# Patient Record
Sex: MALE | Race: WHITE | NOT HISPANIC OR LATINO | Employment: OTHER | URBAN - METROPOLITAN AREA
[De-identification: names, ages, dates, MRNs, and addresses within clinical notes are randomized per-mention and may not be internally consistent; named-entity substitution may affect disease eponyms.]

---

## 2017-12-17 ENCOUNTER — HOSPITAL ENCOUNTER (INPATIENT)
Facility: HOSPITAL | Age: 57
LOS: 8 days | Discharge: HOME | End: 2017-12-25
Attending: EMERGENCY MEDICINE | Admitting: HOSPITALIST

## 2017-12-17 ENCOUNTER — APPOINTMENT (OUTPATIENT)
Dept: CT IMAGING | Facility: HOSPITAL | Age: 57
End: 2017-12-17

## 2017-12-17 DIAGNOSIS — F10.930 ALCOHOL WITHDRAWAL SYNDROME WITHOUT COMPLICATION (HCC): Primary | ICD-10-CM

## 2017-12-17 DIAGNOSIS — Z74.09 IMPAIRED FUNCTIONAL MOBILITY, BALANCE, AND ENDURANCE: ICD-10-CM

## 2017-12-17 PROBLEM — Z72.0 TOBACCO ABUSE: Status: ACTIVE | Noted: 2017-12-17

## 2017-12-17 PROBLEM — F10.20 ALCOHOLISM: Status: ACTIVE | Noted: 2017-12-17

## 2017-12-17 PROBLEM — F41.9 ANXIETY: Status: ACTIVE | Noted: 2017-12-17

## 2017-12-17 PROBLEM — C64.9 RENAL CANCER, UNSPECIFIED LATERALITY (HCC): Status: ACTIVE | Noted: 2017-12-17

## 2017-12-17 PROBLEM — K76.0 FATTY LIVER: Status: ACTIVE | Noted: 2017-12-17

## 2017-12-17 LAB
ALBUMIN SERPL-MCNC: 4.2 G/DL (ref 3.5–5.2)
ALBUMIN/GLOB SERPL: 1.1 G/DL
ALP SERPL-CCNC: 122 U/L (ref 39–117)
ALT SERPL W P-5'-P-CCNC: 39 U/L (ref 1–41)
AMPHET+METHAMPHET UR QL: NEGATIVE
ANION GAP SERPL CALCULATED.3IONS-SCNC: 30.8 MMOL/L
AST SERPL-CCNC: 81 U/L (ref 1–40)
BARBITURATES UR QL SCN: NEGATIVE
BASOPHILS # BLD AUTO: 0.01 10*3/MM3 (ref 0–0.2)
BASOPHILS NFR BLD AUTO: 0.1 % (ref 0–1.5)
BENZODIAZ UR QL SCN: NEGATIVE
BILIRUB SERPL-MCNC: 0.8 MG/DL (ref 0.1–1.2)
BILIRUB UR QL STRIP: NEGATIVE
BUN BLD-MCNC: 9 MG/DL (ref 6–20)
BUN/CREAT SERPL: 7.6 (ref 7–25)
CALCIUM SPEC-SCNC: 9.4 MG/DL (ref 8.6–10.5)
CANNABINOIDS SERPL QL: NEGATIVE
CHLORIDE SERPL-SCNC: 81 MMOL/L (ref 98–107)
CLARITY UR: CLEAR
CO2 SERPL-SCNC: 22.2 MMOL/L (ref 22–29)
COCAINE UR QL: NEGATIVE
COLOR UR: ABNORMAL
CREAT BLD-MCNC: 1.19 MG/DL (ref 0.76–1.27)
DEPRECATED RDW RBC AUTO: 53.2 FL (ref 37–54)
EOSINOPHIL # BLD AUTO: 0.02 10*3/MM3 (ref 0–0.7)
EOSINOPHIL NFR BLD AUTO: 0.3 % (ref 0.3–6.2)
ERYTHROCYTE [DISTWIDTH] IN BLOOD BY AUTOMATED COUNT: 14.1 % (ref 11.5–14.5)
ETHANOL BLD-MCNC: 54 MG/DL (ref 0–10)
ETHANOL UR QL: 0.05 %
GFR SERPL CREATININE-BSD FRML MDRD: 63 ML/MIN/1.73
GLOBULIN UR ELPH-MCNC: 4 GM/DL
GLUCOSE BLD-MCNC: 112 MG/DL (ref 65–99)
GLUCOSE UR STRIP-MCNC: NEGATIVE MG/DL
HCT VFR BLD AUTO: 39.2 % (ref 40.4–52.2)
HGB BLD-MCNC: 14 G/DL (ref 13.7–17.6)
HGB UR QL STRIP.AUTO: NEGATIVE
HOLD SPECIMEN: NORMAL
HOLD SPECIMEN: NORMAL
IMM GRANULOCYTES # BLD: 0.02 10*3/MM3 (ref 0–0.03)
IMM GRANULOCYTES NFR BLD: 0.3 % (ref 0–0.5)
KETONES UR QL STRIP: NEGATIVE
LEUKOCYTE ESTERASE UR QL STRIP.AUTO: NEGATIVE
LIPASE SERPL-CCNC: 67 U/L (ref 13–60)
LYMPHOCYTES # BLD AUTO: 1.01 10*3/MM3 (ref 0.9–4.8)
LYMPHOCYTES NFR BLD AUTO: 13 % (ref 19.6–45.3)
MCH RBC QN AUTO: 37.3 PG (ref 27–32.7)
MCHC RBC AUTO-ENTMCNC: 35.7 G/DL (ref 32.6–36.4)
MCV RBC AUTO: 104.5 FL (ref 79.8–96.2)
METHADONE UR QL SCN: NEGATIVE
MONOCYTES # BLD AUTO: 0.54 10*3/MM3 (ref 0.2–1.2)
MONOCYTES NFR BLD AUTO: 7 % (ref 5–12)
NEUTROPHILS # BLD AUTO: 6.16 10*3/MM3 (ref 1.9–8.1)
NEUTROPHILS NFR BLD AUTO: 79.3 % (ref 42.7–76)
NITRITE UR QL STRIP: NEGATIVE
OPIATES UR QL: NEGATIVE
OXYCODONE UR QL SCN: NEGATIVE
PH UR STRIP.AUTO: 6.5 [PH] (ref 5–8)
PLATELET # BLD AUTO: 148 10*3/MM3 (ref 140–500)
PMV BLD AUTO: 9.8 FL (ref 6–12)
POTASSIUM BLD-SCNC: 3.7 MMOL/L (ref 3.5–5.2)
PROT SERPL-MCNC: 8.2 G/DL (ref 6–8.5)
PROT UR QL STRIP: NEGATIVE
RBC # BLD AUTO: 3.75 10*6/MM3 (ref 4.6–6)
SODIUM BLD-SCNC: 134 MMOL/L (ref 136–145)
SP GR UR STRIP: 1.01 (ref 1–1.03)
UROBILINOGEN UR QL STRIP: ABNORMAL
WBC NRBC COR # BLD: 7.76 10*3/MM3 (ref 4.5–10.7)
WHOLE BLOOD HOLD SPECIMEN: NORMAL
WHOLE BLOOD HOLD SPECIMEN: NORMAL

## 2017-12-17 PROCEDURE — 80053 COMPREHEN METABOLIC PANEL: CPT | Performed by: NURSE PRACTITIONER

## 2017-12-17 PROCEDURE — 80307 DRUG TEST PRSMV CHEM ANLYZR: CPT | Performed by: NURSE PRACTITIONER

## 2017-12-17 PROCEDURE — 99285 EMERGENCY DEPT VISIT HI MDM: CPT

## 2017-12-17 PROCEDURE — 83690 ASSAY OF LIPASE: CPT | Performed by: HOSPITALIST

## 2017-12-17 PROCEDURE — 90791 PSYCH DIAGNOSTIC EVALUATION: CPT | Performed by: SOCIAL WORKER

## 2017-12-17 PROCEDURE — 81003 URINALYSIS AUTO W/O SCOPE: CPT | Performed by: NURSE PRACTITIONER

## 2017-12-17 PROCEDURE — 74176 CT ABD & PELVIS W/O CONTRAST: CPT

## 2017-12-17 PROCEDURE — 25010000002 ONDANSETRON PER 1 MG: Performed by: NURSE PRACTITIONER

## 2017-12-17 PROCEDURE — 85025 COMPLETE CBC W/AUTO DIFF WBC: CPT | Performed by: NURSE PRACTITIONER

## 2017-12-17 PROCEDURE — 25010000002 MAGNESIUM SULFATE PER 500 MG OF MAGNESIUM: Performed by: NURSE PRACTITIONER

## 2017-12-17 PROCEDURE — 25010000002 THIAMINE PER 100 MG: Performed by: NURSE PRACTITIONER

## 2017-12-17 PROCEDURE — 25010000002 ONDANSETRON PER 1 MG: Performed by: HOSPITALIST

## 2017-12-17 PROCEDURE — 25010000002 LORAZEPAM PER 2 MG: Performed by: NURSE PRACTITIONER

## 2017-12-17 RX ORDER — PROPRANOLOL HYDROCHLORIDE 40 MG/1
60 TABLET ORAL 2 TIMES DAILY
COMMUNITY

## 2017-12-17 RX ORDER — LORAZEPAM 1 MG/1
1 TABLET ORAL
Status: DISCONTINUED | OUTPATIENT
Start: 2017-12-17 | End: 2017-12-25 | Stop reason: HOSPADM

## 2017-12-17 RX ORDER — LORAZEPAM 2 MG/ML
1 INJECTION INTRAMUSCULAR ONCE
Status: COMPLETED | OUTPATIENT
Start: 2017-12-17 | End: 2017-12-17

## 2017-12-17 RX ORDER — HYDROCHLOROTHIAZIDE 25 MG/1
25 TABLET ORAL DAILY
COMMUNITY
End: 2017-12-25 | Stop reason: HOSPADM

## 2017-12-17 RX ORDER — AMLODIPINE BESYLATE 5 MG/1
5 TABLET ORAL DAILY
Status: DISCONTINUED | OUTPATIENT
Start: 2017-12-17 | End: 2017-12-25 | Stop reason: HOSPADM

## 2017-12-17 RX ORDER — HYDROCHLOROTHIAZIDE 25 MG/1
25 TABLET ORAL DAILY
Status: DISCONTINUED | OUTPATIENT
Start: 2017-12-17 | End: 2017-12-21

## 2017-12-17 RX ORDER — DIPHENOXYLATE HYDROCHLORIDE AND ATROPINE SULFATE 2.5; .025 MG/1; MG/1
1 TABLET ORAL DAILY
Status: DISCONTINUED | OUTPATIENT
Start: 2017-12-17 | End: 2017-12-25 | Stop reason: HOSPADM

## 2017-12-17 RX ORDER — SODIUM CHLORIDE 9 MG/ML
100 INJECTION, SOLUTION INTRAVENOUS CONTINUOUS
Status: DISCONTINUED | OUTPATIENT
Start: 2017-12-17 | End: 2017-12-20

## 2017-12-17 RX ORDER — BUPROPION HYDROCHLORIDE 100 MG/1
300 TABLET ORAL DAILY
COMMUNITY
End: 2019-03-25 | Stop reason: HOSPADM

## 2017-12-17 RX ORDER — ACETAMINOPHEN 325 MG/1
650 TABLET ORAL EVERY 4 HOURS PRN
Status: DISCONTINUED | OUTPATIENT
Start: 2017-12-17 | End: 2017-12-25 | Stop reason: HOSPADM

## 2017-12-17 RX ORDER — ONDANSETRON 2 MG/ML
4 INJECTION INTRAMUSCULAR; INTRAVENOUS EVERY 4 HOURS PRN
Status: DISCONTINUED | OUTPATIENT
Start: 2017-12-17 | End: 2017-12-25 | Stop reason: HOSPADM

## 2017-12-17 RX ORDER — LORAZEPAM 2 MG/ML
1 INJECTION INTRAMUSCULAR EVERY 8 HOURS
Status: COMPLETED | OUTPATIENT
Start: 2017-12-18 | End: 2017-12-19

## 2017-12-17 RX ORDER — LORAZEPAM 2 MG/ML
1 INJECTION INTRAMUSCULAR EVERY 6 HOURS
Status: DISPENSED | OUTPATIENT
Start: 2017-12-17 | End: 2017-12-18

## 2017-12-17 RX ORDER — HYDROCODONE BITARTRATE AND ACETAMINOPHEN 5; 325 MG/1; MG/1
1 TABLET ORAL EVERY 6 HOURS PRN
Status: DISCONTINUED | OUTPATIENT
Start: 2017-12-17 | End: 2017-12-25 | Stop reason: HOSPADM

## 2017-12-17 RX ORDER — SODIUM CHLORIDE 0.9 % (FLUSH) 0.9 %
1-10 SYRINGE (ML) INJECTION AS NEEDED
Status: DISCONTINUED | OUTPATIENT
Start: 2017-12-17 | End: 2017-12-25 | Stop reason: HOSPADM

## 2017-12-17 RX ORDER — ONDANSETRON 2 MG/ML
4 INJECTION INTRAMUSCULAR; INTRAVENOUS ONCE
Status: COMPLETED | OUTPATIENT
Start: 2017-12-17 | End: 2017-12-17

## 2017-12-17 RX ORDER — THIAMINE MONONITRATE (VIT B1) 100 MG
100 TABLET ORAL DAILY
Status: DISCONTINUED | OUTPATIENT
Start: 2017-12-17 | End: 2017-12-25 | Stop reason: HOSPADM

## 2017-12-17 RX ORDER — ONDANSETRON 2 MG/ML
4 INJECTION INTRAMUSCULAR; INTRAVENOUS EVERY 4 HOURS
Status: DISCONTINUED | OUTPATIENT
Start: 2017-12-17 | End: 2017-12-17

## 2017-12-17 RX ORDER — AMLODIPINE BESYLATE 5 MG/1
5 TABLET ORAL DAILY
COMMUNITY
End: 2022-06-03 | Stop reason: HOSPADM

## 2017-12-17 RX ORDER — PROPRANOLOL HYDROCHLORIDE 40 MG/1
40 TABLET ORAL 3 TIMES DAILY
Status: DISCONTINUED | OUTPATIENT
Start: 2017-12-17 | End: 2017-12-25 | Stop reason: HOSPADM

## 2017-12-17 RX ORDER — SODIUM CHLORIDE 0.9 % (FLUSH) 0.9 %
10 SYRINGE (ML) INJECTION AS NEEDED
Status: DISCONTINUED | OUTPATIENT
Start: 2017-12-17 | End: 2017-12-25 | Stop reason: HOSPADM

## 2017-12-17 RX ORDER — HYDROCODONE BITARTRATE AND ACETAMINOPHEN 5; 325 MG/1; MG/1
1 TABLET ORAL EVERY 6 HOURS PRN
Status: ON HOLD | COMMUNITY
End: 2019-03-20

## 2017-12-17 RX ORDER — PANTOPRAZOLE SODIUM 40 MG/1
40 TABLET, DELAYED RELEASE ORAL
Status: DISCONTINUED | OUTPATIENT
Start: 2017-12-17 | End: 2017-12-25 | Stop reason: HOSPADM

## 2017-12-17 RX ADMIN — SODIUM CHLORIDE 1000 ML: 9 INJECTION, SOLUTION INTRAVENOUS at 09:13

## 2017-12-17 RX ADMIN — LORAZEPAM 1 MG: 1 TABLET ORAL at 20:01

## 2017-12-17 RX ADMIN — LORAZEPAM 1 MG: 1 TABLET ORAL at 12:59

## 2017-12-17 RX ADMIN — FOLIC ACID 1000 ML/HR: 5 INJECTION, SOLUTION INTRAMUSCULAR; INTRAVENOUS; SUBCUTANEOUS at 07:38

## 2017-12-17 RX ADMIN — LORAZEPAM 1 MG: 1 TABLET ORAL at 15:27

## 2017-12-17 RX ADMIN — ONDANSETRON 4 MG: 2 INJECTION INTRAMUSCULAR; INTRAVENOUS at 16:43

## 2017-12-17 RX ADMIN — Medication 1 TABLET: at 15:14

## 2017-12-17 RX ADMIN — LORAZEPAM 1 MG: 1 TABLET ORAL at 22:10

## 2017-12-17 RX ADMIN — Medication 100 MG: at 15:14

## 2017-12-17 RX ADMIN — SODIUM CHLORIDE 100 ML/HR: 9 INJECTION, SOLUTION INTRAVENOUS at 15:19

## 2017-12-17 RX ADMIN — ONDANSETRON 4 MG: 2 INJECTION INTRAMUSCULAR; INTRAVENOUS at 09:54

## 2017-12-17 RX ADMIN — PANTOPRAZOLE SODIUM 40 MG: 40 TABLET, DELAYED RELEASE ORAL at 16:43

## 2017-12-17 RX ADMIN — Medication 10 ML: at 15:16

## 2017-12-17 RX ADMIN — BUPROPION HYDROCHLORIDE 350 MG: 100 TABLET, FILM COATED ORAL at 15:15

## 2017-12-17 RX ADMIN — PROPRANOLOL HYDROCHLORIDE 40 MG: 40 TABLET ORAL at 15:15

## 2017-12-17 RX ADMIN — HYDROCHLOROTHIAZIDE 25 MG: 25 TABLET ORAL at 15:19

## 2017-12-17 RX ADMIN — LORAZEPAM 1 MG: 2 INJECTION INTRAMUSCULAR; INTRAVENOUS at 09:55

## 2017-12-17 RX ADMIN — ONDANSETRON 4 MG: 2 INJECTION INTRAMUSCULAR; INTRAVENOUS at 21:04

## 2017-12-17 RX ADMIN — LORAZEPAM 1 MG: 2 INJECTION INTRAMUSCULAR; INTRAVENOUS at 07:01

## 2017-12-17 RX ADMIN — ONDANSETRON 4 MG: 2 INJECTION INTRAMUSCULAR; INTRAVENOUS at 07:01

## 2017-12-17 RX ADMIN — LORAZEPAM 1 MG: 1 TABLET ORAL at 18:09

## 2017-12-17 RX ADMIN — AMLODIPINE BESYLATE 5 MG: 5 TABLET ORAL at 15:15

## 2017-12-18 LAB
ALBUMIN SERPL-MCNC: 3.7 G/DL (ref 3.5–5.2)
ALBUMIN/GLOB SERPL: 1.2 G/DL
ALP SERPL-CCNC: 94 U/L (ref 39–117)
ALT SERPL W P-5'-P-CCNC: 27 U/L (ref 1–41)
ANION GAP SERPL CALCULATED.3IONS-SCNC: 14 MMOL/L
AST SERPL-CCNC: 55 U/L (ref 1–40)
BASOPHILS # BLD AUTO: 0.01 10*3/MM3 (ref 0–0.2)
BASOPHILS NFR BLD AUTO: 0.2 % (ref 0–1.5)
BILIRUB SERPL-MCNC: 1.5 MG/DL (ref 0.1–1.2)
BUN BLD-MCNC: 9 MG/DL (ref 6–20)
BUN/CREAT SERPL: 8.4 (ref 7–25)
CALCIUM SPEC-SCNC: 8.8 MG/DL (ref 8.6–10.5)
CHLORIDE SERPL-SCNC: 94 MMOL/L (ref 98–107)
CO2 SERPL-SCNC: 26 MMOL/L (ref 22–29)
CREAT BLD-MCNC: 1.07 MG/DL (ref 0.76–1.27)
DEPRECATED RDW RBC AUTO: 54.5 FL (ref 37–54)
EOSINOPHIL # BLD AUTO: 0.05 10*3/MM3 (ref 0–0.7)
EOSINOPHIL NFR BLD AUTO: 1.1 % (ref 0.3–6.2)
ERYTHROCYTE [DISTWIDTH] IN BLOOD BY AUTOMATED COUNT: 13.9 % (ref 11.5–14.5)
GFR SERPL CREATININE-BSD FRML MDRD: 71 ML/MIN/1.73
GLOBULIN UR ELPH-MCNC: 3.1 GM/DL
GLUCOSE BLD-MCNC: 77 MG/DL (ref 65–99)
HCT VFR BLD AUTO: 35.2 % (ref 40.4–52.2)
HGB BLD-MCNC: 12.1 G/DL (ref 13.7–17.6)
IMM GRANULOCYTES # BLD: 0.02 10*3/MM3 (ref 0–0.03)
IMM GRANULOCYTES NFR BLD: 0.4 % (ref 0–0.5)
LYMPHOCYTES # BLD AUTO: 0.74 10*3/MM3 (ref 0.9–4.8)
LYMPHOCYTES NFR BLD AUTO: 16.3 % (ref 19.6–45.3)
MACROCYTES BLD QL SMEAR: NORMAL
MCH RBC QN AUTO: 36.9 PG (ref 27–32.7)
MCHC RBC AUTO-ENTMCNC: 34.4 G/DL (ref 32.6–36.4)
MCV RBC AUTO: 107.3 FL (ref 79.8–96.2)
MONOCYTES # BLD AUTO: 0.38 10*3/MM3 (ref 0.2–1.2)
MONOCYTES NFR BLD AUTO: 8.4 % (ref 5–12)
NEUTROPHILS # BLD AUTO: 3.33 10*3/MM3 (ref 1.9–8.1)
NEUTROPHILS NFR BLD AUTO: 73.6 % (ref 42.7–76)
PLAT MORPH BLD: NORMAL
PLATELET # BLD AUTO: 84 10*3/MM3 (ref 140–500)
PMV BLD AUTO: 10.3 FL (ref 6–12)
POTASSIUM BLD-SCNC: 3.6 MMOL/L (ref 3.5–5.2)
PROT SERPL-MCNC: 6.8 G/DL (ref 6–8.5)
RBC # BLD AUTO: 3.28 10*6/MM3 (ref 4.6–6)
SODIUM BLD-SCNC: 134 MMOL/L (ref 136–145)
STOMATOCYTES BLD QL SMEAR: NORMAL
WBC MORPH BLD: NORMAL
WBC NRBC COR # BLD: 4.53 10*3/MM3 (ref 4.5–10.7)

## 2017-12-18 PROCEDURE — 25010000002 LORAZEPAM PER 2 MG: Performed by: HOSPITALIST

## 2017-12-18 PROCEDURE — 85007 BL SMEAR W/DIFF WBC COUNT: CPT | Performed by: HOSPITALIST

## 2017-12-18 PROCEDURE — 25010000002 LORAZEPAM PER 2 MG: Performed by: INTERNAL MEDICINE

## 2017-12-18 PROCEDURE — 85025 COMPLETE CBC W/AUTO DIFF WBC: CPT | Performed by: HOSPITALIST

## 2017-12-18 PROCEDURE — 80053 COMPREHEN METABOLIC PANEL: CPT | Performed by: HOSPITALIST

## 2017-12-18 RX ORDER — LORAZEPAM 1 MG/1
1 TABLET ORAL
Status: DISCONTINUED | OUTPATIENT
Start: 2017-12-18 | End: 2017-12-25 | Stop reason: HOSPADM

## 2017-12-18 RX ORDER — LORAZEPAM 2 MG/ML
2 INJECTION INTRAMUSCULAR
Status: DISCONTINUED | OUTPATIENT
Start: 2017-12-18 | End: 2017-12-25 | Stop reason: HOSPADM

## 2017-12-18 RX ORDER — LORAZEPAM 2 MG/ML
1 INJECTION INTRAMUSCULAR
Status: DISCONTINUED | OUTPATIENT
Start: 2017-12-18 | End: 2017-12-25 | Stop reason: HOSPADM

## 2017-12-18 RX ORDER — LORAZEPAM 1 MG/1
2 TABLET ORAL
Status: DISCONTINUED | OUTPATIENT
Start: 2017-12-18 | End: 2017-12-25 | Stop reason: HOSPADM

## 2017-12-18 RX ADMIN — LORAZEPAM 2 MG: 2 INJECTION INTRAMUSCULAR; INTRAVENOUS at 05:47

## 2017-12-18 RX ADMIN — SODIUM CHLORIDE 100 ML/HR: 9 INJECTION, SOLUTION INTRAVENOUS at 12:23

## 2017-12-18 RX ADMIN — LORAZEPAM 2 MG: 2 INJECTION INTRAMUSCULAR; INTRAVENOUS at 23:35

## 2017-12-18 RX ADMIN — PROPRANOLOL HYDROCHLORIDE 40 MG: 40 TABLET ORAL at 20:55

## 2017-12-18 RX ADMIN — Medication 100 MG: at 12:25

## 2017-12-18 RX ADMIN — LORAZEPAM 1 MG: 2 INJECTION, SOLUTION INTRAMUSCULAR; INTRAVENOUS at 02:05

## 2017-12-18 RX ADMIN — BUPROPION HYDROCHLORIDE 350 MG: 100 TABLET, FILM COATED ORAL at 12:25

## 2017-12-18 RX ADMIN — AMLODIPINE BESYLATE 5 MG: 5 TABLET ORAL at 12:25

## 2017-12-18 RX ADMIN — HYDROCHLOROTHIAZIDE 25 MG: 25 TABLET ORAL at 12:25

## 2017-12-18 RX ADMIN — LORAZEPAM 2 MG: 2 INJECTION INTRAMUSCULAR; INTRAVENOUS at 04:41

## 2017-12-18 RX ADMIN — LORAZEPAM 1 MG: 1 TABLET ORAL at 00:11

## 2017-12-18 RX ADMIN — Medication 1 TABLET: at 12:25

## 2017-12-18 RX ADMIN — LORAZEPAM 1 MG: 1 TABLET ORAL at 03:28

## 2017-12-18 RX ADMIN — LORAZEPAM 1 MG: 2 INJECTION, SOLUTION INTRAMUSCULAR; INTRAVENOUS at 12:28

## 2017-12-18 RX ADMIN — LORAZEPAM 2 MG: 2 INJECTION INTRAMUSCULAR; INTRAVENOUS at 14:26

## 2017-12-18 RX ADMIN — LORAZEPAM 1 MG: 2 INJECTION, SOLUTION INTRAMUSCULAR; INTRAVENOUS at 20:53

## 2017-12-18 RX ADMIN — LORAZEPAM 2 MG: 2 INJECTION, SOLUTION INTRAMUSCULAR; INTRAVENOUS at 08:06

## 2017-12-18 RX ADMIN — PANTOPRAZOLE SODIUM 40 MG: 40 TABLET, DELAYED RELEASE ORAL at 18:09

## 2017-12-18 RX ADMIN — LORAZEPAM 1 MG: 2 INJECTION, SOLUTION INTRAMUSCULAR; INTRAVENOUS at 18:19

## 2017-12-18 RX ADMIN — LORAZEPAM 2 MG: 2 INJECTION INTRAMUSCULAR; INTRAVENOUS at 19:33

## 2017-12-18 RX ADMIN — LORAZEPAM 2 MG: 2 INJECTION INTRAMUSCULAR; INTRAVENOUS at 10:49

## 2017-12-18 RX ADMIN — SODIUM CHLORIDE 100 ML/HR: 9 INJECTION, SOLUTION INTRAVENOUS at 02:05

## 2017-12-18 RX ADMIN — SODIUM CHLORIDE 100 ML/HR: 9 INJECTION, SOLUTION INTRAVENOUS at 23:29

## 2017-12-19 LAB
ALBUMIN SERPL-MCNC: 3.9 G/DL (ref 3.5–5.2)
ALBUMIN/GLOB SERPL: 1.1 G/DL
ALP SERPL-CCNC: 98 U/L (ref 39–117)
ALT SERPL W P-5'-P-CCNC: 29 U/L (ref 1–41)
ANION GAP SERPL CALCULATED.3IONS-SCNC: 11.7 MMOL/L
AST SERPL-CCNC: 65 U/L (ref 1–40)
BASOPHILS # BLD AUTO: 0.01 10*3/MM3 (ref 0–0.2)
BASOPHILS NFR BLD AUTO: 0.1 % (ref 0–1.5)
BILIRUB SERPL-MCNC: 1.3 MG/DL (ref 0.1–1.2)
BUN BLD-MCNC: 6 MG/DL (ref 6–20)
BUN/CREAT SERPL: 6 (ref 7–25)
CALCIUM SPEC-SCNC: 8.9 MG/DL (ref 8.6–10.5)
CHLORIDE SERPL-SCNC: 91 MMOL/L (ref 98–107)
CO2 SERPL-SCNC: 28.3 MMOL/L (ref 22–29)
CREAT BLD-MCNC: 1 MG/DL (ref 0.76–1.27)
DEPRECATED RDW RBC AUTO: 53.7 FL (ref 37–54)
EOSINOPHIL # BLD AUTO: 0.09 10*3/MM3 (ref 0–0.7)
EOSINOPHIL NFR BLD AUTO: 1.3 % (ref 0.3–6.2)
ERYTHROCYTE [DISTWIDTH] IN BLOOD BY AUTOMATED COUNT: 13.8 % (ref 11.5–14.5)
GFR SERPL CREATININE-BSD FRML MDRD: 76 ML/MIN/1.73
GLOBULIN UR ELPH-MCNC: 3.5 GM/DL
GLUCOSE BLD-MCNC: 74 MG/DL (ref 65–99)
HCT VFR BLD AUTO: 37.7 % (ref 40.4–52.2)
HGB BLD-MCNC: 12.9 G/DL (ref 13.7–17.6)
IMM GRANULOCYTES # BLD: 0.02 10*3/MM3 (ref 0–0.03)
IMM GRANULOCYTES NFR BLD: 0.3 % (ref 0–0.5)
LIPASE SERPL-CCNC: 99 U/L (ref 13–60)
LYMPHOCYTES # BLD AUTO: 0.73 10*3/MM3 (ref 0.9–4.8)
LYMPHOCYTES NFR BLD AUTO: 10.5 % (ref 19.6–45.3)
MCH RBC QN AUTO: 36.9 PG (ref 27–32.7)
MCHC RBC AUTO-ENTMCNC: 34.2 G/DL (ref 32.6–36.4)
MCV RBC AUTO: 107.7 FL (ref 79.8–96.2)
MONOCYTES # BLD AUTO: 0.46 10*3/MM3 (ref 0.2–1.2)
MONOCYTES NFR BLD AUTO: 6.6 % (ref 5–12)
NEUTROPHILS # BLD AUTO: 5.63 10*3/MM3 (ref 1.9–8.1)
NEUTROPHILS NFR BLD AUTO: 81.2 % (ref 42.7–76)
PLATELET # BLD AUTO: 62 10*3/MM3 (ref 140–500)
PMV BLD AUTO: 11 FL (ref 6–12)
POTASSIUM BLD-SCNC: 3.6 MMOL/L (ref 3.5–5.2)
PROT SERPL-MCNC: 7.4 G/DL (ref 6–8.5)
RBC # BLD AUTO: 3.5 10*6/MM3 (ref 4.6–6)
SODIUM BLD-SCNC: 131 MMOL/L (ref 136–145)
WBC NRBC COR # BLD: 6.94 10*3/MM3 (ref 4.5–10.7)

## 2017-12-19 PROCEDURE — 25010000002 LORAZEPAM PER 2 MG: Performed by: INTERNAL MEDICINE

## 2017-12-19 PROCEDURE — 25010000002 ONDANSETRON PER 1 MG: Performed by: HOSPITALIST

## 2017-12-19 PROCEDURE — 85025 COMPLETE CBC W/AUTO DIFF WBC: CPT | Performed by: HOSPITALIST

## 2017-12-19 PROCEDURE — 80053 COMPREHEN METABOLIC PANEL: CPT | Performed by: HOSPITALIST

## 2017-12-19 PROCEDURE — 25010000002 LORAZEPAM PER 2 MG: Performed by: HOSPITALIST

## 2017-12-19 PROCEDURE — 83690 ASSAY OF LIPASE: CPT | Performed by: HOSPITALIST

## 2017-12-19 RX ORDER — NICOTINE 21 MG/24HR
1 PATCH, TRANSDERMAL 24 HOURS TRANSDERMAL EVERY 24 HOURS
Status: DISCONTINUED | OUTPATIENT
Start: 2017-12-19 | End: 2017-12-25 | Stop reason: HOSPADM

## 2017-12-19 RX ADMIN — PANTOPRAZOLE SODIUM 40 MG: 40 TABLET, DELAYED RELEASE ORAL at 16:32

## 2017-12-19 RX ADMIN — SODIUM CHLORIDE 100 ML/HR: 9 INJECTION, SOLUTION INTRAVENOUS at 20:59

## 2017-12-19 RX ADMIN — NICOTINE 1 PATCH: 21 PATCH, EXTENDED RELEASE TRANSDERMAL at 16:32

## 2017-12-19 RX ADMIN — ONDANSETRON 4 MG: 2 INJECTION INTRAMUSCULAR; INTRAVENOUS at 22:19

## 2017-12-19 RX ADMIN — PROPRANOLOL HYDROCHLORIDE 40 MG: 40 TABLET ORAL at 16:32

## 2017-12-19 RX ADMIN — LORAZEPAM 1 MG: 2 INJECTION, SOLUTION INTRAMUSCULAR; INTRAVENOUS at 05:46

## 2017-12-19 RX ADMIN — LORAZEPAM 1 MG: 2 INJECTION, SOLUTION INTRAMUSCULAR; INTRAVENOUS at 16:32

## 2017-12-19 RX ADMIN — Medication 100 MG: at 08:51

## 2017-12-19 RX ADMIN — LORAZEPAM 1 MG: 1 TABLET ORAL at 22:19

## 2017-12-19 RX ADMIN — AMLODIPINE BESYLATE 5 MG: 5 TABLET ORAL at 08:51

## 2017-12-19 RX ADMIN — PANTOPRAZOLE SODIUM 40 MG: 40 TABLET, DELAYED RELEASE ORAL at 08:50

## 2017-12-19 RX ADMIN — PROPRANOLOL HYDROCHLORIDE 40 MG: 40 TABLET ORAL at 08:51

## 2017-12-19 RX ADMIN — HYDROCHLOROTHIAZIDE 25 MG: 25 TABLET ORAL at 08:51

## 2017-12-19 RX ADMIN — BUPROPION HYDROCHLORIDE 350 MG: 100 TABLET, FILM COATED ORAL at 08:51

## 2017-12-19 RX ADMIN — Medication 1 TABLET: at 08:51

## 2017-12-19 RX ADMIN — LORAZEPAM 1 MG: 2 INJECTION, SOLUTION INTRAMUSCULAR; INTRAVENOUS at 12:52

## 2017-12-19 RX ADMIN — PROPRANOLOL HYDROCHLORIDE 40 MG: 40 TABLET ORAL at 20:13

## 2017-12-19 RX ADMIN — LORAZEPAM 1 MG: 2 INJECTION, SOLUTION INTRAMUSCULAR; INTRAVENOUS at 08:52

## 2017-12-20 LAB
ALBUMIN SERPL-MCNC: 3.6 G/DL (ref 3.5–5.2)
ALBUMIN/GLOB SERPL: 1 G/DL
ALP SERPL-CCNC: 99 U/L (ref 39–117)
ALT SERPL W P-5'-P-CCNC: 26 U/L (ref 1–41)
ANION GAP SERPL CALCULATED.3IONS-SCNC: 9.2 MMOL/L
AST SERPL-CCNC: 56 U/L (ref 1–40)
BASOPHILS # BLD AUTO: 0.01 10*3/MM3 (ref 0–0.2)
BASOPHILS NFR BLD AUTO: 0.1 % (ref 0–1.5)
BILIRUB SERPL-MCNC: 1.3 MG/DL (ref 0.1–1.2)
BUN BLD-MCNC: 7 MG/DL (ref 6–20)
BUN/CREAT SERPL: 7.1 (ref 7–25)
CALCIUM SPEC-SCNC: 8.9 MG/DL (ref 8.6–10.5)
CHLORIDE SERPL-SCNC: 92 MMOL/L (ref 98–107)
CO2 SERPL-SCNC: 30.8 MMOL/L (ref 22–29)
CREAT BLD-MCNC: 0.98 MG/DL (ref 0.76–1.27)
DEPRECATED RDW RBC AUTO: 51.8 FL (ref 37–54)
EOSINOPHIL # BLD AUTO: 0.13 10*3/MM3 (ref 0–0.7)
EOSINOPHIL NFR BLD AUTO: 1.7 % (ref 0.3–6.2)
ERYTHROCYTE [DISTWIDTH] IN BLOOD BY AUTOMATED COUNT: 13.5 % (ref 11.5–14.5)
GFR SERPL CREATININE-BSD FRML MDRD: 78 ML/MIN/1.73
GLOBULIN UR ELPH-MCNC: 3.6 GM/DL
GLUCOSE BLD-MCNC: 76 MG/DL (ref 65–99)
HCT VFR BLD AUTO: 36.6 % (ref 40.4–52.2)
HGB BLD-MCNC: 12.5 G/DL (ref 13.7–17.6)
IMM GRANULOCYTES # BLD: 0.03 10*3/MM3 (ref 0–0.03)
IMM GRANULOCYTES NFR BLD: 0.4 % (ref 0–0.5)
LYMPHOCYTES # BLD AUTO: 0.81 10*3/MM3 (ref 0.9–4.8)
LYMPHOCYTES NFR BLD AUTO: 10.5 % (ref 19.6–45.3)
MCH RBC QN AUTO: 36.4 PG (ref 27–32.7)
MCHC RBC AUTO-ENTMCNC: 34.2 G/DL (ref 32.6–36.4)
MCV RBC AUTO: 106.7 FL (ref 79.8–96.2)
MONOCYTES # BLD AUTO: 0.6 10*3/MM3 (ref 0.2–1.2)
MONOCYTES NFR BLD AUTO: 7.8 % (ref 5–12)
NEUTROPHILS # BLD AUTO: 6.13 10*3/MM3 (ref 1.9–8.1)
NEUTROPHILS NFR BLD AUTO: 79.5 % (ref 42.7–76)
PLATELET # BLD AUTO: 64 10*3/MM3 (ref 140–500)
PMV BLD AUTO: 11.9 FL (ref 6–12)
POTASSIUM BLD-SCNC: 3.5 MMOL/L (ref 3.5–5.2)
PROT SERPL-MCNC: 7.2 G/DL (ref 6–8.5)
RBC # BLD AUTO: 3.43 10*6/MM3 (ref 4.6–6)
SODIUM BLD-SCNC: 132 MMOL/L (ref 136–145)
WBC NRBC COR # BLD: 7.71 10*3/MM3 (ref 4.5–10.7)

## 2017-12-20 PROCEDURE — 25010000002 ONDANSETRON PER 1 MG: Performed by: HOSPITALIST

## 2017-12-20 PROCEDURE — 85025 COMPLETE CBC W/AUTO DIFF WBC: CPT | Performed by: HOSPITALIST

## 2017-12-20 PROCEDURE — 80053 COMPREHEN METABOLIC PANEL: CPT | Performed by: HOSPITALIST

## 2017-12-20 PROCEDURE — 97162 PT EVAL MOD COMPLEX 30 MIN: CPT

## 2017-12-20 PROCEDURE — 97110 THERAPEUTIC EXERCISES: CPT

## 2017-12-20 PROCEDURE — 25010000002 LORAZEPAM PER 2 MG: Performed by: INTERNAL MEDICINE

## 2017-12-20 RX ADMIN — AMLODIPINE BESYLATE 5 MG: 5 TABLET ORAL at 08:02

## 2017-12-20 RX ADMIN — ONDANSETRON 4 MG: 2 INJECTION INTRAMUSCULAR; INTRAVENOUS at 06:30

## 2017-12-20 RX ADMIN — SODIUM CHLORIDE 100 ML/HR: 9 INJECTION, SOLUTION INTRAVENOUS at 08:17

## 2017-12-20 RX ADMIN — LORAZEPAM 1 MG: 1 TABLET ORAL at 04:13

## 2017-12-20 RX ADMIN — LORAZEPAM 1 MG: 2 INJECTION, SOLUTION INTRAMUSCULAR; INTRAVENOUS at 22:28

## 2017-12-20 RX ADMIN — Medication 100 MG: at 08:02

## 2017-12-20 RX ADMIN — LORAZEPAM 2 MG: 2 INJECTION, SOLUTION INTRAMUSCULAR; INTRAVENOUS at 17:54

## 2017-12-20 RX ADMIN — HYDROCHLOROTHIAZIDE 25 MG: 25 TABLET ORAL at 08:02

## 2017-12-20 RX ADMIN — BUPROPION HYDROCHLORIDE 350 MG: 100 TABLET, FILM COATED ORAL at 08:02

## 2017-12-20 RX ADMIN — PANTOPRAZOLE SODIUM 40 MG: 40 TABLET, DELAYED RELEASE ORAL at 08:02

## 2017-12-20 RX ADMIN — NICOTINE 1 PATCH: 21 PATCH, EXTENDED RELEASE TRANSDERMAL at 17:00

## 2017-12-20 RX ADMIN — PROPRANOLOL HYDROCHLORIDE 40 MG: 40 TABLET ORAL at 18:27

## 2017-12-20 RX ADMIN — PROPRANOLOL HYDROCHLORIDE 40 MG: 40 TABLET ORAL at 08:02

## 2017-12-20 RX ADMIN — LORAZEPAM 1 MG: 1 TABLET ORAL at 11:41

## 2017-12-20 RX ADMIN — Medication 1 TABLET: at 08:02

## 2017-12-21 PROBLEM — E87.6 HYPOKALEMIA: Status: ACTIVE | Noted: 2017-12-21

## 2017-12-21 PROBLEM — E87.1 HYPONATREMIA: Status: ACTIVE | Noted: 2017-12-21

## 2017-12-21 LAB
ANION GAP SERPL CALCULATED.3IONS-SCNC: 15.1 MMOL/L
BASOPHILS # BLD AUTO: 0.02 10*3/MM3 (ref 0–0.2)
BASOPHILS NFR BLD AUTO: 0.3 % (ref 0–1.5)
BUN BLD-MCNC: 8 MG/DL (ref 6–20)
BUN/CREAT SERPL: 7.8 (ref 7–25)
CALCIUM SPEC-SCNC: 9 MG/DL (ref 8.6–10.5)
CHLORIDE SERPL-SCNC: 89 MMOL/L (ref 98–107)
CO2 SERPL-SCNC: 24.9 MMOL/L (ref 22–29)
CREAT BLD-MCNC: 1.03 MG/DL (ref 0.76–1.27)
DEPRECATED RDW RBC AUTO: 53.2 FL (ref 37–54)
EOSINOPHIL # BLD AUTO: 0.1 10*3/MM3 (ref 0–0.7)
EOSINOPHIL NFR BLD AUTO: 1.3 % (ref 0.3–6.2)
ERYTHROCYTE [DISTWIDTH] IN BLOOD BY AUTOMATED COUNT: 13.8 % (ref 11.5–14.5)
GFR SERPL CREATININE-BSD FRML MDRD: 74 ML/MIN/1.73
GLUCOSE BLD-MCNC: 79 MG/DL (ref 65–99)
HCT VFR BLD AUTO: 36.6 % (ref 40.4–52.2)
HGB BLD-MCNC: 12.5 G/DL (ref 13.7–17.6)
IMM GRANULOCYTES # BLD: 0.03 10*3/MM3 (ref 0–0.03)
IMM GRANULOCYTES NFR BLD: 0.4 % (ref 0–0.5)
LYMPHOCYTES # BLD AUTO: 1.05 10*3/MM3 (ref 0.9–4.8)
LYMPHOCYTES NFR BLD AUTO: 13.2 % (ref 19.6–45.3)
MCH RBC QN AUTO: 36.4 PG (ref 27–32.7)
MCHC RBC AUTO-ENTMCNC: 34.2 G/DL (ref 32.6–36.4)
MCV RBC AUTO: 106.7 FL (ref 79.8–96.2)
MONOCYTES # BLD AUTO: 0.92 10*3/MM3 (ref 0.2–1.2)
MONOCYTES NFR BLD AUTO: 11.5 % (ref 5–12)
NEUTROPHILS # BLD AUTO: 5.86 10*3/MM3 (ref 1.9–8.1)
NEUTROPHILS NFR BLD AUTO: 73.3 % (ref 42.7–76)
PLATELET # BLD AUTO: 103 10*3/MM3 (ref 140–500)
PMV BLD AUTO: 11.3 FL (ref 6–12)
POTASSIUM BLD-SCNC: 3.2 MMOL/L (ref 3.5–5.2)
POTASSIUM BLD-SCNC: 4 MMOL/L (ref 3.5–5.2)
RBC # BLD AUTO: 3.43 10*6/MM3 (ref 4.6–6)
SODIUM BLD-SCNC: 129 MMOL/L (ref 136–145)
WBC NRBC COR # BLD: 7.98 10*3/MM3 (ref 4.5–10.7)

## 2017-12-21 PROCEDURE — 84132 ASSAY OF SERUM POTASSIUM: CPT | Performed by: INTERNAL MEDICINE

## 2017-12-21 PROCEDURE — 85025 COMPLETE CBC W/AUTO DIFF WBC: CPT | Performed by: INTERNAL MEDICINE

## 2017-12-21 PROCEDURE — 25010000002 LORAZEPAM PER 2 MG: Performed by: INTERNAL MEDICINE

## 2017-12-21 PROCEDURE — 25010000002 ONDANSETRON PER 1 MG: Performed by: HOSPITALIST

## 2017-12-21 PROCEDURE — 80048 BASIC METABOLIC PNL TOTAL CA: CPT | Performed by: INTERNAL MEDICINE

## 2017-12-21 RX ORDER — POTASSIUM CHLORIDE 750 MG/1
40 CAPSULE, EXTENDED RELEASE ORAL AS NEEDED
Status: DISCONTINUED | OUTPATIENT
Start: 2017-12-21 | End: 2017-12-25 | Stop reason: HOSPADM

## 2017-12-21 RX ORDER — SODIUM CHLORIDE 9 MG/ML
75 INJECTION, SOLUTION INTRAVENOUS CONTINUOUS
Status: DISCONTINUED | OUTPATIENT
Start: 2017-12-21 | End: 2017-12-23

## 2017-12-21 RX ORDER — POTASSIUM CHLORIDE 1.5 G/1.77G
40 POWDER, FOR SOLUTION ORAL AS NEEDED
Status: DISCONTINUED | OUTPATIENT
Start: 2017-12-21 | End: 2017-12-21 | Stop reason: CLARIF

## 2017-12-21 RX ORDER — POTASSIUM CHLORIDE 7.45 MG/ML
10 INJECTION INTRAVENOUS
Status: DISCONTINUED | OUTPATIENT
Start: 2017-12-21 | End: 2017-12-25 | Stop reason: HOSPADM

## 2017-12-21 RX ADMIN — SODIUM CHLORIDE 75 ML/HR: 9 INJECTION, SOLUTION INTRAVENOUS at 23:44

## 2017-12-21 RX ADMIN — PROPRANOLOL HYDROCHLORIDE 40 MG: 40 TABLET ORAL at 21:57

## 2017-12-21 RX ADMIN — HYDROCHLOROTHIAZIDE 25 MG: 25 TABLET ORAL at 08:17

## 2017-12-21 RX ADMIN — PROPRANOLOL HYDROCHLORIDE 40 MG: 40 TABLET ORAL at 08:17

## 2017-12-21 RX ADMIN — POTASSIUM CHLORIDE 40 MEQ: 750 CAPSULE, EXTENDED RELEASE ORAL at 14:59

## 2017-12-21 RX ADMIN — LORAZEPAM 2 MG: 2 INJECTION INTRAMUSCULAR; INTRAVENOUS at 04:00

## 2017-12-21 RX ADMIN — LORAZEPAM 1 MG: 1 TABLET ORAL at 12:52

## 2017-12-21 RX ADMIN — Medication 100 MG: at 08:17

## 2017-12-21 RX ADMIN — HYDROCODONE BITARTRATE AND ACETAMINOPHEN 1 TABLET: 5; 325 TABLET ORAL at 12:51

## 2017-12-21 RX ADMIN — POTASSIUM CHLORIDE 40 MEQ: 750 CAPSULE, EXTENDED RELEASE ORAL at 11:19

## 2017-12-21 RX ADMIN — PROPRANOLOL HYDROCHLORIDE 40 MG: 40 TABLET ORAL at 15:51

## 2017-12-21 RX ADMIN — PANTOPRAZOLE SODIUM 40 MG: 40 TABLET, DELAYED RELEASE ORAL at 17:58

## 2017-12-21 RX ADMIN — ONDANSETRON 4 MG: 2 INJECTION INTRAMUSCULAR; INTRAVENOUS at 22:10

## 2017-12-21 RX ADMIN — BUPROPION HYDROCHLORIDE 350 MG: 100 TABLET, FILM COATED ORAL at 08:17

## 2017-12-21 RX ADMIN — NICOTINE 1 PATCH: 21 PATCH, EXTENDED RELEASE TRANSDERMAL at 17:34

## 2017-12-21 RX ADMIN — LORAZEPAM 1 MG: 2 INJECTION, SOLUTION INTRAMUSCULAR; INTRAVENOUS at 22:13

## 2017-12-21 RX ADMIN — LORAZEPAM 2 MG: 2 INJECTION INTRAMUSCULAR; INTRAVENOUS at 06:39

## 2017-12-21 RX ADMIN — AMLODIPINE BESYLATE 5 MG: 5 TABLET ORAL at 08:17

## 2017-12-21 RX ADMIN — PANTOPRAZOLE SODIUM 40 MG: 40 TABLET, DELAYED RELEASE ORAL at 05:23

## 2017-12-21 RX ADMIN — LORAZEPAM 2 MG: 2 INJECTION INTRAMUSCULAR; INTRAVENOUS at 05:23

## 2017-12-21 RX ADMIN — Medication 1 TABLET: at 08:17

## 2017-12-21 RX ADMIN — SODIUM CHLORIDE 75 ML/HR: 9 INJECTION, SOLUTION INTRAVENOUS at 10:40

## 2017-12-21 RX ADMIN — LORAZEPAM 2 MG: 2 INJECTION INTRAMUSCULAR; INTRAVENOUS at 00:38

## 2017-12-21 RX ADMIN — LORAZEPAM 2 MG: 2 INJECTION INTRAMUSCULAR; INTRAVENOUS at 02:06

## 2017-12-22 LAB
ANION GAP SERPL CALCULATED.3IONS-SCNC: 13.1 MMOL/L
BASOPHILS # BLD AUTO: 0.03 10*3/MM3 (ref 0–0.2)
BASOPHILS NFR BLD AUTO: 0.5 % (ref 0–1.5)
BUN BLD-MCNC: 9 MG/DL (ref 6–20)
BUN/CREAT SERPL: 9.2 (ref 7–25)
CALCIUM SPEC-SCNC: 9 MG/DL (ref 8.6–10.5)
CHLORIDE SERPL-SCNC: 93 MMOL/L (ref 98–107)
CO2 SERPL-SCNC: 23.9 MMOL/L (ref 22–29)
CREAT BLD-MCNC: 0.98 MG/DL (ref 0.76–1.27)
DEPRECATED RDW RBC AUTO: 56.7 FL (ref 37–54)
EOSINOPHIL # BLD AUTO: 0.09 10*3/MM3 (ref 0–0.7)
EOSINOPHIL NFR BLD AUTO: 1.6 % (ref 0.3–6.2)
ERYTHROCYTE [DISTWIDTH] IN BLOOD BY AUTOMATED COUNT: 14.3 % (ref 11.5–14.5)
GFR SERPL CREATININE-BSD FRML MDRD: 78 ML/MIN/1.73
GLUCOSE BLD-MCNC: 75 MG/DL (ref 65–99)
HCT VFR BLD AUTO: 37.4 % (ref 40.4–52.2)
HGB BLD-MCNC: 12.7 G/DL (ref 13.7–17.6)
IMM GRANULOCYTES # BLD: 0.04 10*3/MM3 (ref 0–0.03)
IMM GRANULOCYTES NFR BLD: 0.7 % (ref 0–0.5)
LYMPHOCYTES # BLD AUTO: 0.82 10*3/MM3 (ref 0.9–4.8)
LYMPHOCYTES NFR BLD AUTO: 14.4 % (ref 19.6–45.3)
MAGNESIUM SERPL-MCNC: 1.7 MG/DL (ref 1.6–2.6)
MCH RBC QN AUTO: 37 PG (ref 27–32.7)
MCHC RBC AUTO-ENTMCNC: 34 G/DL (ref 32.6–36.4)
MCV RBC AUTO: 109 FL (ref 79.8–96.2)
MONOCYTES # BLD AUTO: 0.93 10*3/MM3 (ref 0.2–1.2)
MONOCYTES NFR BLD AUTO: 16.4 % (ref 5–12)
NEUTROPHILS # BLD AUTO: 3.77 10*3/MM3 (ref 1.9–8.1)
NEUTROPHILS NFR BLD AUTO: 66.4 % (ref 42.7–76)
PHOSPHATE SERPL-MCNC: 2.9 MG/DL (ref 2.5–4.5)
PLATELET # BLD AUTO: 152 10*3/MM3 (ref 140–500)
PMV BLD AUTO: 11.2 FL (ref 6–12)
POTASSIUM BLD-SCNC: 3.7 MMOL/L (ref 3.5–5.2)
RBC # BLD AUTO: 3.43 10*6/MM3 (ref 4.6–6)
SODIUM BLD-SCNC: 130 MMOL/L (ref 136–145)
WBC NRBC COR # BLD: 5.68 10*3/MM3 (ref 4.5–10.7)

## 2017-12-22 PROCEDURE — 83735 ASSAY OF MAGNESIUM: CPT | Performed by: INTERNAL MEDICINE

## 2017-12-22 PROCEDURE — 84100 ASSAY OF PHOSPHORUS: CPT | Performed by: INTERNAL MEDICINE

## 2017-12-22 PROCEDURE — 80048 BASIC METABOLIC PNL TOTAL CA: CPT | Performed by: INTERNAL MEDICINE

## 2017-12-22 PROCEDURE — 97110 THERAPEUTIC EXERCISES: CPT | Performed by: PHYSICAL THERAPIST

## 2017-12-22 PROCEDURE — 85025 COMPLETE CBC W/AUTO DIFF WBC: CPT | Performed by: INTERNAL MEDICINE

## 2017-12-22 RX ADMIN — LORAZEPAM 1 MG: 1 TABLET ORAL at 12:12

## 2017-12-22 RX ADMIN — PANTOPRAZOLE SODIUM 40 MG: 40 TABLET, DELAYED RELEASE ORAL at 17:14

## 2017-12-22 RX ADMIN — Medication 100 MG: at 08:04

## 2017-12-22 RX ADMIN — PROPRANOLOL HYDROCHLORIDE 40 MG: 40 TABLET ORAL at 20:00

## 2017-12-22 RX ADMIN — LORAZEPAM 1 MG: 1 TABLET ORAL at 21:10

## 2017-12-22 RX ADMIN — BUPROPION HYDROCHLORIDE 350 MG: 100 TABLET, FILM COATED ORAL at 08:04

## 2017-12-22 RX ADMIN — LORAZEPAM 1 MG: 1 TABLET ORAL at 16:12

## 2017-12-22 RX ADMIN — SODIUM CHLORIDE 75 ML/HR: 9 INJECTION, SOLUTION INTRAVENOUS at 20:00

## 2017-12-22 RX ADMIN — LORAZEPAM 1 MG: 1 TABLET ORAL at 00:49

## 2017-12-22 RX ADMIN — SODIUM CHLORIDE 75 ML/HR: 9 INJECTION, SOLUTION INTRAVENOUS at 13:15

## 2017-12-22 RX ADMIN — HYDROCODONE BITARTRATE AND ACETAMINOPHEN 1 TABLET: 5; 325 TABLET ORAL at 18:33

## 2017-12-22 RX ADMIN — NICOTINE 1 PATCH: 21 PATCH, EXTENDED RELEASE TRANSDERMAL at 16:12

## 2017-12-22 RX ADMIN — LORAZEPAM 1 MG: 1 TABLET ORAL at 20:05

## 2017-12-22 RX ADMIN — Medication 1 TABLET: at 08:04

## 2017-12-22 RX ADMIN — HYDROCODONE BITARTRATE AND ACETAMINOPHEN 1 TABLET: 5; 325 TABLET ORAL at 00:39

## 2017-12-22 RX ADMIN — AMLODIPINE BESYLATE 5 MG: 5 TABLET ORAL at 08:04

## 2017-12-22 RX ADMIN — PANTOPRAZOLE SODIUM 40 MG: 40 TABLET, DELAYED RELEASE ORAL at 08:04

## 2017-12-22 RX ADMIN — ACETAMINOPHEN 650 MG: 325 TABLET, FILM COATED ORAL at 03:04

## 2017-12-22 RX ADMIN — HYDROCODONE BITARTRATE AND ACETAMINOPHEN 1 TABLET: 5; 325 TABLET ORAL at 12:12

## 2017-12-22 RX ADMIN — LORAZEPAM 1 MG: 1 TABLET ORAL at 23:16

## 2017-12-22 RX ADMIN — PROPRANOLOL HYDROCHLORIDE 40 MG: 40 TABLET ORAL at 08:04

## 2017-12-23 LAB
ANION GAP SERPL CALCULATED.3IONS-SCNC: 13.4 MMOL/L
BASOPHILS # BLD AUTO: 0.02 10*3/MM3 (ref 0–0.2)
BASOPHILS NFR BLD AUTO: 0.4 % (ref 0–1.5)
BUN BLD-MCNC: 10 MG/DL (ref 6–20)
BUN/CREAT SERPL: 9.9 (ref 7–25)
CALCIUM SPEC-SCNC: 9.2 MG/DL (ref 8.6–10.5)
CHLORIDE SERPL-SCNC: 96 MMOL/L (ref 98–107)
CO2 SERPL-SCNC: 26.6 MMOL/L (ref 22–29)
CREAT BLD-MCNC: 1.01 MG/DL (ref 0.76–1.27)
DEPRECATED RDW RBC AUTO: 54.2 FL (ref 37–54)
EOSINOPHIL # BLD AUTO: 0.07 10*3/MM3 (ref 0–0.7)
EOSINOPHIL NFR BLD AUTO: 1.5 % (ref 0.3–6.2)
ERYTHROCYTE [DISTWIDTH] IN BLOOD BY AUTOMATED COUNT: 13.7 % (ref 11.5–14.5)
GFR SERPL CREATININE-BSD FRML MDRD: 76 ML/MIN/1.73
GLUCOSE BLD-MCNC: 77 MG/DL (ref 65–99)
HCT VFR BLD AUTO: 35.6 % (ref 40.4–52.2)
HGB BLD-MCNC: 11.8 G/DL (ref 13.7–17.6)
IMM GRANULOCYTES # BLD: 0.02 10*3/MM3 (ref 0–0.03)
IMM GRANULOCYTES NFR BLD: 0.4 % (ref 0–0.5)
LYMPHOCYTES # BLD AUTO: 0.8 10*3/MM3 (ref 0.9–4.8)
LYMPHOCYTES NFR BLD AUTO: 17.4 % (ref 19.6–45.3)
MCH RBC QN AUTO: 36 PG (ref 27–32.7)
MCHC RBC AUTO-ENTMCNC: 33.1 G/DL (ref 32.6–36.4)
MCV RBC AUTO: 108.5 FL (ref 79.8–96.2)
MONOCYTES # BLD AUTO: 0.8 10*3/MM3 (ref 0.2–1.2)
MONOCYTES NFR BLD AUTO: 17.4 % (ref 5–12)
NEUTROPHILS # BLD AUTO: 2.89 10*3/MM3 (ref 1.9–8.1)
NEUTROPHILS NFR BLD AUTO: 62.9 % (ref 42.7–76)
PLATELET # BLD AUTO: 184 10*3/MM3 (ref 140–500)
PMV BLD AUTO: 10.7 FL (ref 6–12)
POTASSIUM BLD-SCNC: 3.7 MMOL/L (ref 3.5–5.2)
RBC # BLD AUTO: 3.28 10*6/MM3 (ref 4.6–6)
SODIUM BLD-SCNC: 136 MMOL/L (ref 136–145)
WBC NRBC COR # BLD: 4.6 10*3/MM3 (ref 4.5–10.7)

## 2017-12-23 PROCEDURE — G0009 ADMIN PNEUMOCOCCAL VACCINE: HCPCS | Performed by: HOSPITALIST

## 2017-12-23 PROCEDURE — 90732 PPSV23 VACC 2 YRS+ SUBQ/IM: CPT | Performed by: HOSPITALIST

## 2017-12-23 PROCEDURE — 97110 THERAPEUTIC EXERCISES: CPT | Performed by: PHYSICAL THERAPIST

## 2017-12-23 PROCEDURE — 90661 CCIIV3 VAC ABX FR 0.5 ML IM: CPT | Performed by: HOSPITALIST

## 2017-12-23 PROCEDURE — 80048 BASIC METABOLIC PNL TOTAL CA: CPT | Performed by: HOSPITALIST

## 2017-12-23 PROCEDURE — 25010000002 PNEUMOCOCCAL VAC POLYVALENT PER 0.5 ML: Performed by: HOSPITALIST

## 2017-12-23 PROCEDURE — 25010000002 INFLUENZA VAC SUBUNIT QUAD 0.5 ML SUSPENSION PREFILLED SYRINGE: Performed by: HOSPITALIST

## 2017-12-23 PROCEDURE — 85025 COMPLETE CBC W/AUTO DIFF WBC: CPT | Performed by: HOSPITALIST

## 2017-12-23 PROCEDURE — G0008 ADMIN INFLUENZA VIRUS VAC: HCPCS | Performed by: HOSPITALIST

## 2017-12-23 RX ADMIN — PROPRANOLOL HYDROCHLORIDE 40 MG: 40 TABLET ORAL at 09:14

## 2017-12-23 RX ADMIN — PROPRANOLOL HYDROCHLORIDE 40 MG: 40 TABLET ORAL at 16:39

## 2017-12-23 RX ADMIN — LORAZEPAM 1 MG: 1 TABLET ORAL at 12:59

## 2017-12-23 RX ADMIN — NICOTINE 1 PATCH: 21 PATCH, EXTENDED RELEASE TRANSDERMAL at 16:39

## 2017-12-23 RX ADMIN — HYDROCODONE BITARTRATE AND ACETAMINOPHEN 1 TABLET: 5; 325 TABLET ORAL at 09:20

## 2017-12-23 RX ADMIN — PANTOPRAZOLE SODIUM 40 MG: 40 TABLET, DELAYED RELEASE ORAL at 16:39

## 2017-12-23 RX ADMIN — Medication 1 TABLET: at 09:14

## 2017-12-23 RX ADMIN — A/SINGAPORE/GP1908/2015 IVR-180 (H1N1) (AN A/MICHIGAN/45/2015-LIKE VIRUS), A/SINGAPORE/GP2050/2015 (H3N2) (AN A/HONG KONG/4801/2014 - LIKE VIRUS), B/UTAH/9/2014 (A B/PHUKET/3073/2013-LIKE VIRUS), B/HONG KONG/259/2010 (A B/BRISBANE/60/08-LIKE VIRUS) 0.5 ML: 15; 15; 15; 15 INJECTION, SUSPENSION INTRAMUSCULAR at 16:48

## 2017-12-23 RX ADMIN — PANTOPRAZOLE SODIUM 40 MG: 40 TABLET, DELAYED RELEASE ORAL at 09:14

## 2017-12-23 RX ADMIN — PANTOPRAZOLE SODIUM 40 MG: 40 TABLET, DELAYED RELEASE ORAL at 06:15

## 2017-12-23 RX ADMIN — BUPROPION HYDROCHLORIDE 350 MG: 100 TABLET, FILM COATED ORAL at 09:17

## 2017-12-23 RX ADMIN — LORAZEPAM 1 MG: 1 TABLET ORAL at 06:15

## 2017-12-23 RX ADMIN — Medication 100 MG: at 09:14

## 2017-12-23 RX ADMIN — HYDROCODONE BITARTRATE AND ACETAMINOPHEN 1 TABLET: 5; 325 TABLET ORAL at 16:56

## 2017-12-23 RX ADMIN — PROPRANOLOL HYDROCHLORIDE 40 MG: 40 TABLET ORAL at 20:21

## 2017-12-23 RX ADMIN — LORAZEPAM 1 MG: 1 TABLET ORAL at 16:45

## 2017-12-23 RX ADMIN — HYDROCODONE BITARTRATE AND ACETAMINOPHEN 1 TABLET: 5; 325 TABLET ORAL at 01:36

## 2017-12-23 RX ADMIN — PNEUMOCOCCAL VACCINE POLYVALENT 0.5 ML
25; 25; 25; 25; 25; 25; 25; 25; 25; 25; 25; 25; 25; 25; 25; 25; 25; 25; 25; 25; 25; 25; 25 INJECTION, SOLUTION INTRAMUSCULAR; SUBCUTANEOUS at 16:45

## 2017-12-23 RX ADMIN — AMLODIPINE BESYLATE 5 MG: 5 TABLET ORAL at 09:14

## 2017-12-24 VITALS
BODY MASS INDEX: 33.43 KG/M2 | DIASTOLIC BLOOD PRESSURE: 88 MMHG | OXYGEN SATURATION: 95 % | TEMPERATURE: 98.8 F | HEIGHT: 67 IN | SYSTOLIC BLOOD PRESSURE: 141 MMHG | WEIGHT: 213 LBS | RESPIRATION RATE: 14 BRPM | HEART RATE: 68 BPM

## 2017-12-24 LAB
ANION GAP SERPL CALCULATED.3IONS-SCNC: 10.6 MMOL/L
BASOPHILS # BLD AUTO: 0.01 10*3/MM3 (ref 0–0.2)
BASOPHILS NFR BLD AUTO: 0.2 % (ref 0–1.5)
BUN BLD-MCNC: 9 MG/DL (ref 6–20)
BUN/CREAT SERPL: 9.1 (ref 7–25)
CALCIUM SPEC-SCNC: 9.2 MG/DL (ref 8.6–10.5)
CHLORIDE SERPL-SCNC: 97 MMOL/L (ref 98–107)
CO2 SERPL-SCNC: 27.4 MMOL/L (ref 22–29)
CREAT BLD-MCNC: 0.99 MG/DL (ref 0.76–1.27)
DEPRECATED RDW RBC AUTO: 54.7 FL (ref 37–54)
EOSINOPHIL # BLD AUTO: 0.04 10*3/MM3 (ref 0–0.7)
EOSINOPHIL NFR BLD AUTO: 0.8 % (ref 0.3–6.2)
ERYTHROCYTE [DISTWIDTH] IN BLOOD BY AUTOMATED COUNT: 14 % (ref 11.5–14.5)
GFR SERPL CREATININE-BSD FRML MDRD: 77 ML/MIN/1.73
GLUCOSE BLD-MCNC: 79 MG/DL (ref 65–99)
HCT VFR BLD AUTO: 34.4 % (ref 40.4–52.2)
HGB BLD-MCNC: 11.6 G/DL (ref 13.7–17.6)
IMM GRANULOCYTES # BLD: 0.03 10*3/MM3 (ref 0–0.03)
IMM GRANULOCYTES NFR BLD: 0.6 % (ref 0–0.5)
LYMPHOCYTES # BLD AUTO: 0.62 10*3/MM3 (ref 0.9–4.8)
LYMPHOCYTES NFR BLD AUTO: 11.7 % (ref 19.6–45.3)
MACROCYTES BLD QL SMEAR: NORMAL
MCH RBC QN AUTO: 36.4 PG (ref 27–32.7)
MCHC RBC AUTO-ENTMCNC: 33.7 G/DL (ref 32.6–36.4)
MCV RBC AUTO: 107.8 FL (ref 79.8–96.2)
MONOCYTES # BLD AUTO: 1.08 10*3/MM3 (ref 0.2–1.2)
MONOCYTES NFR BLD AUTO: 20.4 % (ref 5–12)
NEUTROPHILS # BLD AUTO: 3.52 10*3/MM3 (ref 1.9–8.1)
NEUTROPHILS NFR BLD AUTO: 66.3 % (ref 42.7–76)
NRBC BLD MANUAL-RTO: 0 /100 WBC (ref 0–0)
PLAT MORPH BLD: NORMAL
PLATELET # BLD AUTO: 255 10*3/MM3 (ref 140–500)
PMV BLD AUTO: 10.5 FL (ref 6–12)
POTASSIUM BLD-SCNC: 4.1 MMOL/L (ref 3.5–5.2)
RBC # BLD AUTO: 3.19 10*6/MM3 (ref 4.6–6)
SODIUM BLD-SCNC: 135 MMOL/L (ref 136–145)
STOMATOCYTES BLD QL SMEAR: NORMAL
WBC MORPH BLD: NORMAL
WBC NRBC COR # BLD: 5.3 10*3/MM3 (ref 4.5–10.7)

## 2017-12-24 PROCEDURE — 85007 BL SMEAR W/DIFF WBC COUNT: CPT | Performed by: HOSPITALIST

## 2017-12-24 PROCEDURE — 85025 COMPLETE CBC W/AUTO DIFF WBC: CPT | Performed by: HOSPITALIST

## 2017-12-24 PROCEDURE — 80048 BASIC METABOLIC PNL TOTAL CA: CPT | Performed by: HOSPITALIST

## 2017-12-24 RX ADMIN — Medication 100 MG: at 08:11

## 2017-12-24 RX ADMIN — AMLODIPINE BESYLATE 5 MG: 5 TABLET ORAL at 08:11

## 2017-12-24 RX ADMIN — Medication 1 TABLET: at 08:11

## 2017-12-24 RX ADMIN — BUPROPION HYDROCHLORIDE 350 MG: 100 TABLET, FILM COATED ORAL at 08:11

## 2017-12-24 RX ADMIN — PROPRANOLOL HYDROCHLORIDE 40 MG: 40 TABLET ORAL at 08:11

## 2017-12-24 RX ADMIN — PANTOPRAZOLE SODIUM 40 MG: 40 TABLET, DELAYED RELEASE ORAL at 08:11

## 2019-03-19 ENCOUNTER — HOSPITAL ENCOUNTER (INPATIENT)
Facility: HOSPITAL | Age: 59
LOS: 5 days | Discharge: HOME | End: 2019-03-25
Attending: EMERGENCY MEDICINE | Admitting: INTERNAL MEDICINE

## 2019-03-19 DIAGNOSIS — R94.31 PROLONGED Q-T INTERVAL ON ECG: ICD-10-CM

## 2019-03-19 DIAGNOSIS — E83.42 HYPOMAGNESEMIA: ICD-10-CM

## 2019-03-19 DIAGNOSIS — F10.931 ALCOHOL WITHDRAWAL SYNDROME, WITH DELIRIUM (HCC): Primary | ICD-10-CM

## 2019-03-19 PROCEDURE — 99285 EMERGENCY DEPT VISIT HI MDM: CPT

## 2019-03-19 PROCEDURE — 85025 COMPLETE CBC W/AUTO DIFF WBC: CPT | Performed by: EMERGENCY MEDICINE

## 2019-03-19 PROCEDURE — 83735 ASSAY OF MAGNESIUM: CPT | Performed by: EMERGENCY MEDICINE

## 2019-03-19 PROCEDURE — 93010 ELECTROCARDIOGRAM REPORT: CPT | Performed by: INTERNAL MEDICINE

## 2019-03-19 PROCEDURE — 80307 DRUG TEST PRSMV CHEM ANLYZR: CPT | Performed by: EMERGENCY MEDICINE

## 2019-03-19 PROCEDURE — 93005 ELECTROCARDIOGRAM TRACING: CPT | Performed by: EMERGENCY MEDICINE

## 2019-03-19 PROCEDURE — 80053 COMPREHEN METABOLIC PANEL: CPT | Performed by: EMERGENCY MEDICINE

## 2019-03-19 RX ORDER — MAGNESIUM SULFATE HEPTAHYDRATE 40 MG/ML
2 INJECTION, SOLUTION INTRAVENOUS ONCE
Status: COMPLETED | OUTPATIENT
Start: 2019-03-19 | End: 2019-03-20

## 2019-03-19 RX ADMIN — SODIUM CHLORIDE 1000 ML: 9 INJECTION, SOLUTION INTRAVENOUS at 23:54

## 2019-03-20 PROBLEM — F10.931 DELIRIUM TREMENS: Status: ACTIVE | Noted: 2019-03-20

## 2019-03-20 LAB
ALBUMIN SERPL-MCNC: 3.9 G/DL (ref 3.5–5.2)
ALBUMIN SERPL-MCNC: 5 G/DL (ref 3.5–5.2)
ALBUMIN/GLOB SERPL: 1.3 G/DL
ALBUMIN/GLOB SERPL: 1.4 G/DL
ALP SERPL-CCNC: 69 U/L (ref 39–117)
ALP SERPL-CCNC: 92 U/L (ref 39–117)
ALT SERPL W P-5'-P-CCNC: 34 U/L (ref 1–41)
ALT SERPL W P-5'-P-CCNC: 47 U/L (ref 1–41)
ANION GAP SERPL CALCULATED.3IONS-SCNC: 14.4 MMOL/L
ANION GAP SERPL CALCULATED.3IONS-SCNC: 33.3 MMOL/L
AST SERPL-CCNC: 39 U/L (ref 1–40)
AST SERPL-CCNC: 53 U/L (ref 1–40)
BASOPHILS # BLD AUTO: 0.03 10*3/MM3 (ref 0–0.2)
BASOPHILS # BLD AUTO: 0.05 10*3/MM3 (ref 0–0.2)
BASOPHILS NFR BLD AUTO: 0.3 % (ref 0–1.5)
BASOPHILS NFR BLD AUTO: 0.5 % (ref 0–1.5)
BILIRUB SERPL-MCNC: 0.6 MG/DL (ref 0.2–1.2)
BILIRUB SERPL-MCNC: 0.8 MG/DL (ref 0.2–1.2)
BUN BLD-MCNC: 7 MG/DL (ref 8–23)
BUN BLD-MCNC: 9 MG/DL (ref 8–23)
BUN/CREAT SERPL: 11 (ref 7–25)
BUN/CREAT SERPL: 6.1 (ref 7–25)
CA-I BLD-MCNC: 4.6 MG/DL (ref 4.6–5.4)
CA-I SERPL ISE-MCNC: 1.14 MMOL/L (ref 1.15–1.35)
CALCIUM SPEC-SCNC: 10.4 MG/DL (ref 8.6–10.5)
CALCIUM SPEC-SCNC: 9 MG/DL (ref 8.6–10.5)
CHLORIDE SERPL-SCNC: 83 MMOL/L (ref 98–107)
CHLORIDE SERPL-SCNC: 90 MMOL/L (ref 98–107)
CK SERPL-CCNC: 38 U/L (ref 20–200)
CO2 SERPL-SCNC: 23.7 MMOL/L (ref 22–29)
CO2 SERPL-SCNC: 33.6 MMOL/L (ref 22–29)
CREAT BLD-MCNC: 0.82 MG/DL (ref 0.76–1.27)
CREAT BLD-MCNC: 1.15 MG/DL (ref 0.76–1.27)
DEPRECATED RDW RBC AUTO: 44.1 FL (ref 37–54)
DEPRECATED RDW RBC AUTO: 45.1 FL (ref 37–54)
EOSINOPHIL # BLD AUTO: 0 10*3/MM3 (ref 0–0.4)
EOSINOPHIL # BLD AUTO: 0.01 10*3/MM3 (ref 0–0.4)
EOSINOPHIL NFR BLD AUTO: 0 % (ref 0.3–6.2)
EOSINOPHIL NFR BLD AUTO: 0.1 % (ref 0.3–6.2)
ERYTHROCYTE [DISTWIDTH] IN BLOOD BY AUTOMATED COUNT: 12.8 % (ref 12.3–15.4)
ERYTHROCYTE [DISTWIDTH] IN BLOOD BY AUTOMATED COUNT: 13 % (ref 12.3–15.4)
ETHANOL BLD-MCNC: 16 MG/DL (ref 0–10)
ETHANOL UR QL: 0.02 %
GFR SERPL CREATININE-BSD FRML MDRD: 65 ML/MIN/1.73
GFR SERPL CREATININE-BSD FRML MDRD: 96 ML/MIN/1.73
GLOBULIN UR ELPH-MCNC: 3 GM/DL
GLOBULIN UR ELPH-MCNC: 3.7 GM/DL
GLUCOSE BLD-MCNC: 167 MG/DL (ref 65–99)
GLUCOSE BLD-MCNC: 88 MG/DL (ref 65–99)
GLUCOSE BLDC GLUCOMTR-MCNC: 112 MG/DL (ref 70–130)
GLUCOSE BLDC GLUCOMTR-MCNC: 120 MG/DL (ref 70–130)
GLUCOSE BLDC GLUCOMTR-MCNC: 122 MG/DL (ref 70–130)
GLUCOSE BLDC GLUCOMTR-MCNC: 82 MG/DL (ref 70–130)
HCT VFR BLD AUTO: 38.5 % (ref 37.5–51)
HCT VFR BLD AUTO: 47 % (ref 37.5–51)
HGB BLD-MCNC: 13.1 G/DL (ref 13–17.7)
HGB BLD-MCNC: 16.4 G/DL (ref 13–17.7)
IMM GRANULOCYTES # BLD AUTO: 0.03 10*3/MM3 (ref 0–0.05)
IMM GRANULOCYTES # BLD AUTO: 0.04 10*3/MM3 (ref 0–0.05)
IMM GRANULOCYTES NFR BLD AUTO: 0.3 % (ref 0–0.5)
IMM GRANULOCYTES NFR BLD AUTO: 0.4 % (ref 0–0.5)
LYMPHOCYTES # BLD AUTO: 1.01 10*3/MM3 (ref 0.7–3.1)
LYMPHOCYTES # BLD AUTO: 1.54 10*3/MM3 (ref 0.7–3.1)
LYMPHOCYTES NFR BLD AUTO: 11.8 % (ref 19.6–45.3)
LYMPHOCYTES NFR BLD AUTO: 14.2 % (ref 19.6–45.3)
MAGNESIUM SERPL-MCNC: 1.2 MG/DL (ref 1.6–2.4)
MAGNESIUM SERPL-MCNC: 1.7 MG/DL (ref 1.6–2.4)
MCH RBC QN AUTO: 32 PG (ref 26.6–33)
MCH RBC QN AUTO: 32.9 PG (ref 26.6–33)
MCHC RBC AUTO-ENTMCNC: 34 G/DL (ref 31.5–35.7)
MCHC RBC AUTO-ENTMCNC: 34.9 G/DL (ref 31.5–35.7)
MCV RBC AUTO: 94.1 FL (ref 79–97)
MCV RBC AUTO: 94.2 FL (ref 79–97)
MONOCYTES # BLD AUTO: 0.83 10*3/MM3 (ref 0.1–0.9)
MONOCYTES # BLD AUTO: 1.11 10*3/MM3 (ref 0.1–0.9)
MONOCYTES NFR BLD AUTO: 10.2 % (ref 5–12)
MONOCYTES NFR BLD AUTO: 9.7 % (ref 5–12)
NEUTROPHILS # BLD AUTO: 6.68 10*3/MM3 (ref 1.4–7)
NEUTROPHILS # BLD AUTO: 8.13 10*3/MM3 (ref 1.4–7)
NEUTROPHILS NFR BLD AUTO: 74.6 % (ref 42.7–76)
NEUTROPHILS NFR BLD AUTO: 77.9 % (ref 42.7–76)
NRBC BLD AUTO-RTO: 0 /100 WBC (ref 0–0)
NRBC BLD AUTO-RTO: 0 /100 WBC (ref 0–0)
PHOSPHATE SERPL-MCNC: 2.5 MG/DL (ref 2.5–4.5)
PLATELET # BLD AUTO: 196 10*3/MM3 (ref 140–450)
PLATELET # BLD AUTO: 295 10*3/MM3 (ref 140–450)
PMV BLD AUTO: 9.4 FL (ref 6–12)
PMV BLD AUTO: 9.7 FL (ref 6–12)
POTASSIUM BLD-SCNC: 3.1 MMOL/L (ref 3.5–5.2)
POTASSIUM BLD-SCNC: 3.2 MMOL/L (ref 3.5–5.2)
POTASSIUM BLD-SCNC: 3.3 MMOL/L (ref 3.5–5.2)
PROT SERPL-MCNC: 6.9 G/DL (ref 6–8.5)
PROT SERPL-MCNC: 8.7 G/DL (ref 6–8.5)
RBC # BLD AUTO: 4.09 10*6/MM3 (ref 4.14–5.8)
RBC # BLD AUTO: 4.99 10*6/MM3 (ref 4.14–5.8)
SODIUM BLD-SCNC: 138 MMOL/L (ref 136–145)
SODIUM BLD-SCNC: 140 MMOL/L (ref 136–145)
WBC NRBC COR # BLD: 10.88 10*3/MM3 (ref 3.4–10.8)
WBC NRBC COR # BLD: 8.58 10*3/MM3 (ref 3.4–10.8)

## 2019-03-20 PROCEDURE — 82962 GLUCOSE BLOOD TEST: CPT

## 2019-03-20 PROCEDURE — 93010 ELECTROCARDIOGRAM REPORT: CPT | Performed by: INTERNAL MEDICINE

## 2019-03-20 PROCEDURE — 25010000002 ONDANSETRON PER 1 MG: Performed by: EMERGENCY MEDICINE

## 2019-03-20 PROCEDURE — 93005 ELECTROCARDIOGRAM TRACING: CPT | Performed by: INTERNAL MEDICINE

## 2019-03-20 PROCEDURE — 25010000002 ONDANSETRON PER 1 MG: Performed by: INTERNAL MEDICINE

## 2019-03-20 PROCEDURE — 84100 ASSAY OF PHOSPHORUS: CPT | Performed by: INTERNAL MEDICINE

## 2019-03-20 PROCEDURE — 25010000002 POTASSIUM CHLORIDE PER 2 MEQ OF POTASSIUM: Performed by: INTERNAL MEDICINE

## 2019-03-20 PROCEDURE — 82550 ASSAY OF CK (CPK): CPT | Performed by: INTERNAL MEDICINE

## 2019-03-20 PROCEDURE — 83735 ASSAY OF MAGNESIUM: CPT | Performed by: INTERNAL MEDICINE

## 2019-03-20 PROCEDURE — 85025 COMPLETE CBC W/AUTO DIFF WBC: CPT | Performed by: INTERNAL MEDICINE

## 2019-03-20 PROCEDURE — 25010000002 LORAZEPAM PER 2 MG: Performed by: INTERNAL MEDICINE

## 2019-03-20 PROCEDURE — 25010000002 MAGNESIUM SULFATE 2 GM/50ML SOLUTION: Performed by: EMERGENCY MEDICINE

## 2019-03-20 PROCEDURE — 82330 ASSAY OF CALCIUM: CPT | Performed by: INTERNAL MEDICINE

## 2019-03-20 PROCEDURE — 90791 PSYCH DIAGNOSTIC EVALUATION: CPT | Performed by: SOCIAL WORKER

## 2019-03-20 PROCEDURE — 25010000002 THIAMINE PER 100 MG: Performed by: INTERNAL MEDICINE

## 2019-03-20 PROCEDURE — 25010000002 LORAZEPAM PER 2 MG: Performed by: EMERGENCY MEDICINE

## 2019-03-20 PROCEDURE — 80053 COMPREHEN METABOLIC PANEL: CPT | Performed by: INTERNAL MEDICINE

## 2019-03-20 PROCEDURE — 84132 ASSAY OF SERUM POTASSIUM: CPT | Performed by: INTERNAL MEDICINE

## 2019-03-20 RX ORDER — LORAZEPAM 2 MG/ML
2 INJECTION INTRAMUSCULAR
Status: DISCONTINUED | OUTPATIENT
Start: 2019-03-20 | End: 2019-03-23

## 2019-03-20 RX ORDER — SODIUM CHLORIDE 0.9 % (FLUSH) 0.9 %
3-10 SYRINGE (ML) INJECTION AS NEEDED
Status: DISCONTINUED | OUTPATIENT
Start: 2019-03-20 | End: 2019-03-25 | Stop reason: HOSPADM

## 2019-03-20 RX ORDER — LORAZEPAM 2 MG/ML
1 INJECTION INTRAMUSCULAR
Status: DISCONTINUED | OUTPATIENT
Start: 2019-03-20 | End: 2019-03-25 | Stop reason: HOSPADM

## 2019-03-20 RX ORDER — LORAZEPAM 1 MG/1
1 TABLET ORAL
Status: DISCONTINUED | OUTPATIENT
Start: 2019-03-20 | End: 2019-03-25 | Stop reason: HOSPADM

## 2019-03-20 RX ORDER — ONDANSETRON 4 MG/1
4 TABLET, FILM COATED ORAL EVERY 6 HOURS PRN
Status: DISCONTINUED | OUTPATIENT
Start: 2019-03-20 | End: 2019-03-25 | Stop reason: HOSPADM

## 2019-03-20 RX ORDER — THIAMINE MONONITRATE (VIT B1) 100 MG
100 TABLET ORAL DAILY
Status: DISCONTINUED | OUTPATIENT
Start: 2019-03-20 | End: 2019-03-21

## 2019-03-20 RX ORDER — BUPROPION HYDROCHLORIDE 75 MG/1
150 TABLET ORAL DAILY
Status: DISCONTINUED | OUTPATIENT
Start: 2019-03-20 | End: 2019-03-23

## 2019-03-20 RX ORDER — MAGNESIUM SULFATE HEPTAHYDRATE 40 MG/ML
2 INJECTION, SOLUTION INTRAVENOUS AS NEEDED
Status: DISCONTINUED | OUTPATIENT
Start: 2019-03-20 | End: 2019-03-25 | Stop reason: HOSPADM

## 2019-03-20 RX ORDER — MAGNESIUM SULFATE HEPTAHYDRATE 40 MG/ML
4 INJECTION, SOLUTION INTRAVENOUS AS NEEDED
Status: DISCONTINUED | OUTPATIENT
Start: 2019-03-20 | End: 2019-03-25 | Stop reason: HOSPADM

## 2019-03-20 RX ORDER — FOLIC ACID 1 MG/1
1 TABLET ORAL DAILY
Status: DISCONTINUED | OUTPATIENT
Start: 2019-03-20 | End: 2019-03-21

## 2019-03-20 RX ORDER — ACETAMINOPHEN 325 MG/1
650 TABLET ORAL EVERY 6 HOURS PRN
Status: DISCONTINUED | OUTPATIENT
Start: 2019-03-20 | End: 2019-03-25 | Stop reason: HOSPADM

## 2019-03-20 RX ORDER — POTASSIUM CHLORIDE 750 MG/1
20 TABLET, EXTENDED RELEASE ORAL DAILY
Status: ON HOLD | COMMUNITY
End: 2019-03-20

## 2019-03-20 RX ORDER — POTASSIUM CHLORIDE 7.45 MG/ML
10 INJECTION INTRAVENOUS
Status: DISCONTINUED | OUTPATIENT
Start: 2019-03-20 | End: 2019-03-25 | Stop reason: HOSPADM

## 2019-03-20 RX ORDER — BUPROPION HYDROCHLORIDE 300 MG/1
TABLET ORAL
COMMUNITY
End: 2019-03-25 | Stop reason: HOSPADM

## 2019-03-20 RX ORDER — AMLODIPINE BESYLATE 5 MG/1
5 TABLET ORAL DAILY
Status: DISCONTINUED | OUTPATIENT
Start: 2019-03-20 | End: 2019-03-25 | Stop reason: HOSPADM

## 2019-03-20 RX ORDER — PROPRANOLOL HYDROCHLORIDE 60 MG/1
TABLET ORAL
COMMUNITY
End: 2019-03-25 | Stop reason: HOSPADM

## 2019-03-20 RX ORDER — HYDROCHLOROTHIAZIDE 25 MG/1
25 TABLET ORAL DAILY
COMMUNITY
End: 2019-11-03 | Stop reason: HOSPADM

## 2019-03-20 RX ORDER — POTASSIUM CHLORIDE 1.5 G/1.77G
40 POWDER, FOR SOLUTION ORAL AS NEEDED
Status: DISCONTINUED | OUTPATIENT
Start: 2019-03-20 | End: 2019-03-25 | Stop reason: HOSPADM

## 2019-03-20 RX ORDER — POTASSIUM CHLORIDE 750 MG/1
40 CAPSULE, EXTENDED RELEASE ORAL AS NEEDED
Status: DISCONTINUED | OUTPATIENT
Start: 2019-03-20 | End: 2019-03-25 | Stop reason: HOSPADM

## 2019-03-20 RX ORDER — SODIUM CHLORIDE 0.9 % (FLUSH) 0.9 %
3 SYRINGE (ML) INJECTION EVERY 12 HOURS SCHEDULED
Status: DISCONTINUED | OUTPATIENT
Start: 2019-03-20 | End: 2019-03-25 | Stop reason: HOSPADM

## 2019-03-20 RX ORDER — ONDANSETRON 2 MG/ML
4 INJECTION INTRAMUSCULAR; INTRAVENOUS ONCE
Status: COMPLETED | OUTPATIENT
Start: 2019-03-20 | End: 2019-03-20

## 2019-03-20 RX ORDER — CHLORDIAZEPOXIDE HYDROCHLORIDE 25 MG/1
50 CAPSULE, GELATIN COATED ORAL ONCE
Status: COMPLETED | OUTPATIENT
Start: 2019-03-20 | End: 2019-03-20

## 2019-03-20 RX ORDER — ONDANSETRON 4 MG/1
4 TABLET, ORALLY DISINTEGRATING ORAL EVERY 6 HOURS PRN
Status: DISCONTINUED | OUTPATIENT
Start: 2019-03-20 | End: 2019-03-25 | Stop reason: HOSPADM

## 2019-03-20 RX ORDER — LORAZEPAM 1 MG/1
2 TABLET ORAL
Status: DISCONTINUED | OUTPATIENT
Start: 2019-03-20 | End: 2019-03-23

## 2019-03-20 RX ORDER — LORAZEPAM 0.5 MG/1
0.5 TABLET ORAL
Status: DISCONTINUED | OUTPATIENT
Start: 2019-03-20 | End: 2019-03-25 | Stop reason: HOSPADM

## 2019-03-20 RX ORDER — LORAZEPAM 2 MG/ML
0.5 INJECTION INTRAMUSCULAR
Status: DISCONTINUED | OUTPATIENT
Start: 2019-03-20 | End: 2019-03-25 | Stop reason: HOSPADM

## 2019-03-20 RX ORDER — LORAZEPAM 2 MG/ML
2 INJECTION INTRAMUSCULAR ONCE
Status: COMPLETED | OUTPATIENT
Start: 2019-03-20 | End: 2019-03-20

## 2019-03-20 RX ORDER — ONDANSETRON 2 MG/ML
4 INJECTION INTRAMUSCULAR; INTRAVENOUS EVERY 6 HOURS PRN
Status: DISCONTINUED | OUTPATIENT
Start: 2019-03-20 | End: 2019-03-25 | Stop reason: HOSPADM

## 2019-03-20 RX ADMIN — POTASSIUM CHLORIDE 40 MEQ: 750 CAPSULE, EXTENDED RELEASE ORAL at 08:16

## 2019-03-20 RX ADMIN — AMLODIPINE BESYLATE 5 MG: 5 TABLET ORAL at 08:17

## 2019-03-20 RX ADMIN — LORAZEPAM 1 MG: 1 TABLET ORAL at 17:28

## 2019-03-20 RX ADMIN — MAGNESIUM SULFATE HEPTAHYDRATE 2 G: 40 INJECTION, SOLUTION INTRAVENOUS at 00:06

## 2019-03-20 RX ADMIN — POTASSIUM CHLORIDE 50 ML/HR: 2 INJECTION, SOLUTION, CONCENTRATE INTRAVENOUS at 08:18

## 2019-03-20 RX ADMIN — Medication 100 MG: at 11:50

## 2019-03-20 RX ADMIN — CHLORDIAZEPOXIDE HYDROCHLORIDE 50 MG: 25 CAPSULE ORAL at 00:28

## 2019-03-20 RX ADMIN — METOPROLOL TARTRATE 5 MG: 5 INJECTION INTRAVENOUS at 00:02

## 2019-03-20 RX ADMIN — ONDANSETRON HYDROCHLORIDE 4 MG: 2 SOLUTION INTRAMUSCULAR; INTRAVENOUS at 00:31

## 2019-03-20 RX ADMIN — LORAZEPAM 2 MG: 2 INJECTION INTRAMUSCULAR; INTRAVENOUS at 15:30

## 2019-03-20 RX ADMIN — LORAZEPAM 2 MG: 1 TABLET ORAL at 11:55

## 2019-03-20 RX ADMIN — LORAZEPAM 2 MG: 1 TABLET ORAL at 10:28

## 2019-03-20 RX ADMIN — SODIUM CHLORIDE, PRESERVATIVE FREE 3 ML: 5 INJECTION INTRAVENOUS at 20:47

## 2019-03-20 RX ADMIN — BUPROPION HYDROCHLORIDE 150 MG: 75 TABLET, FILM COATED ORAL at 08:17

## 2019-03-20 RX ADMIN — ONDANSETRON HYDROCHLORIDE 4 MG: 2 SOLUTION INTRAMUSCULAR; INTRAVENOUS at 08:17

## 2019-03-20 RX ADMIN — POTASSIUM CHLORIDE 40 MEQ: 750 CAPSULE, EXTENDED RELEASE ORAL at 11:50

## 2019-03-20 RX ADMIN — POTASSIUM CHLORIDE 40 MEQ: 750 CAPSULE, EXTENDED RELEASE ORAL at 17:28

## 2019-03-20 RX ADMIN — LORAZEPAM 2 MG: 2 INJECTION INTRAMUSCULAR; INTRAVENOUS at 00:29

## 2019-03-20 RX ADMIN — SODIUM CHLORIDE, PRESERVATIVE FREE 3 ML: 5 INJECTION INTRAVENOUS at 08:19

## 2019-03-20 RX ADMIN — LORAZEPAM 2 MG: 1 TABLET ORAL at 08:17

## 2019-03-20 RX ADMIN — FOLIC ACID 1 MG: 1 TABLET ORAL at 11:50

## 2019-03-20 RX ADMIN — MAGNESIUM SULFATE HEPTAHYDRATE 4 G: 40 INJECTION, SOLUTION INTRAVENOUS at 09:08

## 2019-03-20 RX ADMIN — PROPRANOLOL HYDROCHLORIDE 60 MG: 20 TABLET ORAL at 08:16

## 2019-03-21 LAB
ALBUMIN SERPL-MCNC: 3.4 G/DL (ref 3.5–5.2)
ALBUMIN/GLOB SERPL: 1.2 G/DL
ALP SERPL-CCNC: 66 U/L (ref 39–117)
ALT SERPL W P-5'-P-CCNC: 29 U/L (ref 1–41)
ANION GAP SERPL CALCULATED.3IONS-SCNC: 12.7 MMOL/L
AST SERPL-CCNC: 37 U/L (ref 1–40)
BASOPHILS # BLD AUTO: 0.02 10*3/MM3 (ref 0–0.2)
BASOPHILS NFR BLD AUTO: 0.3 % (ref 0–1.5)
BILIRUB SERPL-MCNC: 0.7 MG/DL (ref 0.2–1.2)
BUN BLD-MCNC: 10 MG/DL (ref 8–23)
BUN/CREAT SERPL: 11.9 (ref 7–25)
CALCIUM SPEC-SCNC: 9 MG/DL (ref 8.6–10.5)
CHLORIDE SERPL-SCNC: 95 MMOL/L (ref 98–107)
CO2 SERPL-SCNC: 28.3 MMOL/L (ref 22–29)
CREAT BLD-MCNC: 0.84 MG/DL (ref 0.76–1.27)
DEPRECATED RDW RBC AUTO: 47.2 FL (ref 37–54)
EOSINOPHIL # BLD AUTO: 0.03 10*3/MM3 (ref 0–0.4)
EOSINOPHIL NFR BLD AUTO: 0.4 % (ref 0.3–6.2)
ERYTHROCYTE [DISTWIDTH] IN BLOOD BY AUTOMATED COUNT: 13 % (ref 12.3–15.4)
GFR SERPL CREATININE-BSD FRML MDRD: 93 ML/MIN/1.73
GLOBULIN UR ELPH-MCNC: 2.8 GM/DL
GLUCOSE BLD-MCNC: 118 MG/DL (ref 65–99)
HCT VFR BLD AUTO: 38.1 % (ref 37.5–51)
HGB BLD-MCNC: 12.4 G/DL (ref 13–17.7)
IMM GRANULOCYTES # BLD AUTO: 0.03 10*3/MM3 (ref 0–0.05)
IMM GRANULOCYTES NFR BLD AUTO: 0.4 % (ref 0–0.5)
LYMPHOCYTES # BLD AUTO: 0.95 10*3/MM3 (ref 0.7–3.1)
LYMPHOCYTES NFR BLD AUTO: 14.2 % (ref 19.6–45.3)
MAGNESIUM SERPL-MCNC: 2 MG/DL (ref 1.6–2.4)
MCH RBC QN AUTO: 32.3 PG (ref 26.6–33)
MCHC RBC AUTO-ENTMCNC: 32.5 G/DL (ref 31.5–35.7)
MCV RBC AUTO: 99.2 FL (ref 79–97)
MONOCYTES # BLD AUTO: 0.77 10*3/MM3 (ref 0.1–0.9)
MONOCYTES NFR BLD AUTO: 11.5 % (ref 5–12)
NEUTROPHILS # BLD AUTO: 4.89 10*3/MM3 (ref 1.4–7)
NEUTROPHILS NFR BLD AUTO: 73.2 % (ref 42.7–76)
NRBC BLD AUTO-RTO: 0.1 /100 WBC (ref 0–0)
PLATELET # BLD AUTO: 160 10*3/MM3 (ref 140–450)
PMV BLD AUTO: 10.3 FL (ref 6–12)
POTASSIUM BLD-SCNC: 3.8 MMOL/L (ref 3.5–5.2)
PROT SERPL-MCNC: 6.2 G/DL (ref 6–8.5)
RBC # BLD AUTO: 3.84 10*6/MM3 (ref 4.14–5.8)
SODIUM BLD-SCNC: 136 MMOL/L (ref 136–145)
WBC NRBC COR # BLD: 6.69 10*3/MM3 (ref 3.4–10.8)

## 2019-03-21 PROCEDURE — 85025 COMPLETE CBC W/AUTO DIFF WBC: CPT | Performed by: INTERNAL MEDICINE

## 2019-03-21 PROCEDURE — 83735 ASSAY OF MAGNESIUM: CPT | Performed by: INTERNAL MEDICINE

## 2019-03-21 PROCEDURE — 25010000002 ENOXAPARIN PER 10 MG: Performed by: INTERNAL MEDICINE

## 2019-03-21 PROCEDURE — 25010000002 LORAZEPAM PER 2 MG: Performed by: INTERNAL MEDICINE

## 2019-03-21 PROCEDURE — 80053 COMPREHEN METABOLIC PANEL: CPT | Performed by: INTERNAL MEDICINE

## 2019-03-21 PROCEDURE — 25010000002 THIAMINE PER 100 MG: Performed by: INTERNAL MEDICINE

## 2019-03-21 RX ADMIN — LORAZEPAM 1 MG: 2 INJECTION INTRAMUSCULAR; INTRAVENOUS at 07:52

## 2019-03-21 RX ADMIN — LORAZEPAM 1 MG: 2 INJECTION INTRAMUSCULAR; INTRAVENOUS at 06:19

## 2019-03-21 RX ADMIN — DEXMEDETOMIDINE HYDROCHLORIDE 1.5 MCG/KG/HR: 100 INJECTION, SOLUTION, CONCENTRATE INTRAVENOUS at 07:16

## 2019-03-21 RX ADMIN — LORAZEPAM 2 MG: 2 INJECTION INTRAMUSCULAR; INTRAVENOUS at 07:32

## 2019-03-21 RX ADMIN — LORAZEPAM 2 MG: 2 INJECTION INTRAMUSCULAR; INTRAVENOUS at 10:47

## 2019-03-21 RX ADMIN — DEXMEDETOMIDINE HYDROCHLORIDE 0.6 MCG/KG/HR: 100 INJECTION, SOLUTION, CONCENTRATE INTRAVENOUS at 23:17

## 2019-03-21 RX ADMIN — SODIUM CHLORIDE, PRESERVATIVE FREE 3 ML: 5 INJECTION INTRAVENOUS at 08:23

## 2019-03-21 RX ADMIN — DEXMEDETOMIDINE HYDROCHLORIDE 0.7 MCG/KG/HR: 100 INJECTION, SOLUTION, CONCENTRATE INTRAVENOUS at 13:51

## 2019-03-21 RX ADMIN — LORAZEPAM 2 MG: 2 INJECTION INTRAMUSCULAR; INTRAVENOUS at 05:17

## 2019-03-21 RX ADMIN — DEXMEDETOMIDINE HYDROCHLORIDE 1 MCG/KG/HR: 100 INJECTION, SOLUTION, CONCENTRATE INTRAVENOUS at 10:15

## 2019-03-21 RX ADMIN — ENOXAPARIN SODIUM 40 MG: 40 INJECTION SUBCUTANEOUS at 17:04

## 2019-03-21 RX ADMIN — LORAZEPAM 2 MG: 2 INJECTION INTRAMUSCULAR; INTRAVENOUS at 04:14

## 2019-03-21 RX ADMIN — FOLIC ACID 1 MG: 5 INJECTION, SOLUTION INTRAMUSCULAR; INTRAVENOUS; SUBCUTANEOUS at 11:32

## 2019-03-21 RX ADMIN — SODIUM CHLORIDE, PRESERVATIVE FREE 3 ML: 5 INJECTION INTRAVENOUS at 20:05

## 2019-03-21 RX ADMIN — THIAMINE HYDROCHLORIDE 100 MG: 100 INJECTION, SOLUTION INTRAMUSCULAR; INTRAVENOUS at 11:32

## 2019-03-22 PROCEDURE — 25010000002 ENOXAPARIN PER 10 MG: Performed by: INTERNAL MEDICINE

## 2019-03-22 PROCEDURE — 25010000002 THIAMINE PER 100 MG: Performed by: INTERNAL MEDICINE

## 2019-03-22 RX ORDER — FOLIC ACID 1 MG/1
1 TABLET ORAL DAILY
Status: DISCONTINUED | OUTPATIENT
Start: 2019-03-23 | End: 2019-03-25 | Stop reason: HOSPADM

## 2019-03-22 RX ORDER — CHLORDIAZEPOXIDE HYDROCHLORIDE 25 MG/1
25 CAPSULE, GELATIN COATED ORAL EVERY 8 HOURS SCHEDULED
Status: DISCONTINUED | OUTPATIENT
Start: 2019-03-22 | End: 2019-03-23

## 2019-03-22 RX ORDER — THIAMINE MONONITRATE (VIT B1) 100 MG
100 TABLET ORAL DAILY
Status: DISCONTINUED | OUTPATIENT
Start: 2019-03-23 | End: 2019-03-25 | Stop reason: HOSPADM

## 2019-03-22 RX ORDER — ECHINACEA PURPUREA EXTRACT 125 MG
2 TABLET ORAL AS NEEDED
Status: DISCONTINUED | OUTPATIENT
Start: 2019-03-22 | End: 2019-03-25 | Stop reason: HOSPADM

## 2019-03-22 RX ADMIN — PROPRANOLOL HYDROCHLORIDE 60 MG: 20 TABLET ORAL at 08:18

## 2019-03-22 RX ADMIN — DEXMEDETOMIDINE HYDROCHLORIDE 0.5 MCG/KG/HR: 100 INJECTION, SOLUTION, CONCENTRATE INTRAVENOUS at 08:12

## 2019-03-22 RX ADMIN — SODIUM CHLORIDE, PRESERVATIVE FREE 3 ML: 5 INJECTION INTRAVENOUS at 10:07

## 2019-03-22 RX ADMIN — LORAZEPAM 1 MG: 1 TABLET ORAL at 19:48

## 2019-03-22 RX ADMIN — AMLODIPINE BESYLATE 5 MG: 5 TABLET ORAL at 08:18

## 2019-03-22 RX ADMIN — PROPRANOLOL HYDROCHLORIDE 60 MG: 20 TABLET ORAL at 22:05

## 2019-03-22 RX ADMIN — SODIUM CHLORIDE, PRESERVATIVE FREE 10 ML: 5 INJECTION INTRAVENOUS at 22:08

## 2019-03-22 RX ADMIN — ACETAMINOPHEN 650 MG: 325 TABLET, FILM COATED ORAL at 16:32

## 2019-03-22 RX ADMIN — THIAMINE HYDROCHLORIDE 100 MG: 100 INJECTION, SOLUTION INTRAMUSCULAR; INTRAVENOUS at 08:29

## 2019-03-22 RX ADMIN — Medication 2 SPRAY: at 16:32

## 2019-03-22 RX ADMIN — ENOXAPARIN SODIUM 40 MG: 40 INJECTION SUBCUTANEOUS at 16:50

## 2019-03-22 RX ADMIN — CHLORDIAZEPOXIDE HYDROCHLORIDE 25 MG: 25 CAPSULE ORAL at 14:27

## 2019-03-22 RX ADMIN — CHLORDIAZEPOXIDE HYDROCHLORIDE 25 MG: 25 CAPSULE ORAL at 22:04

## 2019-03-22 RX ADMIN — BUPROPION HYDROCHLORIDE 150 MG: 75 TABLET, FILM COATED ORAL at 08:19

## 2019-03-22 RX ADMIN — ACETAMINOPHEN 650 MG: 325 TABLET, FILM COATED ORAL at 23:38

## 2019-03-22 RX ADMIN — LORAZEPAM 1 MG: 1 TABLET ORAL at 23:38

## 2019-03-22 RX ADMIN — FOLIC ACID 1 MG: 5 INJECTION, SOLUTION INTRAMUSCULAR; INTRAVENOUS; SUBCUTANEOUS at 08:29

## 2019-03-23 LAB
ANION GAP SERPL CALCULATED.3IONS-SCNC: 12.1 MMOL/L
BUN BLD-MCNC: 12 MG/DL (ref 8–23)
BUN/CREAT SERPL: 15.2 (ref 7–25)
CALCIUM SPEC-SCNC: 8.8 MG/DL (ref 8.6–10.5)
CHLORIDE SERPL-SCNC: 97 MMOL/L (ref 98–107)
CO2 SERPL-SCNC: 26.9 MMOL/L (ref 22–29)
CREAT BLD-MCNC: 0.79 MG/DL (ref 0.76–1.27)
DEPRECATED RDW RBC AUTO: 47.1 FL (ref 37–54)
DEPRECATED RDW RBC AUTO: 48 FL (ref 37–54)
ERYTHROCYTE [DISTWIDTH] IN BLOOD BY AUTOMATED COUNT: 13.2 % (ref 12.3–15.4)
ERYTHROCYTE [DISTWIDTH] IN BLOOD BY AUTOMATED COUNT: 13.2 % (ref 12.3–15.4)
GFR SERPL CREATININE-BSD FRML MDRD: 100 ML/MIN/1.73
GLUCOSE BLD-MCNC: 94 MG/DL (ref 65–99)
HCT VFR BLD AUTO: 36.4 % (ref 37.5–51)
HCT VFR BLD AUTO: 37.7 % (ref 37.5–51)
HGB BLD-MCNC: 12 G/DL (ref 13–17.7)
HGB BLD-MCNC: 12.4 G/DL (ref 13–17.7)
MCH RBC QN AUTO: 32.6 PG (ref 26.6–33)
MCH RBC QN AUTO: 33.1 PG (ref 26.6–33)
MCHC RBC AUTO-ENTMCNC: 32.9 G/DL (ref 31.5–35.7)
MCHC RBC AUTO-ENTMCNC: 33 G/DL (ref 31.5–35.7)
MCV RBC AUTO: 100.3 FL (ref 79–97)
MCV RBC AUTO: 99.2 FL (ref 79–97)
PLATELET # BLD AUTO: 127 10*3/MM3 (ref 140–450)
PLATELET # BLD AUTO: 142 10*3/MM3 (ref 140–450)
PMV BLD AUTO: 10.7 FL (ref 6–12)
PMV BLD AUTO: 11.3 FL (ref 6–12)
POTASSIUM BLD-SCNC: 3.6 MMOL/L (ref 3.5–5.2)
RBC # BLD AUTO: 3.63 10*6/MM3 (ref 4.14–5.8)
RBC # BLD AUTO: 3.8 10*6/MM3 (ref 4.14–5.8)
SODIUM BLD-SCNC: 136 MMOL/L (ref 136–145)
WBC NRBC COR # BLD: 7.5 10*3/MM3 (ref 3.4–10.8)
WBC NRBC COR # BLD: 7.6 10*3/MM3 (ref 3.4–10.8)

## 2019-03-23 PROCEDURE — 85027 COMPLETE CBC AUTOMATED: CPT | Performed by: INTERNAL MEDICINE

## 2019-03-23 PROCEDURE — 94799 UNLISTED PULMONARY SVC/PX: CPT

## 2019-03-23 PROCEDURE — 80048 BASIC METABOLIC PNL TOTAL CA: CPT | Performed by: INTERNAL MEDICINE

## 2019-03-23 RX ORDER — CALCIUM CARBONATE 200(500)MG
2 TABLET,CHEWABLE ORAL 3 TIMES DAILY PRN
Status: DISCONTINUED | OUTPATIENT
Start: 2019-03-23 | End: 2019-03-25 | Stop reason: HOSPADM

## 2019-03-23 RX ORDER — HALOPERIDOL 5 MG/1
5 TABLET ORAL EVERY 6 HOURS PRN
Status: DISCONTINUED | OUTPATIENT
Start: 2019-03-23 | End: 2019-03-25 | Stop reason: HOSPADM

## 2019-03-23 RX ADMIN — AMLODIPINE BESYLATE 5 MG: 5 TABLET ORAL at 08:31

## 2019-03-23 RX ADMIN — PROPRANOLOL HYDROCHLORIDE 60 MG: 20 TABLET ORAL at 20:15

## 2019-03-23 RX ADMIN — PROPRANOLOL HYDROCHLORIDE 60 MG: 20 TABLET ORAL at 08:31

## 2019-03-23 RX ADMIN — FOLIC ACID 1 MG: 1 TABLET ORAL at 08:31

## 2019-03-23 RX ADMIN — BUPROPION HYDROCHLORIDE 150 MG: 75 TABLET, FILM COATED ORAL at 08:31

## 2019-03-23 RX ADMIN — SODIUM CHLORIDE, PRESERVATIVE FREE 3 ML: 5 INJECTION INTRAVENOUS at 08:23

## 2019-03-23 RX ADMIN — CHLORDIAZEPOXIDE HYDROCHLORIDE 25 MG: 25 CAPSULE ORAL at 06:10

## 2019-03-23 RX ADMIN — POTASSIUM CHLORIDE 40 MEQ: 750 CAPSULE, EXTENDED RELEASE ORAL at 06:10

## 2019-03-23 RX ADMIN — Medication 2 TABLET: at 14:18

## 2019-03-23 RX ADMIN — SODIUM CHLORIDE, PRESERVATIVE FREE 3 ML: 5 INJECTION INTRAVENOUS at 20:15

## 2019-03-23 RX ADMIN — ACETAMINOPHEN 650 MG: 325 TABLET, FILM COATED ORAL at 16:04

## 2019-03-23 RX ADMIN — Medication 100 MG: at 08:31

## 2019-03-23 RX ADMIN — CHLORDIAZEPOXIDE HYDROCHLORIDE 25 MG: 25 CAPSULE ORAL at 13:37

## 2019-03-24 ENCOUNTER — APPOINTMENT (OUTPATIENT)
Dept: GENERAL RADIOLOGY | Facility: HOSPITAL | Age: 59
End: 2019-03-24

## 2019-03-24 LAB
ALBUMIN SERPL-MCNC: 3.5 G/DL (ref 3.5–5.2)
ALBUMIN/GLOB SERPL: 1.3 G/DL
ALP SERPL-CCNC: 63 U/L (ref 39–117)
ALT SERPL W P-5'-P-CCNC: 23 U/L (ref 1–41)
ANION GAP SERPL CALCULATED.3IONS-SCNC: 12.2 MMOL/L
AST SERPL-CCNC: 27 U/L (ref 1–40)
BILIRUB SERPL-MCNC: 0.3 MG/DL (ref 0.2–1.2)
BUN BLD-MCNC: 10 MG/DL (ref 8–23)
BUN/CREAT SERPL: 10.2 (ref 7–25)
CALCIUM SPEC-SCNC: 9.4 MG/DL (ref 8.6–10.5)
CHLORIDE SERPL-SCNC: 99 MMOL/L (ref 98–107)
CO2 SERPL-SCNC: 24.8 MMOL/L (ref 22–29)
CREAT BLD-MCNC: 0.98 MG/DL (ref 0.76–1.27)
GFR SERPL CREATININE-BSD FRML MDRD: 78 ML/MIN/1.73
GLOBULIN UR ELPH-MCNC: 2.8 GM/DL
GLUCOSE BLD-MCNC: 99 MG/DL (ref 65–99)
MAGNESIUM SERPL-MCNC: 1.5 MG/DL (ref 1.6–2.4)
POTASSIUM BLD-SCNC: 4 MMOL/L (ref 3.5–5.2)
PROT SERPL-MCNC: 6.3 G/DL (ref 6–8.5)
SODIUM BLD-SCNC: 136 MMOL/L (ref 136–145)

## 2019-03-24 PROCEDURE — 83735 ASSAY OF MAGNESIUM: CPT | Performed by: INTERNAL MEDICINE

## 2019-03-24 PROCEDURE — 73630 X-RAY EXAM OF FOOT: CPT

## 2019-03-24 PROCEDURE — 80053 COMPREHEN METABOLIC PANEL: CPT | Performed by: INTERNAL MEDICINE

## 2019-03-24 PROCEDURE — 25010000002 MAGNESIUM SULFATE 2 GM/50ML SOLUTION: Performed by: INTERNAL MEDICINE

## 2019-03-24 PROCEDURE — 97162 PT EVAL MOD COMPLEX 30 MIN: CPT | Performed by: PHYSICAL THERAPIST

## 2019-03-24 PROCEDURE — 97110 THERAPEUTIC EXERCISES: CPT | Performed by: PHYSICAL THERAPIST

## 2019-03-24 RX ORDER — AMITRIPTYLINE HYDROCHLORIDE 25 MG/1
25 TABLET, FILM COATED ORAL NIGHTLY
Status: DISCONTINUED | OUTPATIENT
Start: 2019-03-24 | End: 2019-03-25 | Stop reason: HOSPADM

## 2019-03-24 RX ORDER — NICOTINE 21 MG/24HR
1 PATCH, TRANSDERMAL 24 HOURS TRANSDERMAL
Status: DISCONTINUED | OUTPATIENT
Start: 2019-03-24 | End: 2019-03-25 | Stop reason: HOSPADM

## 2019-03-24 RX ADMIN — PROPRANOLOL HYDROCHLORIDE 60 MG: 20 TABLET ORAL at 09:08

## 2019-03-24 RX ADMIN — ACETAMINOPHEN 650 MG: 325 TABLET, FILM COATED ORAL at 09:20

## 2019-03-24 RX ADMIN — MAGNESIUM SULFATE HEPTAHYDRATE 2 G: 40 INJECTION, SOLUTION INTRAVENOUS at 13:15

## 2019-03-24 RX ADMIN — FOLIC ACID 1 MG: 1 TABLET ORAL at 09:08

## 2019-03-24 RX ADMIN — SODIUM CHLORIDE, PRESERVATIVE FREE 3 ML: 5 INJECTION INTRAVENOUS at 20:26

## 2019-03-24 RX ADMIN — NICOTINE 1 PATCH: 21 PATCH, EXTENDED RELEASE TRANSDERMAL at 11:24

## 2019-03-24 RX ADMIN — PROPRANOLOL HYDROCHLORIDE 60 MG: 20 TABLET ORAL at 20:25

## 2019-03-24 RX ADMIN — MAGNESIUM SULFATE HEPTAHYDRATE 2 G: 40 INJECTION, SOLUTION INTRAVENOUS at 09:08

## 2019-03-24 RX ADMIN — MAGNESIUM SULFATE HEPTAHYDRATE 2 G: 40 INJECTION, SOLUTION INTRAVENOUS at 11:14

## 2019-03-24 RX ADMIN — AMLODIPINE BESYLATE 5 MG: 5 TABLET ORAL at 09:07

## 2019-03-24 RX ADMIN — Medication 100 MG: at 09:08

## 2019-03-24 RX ADMIN — AMITRIPTYLINE HYDROCHLORIDE 25 MG: 25 TABLET, FILM COATED ORAL at 15:42

## 2019-03-24 RX ADMIN — AMITRIPTYLINE HYDROCHLORIDE 25 MG: 25 TABLET, FILM COATED ORAL at 20:26

## 2019-03-24 RX ADMIN — ACETAMINOPHEN 650 MG: 325 TABLET, FILM COATED ORAL at 18:12

## 2019-03-24 RX ADMIN — SODIUM CHLORIDE, PRESERVATIVE FREE 3 ML: 5 INJECTION INTRAVENOUS at 09:20

## 2019-03-25 VITALS
WEIGHT: 200.18 LBS | HEART RATE: 77 BPM | OXYGEN SATURATION: 95 % | SYSTOLIC BLOOD PRESSURE: 95 MMHG | HEIGHT: 67 IN | DIASTOLIC BLOOD PRESSURE: 69 MMHG | BODY MASS INDEX: 31.42 KG/M2 | RESPIRATION RATE: 18 BRPM | TEMPERATURE: 97.5 F

## 2019-03-25 LAB — GLUCOSE BLDC GLUCOMTR-MCNC: 86 MG/DL (ref 70–130)

## 2019-03-25 PROCEDURE — 97110 THERAPEUTIC EXERCISES: CPT

## 2019-03-25 PROCEDURE — 82962 GLUCOSE BLOOD TEST: CPT

## 2019-03-25 RX ORDER — FOLIC ACID 1 MG/1
1 TABLET ORAL DAILY
Qty: 30 TABLET | Refills: 0 | Status: SHIPPED | OUTPATIENT
Start: 2019-03-26

## 2019-03-25 RX ORDER — AMITRIPTYLINE HYDROCHLORIDE 25 MG/1
25 TABLET, FILM COATED ORAL NIGHTLY
Qty: 30 TABLET | Refills: 0 | Status: SHIPPED | OUTPATIENT
Start: 2019-03-25 | End: 2023-01-03 | Stop reason: HOSPADM

## 2019-03-25 RX ORDER — LANOLIN ALCOHOL/MO/W.PET/CERES
100 CREAM (GRAM) TOPICAL DAILY
Qty: 30 TABLET | Refills: 0 | Status: SHIPPED | OUTPATIENT
Start: 2019-03-26

## 2019-03-25 RX ADMIN — NICOTINE 1 PATCH: 21 PATCH, EXTENDED RELEASE TRANSDERMAL at 09:11

## 2019-03-25 RX ADMIN — SODIUM CHLORIDE, PRESERVATIVE FREE 3 ML: 5 INJECTION INTRAVENOUS at 09:10

## 2019-03-25 RX ADMIN — PROPRANOLOL HYDROCHLORIDE 60 MG: 20 TABLET ORAL at 09:09

## 2019-03-25 RX ADMIN — ACETAMINOPHEN 650 MG: 325 TABLET, FILM COATED ORAL at 00:06

## 2019-03-25 RX ADMIN — Medication 100 MG: at 09:10

## 2019-03-25 RX ADMIN — LORAZEPAM 0.5 MG: 0.5 TABLET ORAL at 00:11

## 2019-03-25 RX ADMIN — ACETAMINOPHEN 650 MG: 325 TABLET, FILM COATED ORAL at 09:09

## 2019-03-25 RX ADMIN — FOLIC ACID 1 MG: 1 TABLET ORAL at 09:09

## 2019-03-25 RX ADMIN — AMLODIPINE BESYLATE 5 MG: 5 TABLET ORAL at 09:09

## 2019-03-26 ENCOUNTER — READMISSION MANAGEMENT (OUTPATIENT)
Dept: CALL CENTER | Facility: HOSPITAL | Age: 59
End: 2019-03-26

## 2019-03-26 NOTE — OUTREACH NOTE
Prep Survey      Responses   Facility patient discharged from?  [facility]   Is patient eligible?  No   What are the reasons patient is not eligible?  [facility]   Does the patient have one of the following disease processes/diagnoses(primary or secondary)?  Other [Alcohol withdrawal]   Prep survey completed?  Yes          Eva Mcneill RN

## 2019-10-30 ENCOUNTER — HOSPITAL ENCOUNTER (INPATIENT)
Facility: HOSPITAL | Age: 59
LOS: 4 days | Discharge: HOME | End: 2019-11-03
Attending: EMERGENCY MEDICINE | Admitting: INTERNAL MEDICINE

## 2019-10-30 ENCOUNTER — APPOINTMENT (OUTPATIENT)
Dept: CT IMAGING | Facility: HOSPITAL | Age: 59
End: 2019-10-30

## 2019-10-30 DIAGNOSIS — R56.9 ALCOHOL WITHDRAWAL SEIZURE WITH COMPLICATION (HCC): ICD-10-CM

## 2019-10-30 DIAGNOSIS — E87.6 HYPOKALEMIA: ICD-10-CM

## 2019-10-30 DIAGNOSIS — E87.1 HYPONATREMIA: ICD-10-CM

## 2019-10-30 DIAGNOSIS — F10.10 ALCOHOL ABUSE: Primary | ICD-10-CM

## 2019-10-30 DIAGNOSIS — E83.42 HYPOMAGNESEMIA: ICD-10-CM

## 2019-10-30 DIAGNOSIS — F10.939 ALCOHOL WITHDRAWAL SEIZURE WITH COMPLICATION (HCC): ICD-10-CM

## 2019-10-30 PROBLEM — R91.1 LUNG NODULE: Status: ACTIVE | Noted: 2017-08-21

## 2019-10-30 PROBLEM — K76.0 FATTY LIVER: Chronic | Status: ACTIVE | Noted: 2017-12-17

## 2019-10-30 PROBLEM — R45.7 UNDER EMOTIONAL STRESS: Status: ACTIVE | Noted: 2019-10-30

## 2019-10-30 PROBLEM — Z90.5 HISTORY OF LEFT NEPHRECTOMY: Chronic | Status: ACTIVE | Noted: 2019-10-30

## 2019-10-30 PROBLEM — Z72.0 TOBACCO ABUSE: Chronic | Status: ACTIVE | Noted: 2017-12-17

## 2019-10-30 PROBLEM — E55.9 VITAMIN D DEFICIENCY: Status: ACTIVE | Noted: 2017-08-16

## 2019-10-30 PROBLEM — F54 COPING STYLE AFFECTING MEDICAL CONDITION: Status: ACTIVE | Noted: 2019-10-30

## 2019-10-30 PROBLEM — F10.20 ALCOHOLISM: Chronic | Status: ACTIVE | Noted: 2017-12-17

## 2019-10-30 PROBLEM — K76.9 LIVER LESION: Status: ACTIVE | Noted: 2017-12-04

## 2019-10-30 PROBLEM — Z85.528 HISTORY OF RENAL CELL CANCER: Chronic | Status: ACTIVE | Noted: 2019-10-30

## 2019-10-30 PROBLEM — Z63.5 SEPARATED FROM SPOUSE: Status: ACTIVE | Noted: 2019-10-30

## 2019-10-30 LAB
ALBUMIN SERPL-MCNC: 3.8 G/DL (ref 3.5–5.2)
ALBUMIN/GLOB SERPL: 1.2 G/DL
ALP SERPL-CCNC: 103 U/L (ref 39–117)
ALT SERPL W P-5'-P-CCNC: 36 U/L (ref 1–41)
AMPHET+METHAMPHET UR QL: NEGATIVE
ANION GAP SERPL CALCULATED.3IONS-SCNC: 21.9 MMOL/L (ref 5–15)
AST SERPL-CCNC: 78 U/L (ref 1–40)
BARBITURATES UR QL SCN: NEGATIVE
BASOPHILS # BLD AUTO: 0.03 10*3/MM3 (ref 0–0.2)
BASOPHILS NFR BLD AUTO: 0.3 % (ref 0–1.5)
BENZODIAZ UR QL SCN: NEGATIVE
BILIRUB SERPL-MCNC: 1.3 MG/DL (ref 0.2–1.2)
BILIRUB UR QL STRIP: NEGATIVE
BUN BLD-MCNC: 13 MG/DL (ref 8–23)
BUN/CREAT SERPL: 9 (ref 7–25)
CALCIUM SPEC-SCNC: 8.1 MG/DL (ref 8.6–10.5)
CANNABINOIDS SERPL QL: NEGATIVE
CHLORIDE SERPL-SCNC: 74 MMOL/L (ref 98–107)
CLARITY UR: CLEAR
CO2 SERPL-SCNC: 28.1 MMOL/L (ref 22–29)
COCAINE UR QL: NEGATIVE
COLOR UR: YELLOW
CREAT BLD-MCNC: 1.44 MG/DL (ref 0.76–1.27)
DEPRECATED RDW RBC AUTO: 45.7 FL (ref 37–54)
EOSINOPHIL # BLD AUTO: 0.05 10*3/MM3 (ref 0–0.4)
EOSINOPHIL NFR BLD AUTO: 0.5 % (ref 0.3–6.2)
ERYTHROCYTE [DISTWIDTH] IN BLOOD BY AUTOMATED COUNT: 12.8 % (ref 12.3–15.4)
ETHANOL BLD-MCNC: <10 MG/DL (ref 0–10)
ETHANOL UR QL: <0.01 %
GFR SERPL CREATININE-BSD FRML MDRD: 50 ML/MIN/1.73
GLOBULIN UR ELPH-MCNC: 3.2 GM/DL
GLUCOSE BLD-MCNC: 108 MG/DL (ref 65–99)
GLUCOSE UR STRIP-MCNC: NEGATIVE MG/DL
HCT VFR BLD AUTO: 35.9 % (ref 37.5–51)
HGB BLD-MCNC: 12.4 G/DL (ref 13–17.7)
HGB UR QL STRIP.AUTO: NEGATIVE
IMM GRANULOCYTES # BLD AUTO: 0.09 10*3/MM3 (ref 0–0.05)
IMM GRANULOCYTES NFR BLD AUTO: 0.9 % (ref 0–0.5)
INR PPP: 1.14 (ref 0.9–1.1)
KETONES UR QL STRIP: NEGATIVE
LEUKOCYTE ESTERASE UR QL STRIP.AUTO: NEGATIVE
LYMPHOCYTES # BLD AUTO: 0.78 10*3/MM3 (ref 0.7–3.1)
LYMPHOCYTES NFR BLD AUTO: 8.2 % (ref 19.6–45.3)
MAGNESIUM SERPL-MCNC: 1.3 MG/DL (ref 1.6–2.4)
MCH RBC QN AUTO: 33.7 PG (ref 26.6–33)
MCHC RBC AUTO-ENTMCNC: 34.5 G/DL (ref 31.5–35.7)
MCV RBC AUTO: 97.6 FL (ref 79–97)
METHADONE UR QL SCN: NEGATIVE
MONOCYTES # BLD AUTO: 1.16 10*3/MM3 (ref 0.1–0.9)
MONOCYTES NFR BLD AUTO: 12.2 % (ref 5–12)
NEUTROPHILS # BLD AUTO: 7.42 10*3/MM3 (ref 1.7–7)
NEUTROPHILS NFR BLD AUTO: 77.9 % (ref 42.7–76)
NITRITE UR QL STRIP: NEGATIVE
NRBC BLD AUTO-RTO: 0 /100 WBC (ref 0–0.2)
OPIATES UR QL: NEGATIVE
OXYCODONE UR QL SCN: NEGATIVE
PH UR STRIP.AUTO: 6 [PH] (ref 5–8)
PLATELET # BLD AUTO: 209 10*3/MM3 (ref 140–450)
PMV BLD AUTO: 10.2 FL (ref 6–12)
POTASSIUM BLD-SCNC: 3 MMOL/L (ref 3.5–5.2)
PROT SERPL-MCNC: 7 G/DL (ref 6–8.5)
PROT UR QL STRIP: NEGATIVE
PROTHROMBIN TIME: 14.3 SECONDS (ref 11.7–14.2)
RBC # BLD AUTO: 3.68 10*6/MM3 (ref 4.14–5.8)
SODIUM BLD-SCNC: 124 MMOL/L (ref 136–145)
SP GR UR STRIP: 1.01 (ref 1–1.03)
TROPONIN T SERPL-MCNC: <0.01 NG/ML (ref 0–0.03)
TSH SERPL DL<=0.05 MIU/L-ACNC: 2.92 UIU/ML (ref 0.27–4.2)
UROBILINOGEN UR QL STRIP: NORMAL
WBC NRBC COR # BLD: 9.53 10*3/MM3 (ref 3.4–10.8)

## 2019-10-30 PROCEDURE — 84443 ASSAY THYROID STIM HORMONE: CPT | Performed by: EMERGENCY MEDICINE

## 2019-10-30 PROCEDURE — 93010 ELECTROCARDIOGRAM REPORT: CPT | Performed by: INTERNAL MEDICINE

## 2019-10-30 PROCEDURE — 93005 ELECTROCARDIOGRAM TRACING: CPT | Performed by: EMERGENCY MEDICINE

## 2019-10-30 PROCEDURE — 80307 DRUG TEST PRSMV CHEM ANLYZR: CPT | Performed by: EMERGENCY MEDICINE

## 2019-10-30 PROCEDURE — 84484 ASSAY OF TROPONIN QUANT: CPT | Performed by: EMERGENCY MEDICINE

## 2019-10-30 PROCEDURE — 25810000003 SODIUM CHLORIDE 0.9 % WITH KCL 20 MEQ 20-0.9 MEQ/L-% SOLUTION: Performed by: INTERNAL MEDICINE

## 2019-10-30 PROCEDURE — 85025 COMPLETE CBC W/AUTO DIFF WBC: CPT | Performed by: EMERGENCY MEDICINE

## 2019-10-30 PROCEDURE — 80053 COMPREHEN METABOLIC PANEL: CPT | Performed by: EMERGENCY MEDICINE

## 2019-10-30 PROCEDURE — 25010000002 MAGNESIUM SULFATE PER 500 MG OF MAGNESIUM: Performed by: EMERGENCY MEDICINE

## 2019-10-30 PROCEDURE — 25010000002 LORAZEPAM PER 2 MG: Performed by: EMERGENCY MEDICINE

## 2019-10-30 PROCEDURE — 70450 CT HEAD/BRAIN W/O DYE: CPT

## 2019-10-30 PROCEDURE — 81003 URINALYSIS AUTO W/O SCOPE: CPT | Performed by: EMERGENCY MEDICINE

## 2019-10-30 PROCEDURE — 83735 ASSAY OF MAGNESIUM: CPT | Performed by: EMERGENCY MEDICINE

## 2019-10-30 PROCEDURE — 85610 PROTHROMBIN TIME: CPT | Performed by: EMERGENCY MEDICINE

## 2019-10-30 PROCEDURE — 25010000002 MAGNESIUM SULFATE 2 GM/50ML SOLUTION: Performed by: INTERNAL MEDICINE

## 2019-10-30 PROCEDURE — 25010000002 THIAMINE PER 100 MG: Performed by: EMERGENCY MEDICINE

## 2019-10-30 PROCEDURE — 99285 EMERGENCY DEPT VISIT HI MDM: CPT

## 2019-10-30 RX ORDER — NALOXONE HCL 0.4 MG/ML
0.4 VIAL (ML) INJECTION
Status: DISCONTINUED | OUTPATIENT
Start: 2019-10-30 | End: 2019-11-03 | Stop reason: HOSPADM

## 2019-10-30 RX ORDER — AMITRIPTYLINE HYDROCHLORIDE 25 MG/1
25 TABLET, FILM COATED ORAL NIGHTLY
Status: DISCONTINUED | OUTPATIENT
Start: 2019-10-30 | End: 2019-11-01

## 2019-10-30 RX ORDER — CHLORDIAZEPOXIDE HYDROCHLORIDE 10 MG/1
10 CAPSULE, GELATIN COATED ORAL EVERY 6 HOURS SCHEDULED
Status: DISCONTINUED | OUTPATIENT
Start: 2019-10-31 | End: 2019-11-01

## 2019-10-30 RX ORDER — LORAZEPAM 2 MG/ML
1 INJECTION INTRAMUSCULAR
Status: DISCONTINUED | OUTPATIENT
Start: 2019-10-30 | End: 2019-11-03 | Stop reason: HOSPADM

## 2019-10-30 RX ORDER — SODIUM CHLORIDE AND POTASSIUM CHLORIDE 150; 900 MG/100ML; MG/100ML
125 INJECTION, SOLUTION INTRAVENOUS CONTINUOUS
Status: DISCONTINUED | OUTPATIENT
Start: 2019-10-30 | End: 2019-10-31

## 2019-10-30 RX ORDER — SENNA AND DOCUSATE SODIUM 50; 8.6 MG/1; MG/1
2 TABLET, FILM COATED ORAL 2 TIMES DAILY PRN
Status: DISCONTINUED | OUTPATIENT
Start: 2019-10-30 | End: 2019-11-03 | Stop reason: HOSPADM

## 2019-10-30 RX ORDER — LORAZEPAM 2 MG/ML
2 INJECTION INTRAMUSCULAR
Status: DISCONTINUED | OUTPATIENT
Start: 2019-10-30 | End: 2019-11-03 | Stop reason: HOSPADM

## 2019-10-30 RX ORDER — NITROGLYCERIN 0.4 MG/1
0.4 TABLET SUBLINGUAL
Status: DISCONTINUED | OUTPATIENT
Start: 2019-10-30 | End: 2019-11-03 | Stop reason: HOSPADM

## 2019-10-30 RX ORDER — POTASSIUM CHLORIDE 1.5 G/1.77G
40 POWDER, FOR SOLUTION ORAL AS NEEDED
Status: DISCONTINUED | OUTPATIENT
Start: 2019-10-30 | End: 2019-11-03 | Stop reason: HOSPADM

## 2019-10-30 RX ORDER — LORAZEPAM 1 MG/1
2 TABLET ORAL
Status: DISCONTINUED | OUTPATIENT
Start: 2019-10-30 | End: 2019-11-03 | Stop reason: HOSPADM

## 2019-10-30 RX ORDER — BISACODYL 10 MG
10 SUPPOSITORY, RECTAL RECTAL DAILY PRN
Status: DISCONTINUED | OUTPATIENT
Start: 2019-10-30 | End: 2019-11-03 | Stop reason: HOSPADM

## 2019-10-30 RX ORDER — MAGNESIUM SULFATE HEPTAHYDRATE 40 MG/ML
4 INJECTION, SOLUTION INTRAVENOUS AS NEEDED
Status: DISCONTINUED | OUTPATIENT
Start: 2019-10-30 | End: 2019-11-03 | Stop reason: HOSPADM

## 2019-10-30 RX ORDER — THIAMINE MONONITRATE (VIT B1) 100 MG
100 TABLET ORAL DAILY
Status: DISCONTINUED | OUTPATIENT
Start: 2019-10-31 | End: 2019-11-03 | Stop reason: HOSPADM

## 2019-10-30 RX ORDER — MAGNESIUM SULFATE HEPTAHYDRATE 40 MG/ML
2 INJECTION, SOLUTION INTRAVENOUS AS NEEDED
Status: DISCONTINUED | OUTPATIENT
Start: 2019-10-30 | End: 2019-11-03 | Stop reason: HOSPADM

## 2019-10-30 RX ORDER — SODIUM CHLORIDE 0.9 % (FLUSH) 0.9 %
10 SYRINGE (ML) INJECTION AS NEEDED
Status: DISCONTINUED | OUTPATIENT
Start: 2019-10-30 | End: 2019-10-30

## 2019-10-30 RX ORDER — POTASSIUM CHLORIDE 7.45 MG/ML
10 INJECTION INTRAVENOUS
Status: DISCONTINUED | OUTPATIENT
Start: 2019-10-30 | End: 2019-11-03 | Stop reason: HOSPADM

## 2019-10-30 RX ORDER — ACETAMINOPHEN 650 MG/1
650 SUPPOSITORY RECTAL EVERY 4 HOURS PRN
Status: DISCONTINUED | OUTPATIENT
Start: 2019-10-30 | End: 2019-11-03 | Stop reason: HOSPADM

## 2019-10-30 RX ORDER — POLYETHYLENE GLYCOL 3350 17 G/17G
17 POWDER, FOR SOLUTION ORAL DAILY
Status: DISCONTINUED | OUTPATIENT
Start: 2019-10-31 | End: 2019-11-03 | Stop reason: HOSPADM

## 2019-10-30 RX ORDER — CLONIDINE HYDROCHLORIDE 0.1 MG/1
0.1 TABLET ORAL EVERY 4 HOURS PRN
Status: DISCONTINUED | OUTPATIENT
Start: 2019-10-30 | End: 2019-11-03 | Stop reason: HOSPADM

## 2019-10-30 RX ORDER — CHOLECALCIFEROL (VITAMIN D3) 125 MCG
5 CAPSULE ORAL NIGHTLY PRN
Status: DISCONTINUED | OUTPATIENT
Start: 2019-10-30 | End: 2019-11-03 | Stop reason: HOSPADM

## 2019-10-30 RX ORDER — ACETAMINOPHEN 160 MG/5ML
650 SOLUTION ORAL EVERY 4 HOURS PRN
Status: DISCONTINUED | OUTPATIENT
Start: 2019-10-30 | End: 2019-11-03 | Stop reason: HOSPADM

## 2019-10-30 RX ORDER — NICOTINE 21 MG/24HR
1 PATCH, TRANSDERMAL 24 HOURS TRANSDERMAL EVERY 24 HOURS
Status: DISCONTINUED | OUTPATIENT
Start: 2019-10-30 | End: 2019-11-03 | Stop reason: HOSPADM

## 2019-10-30 RX ORDER — SODIUM CHLORIDE 0.9 % (FLUSH) 0.9 %
10 SYRINGE (ML) INJECTION AS NEEDED
Status: DISCONTINUED | OUTPATIENT
Start: 2019-10-30 | End: 2019-11-03 | Stop reason: HOSPADM

## 2019-10-30 RX ORDER — LORAZEPAM 1 MG/1
1 TABLET ORAL
Status: DISCONTINUED | OUTPATIENT
Start: 2019-10-30 | End: 2019-11-03 | Stop reason: HOSPADM

## 2019-10-30 RX ORDER — ONDANSETRON 2 MG/ML
4 INJECTION INTRAMUSCULAR; INTRAVENOUS EVERY 6 HOURS PRN
Status: DISCONTINUED | OUTPATIENT
Start: 2019-10-30 | End: 2019-11-03 | Stop reason: HOSPADM

## 2019-10-30 RX ORDER — CALCIUM CARBONATE 200(500)MG
2 TABLET,CHEWABLE ORAL 2 TIMES DAILY PRN
Status: DISCONTINUED | OUTPATIENT
Start: 2019-10-30 | End: 2019-11-03 | Stop reason: HOSPADM

## 2019-10-30 RX ORDER — ACETAMINOPHEN 325 MG/1
650 TABLET ORAL EVERY 4 HOURS PRN
Status: DISCONTINUED | OUTPATIENT
Start: 2019-10-30 | End: 2019-11-03 | Stop reason: HOSPADM

## 2019-10-30 RX ORDER — ONDANSETRON 4 MG/1
4 TABLET, FILM COATED ORAL EVERY 6 HOURS PRN
Status: DISCONTINUED | OUTPATIENT
Start: 2019-10-30 | End: 2019-11-03 | Stop reason: HOSPADM

## 2019-10-30 RX ORDER — POTASSIUM CHLORIDE 750 MG/1
40 CAPSULE, EXTENDED RELEASE ORAL AS NEEDED
Status: DISCONTINUED | OUTPATIENT
Start: 2019-10-30 | End: 2019-11-03 | Stop reason: HOSPADM

## 2019-10-30 RX ORDER — DOCUSATE SODIUM 100 MG/1
100 CAPSULE, LIQUID FILLED ORAL 2 TIMES DAILY PRN
Status: DISCONTINUED | OUTPATIENT
Start: 2019-10-30 | End: 2019-11-03 | Stop reason: HOSPADM

## 2019-10-30 RX ORDER — FOLIC ACID 1 MG/1
1 TABLET ORAL DAILY
Status: DISCONTINUED | OUTPATIENT
Start: 2019-10-31 | End: 2019-11-03 | Stop reason: HOSPADM

## 2019-10-30 RX ORDER — POTASSIUM CHLORIDE 750 MG/1
40 CAPSULE, EXTENDED RELEASE ORAL ONCE
Status: COMPLETED | OUTPATIENT
Start: 2019-10-30 | End: 2019-10-30

## 2019-10-30 RX ORDER — AMLODIPINE BESYLATE 5 MG/1
5 TABLET ORAL DAILY
Status: DISCONTINUED | OUTPATIENT
Start: 2019-10-31 | End: 2019-10-31

## 2019-10-30 RX ORDER — SODIUM CHLORIDE 0.9 % (FLUSH) 0.9 %
10 SYRINGE (ML) INJECTION EVERY 12 HOURS SCHEDULED
Status: DISCONTINUED | OUTPATIENT
Start: 2019-10-30 | End: 2019-11-03 | Stop reason: HOSPADM

## 2019-10-30 RX ORDER — MORPHINE SULFATE 2 MG/ML
1 INJECTION, SOLUTION INTRAMUSCULAR; INTRAVENOUS EVERY 4 HOURS PRN
Status: DISCONTINUED | OUTPATIENT
Start: 2019-10-30 | End: 2019-11-03 | Stop reason: HOSPADM

## 2019-10-30 RX ORDER — LORAZEPAM 2 MG/ML
2 INJECTION INTRAMUSCULAR ONCE
Status: COMPLETED | OUTPATIENT
Start: 2019-10-30 | End: 2019-10-30

## 2019-10-30 RX ADMIN — THIAMINE HYDROCHLORIDE 1000 ML/HR: 100 INJECTION, SOLUTION INTRAMUSCULAR; INTRAVENOUS at 15:56

## 2019-10-30 RX ADMIN — ACETAMINOPHEN 650 MG: 325 TABLET, FILM COATED ORAL at 23:09

## 2019-10-30 RX ADMIN — NICOTINE 1 PATCH: 21 PATCH, EXTENDED RELEASE TRANSDERMAL at 23:01

## 2019-10-30 RX ADMIN — AMITRIPTYLINE HYDROCHLORIDE 25 MG: 25 TABLET, FILM COATED ORAL at 22:32

## 2019-10-30 RX ADMIN — LORAZEPAM 2 MG: 2 INJECTION INTRAMUSCULAR; INTRAVENOUS at 15:28

## 2019-10-30 RX ADMIN — POTASSIUM CHLORIDE 40 MEQ: 750 CAPSULE, EXTENDED RELEASE ORAL at 22:39

## 2019-10-30 RX ADMIN — SODIUM CHLORIDE, PRESERVATIVE FREE 10 ML: 5 INJECTION INTRAVENOUS at 21:24

## 2019-10-30 RX ADMIN — MAGNESIUM SULFATE HEPTAHYDRATE 2 G: 40 INJECTION, SOLUTION INTRAVENOUS at 22:31

## 2019-10-30 RX ADMIN — PROPRANOLOL HYDROCHLORIDE 60 MG: 40 TABLET ORAL at 22:59

## 2019-10-30 RX ADMIN — POTASSIUM CHLORIDE 40 MEQ: 750 CAPSULE, EXTENDED RELEASE ORAL at 16:00

## 2019-10-30 RX ADMIN — SODIUM CHLORIDE 1000 ML: 9 INJECTION, SOLUTION INTRAVENOUS at 15:28

## 2019-10-30 RX ADMIN — POTASSIUM CHLORIDE AND SODIUM CHLORIDE 125 ML/HR: 900; 150 INJECTION, SOLUTION INTRAVENOUS at 21:22

## 2019-10-31 PROBLEM — E87.6 HYPOKALEMIA: Status: RESOLVED | Noted: 2017-12-21 | Resolved: 2019-10-31

## 2019-10-31 LAB
ANION GAP SERPL CALCULATED.3IONS-SCNC: 10.6 MMOL/L (ref 5–15)
BASOPHILS # BLD AUTO: 0.03 10*3/MM3 (ref 0–0.2)
BASOPHILS NFR BLD AUTO: 0.5 % (ref 0–1.5)
BUN BLD-MCNC: 12 MG/DL (ref 8–23)
BUN/CREAT SERPL: 12.4 (ref 7–25)
CALCIUM SPEC-SCNC: 7.5 MG/DL (ref 8.6–10.5)
CHLORIDE SERPL-SCNC: 92 MMOL/L (ref 98–107)
CO2 SERPL-SCNC: 27.4 MMOL/L (ref 22–29)
CREAT BLD-MCNC: 0.97 MG/DL (ref 0.76–1.27)
DEPRECATED RDW RBC AUTO: 43.5 FL (ref 37–54)
EOSINOPHIL # BLD AUTO: 0.07 10*3/MM3 (ref 0–0.4)
EOSINOPHIL NFR BLD AUTO: 1.1 % (ref 0.3–6.2)
ERYTHROCYTE [DISTWIDTH] IN BLOOD BY AUTOMATED COUNT: 12.6 % (ref 12.3–15.4)
GFR SERPL CREATININE-BSD FRML MDRD: 79 ML/MIN/1.73
GLUCOSE BLD-MCNC: 101 MG/DL (ref 65–99)
HCT VFR BLD AUTO: 29.4 % (ref 37.5–51)
HGB BLD-MCNC: 10.4 G/DL (ref 13–17.7)
IMM GRANULOCYTES # BLD AUTO: 0.06 10*3/MM3 (ref 0–0.05)
IMM GRANULOCYTES NFR BLD AUTO: 0.9 % (ref 0–0.5)
LYMPHOCYTES # BLD AUTO: 0.87 10*3/MM3 (ref 0.7–3.1)
LYMPHOCYTES NFR BLD AUTO: 13.6 % (ref 19.6–45.3)
MAGNESIUM SERPL-MCNC: 3.5 MG/DL (ref 1.6–2.4)
MCH RBC QN AUTO: 33.8 PG (ref 26.6–33)
MCHC RBC AUTO-ENTMCNC: 35.4 G/DL (ref 31.5–35.7)
MCV RBC AUTO: 95.5 FL (ref 79–97)
MONOCYTES # BLD AUTO: 0.86 10*3/MM3 (ref 0.1–0.9)
MONOCYTES NFR BLD AUTO: 13.5 % (ref 5–12)
NEUTROPHILS # BLD AUTO: 4.49 10*3/MM3 (ref 1.7–7)
NEUTROPHILS NFR BLD AUTO: 70.4 % (ref 42.7–76)
NRBC BLD AUTO-RTO: 0 /100 WBC (ref 0–0.2)
PLATELET # BLD AUTO: 160 10*3/MM3 (ref 140–450)
PMV BLD AUTO: 9.9 FL (ref 6–12)
POTASSIUM BLD-SCNC: 4.1 MMOL/L (ref 3.5–5.2)
RBC # BLD AUTO: 3.08 10*6/MM3 (ref 4.14–5.8)
SODIUM BLD-SCNC: 130 MMOL/L (ref 136–145)
WBC NRBC COR # BLD: 6.38 10*3/MM3 (ref 3.4–10.8)

## 2019-10-31 PROCEDURE — 83735 ASSAY OF MAGNESIUM: CPT | Performed by: INTERNAL MEDICINE

## 2019-10-31 PROCEDURE — 85025 COMPLETE CBC W/AUTO DIFF WBC: CPT | Performed by: INTERNAL MEDICINE

## 2019-10-31 PROCEDURE — 80048 BASIC METABOLIC PNL TOTAL CA: CPT | Performed by: INTERNAL MEDICINE

## 2019-10-31 PROCEDURE — 25010000002 LORAZEPAM PER 2 MG: Performed by: INTERNAL MEDICINE

## 2019-10-31 PROCEDURE — 25810000003 SODIUM CHLORIDE 0.9 % WITH KCL 20 MEQ 20-0.9 MEQ/L-% SOLUTION: Performed by: INTERNAL MEDICINE

## 2019-10-31 PROCEDURE — 25010000002 MAGNESIUM SULFATE 2 GM/50ML SOLUTION: Performed by: INTERNAL MEDICINE

## 2019-10-31 RX ORDER — PROPRANOLOL HYDROCHLORIDE 20 MG/1
20 TABLET ORAL 2 TIMES DAILY
Status: DISCONTINUED | OUTPATIENT
Start: 2019-10-31 | End: 2019-11-03 | Stop reason: HOSPADM

## 2019-10-31 RX ADMIN — Medication 5 MG: at 00:58

## 2019-10-31 RX ADMIN — CHLORDIAZEPOXIDE HYDROCHLORIDE 10 MG: 10 CAPSULE ORAL at 18:07

## 2019-10-31 RX ADMIN — POLYETHYLENE GLYCOL 3350 17 G: 17 POWDER, FOR SOLUTION ORAL at 09:49

## 2019-10-31 RX ADMIN — MAGNESIUM SULFATE HEPTAHYDRATE 2 G: 40 INJECTION, SOLUTION INTRAVENOUS at 02:03

## 2019-10-31 RX ADMIN — FOLIC ACID 1 MG: 1 TABLET ORAL at 09:49

## 2019-10-31 RX ADMIN — PROPRANOLOL HYDROCHLORIDE 20 MG: 40 TABLET ORAL at 20:57

## 2019-10-31 RX ADMIN — CHLORDIAZEPOXIDE HYDROCHLORIDE 10 MG: 10 CAPSULE ORAL at 05:24

## 2019-10-31 RX ADMIN — CHLORDIAZEPOXIDE HYDROCHLORIDE 10 MG: 10 CAPSULE ORAL at 00:58

## 2019-10-31 RX ADMIN — MAGNESIUM SULFATE HEPTAHYDRATE 2 G: 40 INJECTION, SOLUTION INTRAVENOUS at 00:08

## 2019-10-31 RX ADMIN — POTASSIUM CHLORIDE AND SODIUM CHLORIDE 125 ML/HR: 900; 150 INJECTION, SOLUTION INTRAVENOUS at 12:57

## 2019-10-31 RX ADMIN — CHLORDIAZEPOXIDE HYDROCHLORIDE 10 MG: 10 CAPSULE ORAL at 12:54

## 2019-10-31 RX ADMIN — SODIUM CHLORIDE, PRESERVATIVE FREE 10 ML: 5 INJECTION INTRAVENOUS at 20:59

## 2019-10-31 RX ADMIN — LORAZEPAM 2 MG: 2 INJECTION INTRAMUSCULAR; INTRAVENOUS at 02:15

## 2019-10-31 RX ADMIN — AMITRIPTYLINE HYDROCHLORIDE 25 MG: 25 TABLET, FILM COATED ORAL at 20:56

## 2019-10-31 RX ADMIN — NICOTINE 1 PATCH: 21 PATCH, EXTENDED RELEASE TRANSDERMAL at 21:04

## 2019-10-31 RX ADMIN — ACETAMINOPHEN 650 MG: 325 TABLET, FILM COATED ORAL at 20:57

## 2019-10-31 RX ADMIN — POTASSIUM CHLORIDE 40 MEQ: 750 CAPSULE, EXTENDED RELEASE ORAL at 02:03

## 2019-10-31 RX ADMIN — LORAZEPAM 1 MG: 2 INJECTION INTRAMUSCULAR; INTRAVENOUS at 00:58

## 2019-10-31 RX ADMIN — CHLORDIAZEPOXIDE HYDROCHLORIDE 10 MG: 10 CAPSULE ORAL at 23:47

## 2019-10-31 RX ADMIN — Medication 100 MG: at 09:49

## 2019-10-31 RX ADMIN — POTASSIUM CHLORIDE AND SODIUM CHLORIDE 125 ML/HR: 900; 150 INJECTION, SOLUTION INTRAVENOUS at 05:26

## 2019-11-01 ENCOUNTER — APPOINTMENT (OUTPATIENT)
Dept: NEUROLOGY | Facility: HOSPITAL | Age: 59
End: 2019-11-01

## 2019-11-01 PROBLEM — F10.931 DELIRIUM TREMENS (HCC): Status: RESOLVED | Noted: 2019-03-20 | Resolved: 2019-11-01

## 2019-11-01 PROBLEM — F10.939 ALCOHOL WITHDRAWAL SYNDROME WITH COMPLICATION: Status: ACTIVE | Noted: 2017-12-17

## 2019-11-01 LAB
ANION GAP SERPL CALCULATED.3IONS-SCNC: 12.1 MMOL/L (ref 5–15)
BUN BLD-MCNC: 10 MG/DL (ref 8–23)
BUN/CREAT SERPL: 9.3 (ref 7–25)
CALCIUM SPEC-SCNC: 7.1 MG/DL (ref 8.6–10.5)
CHLORIDE SERPL-SCNC: 95 MMOL/L (ref 98–107)
CO2 SERPL-SCNC: 25.9 MMOL/L (ref 22–29)
CREAT BLD-MCNC: 1.08 MG/DL (ref 0.76–1.27)
DEPRECATED RDW RBC AUTO: 44 FL (ref 37–54)
ERYTHROCYTE [DISTWIDTH] IN BLOOD BY AUTOMATED COUNT: 12.4 % (ref 12.3–15.4)
GFR SERPL CREATININE-BSD FRML MDRD: 70 ML/MIN/1.73
GLUCOSE BLD-MCNC: 82 MG/DL (ref 65–99)
HCT VFR BLD AUTO: 29.5 % (ref 37.5–51)
HGB BLD-MCNC: 10.1 G/DL (ref 13–17.7)
MAGNESIUM SERPL-MCNC: 1.8 MG/DL (ref 1.6–2.4)
MCH RBC QN AUTO: 33.2 PG (ref 26.6–33)
MCHC RBC AUTO-ENTMCNC: 34.2 G/DL (ref 31.5–35.7)
MCV RBC AUTO: 97 FL (ref 79–97)
PLATELET # BLD AUTO: 178 10*3/MM3 (ref 140–450)
PMV BLD AUTO: 9.9 FL (ref 6–12)
POTASSIUM BLD-SCNC: 4.5 MMOL/L (ref 3.5–5.2)
RBC # BLD AUTO: 3.04 10*6/MM3 (ref 4.14–5.8)
SODIUM BLD-SCNC: 133 MMOL/L (ref 136–145)
WBC NRBC COR # BLD: 5.82 10*3/MM3 (ref 3.4–10.8)

## 2019-11-01 PROCEDURE — 95816 EEG AWAKE AND DROWSY: CPT

## 2019-11-01 PROCEDURE — 83735 ASSAY OF MAGNESIUM: CPT | Performed by: INTERNAL MEDICINE

## 2019-11-01 PROCEDURE — 95816 EEG AWAKE AND DROWSY: CPT | Performed by: PSYCHIATRY & NEUROLOGY

## 2019-11-01 PROCEDURE — 97162 PT EVAL MOD COMPLEX 30 MIN: CPT

## 2019-11-01 PROCEDURE — 25010000002 LORAZEPAM PER 2 MG: Performed by: INTERNAL MEDICINE

## 2019-11-01 PROCEDURE — 85027 COMPLETE CBC AUTOMATED: CPT | Performed by: INTERNAL MEDICINE

## 2019-11-01 PROCEDURE — 80048 BASIC METABOLIC PNL TOTAL CA: CPT | Performed by: INTERNAL MEDICINE

## 2019-11-01 PROCEDURE — 97110 THERAPEUTIC EXERCISES: CPT

## 2019-11-01 RX ORDER — MIRTAZAPINE 15 MG/1
15 TABLET, FILM COATED ORAL NIGHTLY
Status: DISCONTINUED | OUTPATIENT
Start: 2019-11-01 | End: 2019-11-03 | Stop reason: HOSPADM

## 2019-11-01 RX ORDER — OLANZAPINE 10 MG/1
10 INJECTION, POWDER, LYOPHILIZED, FOR SOLUTION INTRAMUSCULAR EVERY 8 HOURS PRN
Status: DISCONTINUED | OUTPATIENT
Start: 2019-11-01 | End: 2019-11-03 | Stop reason: HOSPADM

## 2019-11-01 RX ADMIN — POLYETHYLENE GLYCOL 3350 17 G: 17 POWDER, FOR SOLUTION ORAL at 08:16

## 2019-11-01 RX ADMIN — MIRTAZAPINE 15 MG: 15 TABLET, FILM COATED ORAL at 20:01

## 2019-11-01 RX ADMIN — SODIUM CHLORIDE, PRESERVATIVE FREE 10 ML: 5 INJECTION INTRAVENOUS at 08:16

## 2019-11-01 RX ADMIN — FOLIC ACID 1 MG: 1 TABLET ORAL at 08:16

## 2019-11-01 RX ADMIN — PROPRANOLOL HYDROCHLORIDE 20 MG: 40 TABLET ORAL at 08:16

## 2019-11-01 RX ADMIN — LORAZEPAM 1 MG: 2 INJECTION INTRAMUSCULAR; INTRAVENOUS at 16:46

## 2019-11-01 RX ADMIN — OLANZAPINE 10 MG: 10 INJECTION, POWDER, LYOPHILIZED, FOR SOLUTION INTRAMUSCULAR at 20:40

## 2019-11-01 RX ADMIN — CHLORDIAZEPOXIDE HYDROCHLORIDE 10 MG: 10 CAPSULE ORAL at 05:49

## 2019-11-01 RX ADMIN — LORAZEPAM 1 MG: 2 INJECTION INTRAMUSCULAR; INTRAVENOUS at 12:51

## 2019-11-01 RX ADMIN — PROPRANOLOL HYDROCHLORIDE 20 MG: 40 TABLET ORAL at 20:01

## 2019-11-01 RX ADMIN — NICOTINE 1 PATCH: 21 PATCH, EXTENDED RELEASE TRANSDERMAL at 20:04

## 2019-11-01 RX ADMIN — Medication 100 MG: at 08:16

## 2019-11-01 RX ADMIN — SODIUM CHLORIDE, PRESERVATIVE FREE 10 ML: 5 INJECTION INTRAVENOUS at 20:00

## 2019-11-01 NOTE — CONSULTS
consult per physician request. Pt welcomed me into the room, however, he seemed to have a lot of walls up and didn't want to engage in conversation. I attempted to ask questions to open conversation, but pt did not seem to want to talk about anything that is going on. I educated pt on  services and offered support. He was receptive and expressed thanks for the visit.

## 2019-11-01 NOTE — PROGRESS NOTES
Access did follow up with pt to give him outpt resources for Dual Diagnosis programs upon d/c. Nurse states pt has had confusion, increased anxiety and paranoia and keeps trying to leave. Altagracia called Dr Easley who consulted on pt this morning. Dr Easley will talk with nurse to give orders on meds. Access will continue to follow.

## 2019-11-01 NOTE — PLAN OF CARE
Problem: Patient Care Overview  Goal: Plan of Care Review  Outcome: Ongoing (interventions implemented as appropriate)   11/01/19 5422   Coping/Psychosocial   Plan of Care Reviewed With patient;spouse   Plan of Care Review   Progress declining   OTHER   Outcome Summary Pt more agitated with paranoia. Wanting to leave, getting out of bed and pulling off monitor. Ativan 1 mg IV given x2 this shift. Needs frequent redirecting. Dr Easley contacted and ordered Zyprexa IM PRN. Safety maintained, CIWA continued and currently ranging from 6-9, Will continue to monitor. CCRN 11/1/19 @ 1784

## 2019-11-01 NOTE — PROGRESS NOTES
"     LOS: 2 days   Primary Care Physician: Charles Farooq MD     Subjective   Patient saying that he cannot stay here anymore.  He feels \"intimidated\".  Wife was at bedside.  Patient telling her he needed to leave, not to abandon him etc.    Vital Signs  Body mass index is 36.61 kg/m².  Temp:  [97.6 °F (36.4 °C)-98.7 °F (37.1 °C)] 97.6 °F (36.4 °C)  Heart Rate:  [80-83] 83  Resp:  [16] 16  BP: (103-155)/(57-97) 140/97      Objective:  General Appearance:  General patient appearance: Anxious and almost tearful.  Looks older than age.  Obese.    Vital signs: (most recent): Blood pressure 140/97, pulse 83, temperature 97.6 °F (36.4 °C), temperature source Oral, resp. rate 16, height 167.6 cm (66\"), weight 103 kg (226 lb 12.8 oz), SpO2 92 %.    Lungs:  He is not in respiratory distress.  No stridor.  There are decreased breath sounds.  No rales, wheezes or rhonchi.    Heart: Normal rate.  Regular rhythm.  No murmur.   Abdomen: Abdomen is soft and non-distended.  Bowel sounds are normal.   There is no abdominal tenderness.   There is no splenomegaly. There is no hepatomegaly.   Extremities: There is dependent edema.  (Trace if any.  No tremor of the fingers with outstretched)  Neurological: Patient is alert.    Skin:  Warm.          Results Review:    I reviewed the patient's new clinical results.    Results from last 7 days   Lab Units 11/01/19  0433 10/31/19  0730   WBC 10*3/mm3 5.82 6.38   HEMOGLOBIN g/dL 10.1* 10.4*   PLATELETS 10*3/mm3 178 160     Results from last 7 days   Lab Units 11/01/19 0433 10/31/19  0730   SODIUM mmol/L 133* 130*   POTASSIUM mmol/L 4.5 4.1   CHLORIDE mmol/L 95* 92*   CO2 mmol/L 25.9 27.4   BUN mg/dL 10 12   CREATININE mg/dL 1.08 0.97   CALCIUM mg/dL 7.1* 7.5*   GLUCOSE mg/dL 82 101*     Results from last 7 days   Lab Units 10/30/19  1440   INR  1.14*     Hemoglobin A1C:No results found for: HGBA1C    Glucose Range:No results found for: POCGLU    No results found for: " [id number]    Lab Results   Component Value Date    TSH 2.920 10/30/2019       Assessment & Plan      Medication Review: Yes    Active Hospital Problems    Diagnosis  POA   • **Alcohol withdrawal syndrome with complication (CMS/HCC) [F10.239]  Unknown   • Alcohol abuse [F10.10]  Yes   • History of renal cell cancer [Z85.528]  Not Applicable   • History of left nephrectomy 2/2 RCC [Z90.5]  Not Applicable   • Under emotional stress [R45.7]  Yes   •  from spouse [Z63.5]  Yes   • Coping style affecting medical condition [F54]  Yes   • Hyponatremia [E87.1]  Yes   • Fatty liver [K76.0]  Yes   • Alcoholism (CMS/HCC) [F10.20]  Yes   • Tobacco abuse [Z72.0]  Yes      Resolved Hospital Problems    Diagnosis Date Resolved POA   • Delirium tremens (CMS/HCC) [F10.231] 11/01/2019 Yes   • Hypokalemia [E87.6] 10/31/2019 Yes       Assessment/Plan  1.  Anxiety and depression with alcoholism.  Scheduled Librium discontinued.  Ativan as needed.  Appreciate psych input.  Access to see. ? if could go to [facility] at some point.  Increase activity as tolerated.  PT to see for weakness  2.  Hyponatremia, improved  3.  Anemia.  Probably secondary to alcoholism and history of renal cell cancer.  Stable.  Will check iron studies in a.m.  4.  Possible seizure.  EEG done    Discussed with patient and wife at length.  Total time 35 minutes with 20 minutes regarding management of anxiety, alcoholism and weakness.    Leigh Ann Lora MD  11/01/19  2:51 PM

## 2019-11-01 NOTE — THERAPY EVALUATION
Patient Name: Nilesh Zapata  : 1960    MRN: [id number]                              Today's Date: 2019       Admit Date: 10/30/2019    Visit Dx:     ICD-10-CM ICD-9-CM   1. Alcohol abuse F10.10 305.00   2. Alcohol withdrawal seizure with complication (CMS/HCC) F10.239 291.81    R56.9 780.39   3. Hypomagnesemia E83.42 275.2   4. Hypokalemia E87.6 276.8   5. Hyponatremia E87.1 276.1     Patient Active Problem List   Diagnosis   • Alcohol withdrawal syndrome with complication (CMS/HCC)   • Anxiety   • Fatty liver   • Tobacco abuse   • Kidney cancer, primary, with metastasis from kidney to other site (CMS/HCC)   • Alcoholism (CMS/HCC)   • Hyponatremia   • Alcohol abuse   • History of renal cell cancer   • History of left nephrectomy  RCC   • Liver lesion   • Lung nodule   • Vitamin D deficiency   • Under emotional stress   •  from spouse   • Coping style affecting medical condition     Past Medical History:   Diagnosis Date   • Anxiety    • Delirium tremens (CMS/HCC) 3/20/2019   • Fatty liver    • FH: kidney cancer    • Hypertension      Past Surgical History:   Procedure Laterality Date   • APPENDECTOMY     • NEPHRECTOMY      left    • TONSILLECTOMY       General Information     Row Name 19 1531          PT Evaluation Time/Intention    Document Type  evaluation  -     Mode of Treatment  individual therapy;physical therapy  -     Row Name 19 1531          General Information    Prior Level of Function  independent:;gait;transfer;bed mobility no AD  -     Existing Precautions/Restrictions  fall  -CH     Barriers to Rehab  medically complex  -     Row Name 19 1531          Relationship/Environment    Lives With  alone  -     Row Name 19 1531          Resource/Environmental Concerns    Current Living Arrangements  home/apartment/condo  -     Row Name 19 1531          Cognitive Assessment/Intervention- PT/OT    Orientation Status (Cognition)  oriented x 3  -CH      Row Name 11/01/19 1531          Safety Issues, Functional Mobility    Safety Issues Affecting Function (Mobility)  insight into deficits/self awareness;impulsivity  -     Impairments Affecting Function (Mobility)  balance;cognition;coordination;endurance/activity tolerance;strength;shortness of breath  -       User Key  (r) = Recorded By, (t) = Taken By, (c) = Cosigned By    Initials Name Provider Type     Karla Zavala, PT Physical Therapist        Mobility     Row Name 11/01/19 1532          Bed Mobility Assessment/Treatment    Bed Mobility Assessment/Treatment  supine-sit;sit-supine  -     Supine-Sit [place] (Bed Mobility)  verbal cues;nonverbal cues (demo/gesture);contact guard  -     Sit-Supine [place] (Bed Mobility)  verbal cues;nonverbal cues (demo/gesture);contact guard  -     Row Name 11/01/19 1532          Sit-Stand Transfer    Sit-Stand [place] (Transfers)  verbal cues;nonverbal cues (demo/gesture);contact guard  -     Assistive Device (Sit-Stand Transfers)  -- HHA  -     Row Name 11/01/19 1532          Gait/Stairs Assessment/Training    [place] Level (Gait)  verbal cues;nonverbal cues (demo/gesture);contact guard;minimum assist (75% patient effort)  -     Assistive Device (Gait)  -- HHA  -     Distance in Feet (Gait)  150  -     Deviations/Abnormal Patterns (Gait)  ataxic;[place]mane decreased;gait speed decreased;scissoring;stride length decreased  -     Comment (Gait/Stairs)  pt required 2 standing rest breaks secondary to SOA, pt very unsteady   -       User Key  (r) = Recorded By, (t) = Taken By, (c) = Cosigned By    Initials Name Provider Type     Karla Zavala, PT Physical Therapist        Obj/Interventions     Row Name 11/01/19 1534          General ROM    GENERAL ROM COMMENTS  AROM WFL for age  -     Row Name 11/01/19 1534          MMT (Manual Muscle Testing)    General MMT Comments  generalized weakness noted with functional mobility  and gait  -[facility] Name 11/01/19 1534          Static Standing Balance    Level of [place] (Supported Standing, Static Balance)  contact guard assist  -     Row Name 11/01/19 1534          Dynamic Standing Balance    Level of [place], Reaches Outside Midline (Standing, Dynamic Balance)  contact guard assist;minimal assist, 75% patient effort  -       User Key  (r) = Recorded By, (t) = Taken By, (c) = Cosigned By    Initials Name Provider Type     Karla Zavala, PT Physical Therapist        Goals/Plan     Row Name 11/01/19 1535          Bed Mobility Goal 1 (PT)    Activity/Assistive Device (Bed Mobility Goal 1, PT)  bed mobility activities, all  -CH     [place] Level/Cues Needed (Bed Mobility Goal 1, PT)  supervision required  -CH     Time Frame (Bed Mobility Goal 1, PT)  1 week  -[facility] Name 11/01/19 1535          Transfer Goal 1 (PT)    Activity/Assistive Device (Transfer Goal 1, PT)  transfers, all  -CH     [place] Level/Cues Needed (Transfer Goal 1, PT)  supervision required  -CH     Time Frame (Transfer Goal 1, PT)  1 week  -[facility] Name 11/01/19 1535          Gait Training Goal 1 (PT)    Activity/Assistive Device (Gait Training Goal 1, PT)  gait (walking locomotion)  -CH     [place] Level (Gait Training Goal 1, PT)  supervision required  -CH     Distance (Gait Goal 1, PT)  300  -CH     Time Frame (Gait Training Goal 1, PT)  1 week  -       User Key  (r) = Recorded By, (t) = Taken By, (c) = Cosigned By    Initials Name Provider Type     Karla Zavala, PT Physical Therapist        Clinical Impression     Row Name 11/01/19 1534          Pain Assessment    Additional Documentation  Pain Scale: FACES Pre/Post-Treatment (Group)  -[facility] Name 11/01/19 1534          Pain Scale: FACES Pre/Post-Treatment    Pain: FACES Scale, Pretreatment  0-->no hurt  -     Pain: FACES Scale, Post-Treatment  0-->no hurt  -[facility] Name 11/01/19 1534          Plan of Care  Review    Plan of Care Reviewed With  patient  -     Row Name 11/01/19 1534          Physical Therapy Clinical Impression    Patient/Family Goals Statement (PT Clinical Impression)  to return to [facility]  -     Criteria for Skilled Interventions Met (PT Clinical Impression)  treatment indicated  -     Rehab Potential (PT Clinical Summary)  good, to achieve stated therapy goals  -     Row Name 11/01/19 1534          Positioning and Restraints    Pre-Treatment Position  in bed  -     Post Treatment Position  bed  -     In Bed  supine;call light within reach;encouraged to call for assist;exit alarm on  -       User Key  (r) = Recorded By, (t) = Taken By, (c) = Cosigned By    Initials Name Provider Type    Karla Pardo, PT Physical Therapist        Outcome Measures     Row Name 11/01/19 1538          How much help from another person do you currently need...    Turning from your back to your side while in flat bed without using bedrails?  3  -CH     Moving from lying on back to sitting on the side of a flat bed without bedrails?  3  -CH     Moving to and from a bed to a chair (including a wheelchair)?  3  -CH     Standing up from a chair using your arms (e.g., wheelchair, bedside chair)?  3  -CH     Climbing 3-5 steps with a railing?  2  -CH     To walk in hospital room?  3  -CH     AM-PAC 6 Clicks Score (PT)  17  -     Row Name 11/01/19 1538          Functional Assessment    Outcome Measure Options  AM-PAC 6 Clicks Basic Mobility (PT)  -       User Key  (r) = Recorded By, (t) = Taken By, (c) = Cosigned By    Initials Name Provider Type    aKrla Pardo, PT Physical Therapist        Physical Therapy Education     Title: PT OT SLP Therapies (In Progress)     Topic: Physical Therapy (In Progress)     Point: Mobility training (Done)     Learning Progress Summary           Patient Acceptance, E,TB,D, VU,NR by  at 11/1/2019  3:36 PM                   Point: Body mechanics (Done)     Learning  Progress Summary           Patient Acceptance, E,TB,D, VU,NR by  at 11/1/2019  3:36 PM                   Point: Precautions (Done)     Learning Progress Summary           Patient Acceptance, E,TB,D, VU,NR by  at 11/1/2019  3:36 PM                               User Key     Initials Effective Dates Name Provider Type [facility] 04/03/18 -  Karla Zavala PT Physical Therapist PT              PT Recommendation and Plan  Planned Therapy Interventions (PT Eval): balance training, bed mobility training, gait training, home exercise program, patient/family education, strengthening, transfer training  Outcome Summary/Treatment Plan (PT)  Anticipated Discharge Disposition (PT): home with home health, skilled nursing facility(pending progress and home resources)  Plan of Care Reviewed With: patient  Outcome Summary: Pt is a 60 yo M who was admitted with seizure. Pt present to PT with impaired functional mombility and gait secondary to generalized weakness, impaired balance, and decreased activity toleance. Pt may benefit from skilled PT to address strength, mobility, and gait.     Time Calculation:   PT Charges     Row Name 11/01/19 1538             Time Calculation    Start Time  1502  -      Stop Time  1514  -      Time Calculation (min)  12 min  -      PT Received On  11/01/19  -      PT - Next Appointment  11/02/19  -      PT Goal Re-Cert Due Date  11/08/19  -         Time Calculation- PT    Total Timed Code Minutes- PT  8 minute(s)  -        User Key  (r) = Recorded By, (t) = Taken By, (c) = Cosigned By    Initials Name Provider Type     Karla Zavala PT Physical Therapist        Therapy Charges for Today     Code Description Service Date Service Provider Modifiers Qty    40818551207 HC PT EVAL MOD COMPLEXITY 2 11/1/2019 Karla Zavala, PT GP 1    [id number]  PT THER PROC EA 15 MIN 11/1/2019 Karla Zavala, PT GP 1          PT G-Codes  Outcome Measure Options: AM-PAC 6  Clicks Basic Mobility (PT)  AM-[facility] 6 Clicks Score (PT): 17    Karla Zavala, PT  11/1/2019

## 2019-11-01 NOTE — PLAN OF CARE
Problem: Patient Care Overview  Goal: Plan of Care Review  Outcome: Ongoing (interventions implemented as appropriate)   11/01/19 3677   Coping/Psychosocial   Plan of Care Reviewed With patient   OTHER   Outcome Summary Pt is a 58 yo M who was admitted with seizure. Pt present to PT with impaired functional mombility and gait secondary to generalized weakness, impaired balance, and decreased activity toleance. Pt may benefit from skilled PT to address strength, mobility, and gait.

## 2019-11-01 NOTE — CONSULTS
IDENTIFYING INFORMATION: The patient is a 59-year-old white male admitted after an alcohol withdrawal related seizure    CHIEF COMPLAINT: None given    INFORMANT: Patient and chart    RELIABILITY: Fair    HISTORY OF PRESENT ILLNESS: The patient is a 59-year-old white male with a long history of alcohol dependence.  He has been in residential and outpatient chemical dependence treatment on several occasions in the past.  He is also been prescribed psychotropic medications over the years including Zoloft, Seroquel, Latuda and Luvox.  He is not presently followed by a psychiatrist.  The patient reports that he had attempted to cut his alcohol use and a half as his daughter is being  in 2 weeks.  He had previously been drinking 1/5 of hard liquor on a daily basis, but reports that he had recently tried to cut this drinking in half.  As a result he had suffered a seizure and according to the wife had exhibited symptoms of confusion possibly consistent with a delirium tremens prior to his admission to the hospital.  When seen today the patient is pleasant and cooperative.  He complains of some mild depressive symptoms and complains of poor sleep but denies suicidal homicidal ideation.  He is no longer exhibiting any signs of confusion or delirium tremens as he is now on a detoxication protocol for alcohol.  Patient complains of variable sleep he denies any loss of appetite.  The patient is also being treated for stage IV renal carcinoma.  It is been recommended that he confer with a psychiatrist by his oncologist but the patient had not yet done so.  He has been prescribed amitriptyline for sleep, but his only other psychotropic medication is propranolol.    PAST PSYCHIATRIC HISTORY: As above    PAST MEDICAL HISTORY: Significant for obesity, the aforementioned renal cell carcinoma, history of left nephrectomy, hyponatremia, fatty liver    MEDICATIONS:   Current Facility-Administered Medications   Medication Dose  Route Frequency Provider Last Rate Last Dose   • acetaminophen (TYLENOL) tablet 650 mg  650 mg Oral Q4H PRN Porsha Barraza MD   650 mg at 10/31/19 2057    Or   • acetaminophen (TYLENOL) 160 MG/5ML solution 650 mg  650 mg Oral Q4H PRN Porsha Barraza MD        Or   • acetaminophen (TYLENOL) suppository 650 mg  650 mg Rectal Q4H PRN Porsha Barraza MD       • bisacodyl (DULCOLAX) suppository 10 mg  10 mg Rectal Daily PRN Porsha Barraza MD       • calcium carbonate (TUMS) chewable tablet 500 mg (200 mg elemental)  2 tablet Oral BID PRN Porsha Barraza MD       • cloNIDine (CATAPRES) tablet 0.1 mg  0.1 mg Oral Q4H PRN Porsha Barraza MD       • docusate sodium (COLACE) capsule 100 mg  100 mg Oral BID PRN Porsha Barraza MD       • folic acid (FOLVITE) tablet 1 mg  1 mg Oral Daily Porsha Barraza MD   1 mg at 11/01/19 0816   • influenza vac split quad (FLUZONE,FLUARIX,AFLURIA) injection 0.5 mL  0.5 mL Intramuscular Once Porsha Barraza MD       • LORazepam (ATIVAN) tablet 1 mg  1 mg Oral Q2H PRN Porsha Barraza MD        Or   • LORazepam (ATIVAN) injection 1 mg  1 mg Intravenous Q2H PRN Porsha Barraza MD   1 mg at 10/31/19 0058    Or   • LORazepam (ATIVAN) tablet 2 mg  2 mg Oral Q1H PRN Porsha Barraza MD        Or   • LORazepam (ATIVAN) injection 2 mg  2 mg Intravenous Q1H PRN Porsha Barraza MD   2 mg at 10/31/19 0215    Or   • LORazepam (ATIVAN) injection 2 mg  2 mg Intravenous Q15 Min PRN Porsha Barraza MD        Or   • LORazepam (ATIVAN) injection 2 mg  2 mg Intramuscular Q15 Min PRN Porsha Barraza MD       • Magnesium Sulfate 2 gram Bolus, followed by 8 gram infusion (total Mg dose 10 grams)- Mg less than or equal to 1mg/dL  2 g Intravenous PRN Porsha Barraza MD        Or   • Magnesium Sulfate 2 gram / 50mL Infusion (GIVE X 3 BAGS TO EQUAL 6GM TOTAL DOSE) - Mg 1.1 - 1.5 mg/dl  2 g  Intravenous PRN Porsha Barraza MD 25 mL/hr at 10/31/19 0203 2 g at 10/31/19 0203    Or   • Magnesium Sulfate 4 gram infusion- Mg 1.6-1.9 mg/dL  4 g Intravenous PRN Porsha Barraza MD       • melatonin tablet 5 mg  5 mg Oral Nightly PRN Porsha Barraza MD   5 mg at 10/31/19 0058   • mirtazapine (REMERON) tablet 15 mg  15 mg Oral Nightly Dar Easley III, MD       • morphine injection 1 mg  1 mg Intravenous Q4H PRN Porsha Barraza MD        And   • naloxone (NARCAN) injection 0.4 mg  0.4 mg Intravenous Q5 Min PRN Porsha Barraza MD       • nicotine (NICODERM CQ) 21 MG/24HR patch 1 patch  1 patch Transdermal Q24H Porsha Barraza MD   1 patch at 10/31/19 2104   • nitroglycerin (NITROSTAT) SL tablet 0.4 mg  0.4 mg Sublingual Q5 Min PRN Porsha Barraza MD       • ondansetron (ZOFRAN) tablet 4 mg  4 mg Oral Q6H PRN Porsha Barraza MD        Or   • ondansetron (ZOFRAN) injection 4 mg  4 mg Intravenous Q6H PRN Porsha Barraza MD       • polyethylene glycol (MIRALAX) powder 17 g  17 g Oral Daily Porsha Barraza MD   17 g at 11/01/19 0816   • potassium chloride (MICRO-K) CR capsule 40 mEq  40 mEq Oral PRN Porsha Barraza MD   40 mEq at 10/31/19 0203    Or   • potassium chloride (KLOR-CON) packet 40 mEq  40 mEq Oral PRN Porsha Barraza MD        Or   • potassium chloride 10 mEq in 100 mL IVPB  10 mEq Intravenous Q1H PRN Porsha Barraza MD       • propranolol (INDERAL) tablet 20 mg  20 mg Oral BID Leigh Ann Lora MD   20 mg at 11/01/19 0816   • senna-docusate sodium (SENOKOT-S) 8.6-50 MG tablet 2 tablet  2 tablet Oral BID PRN Porsha Barraza MD       • sodium chloride 0.9 % bolus 1,000 mL  1,000 mL Intravenous Once Porsha Barraza MD       • sodium chloride 0.9 % flush 10 mL  10 mL Intravenous Q12H Porsha Barraza MD   10 mL at 11/01/19 0816   • sodium chloride 0.9 % flush 10 mL  10 mL  Intravenous PRN Porsha Barraza MD       • thiamine (VITAMIN B-1) tablet 100 mg  100 mg Oral Daily Porsha Barraza MD   100 mg at 11/01/19 0816         ALLERGIES: None    FAMILY HISTORY: Noncontributory    SOCIAL HISTORY: The patient is  from his wife.  He is retired from work as a  for a local car lot.  He admits to alcohol use as described previously.  He is also a smoker.    MENTAL STATUS EXAM: The patient is an obese white male appearing stated age.  He is no apparent physical distress at the time of examination.  He is awake alert and oriented ulcers.  His mood is calm his affect constricted.  Speech is generally well coherent.  There are no deficits memory cognition noted.  Intelligence is judged to be in the average range based on fund of knowledge, the patient is cooperative throughout interview.  He denies suicidal homicidal ideation or any psychotic symptoms.  His judgment insight appear to be reasonably intact.    ASSETS/LIABILITIES: Motivation for change/multiple health issues, marital separation, ongoing alcohol use    DIAGNOSTIC IMPRESSION: Alcohol use disorder, dysthymic disorder, delirium tremens resolving, history of alcohol withdrawal seizure, renal cell carcinoma, status post nephrectomy, obesity    PLAN: The patient is agreeable to a trial of mirtazapine 15 mg at bedtime to address his symptoms of depression and poor sleep.  I have spoken with the patient regarding the deleterious effect and ongoing abuse of alcohol have on any potential recovery from his depressive symptoms.  I will request that the access center to see the patient regarding post discharge chemical dependence treatment options.  I have removed the low-dose Librium that have been ordered for the patient since it represents needles polypharmacy with the [place] based Ativan has been ordered for the patient.    Thank you for the opportunity to see this patient.

## 2019-11-01 NOTE — PLAN OF CARE
Problem: Fall Risk (Adult)  Goal: Absence of Fall  Outcome: Ongoing (interventions implemented as appropriate)      Problem: Patient Care Overview  Goal: Plan of Care Review  Outcome: Ongoing (interventions implemented as appropriate)   11/01/19 0537   Coping/Psychosocial   Plan of Care Reviewed With patient   OTHER   Outcome Summary VSS, CIWA monitored, no prn ativan required. Plan for EEG today. Will ct to monitor.      Goal: Discharge Needs Assessment  Outcome: Ongoing (interventions implemented as appropriate)    Goal: Interprofessional Rounds/Family Conf  Outcome: Ongoing (interventions implemented as appropriate)      Problem: Alcohol Withdrawal Acute, Risk/Actual (Adult)  Goal: Signs and Symptoms of Listed Potential Problems Will be Absent, Minimized or Managed (Alcohol Withdrawal Acute, Risk/Actual)  Outcome: Ongoing (interventions implemented as appropriate)   10/31/19 0522   Goal/Outcome Evaluation   Problems Assessed (Alcohol Withdrawal Syndrome) all   Problems Present (Alcohol W/D Syndrome) effects of RUPA (alcohol withdrawal syndrome);situational response

## 2019-11-02 LAB
ANION GAP SERPL CALCULATED.3IONS-SCNC: 5.3 MMOL/L (ref 5–15)
BUN BLD-MCNC: 6 MG/DL (ref 8–23)
BUN/CREAT SERPL: 7.2 (ref 7–25)
CALCIUM SPEC-SCNC: 7.9 MG/DL (ref 8.6–10.5)
CHLORIDE SERPL-SCNC: 101 MMOL/L (ref 98–107)
CO2 SERPL-SCNC: 27.7 MMOL/L (ref 22–29)
CREAT BLD-MCNC: 0.83 MG/DL (ref 0.76–1.27)
GFR SERPL CREATININE-BSD FRML MDRD: 94 ML/MIN/1.73
GLUCOSE BLD-MCNC: 73 MG/DL (ref 65–99)
IRON 24H UR-MRATE: 51 MCG/DL (ref 59–158)
IRON SATN MFR SERPL: 19 % (ref 20–50)
MAGNESIUM SERPL-MCNC: 1.7 MG/DL (ref 1.6–2.4)
POTASSIUM BLD-SCNC: 4.4 MMOL/L (ref 3.5–5.2)
SODIUM BLD-SCNC: 134 MMOL/L (ref 136–145)
TIBC SERPL-MCNC: 264 MCG/DL (ref 298–536)
TRANSFERRIN SERPL-MCNC: 177 MG/DL (ref 200–360)

## 2019-11-02 PROCEDURE — 83540 ASSAY OF IRON: CPT | Performed by: INTERNAL MEDICINE

## 2019-11-02 PROCEDURE — 25010000002 LORAZEPAM PER 2 MG: Performed by: INTERNAL MEDICINE

## 2019-11-02 PROCEDURE — 84466 ASSAY OF TRANSFERRIN: CPT | Performed by: INTERNAL MEDICINE

## 2019-11-02 PROCEDURE — 80048 BASIC METABOLIC PNL TOTAL CA: CPT | Performed by: INTERNAL MEDICINE

## 2019-11-02 PROCEDURE — 83735 ASSAY OF MAGNESIUM: CPT | Performed by: INTERNAL MEDICINE

## 2019-11-02 RX ADMIN — FOLIC ACID 1 MG: 1 TABLET ORAL at 09:50

## 2019-11-02 RX ADMIN — Medication 100 MG: at 09:50

## 2019-11-02 RX ADMIN — LORAZEPAM 1 MG: 2 INJECTION INTRAMUSCULAR; INTRAVENOUS at 03:14

## 2019-11-02 RX ADMIN — SODIUM CHLORIDE, PRESERVATIVE FREE 10 ML: 5 INJECTION INTRAVENOUS at 09:51

## 2019-11-02 RX ADMIN — NICOTINE 1 PATCH: 21 PATCH, EXTENDED RELEASE TRANSDERMAL at 22:00

## 2019-11-02 RX ADMIN — POLYETHYLENE GLYCOL 3350 17 G: 17 POWDER, FOR SOLUTION ORAL at 09:50

## 2019-11-02 RX ADMIN — PROPRANOLOL HYDROCHLORIDE 20 MG: 40 TABLET ORAL at 09:50

## 2019-11-02 RX ADMIN — PROPRANOLOL HYDROCHLORIDE 20 MG: 40 TABLET ORAL at 22:00

## 2019-11-02 RX ADMIN — MIRTAZAPINE 15 MG: 15 TABLET, FILM COATED ORAL at 22:00

## 2019-11-02 NOTE — PROGRESS NOTES
Met w/ patient. He is lying in bed. Admits to having some confusion and wanting to go home. He sates that this happens after he wakes up. He also states talking to his wife helps him re orient.  At same time states that he want to get out of hospital so he can be at daughter's wedding in two weeks.  Discussed concerns w/ him leaving. Explained that if he left he would in all probability would end right back in the hospital and that would probably prevent him from getting to daughter's wedding.  He agreed. Ativan and Zyprexa is ordered. [facility] will follow. No SI or HI. No a/v hallucinations.

## 2019-11-02 NOTE — PLAN OF CARE
Problem: Fall Risk (Adult)  Goal: Absence of Fall  Outcome: Ongoing (interventions implemented as appropriate)      Problem: Patient Care Overview  Goal: Plan of Care Review  Outcome: Ongoing (interventions implemented as appropriate)   11/02/19 1412 11/02/19 1700   Coping/Psychosocial   Plan of Care Reviewed With patient --    Plan of Care Review   Progress --  improving   OTHER   Outcome Summary --  Pt was confused at first this morning, got up without assistance and was not making sense. Pt has improved as day has gone on, and is making more sense and being cooperative. CIWA scale has been minimall, Pt has had no complaints as day has gone on. VSS, will continnue to monitor.       Problem: Alcohol Withdrawal Acute, Risk/Actual (Adult)  Goal: Signs and Symptoms of Listed Potential Problems Will be Absent, Minimized or Managed (Alcohol Withdrawal Acute, Risk/Actual)  Outcome: Ongoing (interventions implemented as appropriate)

## 2019-11-02 NOTE — PLAN OF CARE
Problem: Patient Care Overview  Goal: Plan of Care Review  Outcome: Ongoing (interventions implemented as appropriate)   11/02/19 1975   Coping/Psychosocial   Plan of Care Reviewed With patient   Plan of Care Review   Progress declining   OTHER   Outcome Summary Pt confused and increasingly agitated this shift. Uncooperative at times. zyprexa given but did not help much. Ativan given with highest CIWA a score of 8. Patients wife called and updates were given to her.

## 2019-11-03 VITALS
TEMPERATURE: 98.3 F | RESPIRATION RATE: 18 BRPM | SYSTOLIC BLOOD PRESSURE: 147 MMHG | HEIGHT: 66 IN | HEART RATE: 80 BPM | DIASTOLIC BLOOD PRESSURE: 92 MMHG | BODY MASS INDEX: 35.41 KG/M2 | OXYGEN SATURATION: 94 % | WEIGHT: 220.3 LBS

## 2019-11-03 LAB
ALBUMIN SERPL-MCNC: 3.1 G/DL (ref 3.5–5.2)
ALBUMIN/GLOB SERPL: 1 G/DL
ALP SERPL-CCNC: 91 U/L (ref 39–117)
ALT SERPL W P-5'-P-CCNC: 26 U/L (ref 1–41)
ANION GAP SERPL CALCULATED.3IONS-SCNC: 8.4 MMOL/L (ref 5–15)
AST SERPL-CCNC: 51 U/L (ref 1–40)
BILIRUB SERPL-MCNC: 0.4 MG/DL (ref 0.2–1.2)
BUN BLD-MCNC: 9 MG/DL (ref 8–23)
BUN/CREAT SERPL: 8.5 (ref 7–25)
CALCIUM SPEC-SCNC: 8.6 MG/DL (ref 8.6–10.5)
CHLORIDE SERPL-SCNC: 100 MMOL/L (ref 98–107)
CO2 SERPL-SCNC: 23.6 MMOL/L (ref 22–29)
CREAT BLD-MCNC: 1.06 MG/DL (ref 0.76–1.27)
GFR SERPL CREATININE-BSD FRML MDRD: 71 ML/MIN/1.73
GLOBULIN UR ELPH-MCNC: 3.1 GM/DL
GLUCOSE BLD-MCNC: 79 MG/DL (ref 65–99)
MAGNESIUM SERPL-MCNC: 1.5 MG/DL (ref 1.6–2.4)
POTASSIUM BLD-SCNC: 4.4 MMOL/L (ref 3.5–5.2)
PROT SERPL-MCNC: 6.2 G/DL (ref 6–8.5)
SODIUM BLD-SCNC: 132 MMOL/L (ref 136–145)

## 2019-11-03 PROCEDURE — 97110 THERAPEUTIC EXERCISES: CPT

## 2019-11-03 PROCEDURE — 83735 ASSAY OF MAGNESIUM: CPT | Performed by: INTERNAL MEDICINE

## 2019-11-03 PROCEDURE — 80053 COMPREHEN METABOLIC PANEL: CPT | Performed by: INTERNAL MEDICINE

## 2019-11-03 RX ORDER — MIRTAZAPINE 15 MG/1
15 TABLET, FILM COATED ORAL NIGHTLY
Qty: 30 TABLET | Refills: 0 | Status: SHIPPED | OUTPATIENT
Start: 2019-11-03

## 2019-11-03 RX ADMIN — PROPRANOLOL HYDROCHLORIDE 20 MG: 40 TABLET ORAL at 08:59

## 2019-11-03 RX ADMIN — SODIUM CHLORIDE, PRESERVATIVE FREE 10 ML: 5 INJECTION INTRAVENOUS at 09:00

## 2019-11-03 RX ADMIN — POLYETHYLENE GLYCOL 3350 17 G: 17 POWDER, FOR SOLUTION ORAL at 08:59

## 2019-11-03 RX ADMIN — FOLIC ACID 1 MG: 1 TABLET ORAL at 08:59

## 2019-11-03 RX ADMIN — Medication 100 MG: at 08:59

## 2019-11-03 NOTE — PROGRESS NOTES
Met with patient and reviewed chart. Patient improving.  Oriented to month, day, year and date.  He was correct w/ floor number.  Even with that still seems some foggy.  Was vague with answers and required additional questions to get specific.  Discussed treatment options. He states he might be interested in an IOP.  Will follow.

## 2019-11-03 NOTE — DISCHARGE SUMMARY
NAME: Nilesh Zapata ADMIT: 10/30/2019   : 1960  PCP: Charles Farooq MD    MRN: [id number] LOS: 4 days   AGE/SEX: 59 y.o. male  ROOM: 23/     Date of Admission:  10/30/2019  Date of Discharge:  11/3/2019    PCP: Charles Farooq MD    CHIEF COMPLAINT  Seizures      DISCHARGE DIAGNOSIS  Active Hospital Problems    Diagnosis  POA   • **Alcohol withdrawal syndrome with complication (CMS/HCC) [F10.239]  Unknown   • Alcohol abuse [F10.10]  Yes   • History of renal cell cancer [Z85.528]  Not Applicable   • History of left nephrectomy 2/2 RCC [Z90.5]  Not Applicable   • Under emotional stress [R45.7]  Yes   •  from spouse [Z63.5]  Yes   • Coping style affecting medical condition [F54]  Yes   • Hyponatremia [E87.1]  Yes   • Fatty liver [K76.0]  Yes   • Alcoholism (CMS/HCC) [F10.20]  Yes   • Tobacco abuse [Z72.0]  Yes      Resolved Hospital Problems    Diagnosis Date Resolved POA   • Delirium tremens (CMS/HCC) [F10.231] 2019 Yes   • Hypokalemia [E87.6] 10/31/2019 Yes       SECONDARY DIAGNOSES  Past Medical History:   Diagnosis Date   • Anxiety    • Delirium tremens (CMS/HCC) 3/20/2019   • Fatty liver    • FH: kidney cancer    • Hypertension        CONSULTS   Psychaitry    HOSPITAL COURSE  Patient is a 59 y.o. male with history of alcohol abuse, previous RCC s/p nephrectomy, HTN who presented with a fall and possible seizure. He was admitted and treated for alcohol withdrawal. Hyponatremia which has improved.  He is not requiring any additional medication for alcohol withdrawal as this seems to be over and is ready for discharge.  He was given resources for alcohol cessation.      DIAGNOSTICS    2019 0641  2019 0731  Comprehensive Metabolic Panel [[id number]]    (Abnormal)   Blood     Final result  Glucose 79 mg/dL   BUN 9 mg/dL   Creatinine 1.06 mg/dL   Sodium 132 Abnormally low  mmol/L   Potassium 4.4 mmol/L   Chloride 100 mmol/L   CO2 23.6 mmol/L   Calcium 8.6 mg/dL    Total Protein 6.2 g/dL   Albumin 3.10 Abnormally low  g/dL   ALT (SGPT) 26 U/L   AST (SGOT) 51 Abnormally high  U/L   Alkaline Phosphatase 91 U/L   Total Bilirubin 0.4 mg/dL   eGFR Non African Am 71 mL/min/1.73   Globulin 3.1 gm/dL   A/G Ratio 1.0 g/dL   BUN/Creatinine Ratio 8.5    Anion Gap 8.4 mmol/L            PHYSICAL EXAM  Objective    Alert  nad  No resp distress    CONDITION ON DISCHARGE  Stable.      DISCHARGE DISPOSITION   Home or Self Care      DISCHARGE MEDICATIONS       Your medication list      START taking these medications      Instructions Last Dose Given Next Dose Due   mirtazapine 15 MG tablet  Commonly known as:  REMERON      Take 1 tablet by mouth Every Night.          CONTINUE taking these medications      Instructions Last Dose Given Next Dose Due   amitriptyline 25 MG tablet  Commonly known as:  ELAVIL      Take 1 tablet by mouth Every Night.       amLODIPine 5 MG tablet  Commonly known as:  NORVASC      Take 5 mg by mouth Daily.       folic acid 1 MG tablet  Commonly known as:  FOLVITE      Take 1 tablet by mouth Daily.       propranolol 40 MG tablet  Commonly known as:  INDERAL      Take 60 mg by mouth 2 (Two) Times a Day.       thiamine 100 MG tablet  Commonly known as:  VITAMIN B1      Take 1 tablet by mouth Daily.          STOP taking these medications    hydroCHLOROthiazide 25 MG tablet  Commonly known as:  HYDRODIURIL        POTASSIUM CHLORIDE PO              Where to Get Your Medications      These medications were sent to [facility]/pharmacy #[id number] - [place], [place] - [address] - [phone]  - [phone]   [address] [facility] [address]    Phone:  [phone]   · mirtazapine 15 MG tablet        No future appointments.  Additional Instructions for the Follow-ups that You Need to Schedule     Discharge Follow-up with PCP   As directed       Currently Documented PCP:    Charles Farooq MD    PCP Phone Number:    [phone]     Follow Up Details:  2 weeks          Discharge Follow-up with Specialty: Alcohol Cessation program tomorrow   As directed      Specialty:  Alcohol Cessation program tomorrow           Follow-up Information     Charles Farooq MD .    Specialty:  Family Medicine  Why:  2 weeks  Contact information:  [address] [place], [place] [id number] [phone]                   TEST  RESULTS PENDING AT DISCHARGE         Gino Wise MD  [facility]  11/03/19  2:44 PM      Time: greater than 32 minutes on discharge  It was a pleasure taking care of this patient while in the hospital.

## 2019-11-03 NOTE — PLAN OF CARE
Problem: Patient Care Overview  Goal: Plan of Care Review  Outcome: Ongoing (interventions implemented as appropriate)    Pt oriented x4 this shift. Behavior has been appropriate. Stand by assist OOB,Offered. Pills whole with water and standby assist.

## 2019-11-03 NOTE — THERAPY TREATMENT NOTE
Patient Name: Nilesh Zapata  : 1960    MRN: [id number]                              Today's Date: 11/3/2019       Admit Date: 10/30/2019    Visit Dx:     ICD-10-CM ICD-9-CM   1. Alcohol abuse F10.10 305.00   2. Alcohol withdrawal seizure with complication (CMS/HCC) F10.239 291.81    R56.9 780.39   3. Hypomagnesemia E83.42 275.2   4. Hypokalemia E87.6 276.8   5. Hyponatremia E87.1 276.1     Patient Active Problem List   Diagnosis   • Alcohol withdrawal syndrome with complication (CMS/HCC)   • Anxiety   • Fatty liver   • Tobacco abuse   • Kidney cancer, primary, with metastasis from kidney to other site (CMS/HCC)   • Alcoholism (CMS/HCC)   • Hyponatremia   • Alcohol abuse   • History of renal cell cancer   • History of left nephrectomy / RCC   • Liver lesion   • Lung nodule   • Vitamin D deficiency   • Under emotional stress   •  from spouse   • Coping style affecting medical condition     Past Medical History:   Diagnosis Date   • Anxiety    • Delirium tremens (CMS/HCC) 3/20/2019   • Fatty liver    • FH: kidney cancer    • Hypertension      Past Surgical History:   Procedure Laterality Date   • APPENDECTOMY     • NEPHRECTOMY      left    • TONSILLECTOMY       General Information     Row Name 19 1002          PT Evaluation Time/Intention    Document Type  therapy note (daily note)  -     Mode of Treatment  individual therapy;physical therapy  -     Row Name 19 1002          General Information    Existing Precautions/Restrictions  fall  -     Row Name 19 1002          Cognitive Assessment/Intervention- PT/OT    Orientation Status (Cognition)  oriented x 3  -     Row Name 19 1002          Safety Issues, Functional Mobility    Impairments Affecting Function (Mobility)  balance;cognition;coordination;endurance/activity tolerance;strength;shortness of breath  -       User Key  (r) = Recorded By, (t) = Taken By, (c) = Cosigned By    Initials Name Provider Type      Kunal Vicente, [place] Physical Therapy Assistant        Mobility     Row Name 11/03/19 1002          Bed Mobility Assessment/Treatment    Supine-Sit [place] (Bed Mobility)  supervision  -     Sit-Supine [place] (Bed Mobility)  supervision  -     Assistive Device (Bed Mobility)  bed rails  -     Row Name 11/03/19 1002          Bed-Chair Transfer    Bed-Chair [place] (Transfers)  stand by assist;verbal cues  -     Assistive Device (Bed-Chair Transfers)  walker, front-wheeled  -RH     Row Name 11/03/19 1002          Sit-Stand Transfer    Sit-Stand [place] (Transfers)  supervision;verbal cues  -     Assistive Device (Sit-Stand Transfers)  walker, front-wheeled  -RH     Row Name 11/03/19 1002          Gait/Stairs Assessment/Training    [place] Level (Gait)  verbal cues;stand by assist  -     Assistive Device (Gait)  walker, front-wheeled  -     Pattern (Gait)  step-through  -     Deviations/Abnormal Patterns (Gait)  ousmane decreased;stride length decreased  -     [place] Level (Stairs)  not tested  -       User Key  (r) = Recorded By, (t) = Taken By, (c) = Cosigned By    Initials Name Provider Type    RH Kunal Vicente, [place] Physical Therapy Assistant        Obj/Interventions     Row Name 11/03/19 1004          Static Sitting Balance    Level of [place] (Unsupported Sitting, Static Balance)  conditional independence  -RH     Sitting Position (Unsupported Sitting, Static Balance)  sitting on edge of bed  -     Time Able to Maintain Position (Unsupported Sitting, Static Balance)  more than 5 minutes  -     Row Name 11/03/19 1004          Static Standing Balance    Level of [place] (Supported Standing, Static Balance)  standby assist  -     Time Able to Maintain Position (Supported Standing, Static Balance)  1 to 2 minutes  -     Assistive Device Utilized (Supported Standing, Static Balance)  walker, rolling  -       User Key  (r) = Recorded By, (t)  = Taken By, (c) = Cosigned By    Initials Name Provider Type     Kunal Vicente PTA Physical Therapy Assistant        Goals/Plan    No documentation.       Clinical Impression     Row Name 11/03/19 1004          Pain Assessment    Additional Documentation  Pain Scale: Numbers Pre/Post-Treatment (Group)  -     Row Name 11/03/19 1004          Pain Scale: Numbers Pre/Post-Treatment    Pain Scale: Numbers, Pretreatment  0/10 - no pain  -     Pain Scale: Numbers, Post-Treatment  0/10 - no pain  -     Row Name 11/03/19 1004          Vital Signs    Posttreatment Heart Rate (beats/min)  97  -RH     O2 Delivery Intra Treatment  room air  -     Row Name 11/03/19 1004          Positioning and Restraints    Pre-Treatment Position  in bed  -RH     Post Treatment Position  bed  -RH     In Bed  supine;call light within reach;encouraged to call for assist;exit alarm on;side rails up x2  -RH       User Key  (r) = Recorded By, (t) = Taken By, (c) = Cosigned By    Initials Name Provider Type     Kunal Vicente PTA Physical Therapy Assistant        Outcome Measures     Row Name 11/03/19 1005          How much help from another person do you currently need...    Turning from your back to your side while in flat bed without using bedrails?  3  -RH     Moving from lying on back to sitting on the side of a flat bed without bedrails?  3  -RH     Moving to and from a bed to a chair (including a wheelchair)?  3  -RH     Standing up from a chair using your arms (e.g., wheelchair, bedside chair)?  3  -RH     Climbing 3-5 steps with a railing?  3  -RH     To walk in hospital room?  3  -RH     AM-PAC 6 Clicks Score (PT)  18  -RH       User Key  (r) = Recorded By, (t) = Taken By, (c) = Cosigned By    Initials Name Provider Type    Kunal Mattson PTA Physical Therapy Assistant        Physical Therapy Education     Title: PT OT SLP Therapies (In Progress)     Topic: Physical Therapy (In Progress)     Point: Mobility  training (Done)     Learning Progress Summary           Patient Acceptance, E,TB,D, VU,NR by  at 11/1/2019  3:36 PM                   Point: Body mechanics (Done)     Learning Progress Summary           Patient Acceptance, E,TB,D, VU,NR by  at 11/1/2019  3:36 PM                   Point: Precautions (Done)     Learning Progress Summary           Patient Acceptance, E,TB,D, VU,NR by  at 11/1/2019  3:36 PM                               User Key     Initials Effective Dates Name Provider Type [facility] 04/03/18 -  Karla Zavala, PT Physical Therapist PT              PT Recommendation and Plan     Plan of Care Reviewed With: patient  Progress: improving     Time Calculation:   PT Charges     Row Name 11/03/19 1005             Time Calculation    Start Time  0952  -      Stop Time  1007  -      Time Calculation (min)  15 min  -      PT Received On  11/03/19  -      PT - Next Appointment  11/04/19  -        User Key  (r) = Recorded By, (t) = Taken By, (c) = Cosigned By    Initials Name Provider Type     Kunal Vicente PTA Physical Therapy Assistant        Therapy Charges for Today     Code Description Service Date Service Provider Modifiers Qty    73111165196 HC PT THER PROC EA 15 MIN 11/3/2019 Kunal Vicente PTA GP 1          PT G-Codes  Outcome Measure Options: AM-PAC 6 Clicks Basic Mobility (PT)  AM-PAC 6 Clicks Score (PT): 18    Kunal Vicente PTA  11/3/2019

## 2019-11-03 NOTE — PROGRESS NOTES
"     LOS: 3 days   Primary Care Physician: Charles Farooq MD     Subjective   Saw the patient this afternoon.  He was feeling better.  Wanted to take a shower.  Has been able to eat.  Weak when he got up with PT yesterday    Vital Signs  Body mass index is 36.61 kg/m².  Temp:  [97.8 °F (36.6 °C)-98.2 °F (36.8 °C)] 97.8 °F (36.6 °C)  Heart Rate:  [67-96] 96  Resp:  [18] 18  BP: (104-139)/(69-91) 104/69      Objective:  General Appearance:  In no acute distress (Looks older than age.  Morbidly obese).    Vital signs: (most recent): Blood pressure 104/69, pulse 96, temperature 97.8 °F (36.6 °C), temperature source Oral, resp. rate 18, height 167.6 cm (66\"), weight 103 kg (226 lb 12.8 oz), SpO2 93 %.    HEENT: (No JVD.  Neck supple.  Trachea midline)    Lungs:  He is not in respiratory distress.  No stridor.  There are decreased breath sounds.  No rales, wheezes or rhonchi.    Heart: Normal rate.  Regular rhythm.  No murmur.   Abdomen: Abdomen is soft and non-distended.  Bowel sounds are normal.   There is no abdominal tenderness.   There is no splenomegaly. There is no hepatomegaly.   Extremities: There is no dependent edema.  (No tremor of the fingers with hands outstretched)  Neurological: Patient is alert.    Skin:  Warm and dry.          Results Review:    I reviewed the patient's new clinical results.    Results from last 7 days   Lab Units 11/01/19  0433 10/31/19  0730   WBC 10*3/mm3 5.82 6.38   HEMOGLOBIN g/dL 10.1* 10.4*   PLATELETS 10*3/mm3 178 160     Results from last 7 days   Lab Units 11/02/19  0715 11/01/19  0433   SODIUM mmol/L 134* 133*   POTASSIUM mmol/L 4.4 4.5   CHLORIDE mmol/L 101 95*   CO2 mmol/L 27.7 25.9   BUN mg/dL 6* 10   CREATININE mg/dL 0.83 1.08   CALCIUM mg/dL 7.9* 7.1*   GLUCOSE mg/dL 73 82     Results from last 7 days   Lab Units 10/30/19  1440   INR  1.14*     Hemoglobin A1C:No results found for: HGBA1C    Glucose Range:No results found for: POCGLU    No results found for: " [id number]    Lab Results   Component Value Date    TSH 2.920 10/30/2019       Assessment & Plan      Medication Review: Yes    Active Hospital Problems    Diagnosis  POA   • **Alcohol withdrawal syndrome with complication (CMS/HCC) [F10.239]  Unknown   • Alcohol abuse [F10.10]  Yes   • History of renal cell cancer [Z85.528]  Not Applicable   • History of left nephrectomy 2/2 RCC [Z90.5]  Not Applicable   • Under emotional stress [R45.7]  Yes   •  from spouse [Z63.5]  Yes   • Coping style affecting medical condition [F54]  Yes   • Hyponatremia [E87.1]  Yes   • Fatty liver [K76.0]  Yes   • Alcoholism (CMS/HCC) [F10.20]  Yes   • Tobacco abuse [Z72.0]  Yes      Resolved Hospital Problems    Diagnosis Date Resolved POA   • Delirium tremens (CMS/HCC) [F10.231] 11/01/2019 Yes   • Hypokalemia [E87.6] 10/31/2019 Yes       Assessment/Plan  1.  Alcohol withdrawal, improved.  Has required less Ativan.  Hyponatremia improved.  Potassium and magnesium normal.  2.  Weakness.  Continue activity per PT.  Home versus rehab  3.  Anemia with low iron stores, consistent with chronic disease.  Hemoglobin fairly stable  4.  Possible seizure prior to admission.  EEG report pending  5.  Renal cell cancer, left nephrectomy  6.  Fatty liver noted in 2017.  AST elevated consistent with alcohol usage.  Repeat LFTs- might be candidate for vivitrol as an outpatient.  Access has given him the names of some providers that will prescribe    Leigh Ann Lora MD  11/02/19  9:21 PM

## 2019-11-04 ENCOUNTER — READMISSION MANAGEMENT (OUTPATIENT)
Dept: CALL CENTER | Facility: HOSPITAL | Age: 59
End: 2019-11-04

## 2019-11-04 NOTE — OUTREACH NOTE
Prep Survey      Responses   Facility patient discharged from?  [facility]   Is patient eligible?  No   What are the reasons patient is not eligible?  Other   Does the patient have one of the following disease processes/diagnoses(primary or secondary)?  Other   Prep survey completed?  Yes          Leigh Chiu RN

## 2019-11-04 NOTE — PROGRESS NOTES
Case Management Discharge Note    Final Note: Patient was DC'd home 11/3.         Transportation Services  Private: Car    Final Discharge Disposition Code: 01 - home or self-care

## 2021-03-16 ENCOUNTER — IMMUNIZATION (OUTPATIENT)
Dept: VACCINE CLINIC | Facility: HOSPITAL | Age: 61
End: 2021-03-16

## 2021-03-16 PROCEDURE — 91300 HC SARSCOV02 VAC 30MCG/0.3ML IM: CPT | Performed by: INTERNAL MEDICINE

## 2021-03-16 PROCEDURE — 0001A: CPT | Performed by: INTERNAL MEDICINE

## 2021-04-06 ENCOUNTER — IMMUNIZATION (OUTPATIENT)
Dept: VACCINE CLINIC | Facility: HOSPITAL | Age: 61
End: 2021-04-06

## 2021-04-06 PROCEDURE — 0002A: CPT | Performed by: INTERNAL MEDICINE

## 2021-04-06 PROCEDURE — 91300 HC SARSCOV02 VAC 30MCG/0.3ML IM: CPT | Performed by: INTERNAL MEDICINE

## 2022-05-30 ENCOUNTER — HOSPITAL ENCOUNTER (INPATIENT)
Facility: HOSPITAL | Age: 62
LOS: 4 days | Discharge: HOME | End: 2022-06-03
Attending: EMERGENCY MEDICINE | Admitting: INTERNAL MEDICINE

## 2022-05-30 DIAGNOSIS — F10.939 ALCOHOL WITHDRAWAL SYNDROME WITH COMPLICATION: Primary | ICD-10-CM

## 2022-05-30 LAB
ALBUMIN SERPL-MCNC: 4.4 G/DL (ref 3.5–5.2)
ALBUMIN/GLOB SERPL: 1.4 G/DL
ALP SERPL-CCNC: 100 U/L (ref 39–117)
ALT SERPL W P-5'-P-CCNC: 33 U/L (ref 1–41)
AMPHET+METHAMPHET UR QL: NEGATIVE
ANION GAP SERPL CALCULATED.3IONS-SCNC: 19 MMOL/L (ref 5–15)
AST SERPL-CCNC: 57 U/L (ref 1–40)
BACTERIA UR QL AUTO: ABNORMAL /HPF
BARBITURATES UR QL SCN: NEGATIVE
BASOPHILS # BLD AUTO: 0.04 10*3/MM3 (ref 0–0.2)
BASOPHILS NFR BLD AUTO: 0.6 % (ref 0–1.5)
BENZODIAZ UR QL SCN: NEGATIVE
BILIRUB SERPL-MCNC: 1.3 MG/DL (ref 0–1.2)
BILIRUB UR QL STRIP: NEGATIVE
BUN SERPL-MCNC: 7 MG/DL (ref 8–23)
BUN/CREAT SERPL: 8.2 (ref 7–25)
CALCIUM SPEC-SCNC: 9.3 MG/DL (ref 8.6–10.5)
CANNABINOIDS SERPL QL: NEGATIVE
CHLORIDE SERPL-SCNC: 89 MMOL/L (ref 98–107)
CLARITY UR: CLEAR
CO2 SERPL-SCNC: 26 MMOL/L (ref 22–29)
COCAINE UR QL: NEGATIVE
COLOR UR: ABNORMAL
CREAT SERPL-MCNC: 0.85 MG/DL (ref 0.76–1.27)
DEPRECATED RDW RBC AUTO: 51.2 FL (ref 37–54)
EGFRCR SERPLBLD CKD-EPI 2021: 97.6 ML/MIN/1.73
EOSINOPHIL # BLD AUTO: 0.15 10*3/MM3 (ref 0–0.4)
EOSINOPHIL NFR BLD AUTO: 2.3 % (ref 0.3–6.2)
ERYTHROCYTE [DISTWIDTH] IN BLOOD BY AUTOMATED COUNT: 13.5 % (ref 12.3–15.4)
ETHANOL BLD-MCNC: 34 MG/DL (ref 0–10)
ETHANOL UR QL: 0.03 %
GLOBULIN UR ELPH-MCNC: 3.2 GM/DL
GLUCOSE SERPL-MCNC: 132 MG/DL (ref 65–99)
GLUCOSE UR STRIP-MCNC: NEGATIVE MG/DL
HCT VFR BLD AUTO: 42 % (ref 37.5–51)
HGB BLD-MCNC: 15.1 G/DL (ref 13–17.7)
HGB UR QL STRIP.AUTO: ABNORMAL
HYALINE CASTS UR QL AUTO: ABNORMAL /LPF
IMM GRANULOCYTES # BLD AUTO: 0.02 10*3/MM3 (ref 0–0.05)
IMM GRANULOCYTES NFR BLD AUTO: 0.3 % (ref 0–0.5)
KETONES UR QL STRIP: NEGATIVE
LEUKOCYTE ESTERASE UR QL STRIP.AUTO: ABNORMAL
LIPASE SERPL-CCNC: 37 U/L (ref 13–60)
LYMPHOCYTES # BLD AUTO: 1.16 10*3/MM3 (ref 0.7–3.1)
LYMPHOCYTES NFR BLD AUTO: 17.8 % (ref 19.6–45.3)
MCH RBC QN AUTO: 37.2 PG (ref 26.6–33)
MCHC RBC AUTO-ENTMCNC: 36 G/DL (ref 31.5–35.7)
MCV RBC AUTO: 103.4 FL (ref 79–97)
METHADONE UR QL SCN: NEGATIVE
MONOCYTES # BLD AUTO: 0.27 10*3/MM3 (ref 0.1–0.9)
MONOCYTES NFR BLD AUTO: 4.1 % (ref 5–12)
NEUTROPHILS NFR BLD AUTO: 4.88 10*3/MM3 (ref 1.7–7)
NEUTROPHILS NFR BLD AUTO: 74.9 % (ref 42.7–76)
NITRITE UR QL STRIP: POSITIVE
NRBC BLD AUTO-RTO: 0.2 /100 WBC (ref 0–0.2)
OPIATES UR QL: NEGATIVE
OXYCODONE UR QL SCN: NEGATIVE
PH UR STRIP.AUTO: 6 [PH] (ref 5–8)
PLATELET # BLD AUTO: 71 10*3/MM3 (ref 140–450)
PMV BLD AUTO: 10.6 FL (ref 6–12)
POTASSIUM SERPL-SCNC: 3.8 MMOL/L (ref 3.5–5.2)
PROT SERPL-MCNC: 7.6 G/DL (ref 6–8.5)
PROT UR QL STRIP: ABNORMAL
RBC # BLD AUTO: 4.06 10*6/MM3 (ref 4.14–5.8)
RBC # UR STRIP: ABNORMAL /HPF
REF LAB TEST METHOD: ABNORMAL
SARS-COV-2 RNA RESP QL NAA+PROBE: NOT DETECTED
SODIUM SERPL-SCNC: 134 MMOL/L (ref 136–145)
SP GR UR STRIP: 1.02 (ref 1–1.03)
SQUAMOUS #/AREA URNS HPF: ABNORMAL /HPF
UROBILINOGEN UR QL STRIP: ABNORMAL
WBC # UR STRIP: ABNORMAL /HPF
WBC NRBC COR # BLD: 6.52 10*3/MM3 (ref 3.4–10.8)

## 2022-05-30 PROCEDURE — 80307 DRUG TEST PRSMV CHEM ANLYZR: CPT | Performed by: PHYSICIAN ASSISTANT

## 2022-05-30 PROCEDURE — 25010000002 LORAZEPAM PER 2 MG: Performed by: EMERGENCY MEDICINE

## 2022-05-30 PROCEDURE — 80053 COMPREHEN METABOLIC PANEL: CPT | Performed by: PHYSICIAN ASSISTANT

## 2022-05-30 PROCEDURE — 82077 ASSAY SPEC XCP UR&BREATH IA: CPT | Performed by: PHYSICIAN ASSISTANT

## 2022-05-30 PROCEDURE — U0003 INFECTIOUS AGENT DETECTION BY NUCLEIC ACID (DNA OR RNA); SEVERE ACUTE RESPIRATORY SYNDROME CORONAVIRUS 2 (SARS-COV-2) (CORONAVIRUS DISEASE [COVID-19]), AMPLIFIED PROBE TECHNIQUE, MAKING USE OF HIGH THROUGHPUT TECHNOLOGIES AS DESCRIBED BY CMS-2020-01-R: HCPCS | Performed by: PHYSICIAN ASSISTANT

## 2022-05-30 PROCEDURE — 85025 COMPLETE CBC W/AUTO DIFF WBC: CPT | Performed by: PHYSICIAN ASSISTANT

## 2022-05-30 PROCEDURE — 25010000002 ONDANSETRON PER 1 MG: Performed by: PHYSICIAN ASSISTANT

## 2022-05-30 PROCEDURE — 25010000002 LORAZEPAM PER 2 MG: Performed by: INTERNAL MEDICINE

## 2022-05-30 PROCEDURE — 81001 URINALYSIS AUTO W/SCOPE: CPT | Performed by: PHYSICIAN ASSISTANT

## 2022-05-30 PROCEDURE — 90791 PSYCH DIAGNOSTIC EVALUATION: CPT

## 2022-05-30 PROCEDURE — 99284 EMERGENCY DEPT VISIT MOD MDM: CPT

## 2022-05-30 PROCEDURE — 83690 ASSAY OF LIPASE: CPT | Performed by: PHYSICIAN ASSISTANT

## 2022-05-30 RX ORDER — NITROGLYCERIN 0.4 MG/1
0.4 TABLET SUBLINGUAL
Status: DISCONTINUED | OUTPATIENT
Start: 2022-05-30 | End: 2022-06-03 | Stop reason: HOSPADM

## 2022-05-30 RX ORDER — FLUVOXAMINE MALEATE 50 MG/1
100 TABLET, COATED ORAL DAILY
Status: DISCONTINUED | OUTPATIENT
Start: 2022-05-31 | End: 2022-06-03 | Stop reason: HOSPADM

## 2022-05-30 RX ORDER — LORAZEPAM 2 MG/ML
2 INJECTION INTRAMUSCULAR
Status: DISCONTINUED | OUTPATIENT
Start: 2022-05-30 | End: 2022-06-03 | Stop reason: HOSPADM

## 2022-05-30 RX ORDER — NALTREXONE HYDROCHLORIDE 50 MG/1
50 TABLET, FILM COATED ORAL DAILY
COMMUNITY

## 2022-05-30 RX ORDER — LANOLIN ALCOHOL/MO/W.PET/CERES
3 CREAM (GRAM) TOPICAL DAILY
COMMUNITY
Start: 2022-03-15 | End: 2022-06-13

## 2022-05-30 RX ORDER — ACETAMINOPHEN 325 MG/1
650 TABLET ORAL EVERY 4 HOURS PRN
Status: DISCONTINUED | OUTPATIENT
Start: 2022-05-30 | End: 2022-06-03 | Stop reason: HOSPADM

## 2022-05-30 RX ORDER — ONDANSETRON 2 MG/ML
4 INJECTION INTRAMUSCULAR; INTRAVENOUS EVERY 6 HOURS PRN
Status: DISCONTINUED | OUTPATIENT
Start: 2022-05-30 | End: 2022-06-03 | Stop reason: HOSPADM

## 2022-05-30 RX ORDER — UREA 10 %
3 LOTION (ML) TOPICAL NIGHTLY PRN
Status: DISCONTINUED | OUTPATIENT
Start: 2022-05-30 | End: 2022-06-03 | Stop reason: HOSPADM

## 2022-05-30 RX ORDER — PROPRANOLOL HYDROCHLORIDE 20 MG/1
60 TABLET ORAL 2 TIMES DAILY
Status: DISCONTINUED | OUTPATIENT
Start: 2022-05-30 | End: 2022-06-03 | Stop reason: HOSPADM

## 2022-05-30 RX ORDER — LORAZEPAM 1 MG/1
1 TABLET ORAL
Status: DISCONTINUED | OUTPATIENT
Start: 2022-05-30 | End: 2022-06-03 | Stop reason: HOSPADM

## 2022-05-30 RX ORDER — RISPERIDONE 2 MG/1
2 TABLET ORAL 2 TIMES DAILY
Status: DISCONTINUED | OUTPATIENT
Start: 2022-05-30 | End: 2022-06-03 | Stop reason: HOSPADM

## 2022-05-30 RX ORDER — ONDANSETRON 4 MG/1
4 TABLET, FILM COATED ORAL EVERY 6 HOURS PRN
Status: DISCONTINUED | OUTPATIENT
Start: 2022-05-30 | End: 2022-06-03 | Stop reason: HOSPADM

## 2022-05-30 RX ORDER — CLONIDINE HYDROCHLORIDE 0.1 MG/1
0.1 TABLET ORAL EVERY 4 HOURS PRN
Status: DISCONTINUED | OUTPATIENT
Start: 2022-05-30 | End: 2022-06-03 | Stop reason: HOSPADM

## 2022-05-30 RX ORDER — LORAZEPAM 2 MG/ML
1 INJECTION INTRAMUSCULAR
Status: DISCONTINUED | OUTPATIENT
Start: 2022-05-30 | End: 2022-06-03 | Stop reason: HOSPADM

## 2022-05-30 RX ORDER — FLUVOXAMINE MALEATE 25 MG
100 TABLET ORAL DAILY
COMMUNITY

## 2022-05-30 RX ORDER — LORAZEPAM 2 MG/ML
1 INJECTION INTRAMUSCULAR ONCE
Status: COMPLETED | OUTPATIENT
Start: 2022-05-30 | End: 2022-05-30

## 2022-05-30 RX ORDER — DIPHENOXYLATE HYDROCHLORIDE AND ATROPINE SULFATE 2.5; .025 MG/1; MG/1
1 TABLET ORAL DAILY
Status: COMPLETED | OUTPATIENT
Start: 2022-05-31 | End: 2022-06-02

## 2022-05-30 RX ORDER — AMITRIPTYLINE HYDROCHLORIDE 25 MG/1
25 TABLET, FILM COATED ORAL NIGHTLY
Status: DISCONTINUED | OUTPATIENT
Start: 2022-05-30 | End: 2022-06-03 | Stop reason: HOSPADM

## 2022-05-30 RX ORDER — LORAZEPAM 1 MG/1
2 TABLET ORAL
Status: DISCONTINUED | OUTPATIENT
Start: 2022-05-30 | End: 2022-06-03 | Stop reason: HOSPADM

## 2022-05-30 RX ORDER — HYDROCODONE BITARTRATE AND ACETAMINOPHEN 5; 325 MG/1; MG/1
1 TABLET ORAL EVERY 6 HOURS PRN
COMMUNITY
End: 2022-06-03 | Stop reason: HOSPADM

## 2022-05-30 RX ORDER — AMLODIPINE BESYLATE 5 MG/1
5 TABLET ORAL DAILY
Status: DISCONTINUED | OUTPATIENT
Start: 2022-05-31 | End: 2022-06-02

## 2022-05-30 RX ORDER — THIAMINE HYDROCHLORIDE 100 MG/ML
100 INJECTION, SOLUTION INTRAMUSCULAR; INTRAVENOUS ONCE
Status: DISCONTINUED | OUTPATIENT
Start: 2022-05-30 | End: 2022-05-30 | Stop reason: SDUPTHER

## 2022-05-30 RX ORDER — POTASSIUM CHLORIDE 20 MEQ/1
20 TABLET, EXTENDED RELEASE ORAL DAILY
COMMUNITY
Start: 2022-03-15 | End: 2022-06-13

## 2022-05-30 RX ORDER — ONDANSETRON 2 MG/ML
4 INJECTION INTRAMUSCULAR; INTRAVENOUS ONCE
Status: COMPLETED | OUTPATIENT
Start: 2022-05-30 | End: 2022-05-30

## 2022-05-30 RX ORDER — FOLIC ACID 1 MG/1
1 TABLET ORAL DAILY
Status: DISCONTINUED | OUTPATIENT
Start: 2022-05-31 | End: 2022-06-03 | Stop reason: HOSPADM

## 2022-05-30 RX ORDER — FOLIC ACID 1 MG/1
1 TABLET ORAL DAILY
Status: DISCONTINUED | OUTPATIENT
Start: 2022-05-31 | End: 2022-05-30 | Stop reason: SDUPTHER

## 2022-05-30 RX ORDER — RISPERIDONE 2 MG/1
2 TABLET ORAL 2 TIMES DAILY
COMMUNITY

## 2022-05-30 RX ORDER — MIRTAZAPINE 15 MG/1
15 TABLET, FILM COATED ORAL NIGHTLY
Status: DISCONTINUED | OUTPATIENT
Start: 2022-05-30 | End: 2022-06-03 | Stop reason: HOSPADM

## 2022-05-30 RX ADMIN — LORAZEPAM 1 MG: 2 INJECTION INTRAMUSCULAR; INTRAVENOUS at 13:04

## 2022-05-30 RX ADMIN — MIRTAZAPINE 15 MG: 15 TABLET, FILM COATED ORAL at 21:46

## 2022-05-30 RX ADMIN — LORAZEPAM 1 MG: 2 INJECTION, SOLUTION INTRAMUSCULAR; INTRAVENOUS at 20:45

## 2022-05-30 RX ADMIN — ONDANSETRON 4 MG: 2 INJECTION INTRAMUSCULAR; INTRAVENOUS at 13:04

## 2022-05-30 RX ADMIN — AMITRIPTYLINE HYDROCHLORIDE 25 MG: 25 TABLET, FILM COATED ORAL at 21:46

## 2022-05-30 RX ADMIN — PROPRANOLOL HYDROCHLORIDE 60 MG: 20 TABLET ORAL at 21:46

## 2022-05-30 RX ADMIN — LORAZEPAM 1 MG: 2 INJECTION INTRAMUSCULAR; INTRAVENOUS at 17:01

## 2022-05-30 RX ADMIN — LORAZEPAM 2 MG: 2 INJECTION, SOLUTION INTRAMUSCULAR; INTRAVENOUS at 21:45

## 2022-05-30 RX ADMIN — RISPERIDONE 2 MG: 2 TABLET ORAL at 21:46

## 2022-05-30 RX ADMIN — SODIUM CHLORIDE 1000 ML: 9 INJECTION, SOLUTION INTRAVENOUS at 13:04

## 2022-05-31 ENCOUNTER — APPOINTMENT (OUTPATIENT)
Dept: ULTRASOUND IMAGING | Facility: HOSPITAL | Age: 62
End: 2022-05-31

## 2022-05-31 PROBLEM — I10 ESSENTIAL HYPERTENSION: Status: ACTIVE | Noted: 2022-05-31

## 2022-05-31 PROBLEM — D69.6 THROMBOCYTOPENIA (HCC): Status: ACTIVE | Noted: 2022-05-31

## 2022-05-31 LAB
ANION GAP SERPL CALCULATED.3IONS-SCNC: 7 MMOL/L (ref 5–15)
BUN SERPL-MCNC: 10 MG/DL (ref 8–23)
BUN/CREAT SERPL: 11.2 (ref 7–25)
CALCIUM SPEC-SCNC: 9 MG/DL (ref 8.6–10.5)
CHLORIDE SERPL-SCNC: 92 MMOL/L (ref 98–107)
CO2 SERPL-SCNC: 34 MMOL/L (ref 22–29)
CREAT SERPL-MCNC: 0.89 MG/DL (ref 0.76–1.27)
DEPRECATED RDW RBC AUTO: 50.4 FL (ref 37–54)
EGFRCR SERPLBLD CKD-EPI 2021: 96.3 ML/MIN/1.73
ERYTHROCYTE [DISTWIDTH] IN BLOOD BY AUTOMATED COUNT: 13.2 % (ref 12.3–15.4)
GLUCOSE BLDC GLUCOMTR-MCNC: 150 MG/DL (ref 70–130)
GLUCOSE SERPL-MCNC: 95 MG/DL (ref 65–99)
HCT VFR BLD AUTO: 36.5 % (ref 37.5–51)
HGB BLD-MCNC: 12.8 G/DL (ref 13–17.7)
INR PPP: 1.18 (ref 0.9–1.1)
MCH RBC QN AUTO: 36.8 PG (ref 26.6–33)
MCHC RBC AUTO-ENTMCNC: 35.1 G/DL (ref 31.5–35.7)
MCV RBC AUTO: 104.9 FL (ref 79–97)
PLATELET # BLD AUTO: 39 10*3/MM3 (ref 140–450)
PMV BLD AUTO: 11 FL (ref 6–12)
POTASSIUM SERPL-SCNC: 3.6 MMOL/L (ref 3.5–5.2)
PROTHROMBIN TIME: 14.9 SECONDS (ref 11.7–14.2)
RBC # BLD AUTO: 3.48 10*6/MM3 (ref 4.14–5.8)
SODIUM SERPL-SCNC: 133 MMOL/L (ref 136–145)
WBC NRBC COR # BLD: 4.05 10*3/MM3 (ref 3.4–10.8)

## 2022-05-31 PROCEDURE — 94799 UNLISTED PULMONARY SVC/PX: CPT

## 2022-05-31 PROCEDURE — 85027 COMPLETE CBC AUTOMATED: CPT | Performed by: INTERNAL MEDICINE

## 2022-05-31 PROCEDURE — 85610 PROTHROMBIN TIME: CPT | Performed by: STUDENT IN AN ORGANIZED HEALTH CARE EDUCATION/TRAINING PROGRAM

## 2022-05-31 PROCEDURE — 80048 BASIC METABOLIC PNL TOTAL CA: CPT | Performed by: INTERNAL MEDICINE

## 2022-05-31 PROCEDURE — 25010000002 LORAZEPAM PER 2 MG: Performed by: INTERNAL MEDICINE

## 2022-05-31 PROCEDURE — 82962 GLUCOSE BLOOD TEST: CPT

## 2022-05-31 PROCEDURE — 76700 US EXAM ABDOM COMPLETE: CPT

## 2022-05-31 RX ORDER — NICOTINE 21 MG/24HR
1 PATCH, TRANSDERMAL 24 HOURS TRANSDERMAL
Status: DISCONTINUED | OUTPATIENT
Start: 2022-05-31 | End: 2022-06-03 | Stop reason: HOSPADM

## 2022-05-31 RX ADMIN — PROPRANOLOL HYDROCHLORIDE 60 MG: 20 TABLET ORAL at 09:02

## 2022-05-31 RX ADMIN — LORAZEPAM 1 MG: 1 TABLET ORAL at 22:38

## 2022-05-31 RX ADMIN — LORAZEPAM 2 MG: 2 INJECTION, SOLUTION INTRAMUSCULAR; INTRAVENOUS at 00:07

## 2022-05-31 RX ADMIN — RISPERIDONE 2 MG: 2 TABLET ORAL at 09:02

## 2022-05-31 RX ADMIN — Medication 100 MG: at 09:01

## 2022-05-31 RX ADMIN — PROPRANOLOL HYDROCHLORIDE 60 MG: 20 TABLET ORAL at 20:23

## 2022-05-31 RX ADMIN — AMITRIPTYLINE HYDROCHLORIDE 25 MG: 25 TABLET, FILM COATED ORAL at 20:23

## 2022-05-31 RX ADMIN — AMLODIPINE BESYLATE 5 MG: 5 TABLET ORAL at 09:02

## 2022-05-31 RX ADMIN — NICOTINE 1 PATCH: 21 PATCH, EXTENDED RELEASE TRANSDERMAL at 18:16

## 2022-05-31 RX ADMIN — MIRTAZAPINE 15 MG: 15 TABLET, FILM COATED ORAL at 20:23

## 2022-05-31 RX ADMIN — RISPERIDONE 2 MG: 2 TABLET ORAL at 20:23

## 2022-05-31 RX ADMIN — FLUVOXAMINE MALEATE 100 MG: 50 TABLET, COATED ORAL at 09:02

## 2022-05-31 RX ADMIN — FOLIC ACID 1 MG: 1 TABLET ORAL at 09:01

## 2022-05-31 RX ADMIN — Medication 1 TABLET: at 09:01

## 2022-06-01 LAB
ALBUMIN SERPL-MCNC: 3.6 G/DL (ref 3.5–5.2)
ALBUMIN/GLOB SERPL: 1.4 G/DL
ALP SERPL-CCNC: 77 U/L (ref 39–117)
ALT SERPL W P-5'-P-CCNC: 21 U/L (ref 1–41)
ANION GAP SERPL CALCULATED.3IONS-SCNC: 10 MMOL/L (ref 5–15)
AST SERPL-CCNC: 34 U/L (ref 1–40)
BASOPHILS # BLD AUTO: 0.02 10*3/MM3 (ref 0–0.2)
BASOPHILS NFR BLD AUTO: 0.6 % (ref 0–1.5)
BILIRUB SERPL-MCNC: 1.1 MG/DL (ref 0–1.2)
BUN SERPL-MCNC: 13 MG/DL (ref 8–23)
BUN/CREAT SERPL: 18.1 (ref 7–25)
CALCIUM SPEC-SCNC: 8.9 MG/DL (ref 8.6–10.5)
CHLORIDE SERPL-SCNC: 91 MMOL/L (ref 98–107)
CO2 SERPL-SCNC: 32 MMOL/L (ref 22–29)
CREAT SERPL-MCNC: 0.72 MG/DL (ref 0.76–1.27)
DEPRECATED RDW RBC AUTO: 48.1 FL (ref 37–54)
EGFRCR SERPLBLD CKD-EPI 2021: 102.7 ML/MIN/1.73
EOSINOPHIL # BLD AUTO: 0.07 10*3/MM3 (ref 0–0.4)
EOSINOPHIL NFR BLD AUTO: 2.2 % (ref 0.3–6.2)
ERYTHROCYTE [DISTWIDTH] IN BLOOD BY AUTOMATED COUNT: 13.3 % (ref 12.3–15.4)
FOLATE SERPL-MCNC: 5.64 NG/ML (ref 4.78–24.2)
GLOBULIN UR ELPH-MCNC: 2.6 GM/DL
GLUCOSE SERPL-MCNC: 84 MG/DL (ref 65–99)
HCT VFR BLD AUTO: 34.3 % (ref 37.5–51)
HCV AB SER DONR QL: NORMAL
HGB BLD-MCNC: 12.6 G/DL (ref 13–17.7)
LYMPHOCYTES # BLD AUTO: 1.06 10*3/MM3 (ref 0.7–3.1)
LYMPHOCYTES NFR BLD AUTO: 33.8 % (ref 19.6–45.3)
MAGNESIUM SERPL-MCNC: 1.2 MG/DL (ref 1.6–2.4)
MCH RBC QN AUTO: 37.2 PG (ref 26.6–33)
MCHC RBC AUTO-ENTMCNC: 36.7 G/DL (ref 31.5–35.7)
MCV RBC AUTO: 101.2 FL (ref 79–97)
MONOCYTES # BLD AUTO: 0.28 10*3/MM3 (ref 0.1–0.9)
MONOCYTES NFR BLD AUTO: 8.9 % (ref 5–12)
NEUTROPHILS NFR BLD AUTO: 1.69 10*3/MM3 (ref 1.7–7)
NEUTROPHILS NFR BLD AUTO: 53.9 % (ref 42.7–76)
PHOSPHATE SERPL-MCNC: 2.5 MG/DL (ref 2.5–4.5)
PLATELET # BLD AUTO: 30 10*3/MM3 (ref 140–450)
PMV BLD AUTO: 11.1 FL (ref 6–12)
POTASSIUM SERPL-SCNC: 3.7 MMOL/L (ref 3.5–5.2)
PROT SERPL-MCNC: 6.2 G/DL (ref 6–8.5)
RBC # BLD AUTO: 3.39 10*6/MM3 (ref 4.14–5.8)
SODIUM SERPL-SCNC: 133 MMOL/L (ref 136–145)
VIT B12 BLD-MCNC: 270 PG/ML (ref 211–946)
WBC NRBC COR # BLD: 3.14 10*3/MM3 (ref 3.4–10.8)

## 2022-06-01 PROCEDURE — 86803 HEPATITIS C AB TEST: CPT | Performed by: STUDENT IN AN ORGANIZED HEALTH CARE EDUCATION/TRAINING PROGRAM

## 2022-06-01 PROCEDURE — 83735 ASSAY OF MAGNESIUM: CPT | Performed by: STUDENT IN AN ORGANIZED HEALTH CARE EDUCATION/TRAINING PROGRAM

## 2022-06-01 PROCEDURE — 84100 ASSAY OF PHOSPHORUS: CPT | Performed by: STUDENT IN AN ORGANIZED HEALTH CARE EDUCATION/TRAINING PROGRAM

## 2022-06-01 PROCEDURE — 80053 COMPREHEN METABOLIC PANEL: CPT | Performed by: STUDENT IN AN ORGANIZED HEALTH CARE EDUCATION/TRAINING PROGRAM

## 2022-06-01 PROCEDURE — 25010000002 MAGNESIUM SULFATE 2 GM/50ML SOLUTION: Performed by: STUDENT IN AN ORGANIZED HEALTH CARE EDUCATION/TRAINING PROGRAM

## 2022-06-01 PROCEDURE — 82607 VITAMIN B-12: CPT | Performed by: STUDENT IN AN ORGANIZED HEALTH CARE EDUCATION/TRAINING PROGRAM

## 2022-06-01 PROCEDURE — 82746 ASSAY OF FOLIC ACID SERUM: CPT | Performed by: STUDENT IN AN ORGANIZED HEALTH CARE EDUCATION/TRAINING PROGRAM

## 2022-06-01 PROCEDURE — 85025 COMPLETE CBC W/AUTO DIFF WBC: CPT | Performed by: STUDENT IN AN ORGANIZED HEALTH CARE EDUCATION/TRAINING PROGRAM

## 2022-06-01 PROCEDURE — 99222 1ST HOSP IP/OBS MODERATE 55: CPT | Performed by: INTERNAL MEDICINE

## 2022-06-01 PROCEDURE — 25010000002 CYANOCOBALAMIN PER 1000 MCG: Performed by: STUDENT IN AN ORGANIZED HEALTH CARE EDUCATION/TRAINING PROGRAM

## 2022-06-01 RX ORDER — CYANOCOBALAMIN 1000 UG/ML
1000 INJECTION, SOLUTION INTRAMUSCULAR; SUBCUTANEOUS DAILY
Status: DISCONTINUED | OUTPATIENT
Start: 2022-06-01 | End: 2022-06-03 | Stop reason: HOSPADM

## 2022-06-01 RX ORDER — MAGNESIUM SULFATE HEPTAHYDRATE 40 MG/ML
4 INJECTION, SOLUTION INTRAVENOUS AS NEEDED
Status: DISCONTINUED | OUTPATIENT
Start: 2022-06-01 | End: 2022-06-03 | Stop reason: HOSPADM

## 2022-06-01 RX ORDER — FOLIC ACID 1 MG/1
1 TABLET ORAL DAILY
Status: DISCONTINUED | OUTPATIENT
Start: 2022-06-01 | End: 2022-06-01 | Stop reason: SDUPTHER

## 2022-06-01 RX ORDER — MAGNESIUM SULFATE HEPTAHYDRATE 40 MG/ML
2 INJECTION, SOLUTION INTRAVENOUS AS NEEDED
Status: DISCONTINUED | OUTPATIENT
Start: 2022-06-01 | End: 2022-06-03 | Stop reason: HOSPADM

## 2022-06-01 RX ADMIN — AMLODIPINE BESYLATE 5 MG: 5 TABLET ORAL at 08:35

## 2022-06-01 RX ADMIN — RISPERIDONE 2 MG: 2 TABLET ORAL at 08:35

## 2022-06-01 RX ADMIN — NICOTINE 1 PATCH: 21 PATCH, EXTENDED RELEASE TRANSDERMAL at 08:36

## 2022-06-01 RX ADMIN — MAGNESIUM SULFATE HEPTAHYDRATE 2 G: 2 INJECTION, SOLUTION INTRAVENOUS at 13:32

## 2022-06-01 RX ADMIN — Medication 1 TABLET: at 08:35

## 2022-06-01 RX ADMIN — AMITRIPTYLINE HYDROCHLORIDE 25 MG: 25 TABLET, FILM COATED ORAL at 20:10

## 2022-06-01 RX ADMIN — FOLIC ACID 1 MG: 1 TABLET ORAL at 08:35

## 2022-06-01 RX ADMIN — CYANOCOBALAMIN 1000 MCG: 1000 INJECTION, SOLUTION INTRAMUSCULAR; SUBCUTANEOUS at 11:50

## 2022-06-01 RX ADMIN — RISPERIDONE 2 MG: 2 TABLET ORAL at 20:10

## 2022-06-01 RX ADMIN — PROPRANOLOL HYDROCHLORIDE 60 MG: 20 TABLET ORAL at 08:35

## 2022-06-01 RX ADMIN — Medication 100 MG: at 08:35

## 2022-06-01 RX ADMIN — MAGNESIUM SULFATE HEPTAHYDRATE 2 G: 2 INJECTION, SOLUTION INTRAVENOUS at 11:50

## 2022-06-01 RX ADMIN — MAGNESIUM SULFATE HEPTAHYDRATE 2 G: 2 INJECTION, SOLUTION INTRAVENOUS at 15:10

## 2022-06-01 RX ADMIN — FLUVOXAMINE MALEATE 100 MG: 50 TABLET, COATED ORAL at 08:35

## 2022-06-01 RX ADMIN — MIRTAZAPINE 15 MG: 15 TABLET, FILM COATED ORAL at 20:10

## 2022-06-01 RX ADMIN — PROPRANOLOL HYDROCHLORIDE 60 MG: 20 TABLET ORAL at 20:10

## 2022-06-01 NOTE — NURSING NOTE
[facility] Follow Up    Patient resting in bed, reviewed chart, spoke with nurse. Patient still having anxiety and some hallucinations thinking his room was on fire, had to be redirected multiple times, CIWA-8, 1mg Ativan administered during shift. Access will continue to follow.

## 2022-06-01 NOTE — CASE MANAGEMENT/SOCIAL WORK
Discharge Planning Assessment  [facility]     Patient Name: Nilesh aZpata  MRN: [id number]  Today's Date: 6/1/2022    Admit Date: 5/30/2022     Discharge Needs Assessment     Row Name 06/01/22 1150       Living Environment    People in Home alone    Unique Family Situation seperated from wife but she checks on him    Current Living Arrangements home    Primary Care Provided by self    Provides Primary Care For no one    Family Caregiver if Needed spouse    Quality of Family Relationships helpful;involved    Able to Return to Prior Arrangements yes       Resource/Environmental Concerns    Resource/Environmental Concerns none       Transition Planning    Patient/Family Anticipates Transition to home    Transportation Anticipated family or friend will provide       Discharge Needs Assessment    Readmission Within the Last 30 Days no previous admission in last 30 days    Equipment Currently Used at Home none    Equipment Needed After Discharge none               Discharge Plan     Row Name 06/01/22 1151       Plan    Plan home; access following for ETOH    Patient/Family in Agreement with Plan yes    Plan Comments Spoke with pt's wife via phone. Confirmed facesheet correct. Explained role of CCP. Pt lives alone in a first floor apartment. He is seperated from wife but she helps as needed and checks on him. Pt has a new PCP with  family practive in [facility] but she can't remember the name of the new provider. Pt has never used HH or SNF. During Covid he used [facility] when wife couldn't help him. [facility] is following for ETOH. CCP to follow for d/c needs.              Continued Care and Services - Admitted Since 5/30/2022    Coordination has not been started for this encounter.       Expected Discharge Date and Time     Expected Discharge Date Expected Discharge Time    Jun 2, 2022          Demographic Summary     Row Name 06/01/22 1149       General Information    Admission Type inpatient    Arrived From home     Required Notices Provided Important Message from Medicare               Functional Status     Row Name 06/01/22 1150       Functional Status    Usual Activity Tolerance good    Current Activity Tolerance moderate       Functional Status, IADL    Medications independent    Meal Preparation independent    Housekeeping independent    Laundry independent    Shopping independent               Psychosocial    No documentation.                Abuse/Neglect    No documentation.                Legal    No documentation.                Substance Abuse    No documentation.                Patient Forms    No documentation.                   ROCIO Briggs

## 2022-06-01 NOTE — PROGRESS NOTES
Name: Nilesh Zapata ADMIT: 2022   : 1960  PCP: Charles Farooq MD    MRN: [id number] LOS: 2 days   AGE/SEX: 61 y.o. male  ROOM: [facility]     Subjective   Subjective     Still withdrawaling and had some hallucinations last night. Got ativan this morning Platelets remain low. b12 came back a bit low and so i've started some replacement. Liver u/s reviewed, no splenomegaly or cirrhosis       Objective   Objective   Vital Signs  Temp:  [97.8 °F (36.6 °C)-98.3 °F (36.8 °C)] 97.8 °F (36.6 °C)  Heart Rate:  [66-74] 73  Resp:  [18] 18  BP: (138-147)/(95-97) 145/95  SpO2:  [90 %-94 %] 94 %  on  Flow (L/min):  [2-2.5] 2.5;   Device (Oxygen Therapy): nasal cannula  Body mass index is 33.8 kg/m².  Physical Exam  Constitutional:       General: He is not in acute distress.     Appearance: He is ill-appearing.   Cardiovascular:      Rate and Rhythm: Normal rate and regular rhythm.      Heart sounds: Normal heart sounds.   Pulmonary:      Effort: Pulmonary effort is normal.      Breath sounds: Normal breath sounds.   Abdominal:      General: Bowel sounds are normal.      Palpations: Abdomen is soft.   Musculoskeletal:         General: No tenderness.      Left lower leg: No edema.   Neurological:      Mental Status: He is alert.   Psychiatric:         Mood and Affect: Mood normal.         Behavior: Behavior normal.         Results Review     I reviewed the patient's new clinical results.  Results from last 7 days   Lab Units 22  0639 22  0548 22  1246   WBC 10*3/mm3 3.14* 4.05 6.52   HEMOGLOBIN g/dL 12.6* 12.8* 15.1   PLATELETS 10*3/mm3 30* 39* 71*     Results from last 7 days   Lab Units 22  0640 22  0548 22  1246   SODIUM mmol/L 133* 133* 134*   POTASSIUM mmol/L 3.7 3.6 3.8   CHLORIDE mmol/L 91* 92* 89*   CO2 mmol/L 32.0* 34.0* 26.0   BUN mg/dL 13 10 7*   CREATININE mg/dL 0.72* 0.89 0.85   GLUCOSE mg/dL 84 95 132*   Estimated Creatinine Clearance: 117 mL/min (A) (by C-G  formula based on SCr of 0.72 mg/dL (L)).  Results from last 7 days   Lab Units 06/01/22  0640 05/30/22  1246   ALBUMIN g/dL 3.60 4.40   BILIRUBIN mg/dL 1.1 1.3*   ALK PHOS U/L 77 100   AST (SGOT) U/L 34 57*   ALT (SGPT) U/L 21 33     Results from last 7 days   Lab Units 06/01/22  0640 05/31/22  0548 05/30/22  1246   CALCIUM mg/dL 8.9 9.0 9.3   ALBUMIN g/dL 3.60  --  4.40   MAGNESIUM mg/dL 1.2*  --   --    PHOSPHORUS mg/dL 2.5  --   --        COVID19   Date Value Ref Range Status   05/30/2022 Not Detected Not Detected - Ref. Range Final     Glucose   Date/Time Value Ref Range Status   05/31/2022 1300 150 (H) 70 - 130 mg/dL Final     Comment:     Meter: [id number] : [id number] Heidi Perkins RN         Scheduled Medications  amitriptyline, 25 mg, Oral, Nightly  amLODIPine, 5 mg, Oral, Daily  cyanocobalamin, 1,000 mcg, Intramuscular, Daily  fluvoxaMINE, 100 mg, Oral, Daily  folic acid, 1 mg, Oral, Daily  mirtazapine, 15 mg, Oral, Nightly  thiamine, 100 mg, Oral, Daily   And  multivitamin, 1 tablet, Oral, Daily  nicotine, 1 patch, Transdermal, Q24H  propranolol, 60 mg, Oral, BID  risperiDONE, 2 mg, Oral, BID    Infusions   Diet  Diet Regular; Cardiac       Assessment/Plan     Active Hospital Problems    Diagnosis  POA   • **Alcohol withdrawal syndrome with complication (HCC) [F10.239]  Yes   • Thrombocytopenia (HCC) [D69.6]  Yes   • Essential hypertension [I10]  Yes   • Alcohol abuse [F10.10]  Yes   • 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       61 y.o. male admitted with Alcohol withdrawal syndrome with complication (HCC).    · Alcohol dependence with withdrawal-continue CIWA, thiamine, MVI, access is following  · Thrombocytopenia-this is new this admission. INR is only slightly elevated. Abdominal u/s without cirrhosis or splenomegaly. Hep c negative. Folate normal. b12 on the low side so  replacement has been started. Given the degree of thrombocytopenia, will ask hematology to consult.   · Hypomagnesemia-replace today  · Slurred speech-team d evaluated and recommended continued monitoring  · Essential hypertension-bp adequately controlled acutely  · SCDs for DVT prophylaxis.  · Full code.  · Discussed with patient and nursing staff.  · Anticipate discharge TBD timing yet to be determined.      Naeem Samayoa MD  [facility]  06/01/22  13:46 EDT    I wore protective equipment throughout this patient encounter including a face mask, gloves and protective eyewear.  Hand hygiene was performed before donning protective equipment and after removal when leaving the room.

## 2022-06-01 NOTE — PLAN OF CARE
Goal Outcome Evaluation:  Plan of Care Reviewed With: patient        Progress: no change  Exhibited episodes of anxiety overnight. Continues to attempt to get OOB with unsteady gait. Multiple attempts to redirect. Has some hallucinations and thought his room was on fire. Once calm he has been resting.

## 2022-06-01 NOTE — CONSULTS
Subjective     REASON FOR CONSULTATION: Thrombocytopenia  Provide an opinion on any further workup or treatment                             REQUESTING PHYSICIAN: Dr. Samayoa    RECORDS OBTAINED:  Records of the patients history including those obtained from the referring provider were reviewed and summarized in detail.      History of Present Illness   This is a 61-year-old man with alcoholism admitted on 5/30/2022 requesting help to discontinue alcohol with history of withdrawal.  The patient has been receiving an Ativan protocol.  Hematology consulted for acute thrombocytopenia.  Reviewing his labs, the patient had a normal CBC other than macrocytosis on 3/15/2022 including a white blood cell count 5.62, hemoglobin 15.6/.9 and platelets 148.  On admission the patient had a platelet count of 71,000 which has subsequently declined to 30,000 along with mild neutropenia ANC 1.69 and hemoglobin 12.6.  His B12 is 270 and folic acid 5.64.  Alcohol level on admission was 34.  The patient is followed by Dr. Guillaume Sandhu at [facility] for history of renal cell carcinoma with previous positive biopsy from a subcarinal lymph node although currently not requiring any treatment for the same.  He gets CT scans periodically for surveillance of progressive disease.    The patient reports easy bruising of the skin but denies other overt bleeding.    Past Medical History:   Diagnosis Date   • Alcohol abuse    • Anxiety    • Delirium tremens (HCC) 03/20/2019   • Fatty liver    • FH: kidney cancer    • Hypertension         Past Surgical History:   Procedure Laterality Date   • APPENDECTOMY     • NEPHRECTOMY      left    • TONSILLECTOMY          No current facility-administered medications on file prior to encounter.     Current Outpatient Medications on File Prior to Encounter   Medication Sig Dispense Refill   • amitriptyline (ELAVIL) 25 MG tablet Take 1 tablet by mouth Every Night. 30 tablet 0   • amLODIPine (NORVASC)  5 MG tablet Take 5 mg by mouth Daily.     • fluvoxaMINE (LUVOX) 25 MG tablet Take 100 mg by mouth Daily.     • folic acid (FOLVITE) 1 MG tablet Take 1 tablet by mouth Daily. 30 tablet 0   • HYDROcodone-acetaminophen (NORCO) 5-325 MG per tablet Take 1 tablet by mouth Every 6 (Six) Hours As Needed.     • melatonin 3 MG tablet Take 3 mg by mouth Daily.     • mirtazapine (REMERON) 15 MG tablet Take 1 tablet by mouth Every Night. 30 tablet 0   • naltrexone (DEPADE) 50 MG tablet Take 50 mg by mouth Daily.     • potassium chloride (K-DUR,KLOR-CON) 20 MEQ CR tablet Take 20 mEq by mouth Daily.     • propranolol (INDERAL) 40 MG tablet Take 60 mg by mouth 2 (Two) Times a Day.     • risperiDONE (risperDAL) 2 MG tablet Take 2 mg by mouth 2 (Two) Times a Day.     • thiamine (VITAMIN B1) 100 MG tablet Take 1 tablet by mouth Daily. 30 tablet 0        ALLERGIES:  No Known Allergies     Social History     Socioeconomic History   • Marital status:    Tobacco Use   • Smoking status: Current Some Day Smoker     Packs/day: 0.25     Years: 15.00     Pack years: 3.75     Types: Cigarettes     Start date: 12/17/1981   • Tobacco comment: refused    Substance and Sexual Activity   • Alcohol use: Yes     Comment: last drink was at 1000   drinks 1 fifth a day    • Drug use: No   • Sexual activity: Defer        Family History   Problem Relation Age of Onset   • Diabetes Mother    • Throat cancer Father         Review of Systems   Constitutional: Positive for activity change and fatigue. Negative for fever and unexpected weight change.   HENT: Negative.    Respiratory: Negative.    Cardiovascular: Negative.    Gastrointestinal: Negative.    Musculoskeletal: Negative.    Skin: Negative.    Neurological: Positive for weakness. Negative for dizziness and light-headedness.   Hematological: Bruises/bleeds easily.   Psychiatric/Behavioral: Positive for decreased concentration and dysphoric mood.        Objective     Vitals:    05/31/22 2024  05/31/22 2300 06/01/22 0732 06/01/22 1407   BP: 138/97 147/97 145/95 107/73   BP Location: Left arm Left arm Left arm Left arm   Patient Position: Lying Lying Lying Lying   Pulse: 66 74 73 71   Resp: 18 18 18 18   Temp:  98.3 °F (36.8 °C) 97.8 °F (36.6 °C) 97.6 °F (36.4 °C)   TempSrc:  Oral Oral Oral   SpO2: 90% 91% 94% 92%   Weight:       Height:         No flowsheet data found.    Physical Exam    CONSTITUTIONAL: pleasant well-developed adult man  HEENT: no icterus, no thrush, moist membranes  NECK: no jvd  LYMPH: no cervical or supraclavicular lad  CV: RRR, S1S2, no murmur  RESP: cta bilat, no wheezing, no rales  GI: soft, non-tender, no splenomegaly, +bs  MUSC: no edema  NEURO: alert and oriented x3, mild global weakness, somewhat slurred speech  PSYCH: normal mood and affect  Skin: Scattered ecchymoses, no induration    RECENT LABS:  Hematology WBC   Date Value Ref Range Status   06/01/2022 3.14 (L) 3.40 - 10.80 10*3/mm3 Final     RBC   Date Value Ref Range Status   06/01/2022 3.39 (L) 4.14 - 5.80 10*6/mm3 Final     Hemoglobin   Date Value Ref Range Status   06/01/2022 12.6 (L) 13.0 - 17.7 g/dL Final     Hematocrit   Date Value Ref Range Status   06/01/2022 34.3 (L) 37.5 - 51.0 % Final     Platelets   Date Value Ref Range Status   06/01/2022 30 (C) 140 - 450 10*3/mm3 Final        Lab Results   Component Value Date    GLUCOSE 84 06/01/2022    BUN 13 06/01/2022    CREATININE 0.72 (L) 06/01/2022    EGFRIFNONA 71 11/03/2019    BCR 18.1 06/01/2022    K 3.7 06/01/2022    CO2 32.0 (H) 06/01/2022    CALCIUM 8.9 06/01/2022    ALBUMIN 3.60 06/01/2022    LABIL2 0.8 (L) 09/16/2020    AST 34 06/01/2022    ALT 21 06/01/2022     B12 270/folate 5.6    CT chest abdomen pelvis 3/22/2022 showed stable chronic changes in the chest with no evidence of metastatic disease, normal-appearing liver without cirrhosis, no splenomegaly, barely susceptible enhancing lesion left hepatic lobe    Assessment & Plan     *Acute  thrombocytopenia/leukopenia  · Prior to admission the patient had excessive alcohol intake  · Prior CBC 3/15/2022 was normal except .9-platelets were 148  · CBC on admission platelets 71,000 now 30,000; ANC also slightly low 1.69  · The patient has easy bruising but no overt bleeding    *Low B12 270    *Low folic acid 5.6-appears to have been on folic acid supplement at home    *Metastatic renal cell carcinoma currently not on treatment and under surveillance by Dr. Sandhu at [facility] most recently 3/22/2022 without evidence of progressive disease    Hematology recommendations/plan:  1. Acute thrombocytopenia most likely secondary to acute alcohol toxicity to the bone marrow and I suspect will improve after a few more days of alcohol avoidance.  No clinical evidence of DIC, TTP, HUS etc.  2. Check IPF in the a.m. along with a repeat CBC  3. Agree with B12 replacement  4. Begin folic acid 1 mg p.o. daily  5. Monitor for bleeding  6. Transfusion support as needed, consider platelet transfusion if drops below 20,000  7. Patient will follow-up with Dr. Sandhu after discharge for continued surveillance of his renal cell carcinoma    Thank you for allowing me to participate in the care of this pleasant patient.

## 2022-06-01 NOTE — PLAN OF CARE
Goal Outcome Evaluation:  Plan of Care Reviewed With: patient           Outcome Evaluation: Calm and cooperative with care. VSS. CIWA's 2-3. No attempts to get OOB. No ativan needed or requested thus far this shift. Napped most of the day, but easy to wake and was cooperative. Mag 1.2 this am. Replacement protocol completed and level will be rechecked in the am. Seizure precautions continue, side rails padded. Safety measures maintained.

## 2022-06-02 LAB
ALBUMIN SERPL-MCNC: 3.6 G/DL (ref 3.5–5.2)
ALBUMIN/GLOB SERPL: 1.4 G/DL
ALP SERPL-CCNC: 82 U/L (ref 39–117)
ALT SERPL W P-5'-P-CCNC: 25 U/L (ref 1–41)
ANION GAP SERPL CALCULATED.3IONS-SCNC: 6.2 MMOL/L (ref 5–15)
AST SERPL-CCNC: 45 U/L (ref 1–40)
BASOPHILS # BLD AUTO: 0.01 10*3/MM3 (ref 0–0.2)
BASOPHILS NFR BLD AUTO: 0.3 % (ref 0–1.5)
BILIRUB SERPL-MCNC: 0.8 MG/DL (ref 0–1.2)
BUN SERPL-MCNC: 14 MG/DL (ref 8–23)
BUN/CREAT SERPL: 17.9 (ref 7–25)
CALCIUM SPEC-SCNC: 8.9 MG/DL (ref 8.6–10.5)
CHLORIDE SERPL-SCNC: 91 MMOL/L (ref 98–107)
CO2 SERPL-SCNC: 32.8 MMOL/L (ref 22–29)
CREAT SERPL-MCNC: 0.78 MG/DL (ref 0.76–1.27)
DEPRECATED RDW RBC AUTO: 50.1 FL (ref 37–54)
EGFRCR SERPLBLD CKD-EPI 2021: 100.2 ML/MIN/1.73
EOSINOPHIL # BLD AUTO: 0.06 10*3/MM3 (ref 0–0.4)
EOSINOPHIL NFR BLD AUTO: 1.6 % (ref 0.3–6.2)
ERYTHROCYTE [DISTWIDTH] IN BLOOD BY AUTOMATED COUNT: 13.1 % (ref 12.3–15.4)
GLOBULIN UR ELPH-MCNC: 2.6 GM/DL
GLUCOSE SERPL-MCNC: 90 MG/DL (ref 65–99)
HCT VFR BLD AUTO: 34.7 % (ref 37.5–51)
HGB BLD-MCNC: 12.1 G/DL (ref 13–17.7)
LYMPHOCYTES # BLD AUTO: 0.93 10*3/MM3 (ref 0.7–3.1)
LYMPHOCYTES NFR BLD AUTO: 24.4 % (ref 19.6–45.3)
MAGNESIUM SERPL-MCNC: 1.9 MG/DL (ref 1.6–2.4)
MCH RBC QN AUTO: 36.9 PG (ref 26.6–33)
MCHC RBC AUTO-ENTMCNC: 34.9 G/DL (ref 31.5–35.7)
MCV RBC AUTO: 105.8 FL (ref 79–97)
MONOCYTES # BLD AUTO: 0.53 10*3/MM3 (ref 0.1–0.9)
MONOCYTES NFR BLD AUTO: 13.9 % (ref 5–12)
NEUTROPHILS NFR BLD AUTO: 2.26 10*3/MM3 (ref 1.7–7)
NEUTROPHILS NFR BLD AUTO: 59.3 % (ref 42.7–76)
PHOSPHATE SERPL-MCNC: 3.2 MG/DL (ref 2.5–4.5)
PLATELET # BLD AUTO: 35 10*3/MM3 (ref 140–450)
PLATELET # BLD AUTO: 35 10*3/MM3 (ref 140–450)
PLATELETS.RETICULATED NFR BLD AUTO: 13.6 % (ref 0.9–6.5)
PMV BLD AUTO: 11.1 FL (ref 6–12)
POTASSIUM SERPL-SCNC: 4 MMOL/L (ref 3.5–5.2)
PROT SERPL-MCNC: 6.2 G/DL (ref 6–8.5)
RBC # BLD AUTO: 3.28 10*6/MM3 (ref 4.14–5.8)
SODIUM SERPL-SCNC: 130 MMOL/L (ref 136–145)
WBC NRBC COR # BLD: 3.81 10*3/MM3 (ref 3.4–10.8)

## 2022-06-02 PROCEDURE — 85055 RETICULATED PLATELET ASSAY: CPT | Performed by: INTERNAL MEDICINE

## 2022-06-02 PROCEDURE — 99232 SBSQ HOSP IP/OBS MODERATE 35: CPT | Performed by: INTERNAL MEDICINE

## 2022-06-02 PROCEDURE — 83735 ASSAY OF MAGNESIUM: CPT | Performed by: STUDENT IN AN ORGANIZED HEALTH CARE EDUCATION/TRAINING PROGRAM

## 2022-06-02 PROCEDURE — 84100 ASSAY OF PHOSPHORUS: CPT | Performed by: STUDENT IN AN ORGANIZED HEALTH CARE EDUCATION/TRAINING PROGRAM

## 2022-06-02 PROCEDURE — 85025 COMPLETE CBC W/AUTO DIFF WBC: CPT | Performed by: INTERNAL MEDICINE

## 2022-06-02 PROCEDURE — 80053 COMPREHEN METABOLIC PANEL: CPT | Performed by: STUDENT IN AN ORGANIZED HEALTH CARE EDUCATION/TRAINING PROGRAM

## 2022-06-02 PROCEDURE — 97530 THERAPEUTIC ACTIVITIES: CPT

## 2022-06-02 PROCEDURE — 25010000002 CYANOCOBALAMIN PER 1000 MCG: Performed by: STUDENT IN AN ORGANIZED HEALTH CARE EDUCATION/TRAINING PROGRAM

## 2022-06-02 PROCEDURE — 97161 PT EVAL LOW COMPLEX 20 MIN: CPT

## 2022-06-02 RX ORDER — CHOLECALCIFEROL (VITAMIN D3) 125 MCG
1000 CAPSULE ORAL DAILY
Status: DISCONTINUED | OUTPATIENT
Start: 2022-06-03 | End: 2022-06-03 | Stop reason: HOSPADM

## 2022-06-02 RX ORDER — FOLIC ACID 1 MG/1
1 TABLET ORAL DAILY
Status: DISCONTINUED | OUTPATIENT
Start: 2022-06-03 | End: 2022-06-02

## 2022-06-02 RX ORDER — DIPHENOXYLATE HYDROCHLORIDE AND ATROPINE SULFATE 2.5; .025 MG/1; MG/1
1 TABLET ORAL DAILY
Status: DISCONTINUED | OUTPATIENT
Start: 2022-06-03 | End: 2022-06-03 | Stop reason: HOSPADM

## 2022-06-02 RX ADMIN — PROPRANOLOL HYDROCHLORIDE 60 MG: 20 TABLET ORAL at 21:04

## 2022-06-02 RX ADMIN — MIRTAZAPINE 15 MG: 15 TABLET, FILM COATED ORAL at 21:04

## 2022-06-02 RX ADMIN — RISPERIDONE 2 MG: 2 TABLET ORAL at 08:34

## 2022-06-02 RX ADMIN — CYANOCOBALAMIN 1000 MCG: 1000 INJECTION, SOLUTION INTRAMUSCULAR; SUBCUTANEOUS at 08:34

## 2022-06-02 RX ADMIN — RISPERIDONE 2 MG: 2 TABLET ORAL at 21:04

## 2022-06-02 RX ADMIN — FOLIC ACID 1 MG: 1 TABLET ORAL at 08:34

## 2022-06-02 RX ADMIN — AMLODIPINE BESYLATE 5 MG: 5 TABLET ORAL at 08:34

## 2022-06-02 RX ADMIN — Medication 1 TABLET: at 08:34

## 2022-06-02 RX ADMIN — AMITRIPTYLINE HYDROCHLORIDE 25 MG: 25 TABLET, FILM COATED ORAL at 21:04

## 2022-06-02 RX ADMIN — Medication 3 MG: at 21:04

## 2022-06-02 RX ADMIN — NICOTINE 1 PATCH: 21 PATCH, EXTENDED RELEASE TRANSDERMAL at 08:34

## 2022-06-02 RX ADMIN — PROPRANOLOL HYDROCHLORIDE 60 MG: 20 TABLET ORAL at 08:34

## 2022-06-02 RX ADMIN — FLUVOXAMINE MALEATE 100 MG: 50 TABLET, COATED ORAL at 08:34

## 2022-06-02 RX ADMIN — Medication 100 MG: at 08:34

## 2022-06-02 NOTE — THERAPY EVALUATION
Patient Name: Nilesh Zapata  : 1960    MRN: [id number]                              Today's Date: 2022       Admit Date: 2022    Visit Dx:     ICD-10-CM ICD-9-CM   1. Alcohol withdrawal syndrome with complication (HCC)  F10.239 291.81     Patient Active Problem List   Diagnosis   • Alcohol withdrawal syndrome with complication (HCC)   • Anxiety   • Fatty liver   • Tobacco abuse   • Kidney cancer, primary, with metastasis from kidney to other site (HCC)   • Alcoholism (HCC)   • Hyponatremia   • Alcohol abuse   • History of renal cell cancer   • History of left nephrectomy  RCC   • Liver lesion   • Lung nodule   • Vitamin D deficiency   • Under emotional stress   •  from spouse   • Coping style affecting medical condition   • Thrombocytopenia (HCC)   • Essential hypertension     Past Medical History:   Diagnosis Date   • Alcohol abuse    • Anxiety    • Delirium tremens (HCC) 2019   • Fatty liver    • FH: kidney cancer    • Hypertension      Past Surgical History:   Procedure Laterality Date   • APPENDECTOMY     • NEPHRECTOMY      left    • TONSILLECTOMY        General Information     Row Name 22 1525          Physical Therapy Time and Intention    Document Type evaluation  Pt. admitted with alcohol withdrawal syndrome  -MS     Mode of Treatment physical therapy;individual therapy  -MS     Row Name 22 1525          General Information    Patient Profile Reviewed yes  -MS     Prior Level of Function independent:  -MS     Existing Precautions/Restrictions fall   Exit alarm  -MS     Barriers to Rehab none identified  -MS     Row Name 22 1525          Cognition    Orientation Status (Cognition) oriented x 3  -MS     Row Name 22 1525          Safety Issues, Functional Mobility    Comment, Safety Issues/Impairments (Mobility) Gait belt used for safety.  -MS           User Key  (r) = Recorded By, (t) = Taken By, (c) = Cosigned By    Initials Name Provider Type    MS  Savage Samayoa, PT Physical Therapist               Mobility     Row Name 06/02/22 1525          Bed Mobility    Bed Mobility supine-sit;sit-supine  -MS     Supine-Sit [place] (Bed Mobility) contact guard  -MS     Sit-Supine [place] (Bed Mobility) contact guard  -MS     Row Name 06/02/22 1525          Sit-Stand Transfer    Sit-Stand [place] (Transfers) contact guard  -MS     Row Name 06/02/22 1525          Gait/Stairs (Locomotion)    [place] Level (Gait) contact guard  -MS     Distance in Feet (Gait) 100 feet  -MS     Deviations/Abnormal Patterns (Gait) ousmane decreased  -MS     Comment, (Gait/Stairs) Unsteady at times but no overt losses of balance.  -MS           User Key  (r) = Recorded By, (t) = Taken By, (c) = Cosigned By    Initials Name Provider Type    Savage Peterson, PT Physical Therapist               Obj/Interventions     Row Name 06/02/22 1526          Range of Motion Comprehensive    Comment, General Range of Motion BUE/LE (WFL's)  -MS     Row Name 06/02/22 1526          Strength Comprehensive (MMT)    Comment, General Manual Muscle Testing (MMT) Assessment BUE/LE (4-/5)  -MS           User Key  (r) = Recorded By, (t) = Taken By, (c) = Cosigned By    Initials Name Provider Type    Savage Peterson, PT Physical Therapist               Goals/Plan     Row Name 06/02/22 1527          Bed Mobility Goal 1 (PT)    Activity/Assistive Device (Bed Mobility Goal 1, PT) bed mobility activities, all  -MS     [place] Level/Cues Needed (Bed Mobility Goal 1, PT) independent  -MS     Time Frame (Bed Mobility Goal 1, PT) long term goal (LTG);1 week  -MS     Row Name 06/02/22 1527          Transfer Goal 1 (PT)    Activity/Assistive Device (Transfer Goal 1, PT) transfers, all  -MS     [place] Level/Cues Needed (Transfer Goal 1, PT) independent  -MS     Time Frame (Transfer Goal 1, PT) long term goal (LTG);1 week  -MS     Row Name 06/02/22 1527          Gait Training Goal 1 (PT)     Activity/Assistive Device (Gait Training Goal 1, PT) gait (walking locomotion)  -MS     [place] Level (Gait Training Goal 1, PT) independent  -MS     Distance (Gait Training Goal 1, PT) 300 feet  -MS     Time Frame (Gait Training Goal 1, PT) long term goal (LTG);1 week  -MS     Row Name 06/02/22 1527          Therapy Assessment/Plan (PT)    Planned Therapy Interventions (PT) balance training;bed mobility training;gait training;home exercise program;patient/family education;postural re-education;transfer training;strengthening  -MS           User Key  (r) = Recorded By, (t) = Taken By, (c) = Cosigned By    Initials Name Provider Type    Savage Peterson, PT Physical Therapist               Clinical Impression     Row Name 06/02/22 1526          Pain    Pretreatment Pain Rating 0/10 - no pain  -MS     Posttreatment Pain Rating 0/10 - no pain  -MS     Row Name 06/02/22 1526          Plan of Care Review    Plan of Care Reviewed With patient  -MS     Row Name 06/02/22 1526          Therapy Assessment/Plan (PT)    Rehab Potential (PT) good, to achieve stated therapy goals  -MS     Criteria for Skilled Interventions Met (PT) skilled treatment is necessary  -MS     Therapy Frequency (PT) 5 times/wk  -MS     Row Name 06/02/22 1526          Positioning and Restraints    Pre-Treatment Position in bed  -MS     Post Treatment Position bed  -MS     In Bed notified nsg;supine;call light within reach;encouraged to call for assist;exit alarm on  All lines intact.  -MS           User Key  (r) = Recorded By, (t) = Taken By, (c) = Cosigned By    Initials Name Provider Type    Savage Peterson, PT Physical Therapist               Outcome Measures     Row Name 06/02/22 1527          How much help from another person do you currently need...    Turning from your back to your side while in flat bed without using bedrails? 4  -MS     Moving from lying on back to sitting on the side of a flat bed without bedrails? 3  -MS      Moving to and from a bed to a chair (including a wheelchair)? 3  -MS     Standing up from a chair using your arms (e.g., wheelchair, bedside chair)? 3  -MS     Climbing 3-5 steps with a railing? 3  -MS     To walk in hospital room? 3  -MS     AM-PAC 6 Clicks Score (PT) 19  -MS     Highest level of mobility 6 --> Walked 10 steps or more  -MS     Row Name 06/02/22 1527          Functional Assessment    Outcome Measure Options AM-PAC 6 Clicks Basic Mobility (PT)  -MS           User Key  (r) = Recorded By, (t) = Taken By, (c) = Cosigned By    Initials Name Provider Type    MS Savage Samayoa, PT Physical Therapist                             Physical Therapy Education                 Title: PT OT SLP Therapies (Done)     Topic: Physical Therapy (Done)     Point: Mobility training (Done)     Learning Progress Summary           Patient Acceptance, E,D, VU,NR by MS at 6/2/2022 1528                   Point: Home exercise program (Done)     Learning Progress Summary           Patient Acceptance, E,D, VU,NR by MS at 6/2/2022 1528                   Point: Body mechanics (Done)     Learning Progress Summary           Patient Acceptance, E,D, VU,NR by MS at 6/2/2022 1528                   Point: Precautions (Done)     Learning Progress Summary           Patient Acceptance, E,D, VU,NR by MS at 6/2/2022 1528                               User Key     Initials Effective Dates Name Provider Type Discipline    MS 06/16/21 -  Savage Samayoa PT Physical Therapist PT              PT Recommendation and Plan  Planned Therapy Interventions (PT): balance training, bed mobility training, gait training, home exercise program, patient/family education, postural re-education, transfer training, strengthening  Plan of Care Reviewed With: patient  Outcome Evaluation: Pt. is a 61 year old Male admitted to the hospital with Alcohol withdrawal syndrome. Pt. reports that prior to admission he was independent with functional mobility and did not  normally use an A.D. for ambulation.  Pt. currently presents with decreased strength, decreased balance, and decreased tolerance to functional activity. Pt. will benefit from skilled inpt. P.T. to address his functional deficits and to assist pt. in regaining his maximum level of independence with functional mobility.     Time Calculation:    PT Charges     Row Name 06/02/22 1530             Time Calculation    Start Time 1445  -MS      Stop Time 1500  -MS      Time Calculation (min) 15 min  -MS      PT Received On 06/02/22  -MS      PT - Next Appointment 06/03/22  -MS      PT Goal Re-Cert Due Date 06/09/22  -MS              Time Calculation- PT    Total Timed Code Minutes- PT 14 minute(s)  -MS            User Key  (r) = Recorded By, (t) = Taken By, (c) = Cosigned By    Initials Name Provider Type    Savage Peterson, PT Physical Therapist              Therapy Charges for Today     Code Description Service Date Service Provider Modifiers Qty    98353310222 HC PT EVAL LOW COMPLEXITY 2 6/2/2022 Savage Samayoa, PT GP 1    62578260169 HC PT THERAPEUTIC ACT EA 15 MIN 6/2/2022 Savage Samayoa, PT GP 1          PT G-Codes  Outcome Measure Options: AM-PAC 6 Clicks Basic Mobility (PT)  AM-PAC 6 Clicks Score (PT): 19    Savage Samayoa PT  6/2/2022

## 2022-06-02 NOTE — NURSING NOTE
[facility] follow-up.  Patient awake in bed, pleasant.  Denies alcohol withdrawal; CIWA scores today 0.  He denies alcohol cravings.  He reports wanting to return to  and get a sponsor.  He also reports wanting to return to [facility].  He reports plan to reach out to them once discharged saying he has affairs to take care of before going.    AC following.

## 2022-06-02 NOTE — PROGRESS NOTES
REASON FOR FOLLOW-UP: Acute thrombocytopenia    Interval history:  Resting comfortably.  No bleeding. Bruising present (old).    HISTORY OF PRESENT ILLNESS:   This is a 61-year-old man with alcoholism admitted on 5/30/2022 requesting help to discontinue alcohol with history of withdrawal.  The patient has been receiving an Ativan protocol.  Hematology consulted for acute thrombocytopenia.  Reviewing his labs, the patient had a normal CBC other than macrocytosis on 3/15/2022 including a white blood cell count 5.62, hemoglobin 15.6/.9 and platelets 148.  On admission the patient had a platelet count of 71,000 which has subsequently declined to 30,000 along with mild neutropenia ANC 1.69 and hemoglobin 12.6.  His B12 is 270 and folic acid 5.64.  Alcohol level on admission was 34.  The patient is followed by Dr. Guillaume Sandhu at [facility] for history of renal cell carcinoma with previous positive biopsy from a subcarinal lymph node although currently not requiring any treatment for the same.  He gets CT scans periodically for surveillance of progressive disease.           Past Medical History, Past Surgical History, Social History, Family History have been reviewed and are without significant changes except as mentioned.    Review of Systems   Constitutional: Positive for activity change and fatigue. Negative for fever.   HENT: Negative.    Respiratory: Negative.    Cardiovascular: Negative.    Gastrointestinal: Negative.    Genitourinary: Negative.    Hematological: Bruises/bleeds easily.   Psychiatric/Behavioral: Positive for decreased concentration. The patient is not hyperactive.           Medications:  The current medication list was reviewed in the EMR    ALLERGIES:  No Known Allergies    Objective      Vitals:    06/02/22 0609   BP: 133/86   Pulse: 71   Resp: 16   Temp: 97.9 °F (36.6 °C)   SpO2: 91%          Physical Exam    CONSTITUTIONAL: no agitation or acute distress  CV: RRR, S1S2, no  murmur  RESP: cta bilat, no wheezing, no rales  GI: soft, non-tender, no splenomegaly, +bs  MUSC: no edema  NEURO: no agitaion  Skin: Scattered ecchymoses, no induration    RECENT LABS:  Hematology Results from last 7 days   Lab Units 06/02/22  0532 06/01/22  0639 05/31/22  0548   WBC 10*3/mm3 3.81 3.14* 4.05   HEMOGLOBIN g/dL 12.1* 12.6* 12.8*   HEMATOCRIT % 34.7* 34.3* 36.5*   PLATELETS 10*3/mm3 35*  35* 30* 39*     2  Lab Results   Component Value Date    GLUCOSE 90 06/02/2022    BUN 14 06/02/2022    CREATININE 0.78 06/02/2022    EGFRIFNONA 71 11/03/2019    BCR 17.9 06/02/2022    CO2 32.8 (H) 06/02/2022    CALCIUM 8.9 06/02/2022    ALBUMIN 3.60 06/02/2022    LABIL2 0.8 (L) 09/16/2020    AST 45 (H) 06/02/2022    ALT 25 06/02/2022       Lab Results   Component Value Date    IRON 51 (L) 11/02/2019    TIBC 264 (L) 11/02/2019       Lab Results   Component Value Date    [id number] 270 06/01/2022       Lab Results   Component Value Date    FOLATE 5.64 06/01/2022          Assessment & Plan     *Acute thrombocytopenia/leukopenia  · Prior to admission the patient had excessive alcohol intake  · Prior CBC 3/15/2022 was normal except .9-platelets were 148  · CBC on admission platelets 71,000 now 30,000; ANC also slightly low 1.69  · The patient has easy bruising but no overt bleeding  · The platelet count is stable today at 35,000; IPF elevated 13.6%     *Low B12 270     *Low folic acid 5.6-appears to have been on folic acid supplement at home     *Metastatic renal cell carcinoma currently not on treatment and under surveillance by Dr. Sandhu at [facility] most recently 3/22/2022 without evidence of progressive disease     Hematology recommendations/plan:  1. Acute thrombocytopenia most likely secondary to acute alcohol toxicity to the bone marrow and I suspect will improve after a few more days of alcohol avoidance.  No clinical evidence of DIC, TTP, HUS etc.  2. Continue to monitor CBC daily  3. Continue  B12 replacement  4. Continue folic acid 1 mg p.o. daily  5. Monitor for bleeding  6. Transfusion support as needed, consider platelet transfusion if drops below 20,000  7. Patient will follow-up with Dr. Sandhu after discharge for continued surveillance of his renal cell carcinoma                          6/2/2022      CC:

## 2022-06-02 NOTE — PLAN OF CARE
Goal Outcome Evaluation:  Plan of Care Reviewed With: patient  Progress: improving    Currently back at his baseline. A0x4. Responds appropriately no behaviors noted. Steady gait. CIWA 2. States that he is ready to check into an inpatient rehab. Seizure precautions continue, no seizures observed, and side rails are padded. No anxiety medication needed overnight. WCTM.

## 2022-06-02 NOTE — PLAN OF CARE
Goal Outcome Evaluation:  Plan of Care Reviewed With: patient        Progress: improving  Outcome Evaluation: CIWA 0, a&ox4, possible d/c tomorrow. No complaints at this time, will CTM the rest of my shift.

## 2022-06-02 NOTE — PLAN OF CARE
Goal Outcome Evaluation:  Plan of Care Reviewed With: patient           Outcome Evaluation: Pt. is a 61 year old Male admitted to the hospital with Alcohol withdrawal syndrome. Pt. reports that prior to admission he was independent with functional mobility and did not normally use an A.D. for ambulation.  Pt. currently presents with decreased strength, decreased balance, and decreased tolerance to functional activity. Pt. will benefit from skilled inpt. P.T. to address his functional deficits and to assist pt. in regaining his maximum level of independence with functional mobility.    Patient was wearing a face mask during this therapy encounter. Therapist used appropriate personal protective equipment including eye protection, mask, and gloves.  Mask used was standard procedure mask. Appropriate PPE was worn during the entire therapy session. Hand hygiene was completed before and after therapy session. Patient is not in enhanced droplet precautions.

## 2022-06-02 NOTE — PROGRESS NOTES
Name: Nilesh Zapata ADMIT: 2022   : 1960  PCP: Charles Farooq MD    MRN: [id number] LOS: 3 days   AGE/SEX: 61 y.o. male  ROOM: [facility]/   Subjective   Chief Complaint   Patient presents with   • Alcohol Problem      Alcohol withdrawal has improved with improving CIWA score and less Ativan.  He actually did not require any Ativan overnight.  Reports no chest pain palpitations nausea vomiting diarrhea or abdominal pain.  Denies dysuria.  He does feel weak generalized and is concerned about ambulating.    Objective   Vital Signs  Temp:  [97.9 °F (36.6 °C)-98.3 °F (36.8 °C)] 98.3 °F (36.8 °C)  Heart Rate:  [69-71] 71  Resp:  [16-18] 16  BP: ()/(64-86) 96/64  SpO2:  [91 %-95 %] 91 %  on  Flow (L/min):  [2] 2;   Device (Oxygen Therapy): nasal cannula  Body mass index is 34.1 kg/m².    Physical Exam  Vitals and nursing note reviewed.   Constitutional:       General: He is not in acute distress.     Appearance: He is ill-appearing (chronically). He is not diaphoretic.   HENT:      Head: Normocephalic and atraumatic.   Eyes:      General:         Right eye: No discharge.         Left eye: No discharge.      Conjunctiva/sclera: Conjunctivae normal.   Cardiovascular:      Rate and Rhythm: Normal rate and regular rhythm.      Pulses: Normal pulses.   Pulmonary:      Effort: Pulmonary effort is normal.      Breath sounds: No wheezing.   Abdominal:      Palpations: Abdomen is soft.      Tenderness: There is no abdominal tenderness.   Musculoskeletal:         General: No swelling or tenderness.      Cervical back: Neck supple. No tenderness.   Skin:     General: Skin is warm and dry.   Neurological:      Mental Status: He is alert. Mental status is at baseline.   Psychiatric:         Mood and Affect: Mood normal.         Behavior: Behavior normal.         Results Review:       I reviewed the patient's new clinical results.     I reviewed imaging, agree with interpretation.     I reviewed telemetry/EKG  results, sinus on telemetry      Results from last 7 days   Lab Units 06/02/22  0532 06/01/22  0639 05/31/22  0548 05/30/22  1246   WBC 10*3/mm3 3.81 3.14* 4.05 6.52   HEMOGLOBIN g/dL 12.1* 12.6* 12.8* 15.1   PLATELETS 10*3/mm3 35*  35* 30* 39* 71*     Results from last 7 days   Lab Units 06/02/22  0532 06/01/22  0640 05/31/22  0548 05/30/22  1246   SODIUM mmol/L 130* 133* 133* 134*   POTASSIUM mmol/L 4.0 3.7 3.6 3.8   CHLORIDE mmol/L 91* 91* 92* 89*   CO2 mmol/L 32.8* 32.0* 34.0* 26.0   BUN mg/dL 14 13 10 7*   CREATININE mg/dL 0.78 0.72* 0.89 0.85   GLUCOSE mg/dL 90 84 95 132*   Estimated Creatinine Clearance: 108.5 mL/min (by C-G formula based on SCr of 0.78 mg/dL).  Results from last 7 days   Lab Units 06/02/22  0532 06/01/22  0640 05/31/22  0548 05/30/22  1246   CALCIUM mg/dL 8.9 8.9 9.0 9.3   ALBUMIN g/dL 3.60 3.60  --  4.40   MAGNESIUM mg/dL 1.9 1.2*  --   --    PHOSPHORUS mg/dL 3.2 2.5  --   --      Results from last 7 days   Lab Units 05/31/22  1034   INR  1.18*        amitriptyline, 25 mg, Oral, Nightly  amLODIPine, 5 mg, Oral, Daily  cyanocobalamin, 1,000 mcg, Intramuscular, Daily  fluvoxaMINE, 100 mg, Oral, Daily  folic acid, 1 mg, Oral, Daily  mirtazapine, 15 mg, Oral, Nightly  nicotine, 1 patch, Transdermal, Q24H  propranolol, 60 mg, Oral, BID  risperiDONE, 2 mg, Oral, BID       Diet Regular; Cardiac    Assessment & Plan      Active Hospital Problems    Diagnosis  POA   • **Alcohol withdrawal syndrome with complication (HCC) [F10.239]  Yes   • Thrombocytopenia (HCC) [D69.6]  Yes   • Essential hypertension [I10]  Yes   • Alcohol abuse [F10.10]  Yes   • 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       · Alcohol withdrawal: Improving.  Improved CIWA and did not require Ativan last night.  Continue to monitor.  Continue vitamin supplements.  · Thrombocytopenia secondary to alcohol abuse: His  platelet level did improve slightly.  Will check CBC in the morning to monitor.  Hematology following.  · Weakness: B12 was indeterminate.  Check MMA and homocystine.  Continue supplement.  Blood pressure was also borderline low.  Stop amlodipine.  Place hold parameters on propranolol.  PT consult.  · Hypertension: Borderline low.  See above.  · Prophylaxis: SCD, thrombocytopenia present  · Disposition: Home, Access following for alcohol programs, possible discharge tomorrow.    Ananda Gar MD  [facility]  06/02/22  15:15 EDT    Dictated portions using Dragon dictation software.    During the entire encounter, I was wearing recommended PPE including face mask and eye protection. Hand sanitization was performed prior to entering room and upon exit.

## 2022-06-03 ENCOUNTER — READMISSION MANAGEMENT (OUTPATIENT)
Dept: CALL CENTER | Facility: HOSPITAL | Age: 62
End: 2022-06-03

## 2022-06-03 VITALS
SYSTOLIC BLOOD PRESSURE: 114 MMHG | WEIGHT: 223.55 LBS | TEMPERATURE: 98.3 F | DIASTOLIC BLOOD PRESSURE: 76 MMHG | OXYGEN SATURATION: 93 % | HEIGHT: 67 IN | RESPIRATION RATE: 18 BRPM | BODY MASS INDEX: 35.09 KG/M2 | HEART RATE: 64 BPM

## 2022-06-03 LAB
ALBUMIN SERPL-MCNC: 3.5 G/DL (ref 3.5–5.2)
ALBUMIN/GLOB SERPL: 1.3 G/DL
ALP SERPL-CCNC: 81 U/L (ref 39–117)
ALT SERPL W P-5'-P-CCNC: 27 U/L (ref 1–41)
ANION GAP SERPL CALCULATED.3IONS-SCNC: 9 MMOL/L (ref 5–15)
AST SERPL-CCNC: 48 U/L (ref 1–40)
BASOPHILS # BLD AUTO: 0.02 10*3/MM3 (ref 0–0.2)
BASOPHILS NFR BLD AUTO: 0.6 % (ref 0–1.5)
BILIRUB SERPL-MCNC: 0.6 MG/DL (ref 0–1.2)
BUN SERPL-MCNC: 12 MG/DL (ref 8–23)
BUN/CREAT SERPL: 14.8 (ref 7–25)
CALCIUM SPEC-SCNC: 9.1 MG/DL (ref 8.6–10.5)
CHLORIDE SERPL-SCNC: 93 MMOL/L (ref 98–107)
CO2 SERPL-SCNC: 31 MMOL/L (ref 22–29)
CREAT SERPL-MCNC: 0.81 MG/DL (ref 0.76–1.27)
DEPRECATED RDW RBC AUTO: 49.9 FL (ref 37–54)
EGFRCR SERPLBLD CKD-EPI 2021: 99.1 ML/MIN/1.73
EOSINOPHIL # BLD AUTO: 0.06 10*3/MM3 (ref 0–0.4)
EOSINOPHIL NFR BLD AUTO: 1.8 % (ref 0.3–6.2)
ERYTHROCYTE [DISTWIDTH] IN BLOOD BY AUTOMATED COUNT: 13.3 % (ref 12.3–15.4)
GLOBULIN UR ELPH-MCNC: 2.6 GM/DL
GLUCOSE SERPL-MCNC: 91 MG/DL (ref 65–99)
HCT VFR BLD AUTO: 34.5 % (ref 37.5–51)
HCYS SERPL-MCNC: 19.4 UMOL/L (ref 0–15)
HGB BLD-MCNC: 12.3 G/DL (ref 13–17.7)
LYMPHOCYTES # BLD AUTO: 0.83 10*3/MM3 (ref 0.7–3.1)
LYMPHOCYTES NFR BLD AUTO: 24.5 % (ref 19.6–45.3)
MAGNESIUM SERPL-MCNC: 2 MG/DL (ref 1.6–2.4)
MCH RBC QN AUTO: 37.5 PG (ref 26.6–33)
MCHC RBC AUTO-ENTMCNC: 35.7 G/DL (ref 31.5–35.7)
MCV RBC AUTO: 105.2 FL (ref 79–97)
MONOCYTES # BLD AUTO: 0.54 10*3/MM3 (ref 0.1–0.9)
MONOCYTES NFR BLD AUTO: 15.9 % (ref 5–12)
NEUTROPHILS NFR BLD AUTO: 1.91 10*3/MM3 (ref 1.7–7)
NEUTROPHILS NFR BLD AUTO: 56.3 % (ref 42.7–76)
PLATELET # BLD AUTO: 44 10*3/MM3 (ref 140–450)
PMV BLD AUTO: 11.3 FL (ref 6–12)
POTASSIUM SERPL-SCNC: 4.1 MMOL/L (ref 3.5–5.2)
PROT SERPL-MCNC: 6.1 G/DL (ref 6–8.5)
RBC # BLD AUTO: 3.28 10*6/MM3 (ref 4.14–5.8)
SODIUM SERPL-SCNC: 133 MMOL/L (ref 136–145)
WBC NRBC COR # BLD: 3.39 10*3/MM3 (ref 3.4–10.8)

## 2022-06-03 PROCEDURE — 85025 COMPLETE CBC W/AUTO DIFF WBC: CPT | Performed by: INTERNAL MEDICINE

## 2022-06-03 PROCEDURE — 99231 SBSQ HOSP IP/OBS SF/LOW 25: CPT | Performed by: INTERNAL MEDICINE

## 2022-06-03 PROCEDURE — 83735 ASSAY OF MAGNESIUM: CPT | Performed by: INTERNAL MEDICINE

## 2022-06-03 PROCEDURE — 83090 ASSAY OF HOMOCYSTEINE: CPT | Performed by: INTERNAL MEDICINE

## 2022-06-03 PROCEDURE — 83921 ORGANIC ACID SINGLE QUANT: CPT | Performed by: INTERNAL MEDICINE

## 2022-06-03 PROCEDURE — 80053 COMPREHEN METABOLIC PANEL: CPT | Performed by: INTERNAL MEDICINE

## 2022-06-03 RX ADMIN — NICOTINE 1 PATCH: 21 PATCH, EXTENDED RELEASE TRANSDERMAL at 08:27

## 2022-06-03 RX ADMIN — Medication 1 TABLET: at 08:27

## 2022-06-03 RX ADMIN — Medication 100 MG: at 08:27

## 2022-06-03 RX ADMIN — FOLIC ACID 1 MG: 1 TABLET ORAL at 08:27

## 2022-06-03 RX ADMIN — Medication 1000 MCG: at 08:27

## 2022-06-03 RX ADMIN — RISPERIDONE 2 MG: 2 TABLET ORAL at 08:27

## 2022-06-03 RX ADMIN — FLUVOXAMINE MALEATE 100 MG: 50 TABLET, COATED ORAL at 08:27

## 2022-06-03 RX ADMIN — PROPRANOLOL HYDROCHLORIDE 60 MG: 20 TABLET ORAL at 08:27

## 2022-06-03 NOTE — PROGRESS NOTES
REASON FOR FOLLOW-UP: Acute thrombocytopenia    Interval history:  The patient appears to be doing better.  He denies excessive bleeding or new bruises.  He denies confusion seizures etc.    HISTORY OF PRESENT ILLNESS:   This is a 61-year-old man with alcoholism admitted on 5/30/2022 requesting help to discontinue alcohol with history of withdrawal.  The patient has been receiving an Ativan protocol.  Hematology consulted for acute thrombocytopenia.  Reviewing his labs, the patient had a normal CBC other than macrocytosis on 3/15/2022 including a white blood cell count 5.62, hemoglobin 15.6/.9 and platelets 148.  On admission the patient had a platelet count of 71,000 which has subsequently declined to 30,000 along with mild neutropenia ANC 1.69 and hemoglobin 12.6.  His B12 is 270 and folic acid 5.64.  Alcohol level on admission was 34.  The patient is followed by Dr. Guillaume Sandhu at [facility] for history of renal cell carcinoma with previous positive biopsy from a subcarinal lymph node although currently not requiring any treatment for the same.  He gets CT scans periodically for surveillance of progressive disease.           Past Medical History, Past Surgical History, Social History, Family History have been reviewed and are without significant changes except as mentioned.    Review of Systems   Constitutional: Positive for activity change and fatigue. Negative for fever.   HENT: Negative.    Respiratory: Negative.    Cardiovascular: Negative.    Gastrointestinal: Negative.    Genitourinary: Negative.    Hematological: Bruises/bleeds easily.   Psychiatric/Behavioral: Negative for decreased concentration. The patient is not hyperactive.           Medications:  The current medication list was reviewed in the EMR    ALLERGIES:  No Known Allergies    Objective      Vitals:    06/03/22 0837   BP: 114/76   Pulse: 64   Resp: 18   Temp: 98.3 °F (36.8 °C)   SpO2: 93%          Physical Exam     CONSTITUTIONAL: no agitation or acute distress, alert and oriented x3  CV: RRR, S1S2, no murmur  RESP: cta bilat, no wheezing, no rales  GI: soft, non-tender, no splenomegaly, +bs  MUSC: no edema  NEURO: no agitaion, alert and oriented x3  Skin: Scattered ecchymoses, no induration    RECENT LABS:  Hematology Results from last 7 days   Lab Units 06/03/22  0519 06/02/22  0532 06/01/22  0639   WBC 10*3/mm3 3.39* 3.81 3.14*   HEMOGLOBIN g/dL 12.3* 12.1* 12.6*   HEMATOCRIT % 34.5* 34.7* 34.3*   PLATELETS 10*3/mm3 44* 35*  35* 30*     2  Lab Results   Component Value Date    GLUCOSE 91 06/03/2022    BUN 12 06/03/2022    CREATININE 0.81 06/03/2022    EGFRIFNONA 71 11/03/2019    BCR 14.8 06/03/2022    CO2 31.0 (H) 06/03/2022    CALCIUM 9.1 06/03/2022    ALBUMIN 3.50 06/03/2022    LABIL2 0.8 (L) 09/16/2020    AST 48 (H) 06/03/2022    ALT 27 06/03/2022       Lab Results   Component Value Date    IRON 51 (L) 11/02/2019    TIBC 264 (L) 11/02/2019       Lab Results   Component Value Date    [id number] 270 06/01/2022       Lab Results   Component Value Date    FOLATE 5.64 06/01/2022          Assessment & Plan     *Acute thrombocytopenia/leukopenia  · Prior to admission the patient had excessive alcohol intake  · Prior CBC 3/15/2022 was normal except .9-platelets were 148  · CBC on admission platelets 71,000 now 30,000; ANC also slightly low 1.69  · The patient has easy bruising but no overt bleeding  · The platelet count is stable/improved today at 44,000; IPF elevated 13.6%     *Low B12 270-MMA pending     *Low folic acid 5.6-appears to have been on folic acid supplement at home     *Metastatic renal cell carcinoma currently not on treatment and under surveillance by Dr. Sandhu at [facility] most recently 3/22/2022 without evidence of progressive disease     Hematology recommendations/plan:  1. Acute thrombocytopenia most likely secondary to acute alcohol toxicity to the bone marrow and I suspect will improve  after a few more days of alcohol avoidance.  No clinical evidence of DIC, TTP, HUS etc.  2. Continue B12 replacement  3. Continue folic acid 1 mg p.o. daily  4. I recommended he have his platelet count rechecked with his PCP 1 to 2 weeks after discharge  5. Patient will follow-up with Dr. Sandhu after discharge for continued surveillance of his renal cell carcinoma  6. Hematology will sign off, please call if further needed during the hospital stay                          6/3/2022      CC:

## 2022-06-03 NOTE — NURSING NOTE
CIWA scores have bee 0 throughout the night. No hallucinations or anxiety.     Plans to return to  and get a sponsor; also wanting to return to [facility] transitional living [facility] and will reach out to them once discharged.

## 2022-06-03 NOTE — PLAN OF CARE
Goal Outcome Evaluation:  Plan of Care Reviewed With: patient   Progress: improving    Continues to improve. KRYSTLE is currently a zero. He has not experienced and hallucinations or anxiety over the past 2 nights. Alert and oriented x4. 02 at 1L. He does drop to the mid 80's overnight. No seizure activity noted and bed remains padded for safety. He is hopeful to discharge soon and states that he plans to admit to the [facility] inpatient for AA.

## 2022-06-03 NOTE — CASE MANAGEMENT/SOCIAL WORK
Continued Stay Note  [facility]     Patient Name: Nilesh Zapata  MRN: [id number]  Today's Date: 6/3/2022    Admit Date: 5/30/2022     Discharge Plan     Row Name 06/03/22 1044       Plan    Plan Home with outpt etoh tx at [facility]; follow for home o2 setup    Patient/Family in Agreement with Plan yes    Plan Comments Spoke with pt at bedside, discussed dc planning. Pt states he is going to go home, pay his bills and then call [facility] for their ETOH treatment. Offered to call [facility] for ccp, pt declined and has there number. CCP encouraged pt mobility and deep breathing. CCP to continue to follow for home o2 setup. Roland BROWN/CCP               Discharge Codes    No documentation.               Expected Discharge Date and Time     Expected Discharge Date Expected Discharge Time    Elmo 3, 2022             Emelina Villanueva, KEVIN

## 2022-06-03 NOTE — DISCHARGE SUMMARY
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       Presenting Problem/History of Present Illness:  Alcohol withdrawal syndrome with complication (HCC) [F10.239]     61 year old gentleman who presented to the ED to request help with getting off alcohol; he has a past history of complicated alcohol withdrawal; he says his last drink was last night; he was trying to stop drinking on his own but started feeling like he was going into alcohol withdrawal so he would start drinking again; he denies sick contacts; no fever or chills    Hospital Course:  The patient is a 61 y.o. male who presented with alcohol withdrawal, alcohol abuse and resultant thrombocytopenia.  He was admitted and underwent evaluation by hematology.  He is out of acute withdrawal and has not required Ativan recently.  He is continuing his vitamin supplementation.  His platelet count is slowly improving and he is not having any acute bleed.  He did have peripheral weakness and B12 level was indeterminate.  MMA and homocystine are pending but he is getting B12 supplementation.  He also had borderline low blood pressure so I stopped his amlodipine.  He told me that he recently had elevated blood pressure so had to start it.  I instructed him to hold for now and to check his blood pressure in the outpatient setting and talk with his primary care provider to see if he needs to resume it.  Hematology is recommended he have a repeat CBC in 1 to 2 weeks through his primary care provider  or his outpatient oncologist whom he follows with for RCC.    Access evaluated and have given him information so he can check himself into the [facility] after discharge.  This is his current plan.    Exam Today:  Constitutional:       General: He is not in acute distress.     Appearance: He is ill-appearing (chronically). He is not diaphoretic.   HENT:      Head: Normocephalic and atraumatic.   Eyes:      General:         Right eye: No discharge.         Left eye: No discharge.      Conjunctiva/sclera: Conjunctivae normal.   Cardiovascular:      Rate and Rhythm: Normal rate and regular rhythm.      Pulses: Normal pulses.   Pulmonary:      Effort: Pulmonary effort is normal.      Breath sounds: No wheezing.   Abdominal:      Palpations: Abdomen is soft.      Tenderness: There is no abdominal tenderness.   Musculoskeletal:         General: No swelling or tenderness.      Cervical back: Neck supple. No tenderness.   Skin:     General: Skin is warm and dry.   Neurological:      Mental Status: He is alert. Mental status is at baseline.   Psychiatric:         Mood and Affect: Mood normal.         Behavior: Behavior normal.     Results:  US Abdomen  Hepatomegaly, hepatic steatosis. No discrete liver lesion or biliary  ductal dilatation identified. No evidence for acute cholecystitis.     Normal appearance of the spleen.     Mildly aneurysmal abdominal aorta.     If further imaging evaluation is indicated CT could be considered.    Procedures Performed:         Consults:   Consults     Date and Time Order Name Status Description    6/1/2022 11:03 AM Hematology & Oncology Inpatient Consult      5/30/2022  4:43 PM [facility] (on-call MD unless specified) Details             Discharge Disposition:  Home or Self Care    Discharge Medications:     Discharge Medications      New Medications      Instructions Start Date   cyanocobalamin 1000 MCG tablet  Commonly known as: VITAMIN B-12   1,000 mcg, Oral, Daily   Start Date: June 4, 2022         Continue These Medications      Instructions Start Date   amitriptyline 25 MG tablet  Commonly known as: ELAVIL   25 mg, Oral, Nightly      fluvoxaMINE 25 MG tablet  Commonly known as: LUVOX   100 mg, Oral, Daily      folic acid 1 MG tablet  Commonly known as: FOLVITE   1 mg, Oral, Daily      melatonin 3 MG tablet   3 mg, Oral, Daily      mirtazapine 15 MG tablet  Commonly known as: REMERON   15 mg, Oral, Nightly      naltrexone 50 MG tablet  Commonly known as: DEPADE   50 mg, Oral, Daily      potassium chloride 20 MEQ CR tablet  Commonly known as: K-DUR,KLOR-CON   20 mEq, Oral, Daily      propranolol 40 MG tablet  Commonly known as: INDERAL   60 mg, Oral, 2 Times Daily      risperiDONE 2 MG tablet  Commonly known as: risperDAL   2 mg, Oral, 2 Times Daily      thiamine 100 MG tablet tablet  Commonly known as: VITAMIN B-1   100 mg, Oral, Daily         Stop These Medications    amLODIPine 5 MG tablet  Commonly known as: NORVASC     HYDROcodone-acetaminophen 5-325 MG per tablet  Commonly known as: NORCO            Discharge Diet:   Diet Instructions     Advance Diet As Tolerated            Activity at Discharge:   Activity Instructions     Activity as Tolerated            Follow-up Appointments:   Follow-up Information     Charles Farooq MD .    Specialty: Family Medicine  Contact information:  [address] [place], [place] [id number] [phone]             Guillaume Sandhu MD Follow up.    Specialty: Hematology and Oncology  Contact information:  [address] [id number] [phone]                         Test Results Pending at Discharge:  Pending Labs     Order Current Status    Methylmalonic Acid, Serum In process           Ananda Gar MD  06/03/22  11:45 EDT    Time Spent on Discharge Activities: >30 minutes    Dictated portions using Dragon dictation software.  During the entire encounter, I was wearing recommended PPE including face mask and eye protection. Hand sanitization  was performed prior to entering room and upon exit.

## 2022-06-03 NOTE — OUTREACH NOTE
Prep Survey    Flowsheet Row Responses   [facility] facility patient discharged from? [facility]   Is LACE score < 7 ? No   Emergency Room discharge w/ pulse ox? No   Eligibility [facility]   Date of Admission 05/30/22   Date of Discharge 06/03/22   Discharge Disposition Home or Self Care   Discharge diagnosis ETOH withdrawal with complication, thrombocytopenia   Does the patient have one of the following disease processes/diagnoses(primary or secondary)? Other   Does the patient have Home health ordered? No   Is there a DME ordered? No   Prep survey completed? Yes          LG SMITH - Registered Nurse

## 2022-06-06 ENCOUNTER — TRANSITIONAL CARE MANAGEMENT TELEPHONE ENCOUNTER (OUTPATIENT)
Dept: CALL CENTER | Facility: HOSPITAL | Age: 62
End: 2022-06-06

## 2022-06-06 NOTE — OUTREACH NOTE
Call Center TCM Note    Flowsheet Row Responses   [facility] patient discharged from? [place]   Does the patient have one of the following disease processes/diagnoses(primary or secondary)? Other   TCM attempt successful? Yes   Call start time 1628   Call end time 1630   Discharge diagnosis ETOH withdrawal with complication, thrombocytopenia   Meds reviewed with patient/caregiver? Yes   Is the patient having any side effects they believe may be caused by any medication additions or changes? No   Does the patient have all medications ordered at discharge? Yes   Is the patient taking all medications as directed (includes completed medication regime)? Yes   Does the patient have a primary care provider?  Yes   Does the patient have an appointment with their PCP within 7 days of discharge? No   Comments regarding PCP Pateint declined [facility] d/c follow up   What is preventing the patient from scheduling follow up appointments within 7 days of discharge? Haven't had time   Nursing Interventions Educated patient on importance of making appointment, Advised patient to make appointment   Has the patient kept scheduled appointments due by today? N/A   Has home health visited the patient within 72 hours of discharge? N/A   Psychosocial issues? No   Did the patient receive a copy of their discharge instructions? Yes   Nursing interventions Reviewed instructions with patient   What is the patient's perception of their health status since discharge? Improving   Is the patient/caregiver able to teach back signs and symptoms related to disease process for when to call PCP? Yes   Is the patient/caregiver able to teach back signs and symptoms related to disease process for when to call 911? Yes   Is the patient/caregiver able to teach back the hierarchy of who to call/visit for symptoms/problems? PCP, Specialist, Home health nurse, Urgent Care, ED, 911 Yes   If the patient is a current smoker, are they able to teach  back resources for cessation? Smoking cessation medications   TCM call completed? Yes          Day Babin RN    6/6/2022, 16:30 EDT

## 2022-06-06 NOTE — OUTREACH NOTE
Call Center TCM Note    Flowsheet Row Responses   [facility] patient discharged from? [place]   Does the patient have one of the following disease processes/diagnoses(primary or secondary)? Other   TCM attempt successful? No   Unsuccessful attempts Attempt 1          Eva Benedict MA    6/6/2022, 16:16 EDT

## 2022-06-06 NOTE — CASE MANAGEMENT/SOCIAL WORK
Case Management Discharge Note      Final Note: home with pt arranged ETOH outpt treatment. favian rn/ccp         Selected Continued Care - Discharged on 6/3/2022 Admission date: 5/30/2022 - Discharge disposition: Home or Self Care    Destination    No services have been selected for the patient.              Durable Medical Equipment    No services have been selected for the patient.              Dialysis/Infusion    No services have been selected for the patient.              Home Medical Care    No services have been selected for the patient.              Therapy    No services have been selected for the patient.              Community Resources    No services have been selected for the patient.              Community & DME    No services have been selected for the patient.                  Transportation Services  Private: Car    Final Discharge Disposition Code: 01 - home or self-care

## 2022-06-10 LAB — METHYLMALONATE SERPL-SCNC: 498 NMOL/L (ref 0–378)

## 2022-12-26 ENCOUNTER — HOSPITAL ENCOUNTER (INPATIENT)
Facility: HOSPITAL | Age: 62
LOS: 8 days | Discharge: HOME | DRG: 897 | End: 2023-01-03
Attending: EMERGENCY MEDICINE | Admitting: INTERNAL MEDICINE
Payer: MEDICARE

## 2022-12-26 DIAGNOSIS — F10.20 ALCOHOLISM: ICD-10-CM

## 2022-12-26 DIAGNOSIS — K70.9 ALCOHOLIC LIVER DISEASE: ICD-10-CM

## 2022-12-26 DIAGNOSIS — Z09 FOLLOW-UP EXAM: ICD-10-CM

## 2022-12-26 DIAGNOSIS — E87.6 HYPOKALEMIA: ICD-10-CM

## 2022-12-26 DIAGNOSIS — R09.02 HYPOXEMIA: ICD-10-CM

## 2022-12-26 DIAGNOSIS — F10.930 ALCOHOL WITHDRAWAL SYNDROME WITHOUT COMPLICATION: Primary | ICD-10-CM

## 2022-12-26 DIAGNOSIS — E83.42 HYPOMAGNESEMIA: ICD-10-CM

## 2022-12-26 LAB
ALBUMIN SERPL-MCNC: 4.3 G/DL (ref 3.5–5.2)
ALBUMIN/GLOB SERPL: 1.3 G/DL
ALP SERPL-CCNC: 129 U/L (ref 39–117)
ALT SERPL W P-5'-P-CCNC: 35 U/L (ref 1–41)
ANION GAP SERPL CALCULATED.3IONS-SCNC: 18 MMOL/L (ref 5–15)
AST SERPL-CCNC: 68 U/L (ref 1–40)
BASOPHILS # BLD AUTO: 0.08 10*3/MM3 (ref 0–0.2)
BASOPHILS NFR BLD AUTO: 0.7 % (ref 0–1.5)
BILIRUB SERPL-MCNC: 0.8 MG/DL (ref 0–1.2)
BUN SERPL-MCNC: 6 MG/DL (ref 8–23)
BUN/CREAT SERPL: 7.2 (ref 7–25)
CALCIUM SPEC-SCNC: 9.1 MG/DL (ref 8.6–10.5)
CHLORIDE SERPL-SCNC: 96 MMOL/L (ref 98–107)
CO2 SERPL-SCNC: 26 MMOL/L (ref 22–29)
CREAT SERPL-MCNC: 0.83 MG/DL (ref 0.76–1.27)
DEPRECATED RDW RBC AUTO: 47.4 FL (ref 37–54)
EGFRCR SERPLBLD CKD-EPI 2021: 97.7 ML/MIN/1.73
EOSINOPHIL # BLD AUTO: 0.01 10*3/MM3 (ref 0–0.4)
EOSINOPHIL NFR BLD AUTO: 0.1 % (ref 0.3–6.2)
ERYTHROCYTE [DISTWIDTH] IN BLOOD BY AUTOMATED COUNT: 13.7 % (ref 12.3–15.4)
ETHANOL BLD-MCNC: <10 MG/DL (ref 0–10)
ETHANOL UR QL: <0.01 %
GLOBULIN UR ELPH-MCNC: 3.3 GM/DL
GLUCOSE SERPL-MCNC: 124 MG/DL (ref 65–99)
HCT VFR BLD AUTO: 49.6 % (ref 37.5–51)
HGB BLD-MCNC: 16.9 G/DL (ref 13–17.7)
IMM GRANULOCYTES # BLD AUTO: 0.03 10*3/MM3 (ref 0–0.05)
IMM GRANULOCYTES NFR BLD AUTO: 0.3 % (ref 0–0.5)
INR PPP: 1.08 (ref 0.9–1.1)
LIPASE SERPL-CCNC: 22 U/L (ref 13–60)
LYMPHOCYTES # BLD AUTO: 0.82 10*3/MM3 (ref 0.7–3.1)
LYMPHOCYTES NFR BLD AUTO: 7.3 % (ref 19.6–45.3)
MAGNESIUM SERPL-MCNC: 1.1 MG/DL (ref 1.6–2.4)
MCH RBC QN AUTO: 32.2 PG (ref 26.6–33)
MCHC RBC AUTO-ENTMCNC: 34.1 G/DL (ref 31.5–35.7)
MCV RBC AUTO: 94.5 FL (ref 79–97)
MONOCYTES # BLD AUTO: 0.65 10*3/MM3 (ref 0.1–0.9)
MONOCYTES NFR BLD AUTO: 5.8 % (ref 5–12)
NEUTROPHILS NFR BLD AUTO: 85.8 % (ref 42.7–76)
NEUTROPHILS NFR BLD AUTO: 9.62 10*3/MM3 (ref 1.7–7)
NRBC BLD AUTO-RTO: 0 /100 WBC (ref 0–0.2)
PLATELET # BLD AUTO: 217 10*3/MM3 (ref 140–450)
PMV BLD AUTO: 9.5 FL (ref 6–12)
POTASSIUM SERPL-SCNC: 3.4 MMOL/L (ref 3.5–5.2)
PROT SERPL-MCNC: 7.6 G/DL (ref 6–8.5)
PROTHROMBIN TIME: 14.2 SECONDS (ref 11.7–14.2)
QT INTERVAL: 334 MS
RBC # BLD AUTO: 5.25 10*6/MM3 (ref 4.14–5.8)
SODIUM SERPL-SCNC: 140 MMOL/L (ref 136–145)
WBC NRBC COR # BLD: 11.21 10*3/MM3 (ref 3.4–10.8)

## 2022-12-26 PROCEDURE — 25010000002 LORAZEPAM PER 2 MG: Performed by: INTERNAL MEDICINE

## 2022-12-26 PROCEDURE — 36415 COLL VENOUS BLD VENIPUNCTURE: CPT

## 2022-12-26 PROCEDURE — 93010 ELECTROCARDIOGRAM REPORT: CPT | Performed by: INTERNAL MEDICINE

## 2022-12-26 PROCEDURE — 83735 ASSAY OF MAGNESIUM: CPT | Performed by: EMERGENCY MEDICINE

## 2022-12-26 PROCEDURE — 25010000002 LORAZEPAM PER 2 MG: Performed by: EMERGENCY MEDICINE

## 2022-12-26 PROCEDURE — 83690 ASSAY OF LIPASE: CPT | Performed by: EMERGENCY MEDICINE

## 2022-12-26 PROCEDURE — 99285 EMERGENCY DEPT VISIT HI MDM: CPT

## 2022-12-26 PROCEDURE — 93005 ELECTROCARDIOGRAM TRACING: CPT | Performed by: EMERGENCY MEDICINE

## 2022-12-26 PROCEDURE — 85610 PROTHROMBIN TIME: CPT | Performed by: EMERGENCY MEDICINE

## 2022-12-26 PROCEDURE — 25010000002 MAGNESIUM SULFATE 2 GM/50ML SOLUTION: Performed by: EMERGENCY MEDICINE

## 2022-12-26 PROCEDURE — 82077 ASSAY SPEC XCP UR&BREATH IA: CPT | Performed by: EMERGENCY MEDICINE

## 2022-12-26 PROCEDURE — 80053 COMPREHEN METABOLIC PANEL: CPT | Performed by: EMERGENCY MEDICINE

## 2022-12-26 PROCEDURE — 0 POTASSIUM CHLORIDE 10 MEQ/100ML SOLUTION: Performed by: EMERGENCY MEDICINE

## 2022-12-26 PROCEDURE — 25010000002 ONDANSETRON PER 1 MG: Performed by: EMERGENCY MEDICINE

## 2022-12-26 PROCEDURE — 85025 COMPLETE CBC W/AUTO DIFF WBC: CPT | Performed by: EMERGENCY MEDICINE

## 2022-12-26 RX ORDER — PROPRANOLOL HYDROCHLORIDE 20 MG/1
60 TABLET ORAL 2 TIMES DAILY
Status: DISCONTINUED | OUTPATIENT
Start: 2022-12-26 | End: 2022-12-28

## 2022-12-26 RX ORDER — MAGNESIUM SULFATE HEPTAHYDRATE 40 MG/ML
2 INJECTION, SOLUTION INTRAVENOUS ONCE
Status: COMPLETED | OUTPATIENT
Start: 2022-12-26 | End: 2022-12-26

## 2022-12-26 RX ORDER — SODIUM CHLORIDE 0.9 % (FLUSH) 0.9 %
10 SYRINGE (ML) INJECTION AS NEEDED
Status: DISCONTINUED | OUTPATIENT
Start: 2022-12-26 | End: 2023-01-03 | Stop reason: HOSPADM

## 2022-12-26 RX ORDER — FOLIC ACID 1 MG/1
1 TABLET ORAL DAILY
Status: DISCONTINUED | OUTPATIENT
Start: 2022-12-26 | End: 2022-12-28

## 2022-12-26 RX ORDER — LORAZEPAM 1 MG/1
1 TABLET ORAL EVERY 8 HOURS
Status: DISCONTINUED | OUTPATIENT
Start: 2022-12-27 | End: 2022-12-28

## 2022-12-26 RX ORDER — POTASSIUM CHLORIDE 7.45 MG/ML
10 INJECTION INTRAVENOUS ONCE
Status: COMPLETED | OUTPATIENT
Start: 2022-12-26 | End: 2022-12-26

## 2022-12-26 RX ORDER — SODIUM CHLORIDE 9 MG/ML
125 INJECTION, SOLUTION INTRAVENOUS CONTINUOUS
Status: DISCONTINUED | OUTPATIENT
Start: 2022-12-26 | End: 2022-12-29

## 2022-12-26 RX ORDER — LORAZEPAM 2 MG/ML
2 INJECTION INTRAMUSCULAR
Status: DISCONTINUED | OUTPATIENT
Start: 2022-12-26 | End: 2023-01-02

## 2022-12-26 RX ORDER — SODIUM CHLORIDE 0.9 % (FLUSH) 0.9 %
10 SYRINGE (ML) INJECTION EVERY 12 HOURS SCHEDULED
Status: DISCONTINUED | OUTPATIENT
Start: 2022-12-26 | End: 2023-01-03 | Stop reason: HOSPADM

## 2022-12-26 RX ORDER — CHOLECALCIFEROL (VITAMIN D3) 125 MCG
1000 CAPSULE ORAL DAILY
Status: DISCONTINUED | OUTPATIENT
Start: 2022-12-26 | End: 2022-12-28

## 2022-12-26 RX ORDER — AMITRIPTYLINE HYDROCHLORIDE 25 MG/1
25 TABLET, FILM COATED ORAL NIGHTLY
Status: DISCONTINUED | OUTPATIENT
Start: 2022-12-26 | End: 2022-12-28

## 2022-12-26 RX ORDER — LORAZEPAM 1 MG/1
1 TABLET ORAL
Status: DISCONTINUED | OUTPATIENT
Start: 2022-12-26 | End: 2023-01-02

## 2022-12-26 RX ORDER — NITROGLYCERIN 0.4 MG/1
0.4 TABLET SUBLINGUAL
Status: DISCONTINUED | OUTPATIENT
Start: 2022-12-26 | End: 2023-01-03 | Stop reason: HOSPADM

## 2022-12-26 RX ORDER — LORAZEPAM 2 MG/ML
1 INJECTION INTRAMUSCULAR
Status: DISCONTINUED | OUTPATIENT
Start: 2022-12-26 | End: 2023-01-02

## 2022-12-26 RX ORDER — MIRTAZAPINE 15 MG/1
15 TABLET, FILM COATED ORAL NIGHTLY
Status: DISCONTINUED | OUTPATIENT
Start: 2022-12-26 | End: 2022-12-28

## 2022-12-26 RX ORDER — NALTREXONE HYDROCHLORIDE 50 MG/1
50 TABLET, FILM COATED ORAL DAILY
Status: DISCONTINUED | OUTPATIENT
Start: 2022-12-26 | End: 2022-12-28

## 2022-12-26 RX ORDER — LORAZEPAM 1 MG/1
1 TABLET ORAL EVERY 6 HOURS
Status: DISCONTINUED | OUTPATIENT
Start: 2022-12-26 | End: 2022-12-27

## 2022-12-26 RX ORDER — ONDANSETRON 2 MG/ML
4 INJECTION INTRAMUSCULAR; INTRAVENOUS ONCE
Status: COMPLETED | OUTPATIENT
Start: 2022-12-26 | End: 2022-12-26

## 2022-12-26 RX ORDER — FLUVOXAMINE MALEATE 50 MG/1
100 TABLET, COATED ORAL DAILY
Status: DISCONTINUED | OUTPATIENT
Start: 2022-12-26 | End: 2022-12-28

## 2022-12-26 RX ORDER — SODIUM CHLORIDE 9 MG/ML
40 INJECTION, SOLUTION INTRAVENOUS AS NEEDED
Status: DISCONTINUED | OUTPATIENT
Start: 2022-12-26 | End: 2023-01-03 | Stop reason: HOSPADM

## 2022-12-26 RX ORDER — LORAZEPAM 1 MG/1
2 TABLET ORAL
Status: DISCONTINUED | OUTPATIENT
Start: 2022-12-26 | End: 2023-01-02

## 2022-12-26 RX ORDER — LORAZEPAM 2 MG/ML
1 INJECTION INTRAMUSCULAR ONCE
Status: COMPLETED | OUTPATIENT
Start: 2022-12-26 | End: 2022-12-26

## 2022-12-26 RX ORDER — NICOTINE 21 MG/24HR
1 PATCH, TRANSDERMAL 24 HOURS TRANSDERMAL
Status: DISCONTINUED | OUTPATIENT
Start: 2022-12-26 | End: 2023-01-03 | Stop reason: HOSPADM

## 2022-12-26 RX ORDER — RISPERIDONE 2 MG/1
2 TABLET ORAL 2 TIMES DAILY
Status: DISCONTINUED | OUTPATIENT
Start: 2022-12-26 | End: 2022-12-28

## 2022-12-26 RX ADMIN — PROPRANOLOL HYDROCHLORIDE 60 MG: 20 TABLET ORAL at 20:52

## 2022-12-26 RX ADMIN — SODIUM CHLORIDE 125 ML/HR: 9 INJECTION, SOLUTION INTRAVENOUS at 15:36

## 2022-12-26 RX ADMIN — SODIUM CHLORIDE 1000 ML: 9 INJECTION, SOLUTION INTRAVENOUS at 15:35

## 2022-12-26 RX ADMIN — MIRTAZAPINE 15 MG: 15 TABLET, FILM COATED ORAL at 20:54

## 2022-12-26 RX ADMIN — Medication 1 PATCH: at 23:16

## 2022-12-26 RX ADMIN — Medication 10 ML: at 20:54

## 2022-12-26 RX ADMIN — LORAZEPAM 1 MG: 1 TABLET ORAL at 17:50

## 2022-12-26 RX ADMIN — Medication 1000 MCG: at 20:53

## 2022-12-26 RX ADMIN — NALTREXONE HYDROCHLORIDE 50 MG: 50 TABLET, FILM COATED ORAL at 20:53

## 2022-12-26 RX ADMIN — LORAZEPAM 1 MG: 2 INJECTION INTRAMUSCULAR; INTRAVENOUS at 21:04

## 2022-12-26 RX ADMIN — ONDANSETRON 4 MG: 2 INJECTION INTRAMUSCULAR; INTRAVENOUS at 14:58

## 2022-12-26 RX ADMIN — LORAZEPAM 1 MG: 2 INJECTION INTRAMUSCULAR; INTRAVENOUS at 14:58

## 2022-12-26 RX ADMIN — LORAZEPAM 1 MG: 1 TABLET ORAL at 23:16

## 2022-12-26 RX ADMIN — POTASSIUM CHLORIDE 10 MEQ: 7.46 INJECTION, SOLUTION INTRAVENOUS at 16:08

## 2022-12-26 RX ADMIN — Medication 100 MG: at 20:52

## 2022-12-26 RX ADMIN — FOLIC ACID 1 MG: 1 TABLET ORAL at 20:53

## 2022-12-26 RX ADMIN — SODIUM CHLORIDE 125 ML/HR: 9 INJECTION, SOLUTION INTRAVENOUS at 17:50

## 2022-12-26 RX ADMIN — RISPERIDONE 2 MG: 2 TABLET, FILM COATED ORAL at 20:53

## 2022-12-26 RX ADMIN — AMITRIPTYLINE HYDROCHLORIDE 25 MG: 25 TABLET, FILM COATED ORAL at 20:52

## 2022-12-26 RX ADMIN — MAGNESIUM SULFATE HEPTAHYDRATE 2 G: 2 INJECTION, SOLUTION INTRAVENOUS at 15:59

## 2022-12-26 RX ADMIN — FLUVOXAMINE MALEATE 100 MG: 50 TABLET, COATED ORAL at 20:53

## 2022-12-26 NOTE — ED PROVIDER NOTES
" EMERGENCY DEPARTMENT ENCOUNTER    Room Number:  KIMBERLY/KIMBERLY  Date of encounter:  12/26/2022  PCP: Charles Farooq MD  Historian: Patient      HPI:  Chief Complaint: \"I am going through alcohol withdrawal\"  A complete HPI/ROS/PMH/PSH/SH/FH are unobtainable due to: Not applicable  Context: Nilesh Zapata is a 62 y.o. male who presents to the ED c/o patient has a long history of alcoholism.  He cannot even specify how long he has been an alcoholic for as when I ask him he says for a long time.  Currently he drinks about 1/5 of bourbon a day.  His last drink was last night.  He wants to stop drinking.  He started getting shakes this morning.  Feels agitated and shaking and feels tremulous.  This is similar to when he was here earlier this year for alcohol withdrawal.  He has had nausea but no vomiting.  He has not really had anything to eat or drink since last night.  That includes alcohol.  He denies vomiting.  Denies diarrhea.  Denies pain anywhere.  Denies chest pain, abdominal pain or headache.  Denies focal weakness.  Has generalized weakness.  Denies suicidal ideation or homicidal ideation.        Previous Episodes: Yes see above with alcohol withdrawal in the past  Current Symptoms: See above    MEDICAL HISTORY REVIEWED  I reviewed the chart from May and June 2022.  Patient was admitted with alcohol withdrawal.  He did have symptoms consistent with alcohol withdrawal as well as thrombocytopenia.  He is status post left nephrectomy, has history of hypertension and thrombocytopenia.  Patient was seen by hematology for his low platelets.  They were improving prior to discharge.  They recommended that he follow-up with his primary care physician.  And have repeat lab work done after discharge.      PAST MEDICAL HISTORY  Active Ambulatory Problems     Diagnosis Date Noted   • Alcohol withdrawal syndrome with complication (HCC) 12/17/2017   • Anxiety 12/17/2017   • Fatty liver 12/17/2017   • Tobacco abuse 12/17/2017 "   • Kidney cancer, primary, with metastasis from kidney to other site (HCC) 12/17/2017   • Alcoholism (HCC) 12/17/2017   • Hyponatremia 12/21/2017   • Alcohol abuse 10/30/2019   • History of renal cell cancer 10/30/2019   • History of left nephrectomy 2/2 RCC 10/30/2019   • Liver lesion 12/04/2017   • Lung nodule 08/21/2017   • Vitamin D deficiency 08/16/2017   • Under emotional stress 10/30/2019   •  from spouse 10/30/2019   • Coping style affecting medical condition 10/30/2019   • Thrombocytopenia (HCC) 05/31/2022   • Essential hypertension 05/31/2022     Resolved Ambulatory Problems     Diagnosis Date Noted   • Hypokalemia 12/21/2017   • Delirium tremens (HCC) 03/20/2019     Past Medical History:   Diagnosis Date   • FH: kidney cancer    • Hypertension          PAST SURGICAL HISTORY  Past Surgical History:   Procedure Laterality Date   • APPENDECTOMY     • NEPHRECTOMY      left    • TONSILLECTOMY           FAMILY HISTORY  Family History   Problem Relation Age of Onset   • Diabetes Mother    • Throat cancer Father          SOCIAL HISTORY  Social History     Socioeconomic History   • Marital status:    Tobacco Use   • Smoking status: Some Days     Packs/day: 0.25     Years: 15.00     Pack years: 3.75     Types: Cigarettes     Start date: 12/17/1981   • Tobacco comments:     refused    Substance and Sexual Activity   • Alcohol use: Yes     Comment: last drink was at 1000   drinks 1 fifth a day    • Drug use: No   • Sexual activity: Defer         ALLERGIES  Patient has no known allergies.        REVIEW OF SYSTEMS  Review of Systems     All systems reviewed and negative except for those discussed in HPI.       PHYSICAL EXAM    I have reviewed the triage vital signs and nursing notes.    ED Triage Vitals   Temp Heart Rate Resp BP SpO2   12/26/22 1326 12/26/22 1326 12/26/22 1326 12/26/22 1329 12/26/22 1327   98.3 °F (36.8 °C) (!) 144 18 (!) 194/131 94 %      Temp src Heart Rate Source Patient Position BP  Location FiO2 (%)   12/26/22 1326 12/26/22 1326 -- -- --   Tympanic Monitor          GENERAL: Poorly kept male that appears older than his stated age.  Appears agitated consistent with alcohol withdrawal.  Vital signs on my initial evaluation he is tachycardic on my exam with his heart rate between 120s and 130s.  Blood pressure is mildly elevated.  He is afebrile  HENT: nares patent  Head/neck/ face are symmetric without gross deformity, signs of trauma, or swelling  EYES: no scleral icterus, no conjunctival pallor.  NECK: Supple, no meningismus  CV: regular rhythm, tachycardia with intact distal pulses..  No murmur rub  RESPIRATORY: normal effort and no respiratory distress.  Clear to auscultation bilaterally  ABDOMEN: soft and nontender.  Obese  MUSCULOSKELETAL: no deformity.  Intact distal pulses to upper and lower extremities are equal strong and symmetric  NEURO: alert and appropriate, moves all extremities, follows commands.  No focal motor or sensory changes  SKIN: warm, dry    Vital signs and nursing notes reviewed.        LAB RESULTS  Recent Results (from the past 24 hour(s))   ECG 12 Lead Other; Alcohol withdrawal    Collection Time: 12/26/22  2:34 PM   Result Value Ref Range    QT Interval 334 ms   Comprehensive Metabolic Panel    Collection Time: 12/26/22  2:52 PM    Specimen: Blood   Result Value Ref Range    Glucose 124 (H) 65 - 99 mg/dL    BUN 6 (L) 8 - 23 mg/dL    Creatinine 0.83 0.76 - 1.27 mg/dL    Sodium 140 136 - 145 mmol/L    Potassium 3.4 (L) 3.5 - 5.2 mmol/L    Chloride 96 (L) 98 - 107 mmol/L    CO2 26.0 22.0 - 29.0 mmol/L    Calcium 9.1 8.6 - 10.5 mg/dL    Total Protein 7.6 6.0 - 8.5 g/dL    Albumin 4.30 3.50 - 5.20 g/dL    ALT (SGPT) 35 1 - 41 U/L    AST (SGOT) 68 (H) 1 - 40 U/L    Alkaline Phosphatase 129 (H) 39 - 117 U/L    Total Bilirubin 0.8 0.0 - 1.2 mg/dL    Globulin 3.3 gm/dL    A/G Ratio 1.3 g/dL    BUN/Creatinine Ratio 7.2 7.0 - 25.0    Anion Gap 18.0 (H) 5.0 - 15.0 mmol/L    eGFR  97.7 >60.0 mL/min/1.73   Lipase    Collection Time: 12/26/22  2:52 PM    Specimen: Blood   Result Value Ref Range    Lipase 22 13 - 60 U/L   Magnesium    Collection Time: 12/26/22  2:52 PM    Specimen: Blood   Result Value Ref Range    Magnesium 1.1 (L) 1.6 - 2.4 mg/dL   Ethanol    Collection Time: 12/26/22  2:52 PM    Specimen: Blood   Result Value Ref Range    Ethanol <10 0 - 10 mg/dL    Ethanol % <0.010 %   CBC Auto Differential    Collection Time: 12/26/22  2:52 PM    Specimen: Blood   Result Value Ref Range    WBC 11.21 (H) 3.40 - 10.80 10*3/mm3    RBC 5.25 4.14 - 5.80 10*6/mm3    Hemoglobin 16.9 13.0 - 17.7 g/dL    Hematocrit 49.6 37.5 - 51.0 %    MCV 94.5 79.0 - 97.0 fL    MCH 32.2 26.6 - 33.0 pg    MCHC 34.1 31.5 - 35.7 g/dL    RDW 13.7 12.3 - 15.4 %    RDW-SD 47.4 37.0 - 54.0 fl    MPV 9.5 6.0 - 12.0 fL    Platelets 217 140 - 450 10*3/mm3    Neutrophil % 85.8 (H) 42.7 - 76.0 %    Lymphocyte % 7.3 (L) 19.6 - 45.3 %    Monocyte % 5.8 5.0 - 12.0 %    Eosinophil % 0.1 (L) 0.3 - 6.2 %    Basophil % 0.7 0.0 - 1.5 %    Immature Grans % 0.3 0.0 - 0.5 %    Neutrophils, Absolute 9.62 (H) 1.70 - 7.00 10*3/mm3    Lymphocytes, Absolute 0.82 0.70 - 3.10 10*3/mm3    Monocytes, Absolute 0.65 0.10 - 0.90 10*3/mm3    Eosinophils, Absolute 0.01 0.00 - 0.40 10*3/mm3    Basophils, Absolute 0.08 0.00 - 0.20 10*3/mm3    Immature Grans, Absolute 0.03 0.00 - 0.05 10*3/mm3    nRBC 0.0 0.0 - 0.2 /100 WBC   Protime-INR    Collection Time: 12/26/22  2:53 PM    Specimen: Blood   Result Value Ref Range    Protime 14.2 11.7 - 14.2 Seconds    INR 1.08 0.90 - 1.10       Ordered the above labs and independently reviewed the results.        RADIOLOGY  No Radiology Exams Resulted Within Past 24 Hours    I ordered the above noted radiological studies. Reviewed by me and discussed with radiologist.  See dictation for official radiology interpretation.      PROCEDURES    Procedures      MEDICATIONS GIVEN IN ER    Medications   sodium chloride 0.9  % flush 10 mL (has no administration in time range)   sodium chloride 0.9 % infusion (125 mL/hr Intravenous New Bag 12/26/22 1536)   potassium chloride 10 mEq in 100 mL IVPB (10 mEq Intravenous New Bag 12/26/22 1608)   sodium chloride 0.9 % bolus 1,000 mL (1,000 mL Intravenous New Bag 12/26/22 1535)   LORazepam (ATIVAN) injection 1 mg (1 mg Intravenous Given 12/26/22 1458)   ondansetron (ZOFRAN) injection 4 mg (4 mg Intravenous Given 12/26/22 1458)   magnesium sulfate 2g/50 mL (PREMIX) infusion (2 g Intravenous New Bag 12/26/22 1559)         PROGRESS, DATA ANALYSIS, CONSULTS, AND MEDICAL DECISION MAKING    This is a gentleman who is agitated and tachycardic and has symptoms very likely related to alcohol withdrawal.  Informed him we will start him on some IV fluids and check blood work and electrolytes.  We will give him some Ativan here.  This is similar to his presentation that he had earlier part of this year and was admitted to [facility].  Denies any suicidal or homicidal ideation.  All questions answered at this time.  Denies any pain at this time.  No plan of doing any imaging such as CT scans.  We are currently under a pandemic from the COVID19 infection.  The patient presented to the emergency department by ambulance or personal vehicle. I followed the current protocols required by Infection Control at [facility] in my evaluation and treatment of the patient. The patient was wearing a face mask during my evaluation and throughout my encounter. During my whole encounter with this patient I used appropriate personal protective equipment.  This equipment consisted of eye protection, facemask, gown, and gloves.  I applied this equipment before entering the room.    All labs have been independently reviewed by me.  All radiology studies have been reviewed by me and discussed with radiologist dictating the report.   EKG's independently viewed and interpreted by me.  Discussion below  represents my analysis of pertinent findings related to patient's condition, differential diagnosis, treatment plan and final disposition.      ED Course as of 12/26/22 1654   Mon Dec 26, 2022   1438 EKG reading  EKG was done at 2:34 PM.  I looked at the EKG at 2:37 PM  I believe this patient has an atrial tachycardia.  I think it is a sinus tachycardia.  I do not believe he is in atrial fib.  He does have a PAC.  Narrow complex normal axis and appreciate any acute injury pattern QT looks unremarkable.  I compared to his previous EKG from October 30, 2019 and it looks fairly similar. [MM]   1440 Ethanol %: <0.010 [MM]   1544 Magnesium(!): 1.1 [MM]   1544 Anion Gap(!): 18.0 [MM]   1544 CO2: 26.0 [MM]   1545 Potassium(!): 3.4 [MM]   1545 WBC(!): 11.21 [MM]   1604 I discussed the case with Dr. Fuentes who is on for A.  Informed him of the patient's results of tests and my initial treatment plan.  This gentleman has alcohol withdrawal he has hypomagnesemia and hypokalemia.  Agrees to admit.  All questions answered [MM]   1608 On my reevaluation of the patient.  Patient's heart rate is improved is 90 to low 100s.  Blood pressure still mildly elevated.  He appears less agitated.  Informed him about the results of his lab work and retreating with magnesium and potassium.  He agrees with admission.  He denies any suicidal or homicidal ideation.  He does wish to stop drinking.  All questions answered at this time. [MM]      ED Course User Index  [MM] Jeff Lewis MD       AS OF 16:54 EST VITALS:    BP - (!) 154/109  HR - 91  TEMP - 98.3 °F (36.8 °C) (Tympanic)  02 SATS - 91%        DIAGNOSIS  Final diagnoses:   Alcohol withdrawal syndrome without complication (HCC)   Hypomagnesemia   Hypokalemia   Alcoholism (HCC)         DISPOSITION  I have reviewed the test results with my patient and explained the current treatment plan.  I answered all of the patient's questions.  The patient will be admitted to monitor bed at this  time.  The patient is not hypotensive and is tolerating their current disease condition well enough for a monitored bed at this time.  The patient's current condition does not require intensive care treatment at this time.            DICTATED UTILIZING DRAGON DICTATION    Note Disclaimer: At [facility], we believe that sharing information builds trust and better relationships. You are receiving this note because you recently visited [facility]. It is possible you will see health information before a provider has talked with you about it. This kind of information can be easy to misunderstand. To help you fully understand what it means for your health, we urge you to discuss this note with your provider.     Jeff Lewis MD  12/26/22 6667

## 2022-12-26 NOTE — ED TRIAGE NOTES
Patient to ed from  with complaints of alcohol withdrawal. Patient states that he usually drinks 1/5th of bourbon a night.t drink last night.

## 2022-12-26 NOTE — ED NOTES
Nursing report ED to floor  Nilesh Zapata  62 y.o.  male    HPI :   Chief Complaint   Patient presents with    Alcohol Problem       Admitting doctor:   Naldo Fuentes MD    Admitting diagnosis:   The primary encounter diagnosis was Alcohol withdrawal syndrome without complication (HCC). Diagnoses of Hypomagnesemia, Hypokalemia, and Alcoholism (HCC) were also pertinent to this visit.    Code status:   Current Code Status       Date Active Code Status Order ID Comments User Context       Prior            Allergies:   Patient has no known allergies.    Isolation:   No active isolations    Intake and Output  No intake or output data in the 24 hours ending 12/26/22 1629    Weight:       12/26/22  1326   Weight: 95.3 kg (210 lb)       Most recent vitals:   Vitals:    12/26/22 1501 12/26/22 1535 12/26/22 1543 12/26/22 1610   BP:       Pulse: 116 108 111 91   Resp:       Temp:       TempSrc:       SpO2:  90%  91%   Weight:       Height:           Active LDAs/IV Access:   Lines, Drains & Airways       Active LDAs       Name Placement date Placement time Site Days    Peripheral IV 12/26/22 1346 Anterior;Right Forearm 12/26/22  1346  Forearm  less than 1    Peripheral IV 12/26/22 1606 Right Antecubital 12/26/22  1606  Antecubital  less than 1    Peripheral IV Right Antecubital --  --  Antecubital  --                    Labs (abnormal labs have a star):   Labs Reviewed   COMPREHENSIVE METABOLIC PANEL - Abnormal; Notable for the following components:       Result Value    Glucose 124 (*)     BUN 6 (*)     Potassium 3.4 (*)     Chloride 96 (*)     AST (SGOT) 68 (*)     Alkaline Phosphatase 129 (*)     Anion Gap 18.0 (*)     All other components within normal limits    Narrative:     GFR Normal >60  Chronic Kidney Disease <60  Kidney Failure <15     MAGNESIUM - Abnormal; Notable for the following components:    Magnesium 1.1 (*)     All other components within normal limits   CBC WITH AUTO DIFFERENTIAL - Abnormal; Notable for the  following components:    WBC 11.21 (*)     Neutrophil % 85.8 (*)     Lymphocyte % 7.3 (*)     Eosinophil % 0.1 (*)     Neutrophils, Absolute 9.62 (*)     All other components within normal limits   PROTIME-INR - Normal   LIPASE - Normal   ETHANOL   URINE DRUG SCREEN   URINALYSIS W/ MICROSCOPIC IF INDICATED (NO CULTURE)   CBC AND DIFFERENTIAL    Narrative:     The following orders were created for panel order CBC & Differential.  Procedure                               Abnormality         Status                     ---------                               -----------         ------                     CBC Auto Differential[[id number]]        Abnormal            Final result                 Please view results for these tests on the individual orders.       EKG:   ECG 12 Lead Other; Alcohol withdrawal   Preliminary Result   HEART RATE= 121  bpm   RR Interval= 496  ms   AL Interval=   ms   P Horizontal Axis=   deg   P Front Axis=   deg   QRSD Interval= 74  ms   QT Interval= 334  ms   QRS Axis= 245  deg   T Wave Axis= 15  deg   - ABNORMAL ECG -   Atrial fibrillation   Left anterior fascicular block   Borderline low voltage, extremity leads   Abnormal R-wave progression, late transition   Baseline wander in lead(s) V1,V2   Electronically Signed By:    Date and Time of Study: 2022-12-26 14:34:04          Meds given in ED:   Medications   sodium chloride 0.9 % flush 10 mL (has no administration in time range)   sodium chloride 0.9 % infusion (125 mL/hr Intravenous New Bag 12/26/22 1536)   potassium chloride 10 mEq in 100 mL IVPB (10 mEq Intravenous New Bag 12/26/22 1608)   sodium chloride 0.9 % bolus 1,000 mL (1,000 mL Intravenous New Bag 12/26/22 1535)   LORazepam (ATIVAN) injection 1 mg (1 mg Intravenous Given 12/26/22 1458)   ondansetron (ZOFRAN) injection 4 mg (4 mg Intravenous Given 12/26/22 1458)   magnesium sulfate 2g/50 mL (PREMIX) infusion (2 g Intravenous New Bag 12/26/22 2211)       Imaging results:  No radiology  results for the last day    Ambulatory status:   Partial assist    Social issues:   Social History     Socioeconomic History    Marital status:    Tobacco Use    Smoking status: Some Days     Packs/day: 0.25     Years: 15.00     Pack years: 3.75     Types: Cigarettes     Start date: 12/17/1981    Tobacco comments:     refused    Substance and Sexual Activity    Alcohol use: Yes     Comment: last drink was at 1000   drinks 1 fifth a day     Drug use: No    Sexual activity: Defer       NIH Stroke Scale:         Lisa Zuluaga RN  12/26/22 16:29 EST

## 2022-12-26 NOTE — H&P
Internal medicine history and physical  INTERNAL MEDICINE   [facility]       Patient Identification:  Name: Nilesh Zapata  Age: 62 y.o.  Sex: male  :  1960  MRN: [id number]                   Primary Care Physician: Charles Farooq MD                               Date of admission:2022    Chief Complaint: Has been binge drinking about a pint to 1/5 of bourbon daily for the last several months with a last drink last night and feeling jittery and shaky and wants to quit.    History of Present Illness:   Patient is a 62-year-old male with prior history of alcohol use and previous hospitalization for alcohol withdrawal and related complications, history of anxiety, hypertension has been drinking heavily in the last few months and despite multiple questions did not give a clear answer about how long this episode of binge drinking going on and has been drinking somewhere between a pint and fifth of hard liquor including bourbon on a daily basis.  Patient denies any other symptoms of hematemesis or melena or abdominal pain but does have jitters shakes and feeling out of control.  His last drink was last night.  Last night he decided that he would not drink anymore and when he woke up this morning he felt as if he is having withdrawal and came to the emergency room for further evaluation.  Upon arrival he was noted to be tachycardic and tremulous and was started on alcohol withdrawal protocol.  He was noted to be hypomagnesemic and hypocalcemic which was treated in the emergency room and patient is being admitted.      Past Medical History:  Past Medical History:   Diagnosis Date   • Alcohol abuse    • Anxiety    • Delirium tremens (HCC) 2019   • Fatty liver    • FH: kidney cancer    • Hypertension      Past Surgical History:  Past Surgical History:   Procedure Laterality Date   • APPENDECTOMY     • NEPHRECTOMY      left    • TONSILLECTOMY        Home Meds:  Medications Prior to  Admission   Medication Sig Dispense Refill Last Dose   • amitriptyline (ELAVIL) 25 MG tablet Take 1 tablet by mouth Every Night. (Patient taking differently: Take 25 mg by mouth Every Night. Pt states does not take anymore) 30 tablet 0 Patient Taking Differently   • fluvoxaMINE (LUVOX) 25 MG tablet Take 100 mg by mouth Daily.   12/25/2022   • folic acid (FOLVITE) 1 MG tablet Take 1 tablet by mouth Daily. (Patient taking differently: Take 1 mg by mouth Daily. Does not take anymore) 30 tablet 0 Patient Taking Differently   • mirtazapine (REMERON) 15 MG tablet Take 1 tablet by mouth Every Night. 30 tablet 0 12/25/2022   • naltrexone (DEPADE) 50 MG tablet Take 50 mg by mouth Daily.   12/25/2022   • propranolol (INDERAL) 40 MG tablet Take 60 mg by mouth 2 (Two) Times a Day.   12/25/2022   • risperiDONE (risperDAL) 2 MG tablet Take 2 mg by mouth 2 (Two) Times a Day.   12/25/2022   • thiamine (VITAMIN B1) 100 MG tablet Take 1 tablet by mouth Daily. 30 tablet 0 12/25/2022   • vitamin B-12 (VITAMIN B-12) 1000 MCG tablet Take 1 tablet by mouth Daily. 30 tablet 0 12/25/2022     Current Meds:     Current Facility-Administered Medications:   •  influenza vac split quad (FLUZONE,FLUARIX,AFLURIA,FLULAVAL) injection 0.5 mL, 0.5 mL, Intramuscular, During Hospitalization, Naldo Fuentes MD  •  [COMPLETED] Insert Peripheral IV, , , Once **AND** sodium chloride 0.9 % flush 10 mL, 10 mL, Intravenous, PRN, Jeff Lewis MD  •  sodium chloride 0.9 % infusion, 125 mL/hr, Intravenous, Continuous, Jeff Lewis MD, Last Rate: 125 mL/hr at 12/26/22 1536, 125 mL/hr at 12/26/22 1536  Allergies:  No Known Allergies  Social History:   Social History     Tobacco Use   • Smoking status: Some Days     Packs/day: 0.25     Years: 15.00     Pack years: 3.75     Types: Cigarettes     Start date: 12/17/1981   • Smokeless tobacco: Not on file   • Tobacco comments:     refused    Substance Use Topics   • Alcohol use: Yes     Comment: last drink was at  "1000   drinks 1 fifth a day       Family History:  Family History   Problem Relation Age of Onset   • Diabetes Mother    • Throat cancer Father           Review of Systems  See history of present illness and past medical history.   Denies any fever and chills  Denies any hematemesis or melena or abdominal pain or increasing weight gain or swelling.  Denies any fever chills or falls or injury.      Vitals:   /98 (BP Location: Left arm, Patient Position: Lying)   Pulse 86   Temp 98.3 °F (36.8 °C) (Tympanic)   Resp 18   Ht 167.6 cm (66\")   Wt 95.3 kg (210 lb)   SpO2 93%   BMI 33.89 kg/m²   I/O: No intake or output data in the 24 hours ending 12/26/22 1724  Exam:  Patient is examined using the personal protective equipment as per guidelines from infection control for this particular patient as enacted.  Hand washing was performed before and after patient interaction.  General Appearance:   Alert pleasant cooperative feeling better compared to when he arrived to the emergency room.   Head:    Normocephalic, without obvious abnormality, atraumatic   Eyes:    PERRL, conjunctiva/corneas clear, EOM's intact, both eyes   Ears:    Normal external ear canals, both ears   Nose:   Nares normal, septum midline, mucosa normal, no drainage    or sinus tenderness   Throat:   Lips, tongue, gums normal; oral mucosa pink and moist   Neck:   Supple, symmetrical, trachea midline, no adenopathy;     thyroid:  no enlargement/tenderness/nodules; no carotid    bruit or JVD   Back:     Symmetric, no curvature, ROM normal, no CVA tenderness   Lungs:     Clear to auscultation bilaterally, respirations unlabored   Chest Wall:    No tenderness or deformity    Heart:    Regular rate and rhythm, S1 and S2 normal, no murmur, rub   or gallop   Abdomen:    Soft nontender no guarding rigidity or rebound noted   Extremities:   Extremities normal, atraumatic, no cyanosis or edema   Pulses:   Pulses palpable in all extremities; symmetric all " extremities   Skin:   Skin color normal, Skin is warm and dry,  no rashes or palpable lesions   Neurologic:  Alert and oriented and grossly nonfocal examination       Data Review:      I reviewed the patient's new clinical results.  Results from last 7 days   Lab Units 12/26/22  1452   WBC 10*3/mm3 11.21*   HEMOGLOBIN g/dL 16.9   PLATELETS 10*3/mm3 217     Results from last 7 days   Lab Units 12/26/22  1452   SODIUM mmol/L 140   POTASSIUM mmol/L 3.4*   CHLORIDE mmol/L 96*   CO2 mmol/L 26.0   BUN mg/dL 6*   CREATININE mg/dL 0.83   CALCIUM mg/dL 9.1   GLUCOSE mg/dL 124*     ECG 12 Lead Other; Alcohol withdrawal   Final Result   HEART RATE= 121  bpm   RR Interval= 496  ms   IN Interval=   ms   P Horizontal Axis=   deg   P Front Axis=   deg   QRSD Interval= 74  ms   QT Interval= 334  ms   QRS Axis= 245  deg   T Wave Axis= 15  deg   - ABNORMAL ECG -   Atrial fibrillation   Left anterior fascicular block   Borderline low voltage, extremity leads   Abnormal R-wave progression, late transition   No change from previous tracing   Electronically Signed By: Presley HickeyNAVEEN) ([facility]) 26-Dec-2022 19:47:58   Date and Time of Study: 2022-12-26 14:34:04          Assessment:  Active Hospital Problems    Diagnosis  POA   • **Alcohol withdrawal syndrome without complication (HCC) [F10.930]  Yes   • Hypomagnesemia [E83.42]  Unknown   • Essential hypertension [I10]  Yes   • Hypokalemia [E87.6]  Unknown   • Alcoholism (HCC) [F10.20]  Yes   • Anxiety [F41.9]  Yes   • Tobacco abuse [Z72.0]  Yes   • Kidney cancer, primary, with metastasis from kidney to other site (HCC) [C64.9]  Yes       Medical decision making/care plan: See admission orders  · Continue with as needed benzodiazepines per CIWA protocol  · Replace magnesium and continue electrolyte replacement per protocol  · IV fluids  · Continue his antihypertensives  · Access evaluation  · Further management as his condition evolves.    Naldo Fuentes MD   12/26/2022  17:24 EST    Parts  of this note may be an electronic transcription/translation of spoken language to printed text using the Dragon dictation system.

## 2022-12-27 ENCOUNTER — APPOINTMENT (OUTPATIENT)
Dept: MRI IMAGING | Facility: HOSPITAL | Age: 62
DRG: 897 | End: 2022-12-27
Payer: MEDICARE

## 2022-12-27 ENCOUNTER — APPOINTMENT (OUTPATIENT)
Dept: GENERAL RADIOLOGY | Facility: HOSPITAL | Age: 62
DRG: 897 | End: 2022-12-27
Payer: MEDICARE

## 2022-12-27 ENCOUNTER — APPOINTMENT (OUTPATIENT)
Dept: ULTRASOUND IMAGING | Facility: HOSPITAL | Age: 62
DRG: 897 | End: 2022-12-27
Payer: MEDICARE

## 2022-12-27 PROBLEM — F43.23 ACUTE ADJUSTMENT DISORDER WITH MIXED ANXIETY AND DEPRESSED MOOD: Status: ACTIVE | Noted: 2022-12-27

## 2022-12-27 PROBLEM — E83.39 HYPOPHOSPHATEMIA: Status: ACTIVE | Noted: 2022-12-27

## 2022-12-27 PROBLEM — K70.9 ALCOHOLIC LIVER DISEASE (HCC): Status: ACTIVE | Noted: 2022-12-27

## 2022-12-27 PROBLEM — R09.02 HYPOXEMIA: Status: ACTIVE | Noted: 2022-12-27

## 2022-12-27 LAB
ALBUMIN SERPL-MCNC: 3.3 G/DL (ref 3.5–5.2)
ALBUMIN/GLOB SERPL: 1.3 G/DL
ALP SERPL-CCNC: 92 U/L (ref 39–117)
ALT SERPL W P-5'-P-CCNC: 21 U/L (ref 1–41)
ANION GAP SERPL CALCULATED.3IONS-SCNC: 8.9 MMOL/L (ref 5–15)
AST SERPL-CCNC: 41 U/L (ref 1–40)
BASOPHILS # BLD AUTO: 0.03 10*3/MM3 (ref 0–0.2)
BASOPHILS NFR BLD AUTO: 0.5 % (ref 0–1.5)
BILIRUB SERPL-MCNC: 1.4 MG/DL (ref 0–1.2)
BUN SERPL-MCNC: 6 MG/DL (ref 8–23)
BUN/CREAT SERPL: 7.4 (ref 7–25)
CALCIUM SPEC-SCNC: 7.9 MG/DL (ref 8.6–10.5)
CHLORIDE SERPL-SCNC: 100 MMOL/L (ref 98–107)
CO2 SERPL-SCNC: 29.1 MMOL/L (ref 22–29)
CREAT SERPL-MCNC: 0.81 MG/DL (ref 0.76–1.27)
DEPRECATED RDW RBC AUTO: 48.1 FL (ref 37–54)
EGFRCR SERPLBLD CKD-EPI 2021: 98.5 ML/MIN/1.73
EOSINOPHIL # BLD AUTO: 0.06 10*3/MM3 (ref 0–0.4)
EOSINOPHIL NFR BLD AUTO: 0.9 % (ref 0.3–6.2)
ERYTHROCYTE [DISTWIDTH] IN BLOOD BY AUTOMATED COUNT: 13.4 % (ref 12.3–15.4)
GLOBULIN UR ELPH-MCNC: 2.6 GM/DL
GLUCOSE SERPL-MCNC: 83 MG/DL (ref 65–99)
HCT VFR BLD AUTO: 40.7 % (ref 37.5–51)
HGB BLD-MCNC: 14 G/DL (ref 13–17.7)
LYMPHOCYTES # BLD AUTO: 0.97 10*3/MM3 (ref 0.7–3.1)
LYMPHOCYTES NFR BLD AUTO: 15.3 % (ref 19.6–45.3)
MAGNESIUM SERPL-MCNC: 1.4 MG/DL (ref 1.6–2.4)
MCH RBC QN AUTO: 33.3 PG (ref 26.6–33)
MCHC RBC AUTO-ENTMCNC: 34.4 G/DL (ref 31.5–35.7)
MCV RBC AUTO: 96.7 FL (ref 79–97)
MONOCYTES # BLD AUTO: 0.57 10*3/MM3 (ref 0.1–0.9)
MONOCYTES NFR BLD AUTO: 9 % (ref 5–12)
NEUTROPHILS NFR BLD AUTO: 4.69 10*3/MM3 (ref 1.7–7)
NEUTROPHILS NFR BLD AUTO: 74 % (ref 42.7–76)
PHOSPHATE SERPL-MCNC: 1.8 MG/DL (ref 2.5–4.5)
PLATELET # BLD AUTO: 129 10*3/MM3 (ref 140–450)
PMV BLD AUTO: 9.4 FL (ref 6–12)
POTASSIUM SERPL-SCNC: 3.4 MMOL/L (ref 3.5–5.2)
PROT SERPL-MCNC: 5.9 G/DL (ref 6–8.5)
RBC # BLD AUTO: 4.21 10*6/MM3 (ref 4.14–5.8)
SODIUM SERPL-SCNC: 138 MMOL/L (ref 136–145)
WBC NRBC COR # BLD: 6.34 10*3/MM3 (ref 3.4–10.8)

## 2022-12-27 PROCEDURE — 25010000002 MAGNESIUM SULFATE 2 GM/50ML SOLUTION: Performed by: INTERNAL MEDICINE

## 2022-12-27 PROCEDURE — 71046 X-RAY EXAM CHEST 2 VIEWS: CPT

## 2022-12-27 PROCEDURE — 76705 ECHO EXAM OF ABDOMEN: CPT

## 2022-12-27 PROCEDURE — 97161 PT EVAL LOW COMPLEX 20 MIN: CPT

## 2022-12-27 PROCEDURE — 84100 ASSAY OF PHOSPHORUS: CPT | Performed by: INTERNAL MEDICINE

## 2022-12-27 PROCEDURE — 80053 COMPREHEN METABOLIC PANEL: CPT | Performed by: INTERNAL MEDICINE

## 2022-12-27 PROCEDURE — 25010000002 ENOXAPARIN PER 10 MG: Performed by: INTERNAL MEDICINE

## 2022-12-27 PROCEDURE — 97530 THERAPEUTIC ACTIVITIES: CPT

## 2022-12-27 PROCEDURE — 90791 PSYCH DIAGNOSTIC EVALUATION: CPT | Performed by: SOCIAL WORKER

## 2022-12-27 PROCEDURE — 25010000002 LORAZEPAM PER 2 MG: Performed by: INTERNAL MEDICINE

## 2022-12-27 PROCEDURE — 83735 ASSAY OF MAGNESIUM: CPT | Performed by: INTERNAL MEDICINE

## 2022-12-27 PROCEDURE — 85025 COMPLETE CBC W/AUTO DIFF WBC: CPT | Performed by: INTERNAL MEDICINE

## 2022-12-27 RX ORDER — MAGNESIUM SULFATE HEPTAHYDRATE 40 MG/ML
2 INJECTION, SOLUTION INTRAVENOUS AS NEEDED
Status: DISCONTINUED | OUTPATIENT
Start: 2022-12-27 | End: 2023-01-03 | Stop reason: HOSPADM

## 2022-12-27 RX ORDER — ENOXAPARIN SODIUM 100 MG/ML
40 INJECTION SUBCUTANEOUS EVERY 24 HOURS
Status: DISCONTINUED | OUTPATIENT
Start: 2022-12-27 | End: 2023-01-03 | Stop reason: HOSPADM

## 2022-12-27 RX ORDER — MAGNESIUM SULFATE HEPTAHYDRATE 40 MG/ML
4 INJECTION, SOLUTION INTRAVENOUS AS NEEDED
Status: DISCONTINUED | OUTPATIENT
Start: 2022-12-27 | End: 2023-01-03 | Stop reason: HOSPADM

## 2022-12-27 RX ORDER — FAMOTIDINE 10 MG/ML
20 INJECTION, SOLUTION INTRAVENOUS EVERY 12 HOURS SCHEDULED
Status: DISCONTINUED | OUTPATIENT
Start: 2022-12-27 | End: 2022-12-29

## 2022-12-27 RX ORDER — POTASSIUM CHLORIDE 750 MG/1
40 TABLET, FILM COATED, EXTENDED RELEASE ORAL AS NEEDED
Status: DISCONTINUED | OUTPATIENT
Start: 2022-12-27 | End: 2022-12-28

## 2022-12-27 RX ORDER — POTASSIUM CHLORIDE 1.5 G/1.77G
40 POWDER, FOR SOLUTION ORAL AS NEEDED
Status: DISCONTINUED | OUTPATIENT
Start: 2022-12-27 | End: 2022-12-28

## 2022-12-27 RX ORDER — DIPHENOXYLATE HYDROCHLORIDE AND ATROPINE SULFATE 2.5; .025 MG/1; MG/1
1 TABLET ORAL DAILY
Status: DISCONTINUED | OUTPATIENT
Start: 2022-12-27 | End: 2022-12-28

## 2022-12-27 RX ORDER — ASPIRIN 325 MG
325 TABLET, DELAYED RELEASE (ENTERIC COATED) ORAL DAILY
Status: DISCONTINUED | OUTPATIENT
Start: 2022-12-27 | End: 2022-12-28

## 2022-12-27 RX ADMIN — Medication 1000 MCG: at 09:47

## 2022-12-27 RX ADMIN — LORAZEPAM 1 MG: 1 TABLET ORAL at 18:58

## 2022-12-27 RX ADMIN — MAGNESIUM SULFATE HEPTAHYDRATE 2 G: 2 INJECTION, SOLUTION INTRAVENOUS at 17:33

## 2022-12-27 RX ADMIN — LORAZEPAM 1 MG: 2 INJECTION INTRAMUSCULAR; INTRAVENOUS at 22:40

## 2022-12-27 RX ADMIN — RISPERIDONE 2 MG: 2 TABLET, FILM COATED ORAL at 09:47

## 2022-12-27 RX ADMIN — LORAZEPAM 1 MG: 2 INJECTION INTRAMUSCULAR; INTRAVENOUS at 20:38

## 2022-12-27 RX ADMIN — ENOXAPARIN SODIUM 40 MG: 100 INJECTION SUBCUTANEOUS at 17:33

## 2022-12-27 RX ADMIN — Medication 100 MG: at 09:47

## 2022-12-27 RX ADMIN — NALTREXONE HYDROCHLORIDE 50 MG: 50 TABLET, FILM COATED ORAL at 09:47

## 2022-12-27 RX ADMIN — FLUVOXAMINE MALEATE 100 MG: 50 TABLET, COATED ORAL at 09:47

## 2022-12-27 RX ADMIN — ASPIRIN 325 MG: 325 TABLET, COATED ORAL at 18:58

## 2022-12-27 RX ADMIN — FAMOTIDINE 20 MG: 10 INJECTION INTRAVENOUS at 17:33

## 2022-12-27 RX ADMIN — LORAZEPAM 1 MG: 2 INJECTION INTRAMUSCULAR; INTRAVENOUS at 05:03

## 2022-12-27 RX ADMIN — PROPRANOLOL HYDROCHLORIDE 60 MG: 20 TABLET ORAL at 09:47

## 2022-12-27 RX ADMIN — Medication 10 ML: at 20:37

## 2022-12-27 RX ADMIN — LORAZEPAM 2 MG: 2 INJECTION INTRAMUSCULAR; INTRAVENOUS at 23:56

## 2022-12-27 RX ADMIN — POTASSIUM CHLORIDE 40 MEQ: 750 TABLET, EXTENDED RELEASE ORAL at 17:32

## 2022-12-27 RX ADMIN — SODIUM CHLORIDE 125 ML/HR: 9 INJECTION, SOLUTION INTRAVENOUS at 09:49

## 2022-12-27 RX ADMIN — FOLIC ACID 1 MG: 1 TABLET ORAL at 09:47

## 2022-12-27 RX ADMIN — Medication 10 ML: at 09:48

## 2022-12-27 RX ADMIN — Medication 1 TABLET: at 17:33

## 2022-12-27 NOTE — PROGRESS NOTES
Name: Nilesh Zapata ADMIT: 2022   : 1960  PCP: Charles Farooq MD    MRN: [id number] LOS: 1 days   AGE/SEX: 62 y.o. male  ROOM: [facility]     Subjective   Subjective   Patient reports feeling better.  Less anxious.  No hallucinations or delusions.  No headache.  No seizures.  No focal neurological symptoms.  No abdominal pain.  No nausea or vomiting.  Tolerating clear liquids well.  Cannot remember about his bowel movement.    Review of Systems  .  No dysuria or hematuria.  Cardiovascular/respiratory.  Positive occasional dry cough.  No chest pain/no shortness of breath/no wheeze/no hemoptysis/no palpitation.  Constitutional.  No fever or chills       Objective   Objective   Vital Signs  Temp:  [98.1 °F (36.7 °C)-98.6 °F (37 °C)] 98.1 °F (36.7 °C)  Heart Rate:  [] 63  Resp:  [18] 18  BP: (148-194)/() 151/96  SpO2:  [90 %-94 %] 93 %  on  Flow (L/min):  [2-4] 3;   Device (Oxygen Therapy): nasal cannula  No intake or output data in the 24 hours ending 22 1043  Body mass index is 33.89 kg/m².      22  1326   Weight: 95.3 kg (210 lb)     Physical Exam  In general.  Middle-aged gentleman.  He is alert.  Oriented x3.  In no apparent pain/distress/diaphoresis.  Normal mood.  Flat affect.  Obese.  Eyes.  Pupils equal round and reactive.  Intact extraocular musculature.  No pallor or jaundice.  Oral cavity.  Dry mucous membrane.  Neck.  Supple.  No JVD.  No lymphadenopathy or thyromegaly.  Cardiovascular.  Regular rate and rhythm with no gallops or murmurs.  Chest.  Poor to auscultation bilaterally with no added sounds.  Abdomen.  Soft lax.  No tenderness.  No organomegaly.  No guarding or rebound.  Extremities.  No clubbing cyanosis or edema.  CNS.  No acute focal neurological deficits.    Results Review:      Results from last 7 days   Lab Units 22  0458 22  1452   SODIUM mmol/L 138 140   POTASSIUM mmol/L 3.4* 3.4*   CHLORIDE mmol/L 100 96*   CO2 mmol/L 29.1* 26.0    BUN mg/dL 6* 6*   CREATININE mg/dL 0.81 0.83   GLUCOSE mg/dL 83 124*   CALCIUM mg/dL 7.9* 9.1   AST (SGOT) U/L 41* 68*   ALT (SGPT) U/L 21 35     Estimated Creatinine Clearance: 99.6 mL/min (by C-G formula based on SCr of 0.81 mg/dL).                      Results from last 7 days   Lab Units 12/27/22  0458 12/26/22  1452   MAGNESIUM mg/dL 1.4* 1.1*           Invalid input(s): LDLCALC  Results from last 7 days   Lab Units 12/27/22  0458 12/26/22  1452   WBC 10*3/mm3 6.34 11.21*   HEMOGLOBIN g/dL 14.0 16.9   HEMATOCRIT % 40.7 49.6   PLATELETS 10*3/mm3 129* 217   MCV fL 96.7 94.5   MCH pg 33.3* 32.2   MCHC g/dL 34.4 34.1   RDW % 13.4 13.7   RDW-SD fl 48.1 47.4   MPV fL 9.4 9.5   NEUTROPHIL % % 74.0 85.8*   LYMPHOCYTE % % 15.3* 7.3*   MONOCYTES % % 9.0 5.8   EOSINOPHIL % % 0.9 0.1*   BASOPHIL % % 0.5 0.7   IMM GRAN % %  --  0.3   NEUTROS ABS 10*3/mm3 4.69 9.62*   LYMPHS ABS 10*3/mm3 0.97 0.82   MONOS ABS 10*3/mm3 0.57 0.65   EOS ABS 10*3/mm3 0.06 0.01   BASOS ABS 10*3/mm3 0.03 0.08   IMMATURE GRANS (ABS) 10*3/mm3  --  0.03   NRBC /100 WBC  --  0.0     Results from last 7 days   Lab Units 12/26/22  1453   INR  1.08                 Results from last 7 days   Lab Units 12/26/22  1452   LIPASE U/L 22                       Imaging:  Imaging Results (Last 24 Hours)       ** No results found for the last 24 hours. **               I reviewed the patient's new clinical results / labs / tests / procedures      Assessment/Plan     Active Hospital Problems    Diagnosis  POA    **Alcohol withdrawal syndrome without complication (HCC) [F10.930]  Yes    Hypomagnesemia [E83.42]  Unknown    Essential hypertension [I10]  Yes    Hypokalemia [E87.6]  Unknown    Alcoholism (HCC) [F10.20]  Yes    Anxiety [F41.9]  Yes    Tobacco abuse [Z72.0]  Yes    Kidney cancer, primary, with metastasis from kidney to other site (HCC) [C64.9]  Yes      Resolved Hospital Problems   No resolved problems to display.           Alcohol abuse/alcohol  withdrawal/alcoholic liver disease.  No active withdrawal at this time on CIWA protocol and detoxification with folate and thiamine.  Would add multivitamin.  Would ask access center to evaluate (patient's request help to DC alcohol and he would rather outpatient follow-up).  We will monitor elevated liver function test.  Will check right upper quadrant ultrasound.  History of anxiety/depression/mood disorder.  We will continue Lexapro/Risperdal/Remeron.  Hypertension.  Improved since admission with resumption of the antibiotic.  Will monitor.  No evidence of angina or congestive heart failure.  Dehydration/hypomagnesemia/hypokalemia/hypophosphatemia.  Plan IV fluids/electrolyte replacement and monitor.  Metastatic kidney cancer.  History of.  Tobacco abuse.  Counseled.  Hypoxemia.  Will check chest x-ray.  VTE prophylaxis.  Lovenox.    Discussed my findings and plan of treatment with the patient/nurses at multidisciplinary round.  Disposition.  Hopefully home in 1 to 2 days with outpatient follow-up      Nadeen Cai MD  [facility]  12/27/22  10:43 EST

## 2022-12-27 NOTE — NURSING NOTE
Noticed pt had a left facial droop upon entering room as well as slightly slurred speech. Pt A&O x 4, with no other presenting symptoms.   Notified MD.  MRI ordered.

## 2022-12-27 NOTE — CONSULTS
"Access center evaluated 62-year-old male for alcohol abuse.  Patient states he sees psychiatrist Dr Linda Blue.  Patient takes Risperdal, Remeron and Luvox.  Patient rates his current anxiety as an 8/10 and his current depressed mood as a 9/10.  Patient is feeling very tired due to having just had Ativan.  Patient's current CIWA is an 8.  Patient states he was drinking a pint of bourbon a night.  Patient denies any SI and denies any history of attempts.  Patient denies any past psychiatric care.  Patient was last evaluated by Access on 5/30 of this year.  Patient states his only inpatient substance abuse rehab was at the [facility] 10 years ago.  Patient states he left [facility] in June of this year after being in the hospital for a few days in detox.  Patient states he went to a couple of AA meetings but never went to the [facility] as he had planned.  Patient states his sleep is up and down but his appetite was \"okay\".  Patient states he has had seizures and hallucinations with past withdrawal.  Patient states he lives alone in an apartment and has support from his estranged wife and 2 adult daughters.  Patient denies any current legal issues though he states he had DUI in the past.  Patient states he is retired.  Access will follow.  Access will have Samara Dia LCSW come to talk to patient about substance abuse rehab resources.  "

## 2022-12-27 NOTE — PLAN OF CARE
Goal Outcome Evaluation:  Plan of Care Reviewed With: patient           Outcome Evaluation: Pt is a 61 yo M admitted from home for alcohol withdrawal. Pt lives alone and reports independence at BL with no AD. Pt has 1 MAGGIE with no steps inside. Pt reports 1 recent fall 2/2 LOB. Pt presents to PT with impaired strength, endurance, and balance limiting overall mobility. Pt transferred to EOB with CGA, STS with min A x1, and was able to take a few steps to the chair with min A x1 and rwx. Pt c/o mild dizziness with standing, very shuffled unsteady steps to the chair, benefitting from rwx use for safety. Pt required some assist guiding hips to chair 2/2 BLE weakness and rapid fatigue. Pt assisted with repositioning and left with all needs met. PT will continue to follow to progress mobility as tolerated. Anticipate DC home with HHPT pending progress.

## 2022-12-27 NOTE — CASE MANAGEMENT/SOCIAL WORK
Discharge Planning Assessment  [facility]     Patient Name: Nilesh Zapata  MRN: [id number]  Today's Date: 12/27/2022    Admit Date: 12/26/2022    Plan: Home via personal vehicle   Discharge Needs Assessment    No documentation.                Discharge Plan     Row Name 12/27/22 1202       Plan    Plan Home via personal vehicle    Plan Comments Spoke with patient’s nurse/ Elizabeth; stated that patient is not appropriate for screen. Called Wife Jenny Zapata/[phone] via outbound call. Introduced self and explained CCP role. Verified local pharmacy [facility], PCP Charles Farooq MD at Replaced by [facility], and current address. Patient and wife are , patient lives in apartment alone with 1 step to enter, spouse reports that patient is IADL and still drives self. Patient has used inpatient services from [facility] in the past, has outpatient treatment through [facility] for ETOH treatment, and has used AA in the past. Has walker from prior admission with covid. Patient received DME through [facility] previously, agreeable to use again. Family will transport at discharge. CCP to follow for hospital course. SPRING Vivar RN, CCP              Continued Care and Services - Admitted Since 12/26/2022    Coordination has not been started for this encounter.          Demographic Summary     Row Name 12/27/22 1210       General Information    Arrived From home    Referral Source admission list    Reason for Consult discharge planning    Preferred Language English       Contact Information    Permission Granted to Share Info With                Functional Status     Row Name 12/27/22 1211       Functional Status    Usual Activity Tolerance good    Current Activity Tolerance moderate       Functional Status, IADL    Medications independent    Meal Preparation independent    Housekeeping independent    Laundry independent    Shopping independent               Psychosocial     Row Name  12/27/22 1211       Developmental Stage (Eriksson's)    Developmental Stage Stage 7 (35-65 years/Middle Adulthood) Generativity vs. Stagnation               Abuse/Neglect    No documentation.                Legal    No documentation.                Substance Abuse    No documentation.                Patient Forms    No documentation.                   Maddie Vivar RN

## 2022-12-27 NOTE — THERAPY EVALUATION
Patient Name: Nilesh Zapata  : 1960    MRN: [id number]                              Today's Date: 2022       Admit Date: 2022    Visit Dx:     ICD-10-CM ICD-9-CM   1. Alcohol withdrawal syndrome without complication (HCC)  F10.930 291.81   2. Hypomagnesemia  E83.42 275.2   3. Hypokalemia  E87.6 276.8   4. Alcoholism (HCC)  F10.20 303.90     Patient Active Problem List   Diagnosis   • Alcohol withdrawal syndrome with complication (HCC)   • Anxiety   • Fatty liver   • Tobacco abuse   • Kidney cancer, primary, with metastasis from kidney to other site (HCC)   • Alcoholism (HCC)   • Hyponatremia   • Hypokalemia   • Alcohol abuse   • History of renal cell cancer   • History of left nephrectomy  RCC   • Liver lesion   • Lung nodule   • Vitamin D deficiency   • Under emotional stress   •  from spouse   • Coping style affecting medical condition   • Thrombocytopenia (HCC)   • Essential hypertension   • Alcohol withdrawal syndrome without complication (HCC)   • Hypomagnesemia   • Hypophosphatemia   • Acute adjustment disorder with mixed anxiety and depressed mood   • Alcoholic liver disease (HCC)   • Hypoxemia     Past Medical History:   Diagnosis Date   • Alcohol abuse    • Anxiety    • Delirium tremens (HCC) 2019   • Fatty liver    • FH: kidney cancer    • Hypertension      Past Surgical History:   Procedure Laterality Date   • APPENDECTOMY     • NEPHRECTOMY      left    • TONSILLECTOMY        General Information     Row Name 22 1538          Physical Therapy Time and Intention    Document Type evaluation  -     Mode of Treatment individual therapy;physical therapy  -     Row Name 22 1538          General Information    Patient Profile Reviewed yes  -     Prior Level of Function independent:;gait;transfer;bed mobility  -     Existing Precautions/Restrictions fall  Alcohol withdrawal  -     Barriers to Rehab medically complex  -     Row Name 22 1538           Living Environment    People in Home alone  -     Row Name 12/27/22 1538          Home Main Entrance    Number of Stairs, Main Entrance one  -     Row Name 12/27/22 1538          Stairs Within Home, Primary    Number of Stairs, Within Home, Primary none  -     Row Name 12/27/22 1538          Cognition    Orientation Status (Cognition) oriented x 4  -     Row Name 12/27/22 1538          Safety Issues, Functional Mobility    Impairments Affecting Function (Mobility) balance;endurance/activity tolerance;strength  -           User Key  (r) = Recorded By, (t) = Taken By, (c) = Cosigned By    Initials Name Provider Type     Traci Lee PT Physical Therapist               Mobility     Row Name 12/27/22 1539          Bed Mobility    Bed Mobility supine-sit  -     Supine-Sit [place] (Bed Mobility) verbal cues;contact guard  -     Assistive Device (Bed Mobility) bed rails;head of bed elevated  -     Row Name 12/27/22 1539          Sit-Stand Transfer    Sit-Stand Vernon (Transfers) minimum assist (75% patient effort);1 person assist;verbal cues  -     Assistive Device (Sit-Stand Transfers) walker, front-wheeled  -     Row Name 12/27/22 1539          Gait/Stairs (Locomotion)    Vernon Level (Gait) minimum assist (75% patient effort);1 person assist;verbal cues  -     Assistive Device (Gait) walker, front-wheeled  -     Distance in Feet (Gait) 3ft to chair  -     Deviations/Abnormal Patterns (Gait) ousmane decreased;gait speed decreased;festinating/shuffling;stride length decreased;weight shifting decreased  -     Bilateral Gait Deviations forward flexed posture;heel strike decreased  -     Comment, (Gait/Stairs) Shuffled steps to chair, unsteady, benefitted from rwx use  -           User Key  (r) = Recorded By, (t) = Taken By, (c) = Cosigned By    Initials Name Provider Type     Traci Lee PT Physical Therapist               Obj/Interventions     Row Name  12/27/22 1541          Range of Motion Comprehensive    General Range of Motion bilateral lower extremity ROM WFL  -     Row Name 12/27/22 1541          Strength Comprehensive (MMT)    General Manual Muscle Testing (MMT) Assessment lower extremity strength deficits identified  -     Comment, General Manual Muscle Testing (MMT) Assessment Generalized weakness  -     Row Name 12/27/22 1541          Balance    Balance Assessment sitting static balance;sitting dynamic balance;standing static balance;standing dynamic balance  -     Static Sitting Balance contact guard;verbal cues  -     Dynamic Sitting Balance contact guard;verbal cues  -     Position, Sitting Balance unsupported;sitting edge of bed  -     Static Standing Balance minimal assist;verbal cues  -     Dynamic Standing Balance minimal assist;verbal cues  -     Position/Device Used, Standing Balance walker, front-wheeled;supported  -     Balance Interventions sitting;standing;sit to stand;supported;static;dynamic  -[facility] Name 12/27/22 1541          Sensory Assessment (Somatosensory)    Sensory Assessment (Somatosensory) LE sensation intact  -           User Key  (r) = Recorded By, (t) = Taken By, (c) = Cosigned By    Initials Name Provider Type     Traci Lee, PT Physical Therapist               Goals/Plan     [facility] Name 12/27/22 1548          Bed Mobility Goal 1 (PT)    Activity/Assistive Device (Bed Mobility Goal 1, PT) bed mobility activities, all  -     [place] Level/Cues Needed (Bed Mobility Goal 1, PT) standby assist  -     Time Frame (Bed Mobility Goal 1, PT) 1 week  -[facility] Name 12/27/22 1548          Transfer Goal 1 (PT)    Activity/Assistive Device (Transfer Goal 1, PT) transfers, all  -     [place] Level/Cues Needed (Transfer Goal 1, PT) standby assist  -     Time Frame (Transfer Goal 1, PT) 1 week  -[facility] Name 12/27/22 1548          Gait Training Goal 1 (PT)    Activity/Assistive Device  (Gait Training Goal 1, PT) gait (walking locomotion)  -     [place] Level (Gait Training Goal 1, PT) standby assist  -     Distance (Gait Training Goal 1, PT) 100ft  -     Time Frame (Gait Training Goal 1, PT) 1 week  -     Row Name 12/27/22 1548          Therapy Assessment/Plan (PT)    Planned Therapy Interventions (PT) balance training;bed mobility training;gait training;home exercise program;patient/family education;strengthening;transfer training  -           User Key  (r) = Recorded By, (t) = Taken By, (c) = Cosigned By    Initials Name Provider Type     Traci Lee, PT Physical Therapist               Clinical Impression     Row Name 12/27/22 1542          Pain    Pretreatment Pain Rating 0/10 - no pain  -     Posttreatment Pain Rating 0/10 - no pain  -     Row Name 12/27/22 1542          Plan of Care Review    Plan of Care Reviewed With patient  -     Outcome Evaluation Pt is a 63 yo M admitted from home for alcohol withdrawal. Pt lives alone and reports independence at BL with no AD. Pt has 1 MAGGIE with no steps inside. Pt reports 1 recent fall 2/2 LOB. Pt presents to PT with impaired strength, endurance, and balance limiting overall mobility. Pt transferred to EOB with CGA, STS with min A x1, and was able to take a few steps to the chair with min A x1 and rwx. Pt c/o mild dizziness with standing, very shuffled unsteady steps to the chair, benefitting from rwx use for safety. Pt required some assist guiding hips to chair 2/2 BLE weakness and rapid fatigue. Pt assisted with repositioning and left with all needs met. PT will continue to follow to progress mobility as tolerated. Anticipate DC home with HHPT pending progress.  -     Row Name 12/27/22 1542          Therapy Assessment/Plan (PT)    Patient/Family Therapy Goals Statement (PT) Return to PLOF  -     Rehab Potential (PT) good, to achieve stated therapy goals  -     Criteria for Skilled Interventions Met (PT) yes  -      Therapy Frequency (PT) 5 times/wk  -     Row Name 12/27/22 1542          Vital Signs    O2 Delivery Pre Treatment supplemental O2  -     O2 Delivery Intra Treatment room air  -     O2 Delivery Post Treatment supplemental O2  -     Row Name 12/27/22 1542          Positioning and Restraints    Pre-Treatment Position in bed  -     Post Treatment Position chair  -     In Chair reclined;notified nsg;call light within reach;encouraged to call for assist;exit alarm on  -           User Key  (r) = Recorded By, (t) = Taken By, (c) = Cosigned By    Initials Name Provider Type     Traci Lee PT Physical Therapist               Outcome Measures     Row Name 12/27/22 1548          How much help from another person do you currently need...    Turning from your back to your side while in flat bed without using bedrails? 3  -BH     Moving from lying on back to sitting on the side of a flat bed without bedrails? 3  -BH     Moving to and from a bed to a chair (including a wheelchair)? 2  -     Standing up from a chair using your arms (e.g., wheelchair, bedside chair)? 3  -     Climbing 3-5 steps with a railing? 1  -     To walk in hospital room? 2  -     AM-PAC 6 Clicks Score (PT) 14  -     Highest level of mobility 4 --> Transferred to chair/commode  -     Row Name 12/27/22 1548          Functional Assessment    Outcome Measure Options AM-PAC 6 Clicks Basic Mobility (PT)  -           User Key  (r) = Recorded By, (t) = Taken By, (c) = Cosigned By    Initials Name Provider Type     Traci Lee PT Physical Therapist                             Physical Therapy Education     Title: PT OT SLP Therapies (Done)     Topic: Physical Therapy (Done)     Point: Mobility training (Done)     Learning Progress Summary           Patient Acceptance, E,TB,D, VU,NR by  at 12/27/2022 6718                   Point: Home exercise program (Done)     Learning Progress Summary           Patient Acceptance,  E,TB,D, VU,NR by  at 12/27/2022 1548                   Point: Body mechanics (Done)     Learning Progress Summary           Patient Acceptance, E,TB,D, VU,NR by  at 12/27/2022 1548                   Point: Precautions (Done)     Learning Progress Summary           Patient Acceptance, E,TB,D, VU,NR by  at 12/27/2022 1548                               User Key     Initials Effective Dates Name Provider Type Discipline     04/08/22 -  Traci Lee, PT Physical Therapist PT              PT Recommendation and Plan  Planned Therapy Interventions (PT): balance training, bed mobility training, gait training, home exercise program, patient/family education, strengthening, transfer training  Plan of Care Reviewed With: patient  Outcome Evaluation: Pt is a 61 yo M admitted from home for alcohol withdrawal. Pt lives alone and reports independence at BL with no AD. Pt has 1 MAGGIE with no steps inside. Pt reports 1 recent fall 2/2 LOB. Pt presents to PT with impaired strength, endurance, and balance limiting overall mobility. Pt transferred to EOB with CGA, STS with min A x1, and was able to take a few steps to the chair with min A x1 and rwx. Pt c/o mild dizziness with standing, very shuffled unsteady steps to the chair, benefitting from rwx use for safety. Pt required some assist guiding hips to chair 2/2 BLE weakness and rapid fatigue. Pt assisted with repositioning and left with all needs met. PT will continue to follow to progress mobility as tolerated. Anticipate DC home with HHPT pending progress.     Time Calculation:    PT Charges     Row Name 12/27/22 1549             Time Calculation    Start Time 1459  -      Stop Time 1515  -      Time Calculation (min) 16 min  -      PT Received On 12/27/22  -      PT - Next Appointment 12/28/22  [facility]      PT Goal Re-Cert Due Date 01/03/23  -         Time Calculation- PT    Total Timed Code Minutes- PT 12 minute(s)  -         Timed Charges    50228 - PT  Therapeutic Activity Minutes 12  -BH         Untimed Charges    PT Eval/Re-eval Minutes 5  -BH         Total Minutes    Timed Charges Total Minutes 12  -BH      Untimed Charges Total Minutes 5  -BH       Total Minutes 17  -BH            User Key  (r) = Recorded By, (t) = Taken By, (c) = Cosigned By    Initials Name Provider Type     Traci Lee, PT Physical Therapist              Therapy Charges for Today     Code Description Service Date Service Provider Modifiers Qty    47825114244 HC PT THERAPEUTIC ACT EA 15 MIN 12/27/2022 Traci Lee, PT GP 1    93096842522 HC PT EVAL LOW COMPLEXITY 3 12/27/2022 Traci Lee, PT GP 1          PT G-Codes  Outcome Measure Options: AM-PAC 6 Clicks Basic Mobility (PT)  AM-PAC 6 Clicks Score (PT): 14  PT Discharge Summary  Anticipated Discharge Disposition (PT): home with assist, home with home health    Traci Lee, PT  12/27/2022

## 2022-12-28 ENCOUNTER — APPOINTMENT (OUTPATIENT)
Dept: CT IMAGING | Facility: HOSPITAL | Age: 62
DRG: 897 | End: 2022-12-28
Payer: MEDICARE

## 2022-12-28 ENCOUNTER — APPOINTMENT (OUTPATIENT)
Dept: OTHER | Facility: HOSPITAL | Age: 62
DRG: 897 | End: 2022-12-28
Payer: MEDICARE

## 2022-12-28 LAB
ALBUMIN SERPL-MCNC: 3.5 G/DL (ref 3.5–5.2)
ALBUMIN/GLOB SERPL: 1.3 G/DL
ALP SERPL-CCNC: 94 U/L (ref 39–117)
ALT SERPL W P-5'-P-CCNC: 18 U/L (ref 1–41)
ANION GAP SERPL CALCULATED.3IONS-SCNC: 13.1 MMOL/L (ref 5–15)
AST SERPL-CCNC: 43 U/L (ref 1–40)
BASOPHILS # BLD AUTO: 0.04 10*3/MM3 (ref 0–0.2)
BASOPHILS NFR BLD AUTO: 0.7 % (ref 0–1.5)
BILIRUB SERPL-MCNC: 1.3 MG/DL (ref 0–1.2)
BUN SERPL-MCNC: 6 MG/DL (ref 8–23)
BUN/CREAT SERPL: 8.3 (ref 7–25)
CALCIUM SPEC-SCNC: 8.3 MG/DL (ref 8.6–10.5)
CHLORIDE SERPL-SCNC: 98 MMOL/L (ref 98–107)
CO2 SERPL-SCNC: 23.9 MMOL/L (ref 22–29)
CREAT SERPL-MCNC: 0.72 MG/DL (ref 0.76–1.27)
DEPRECATED RDW RBC AUTO: 46.1 FL (ref 37–54)
EGFRCR SERPLBLD CKD-EPI 2021: 102 ML/MIN/1.73
EOSINOPHIL # BLD AUTO: 0.08 10*3/MM3 (ref 0–0.4)
EOSINOPHIL NFR BLD AUTO: 1.4 % (ref 0.3–6.2)
ERYTHROCYTE [DISTWIDTH] IN BLOOD BY AUTOMATED COUNT: 13.2 % (ref 12.3–15.4)
GLOBULIN UR ELPH-MCNC: 2.7 GM/DL
GLUCOSE SERPL-MCNC: 75 MG/DL (ref 65–99)
HCT VFR BLD AUTO: 42.5 % (ref 37.5–51)
HGB BLD-MCNC: 14.6 G/DL (ref 13–17.7)
LYMPHOCYTES # BLD AUTO: 0.95 10*3/MM3 (ref 0.7–3.1)
LYMPHOCYTES NFR BLD AUTO: 16.6 % (ref 19.6–45.3)
MAGNESIUM SERPL-MCNC: 1.6 MG/DL (ref 1.6–2.4)
MCH RBC QN AUTO: 32.9 PG (ref 26.6–33)
MCHC RBC AUTO-ENTMCNC: 34.4 G/DL (ref 31.5–35.7)
MCV RBC AUTO: 95.7 FL (ref 79–97)
MONOCYTES # BLD AUTO: 0.54 10*3/MM3 (ref 0.1–0.9)
MONOCYTES NFR BLD AUTO: 9.4 % (ref 5–12)
NEUTROPHILS NFR BLD AUTO: 4.11 10*3/MM3 (ref 1.7–7)
NEUTROPHILS NFR BLD AUTO: 71.6 % (ref 42.7–76)
PHOSPHATE SERPL-MCNC: 1.2 MG/DL (ref 2.5–4.5)
PHOSPHATE SERPL-MCNC: 2.7 MG/DL (ref 2.5–4.5)
PLATELET # BLD AUTO: 112 10*3/MM3 (ref 140–450)
PMV BLD AUTO: 9.6 FL (ref 6–12)
POTASSIUM SERPL-SCNC: 4 MMOL/L (ref 3.5–5.2)
PROT SERPL-MCNC: 6.2 G/DL (ref 6–8.5)
RBC # BLD AUTO: 4.44 10*6/MM3 (ref 4.14–5.8)
SODIUM SERPL-SCNC: 135 MMOL/L (ref 136–145)
WBC NRBC COR # BLD: 5.74 10*3/MM3 (ref 3.4–10.8)

## 2022-12-28 PROCEDURE — 25010000002 ENOXAPARIN PER 10 MG: Performed by: INTERNAL MEDICINE

## 2022-12-28 PROCEDURE — 25010000002 HYDRALAZINE PER 20 MG: Performed by: INTERNAL MEDICINE

## 2022-12-28 PROCEDURE — 25010000002 LORAZEPAM PER 2 MG: Performed by: INTERNAL MEDICINE

## 2022-12-28 PROCEDURE — 84100 ASSAY OF PHOSPHORUS: CPT | Performed by: INTERNAL MEDICINE

## 2022-12-28 PROCEDURE — 85025 COMPLETE CBC W/AUTO DIFF WBC: CPT | Performed by: INTERNAL MEDICINE

## 2022-12-28 PROCEDURE — 80053 COMPREHEN METABOLIC PANEL: CPT | Performed by: INTERNAL MEDICINE

## 2022-12-28 PROCEDURE — 83735 ASSAY OF MAGNESIUM: CPT | Performed by: INTERNAL MEDICINE

## 2022-12-28 PROCEDURE — 25010000002 THIAMINE PER 100 MG: Performed by: INTERNAL MEDICINE

## 2022-12-28 PROCEDURE — 70450 CT HEAD/BRAIN W/O DYE: CPT

## 2022-12-28 RX ORDER — ASPIRIN 300 MG/1
300 SUPPOSITORY RECTAL DAILY
Status: DISCONTINUED | OUTPATIENT
Start: 2022-12-28 | End: 2022-12-29

## 2022-12-28 RX ORDER — HYDRALAZINE HYDROCHLORIDE 20 MG/ML
10 INJECTION INTRAMUSCULAR; INTRAVENOUS EVERY 6 HOURS PRN
Status: DISCONTINUED | OUTPATIENT
Start: 2022-12-28 | End: 2023-01-02

## 2022-12-28 RX ORDER — OLANZAPINE 10 MG/1
5 INJECTION, POWDER, LYOPHILIZED, FOR SOLUTION INTRAMUSCULAR EVERY 8 HOURS PRN
Status: DISCONTINUED | OUTPATIENT
Start: 2022-12-28 | End: 2023-01-02

## 2022-12-28 RX ADMIN — LORAZEPAM 1 MG: 2 INJECTION INTRAMUSCULAR; INTRAVENOUS at 21:49

## 2022-12-28 RX ADMIN — THIAMINE HYDROCHLORIDE 100 MG: 100 INJECTION, SOLUTION INTRAMUSCULAR; INTRAVENOUS at 13:46

## 2022-12-28 RX ADMIN — RISPERIDONE 2 MG: 2 TABLET, FILM COATED ORAL at 08:10

## 2022-12-28 RX ADMIN — Medication 1 PATCH: at 08:09

## 2022-12-28 RX ADMIN — HYDRALAZINE HYDROCHLORIDE 10 MG: 20 INJECTION INTRAMUSCULAR; INTRAVENOUS at 18:26

## 2022-12-28 RX ADMIN — FAMOTIDINE 20 MG: 10 INJECTION INTRAVENOUS at 20:01

## 2022-12-28 RX ADMIN — ENOXAPARIN SODIUM 40 MG: 100 INJECTION SUBCUTANEOUS at 12:32

## 2022-12-28 RX ADMIN — LORAZEPAM 2 MG: 2 INJECTION INTRAMUSCULAR; INTRAVENOUS at 12:32

## 2022-12-28 RX ADMIN — FOLIC ACID 1 MG: 5 INJECTION, SOLUTION INTRAMUSCULAR; INTRAVENOUS; SUBCUTANEOUS at 12:32

## 2022-12-28 RX ADMIN — LORAZEPAM 2 MG: 2 INJECTION INTRAMUSCULAR; INTRAVENOUS at 08:24

## 2022-12-28 RX ADMIN — FAMOTIDINE 20 MG: 10 INJECTION INTRAVENOUS at 08:10

## 2022-12-28 RX ADMIN — METOPROLOL TARTRATE 2.5 MG: 1 INJECTION, SOLUTION INTRAVENOUS at 12:32

## 2022-12-28 RX ADMIN — ASPIRIN 300 MG: 300 SUPPOSITORY RECTAL at 11:24

## 2022-12-28 RX ADMIN — Medication 10 ML: at 20:02

## 2022-12-28 RX ADMIN — SODIUM CHLORIDE 125 ML/HR: 9 INJECTION, SOLUTION INTRAVENOUS at 17:51

## 2022-12-28 RX ADMIN — Medication 10 ML: at 19:47

## 2022-12-28 RX ADMIN — Medication 1 TABLET: at 08:09

## 2022-12-28 RX ADMIN — POTASSIUM PHOSPHATE, MONOBASIC AND POTASSIUM PHOSPHATE, DIBASIC 45 MMOL: 224; 236 INJECTION, SOLUTION, CONCENTRATE INTRAVENOUS at 07:21

## 2022-12-28 NOTE — PLAN OF CARE
Goal Outcome Evaluation:           Progress: improving   VSS.  Alert to self.  Bilateral soft wrist restraints in place.  Pt confused and trying to get out of bed.  Pt also having slurred speech.  MD aware.  Orders in place.  Spoke with pt's wife later in the day who informed this nurse that the pt has had slurred speech in the past when withdrawing.  Pt refusing PO.  Will CTM.

## 2022-12-28 NOTE — PLAN OF CARE
Problem: Restraint, Nonbehavioral (Nonviolent)  Goal: Absence of Harm or Injury  12/28/2022 0538 by Hatchett, Chenyl, RN  Outcome: Ongoing, Progressing  12/28/2022 0148 by Hatchett, Chenyl, RN  Outcome: Ongoing, Progressing  Intervention: Implement Least Restrictive Safety Strategies  Recent Flowsheet Documentation  Taken 12/28/2022 0400 by Hatchett, Chenyl, RN  Medical Device Protection: IV pole/bag removed from visual field  Less Restrictive Alternative:   1:1 observation maintained   bed alarm in use   calming techniques promoted   emotional support provided   medication offered  De-Escalation Techniques:   1:1 observation initiated   medication administered   increased round frequency  Diversional Activities: television  Taken 12/28/2022 0200 by Hatchett, Chenyl, RN  Medical Device Protection: IV pole/bag removed from visual field  Less Restrictive Alternative:   1:1 observation maintained   bed alarm in use   calming techniques promoted   emotional support provided   medication offered  De-Escalation Techniques:   1:1 observation initiated   increased round frequency   medication administered  Diversional Activities: television  Taken 12/28/2022 0008 by Hatchett, Chenyl, RN  Medical Device Protection: IV pole/bag removed from visual field  Less Restrictive Alternative:   1:1 observation maintained   bed alarm in use   calming techniques promoted   emotional support provided   medication offered  De-Escalation Techniques:   1:1 observation initiated   increased round frequency   medication administered   reoriented   stimulation decreased  Diversional Activities: television  Taken 12/27/2022 2000 by Hatchett, Chenyl, RN  Diversional Activities:   television   smartphone  Intervention: Protect Dignity, Rights, and Personal Wellbeing  Recent Flowsheet Documentation  Taken 12/28/2022 0008 by Hatchett, Chenyl, RN  Trust Relationship/Rapport: care explained  Taken 12/27/2022 2000 by Hatchett, Chenyl, RN Trust  Relationship/Rapport: care explained  Intervention: Protect Skin and Joint Integrity  Recent Flowsheet Documentation  Taken 12/28/2022 0200 by Hatchett, Chenyl, RN  Body Position: position changed independently  Taken 12/28/2022 0008 by Hatchett, Chenyl, RN  Body Position: position changed independently  Taken 12/27/2022 2240 by Hatchett, Chenyl, RN  Body Position: position changed independently  Taken 12/27/2022 2000 by Hatchett, Chenyl, RN  Body Position: position changed independently   Goal Outcome Evaluation:  Plan of Care Reviewed With: patient        Progress: no change  Outcome Evaluation: [id number], VSS. CIWA 8-15, Ativan given per protocol. Ankush wrist restraints ordered. Unable to complete MRI, pt wouldnt tolerate being still. Attempt in AM. Will CTM

## 2022-12-28 NOTE — PLAN OF CARE
Goal Outcome Evaluation:  Plan of Care Reviewed With: patient        Progress: no change  Outcome Evaluation: [id number], VSS. CIWA 8-15, Ativan given per protocol. Ankush wrist restraints ordered. Unable to complete MRI, pt wouldnt tolerate being still. Attempt in AM. Will CTM

## 2022-12-28 NOTE — PROGRESS NOTES
Name: Nilesh Zapata ADMIT: 2022   : 1960  PCP: Charles Farooq MD    MRN: [id number] LOS: 2 days   AGE/SEX: 62 y.o. male  ROOM: [facility]     Subjective   Subjective   Most of the history is from the nursing staff as the patient appears to be disoriented.  Notation of increasing confusion and combativeness last night associated with slurred speech and facial drooping.  No loss of consciousness.  No seizures.  Moving all extremities.  Continues to require oxygen.  No chest pain.  No fever or chills.  No abdominal pain.  No nausea or vomiting.  Positive urine output.  Refusing p.o.  Had to be put in restraints       Objective   Objective   Vital Signs  Temp:  [97.7 °F (36.5 °C)-98.9 °F (37.2 °C)] 98 °F (36.7 °C)  Heart Rate:  [55-84] 65  Resp:  [18] 18  BP: (131-168)/() 149/90  SpO2:  [90 %-96 %] 94 %  on  Flow (L/min):  [3-5] 5;   Device (Oxygen Therapy): nasal cannula    Intake/Output Summary (Last 24 hours) at 2022 0937  Last data filed at 2022 0654  Gross per 24 hour   Intake 380 ml   Output 1250 ml   Net -870 ml     Body mass index is 33.89 kg/m².      22  1326   Weight: 95.3 kg (210 lb)     Physical Exam    In general.  Middle-aged gentleman.  He is alert.  Oriented x2.  In no apparent pain/distress/diaphoresis.  Normal mood.  Flat affect.  Obese.  In restraints.    Eyes left pupil slightly larger than the right (new).  Intact extraocular musculature.  No pallor or jaundice.  Oral cavity.  Dry mucous membrane.  Neck.  Supple.  No JVD.  No lymphadenopathy or thyromegaly.  Cardiovascular.  Regular rate and rhythm with no gallops or murmurs.  Chest.  Poor to auscultation bilaterally with scattered bilateral rhonchi.  Abdomen.  Soft lax.  No tenderness.  No organomegaly.  No guarding or rebound.  Extremities.  No clubbing cyanosis or edema.  CNS.  Left pupil slightly larger than the right.  Questionable right facial droop.  Slurred speech.  Otherwise no focal neurological  deficits.  Above findings are new.    Results Review:      Results from last 7 days   Lab Units 12/28/22  0540 12/27/22  0458 12/26/22  1452   SODIUM mmol/L 135* 138 140   POTASSIUM mmol/L 4.0 3.4* 3.4*   CHLORIDE mmol/L 98 100 96*   CO2 mmol/L 23.9 29.1* 26.0   BUN mg/dL 6* 6* 6*   CREATININE mg/dL 0.72* 0.81 0.83   GLUCOSE mg/dL 75 83 124*   CALCIUM mg/dL 8.3* 7.9* 9.1   AST (SGOT) U/L 43* 41* 68*   ALT (SGPT) U/L 18 21 35     Estimated Creatinine Clearance: 112 mL/min (A) (by C-G formula based on SCr of 0.72 mg/dL (L)).                      Results from last 7 days   Lab Units 12/28/22  0540 12/27/22  0458 12/26/22  1452   MAGNESIUM mg/dL 1.6 1.4* 1.1*           Invalid input(s): LDLCALC  Results from last 7 days   Lab Units 12/28/22  0540 12/27/22  0458 12/26/22  1452   WBC 10*3/mm3 5.74 6.34 11.21*   HEMOGLOBIN g/dL 14.6 14.0 16.9   HEMATOCRIT % 42.5 40.7 49.6   PLATELETS 10*3/mm3 112* 129* 217   MCV fL 95.7 96.7 94.5   MCH pg 32.9 33.3* 32.2   MCHC g/dL 34.4 34.4 34.1   RDW % 13.2 13.4 13.7   RDW-SD fl 46.1 48.1 47.4   MPV fL 9.6 9.4 9.5   NEUTROPHIL % % 71.6 74.0 85.8*   LYMPHOCYTE % % 16.6* 15.3* 7.3*   MONOCYTES % % 9.4 9.0 5.8   EOSINOPHIL % % 1.4 0.9 0.1*   BASOPHIL % % 0.7 0.5 0.7   IMM GRAN % %  --   --  0.3   NEUTROS ABS 10*3/mm3 4.11 4.69 9.62*   LYMPHS ABS 10*3/mm3 0.95 0.97 0.82   MONOS ABS 10*3/mm3 0.54 0.57 0.65   EOS ABS 10*3/mm3 0.08 0.06 0.01   BASOS ABS 10*3/mm3 0.04 0.03 0.08   IMMATURE GRANS (ABS) 10*3/mm3  --   --  0.03   NRBC /100 WBC  --   --  0.0     Results from last 7 days   Lab Units 12/26/22  1453   INR  1.08                 Results from last 7 days   Lab Units 12/26/22  1452   LIPASE U/L 22                       Imaging:  Imaging Results (Last 24 Hours)     Procedure Component Value Units Date/Time    MRI Brain With & Without Contrast [[id number]] Resulted: 12/27/22 2116     Updated: 12/27/22 2143    US Abdomen Limited [[id number]] Collected: 12/27/22 1647     Updated: 12/27/22 1653     Narrative:      RIGHT UPPER QUADRANT ULTRASOUND     HISTORY: Elevated LFTs     COMPARISON: 05/31/2022     TECHNIQUE: Real time ultrasound imaging of the right upper quadrant was  performed. Static images reviewed.     FINDINGS: Visualized portions of the pancreas are unremarkable. There is  increased echogenicity of the liver. Mobile gallstones are noted in the  gallbladder. Some comet tail artifact is also noted suggesting focal  adenomyomatosis. Focal trace pericholecystic fluid is seen. Common duct  measures about 5 mm. The right kidney measures 15.1 cm in length and  contains a small probable cyst measuring about 1.3 cm.       Impression:      1. Increased echogenicity of the liver consistent with fatty  infiltration  2. Cholelithiasis. Focal trace pericholecystic fluid. Findings also  suggesting focal adenomyomatosis     This report was finalized on 12/27/2022 4:50 PM by Dr. Singh Miller M.D.       XR Chest PA & Lateral [[id number]] Collected: 12/27/22 1233     Updated: 12/27/22 1236    Narrative:      PA AND LATERAL CHEST     HISTORY: Hypoxemia     COMPARISON: None available     FINDINGS: Shallow inspiratory effort with bibasilar atelectasis. Heart  size appears normal. There appears to be a hiatal hernia.       Impression:      Bibasilar atelectasis. Apparent hiatal hernia     This report was finalized on 12/27/2022 12:33 PM by Dr. Singh Miller M.D.                I reviewed the patient's new clinical results / labs / tests / procedures      Assessment/Plan     Active Hospital Problems    Diagnosis  POA   • **Alcohol withdrawal syndrome without complication (HCC) [F10.930]  Yes   • Hypophosphatemia [E83.39]  Yes   • Acute adjustment disorder with mixed anxiety and depressed mood [F43.23]  Yes   • Alcoholic liver disease (HCC) [K70.9]  Yes   • Hypoxemia [R09.02]  Yes   • Hypomagnesemia [E83.42]  Yes   • Essential hypertension [I10]  Yes   • Hypokalemia [E87.6]  Yes   • Alcoholism (HCC) [F10.20]  Yes    • Anxiety [F41.9]  Yes   • Tobacco abuse [Z72.0]  Yes   • Kidney cancer, primary, with metastasis from kidney to other site (HCC) [C64.9]  Yes      Resolved Hospital Problems   No resolved problems to display.           · Alcohol abuse/alcohol withdrawal/alcoholic liver disease.  In active withdrawal.  CIWA is 11.  Currently on CIWA protocol and detoxification with folate and thiamine.  Will change to IV as the patient is refusing p.o.  Access center on the case.  Right upper quadrant ultrasound revealed fatty liver and gallstones.  Stable elevation of the liver function test.   · New onset right facial droop/slurred speech/mentation changes.  Given aspirin (will change to rectal suppository as the patient refuses p.o.).  Patient did not tolerate doing an MRI.  We will check CT scan of the brain and consult neurology.  · History of anxiety/depression/mood disorder.  Will hold Risperdal/Remeron as patient refuses p.o..  · Hypertension.  No evidence of angina or congestive heart failure clinically.  Will switch to IV Lopressor since the patient refuses p.o..  Will monitor.   · Dehydration/hypomagnesemia/hypokalemia/hypophosphatemia.  Continues with dehydration.  We will continue IV fluid.  Resolved hypokalemia and hypomagnesemia.  Continues with low phosphate.  We will continue phosphate substitution.   · Metastatic kidney cancer.  History of.  · Tobacco abuse.  Order nicotine patch  · Hypoxemia secondary to lung atelectasis.  Chest x-ray noted.  We will try incentive spirometry.   · VTE prophylaxis.  Lovenox.    Discussed my findings and plan of treatment with the patient/nurses at multidisciplinary round.  Disposition.  To be determined based on clinical course.    Nadeen Cai MD  [facility]  12/28/22  09:37 EST

## 2022-12-28 NOTE — NURSING NOTE
Access Center follow-up d/t ETOH. Chart reviewed. Report received from RN. Latest SAMEERAWA 11 @ 7125.     Per report from RN the patient had a rough night and required restraints to limit mobility and patient pulling lines/tubes. The RN reported patient has been confused and most of his speech is nonsensical. The RN reported the patient is showing some paranoia and stated the patient thinks the RN is trying to poison him.    Upon entering room patient in restraints with legs thrown over the side rail. This writer assisted with putting patient's legs back in bed. The patient was restless in bed. The patient was not able to appropriately answer any questions. The patient was able to state his name but all other speech was nonsensical and difficult to understand. Access will follow.

## 2022-12-29 ENCOUNTER — APPOINTMENT (OUTPATIENT)
Dept: MRI IMAGING | Facility: HOSPITAL | Age: 62
DRG: 897 | End: 2022-12-29
Payer: MEDICARE

## 2022-12-29 LAB
ALBUMIN SERPL-MCNC: 3.6 G/DL (ref 3.5–5.2)
ALBUMIN/GLOB SERPL: 1.1 G/DL
ALP SERPL-CCNC: 97 U/L (ref 39–117)
ALT SERPL W P-5'-P-CCNC: 21 U/L (ref 1–41)
AMMONIA BLD-SCNC: 54 UMOL/L (ref 16–60)
ANION GAP SERPL CALCULATED.3IONS-SCNC: 12.6 MMOL/L (ref 5–15)
AST SERPL-CCNC: 62 U/L (ref 1–40)
BASOPHILS # BLD AUTO: 0.05 10*3/MM3 (ref 0–0.2)
BASOPHILS NFR BLD AUTO: 0.7 % (ref 0–1.5)
BILIRUB SERPL-MCNC: 1.3 MG/DL (ref 0–1.2)
BUN SERPL-MCNC: 6 MG/DL (ref 8–23)
BUN/CREAT SERPL: 9 (ref 7–25)
CALCIUM SPEC-SCNC: 8.5 MG/DL (ref 8.6–10.5)
CHLORIDE SERPL-SCNC: 95 MMOL/L (ref 98–107)
CO2 SERPL-SCNC: 28.4 MMOL/L (ref 22–29)
CREAT SERPL-MCNC: 0.67 MG/DL (ref 0.76–1.27)
DEPRECATED RDW RBC AUTO: 43.6 FL (ref 37–54)
EGFRCR SERPLBLD CKD-EPI 2021: 104.3 ML/MIN/1.73
EOSINOPHIL # BLD AUTO: 0.12 10*3/MM3 (ref 0–0.4)
EOSINOPHIL NFR BLD AUTO: 1.6 % (ref 0.3–6.2)
ERYTHROCYTE [DISTWIDTH] IN BLOOD BY AUTOMATED COUNT: 12.8 % (ref 12.3–15.4)
GLOBULIN UR ELPH-MCNC: 3.2 GM/DL
GLUCOSE BLDC GLUCOMTR-MCNC: 81 MG/DL (ref 70–130)
GLUCOSE SERPL-MCNC: 53 MG/DL (ref 65–99)
HCT VFR BLD AUTO: 44.3 % (ref 37.5–51)
HGB BLD-MCNC: 15.9 G/DL (ref 13–17.7)
LYMPHOCYTES # BLD AUTO: 1.15 10*3/MM3 (ref 0.7–3.1)
LYMPHOCYTES NFR BLD AUTO: 15.4 % (ref 19.6–45.3)
MAGNESIUM SERPL-MCNC: 1.3 MG/DL (ref 1.6–2.4)
MCH RBC QN AUTO: 33.3 PG (ref 26.6–33)
MCHC RBC AUTO-ENTMCNC: 35.9 G/DL (ref 31.5–35.7)
MCV RBC AUTO: 92.7 FL (ref 79–97)
MONOCYTES # BLD AUTO: 0.63 10*3/MM3 (ref 0.1–0.9)
MONOCYTES NFR BLD AUTO: 8.4 % (ref 5–12)
NEUTROPHILS NFR BLD AUTO: 5.51 10*3/MM3 (ref 1.7–7)
NEUTROPHILS NFR BLD AUTO: 73.6 % (ref 42.7–76)
PHOSPHATE SERPL-MCNC: 2.8 MG/DL (ref 2.5–4.5)
PLATELET # BLD AUTO: 105 10*3/MM3 (ref 140–450)
PMV BLD AUTO: 9.9 FL (ref 6–12)
POTASSIUM SERPL-SCNC: 3.6 MMOL/L (ref 3.5–5.2)
PROT SERPL-MCNC: 6.8 G/DL (ref 6–8.5)
RBC # BLD AUTO: 4.78 10*6/MM3 (ref 4.14–5.8)
SODIUM SERPL-SCNC: 136 MMOL/L (ref 136–145)
VIT B12 BLD-MCNC: 717 PG/ML (ref 211–946)
WBC NRBC COR # BLD: 7.48 10*3/MM3 (ref 3.4–10.8)

## 2022-12-29 PROCEDURE — 84100 ASSAY OF PHOSPHORUS: CPT | Performed by: INTERNAL MEDICINE

## 2022-12-29 PROCEDURE — 83735 ASSAY OF MAGNESIUM: CPT | Performed by: INTERNAL MEDICINE

## 2022-12-29 PROCEDURE — 25010000002 HYDRALAZINE PER 20 MG: Performed by: INTERNAL MEDICINE

## 2022-12-29 PROCEDURE — 85025 COMPLETE CBC W/AUTO DIFF WBC: CPT | Performed by: INTERNAL MEDICINE

## 2022-12-29 PROCEDURE — 80053 COMPREHEN METABOLIC PANEL: CPT | Performed by: INTERNAL MEDICINE

## 2022-12-29 PROCEDURE — 25010000002 MAGNESIUM SULFATE 2 GM/50ML SOLUTION: Performed by: INTERNAL MEDICINE

## 2022-12-29 PROCEDURE — 99223 1ST HOSP IP/OBS HIGH 75: CPT | Performed by: NURSE PRACTITIONER

## 2022-12-29 PROCEDURE — 0 GADOBENATE DIMEGLUMINE 529 MG/ML SOLUTION: Performed by: INTERNAL MEDICINE

## 2022-12-29 PROCEDURE — 25010000002 THIAMINE PER 100 MG: Performed by: INTERNAL MEDICINE

## 2022-12-29 PROCEDURE — 25010000002 ENOXAPARIN PER 10 MG: Performed by: INTERNAL MEDICINE

## 2022-12-29 PROCEDURE — 70553 MRI BRAIN STEM W/O & W/DYE: CPT

## 2022-12-29 PROCEDURE — 82140 ASSAY OF AMMONIA: CPT | Performed by: NURSE PRACTITIONER

## 2022-12-29 PROCEDURE — A9577 INJ MULTIHANCE: HCPCS | Performed by: INTERNAL MEDICINE

## 2022-12-29 PROCEDURE — 82962 GLUCOSE BLOOD TEST: CPT

## 2022-12-29 PROCEDURE — 25010000002 LORAZEPAM PER 2 MG: Performed by: INTERNAL MEDICINE

## 2022-12-29 PROCEDURE — 82607 VITAMIN B-12: CPT | Performed by: NURSE PRACTITIONER

## 2022-12-29 RX ORDER — DIPHENOXYLATE HYDROCHLORIDE AND ATROPINE SULFATE 2.5; .025 MG/1; MG/1
1 TABLET ORAL DAILY
Status: DISCONTINUED | OUTPATIENT
Start: 2022-12-29 | End: 2023-01-03 | Stop reason: HOSPADM

## 2022-12-29 RX ORDER — ATORVASTATIN CALCIUM 20 MG/1
40 TABLET, FILM COATED ORAL NIGHTLY
Status: DISCONTINUED | OUTPATIENT
Start: 2022-12-29 | End: 2023-01-03 | Stop reason: HOSPADM

## 2022-12-29 RX ORDER — FAMOTIDINE 20 MG/1
20 TABLET, FILM COATED ORAL
Status: DISCONTINUED | OUTPATIENT
Start: 2022-12-29 | End: 2023-01-03 | Stop reason: HOSPADM

## 2022-12-29 RX ORDER — MIRTAZAPINE 15 MG/1
15 TABLET, FILM COATED ORAL NIGHTLY
Status: DISCONTINUED | OUTPATIENT
Start: 2022-12-29 | End: 2023-01-03 | Stop reason: HOSPADM

## 2022-12-29 RX ORDER — ASPIRIN 81 MG/1
81 TABLET ORAL DAILY
Status: DISCONTINUED | OUTPATIENT
Start: 2022-12-29 | End: 2023-01-03 | Stop reason: HOSPADM

## 2022-12-29 RX ORDER — RISPERIDONE 2 MG/1
2 TABLET ORAL EVERY 12 HOURS SCHEDULED
Status: DISCONTINUED | OUTPATIENT
Start: 2022-12-29 | End: 2023-01-03 | Stop reason: HOSPADM

## 2022-12-29 RX ORDER — FOLIC ACID 1 MG/1
1 TABLET ORAL DAILY
Status: DISCONTINUED | OUTPATIENT
Start: 2022-12-30 | End: 2023-01-03 | Stop reason: HOSPADM

## 2022-12-29 RX ORDER — DEXTROSE AND SODIUM CHLORIDE 5; .45 G/100ML; G/100ML
125 INJECTION, SOLUTION INTRAVENOUS CONTINUOUS
Status: DISCONTINUED | OUTPATIENT
Start: 2022-12-29 | End: 2023-01-01

## 2022-12-29 RX ORDER — FLUVOXAMINE MALEATE 50 MG/1
100 TABLET, COATED ORAL NIGHTLY
Status: DISCONTINUED | OUTPATIENT
Start: 2022-12-29 | End: 2023-01-03 | Stop reason: HOSPADM

## 2022-12-29 RX ORDER — DEXTROSE MONOHYDRATE 25 G/50ML
50 INJECTION, SOLUTION INTRAVENOUS ONCE
Status: COMPLETED | OUTPATIENT
Start: 2022-12-29 | End: 2022-12-29

## 2022-12-29 RX ORDER — PROPRANOLOL HYDROCHLORIDE 20 MG/1
60 TABLET ORAL EVERY 12 HOURS SCHEDULED
Status: DISCONTINUED | OUTPATIENT
Start: 2022-12-29 | End: 2023-01-03 | Stop reason: HOSPADM

## 2022-12-29 RX ADMIN — RISPERIDONE 2 MG: 2 TABLET, FILM COATED ORAL at 21:03

## 2022-12-29 RX ADMIN — RISPERIDONE 2 MG: 2 TABLET, FILM COATED ORAL at 16:18

## 2022-12-29 RX ADMIN — THIAMINE HYDROCHLORIDE 100 MG: 100 INJECTION, SOLUTION INTRAMUSCULAR; INTRAVENOUS at 08:13

## 2022-12-29 RX ADMIN — ENOXAPARIN SODIUM 40 MG: 100 INJECTION SUBCUTANEOUS at 12:51

## 2022-12-29 RX ADMIN — SODIUM CHLORIDE 125 ML/HR: 9 INJECTION, SOLUTION INTRAVENOUS at 01:55

## 2022-12-29 RX ADMIN — LORAZEPAM 2 MG: 2 INJECTION INTRAMUSCULAR; INTRAVENOUS at 01:24

## 2022-12-29 RX ADMIN — Medication 1 TABLET: at 16:06

## 2022-12-29 RX ADMIN — GADOBENATE DIMEGLUMINE 20 ML: 529 INJECTION, SOLUTION INTRAVENOUS at 15:29

## 2022-12-29 RX ADMIN — FAMOTIDINE 20 MG: 20 TABLET, FILM COATED ORAL at 18:42

## 2022-12-29 RX ADMIN — MAGNESIUM SULFATE HEPTAHYDRATE 2 G: 2 INJECTION, SOLUTION INTRAVENOUS at 12:51

## 2022-12-29 RX ADMIN — MIRTAZAPINE 15 MG: 15 TABLET, FILM COATED ORAL at 21:02

## 2022-12-29 RX ADMIN — FLUVOXAMINE MALEATE 100 MG: 50 TABLET, COATED ORAL at 21:02

## 2022-12-29 RX ADMIN — ATORVASTATIN CALCIUM 40 MG: 20 TABLET, FILM COATED ORAL at 21:02

## 2022-12-29 RX ADMIN — METOPROLOL TARTRATE 2.5 MG: 1 INJECTION, SOLUTION INTRAVENOUS at 02:35

## 2022-12-29 RX ADMIN — PROPRANOLOL HYDROCHLORIDE 60 MG: 20 TABLET ORAL at 16:06

## 2022-12-29 RX ADMIN — HYDRALAZINE HYDROCHLORIDE 10 MG: 20 INJECTION INTRAMUSCULAR; INTRAVENOUS at 07:39

## 2022-12-29 RX ADMIN — DEXTROSE AND SODIUM CHLORIDE 125 ML/HR: 5; 450 INJECTION, SOLUTION INTRAVENOUS at 07:39

## 2022-12-29 RX ADMIN — PROPRANOLOL HYDROCHLORIDE 60 MG: 20 TABLET ORAL at 21:02

## 2022-12-29 RX ADMIN — FOLIC ACID 1 MG: 5 INJECTION, SOLUTION INTRAMUSCULAR; INTRAVENOUS; SUBCUTANEOUS at 09:35

## 2022-12-29 RX ADMIN — ASPIRIN 81 MG: 81 TABLET, COATED ORAL at 12:51

## 2022-12-29 RX ADMIN — MAGNESIUM SULFATE HEPTAHYDRATE 2 G: 2 INJECTION, SOLUTION INTRAVENOUS at 10:18

## 2022-12-29 RX ADMIN — FAMOTIDINE 20 MG: 10 INJECTION INTRAVENOUS at 08:13

## 2022-12-29 RX ADMIN — Medication 10 ML: at 21:03

## 2022-12-29 RX ADMIN — MAGNESIUM SULFATE HEPTAHYDRATE 2 G: 2 INJECTION, SOLUTION INTRAVENOUS at 16:06

## 2022-12-29 RX ADMIN — DEXTROSE MONOHYDRATE 50 ML: 25 INJECTION, SOLUTION INTRAVENOUS at 07:39

## 2022-12-29 RX ADMIN — ASPIRIN 300 MG: 300 SUPPOSITORY RECTAL at 08:13

## 2022-12-29 NOTE — PLAN OF CARE
Problem: Adult Inpatient Plan of Care  Goal: Plan of Care Review  Outcome: Ongoing, Progressing  Flowsheets  Taken 12/29/2022 0541 by Radha Jimenez RN  Outcome Evaluation: Continued to be confused and not able to follow commands. Ativan was given as prescribed and bilateral wrist restraints were continued CIWA did not improve during the shift. Spoke with the wife and explained why he was still in restraints and what we were doing for comfort measures,attempts to reorientate him.We continued throughout the shift without success, however we will CTM.  Taken 12/28/2022 0538 by Hatchett, Chenyl, RN  Plan of Care Reviewed With: patient     Problem: Restraint, Nonbehavioral (Nonviolent)  Goal: Absence of Harm or Injury  Intervention: Protect Skin and Joint Integrity  Recent Flowsheet Documentation  Taken 12/29/2022 0400 by Radha Jimenez RN  Body Position:   right   tilted  Taken 12/29/2022 0200 by Radha Jimenez RN  Body Position:   left   side-lying  Taken 12/29/2022 0000 by Radha Jimenez RN  Body Position: weight shifting  Taken 12/28/2022 1950 by Radha Jimenez RN  Body Position: supine     Problem: Restraint, Nonbehavioral (Nonviolent)  Goal: Absence of Harm or Injury  Intervention: Implement Least Restrictive Safety Strategies  Recent Flowsheet Documentation  Taken 12/29/2022 0400 by Radha Jimenez RN  Medical Device Protection:   long sleeve gown   tubing secured  Less Restrictive Alternative:   bed alarm in use   calming techniques promoted   emotional support provided   medication offered  De-Escalation Techniques: quiet time facilitated  Diversional Activities: (music) --  Taken 12/29/2022 0200 by Radha Jimenez RN  Medical Device Protection:   IV pole/bag removed from visual field   tubing secured  Less Restrictive Alternative:   bed alarm in use   calming techniques promoted   emotional support provided   medication offered  De-Escalation Techniques: quiet time facilitated  Diversional Activities: (music)  other (see comments)  Taken 12/29/2022 0000 by Radha Jimenez RN  Medical Device Protection:   IV pole/bag removed from visual field   tubing secured  Less Restrictive Alternative:   bed alarm in use   calming techniques promoted   emotional support provided   medication offered  De-Escalation Techniques:   reoriented   quiet time facilitated   stimulation decreased   verbally redirected  Diversional Activities: television  Taken 12/28/2022 2200 by Radha Jimenez RN  Medical Device Protection: IV pole/bag removed from visual field  Less Restrictive Alternative:   bed alarm in use   calming techniques promoted   emotional support provided   medication offered  De-Escalation Techniques: quiet time facilitated  Diversional Activities: television  Taken 12/28/2022 2000 by Radha Jimenez RN  Medical Device Protection: IV pole/bag removed from visual field  Less Restrictive Alternative:   bed alarm in use   calming techniques promoted   emotional support provided   medication offered  De-Escalation Techniques: quiet time facilitated  Diversional Activities: television  Taken 12/28/2022 1950 by Radha Jimenez RN  Medical Device Protection: IV pole/bag removed from visual field  Less Restrictive Alternative:   bed alarm in use   calming techniques promoted   emotional support provided   medication offered  De-Escalation Techniques:   quiet time facilitated   stimulation decreased  Intervention: Protect Dignity, Rights, and Personal Wellbeing  Recent Flowsheet Documentation  Taken 12/29/2022 0000 by Radha Jimenez RN  Trust Relationship/Rapport: care explained  Taken 12/28/2022 1950 by Radha Jimenez RN  Trust Relationship/Rapport:   care explained   reassurance provided  Intervention: Protect Skin and Joint Integrity  Recent Flowsheet Documentation  Taken 12/29/2022 0400 by Radha Jimenez RN  Body Position:   right   tilted  Taken 12/29/2022 0200 by Radha Jimenez RN  Body Position:   left   side-lying  Taken 12/29/2022  0000 by Radha Jimenez, RN  Body Position: weight shifting  Taken 12/28/2022 1950 by Radha Jimenez, RN  Body Position: supine   Goal Outcome Evaluation:              Outcome Evaluation: Continued to be confused and not able to follow commands. Ativan was given as prescribed and bilateral wrist restraints were continued CIWA did not improve during the shift. Spoke with the wife and explained why he was still in restraints and what we were doing for comfort measures,attempts to reorientate him.We continued throughout the shift without success, however we will CTM.

## 2022-12-29 NOTE — CONSULTS
Reviewed chart:   Pt not appropriate for interview.   Pt confused, restless, and paranoid at times. Pt speech nonsensical. Pt in restraints. Pt on CIWA protocol with IV thiamine

## 2022-12-29 NOTE — SIGNIFICANT NOTE
12/29/22 1247   OTHER   Discipline physical therapist   Rehab Time/Intention   Session Not Performed unable to treat, medical status change;other (see comments)  (PT spoke with RN, pt in restraints and not appropriate for PT today. Will f/u tomorrow)   Therapy Assessment/Plan (PT)   Criteria for Skilled Interventions Met (PT) yes   Recommendation   PT - Next Appointment 12/30/22

## 2022-12-29 NOTE — CASE MANAGEMENT/SOCIAL WORK
Continued Stay Note  [facility]     Patient Name: Nilesh Zapata  MRN: [id number]  Today's Date: 12/29/2022    Admit Date: 12/26/2022    Plan: Home with family   Discharge Plan     Row Name 12/29/22 1534       Plan    Plan Home with family    Patient/Family in Agreement with Plan yes    Plan Comments Access following for ETOH withdrawal treatment. CCP to follow for hospital course/ needs. SPRING Vivar RN, CCP               Discharge Codes    No documentation.               Expected Discharge Date and Time     Expected Discharge Date Expected Discharge Time    Jan 2, 2023             Maddie Vivar, RN

## 2022-12-29 NOTE — NURSING NOTE
Per overnight nurse pt. confused, unable to follow commands. No improvement in CIWA overnight, prn ativan given and restraints continued. At this time pt. resting in bed, primary RN at bedside. Primary reports some improvement noted and wrist restraints to be d/c'ed. Patient alert and oriented x3 but drowsy. Pt. With some slurred speech but reports possibly considering ETOH treatment. Pt. to be seen by chemical dependency  regarding recovery options. Last CIWA 1. Access following.

## 2022-12-29 NOTE — PROGRESS NOTES
Name: Nilesh Zapata ADMIT: 2022   : 1960  PCP: Charles Farooq MD    MRN: [id number] LOS: 3 days   AGE/SEX: 62 y.o. male  ROOM: [facility]     Subjective   Subjective   History from both the patient and the nurse.  Low blood sugar this morning status post IV dextrose.  Confusion and agitation are better today.  Patient is somnolent but able to be aroused and cooperative.  Decreased slurred speech.  No headache.  No other focal neurological symptoms.  No seizures or loss of consciousness.    No chest pain.  No fever or chills.  No abdominal pain.  No nausea or vomiting.  Positive urine output.  CIWA score 0 this morning     Objective   Objective   Vital Signs  Temp:  [97.4 °F (36.3 °C)-98.5 °F (36.9 °C)] 98.5 °F (36.9 °C)  Heart Rate:  [57-70] 65  Resp:  [18] 18  BP: (148-189)/() 169/88  SpO2:  [92 %-96 %] 92 %  on  Flow (L/min):  [4] 4;   Device (Oxygen Therapy): nasal cannula    Intake/Output Summary (Last 24 hours) at 2022 1129  Last data filed at 2022 0104  Gross per 24 hour   Intake --   Output 1150 ml   Net -1150 ml     Body mass index is 33.89 kg/m².      22  1326   Weight: 95.3 kg (210 lb)     Physical Exam    General.  Middle-aged gentleman.  Somnolent but arousable.  Oriented x2.  In no apparent pain/distress/diaphoresis.  Normal mood.  Flat affect.  Obese.  In restraints.    Eyes left pupil slightly larger than the right (new).  Intact extraocular musculature.  No pallor or jaundice.  Oral cavity.  Dry mucous membrane.  Neck.  Supple.  No JVD.  No lymphadenopathy or thyromegaly.  Cardiovascular.  Regular rate and rhythm with no gallops or murmurs.  Chest.  Poor to auscultation bilaterally with few scattered bilateral rhonchi.  Abdomen.  Soft lax.  No tenderness.  No organomegaly.  No guarding or rebound.  Extremities.  No clubbing cyanosis or edema.  CNS.  Left pupil slightly larger than the right.  Questionable right facial droop.  Slurred speech (improved)..   Otherwise no focal neurological deficits.      Results Review:      Results from last 7 days   Lab Units 12/29/22  0435 12/28/22  0540 12/27/22  0458 12/26/22  1452   SODIUM mmol/L 136 135* 138 140   POTASSIUM mmol/L 3.6 4.0 3.4* 3.4*   CHLORIDE mmol/L 95* 98 100 96*   CO2 mmol/L 28.4 23.9 29.1* 26.0   BUN mg/dL 6* 6* 6* 6*   CREATININE mg/dL 0.67* 0.72* 0.81 0.83   GLUCOSE mg/dL 53* 75 83 124*   CALCIUM mg/dL 8.5* 8.3* 7.9* 9.1   AST (SGOT) U/L 62* 43* 41* 68*   ALT (SGPT) U/L 21 18 21 35     Estimated Creatinine Clearance: 120.4 mL/min (A) (by C-G formula based on SCr of 0.67 mg/dL (L)).                      Results from last 7 days   Lab Units 12/29/22 0435 12/28/22  0540 12/27/22 0458 12/26/22  1452   MAGNESIUM mg/dL 1.3* 1.6 1.4* 1.1*           Invalid input(s): LDLCALC  Results from last 7 days   Lab Units 12/29/22 0435 12/28/22  0540 12/27/22  0458 12/26/22  1452   WBC 10*3/mm3 7.48 5.74 6.34 11.21*   HEMOGLOBIN g/dL 15.9 14.6 14.0 16.9   HEMATOCRIT % 44.3 42.5 40.7 49.6   PLATELETS 10*3/mm3 105* 112* 129* 217   MCV fL 92.7 95.7 96.7 94.5   MCH pg 33.3* 32.9 33.3* 32.2   MCHC g/dL 35.9* 34.4 34.4 34.1   RDW % 12.8 13.2 13.4 13.7   RDW-SD fl 43.6 46.1 48.1 47.4   MPV fL 9.9 9.6 9.4 9.5   NEUTROPHIL % % 73.6 71.6 74.0 85.8*   LYMPHOCYTE % % 15.4* 16.6* 15.3* 7.3*   MONOCYTES % % 8.4 9.4 9.0 5.8   EOSINOPHIL % % 1.6 1.4 0.9 0.1*   BASOPHIL % % 0.7 0.7 0.5 0.7   IMM GRAN % %  --   --   --  0.3   NEUTROS ABS 10*3/mm3 5.51 4.11 4.69 9.62*   LYMPHS ABS 10*3/mm3 1.15 0.95 0.97 0.82   MONOS ABS 10*3/mm3 0.63 0.54 0.57 0.65   EOS ABS 10*3/mm3 0.12 0.08 0.06 0.01   BASOS ABS 10*3/mm3 0.05 0.04 0.03 0.08   IMMATURE GRANS (ABS) 10*3/mm3  --   --   --  0.03   NRBC /100 WBC  --   --   --  0.0     Results from last 7 days   Lab Units 12/26/22  1453   INR  1.08                 Results from last 7 days   Lab Units 12/26/22  1452   LIPASE U/L 22                       Imaging:  Imaging Results (Last 24 Hours)     Procedure  Component Value Units Date/Time    CT Head Without Contrast [[id number]] Collected: 12/28/22 1048     Updated: 12/28/22 1627    Narrative:      CT HEAD WITHOUT CONTRAST     HISTORY: Alcohol withdrawal, aphasia, slurred speech.     COMPARISON: CT head 10/30/2019.     FINDINGS: The brain ventricles are symmetrical. There is no evidence of  hemorrhage, hydrocephalus or of abnormal extra-axial fluid. No focal  area of decreased attenuation to suggest acute infarction is identified.  Mild-to-moderate vascular calcifications involving the carotid siphons  are noted. There is a small volume of fluid present within the left  maxillary sinus.       Impression:      There is no evidence of acute infarction, intracranial  hemorrhage, hydrocephalus or of abnormal extra-axial fluid. The etiology  for the patient's aphasia and slurred speech is not established. Further  evaluation could be performed with an MRI examination of the brain.        Radiation dose reduction techniques were utilized, including automated  exposure control and exposure modulation based on body size.     This report was finalized on 12/28/2022 4:24 PM by Dr. Mariusz Godinez M.D.                I reviewed the patient's new clinical results / labs / tests / procedures      Assessment/Plan     Active Hospital Problems    Diagnosis  POA   • **Alcohol withdrawal syndrome without complication (HCC) [F10.930]  Yes   • Hypophosphatemia [E83.39]  Yes   • Acute adjustment disorder with mixed anxiety and depressed mood [F43.23]  Yes   • Alcoholic liver disease (HCC) [K70.9]  Yes   • Hypoxemia [R09.02]  Yes   • Hypomagnesemia [E83.42]  Yes   • Essential hypertension [I10]  Yes   • Hypokalemia [E87.6]  Yes   • Alcoholism (HCC) [F10.20]  Yes   • Anxiety [F41.9]  Yes   • Tobacco abuse [Z72.0]  Yes   • Kidney cancer, primary, with metastasis from kidney to other site (HCC) [C64.9]  Yes      Resolved Hospital Problems   No resolved problems to display.           · Alcohol  abuse/alcohol withdrawal/alcoholic liver disease.  Improved alcohol withdrawal today..  CIWA is 0 this morning.  Currently on CIWA protocol and detoxification with folate and thiamine.  We will try instituting good p.o.  As the patient is more cooperative.    Access center on the case.  Right upper quadrant ultrasound revealed fatty liver and gallstones.  Liver function test slightly worse today.    · New onset right facial droop/slurred speech/mentation changes.  On aspirin.  We will resume Lipitor.  Patient did not tolerate doing an MRI.  CT scan of the brain without acute disease.  We will try to do the MRI today.  Neurology consult pending.   · History of anxiety/depression/mood disorder.  We will resume Luvox/Risperdal/Remeron as we advance the p.o.  · Hypertension.  No evidence of angina or congestive heart failure clinically.  We will switch back to p.o. Inderal and continue as needed hydralazine.  · Dehydration/hypomagnesemia/hypokalemia/hypophosphatemia.  Continues with dehydration.  Potassium and phosphorus are normal now.  We will continue IV fluid.  .  Continue substitution of the magnesium.   · Metastatic kidney cancer.  History of.  · Tobacco abuse.  Order nicotine patch  · Hypoxemia secondary to lung atelectasis.  Chest x-ray noted.  Continue incentive spirometry.   · VTE prophylaxis.  Lovenox.    Discussed my findings and plan of treatment with the patient/nurses at multidisciplinary round.  Disposition.  To be determined based on clinical course.    Nadeen Cai MD  [facility]  12/29/22  11:29 EST        Review of Systems

## 2022-12-29 NOTE — PLAN OF CARE
Goal Outcome Evaluation:           Progress: improving   VSS.  A-Ox3.  Pt doing better today.  Speech much improved.  CIWA 0.  Restraints DC'd.  Magnesium replaced.  Will CTM.

## 2022-12-29 NOTE — CONSULTS
Neurology Consult Note    Consult Date: 12/29/2022    Referring MD: Trell    Reason for Consult I have been asked to see the patient in neurological consultation to render advice and opinion regarding facial droop, slurred speech    Nilesh Zapata is a 62 y.o. male, current smoker, with history of alcohol abuse with associated thrombocytopenia, anxiety, hypertension, renal cell carcinoma status post left nephrectomy who presented 12/26 with alcohol withdrawal.  He reportedly drinks 1/5 of bourbon a day.  His last drink was 12/25.  It was noted on 12/27 after he arrived to the his nursing unit that he had slurred speech and left facial droop.  He is on CIWA protocol and has been noted to be confused, restless, and paranoid at times with nonsensical speech but does seem to be improving.  BP was as high as 194/131 on presentation.  CT head completed yesterday showed no acute findings.  Patient denies previous history of stroke.  MRI brain without contrast is currently pending.  He has been treated with IV thiamine and started on aspirin.     Past Medical/Surgical Hx:  Past Medical History:   Diagnosis Date   • Alcohol abuse    • Anxiety    • Delirium tremens (HCC) 03/20/2019   • Fatty liver    • FH: kidney cancer    • Hypertension      Past Surgical History:   Procedure Laterality Date   • APPENDECTOMY     • NEPHRECTOMY      left    • TONSILLECTOMY         Medications On Admission  Medications Prior to Admission   Medication Sig Dispense Refill Last Dose   • amitriptyline (ELAVIL) 25 MG tablet Take 1 tablet by mouth Every Night. (Patient taking differently: Take 25 mg by mouth Every Night. Pt states does not take anymore) 30 tablet 0 Patient Taking Differently   • fluvoxaMINE (LUVOX) 25 MG tablet Take 100 mg by mouth Daily.   12/25/2022   • folic acid (FOLVITE) 1 MG tablet Take 1 tablet by mouth Daily. (Patient taking differently: Take 1 mg by mouth Daily. Does not take anymore) 30 tablet 0 Patient Taking Differently  "  • mirtazapine (REMERON) 15 MG tablet Take 1 tablet by mouth Every Night. 30 tablet 0 12/25/2022   • naltrexone (DEPADE) 50 MG tablet Take 50 mg by mouth Daily.   12/25/2022   • propranolol (INDERAL) 40 MG tablet Take 60 mg by mouth 2 (Two) Times a Day.   12/25/2022   • risperiDONE (risperDAL) 2 MG tablet Take 2 mg by mouth 2 (Two) Times a Day.   12/25/2022   • thiamine (VITAMIN B1) 100 MG tablet Take 1 tablet by mouth Daily. 30 tablet 0 12/25/2022   • vitamin B-12 (VITAMIN B-12) 1000 MCG tablet Take 1 tablet by mouth Daily. 30 tablet 0 12/25/2022       Allergies:  No Known Allergies    Social Hx:  Social History     Socioeconomic History   • Marital status:    Tobacco Use   • Smoking status: Some Days     Packs/day: 0.25     Years: 15.00     Pack years: 3.75     Types: Cigarettes     Start date: 12/17/1981   • Tobacco comments:     refused    Substance and Sexual Activity   • Alcohol use: Yes     Comment: last drink was at 1000   drinks 1 fifth a day    • Drug use: No   • Sexual activity: Defer       Family Hx:  Family History   Problem Relation Age of Onset   • Diabetes Mother    • Throat cancer Father        REVIEW OF SYSTEMS:   Constitutional: [No fevers, chills, or weight loss/gain]   Eye: [No change in vision]   HEENT: [No headaches or dizziness.]   Respiratory: [No shortness of breath, coughing, wheezing]   Cardiovascular: [No Chest pain or syncope]   Gastrointestinal:+ Nausea, no vomiting/diarrhea  Genitourinary: [Normal bladder function]   Musculoskeletal: [No trauma, joint or neck pain, myalgias, cramping or weakness]   Skin: [No itching, burning, rashes, or birthmarks]   Endocrinology: [No heat or cold intolerance]   Psychiatric:+ anxiety  Neurologic: [See HPI, above]         Exam    /88 (BP Location: Right arm, Patient Position: Lying)   Pulse 65   Temp 98.5 °F (36.9 °C) (Oral)   Resp 18   Ht 167.6 cm (66\")   Wt 95.3 kg (210 lb)   SpO2 92%   BMI 33.89 kg/m²   General appearance: Well " developed, well nourished, drowsy and cooperative.   HEENT: Normocephalic.   Neck: Supple   Cardiac: Regular rate and rhythm. No murmurs.   Peripheral Vasculature: Radial pulses are equal and symmetric.  Chest Exam: Clear to auscultation bilaterally, no wheezes, no rhonchi.  Extremities: Normal, no edema.   Skin: No rashes or birthmarks.     Higher integrative function: Drowsy.  Requires frequent stimulation to attend.  Oriented to self, location, month/year, and current president.  Able to name a few common objects.  No neglect.  Cranial nerves: Visual fields intact to threat, extraocular movements intact, PERRL.  Normal facial sensation, face appears symmetric.  Hearing intact.  Tongue midline.  Moderate dysarthria.  Motor: Moving all extremities symmetrically, 5 out of 5.  Normal tone.  Sensation: Intact/symmetric to light touch in arms and legs.  Station and gait: Deferred.  Muscle stretch reflexes reflexes 2+ and upper extremities, 1+ patella bilaterally, absent ankles.  Plantar reflexes are flexor bilaterally.   Coordination: Finger to nose test showed no dysmetria.     DATA:    Lab Results   Component Value Date    GLUCOSE 53 (L) 12/29/2022    CALCIUM 8.5 (L) 12/29/2022     12/29/2022    K 3.6 12/29/2022    CO2 28.4 12/29/2022    CL 95 (L) 12/29/2022    BUN 6 (L) 12/29/2022    CREATININE 0.67 (L) 12/29/2022    EGFRIFNONA 71 11/03/2019    BCR 9.0 12/29/2022    ANIONGAP 12.6 12/29/2022     Lab Results   Component Value Date    WBC 7.48 12/29/2022    HGB 15.9 12/29/2022    HCT 44.3 12/29/2022    MCV 92.7 12/29/2022     (L) 12/29/2022     No results found for: CHOL  No results found for: HDL  No results found for: LDL  No results found for: TRIG  No results found for: HGBA1C  Lab Results   Component Value Date    INR 1.08 12/26/2022    INR 1.18 (H) 05/31/2022    INR 1.14 (H) 10/30/2019    PROTIME 14.2 12/26/2022    PROTIME 14.9 (H) 05/31/2022    PROTIME 14.3 (H) 10/30/2019       Imaging review: CT  head images viewed by me   CT Head Without Contrast    Result Date: 12/28/2022  CT HEAD WITHOUT CONTRAST  HISTORY: Alcohol withdrawal, aphasia, slurred speech.  COMPARISON: CT head 10/30/2019.  FINDINGS: The brain ventricles are symmetrical. There is no evidence of hemorrhage, hydrocephalus or of abnormal extra-axial fluid. No focal area of decreased attenuation to suggest acute infarction is identified. Mild-to-moderate vascular calcifications involving the carotid siphons are noted. There is a small volume of fluid present within the left maxillary sinus.      There is no evidence of acute infarction, intracranial hemorrhage, hydrocephalus or of abnormal extra-axial fluid. The etiology for the patient's aphasia and slurred speech is not established. Further evaluation could be performed with an MRI examination of the brain.   Radiation dose reduction techniques were utilized, including automated exposure control and exposure modulation based on body size.  This report was finalized on 12/28/2022 4:24 PM by Dr. Mariusz Godinez M.D.      MRI Outside Films    Result Date: 12/28/2022  This procedure was auto-finalized with no dictation required.    US Abdomen Limited    Result Date: 12/27/2022  RIGHT UPPER QUADRANT ULTRASOUND  HISTORY: Elevated LFTs  COMPARISON: 05/31/2022  TECHNIQUE: Real time ultrasound imaging of the right upper quadrant was performed. Static images reviewed.  FINDINGS: Visualized portions of the pancreas are unremarkable. There is increased echogenicity of the liver. Mobile gallstones are noted in the gallbladder. Some comet tail artifact is also noted suggesting focal adenomyomatosis. Focal trace pericholecystic fluid is seen. Common duct measures about 5 mm. The right kidney measures 15.1 cm in length and contains a small probable cyst measuring about 1.3 cm.      1. Increased echogenicity of the liver consistent with fatty infiltration 2. Cholelithiasis. Focal trace pericholecystic fluid.  Findings also suggesting focal adenomyomatosis  This report was finalized on 12/27/2022 4:50 PM by Dr. Singh Miller M.D.      XR Chest PA & Lateral    Result Date: 12/27/2022  PA AND LATERAL CHEST  HISTORY: Hypoxemia  COMPARISON: None available  FINDINGS: Shallow inspiratory effort with bibasilar atelectasis. Heart size appears normal. There appears to be a hiatal hernia.      Bibasilar atelectasis. Apparent hiatal hernia  This report was finalized on 12/27/2022 12:33 PM by Dr. Singh Miller M.D.      Impression:  1) Transient facial droop with persistent dysarthria  2) Acute alcohol withdrawal  3) Tobacco abuse  4) HTN  5) thrombocytopenia      PLAN:   The patient was seen with Dr Welch   Check B12, ammonia level  F/U MRI brain w/wo  On [facility] protocol  Exam is currently non focal. Will f/u MRI brain, if negative no further neurological work is planned.

## 2022-12-30 LAB
ALBUMIN SERPL-MCNC: 3.4 G/DL (ref 3.5–5.2)
ALBUMIN/GLOB SERPL: 1.1 G/DL
ALP SERPL-CCNC: 91 U/L (ref 39–117)
ALT SERPL W P-5'-P-CCNC: 21 U/L (ref 1–41)
ANION GAP SERPL CALCULATED.3IONS-SCNC: 6.1 MMOL/L (ref 5–15)
AST SERPL-CCNC: 60 U/L (ref 1–40)
BASOPHILS # BLD AUTO: 0.01 10*3/MM3 (ref 0–0.2)
BASOPHILS NFR BLD AUTO: 0.1 % (ref 0–1.5)
BILIRUB SERPL-MCNC: 1.5 MG/DL (ref 0–1.2)
BUN SERPL-MCNC: 4 MG/DL (ref 8–23)
BUN/CREAT SERPL: 5.9 (ref 7–25)
CALCIUM SPEC-SCNC: 8.7 MG/DL (ref 8.6–10.5)
CHLORIDE SERPL-SCNC: 93 MMOL/L (ref 98–107)
CO2 SERPL-SCNC: 32.9 MMOL/L (ref 22–29)
CREAT SERPL-MCNC: 0.68 MG/DL (ref 0.76–1.27)
DEPRECATED RDW RBC AUTO: 43.6 FL (ref 37–54)
EGFRCR SERPLBLD CKD-EPI 2021: 103.8 ML/MIN/1.73
EOSINOPHIL # BLD AUTO: 0.11 10*3/MM3 (ref 0–0.4)
EOSINOPHIL NFR BLD AUTO: 1.6 % (ref 0.3–6.2)
ERYTHROCYTE [DISTWIDTH] IN BLOOD BY AUTOMATED COUNT: 12.9 % (ref 12.3–15.4)
GLOBULIN UR ELPH-MCNC: 3.2 GM/DL
GLUCOSE BLDC GLUCOMTR-MCNC: 107 MG/DL (ref 70–130)
GLUCOSE BLDC GLUCOMTR-MCNC: 125 MG/DL (ref 70–130)
GLUCOSE BLDC GLUCOMTR-MCNC: 186 MG/DL (ref 70–130)
GLUCOSE SERPL-MCNC: 109 MG/DL (ref 65–99)
HCT VFR BLD AUTO: 42.8 % (ref 37.5–51)
HGB BLD-MCNC: 15.4 G/DL (ref 13–17.7)
LYMPHOCYTES # BLD AUTO: 0.83 10*3/MM3 (ref 0.7–3.1)
LYMPHOCYTES NFR BLD AUTO: 12 % (ref 19.6–45.3)
MAGNESIUM SERPL-MCNC: 2 MG/DL (ref 1.6–2.4)
MCH RBC QN AUTO: 33 PG (ref 26.6–33)
MCHC RBC AUTO-ENTMCNC: 36 G/DL (ref 31.5–35.7)
MCV RBC AUTO: 91.8 FL (ref 79–97)
MONOCYTES # BLD AUTO: 0.72 10*3/MM3 (ref 0.1–0.9)
MONOCYTES NFR BLD AUTO: 10.4 % (ref 5–12)
NEUTROPHILS NFR BLD AUTO: 5.2 10*3/MM3 (ref 1.7–7)
NEUTROPHILS NFR BLD AUTO: 75.6 % (ref 42.7–76)
PHOSPHATE SERPL-MCNC: 2.3 MG/DL (ref 2.5–4.5)
PLATELET # BLD AUTO: 96 10*3/MM3 (ref 140–450)
PMV BLD AUTO: 10.1 FL (ref 6–12)
POTASSIUM SERPL-SCNC: 3.5 MMOL/L (ref 3.5–5.2)
PROT SERPL-MCNC: 6.6 G/DL (ref 6–8.5)
RBC # BLD AUTO: 4.66 10*6/MM3 (ref 4.14–5.8)
SODIUM SERPL-SCNC: 132 MMOL/L (ref 136–145)
WBC NRBC COR # BLD: 6.89 10*3/MM3 (ref 3.4–10.8)

## 2022-12-30 PROCEDURE — 80053 COMPREHEN METABOLIC PANEL: CPT | Performed by: INTERNAL MEDICINE

## 2022-12-30 PROCEDURE — 85025 COMPLETE CBC W/AUTO DIFF WBC: CPT | Performed by: INTERNAL MEDICINE

## 2022-12-30 PROCEDURE — 82962 GLUCOSE BLOOD TEST: CPT

## 2022-12-30 PROCEDURE — 84100 ASSAY OF PHOSPHORUS: CPT | Performed by: INTERNAL MEDICINE

## 2022-12-30 PROCEDURE — 25010000002 LORAZEPAM PER 2 MG: Performed by: INTERNAL MEDICINE

## 2022-12-30 PROCEDURE — 83735 ASSAY OF MAGNESIUM: CPT | Performed by: INTERNAL MEDICINE

## 2022-12-30 PROCEDURE — 25010000002 ENOXAPARIN PER 10 MG: Performed by: INTERNAL MEDICINE

## 2022-12-30 RX ORDER — AMLODIPINE BESYLATE 5 MG/1
5 TABLET ORAL
Status: DISCONTINUED | OUTPATIENT
Start: 2022-12-30 | End: 2023-01-03 | Stop reason: HOSPADM

## 2022-12-30 RX ADMIN — FAMOTIDINE 20 MG: 20 TABLET, FILM COATED ORAL at 06:42

## 2022-12-30 RX ADMIN — FLUVOXAMINE MALEATE 100 MG: 50 TABLET, COATED ORAL at 22:12

## 2022-12-30 RX ADMIN — RISPERIDONE 2 MG: 2 TABLET, FILM COATED ORAL at 22:12

## 2022-12-30 RX ADMIN — PROPRANOLOL HYDROCHLORIDE 60 MG: 20 TABLET ORAL at 22:12

## 2022-12-30 RX ADMIN — ENOXAPARIN SODIUM 40 MG: 100 INJECTION SUBCUTANEOUS at 12:34

## 2022-12-30 RX ADMIN — PROPRANOLOL HYDROCHLORIDE 60 MG: 20 TABLET ORAL at 10:01

## 2022-12-30 RX ADMIN — DEXTROSE AND SODIUM CHLORIDE 125 ML/HR: 5; 450 INJECTION, SOLUTION INTRAVENOUS at 02:34

## 2022-12-30 RX ADMIN — RISPERIDONE 2 MG: 2 TABLET, FILM COATED ORAL at 10:01

## 2022-12-30 RX ADMIN — ATORVASTATIN CALCIUM 40 MG: 20 TABLET, FILM COATED ORAL at 22:11

## 2022-12-30 RX ADMIN — Medication 1 PATCH: at 10:01

## 2022-12-30 RX ADMIN — MIRTAZAPINE 15 MG: 15 TABLET, FILM COATED ORAL at 22:12

## 2022-12-30 RX ADMIN — Medication 1 TABLET: at 10:01

## 2022-12-30 RX ADMIN — LORAZEPAM 2 MG: 2 INJECTION INTRAMUSCULAR; INTRAVENOUS at 23:29

## 2022-12-30 RX ADMIN — FOLIC ACID 1 MG: 1 TABLET ORAL at 10:01

## 2022-12-30 RX ADMIN — POTASSIUM PHOSPHATE, MONOBASIC AND POTASSIUM PHOSPHATE, DIBASIC 15 MMOL: 224; 236 INJECTION, SOLUTION, CONCENTRATE INTRAVENOUS at 10:01

## 2022-12-30 RX ADMIN — ASPIRIN 81 MG: 81 TABLET, COATED ORAL at 10:04

## 2022-12-30 RX ADMIN — AMLODIPINE BESYLATE 5 MG: 5 TABLET ORAL at 12:34

## 2022-12-30 RX ADMIN — FAMOTIDINE 20 MG: 20 TABLET, FILM COATED ORAL at 18:34

## 2022-12-30 RX ADMIN — Medication 100 MG: at 10:01

## 2022-12-30 RX ADMIN — Medication 10 ML: at 22:13

## 2022-12-30 NOTE — NURSING NOTE
Access center follow up note, regarding ETOH use.  Upon entering the room patient is soundly sleeping and this clinician did not awaken patient.  Latest CIWA was 16.  Jerica H has been requested to also see patient.    Access will continue to follow.

## 2022-12-30 NOTE — PROGRESS NOTES
Name: Nilesh Zapata ADMIT: 2022   : 1960  PCP: Charles Farooq MD    MRN: [id number] LOS: 4 days   AGE/SEX: 62 y.o. male  ROOM: [facility]     Subjective   Subjective   Patient is alert and more cooperative today.  Patient denies any shaking but reports some anxiety.  No headache.  No dizziness.  No seizures.  No focal neurological symptoms.    Review of system.  Cardiovascular/respiratory.  No chest pain/no shortness of breath/no cough/no wheeze/no palpitation  .  No dysuria or hematuria  GI.  No abdominal pain or nausea or vomiting.  Able to tolerate diet.  Objective   Objective   Vital Signs  Temp:  [97.4 °F (36.3 °C)-98.2 °F (36.8 °C)] 98 °F (36.7 °C)  Heart Rate:  [59-79] 59  Resp:  [18] 18  BP: (146-181)/() 162/92  SpO2:  [91 %-96 %] 94 %  on  Flow (L/min):  [0.5] 0.5;   Device (Oxygen Therapy): nasal cannula    Intake/Output Summary (Last 24 hours) at 2022 0902  Last data filed at 2022 1500  Gross per 24 hour   Intake 280 ml   Output --   Net 280 ml     Body mass index is 33.89 kg/m².      22  1326   Weight: 95.3 kg (210 lb)     Physical Exam    General.  Middle-aged gentleman.  Alert.  Oriented x2.  In no apparent pain/distress/diaphoresis.  Normal mood.  Flat affect.  Obese.  In restraints.    Eyes left pupil slightly larger than the right.  Intact extraocular musculature.  No pallor or jaundice.  Oral cavity.  Moist mucous membrane.  Neck.  Supple.  No JVD.  No lymphadenopathy or thyromegaly.  Cardiovascular.  Regular rate and rhythm with no gallops or murmurs.  Chest.  Poor to auscultation bilaterally with few scattered bilateral rhonchi.  Abdomen.  Soft lax.  No tenderness.  No organomegaly.  No guarding or rebound.  Extremities.  No clubbing cyanosis or edema.  CNS.  Left pupil slightly larger than the right.  Resolved right facial droop.  Resolved slurred speech .  Otherwise no focal neurological deficits.      Results Review:      Results from last 7 days    Lab Units 12/30/22  0506 12/29/22  0435 12/28/22  0540 12/27/22  0458 12/26/22  1452   SODIUM mmol/L 132* 136 135* 138 140   POTASSIUM mmol/L 3.5 3.6 4.0 3.4* 3.4*   CHLORIDE mmol/L 93* 95* 98 100 96*   CO2 mmol/L 32.9* 28.4 23.9 29.1* 26.0   BUN mg/dL 4* 6* 6* 6* 6*   CREATININE mg/dL 0.68* 0.67* 0.72* 0.81 0.83   GLUCOSE mg/dL 109* 53* 75 83 124*   CALCIUM mg/dL 8.7 8.5* 8.3* 7.9* 9.1   AST (SGOT) U/L 60* 62* 43* 41* 68*   ALT (SGPT) U/L 21 21 18 21 35     Estimated Creatinine Clearance: 118.6 mL/min (A) (by C-G formula based on SCr of 0.68 mg/dL (L)).      Results from last 7 days   Lab Units 12/30/22  0632 12/29/22  1259   GLUCOSE mg/dL 107 81                 Results from last 7 days   Lab Units 12/30/22  0506 12/29/22  0435 12/28/22  0540 12/27/22  0458 12/26/22  1452   MAGNESIUM mg/dL 2.0 1.3* 1.6 1.4* 1.1*           Invalid input(s): LDLCALC  Results from last 7 days   Lab Units 12/30/22  0506 12/29/22  0435 12/28/22  0540 12/27/22  0458 12/26/22  1452   WBC 10*3/mm3 6.89 7.48 5.74 6.34 11.21*   HEMOGLOBIN g/dL 15.4 15.9 14.6 14.0 16.9   HEMATOCRIT % 42.8 44.3 42.5 40.7 49.6   PLATELETS 10*3/mm3 96* 105* 112* 129* 217   MCV fL 91.8 92.7 95.7 96.7 94.5   MCH pg 33.0 33.3* 32.9 33.3* 32.2   MCHC g/dL 36.0* 35.9* 34.4 34.4 34.1   RDW % 12.9 12.8 13.2 13.4 13.7   RDW-SD fl 43.6 43.6 46.1 48.1 47.4   MPV fL 10.1 9.9 9.6 9.4 9.5   NEUTROPHIL % % 75.6 73.6 71.6 74.0 85.8*   LYMPHOCYTE % % 12.0* 15.4* 16.6* 15.3* 7.3*   MONOCYTES % % 10.4 8.4 9.4 9.0 5.8   EOSINOPHIL % % 1.6 1.6 1.4 0.9 0.1*   BASOPHIL % % 0.1 0.7 0.7 0.5 0.7   IMM GRAN % %  --   --   --   --  0.3   NEUTROS ABS 10*3/mm3 5.20 5.51 4.11 4.69 9.62*   LYMPHS ABS 10*3/mm3 0.83 1.15 0.95 0.97 0.82   MONOS ABS 10*3/mm3 0.72 0.63 0.54 0.57 0.65   EOS ABS 10*3/mm3 0.11 0.12 0.08 0.06 0.01   BASOS ABS 10*3/mm3 0.01 0.05 0.04 0.03 0.08   IMMATURE GRANS (ABS) 10*3/mm3  --   --   --   --  0.03   NRBC /100 WBC  --   --   --   --  0.0     Results from last 7 days    Lab Units 12/26/22  1453   INR  1.08                 Results from last 7 days   Lab Units 12/29/22  1646 12/26/22  1452   LIPASE U/L  --  22   AMMONIA umol/L 54  --                        Imaging:  Imaging Results (Last 24 Hours)     Procedure Component Value Units Date/Time    MRI Brain With & Without Contrast [[id number]] Collected: 12/29/22 1738     Updated: 12/29/22 1934    Narrative:      MRI OF THE BRAIN WITH AND WITHOUT CONTRAST     CLINICAL HISTORY: Facial droop.     TECHNIQUE: MRI of the brain was obtained with sagittal pre and  postgadolinium T1, axial pre and postgadolinium T1, coronal  postgadolinium T1, axial FLAIR, axial T2, axial diffusion, and axial  gradient echo images.     FINDINGS:     The study is mild to moderately compromised by motion artifact.  Otherwise, the ventricles, sulci, and cisterns are age-appropriate.  There are no abnormal foci of restricted diffusion. There are mild  changes of chronic small vessel ischemic phenomena. The major  intracranial flow related signal voids are unremarkable. There are no  abnormal foci of susceptibility artifact. No abnormal foci of contrast  enhancement are noted. The midline intracranial anatomy is within normal  limits.     Incidental note is made of a small amount of fluid signal intensity  within the mastoid air cells that are statistically likely  representative of incidental mastoid effusions. There is  mild-to-moderate mucosal thickening appreciated within the ethmoid air  cells, the left maxillary sinus, and the right aspect of the sphenoid  sinus. A mucus retention cyst is incidentally noted within the right  maxillary sinus.       Impression:         The study is compromised by motion artifact but otherwise demonstrates  no evidence for acute intracranial pathology.     Incidental note is made of mild-to-moderate changes of inflammatory  paranasal sinus disease.     This report was finalized on 12/29/2022 7:30 PM by Dr. Dhiraj Lerma M.D.                 I reviewed the patient's new clinical results / labs / tests / procedures      Assessment/Plan     Active Hospital Problems    Diagnosis  POA   • **Alcohol withdrawal syndrome without complication (HCC) [F10.930]  Yes   • Hypophosphatemia [E83.39]  Yes   • Acute adjustment disorder with mixed anxiety and depressed mood [F43.23]  Yes   • Alcoholic liver disease (HCC) [K70.9]  Yes   • Hypoxemia [R09.02]  Yes   • Hypomagnesemia [E83.42]  Yes   • Essential hypertension [I10]  Yes   • Hypokalemia [E87.6]  Yes   • Alcoholism (HCC) [F10.20]  Yes   • Anxiety [F41.9]  Yes   • Tobacco abuse [Z72.0]  Yes   • Kidney cancer, primary, with metastasis from kidney to other site (HCC) [C64.9]  Yes      Resolved Hospital Problems   No resolved problems to display.           · Alcohol abuse/alcohol withdrawal/alcoholic liver disease.  Resolved alcohol withdrawal.  Continue on CIWA protocol and detoxification with folate and thiamine.  Tolerating diet well.   Access center on the case.  Right upper quadrant ultrasound revealed fatty liver and gallstones.  Liver function test slightly worse today.    · New onset right facial droop/slurred speech/mentation changes.  On aspirin.  Resolved.  Negative MRI of the brain for acute CVA.  Continue aspirin and Lipitor.  Appreciate neuro help.  · History of anxiety/depression/mood disorder.  Stable on Luvox/Risperdal/Remeron as we advance the p.o.  · Hypertension.  No evidence of angina or congestive heart failure clinically.  Blood pressure continues to be elevated after resumption of Inderal.  Will add Norvasc and monitor.  · Dehydration/hypomagnesemia/hypokalemia/hypophosphatemia.  Resolved with substitution and IV fluid..  Continue electrolyte replacement protocol.  · Metastatic kidney cancer.  History of.  · Tobacco abuse.  Order nicotine patch  · Hypoxemia secondary to lung atelectasis.  Chest x-ray noted.  Continue incentive spirometry.  Continue to wean off oxygen.  · VTE  prophylaxis.  Lovenox.    Discussed my findings and plan of treatment with the patient/nurses at multidisciplinary round.  Disposition.  I will await physical therapy evaluation as the patient looks very weak to decide on disposition.  Mostly will need either home health physical therapy or SNF    Nadeen Cai MD  [facility]  12/30/22  09:02 EST        Review of Systems

## 2022-12-30 NOTE — PLAN OF CARE
Goal Outcome Evaluation:  Plan of Care Reviewed With: patient        Progress: improving  Outcome Evaluation: Pt. A&O x3.  Appeared to sleep most of the shift.  CIWA = 1.  No ativan given.  VSS.  WCTM for the rest of the shift.

## 2022-12-30 NOTE — CASE MANAGEMENT/SOCIAL WORK
Continued Stay Note  [facility]     Patient Name: Nilesh Zapata  MRN: [id number]  Today's Date: 12/30/2022    Admit Date: 12/26/2022    Plan: home vs snf   Discharge Plan     Row Name 12/30/22 1559       Plan    Plan home vs snf    Patient/Family in Agreement with Plan yes    Plan Comments Spoke with family via phone regarding discharge planning. Wife/ Jenny states that she is certain that patient will decline SNF at discharge, but verbalizes understanding that we need to have a plan for safe discharge. Wife to return call to weekend CCP, number given, with choices for rehab/SNF. Patient will require precert prior to placement. SPRING Vivra RN, CCP               Discharge Codes    No documentation.               Expected Discharge Date and Time     Expected Discharge Date Expected Discharge Time    Jan 2, 2023             Maddie Vivar RN

## 2022-12-30 NOTE — PROGRESS NOTES
Neurology update: MRI reviewed. No evidence of stroke. No further neurologic workup planned. Continue thiamine, CIWA protocol.

## 2022-12-31 LAB
ALBUMIN SERPL-MCNC: 3.5 G/DL (ref 3.5–5.2)
ALBUMIN/GLOB SERPL: 1.2 G/DL
ALP SERPL-CCNC: 88 U/L (ref 39–117)
ALT SERPL W P-5'-P-CCNC: 23 U/L (ref 1–41)
ANION GAP SERPL CALCULATED.3IONS-SCNC: 9.2 MMOL/L (ref 5–15)
AST SERPL-CCNC: 71 U/L (ref 1–40)
BASOPHILS # BLD AUTO: 0.02 10*3/MM3 (ref 0–0.2)
BASOPHILS NFR BLD AUTO: 0.3 % (ref 0–1.5)
BILIRUB SERPL-MCNC: 1.2 MG/DL (ref 0–1.2)
BUN SERPL-MCNC: 10 MG/DL (ref 8–23)
BUN/CREAT SERPL: 13 (ref 7–25)
CALCIUM SPEC-SCNC: 9.1 MG/DL (ref 8.6–10.5)
CHLORIDE SERPL-SCNC: 94 MMOL/L (ref 98–107)
CO2 SERPL-SCNC: 29.8 MMOL/L (ref 22–29)
CREAT SERPL-MCNC: 0.77 MG/DL (ref 0.76–1.27)
DEPRECATED RDW RBC AUTO: 47.2 FL (ref 37–54)
EGFRCR SERPLBLD CKD-EPI 2021: 100 ML/MIN/1.73
EOSINOPHIL # BLD AUTO: 0.12 10*3/MM3 (ref 0–0.4)
EOSINOPHIL NFR BLD AUTO: 1.6 % (ref 0.3–6.2)
ERYTHROCYTE [DISTWIDTH] IN BLOOD BY AUTOMATED COUNT: 13.2 % (ref 12.3–15.4)
GLOBULIN UR ELPH-MCNC: 3 GM/DL
GLUCOSE BLDC GLUCOMTR-MCNC: 139 MG/DL (ref 70–130)
GLUCOSE BLDC GLUCOMTR-MCNC: 144 MG/DL (ref 70–130)
GLUCOSE BLDC GLUCOMTR-MCNC: 211 MG/DL (ref 70–130)
GLUCOSE BLDC GLUCOMTR-MCNC: 93 MG/DL (ref 70–130)
GLUCOSE SERPL-MCNC: 90 MG/DL (ref 65–99)
HCT VFR BLD AUTO: 41.4 % (ref 37.5–51)
HGB BLD-MCNC: 14.2 G/DL (ref 13–17.7)
LYMPHOCYTES # BLD AUTO: 1.1 10*3/MM3 (ref 0.7–3.1)
LYMPHOCYTES NFR BLD AUTO: 14.4 % (ref 19.6–45.3)
MAGNESIUM SERPL-MCNC: 1.6 MG/DL (ref 1.6–2.4)
MCH RBC QN AUTO: 33.3 PG (ref 26.6–33)
MCHC RBC AUTO-ENTMCNC: 34.3 G/DL (ref 31.5–35.7)
MCV RBC AUTO: 97.2 FL (ref 79–97)
MONOCYTES # BLD AUTO: 0.99 10*3/MM3 (ref 0.1–0.9)
MONOCYTES NFR BLD AUTO: 12.9 % (ref 5–12)
NEUTROPHILS NFR BLD AUTO: 5.38 10*3/MM3 (ref 1.7–7)
NEUTROPHILS NFR BLD AUTO: 70.3 % (ref 42.7–76)
PHOSPHATE SERPL-MCNC: 2.2 MG/DL (ref 2.5–4.5)
PLATELET # BLD AUTO: 105 10*3/MM3 (ref 140–450)
PMV BLD AUTO: 10.9 FL (ref 6–12)
POTASSIUM SERPL-SCNC: 3.7 MMOL/L (ref 3.5–5.2)
PROT SERPL-MCNC: 6.5 G/DL (ref 6–8.5)
RBC # BLD AUTO: 4.26 10*6/MM3 (ref 4.14–5.8)
SODIUM SERPL-SCNC: 133 MMOL/L (ref 136–145)
WBC NRBC COR # BLD: 7.65 10*3/MM3 (ref 3.4–10.8)

## 2022-12-31 PROCEDURE — 82962 GLUCOSE BLOOD TEST: CPT

## 2022-12-31 PROCEDURE — 80053 COMPREHEN METABOLIC PANEL: CPT | Performed by: INTERNAL MEDICINE

## 2022-12-31 PROCEDURE — 83735 ASSAY OF MAGNESIUM: CPT | Performed by: INTERNAL MEDICINE

## 2022-12-31 PROCEDURE — 25010000002 ENOXAPARIN PER 10 MG: Performed by: INTERNAL MEDICINE

## 2022-12-31 PROCEDURE — 84100 ASSAY OF PHOSPHORUS: CPT | Performed by: INTERNAL MEDICINE

## 2022-12-31 PROCEDURE — 97110 THERAPEUTIC EXERCISES: CPT

## 2022-12-31 PROCEDURE — 97530 THERAPEUTIC ACTIVITIES: CPT

## 2022-12-31 PROCEDURE — 85025 COMPLETE CBC W/AUTO DIFF WBC: CPT | Performed by: INTERNAL MEDICINE

## 2022-12-31 RX ADMIN — Medication 1 TABLET: at 08:18

## 2022-12-31 RX ADMIN — PROPRANOLOL HYDROCHLORIDE 60 MG: 20 TABLET ORAL at 08:18

## 2022-12-31 RX ADMIN — PROPRANOLOL HYDROCHLORIDE 60 MG: 20 TABLET ORAL at 20:26

## 2022-12-31 RX ADMIN — ATORVASTATIN CALCIUM 40 MG: 20 TABLET, FILM COATED ORAL at 20:26

## 2022-12-31 RX ADMIN — Medication 1 PATCH: at 08:19

## 2022-12-31 RX ADMIN — RISPERIDONE 2 MG: 2 TABLET, FILM COATED ORAL at 08:18

## 2022-12-31 RX ADMIN — Medication 100 MG: at 08:18

## 2022-12-31 RX ADMIN — Medication 10 ML: at 20:26

## 2022-12-31 RX ADMIN — RISPERIDONE 2 MG: 2 TABLET, FILM COATED ORAL at 20:26

## 2022-12-31 RX ADMIN — FLUVOXAMINE MALEATE 100 MG: 50 TABLET, COATED ORAL at 20:26

## 2022-12-31 RX ADMIN — FAMOTIDINE 20 MG: 20 TABLET, FILM COATED ORAL at 06:39

## 2022-12-31 RX ADMIN — FAMOTIDINE 20 MG: 20 TABLET, FILM COATED ORAL at 18:35

## 2022-12-31 RX ADMIN — FOLIC ACID 1 MG: 1 TABLET ORAL at 08:18

## 2022-12-31 RX ADMIN — ASPIRIN 81 MG: 81 TABLET, COATED ORAL at 08:18

## 2022-12-31 RX ADMIN — ENOXAPARIN SODIUM 40 MG: 100 INJECTION SUBCUTANEOUS at 12:23

## 2022-12-31 RX ADMIN — AMLODIPINE BESYLATE 5 MG: 5 TABLET ORAL at 08:18

## 2022-12-31 RX ADMIN — MIRTAZAPINE 15 MG: 15 TABLET, FILM COATED ORAL at 20:26

## 2022-12-31 NOTE — NURSING NOTE
Patient was very sedate this AM. He could converse with me but was very confused and unable to stand. By the early afternoon, he was back to mostly normal, CIWA 0 and AOx4. Twice in the last 2 days he's received IV ativan for a greater than 11 CIWA score but has remained without withdrawal symptoms in between those doses which was more than a whole shift. Hope to avoid any ativan tonight and possible DC tomorrow. VSS

## 2022-12-31 NOTE — THERAPY PROGRESS REPORT/RE-CERT
Patient Name: Nilesh Zapata  : 1960    MRN: [id number]                              Today's Date: 2022       Admit Date: 2022    Visit Dx:     ICD-10-CM ICD-9-CM   1. Alcohol withdrawal syndrome without complication (HCC)  F10.930 291.81   2. Hypomagnesemia  E83.42 275.2   3. Hypokalemia  E87.6 276.8   4. Alcoholism (HCC)  F10.20 303.90   5. Follow-up exam  Z09 V67.9     Patient Active Problem List   Diagnosis   • Alcohol withdrawal syndrome with complication (HCC)   • Anxiety   • Fatty liver   • Tobacco abuse   • Kidney cancer, primary, with metastasis from kidney to other site (HCC)   • Alcoholism (HCC)   • Hyponatremia   • Hypokalemia   • Alcohol abuse   • History of renal cell cancer   • History of left nephrectomy 2/ RCC   • Liver lesion   • Lung nodule   • Vitamin D deficiency   • Under emotional stress   •  from spouse   • Coping style affecting medical condition   • Thrombocytopenia (HCC)   • Essential hypertension   • Alcohol withdrawal syndrome without complication (HCC)   • Hypomagnesemia   • Hypophosphatemia   • Acute adjustment disorder with mixed anxiety and depressed mood   • Alcoholic liver disease (HCC)   • Hypoxemia     Past Medical History:   Diagnosis Date   • Alcohol abuse    • Anxiety    • Delirium tremens (HCC) 2019   • Fatty liver    • FH: kidney cancer    • Hypertension      Past Surgical History:   Procedure Laterality Date   • APPENDECTOMY     • NEPHRECTOMY      left    • TONSILLECTOMY        General Information     Row Name 22 130          Physical Therapy Time and Intention    Document Type therapy note (daily note)  -CW     Mode of Treatment physical therapy  -CW     Row Name 22 1308          General Information    Patient Profile Reviewed yes  -CW     Existing Precautions/Restrictions fall  -CW     Row Name 22 1302          Cognition    Orientation Status (Cognition) oriented x 4  -CW     Row Name 22 1301          Safety  Issues, Functional Mobility    Impairments Affecting Function (Mobility) balance;endurance/activity tolerance;strength  -CW           User Key  (r) = Recorded By, (t) = Taken By, (c) = Cosigned By    Initials Name Provider Type    CW Frankie Gee PTA Physical Therapist Assistant               Mobility     Row Name 12/31/22 1309          Bed Mobility    Bed Mobility supine-sit  -CW     Supine-Sit [place] (Bed Mobility) verbal cues;contact guard  -CW     Assistive Device (Bed Mobility) bed rails;head of bed elevated  -CW     Row Name 12/31/22 1309          Sit-Stand Transfer    Sit-Stand [place] (Transfers) minimum assist (75% patient effort);1 person assist;verbal cues  -CW     Assistive Device (Sit-Stand Transfers) walker, front-wheeled  -CW     Row Name 12/31/22 1309          Gait/Stairs (Locomotion)    [place] Level (Gait) minimum assist (75% patient effort);1 person assist;verbal cues  -CW     Assistive Device (Gait) walker, front-wheeled  -CW     Deviations/Abnormal Patterns (Gait) ousmane decreased;gait speed decreased;festinating/shuffling;stride length decreased;weight shifting decreased  -CW     Bilateral Gait Deviations forward flexed posture;heel strike decreased  -CW           User Key  (r) = Recorded By, (t) = Taken By, (c) = Cosigned By    Initials Name Provider Type    Frankie Hurt PTA Physical Therapist Assistant               Obj/Interventions    No documentation.                Goals/Plan    No documentation.                Clinical Impression     Row Name 12/31/22 1310          Plan of Care Review    Plan of Care Reviewed With patient  -CW     Progress improving  -CW     Outcome Evaluation Pt able to increase with amb distance and act krissy.  Pt does require extra time when asked to do a task.  -CW     Row Name 12/31/22 1310          Therapy Assessment/Plan (PT)    Rehab Potential (PT) good, to achieve stated therapy goals  -CW     Criteria for Skilled Interventions  Met (PT) yes  -CW     Therapy Frequency (PT) 5 times/wk  -CW     Row Name 12/31/22 1310          Vital Signs    O2 Delivery Pre Treatment supplemental O2  -CW     O2 Delivery Intra Treatment room air  -CW     O2 Delivery Post Treatment supplemental O2  -CW     Row Name 12/31/22 1310          Positioning and Restraints    Pre-Treatment Position in bed  -CW     Post Treatment Position bed  -CW     In Bed notified nsg;supine;call light within reach;encouraged to call for assist;exit alarm on;side rails up x3  -CW           User Key  (r) = Recorded By, (t) = Taken By, (c) = Cosigned By    Initials Name Provider Type    CW Frankie Gee, PTA Physical Therapist Assistant               Outcome Measures     Row Name 12/31/22 1316          How much help from another person do you currently need...    Turning from your back to your side while in flat bed without using bedrails? 3  -CW     Moving from lying on back to sitting on the side of a flat bed without bedrails? 3  -CW     Moving to and from a bed to a chair (including a wheelchair)? 2  -CW     Standing up from a chair using your arms (e.g., wheelchair, bedside chair)? 3  -CW     Climbing 3-5 steps with a railing? 1  -CW     To walk in hospital room? 2  -CW     AM-PAC 6 Clicks Score (PT) 14  -CW     Highest level of mobility 4 --> Transferred to chair/commode  -CW     Row Name 12/31/22 1316          Functional Assessment    Outcome Measure Options AM-PAC 6 Clicks Basic Mobility (PT)  -CW           User Key  (r) = Recorded By, (t) = Taken By, (c) = Cosigned By    Initials Name Provider Type    Frankie Hurt PTA Physical Therapist Assistant                             Physical Therapy Education     Title: PT OT SLP Therapies (Done)     Topic: Physical Therapy (Done)     Point: Mobility training (Done)     Learning Progress Summary           Patient Acceptance, E,TB,D, VU,NR by  at 12/27/2022 3024                   Point: Home exercise program (Done)      Learning Progress Summary           Patient Acceptance, E,TB,D, VU,NR by  at 12/27/2022 1548                   Point: Body mechanics (Done)     Learning Progress Summary           Patient Acceptance, E,TB,D, VU,NR by  at 12/27/2022 1548                   Point: Precautions (Done)     Learning Progress Summary           Patient Acceptance, E,TB,D, VU,NR by  at 12/27/2022 1548                               User Key     Initials Effective Dates Name Provider Type Discipline     04/08/22 -  Traci Lee, PT Physical Therapist PT              PT Recommendation and Plan     Plan of Care Reviewed With: patient  Progress: improving  Outcome Evaluation: Pt able to increase with amb distance and act krissy.  Pt does require extra time when asked to do a task.     Time Calculation:    PT Charges     Row Name 12/31/22 1316             Time Calculation    Start Time 1248  -CW      Stop Time 1316  -CW      Time Calculation (min) 28 min  -CW      PT Received On 12/31/22  -CW      PT - Next Appointment 01/01/23  -CW         Timed Charges    07245 - PT Therapeutic Exercise Minutes 10  -CW      68001 - PT Therapeutic Activity Minutes 18  -CW         Total Minutes    Timed Charges Total Minutes 28  -CW       Total Minutes 28  -CW            User Key  (r) = Recorded By, (t) = Taken By, (c) = Cosigned By    Initials Name Provider Type    CW Frankie Gee PTA Physical Therapist Assistant              Therapy Charges for Today     Code Description Service Date Service Provider Modifiers Qty    11687242249 HC PT THER PROC EA 15 MIN 12/31/2022 Frankie Gee, PTA GP 1    08360564745 HC PT THERAPEUTIC ACT EA 15 MIN 12/31/2022 Frankie Gee PTA GP 1          PT G-Codes  Outcome Measure Options: AM-PAC 6 Clicks Basic Mobility (PT)  AM-PAC 6 Clicks Score (PT): 14  PT Discharge Summary  Anticipated Discharge Disposition (PT): home with assist, home with home health    Frankie Gee PTA  12/31/2022

## 2022-12-31 NOTE — PLAN OF CARE
Goal Outcome Evaluation:  Plan of Care Reviewed With: patient        Progress: no change  Outcome Evaluation: Pt.  slept the first half of the shift.  Report of headache, tremors and feeling anxious once during the shift with CIWA score of 14 and ativan given per MD order.  VSS.  WCTM for the rest of the shift.

## 2022-12-31 NOTE — NURSING NOTE
" Access follow up due to alcohol use. Chemical dependency counselor previously attempted assessment, however patient not appropriate at that time. Chart reveals CIWA score of 14 last night, Ativan provided. Upon entering room, patient is sitting in bed, eating breakfast. He continues to be tremulous and express anxiety. When asked how he's doing, he states \"not very good.\" He believes the date to be \"the 24th.\" He is \"real weak.\" Affect flat. He denies SI, cravings, AH/VH. He rates depression and anxiety both 10/10 \"because I'm here.\" He states his plan is to return to AA. He denies having a sponsor currently but would be open to that in the future. He denies need for residential when asked. He refuses alternate resources at this time and states he has what he needs for AA. Access will continue to follow.   "

## 2022-12-31 NOTE — PROGRESS NOTES
Name: Nilesh Zapata ADMIT: 2022   : 1960  PCP: Charles Farooq MD    MRN: [id number] LOS: 5 days   AGE/SEX: 62 y.o. male  ROOM: [facility]     Subjective   Subjective   Patient seen at bedside.       Objective   Objective   Vital Signs  Temp:  [97.2 °F (36.2 °C)-98.5 °F (36.9 °C)] 97.2 °F (36.2 °C)  Heart Rate:  [73-81] 81  Resp:  [18] 18  BP: (115-130)/(74-91) 115/91  SpO2:  [89 %] 89 %  on  Flow (L/min):  [0.5-4] 4;   Device (Oxygen Therapy): nasal cannula  Body mass index is 33.89 kg/m².  Physical Exam      General.  Middle-aged gentleman.  Alert.  Oriented x2.  In no apparent pain/distress/diaphoresis.  Normal mood.  Flat affect.  Obese.  In restraints.    Eyes left pupil slightly larger than the right.  Intact extraocular musculature.  No pallor or jaundice.  Oral cavity.  Moist mucous membrane.  Neck.  Supple.  No JVD.  No lymphadenopathy or thyromegaly.  Cardiovascular.  Regular rate and rhythm with no gallops or murmurs.  Chest.  Poor to auscultation bilaterally with few scattered bilateral rhonchi.  Abdomen.  Soft lax.  No tenderness.  No organomegaly.  No guarding or rebound.  Extremities.  No clubbing cyanosis or edema.  CNS.  Left pupil slightly larger than the right.  Resolved right facial droop.  Resolved slurred speech .  Otherwise no focal neurological deficits.        Results Review     I reviewed the patient's new clinical results.  Results from last 7 days   Lab Units 22  0537 22  0506 22  0435 22  0540   WBC 10*3/mm3 7.65 6.89 7.48 5.74   HEMOGLOBIN g/dL 14.2 15.4 15.9 14.6   PLATELETS 10*3/mm3 105* 96* 105* 112*     Results from last 7 days   Lab Units 22  0537 22  0506 22  0435 22  0540   SODIUM mmol/L 133* 132* 136 135*   POTASSIUM mmol/L 3.7 3.5 3.6 4.0   CHLORIDE mmol/L 94* 93* 95* 98   CO2 mmol/L 29.8* 32.9* 28.4 23.9   BUN mg/dL 10 4* 6* 6*   CREATININE mg/dL 0.77 0.68* 0.67* 0.72*   GLUCOSE mg/dL 90 109* 53* 75   EGFR  mL/min/1.73 100.0 103.8 104.3 102.0     Results from last 7 days   Lab Units 12/31/22  0537 12/30/22  0506 12/29/22  0435 12/28/22  0540   ALBUMIN g/dL 3.5 3.4* 3.6 3.5   BILIRUBIN mg/dL 1.2 1.5* 1.3* 1.3*   ALK PHOS U/L 88 91 97 94   AST (SGOT) U/L 71* 60* 62* 43*   ALT (SGPT) U/L 23 21 21 18     Results from last 7 days   Lab Units 12/31/22  0537 12/30/22  0506 12/29/22  0435 12/28/22  2204 12/28/22  0540   CALCIUM mg/dL 9.1 8.7 8.5*  --  8.3*   ALBUMIN g/dL 3.5 3.4* 3.6  --  3.5   MAGNESIUM mg/dL 1.6 2.0 1.3*  --  1.6   PHOSPHORUS mg/dL 2.2* 2.3* 2.8 2.7 1.2*       Glucose   Date/Time Value Ref Range Status   12/31/2022 1629 144 (H) 70 - 130 mg/dL Final     Comment:     Meter: [id number] : [id number] Mony Adriana NA   12/31/2022 1102 211 (H) 70 - 130 mg/dL Final     Comment:     Meter: [id number] : [id number] Mony Wright NA   12/31/2022 0555 93 70 - 130 mg/dL Final     Comment:     Meter: [id number] : mark Haley RN   12/30/2022 2056 186 (H) 70 - 130 mg/dL Final     Comment:     Meter: [id number] : [id number] Jazmin Warner NA   12/30/2022 1114 125 70 - 130 mg/dL Final     Comment:     Meter: [id number] : [id number] Nico Andrews PCA   12/30/2022 0632 107 70 - 130 mg/dL Final     Comment:     Meter: [id number] : [id number] Forrest Greene NA   12/29/2022 1259 81 70 - 130 mg/dL Final     Comment:     Meter: [id number] : [id number] Nico Andrews PCA       No radiology results for the last day  Scheduled Medications  amLODIPine, 5 mg, Oral, Q24H  aspirin, 81 mg, Oral, Daily  atorvastatin, 40 mg, Oral, Nightly  enoxaparin, 40 mg, Subcutaneous, Q24H  famotidine, 20 mg, Oral, BID AC  fluvoxaMINE, 100 mg, Oral, Nightly  folic acid, 1 mg, Oral, Daily  mirtazapine, 15 mg, Oral, Nightly  multivitamin, 1 tablet, Oral, Daily  nicotine, 1 patch, Transdermal, Q24H  propranolol, 60 mg, Oral, Q12H  risperiDONE, 2 mg, Oral, Q12H  sodium chloride, 10 mL, Intravenous,  Q12H  thiamine, 100 mg, Oral, Daily    Infusions  dextrose 5 % and sodium chloride 0.45 %, 125 mL/hr, Last Rate: 125 mL/hr (12/30/22 0234)    Diet  Diet: Regular/House Diet; Texture: Regular Texture (IDDSI 7); Fluid Consistency: Thin (IDDSI 0)       Assessment/Plan     Active Hospital Problems    Diagnosis  POA   • **Alcohol withdrawal syndrome without complication (HCC) [F10.930]  Yes   • Hypophosphatemia [E83.39]  Yes   • Acute adjustment disorder with mixed anxiety and depressed mood [F43.23]  Yes   • Alcoholic liver disease (HCC) [K70.9]  Yes   • Hypoxemia [R09.02]  Yes   • Hypomagnesemia [E83.42]  Yes   • Essential hypertension [I10]  Yes   • Hypokalemia [E87.6]  Yes   • Alcoholism (HCC) [F10.20]  Yes   • Anxiety [F41.9]  Yes   • Tobacco abuse [Z72.0]  Yes   • Kidney cancer, primary, with metastasis from kidney to other site (HCC) [C64.9]  Yes      Resolved Hospital Problems   No resolved problems to display.       62 y.o. male admitted with Alcohol withdrawal syndrome without complication (HCC).    Assessment and plan  • Alcohol abuse/alcohol withdrawal/alcoholic liver disease.  Resolved alcohol withdrawal.  Continue on CIWA protocol and detoxification with folate and thiamine.  Tolerating diet well.   Access center on the case.  Right upper quadrant ultrasound revealed fatty liver and gallstones.  Liver function test slightly worse today.    • New onset right facial droop/slurred speech/mentation changes.  On aspirin.  Resolved.  Negative MRI of the brain for acute CVA.  Continue aspirin and Lipitor.  Appreciate neuro help.  • History of anxiety/depression/mood disorder.  Stable on Luvox/Risperdal/Remeron as we advance the p.o.  • Hypertension.  No evidence of angina or congestive heart failure clinically.  Blood pressure continues to be elevated after resumption of Inderal.  Will add Norvasc and monitor.  • Dehydration/hypomagnesemia/hypokalemia/hypophosphatemia.  Resolved with substitution and IV fluid..   Continue electrolyte replacement protocol.  • Metastatic kidney cancer.  History of.  • Tobacco abuse.  Order nicotine patch  • Hypoxemia secondary to lung atelectasis.  Chest x-ray noted.  Continue incentive spirometry.  Continue to wean off oxygen.  • VTE prophylaxis.  Lovenox.      Yaakov Lopez MD  [facility]  12/31/22  16:57 EST

## 2022-12-31 NOTE — PLAN OF CARE
Goal Outcome Evaluation:  Plan of Care Reviewed With: patient        Progress: improving  Outcome Evaluation: Pt able to increase with amb distance and act krissy.  Pt does require extra time when asked to do a task.        Patient was wearing a face mask during this therapy encounter. Therapist used appropriate personal protective equipment including eye protection, mask, and gloves.  Mask used was standard procedure mask. Appropriate PPE was worn during the entire therapy session. Hand hygiene was completed before and after therapy session. Patient is not in enhanced droplet precautions.

## 2023-01-01 LAB
ALBUMIN SERPL-MCNC: 3.4 G/DL (ref 3.5–5.2)
ALBUMIN/GLOB SERPL: 1.1 G/DL
ALP SERPL-CCNC: 87 U/L (ref 39–117)
ALT SERPL W P-5'-P-CCNC: 25 U/L (ref 1–41)
ANION GAP SERPL CALCULATED.3IONS-SCNC: 7.3 MMOL/L (ref 5–15)
AST SERPL-CCNC: 87 U/L (ref 1–40)
BASOPHILS # BLD AUTO: 0.04 10*3/MM3 (ref 0–0.2)
BASOPHILS NFR BLD AUTO: 0.6 % (ref 0–1.5)
BILIRUB SERPL-MCNC: 1.2 MG/DL (ref 0–1.2)
BUN SERPL-MCNC: 11 MG/DL (ref 8–23)
BUN/CREAT SERPL: 14.9 (ref 7–25)
CALCIUM SPEC-SCNC: 9.2 MG/DL (ref 8.6–10.5)
CHLORIDE SERPL-SCNC: 96 MMOL/L (ref 98–107)
CO2 SERPL-SCNC: 31.7 MMOL/L (ref 22–29)
CREAT SERPL-MCNC: 0.74 MG/DL (ref 0.76–1.27)
DEPRECATED RDW RBC AUTO: 47.1 FL (ref 37–54)
EGFRCR SERPLBLD CKD-EPI 2021: 101.2 ML/MIN/1.73
EOSINOPHIL # BLD AUTO: 0.11 10*3/MM3 (ref 0–0.4)
EOSINOPHIL NFR BLD AUTO: 1.6 % (ref 0.3–6.2)
ERYTHROCYTE [DISTWIDTH] IN BLOOD BY AUTOMATED COUNT: 13.1 % (ref 12.3–15.4)
GLOBULIN UR ELPH-MCNC: 3.2 GM/DL
GLUCOSE BLDC GLUCOMTR-MCNC: 102 MG/DL (ref 70–130)
GLUCOSE BLDC GLUCOMTR-MCNC: 140 MG/DL (ref 70–130)
GLUCOSE BLDC GLUCOMTR-MCNC: 87 MG/DL (ref 70–130)
GLUCOSE BLDC GLUCOMTR-MCNC: 88 MG/DL (ref 70–130)
GLUCOSE SERPL-MCNC: 85 MG/DL (ref 65–99)
HCT VFR BLD AUTO: 43.2 % (ref 37.5–51)
HGB BLD-MCNC: 14.6 G/DL (ref 13–17.7)
LYMPHOCYTES # BLD AUTO: 1.23 10*3/MM3 (ref 0.7–3.1)
LYMPHOCYTES NFR BLD AUTO: 18 % (ref 19.6–45.3)
MAGNESIUM SERPL-MCNC: 1.6 MG/DL (ref 1.6–2.4)
MCH RBC QN AUTO: 33 PG (ref 26.6–33)
MCHC RBC AUTO-ENTMCNC: 33.8 G/DL (ref 31.5–35.7)
MCV RBC AUTO: 97.7 FL (ref 79–97)
MONOCYTES # BLD AUTO: 1.03 10*3/MM3 (ref 0.1–0.9)
MONOCYTES NFR BLD AUTO: 15.1 % (ref 5–12)
NEUTROPHILS NFR BLD AUTO: 4.41 10*3/MM3 (ref 1.7–7)
NEUTROPHILS NFR BLD AUTO: 64.4 % (ref 42.7–76)
PHOSPHATE SERPL-MCNC: 3.4 MG/DL (ref 2.5–4.5)
PLATELET # BLD AUTO: 112 10*3/MM3 (ref 140–450)
PMV BLD AUTO: 10.5 FL (ref 6–12)
POTASSIUM SERPL-SCNC: 3.6 MMOL/L (ref 3.5–5.2)
PROT SERPL-MCNC: 6.6 G/DL (ref 6–8.5)
RBC # BLD AUTO: 4.42 10*6/MM3 (ref 4.14–5.8)
SODIUM SERPL-SCNC: 135 MMOL/L (ref 136–145)
WBC NRBC COR # BLD: 6.84 10*3/MM3 (ref 3.4–10.8)

## 2023-01-01 PROCEDURE — 80053 COMPREHEN METABOLIC PANEL: CPT | Performed by: INTERNAL MEDICINE

## 2023-01-01 PROCEDURE — 85025 COMPLETE CBC W/AUTO DIFF WBC: CPT | Performed by: INTERNAL MEDICINE

## 2023-01-01 PROCEDURE — 82962 GLUCOSE BLOOD TEST: CPT

## 2023-01-01 PROCEDURE — 25010000002 ENOXAPARIN PER 10 MG: Performed by: INTERNAL MEDICINE

## 2023-01-01 PROCEDURE — 84100 ASSAY OF PHOSPHORUS: CPT | Performed by: INTERNAL MEDICINE

## 2023-01-01 PROCEDURE — 83735 ASSAY OF MAGNESIUM: CPT | Performed by: INTERNAL MEDICINE

## 2023-01-01 PROCEDURE — 97530 THERAPEUTIC ACTIVITIES: CPT

## 2023-01-01 RX ADMIN — FAMOTIDINE 20 MG: 20 TABLET, FILM COATED ORAL at 17:00

## 2023-01-01 RX ADMIN — RISPERIDONE 2 MG: 2 TABLET, FILM COATED ORAL at 08:07

## 2023-01-01 RX ADMIN — ENOXAPARIN SODIUM 40 MG: 100 INJECTION SUBCUTANEOUS at 11:25

## 2023-01-01 RX ADMIN — FLUVOXAMINE MALEATE 100 MG: 50 TABLET, COATED ORAL at 20:35

## 2023-01-01 RX ADMIN — FOLIC ACID 1 MG: 1 TABLET ORAL at 08:06

## 2023-01-01 RX ADMIN — MIRTAZAPINE 15 MG: 15 TABLET, FILM COATED ORAL at 20:35

## 2023-01-01 RX ADMIN — PROPRANOLOL HYDROCHLORIDE 60 MG: 20 TABLET ORAL at 08:07

## 2023-01-01 RX ADMIN — AMLODIPINE BESYLATE 5 MG: 5 TABLET ORAL at 08:06

## 2023-01-01 RX ADMIN — PROPRANOLOL HYDROCHLORIDE 60 MG: 20 TABLET ORAL at 20:35

## 2023-01-01 RX ADMIN — Medication 100 MG: at 08:07

## 2023-01-01 RX ADMIN — Medication 1 PATCH: at 08:07

## 2023-01-01 RX ADMIN — Medication 10 ML: at 08:07

## 2023-01-01 RX ADMIN — ASPIRIN 81 MG: 81 TABLET, COATED ORAL at 08:06

## 2023-01-01 RX ADMIN — Medication 10 ML: at 20:35

## 2023-01-01 RX ADMIN — ATORVASTATIN CALCIUM 40 MG: 20 TABLET, FILM COATED ORAL at 20:34

## 2023-01-01 RX ADMIN — FAMOTIDINE 20 MG: 20 TABLET, FILM COATED ORAL at 08:07

## 2023-01-01 RX ADMIN — RISPERIDONE 2 MG: 2 TABLET, FILM COATED ORAL at 20:35

## 2023-01-01 RX ADMIN — Medication 1 TABLET: at 08:06

## 2023-01-01 NOTE — PROGRESS NOTES
Name: Nilesh Zapata ADMIT: 2022   : 1960  PCP: Charles Farooq MD    MRN: [id number] LOS: 6 days   AGE/SEX: 62 y.o. male  ROOM: [facility]     Subjective   Subjective   Patient seen at bedside.       Objective   Objective   Vital Signs  Temp:  [97.6 °F (36.4 °C)-99.6 °F (37.6 °C)] 98 °F (36.7 °C)  Heart Rate:  [65-91] 76  Resp:  [16-18] 16  BP: (134-157)/() 157/109  SpO2:  [96 %-97 %] 97 %  on  Flow (L/min):  [3-4] 3;   Device (Oxygen Therapy): nasal cannula  Body mass index is 33.89 kg/m².  Physical Exam   General.  Middle-aged gentleman.  Alert.  Oriented x2.  In no apparent pain/distress/diaphoresis.  Normal mood.  Flat affect.  Obese.  In restraints.    Eyes left pupil slightly larger than the right.  Intact extraocular musculature.  No pallor or jaundice.  Oral cavity.  Moist mucous membrane.  Neck.  Supple.  No JVD.  No lymphadenopathy or thyromegaly.  Cardiovascular.  Regular rate and rhythm with no gallops or murmurs.  Chest.  Poor to auscultation bilaterally with few scattered bilateral rhonchi.  Abdomen.  Soft lax.  No tenderness.  No organomegaly.  No guarding or rebound.  Extremities.  No clubbing cyanosis or edema.  CNS.  Left pupil slightly larger than the right.  Resolved right facial droop.  Resolved slurred speech .  Otherwise no focal neurological deficits.        Results Review     I reviewed the patient's new clinical results.  Results from last 7 days   Lab Units 23  0557 22  0537 22  0506 22  0435   WBC 10*3/mm3 6.84 7.65 6.89 7.48   HEMOGLOBIN g/dL 14.6 14.2 15.4 15.9   PLATELETS 10*3/mm3 112* 105* 96* 105*     Results from last 7 days   Lab Units 23  0557 22  0537 22  0506 22  0435   SODIUM mmol/L 135* 133* 132* 136   POTASSIUM mmol/L 3.6 3.7 3.5 3.6   CHLORIDE mmol/L 96* 94* 93* 95*   CO2 mmol/L 31.7* 29.8* 32.9* 28.4   BUN mg/dL 11 10 4* 6*   CREATININE mg/dL 0.74* 0.77 0.68* 0.67*   GLUCOSE mg/dL 85 90 109* 53*    EGFR mL/min/1.73 101.2 100.0 103.8 104.3     Results from last 7 days   Lab Units 01/01/23  0557 12/31/22  0537 12/30/22  0506 12/29/22  0435   ALBUMIN g/dL 3.4* 3.5 3.4* 3.6   BILIRUBIN mg/dL 1.2 1.2 1.5* 1.3*   ALK PHOS U/L 87 88 91 97   AST (SGOT) U/L 87* 71* 60* 62*   ALT (SGPT) U/L 25 23 21 21     Results from last 7 days   Lab Units 01/01/23  0557 12/31/22  0537 12/30/22  0506 12/29/22  0435   CALCIUM mg/dL 9.2 9.1 8.7 8.5*   ALBUMIN g/dL 3.4* 3.5 3.4* 3.6   MAGNESIUM mg/dL 1.6 1.6 2.0 1.3*   PHOSPHORUS mg/dL 3.4 2.2* 2.3* 2.8       Glucose   Date/Time Value Ref Range Status   01/01/2023 1609 140 (H) 70 - 130 mg/dL Final     Comment:     Meter: [id number] : [id number] Tammire Callumina NA   01/01/2023 1109 87 70 - 130 mg/dL Final     Comment:     Meter: [id number] : [id number] Jasperradhaedgar Michelle NA   01/01/2023 0606 88 70 - 130 mg/dL Final     Comment:     Meter: [id number] : [id number] Jesus Hill RN   12/31/2022 2022 139 (H) 70 - 130 mg/dL Final     Comment:     Meter: [id number] : [id number] Samson SMITH NA   12/31/2022 1629 144 (H) 70 - 130 mg/dL Final     Comment:     Meter: [id number] : [id number] Ramuikare Khina NA   12/31/2022 1102 211 (H) 70 - 130 mg/dL Final     Comment:     Meter: [id number] : [id number] Adhikare Khina NA   12/31/2022 0555 93 70 - 130 mg/dL Final     Comment:     Meter: [id number] : nhjay Haley RN       No radiology results for the last day  Scheduled Medications  amLODIPine, 5 mg, Oral, Q24H  aspirin, 81 mg, Oral, Daily  atorvastatin, 40 mg, Oral, Nightly  enoxaparin, 40 mg, Subcutaneous, Q24H  famotidine, 20 mg, Oral, BID AC  fluvoxaMINE, 100 mg, Oral, Nightly  folic acid, 1 mg, Oral, Daily  mirtazapine, 15 mg, Oral, Nightly  multivitamin, 1 tablet, Oral, Daily  nicotine, 1 patch, Transdermal, Q24H  propranolol, 60 mg, Oral, Q12H  risperiDONE, 2 mg, Oral, Q12H  sodium chloride, 10 mL, Intravenous, Q12H  thiamine, 100 mg,  Oral, Daily    Infusions  dextrose 5 % and sodium chloride 0.45 %, 125 mL/hr, Last Rate: 125 mL/hr (12/30/22 0234)    Diet  Diet: Regular/House Diet; Texture: Regular Texture (IDDSI 7); Fluid Consistency: Thin (IDDSI 0)       Assessment/Plan     Active Hospital Problems    Diagnosis  POA   • **Alcohol withdrawal syndrome without complication (HCC) [F10.930]  Yes   • Hypophosphatemia [E83.39]  Yes   • Acute adjustment disorder with mixed anxiety and depressed mood [F43.23]  Yes   • Alcoholic liver disease (HCC) [K70.9]  Yes   • Hypoxemia [R09.02]  Yes   • Hypomagnesemia [E83.42]  Yes   • Essential hypertension [I10]  Yes   • Hypokalemia [E87.6]  Yes   • Alcoholism (HCC) [F10.20]  Yes   • Anxiety [F41.9]  Yes   • Tobacco abuse [Z72.0]  Yes   • Kidney cancer, primary, with metastasis from kidney to other site (HCC) [C64.9]  Yes      Resolved Hospital Problems   No resolved problems to display.       62 y.o. male admitted with Alcohol withdrawal syndrome without complication (HCC).      Assessment and plan  • Alcohol abuse/alcohol withdrawal/alcoholic liver disease.  Resolved alcohol withdrawal.  Continue on CIWA protocol and detoxification with folate and thiamine.  Tolerating diet well.   Access center on the case.  Right upper quadrant ultrasound revealed fatty liver and gallstones.        • New onset right facial droop/slurred speech/mentation changes.  On aspirin.  Resolved.  Negative MRI of the brain for acute CVA.  Continue aspirin and Lipitor.  Appreciate neuro help.      • History of anxiety/depression/mood disorder.  Stable on Luvox/Risperdal/Remeron as we advance the p.o.      • Hypertension.  No evidence of angina or congestive heart failure clinically.  Blood pressure continues to be elevated after resumption of Inderal.  Will add Norvasc and monitor.       • Dehydration/hypomagnesemia/hypokalemia/hypophosphatemia.  Resolved with substitution and IV fluid..  Continue electrolyte replacement  protocol.      • Metastatic kidney cancer.  Chronic condition.      • Tobacco abuse.  Order nicotine patch      • Hypoxemia secondary to lung atelectasis.  Chest x-ray noted.  Continue incentive spirometry.  Continue to wean off oxygen.      • VTE prophylaxis.  Lovenox.     Patient will likely need rehab placement.         Yaakov Lopez MD  [facility]  01/01/23  16:51 EST

## 2023-01-01 NOTE — PLAN OF CARE
Goal Outcome Evaluation:  Plan of Care Reviewed With: patient           Outcome Evaluation: Pt sleeping but arousable, agreeable to ambulation. Patient has no c/o pain, slow to answer, flat affect but cooperative and following all commands. Patient performed bed mobility with CGA, sit to stand with CGA, ambulated with very slow pace with rwx and CGA. Pt progressing with mobility.

## 2023-01-01 NOTE — NURSING NOTE
Access center follow up regarding ETOH: Last CIWA score of 4. No Ativan given overnight. Will follow for support.

## 2023-01-01 NOTE — PLAN OF CARE
Problem: Adult Inpatient Plan of Care  Goal: Plan of Care Review  Outcome: Ongoing, Progressing  Goal: Patient-Specific Goal (Individualized)  Outcome: Ongoing, Progressing  Goal: Absence of Hospital-Acquired Illness or Injury  Outcome: Ongoing, Progressing  Intervention: Identify and Manage Fall Risk  Recent Flowsheet Documentation  Taken 1/1/2023 1800 by Sandy Diop RN  Safety Promotion/Fall Prevention:   activity supervised   safety round/check completed   nonskid shoes/slippers when out of bed   lighting adjusted   clutter free environment maintained  Taken 1/1/2023 1600 by Sandy Diop RN  Safety Promotion/Fall Prevention:   activity supervised   safety round/check completed   nonskid shoes/slippers when out of bed   lighting adjusted   clutter free environment maintained  Taken 1/1/2023 1400 by Sandy Diop RN  Safety Promotion/Fall Prevention:   activity supervised   safety round/check completed   nonskid shoes/slippers when out of bed   lighting adjusted  Taken 1/1/2023 1200 by Sandy Diop RN  Safety Promotion/Fall Prevention:   activity supervised   safety round/check completed   nonskid shoes/slippers when out of bed   lighting adjusted   clutter free environment maintained  Taken 1/1/2023 1000 by Sandy Diop RN  Safety Promotion/Fall Prevention:   activity supervised   safety round/check completed   nonskid shoes/slippers when out of bed   lighting adjusted   fall prevention program maintained  Taken 1/1/2023 0800 by Sandy Diop RN  Safety Promotion/Fall Prevention:   activity supervised   safety round/check completed   nonskid shoes/slippers when out of bed   lighting adjusted   clutter free environment maintained  Intervention: Prevent Skin Injury  Recent Flowsheet Documentation  Taken 1/1/2023 1800 by Sandy Diop RN  Body Position: supine  Taken 1/1/2023 1600 by Sandy Diop RN  Body Position: position changed independently  Taken 1/1/2023 1400 by Sandy Diop RN  Body Position: sitting up in  bed  Skin Protection: adhesive use limited  Taken 1/1/2023 1200 by Sandy Diop RN  Body Position: position changed independently  Taken 1/1/2023 1000 by Sandy Diop RN  Body Position: position changed independently  Taken 1/1/2023 0800 by Sandy Diop RN  Body Position: position changed independently  Skin Protection: adhesive use limited  Intervention: Prevent and Manage VTE (Venous Thromboembolism) Risk  Recent Flowsheet Documentation  Taken 1/1/2023 1800 by Sandy Diop RN  Activity Management:   activity encouraged   activity adjusted per tolerance  Taken 1/1/2023 1600 by Sandy Diop RN  Activity Management:   activity adjusted per tolerance   activity encouraged  Taken 1/1/2023 1400 by Sandy Diop RN  Activity Management:   activity adjusted per tolerance   activity encouraged  Taken 1/1/2023 1200 by Sandy Diop RN  Activity Management:   activity adjusted per tolerance   activity encouraged  Taken 1/1/2023 1000 by Sandy Diop RN  Activity Management:   activity adjusted per tolerance   activity encouraged  Taken 1/1/2023 0800 by Sandy Diop RN  Activity Management:   activity adjusted per tolerance   activity encouraged  VTE Prevention/Management:   bilateral   sequential compression devices off   patient refused intervention  Goal: Optimal Comfort and Wellbeing  Outcome: Ongoing, Progressing  Intervention: Provide Person-Centered Care  Recent Flowsheet Documentation  Taken 1/1/2023 1400 by Sandy Diop RN  Trust Relationship/Rapport:   care explained   choices provided   emotional support provided   empathic listening provided   questions answered   questions encouraged   reassurance provided   thoughts/feelings acknowledged  Taken 1/1/2023 0800 by Sandy Diop RN  Trust Relationship/Rapport:   care explained   choices provided   emotional support provided   empathic listening provided   questions answered   questions encouraged   reassurance provided   thoughts/feelings acknowledged  Goal: Readiness  for Transition of Care  Outcome: Ongoing, Progressing     Problem: Fall Injury Risk  Goal: Absence of Fall and Fall-Related Injury  Outcome: Ongoing, Progressing  Intervention: Identify and Manage Contributors  Recent Flowsheet Documentation  Taken 1/1/2023 1800 by Sandy Diop RN  Medication Review/Management: medications reviewed  Self-Care Promotion:   independence encouraged   BADL personal routines maintained   BADL personal objects within reach   meal set-up provided  Taken 1/1/2023 1600 by Sandy Diop RN  Medication Review/Management: medications reviewed  Self-Care Promotion:   independence encouraged   BADL personal objects within reach   BADL personal routines maintained   meal set-up provided  Taken 1/1/2023 1400 by Sandy Diop RN  Medication Review/Management: medications reviewed  Self-Care Promotion:   independence encouraged   BADL personal objects within reach   BADL personal routines maintained   meal set-up provided  Taken 1/1/2023 1200 by Sandy Diop RN  Medication Review/Management: medications reviewed  Self-Care Promotion:   independence encouraged   BADL personal objects within reach   BADL personal routines maintained   meal set-up provided  Taken 1/1/2023 1000 by Sandy Diop RN  Medication Review/Management: medications reviewed  Self-Care Promotion:   independence encouraged   BADL personal objects within reach   BADL personal routines maintained   meal set-up provided  Taken 1/1/2023 0800 by Sandy Diop RN  Medication Review/Management: medications reviewed  Self-Care Promotion:   independence encouraged   BADL personal objects within reach   BADL personal routines maintained   meal set-up provided  Intervention: Promote Injury-Free Environment  Recent Flowsheet Documentation  Taken 1/1/2023 1800 by Sandy Dipo RN  Safety Promotion/Fall Prevention:   activity supervised   safety round/check completed   nonskid shoes/slippers when out of bed   lighting adjusted   clutter free  environment maintained  Taken 1/1/2023 1600 by Sandy Diop RN  Safety Promotion/Fall Prevention:   activity supervised   safety round/check completed   nonskid shoes/slippers when out of bed   lighting adjusted   clutter free environment maintained  Taken 1/1/2023 1400 by Sandy Diop RN  Safety Promotion/Fall Prevention:   activity supervised   safety round/check completed   nonskid shoes/slippers when out of bed   lighting adjusted  Taken 1/1/2023 1200 by Sandy Diop RN  Safety Promotion/Fall Prevention:   activity supervised   safety round/check completed   nonskid shoes/slippers when out of bed   lighting adjusted   clutter free environment maintained  Taken 1/1/2023 1000 by Sandy Diop RN  Safety Promotion/Fall Prevention:   activity supervised   safety round/check completed   nonskid shoes/slippers when out of bed   lighting adjusted   fall prevention program maintained  Taken 1/1/2023 0800 by Sandy Diop RN  Safety Promotion/Fall Prevention:   activity supervised   safety round/check completed   nonskid shoes/slippers when out of bed   lighting adjusted   clutter free environment maintained     Problem: Restraint, Nonbehavioral (Nonviolent)  Goal: Absence of Harm or Injury  Outcome: Ongoing, Progressing  Intervention: Implement Least Restrictive Safety Strategies  Recent Flowsheet Documentation  Taken 1/1/2023 1400 by Sandy Diop RN  Diversional Activities: television  Taken 1/1/2023 0800 by Sandy Diop RN  Diversional Activities: television  Intervention: Protect Dignity, Rights, and Personal Wellbeing  Recent Flowsheet Documentation  Taken 1/1/2023 1400 by Sandy Diop RN  Trust Relationship/Rapport:   care explained   choices provided   emotional support provided   empathic listening provided   questions answered   questions encouraged   reassurance provided   thoughts/feelings acknowledged  Taken 1/1/2023 0800 by Sandy Diop RN  Trust Relationship/Rapport:   care explained   choices provided    emotional support provided   empathic listening provided   questions answered   questions encouraged   reassurance provided   thoughts/feelings acknowledged  Intervention: Protect Skin and Joint Integrity  Recent Flowsheet Documentation  Taken 1/1/2023 1800 by Sandy Diop RN  Body Position: supine  Taken 1/1/2023 1600 by Sandy Diop RN  Body Position: position changed independently  Taken 1/1/2023 1400 by Sandy Diop RN  Body Position: sitting up in bed  Taken 1/1/2023 1200 by Sandy Diop RN  Body Position: position changed independently  Taken 1/1/2023 1000 by Sandy Diop RN  Body Position: position changed independently  Taken 1/1/2023 0800 by Sandy Diop RN  Body Position: position changed independently     Problem: Skin Injury Risk Increased  Goal: Skin Health and Integrity  Outcome: Ongoing, Progressing  Intervention: Optimize Skin Protection  Recent Flowsheet Documentation  Taken 1/1/2023 1800 by Sandy Diop RN  Head of Bed (HOB) Positioning: HOB elevated  Taken 1/1/2023 1600 by Sandy Diop RN  Head of Bed (HOB) Positioning: HOB elevated  Taken 1/1/2023 1400 by aSndy Diop RN  Pressure Reduction Techniques: frequent weight shift encouraged  Head of Bed (HOB) Positioning: HOB elevated  Pressure Reduction Devices: alternating pressure pump (ADD)  Skin Protection: adhesive use limited  Taken 1/1/2023 1200 by Sandy Diop RN  Head of Bed (HOB) Positioning: HOB elevated  Taken 1/1/2023 1000 by Sandy Diop RN  Head of Bed (HOB) Positioning: HOB elevated  Taken 1/1/2023 0800 by Sandy Diop RN  Pressure Reduction Techniques: frequent weight shift encouraged  Head of Bed (HOB) Positioning: HOB elevated  Pressure Reduction Devices: alternating pressure pump (ADD)  Skin Protection: adhesive use limited   Goal Outcome Evaluation:      Patient educated on plan of care. Patient stated understanding of education but needs reinforcement. Patient is oriented to self, confused but redirectable. Vital signs stable  throughout shift. Patient CIWA score 4. Patient denied pain and no ativan needed throughout shift.

## 2023-01-01 NOTE — THERAPY TREATMENT NOTE
Patient Name: Nilesh Zapata  : 1960    MRN: [id number]                              Today's Date: 2023       Admit Date: 2022    Visit Dx:     ICD-10-CM ICD-9-CM   1. Alcohol withdrawal syndrome without complication (HCC)  F10.930 291.81   2. Hypomagnesemia  E83.42 275.2   3. Hypokalemia  E87.6 276.8   4. Alcoholism (HCC)  F10.20 303.90   5. Follow-up exam  Z09 V67.9     Patient Active Problem List   Diagnosis   • Alcohol withdrawal syndrome with complication (HCC)   • Anxiety   • Fatty liver   • Tobacco abuse   • Kidney cancer, primary, with metastasis from kidney to other site (HCC)   • Alcoholism (HCC)   • Hyponatremia   • Hypokalemia   • Alcohol abuse   • History of renal cell cancer   • History of left nephrectomy 2/ RCC   • Liver lesion   • Lung nodule   • Vitamin D deficiency   • Under emotional stress   •  from spouse   • Coping style affecting medical condition   • Thrombocytopenia (HCC)   • Essential hypertension   • Alcohol withdrawal syndrome without complication (HCC)   • Hypomagnesemia   • Hypophosphatemia   • Acute adjustment disorder with mixed anxiety and depressed mood   • Alcoholic liver disease (HCC)   • Hypoxemia     Past Medical History:   Diagnosis Date   • Alcohol abuse    • Anxiety    • Delirium tremens (HCC) 2019   • Fatty liver    • FH: kidney cancer    • Hypertension      Past Surgical History:   Procedure Laterality Date   • APPENDECTOMY     • NEPHRECTOMY      left    • TONSILLECTOMY        General Information     Row Name 23 1433          Physical Therapy Time and Intention    Document Type therapy note (daily note)  -EM     Mode of Treatment individual therapy;physical therapy  -EM     Row Name 23 1433          General Information    Existing Precautions/Restrictions fall  -EM           User Key  (r) = Recorded By, (t) = Taken By, (c) = Cosigned By    Initials Name Provider Type    EM Sada Yun PT Physical Therapist                Mobility     Row Name 01/01/23 1433          Bed Mobility    Bed Mobility sit-supine  -EM     Supine-Sit [place] (Bed Mobility) contact guard  -EM     Sit-Supine [place] (Bed Mobility) standby assist  -EM     Assistive Device (Bed Mobility) head of bed elevated  -EM     Row Name 01/01/23 1433          Sit-Stand Transfer    Sit-Stand [place] (Transfers) contact guard  -EM     Assistive Device (Sit-Stand Transfers) walker, front-wheeled  -EM     Row Name 01/01/23 1433          Gait/Stairs (Locomotion)    [place] Level (Gait) contact guard  -EM     Assistive Device (Gait) walker, front-wheeled  -EM     Distance in Feet (Gait) 80  -EM     Deviations/Abnormal Patterns (Gait) gait speed decreased;stride length decreased  -EM     Bilateral Gait Deviations forward flexed posture;heel strike decreased  -EM           User Key  (r) = Recorded By, (t) = Taken By, (c) = Cosigned By    Initials Name Provider Type    Sada Levy PT Physical Therapist               Obj/Interventions    No documentation.                Goals/Plan    No documentation.                Clinical Impression     Row Name 01/01/23 1434          Pain    Pretreatment Pain Rating 0/10 - no pain  -EM     Row Name 01/01/23 1434          Plan of Care Review    Plan of Care Reviewed With patient  -EM     Outcome Evaluation Pt sleeping but arousable, agreeable to ambulation. Patient has no c/o pain, slow to answer, flat affect but cooperative and following all commands. Patient performed bed mobility with CGA, sit to stand with CGA, ambulated with very slow pace with rwx and CGA. Pt progressing with mobility.  -EM     Row Name 01/01/23 1434          Positioning and Restraints    Pre-Treatment Position in bed  -EM     Post Treatment Position bed  -EM     In Bed supine;call light within reach;exit alarm on  -EM           User Key  (r) = Recorded By, (t) = Taken By, (c) = Cosigned By    Initials Name Provider Type    MAGGY Yun  Sada DUFF, PT Physical Therapist               Outcome Measures     Row Name 01/01/23 1436 01/01/23 1000       How much help from another person do you currently need...    Turning from your back to your side while in flat bed without using bedrails? 3  -EM 3  -EB    Moving from lying on back to sitting on the side of a flat bed without bedrails? 3  -EM 3  -EB    Moving to and from a bed to a chair (including a wheelchair)? 3  -EM 2  -EB    Standing up from a chair using your arms (e.g., wheelchair, bedside chair)? 3  -EM 3  -EB    Climbing 3-5 steps with a railing? 3  -EM 1  -EB    To walk in hospital room? 3  -EM 2  -EB    AM-PAC 6 Clicks Score (PT) 18  -EM 14  -EB    Highest level of mobility 6 --> Walked 10 steps or more  -EM 4 --> Transferred to chair/commode  -EB    Row Name 01/01/23 0800          How much help from another person do you currently need...    Turning from your back to your side while in flat bed without using bedrails? 3  -EB     Moving from lying on back to sitting on the side of a flat bed without bedrails? 3  -EB     Moving to and from a bed to a chair (including a wheelchair)? 2  -EB     Standing up from a chair using your arms (e.g., wheelchair, bedside chair)? 3  -EB     Climbing 3-5 steps with a railing? 1  -EB     To walk in hospital room? 2  -EB     AM-PAC 6 Clicks Score (PT) 14  -EB     Highest level of mobility 4 --> Transferred to chair/commode  -EB           User Key  (r) = Recorded By, (t) = Taken By, (c) = Cosigned By    Initials Name Provider Type    Sada Levy, PT Physical Therapist    Sandy Vargas, RN Registered Nurse                             Physical Therapy Education     Title: PT OT SLP Therapies (Done)     Topic: Physical Therapy (Done)     Point: Mobility training (Done)     Learning Progress Summary           Patient Acceptance, E, VU by EM at 1/1/2023 1436    Acceptance, E,TB,D, VU,NR by  at 12/27/2022 1548                   Point: Home exercise  program (Done)     Learning Progress Summary           Patient Acceptance, E,TB,D, VU,NR by  at 12/27/2022 1548                   Point: Body mechanics (Done)     Learning Progress Summary           Patient Acceptance, E,TB,D, VU,NR by  at 12/27/2022 1548                   Point: Precautions (Done)     Learning Progress Summary           Patient Acceptance, E,TB,D, VU,NR by  at 12/27/2022 1548                               User Key     Initials Effective Dates Name Provider Type Discipline     06/16/21 -  Sada Yun PT Physical Therapist PT     04/08/22 -  Traci Lee PT Physical Therapist PT              PT Recommendation and Plan     Plan of Care Reviewed With: patient  Outcome Evaluation: Pt sleeping but arousable, agreeable to ambulation. Patient has no c/o pain, slow to answer, flat affect but cooperative and following all commands. Patient performed bed mobility with CGA, sit to stand with CGA, ambulated with very slow pace with rwx and CGA. Pt progressing with mobility.     Time Calculation:    PT Charges     Row Name 01/01/23 1437             Time Calculation    Start Time 1417  -EM      Stop Time 1427  -EM      Time Calculation (min) 10 min  -EM      PT Received On 01/01/23  -EM      PT - Next Appointment 01/03/23  -EM         Time Calculation- PT    Total Timed Code Minutes- PT 10 minute(s)  -EM         Timed Charges    03304 - PT Therapeutic Activity Minutes 10  -EM         Total Minutes    Timed Charges Total Minutes 10  -EM       Total Minutes 10  -EM            User Key  (r) = Recorded By, (t) = Taken By, (c) = Cosigned By    Initials Name Provider Type     Sada Yun PT Physical Therapist              Therapy Charges for Today     Code Description Service Date Service Provider Modifiers Qty    [id number]  PT THERAPEUTIC ACT EA 15 MIN 1/1/2023 Sada Yun PT GP 1          PT G-Codes  Outcome Measure Options: AM-PAC 6 Clicks Basic Mobility  (PT)  AM-PAC 6 Clicks Score (PT): 18  PT Discharge Summary  Anticipated Discharge Disposition (PT): home with assist, home with home health    Sada Yun, PT  1/1/2023

## 2023-01-02 ENCOUNTER — APPOINTMENT (OUTPATIENT)
Dept: GENERAL RADIOLOGY | Facility: HOSPITAL | Age: 63
DRG: 897 | End: 2023-01-02
Payer: MEDICARE

## 2023-01-02 LAB
ALBUMIN SERPL-MCNC: 3.3 G/DL (ref 3.5–5.2)
ALBUMIN/GLOB SERPL: 1 G/DL
ALP SERPL-CCNC: 86 U/L (ref 39–117)
ALT SERPL W P-5'-P-CCNC: 32 U/L (ref 1–41)
ANION GAP SERPL CALCULATED.3IONS-SCNC: 7.3 MMOL/L (ref 5–15)
AST SERPL-CCNC: 94 U/L (ref 1–40)
BASOPHILS # BLD AUTO: 0.05 10*3/MM3 (ref 0–0.2)
BASOPHILS NFR BLD AUTO: 0.8 % (ref 0–1.5)
BILIRUB SERPL-MCNC: 1.1 MG/DL (ref 0–1.2)
BUN SERPL-MCNC: 16 MG/DL (ref 8–23)
BUN/CREAT SERPL: 20 (ref 7–25)
CALCIUM SPEC-SCNC: 9 MG/DL (ref 8.6–10.5)
CHLORIDE SERPL-SCNC: 98 MMOL/L (ref 98–107)
CO2 SERPL-SCNC: 31.7 MMOL/L (ref 22–29)
CREAT SERPL-MCNC: 0.8 MG/DL (ref 0.76–1.27)
DEPRECATED RDW RBC AUTO: 46.1 FL (ref 37–54)
EGFRCR SERPLBLD CKD-EPI 2021: 98.8 ML/MIN/1.73
EOSINOPHIL # BLD AUTO: 0.17 10*3/MM3 (ref 0–0.4)
EOSINOPHIL NFR BLD AUTO: 2.6 % (ref 0.3–6.2)
ERYTHROCYTE [DISTWIDTH] IN BLOOD BY AUTOMATED COUNT: 13 % (ref 12.3–15.4)
GLOBULIN UR ELPH-MCNC: 3.3 GM/DL
GLUCOSE BLDC GLUCOMTR-MCNC: 102 MG/DL (ref 70–130)
GLUCOSE BLDC GLUCOMTR-MCNC: 76 MG/DL (ref 70–130)
GLUCOSE SERPL-MCNC: 85 MG/DL (ref 65–99)
HCT VFR BLD AUTO: 41.1 % (ref 37.5–51)
HGB BLD-MCNC: 14.1 G/DL (ref 13–17.7)
LYMPHOCYTES # BLD AUTO: 1.17 10*3/MM3 (ref 0.7–3.1)
LYMPHOCYTES NFR BLD AUTO: 18 % (ref 19.6–45.3)
MAGNESIUM SERPL-MCNC: 1.6 MG/DL (ref 1.6–2.4)
MCH RBC QN AUTO: 33.4 PG (ref 26.6–33)
MCHC RBC AUTO-ENTMCNC: 34.3 G/DL (ref 31.5–35.7)
MCV RBC AUTO: 97.4 FL (ref 79–97)
MONOCYTES # BLD AUTO: 1 10*3/MM3 (ref 0.1–0.9)
MONOCYTES NFR BLD AUTO: 15.4 % (ref 5–12)
NEUTROPHILS NFR BLD AUTO: 4.09 10*3/MM3 (ref 1.7–7)
NEUTROPHILS NFR BLD AUTO: 62.7 % (ref 42.7–76)
PHOSPHATE SERPL-MCNC: 3.5 MG/DL (ref 2.5–4.5)
PLATELET # BLD AUTO: 139 10*3/MM3 (ref 140–450)
PMV BLD AUTO: 10.5 FL (ref 6–12)
POTASSIUM SERPL-SCNC: 3.9 MMOL/L (ref 3.5–5.2)
PROT SERPL-MCNC: 6.6 G/DL (ref 6–8.5)
RBC # BLD AUTO: 4.22 10*6/MM3 (ref 4.14–5.8)
SODIUM SERPL-SCNC: 137 MMOL/L (ref 136–145)
WBC NRBC COR # BLD: 6.51 10*3/MM3 (ref 3.4–10.8)

## 2023-01-02 PROCEDURE — 85025 COMPLETE CBC W/AUTO DIFF WBC: CPT | Performed by: INTERNAL MEDICINE

## 2023-01-02 PROCEDURE — 82962 GLUCOSE BLOOD TEST: CPT

## 2023-01-02 PROCEDURE — 94640 AIRWAY INHALATION TREATMENT: CPT

## 2023-01-02 PROCEDURE — 94799 UNLISTED PULMONARY SVC/PX: CPT

## 2023-01-02 PROCEDURE — 83735 ASSAY OF MAGNESIUM: CPT | Performed by: INTERNAL MEDICINE

## 2023-01-02 PROCEDURE — 84100 ASSAY OF PHOSPHORUS: CPT | Performed by: INTERNAL MEDICINE

## 2023-01-02 PROCEDURE — 94761 N-INVAS EAR/PLS OXIMETRY MLT: CPT

## 2023-01-02 PROCEDURE — 25010000002 ENOXAPARIN PER 10 MG: Performed by: INTERNAL MEDICINE

## 2023-01-02 PROCEDURE — 80053 COMPREHEN METABOLIC PANEL: CPT | Performed by: INTERNAL MEDICINE

## 2023-01-02 PROCEDURE — 94760 N-INVAS EAR/PLS OXIMETRY 1: CPT

## 2023-01-02 PROCEDURE — 71046 X-RAY EXAM CHEST 2 VIEWS: CPT

## 2023-01-02 PROCEDURE — 0 MAGNESIUM SULFATE 4 GM/100ML SOLUTION: Performed by: INTERNAL MEDICINE

## 2023-01-02 RX ORDER — LABETALOL HYDROCHLORIDE 5 MG/ML
10 INJECTION, SOLUTION INTRAVENOUS
Status: DISCONTINUED | OUTPATIENT
Start: 2023-01-02 | End: 2023-01-03 | Stop reason: HOSPADM

## 2023-01-02 RX ORDER — IPRATROPIUM BROMIDE AND ALBUTEROL SULFATE 2.5; .5 MG/3ML; MG/3ML
3 SOLUTION RESPIRATORY (INHALATION)
Status: DISCONTINUED | OUTPATIENT
Start: 2023-01-02 | End: 2023-01-03 | Stop reason: HOSPADM

## 2023-01-02 RX ADMIN — AMLODIPINE BESYLATE 5 MG: 5 TABLET ORAL at 08:20

## 2023-01-02 RX ADMIN — ASPIRIN 81 MG: 81 TABLET, COATED ORAL at 08:20

## 2023-01-02 RX ADMIN — MAGNESIUM SULFATE HEPTAHYDRATE 4 G: 40 INJECTION, SOLUTION INTRAVENOUS at 15:19

## 2023-01-02 RX ADMIN — RISPERIDONE 2 MG: 2 TABLET, FILM COATED ORAL at 20:11

## 2023-01-02 RX ADMIN — RISPERIDONE 2 MG: 2 TABLET, FILM COATED ORAL at 08:21

## 2023-01-02 RX ADMIN — ATORVASTATIN CALCIUM 40 MG: 20 TABLET, FILM COATED ORAL at 20:10

## 2023-01-02 RX ADMIN — FAMOTIDINE 20 MG: 20 TABLET, FILM COATED ORAL at 16:54

## 2023-01-02 RX ADMIN — IPRATROPIUM BROMIDE AND ALBUTEROL SULFATE 3 ML: .5; 3 SOLUTION RESPIRATORY (INHALATION) at 10:27

## 2023-01-02 RX ADMIN — FOLIC ACID 1 MG: 1 TABLET ORAL at 08:20

## 2023-01-02 RX ADMIN — PROPRANOLOL HYDROCHLORIDE 60 MG: 20 TABLET ORAL at 08:20

## 2023-01-02 RX ADMIN — Medication 10 ML: at 08:22

## 2023-01-02 RX ADMIN — Medication 100 MG: at 08:20

## 2023-01-02 RX ADMIN — FLUVOXAMINE MALEATE 100 MG: 50 TABLET, COATED ORAL at 20:11

## 2023-01-02 RX ADMIN — Medication 1 TABLET: at 08:20

## 2023-01-02 RX ADMIN — PROPRANOLOL HYDROCHLORIDE 60 MG: 20 TABLET ORAL at 20:10

## 2023-01-02 RX ADMIN — MIRTAZAPINE 15 MG: 15 TABLET, FILM COATED ORAL at 20:11

## 2023-01-02 RX ADMIN — IPRATROPIUM BROMIDE AND ALBUTEROL SULFATE 3 ML: .5; 3 SOLUTION RESPIRATORY (INHALATION) at 15:02

## 2023-01-02 RX ADMIN — IPRATROPIUM BROMIDE AND ALBUTEROL SULFATE 3 ML: .5; 3 SOLUTION RESPIRATORY (INHALATION) at 19:22

## 2023-01-02 RX ADMIN — Medication 1 PATCH: at 08:23

## 2023-01-02 RX ADMIN — Medication 10 ML: at 20:11

## 2023-01-02 RX ADMIN — ENOXAPARIN SODIUM 40 MG: 100 INJECTION SUBCUTANEOUS at 10:49

## 2023-01-02 RX ADMIN — FAMOTIDINE 20 MG: 20 TABLET, FILM COATED ORAL at 08:20

## 2023-01-02 NOTE — PROGRESS NOTES
[facility] Medicine Services  PROGRESS NOTE    Patient Name: Nilesh Zapata  : 1960  MRN: [id number]    Date of Admission: 2022  Primary Care Physician: Charles Farooq MD    Subjective   Subjective     CC:  Follow-up facial droop and alcohol withdrawal    Subjective: Patient still feels quite weak.  He is trying to decide if he feels comfortable going home or if he needs to go to a facility.  He still does not feel quite ready to go home today.  He denies any further tremors.  He is a little short of breath.  He denies ever formally being diagnosed with COPD.  He does not take an inhaler at home and is not on supplemental oxygen.  He is currently 87 to 88% on room air and 92% on 2 L    Review of Systems  No current fevers or chills  No current nausea, vomiting, or diarrhea  No current chest pain or palpitations    Objective   Objective     Vital Signs:   Temp:  [97.3 °F (36.3 °C)-98 °F (36.7 °C)] 97.8 °F (36.6 °C)  Heart Rate:  [69-81] 81  Resp:  [16] 16  BP: (117-157)/() 123/74        Physical Exam:  Constitutional:Awake, alert, no acute distress  HENT: NCAT, mucous membranes moist, neck supple  Respiratory: No cough, no wheezes, rare coarse sounds, respiratory effort normal, nonlabored breathing   Cardiovascular: RRR, normal radial pulses  Gastrointestinal: Positive bowel sounds, soft, nontender, nondistended  Musculoskeletal: Debilitated and obese in appearance, BMI is 34, minimal lower extremity edema  Psychiatric: Somewhat anxious affect, cooperative, conversational  Neurologic: No slurred speech or facial droop, follows commands  Skin: No rashes or jaundice, warm      Results Reviewed:  Results from last 7 days   Lab Units 23  0604 23  0557 22  0537 22  0458 22  1453   WBC 10*3/mm3 6.51 6.84 7.65   < >  --    HEMOGLOBIN g/dL 14.1 14.6 14.2   < >  --    HEMATOCRIT % 41.1 43.2 41.4   < >  --    PLATELETS 10*3/mm3 139* 112* 105*   < >  --    INR   --    --   --   --  1.08    < > = values in this interval not displayed.     Results from last 7 days   Lab Units 01/02/23  0604 01/01/23  0557 12/31/22  0537   SODIUM mmol/L 137 135* 133*   POTASSIUM mmol/L 3.9 3.6 3.7   CHLORIDE mmol/L 98 96* 94*   CO2 mmol/L 31.7* 31.7* 29.8*   BUN mg/dL 16 11 10   CREATININE mg/dL 0.80 0.74* 0.77   GLUCOSE mg/dL 85 85 90   CALCIUM mg/dL 9.0 9.2 9.1   ALT (SGPT) U/L 32 25 23   AST (SGOT) U/L 94* 87* 71*     Estimated Creatinine Clearance: 100.8 mL/min (by C-G formula based on SCr of 0.8 mg/dL).    Microbiology Results Abnormal     None          Imaging Results (Last 24 Hours)     ** No results found for the last 24 hours. **              I have reviewed the medications:  Scheduled Meds:amLODIPine, 5 mg, Oral, Q24H  aspirin, 81 mg, Oral, Daily  atorvastatin, 40 mg, Oral, Nightly  enoxaparin, 40 mg, Subcutaneous, Q24H  famotidine, 20 mg, Oral, BID AC  fluvoxaMINE, 100 mg, Oral, Nightly  folic acid, 1 mg, Oral, Daily  ipratropium-albuterol, 3 mL, Nebulization, 4x Daily - RT  mirtazapine, 15 mg, Oral, Nightly  multivitamin, 1 tablet, Oral, Daily  nicotine, 1 patch, Transdermal, Q24H  propranolol, 60 mg, Oral, Q12H  risperiDONE, 2 mg, Oral, Q12H  sodium chloride, 10 mL, Intravenous, Q12H  thiamine, 100 mg, Oral, Daily      Continuous Infusions:   PRN Meds:.•  hydrALAZINE  •  influenza vaccine  •  magnesium sulfate **OR** magnesium sulfate **OR** magnesium sulfate  •  nitroglycerin  •  OLANZapine  •  potassium phosphate infusion greater than 15 mMoles **OR** potassium phosphate infusion greater than 15 mMoles **OR** potassium phosphate **OR** sodium phosphate IVPB **OR** sodium phosphate IVPB  •  [COMPLETED] Insert Peripheral IV **AND** sodium chloride  •  sodium chloride  •  sodium chloride    Assessment & Plan   Assessment & Plan     Active Hospital Problems    Diagnosis  POA   • **Alcohol withdrawal syndrome without complication (HCC) [F10.930]  Yes   • Hypophosphatemia [E83.39]  Yes   •  Acute adjustment disorder with mixed anxiety and depressed mood [F43.23]  Yes   • Alcoholic liver disease (HCC) [K70.9]  Yes   • Hypoxemia [R09.02]  Yes   • Hypomagnesemia [E83.42]  Yes   • Essential hypertension [I10]  Yes   • Hypokalemia [E87.6]  Yes   • Alcoholism (HCC) [F10.20]  Yes   • Anxiety [F41.9]  Yes   • Tobacco abuse [Z72.0]  Yes   • Kidney cancer, primary, with metastasis from kidney to other site (HCC) [C64.9]  Yes      Resolved Hospital Problems   No resolved problems to display.        Brief Hospital Course to date:  Nilesh Zapata is a 62 y.o. male presents the hospital with slurred speech and alcohol withdrawal syndrome.    Discussion/plan today:  Alcohol withdrawal seems to be resolving.  Slurred speech has been ruled out for stroke and is likely also related to alcohol issues.  Patient today on my examination is hypoxic at 88% on room air and 92% on 2 L nasal cannula.  Patient does not have home oxygen.  Uncertain if he may need this.  Chest x-ray images reviewed and seems to show atelectasis at admission.  Plan to repeat chest x-ray today.  Start flutter valve and incentive spirometer.  Add duo nebs for probably undiagnosed COPD as patient has extensive smoking history.  Obesity also is not helping.  Smoking cessation counseled.  I have encouraged patient to enroll in AA alcohol rehab program at discharge.  Continue physical therapy.  Patient may require a walker at discharge.  Mood disorder reasonably stable on current mood stabilizing medications.  Treatment plan discussed with the patient is in agreement    Hopefully if patient has improvement in his issues he can discharge home tomorrow.    CODE STATUS:   Code Status and Medical Interventions:   Ordered at: 12/26/22 1727     Code Status (Patient has no pulse and is not breathing):    CPR (Attempt to Resuscitate)     Medical Interventions (Patient has pulse or is breathing):    Full Support       Mariusz Powers MD  01/02/23

## 2023-01-02 NOTE — NURSING NOTE
Access center follow up note, regarding ETOH use.  Latest CIWA was 4.  Upon entering the room, patient is sitting up in bed eating breakfast.  Patient states his appetite is a lot better.  Patient states he is doing better and does not have any withdrawal symptoms currently.  Patient plans to go to AA meetings and group therapy for her ETOH use treatment after discharge.    Access will continue to follow.

## 2023-01-02 NOTE — CASE MANAGEMENT/SOCIAL WORK
Continued Stay Note  [facility]     Patient Name: Nilesh Zapata  MRN: [id number]  Today's Date: 1/2/2023    Admit Date: 12/26/2022    Plan: home via family transport   Discharge Plan     Row Name 01/02/23 1416       Plan    Plan home via family transport    Patient/Family in Agreement with Plan yes    Plan Comments Spoke with patient at bedside. Introduced self and explained CCP role. Patient states that he has a walker at home; Patient currently on o2 at 2L, offered to place referrals for home oxygen needs patient declined. Patient offered/declined home health/ DME referrals. CCP will follow hospital course for needs. SPRING Vivar RN, CCP               Discharge Codes    No documentation.               Expected Discharge Date and Time     Expected Discharge Date Expected Discharge Time    Jan 2, 2023             Maddie Vivar RN

## 2023-01-02 NOTE — PLAN OF CARE
Goal Outcome Evaluation:  Plan of Care Reviewed With: patient        Progress: no change  Outcome Evaluation: Pt admitted on 12/26 for Alcohol withdraws. CIWA score being monitored (2,6), no ativan given this shift. New IV placed. Increased oxygen from 3L to 4L. Chest Xray obtained for hypoxia, see results reviewed. IS encourgaed.  Mg replaced, re-draw in the AM. Ambulated to the bathrooom. No complaints of pain.

## 2023-01-03 ENCOUNTER — TELEPHONE (OUTPATIENT)
Dept: FAMILY MEDICINE CLINIC | Facility: CLINIC | Age: 63
End: 2023-01-03

## 2023-01-03 ENCOUNTER — READMISSION MANAGEMENT (OUTPATIENT)
Dept: CALL CENTER | Facility: HOSPITAL | Age: 63
End: 2023-01-03
Payer: MEDICARE

## 2023-01-03 VITALS
RESPIRATION RATE: 18 BRPM | HEIGHT: 66 IN | DIASTOLIC BLOOD PRESSURE: 67 MMHG | HEART RATE: 70 BPM | TEMPERATURE: 98.1 F | WEIGHT: 210 LBS | SYSTOLIC BLOOD PRESSURE: 122 MMHG | OXYGEN SATURATION: 91 % | BODY MASS INDEX: 33.75 KG/M2

## 2023-01-03 PROBLEM — E83.39 HYPOPHOSPHATEMIA: Status: RESOLVED | Noted: 2022-12-27 | Resolved: 2023-01-03

## 2023-01-03 PROBLEM — E83.42 HYPOMAGNESEMIA: Status: RESOLVED | Noted: 2022-12-26 | Resolved: 2023-01-03

## 2023-01-03 PROBLEM — F10.930 ALCOHOL WITHDRAWAL SYNDROME WITHOUT COMPLICATION: Status: RESOLVED | Noted: 2022-12-26 | Resolved: 2023-01-03

## 2023-01-03 PROBLEM — E87.6 HYPOKALEMIA: Status: RESOLVED | Noted: 2017-12-21 | Resolved: 2023-01-03

## 2023-01-03 LAB
ALBUMIN SERPL-MCNC: 3.4 G/DL (ref 3.5–5.2)
ALBUMIN/GLOB SERPL: 1.1 G/DL
ALP SERPL-CCNC: 100 U/L (ref 39–117)
ALT SERPL W P-5'-P-CCNC: 30 U/L (ref 1–41)
ANION GAP SERPL CALCULATED.3IONS-SCNC: 6 MMOL/L (ref 5–15)
AST SERPL-CCNC: 68 U/L (ref 1–40)
BASOPHILS # BLD AUTO: 0.04 10*3/MM3 (ref 0–0.2)
BASOPHILS NFR BLD AUTO: 0.6 % (ref 0–1.5)
BILIRUB SERPL-MCNC: 0.9 MG/DL (ref 0–1.2)
BUN SERPL-MCNC: 18 MG/DL (ref 8–23)
BUN/CREAT SERPL: 22.2 (ref 7–25)
CALCIUM SPEC-SCNC: 9 MG/DL (ref 8.6–10.5)
CHLORIDE SERPL-SCNC: 97 MMOL/L (ref 98–107)
CO2 SERPL-SCNC: 31 MMOL/L (ref 22–29)
CREAT SERPL-MCNC: 0.81 MG/DL (ref 0.76–1.27)
DEPRECATED RDW RBC AUTO: 46.2 FL (ref 37–54)
EGFRCR SERPLBLD CKD-EPI 2021: 98.5 ML/MIN/1.73
EOSINOPHIL # BLD AUTO: 0.17 10*3/MM3 (ref 0–0.4)
EOSINOPHIL NFR BLD AUTO: 2.5 % (ref 0.3–6.2)
ERYTHROCYTE [DISTWIDTH] IN BLOOD BY AUTOMATED COUNT: 12.8 % (ref 12.3–15.4)
GLOBULIN UR ELPH-MCNC: 3.2 GM/DL
GLUCOSE BLDC GLUCOMTR-MCNC: 85 MG/DL (ref 70–130)
GLUCOSE SERPL-MCNC: 85 MG/DL (ref 65–99)
HCT VFR BLD AUTO: 40.4 % (ref 37.5–51)
HGB BLD-MCNC: 13.5 G/DL (ref 13–17.7)
IMM GRANULOCYTES # BLD AUTO: 0.02 10*3/MM3 (ref 0–0.05)
IMM GRANULOCYTES NFR BLD AUTO: 0.3 % (ref 0–0.5)
LYMPHOCYTES # BLD AUTO: 1.31 10*3/MM3 (ref 0.7–3.1)
LYMPHOCYTES NFR BLD AUTO: 19.2 % (ref 19.6–45.3)
MAGNESIUM SERPL-MCNC: 2 MG/DL (ref 1.6–2.4)
MCH RBC QN AUTO: 32.8 PG (ref 26.6–33)
MCHC RBC AUTO-ENTMCNC: 33.4 G/DL (ref 31.5–35.7)
MCV RBC AUTO: 98.1 FL (ref 79–97)
MONOCYTES # BLD AUTO: 1.05 10*3/MM3 (ref 0.1–0.9)
MONOCYTES NFR BLD AUTO: 15.4 % (ref 5–12)
NEUTROPHILS NFR BLD AUTO: 4.23 10*3/MM3 (ref 1.7–7)
NEUTROPHILS NFR BLD AUTO: 62 % (ref 42.7–76)
NRBC BLD AUTO-RTO: 0 /100 WBC (ref 0–0.2)
PHOSPHATE SERPL-MCNC: 3.3 MG/DL (ref 2.5–4.5)
PLATELET # BLD AUTO: 150 10*3/MM3 (ref 140–450)
PMV BLD AUTO: 10.8 FL (ref 6–12)
POTASSIUM SERPL-SCNC: 3.7 MMOL/L (ref 3.5–5.2)
PROT SERPL-MCNC: 6.6 G/DL (ref 6–8.5)
RBC # BLD AUTO: 4.12 10*6/MM3 (ref 4.14–5.8)
SODIUM SERPL-SCNC: 134 MMOL/L (ref 136–145)
WBC NRBC COR # BLD: 6.82 10*3/MM3 (ref 3.4–10.8)

## 2023-01-03 PROCEDURE — 90686 IIV4 VACC NO PRSV 0.5 ML IM: CPT | Performed by: INTERNAL MEDICINE

## 2023-01-03 PROCEDURE — 84100 ASSAY OF PHOSPHORUS: CPT | Performed by: INTERNAL MEDICINE

## 2023-01-03 PROCEDURE — G0008 ADMIN INFLUENZA VIRUS VAC: HCPCS | Performed by: INTERNAL MEDICINE

## 2023-01-03 PROCEDURE — 94664 DEMO&/EVAL PT USE INHALER: CPT

## 2023-01-03 PROCEDURE — 25010000002 INFLUENZA VAC SPLIT QUAD 0.5 ML SUSPENSION PREFILLED SYRINGE: Performed by: INTERNAL MEDICINE

## 2023-01-03 PROCEDURE — 94799 UNLISTED PULMONARY SVC/PX: CPT

## 2023-01-03 PROCEDURE — 80053 COMPREHEN METABOLIC PANEL: CPT | Performed by: INTERNAL MEDICINE

## 2023-01-03 PROCEDURE — 82962 GLUCOSE BLOOD TEST: CPT

## 2023-01-03 PROCEDURE — 94761 N-INVAS EAR/PLS OXIMETRY MLT: CPT

## 2023-01-03 PROCEDURE — 85025 COMPLETE CBC W/AUTO DIFF WBC: CPT | Performed by: INTERNAL MEDICINE

## 2023-01-03 PROCEDURE — 83735 ASSAY OF MAGNESIUM: CPT | Performed by: INTERNAL MEDICINE

## 2023-01-03 PROCEDURE — 25010000002 ENOXAPARIN PER 10 MG: Performed by: INTERNAL MEDICINE

## 2023-01-03 RX ORDER — AMLODIPINE BESYLATE 2.5 MG/1
2.5 TABLET ORAL
Qty: 30 TABLET | Refills: 0 | Status: SHIPPED | OUTPATIENT
Start: 2023-01-04

## 2023-01-03 RX ORDER — DIPHENOXYLATE HYDROCHLORIDE AND ATROPINE SULFATE 2.5; .025 MG/1; MG/1
1 TABLET ORAL DAILY
Qty: 30 TABLET
Start: 2023-01-04

## 2023-01-03 RX ADMIN — FOLIC ACID 1 MG: 1 TABLET ORAL at 08:06

## 2023-01-03 RX ADMIN — ENOXAPARIN SODIUM 40 MG: 100 INJECTION SUBCUTANEOUS at 11:25

## 2023-01-03 RX ADMIN — Medication 1 TABLET: at 08:06

## 2023-01-03 RX ADMIN — IPRATROPIUM BROMIDE AND ALBUTEROL SULFATE 3 ML: .5; 3 SOLUTION RESPIRATORY (INHALATION) at 07:53

## 2023-01-03 RX ADMIN — Medication 1 PATCH: at 08:07

## 2023-01-03 RX ADMIN — Medication 300 MG: at 08:06

## 2023-01-03 RX ADMIN — FAMOTIDINE 20 MG: 20 TABLET, FILM COATED ORAL at 08:07

## 2023-01-03 RX ADMIN — IPRATROPIUM BROMIDE AND ALBUTEROL SULFATE 3 ML: .5; 3 SOLUTION RESPIRATORY (INHALATION) at 12:00

## 2023-01-03 RX ADMIN — INFLUENZA VIRUS VACCINE 0.5 ML: 15; 15; 15; 15 SUSPENSION INTRAMUSCULAR at 13:29

## 2023-01-03 RX ADMIN — AMLODIPINE BESYLATE 5 MG: 5 TABLET ORAL at 08:07

## 2023-01-03 RX ADMIN — Medication 10 ML: at 08:07

## 2023-01-03 RX ADMIN — PROPRANOLOL HYDROCHLORIDE 60 MG: 20 TABLET ORAL at 08:06

## 2023-01-03 RX ADMIN — ASPIRIN 81 MG: 81 TABLET, COATED ORAL at 08:06

## 2023-01-03 RX ADMIN — RISPERIDONE 2 MG: 2 TABLET, FILM COATED ORAL at 08:07

## 2023-01-03 NOTE — PLAN OF CARE
Problem: Adult Inpatient Plan of Care  Goal: Plan of Care Review  Outcome: Adequate for Care Transition  Goal: Patient-Specific Goal (Individualized)  Outcome: Adequate for Care Transition  Goal: Absence of Hospital-Acquired Illness or Injury  Outcome: Adequate for Care Transition  Intervention: Identify and Manage Fall Risk  Recent Flowsheet Documentation  Taken 1/3/2023 0957 by Erika Elmore RN  Safety Promotion/Fall Prevention:   clutter free environment maintained   assistive device/personal items within reach   activity supervised   fall prevention program maintained   nonskid shoes/slippers when out of bed   safety round/check completed   room organization consistent  Taken 1/3/2023 0803 by Erika Elmore RN  Safety Promotion/Fall Prevention:   assistive device/personal items within reach   activity supervised   clutter free environment maintained   fall prevention program maintained   nonskid shoes/slippers when out of bed   room organization consistent   safety round/check completed  Intervention: Prevent Skin Injury  Recent Flowsheet Documentation  Taken 1/3/2023 0957 by Erika Elmore RN  Body Position: supine  Taken 1/3/2023 0803 by Erika Elmore RN  Body Position: supine  Intervention: Prevent and Manage VTE (Venous Thromboembolism) Risk  Recent Flowsheet Documentation  Taken 1/3/2023 0957 by Erika Elmore RN  Activity Management:   activity adjusted per tolerance   ambulated outside room  Taken 1/3/2023 0803 by Erika Elmore RN  Activity Management: activity adjusted per tolerance  Goal: Optimal Comfort and Wellbeing  Outcome: Adequate for Care Transition  Goal: Readiness for Transition of Care  Outcome: Adequate for Care Transition     Problem: Fall Injury Risk  Goal: Absence of Fall and Fall-Related Injury  Outcome: Adequate for Care Transition  Intervention: Promote Injury-Free Environment  Recent Flowsheet Documentation  Taken 1/3/2023 0957 by Erika Elmore  RN  Safety Promotion/Fall Prevention:   clutter free environment maintained   assistive device/personal items within reach   activity supervised   fall prevention program maintained   nonskid shoes/slippers when out of bed   safety round/check completed   room organization consistent  Taken 1/3/2023 0803 by Erika Elmore RN  Safety Promotion/Fall Prevention:   assistive device/personal items within reach   activity supervised   clutter free environment maintained   fall prevention program maintained   nonskid shoes/slippers when out of bed   room organization consistent   safety round/check completed     Problem: Restraint, Nonbehavioral (Nonviolent)  Goal: Absence of Harm or Injury  Outcome: Adequate for Care Transition  Intervention: Protect Skin and Joint Integrity  Recent Flowsheet Documentation  Taken 1/3/2023 0957 by Erika Elmore RN  Body Position: supine  Taken 1/3/2023 0803 by Erika Elmore RN  Body Position: supine     Problem: Skin Injury Risk Increased  Goal: Skin Health and Integrity  Outcome: Adequate for Care Transition  Intervention: Optimize Skin Protection  Recent Flowsheet Documentation  Taken 1/3/2023 0957 by Erika Elmore RN  Head of Bed (HOB) Positioning: HOB at 30-45 degrees  Taken 1/3/2023 0803 by Erika Elmore RN  Head of Bed (HOB) Positioning: HOB at 30-45 degrees   Goal Outcome Evaluation:

## 2023-01-03 NOTE — CASE MANAGEMENT/SOCIAL WORK
Continued Stay Note  [facility]     Patient Name: Nilesh Zapata  MRN: [id number]  Today's Date: 1/3/2023    Admit Date: 12/26/2022    Plan: Home with o2 thru [facility], declines HH   Discharge Plan     Row Name 01/03/23 1141       Plan    Plan Home with o2 thru [facility], declines HH    Patient/Family in Agreement with Plan yes    Plan Comments O2 order in [facility], called [facility] and left vm. Spoke with pt at bedside, he declines HH, even though going home on new o2. He states he will follow up with his PCP if needed. Wife to transport home, once o2 delivered to room. Roland BROWN/CCP    Row Name 01/03/23 0936       Plan    Plan Home with referral to [facility] (for possible o2 at home)    Patient/Family in Agreement with Plan yes    Provided Post Acute Provider List? Yes    Post Acute Provider List DME Supplier;Home Health    Provided Post Acute Provider Quality & Resource List? Refused    Delivered To Patient    Method of Delivery In person    Plan Comments DC orders noted and Walking oximetry pending. CCP followed up with pt at bedside, introduced self and explained CCP role. Discussed dc planning and pt states plan is home and his wife will drive him. CCP explained awaiting walking oximetry and pt states he doesnt anticipate home o2 at dc. CCP explained currently o2 at 89% and explained importance and need for o2 to prevent harm or even death. CCP explained will not setup home o2 if not needed, he verbalized understaning. Denies dme preference. CCP discussed Hh for ongoing PT to improve strength, CCP offered to make HH referrals to see who is in network with insurance and available staff in zip[place]. He verablized agreement and states once stronger he will go to AA meetings. Referral to [facility] and [facility] . Roland BROWN/CCP               Discharge Codes    No documentation.               Expected Discharge Date and Time     Expected Discharge Date Expected Discharge Time    Bharat 3, 2023              Emelina Villanueva RN

## 2023-01-03 NOTE — TELEPHONE ENCOUNTER
Caller: LAZARO    Relationship: Other    Best call back number: [phone]    What orders are you requesting (i.e. lab or imaging): TO EVALUATE FOR PHYSICAL THERAPY      Additional notes:     PATIENT MAY DISCHARGE TODAY FROM HOSPITAL.  SO A RESPONSE AS SOON AS POSSIBLE WOULD BE GREAT.    PLEASE CALL LAZARO

## 2023-01-03 NOTE — OUTREACH NOTE
Prep Survey    Flowsheet Row Responses   [facility] facility patient discharged from? [place]   Is LACE score < 7 ? No   Eligibility Not Eligible   What are the reasons patient is not eligible? Behavioral Health   Does the patient have one of the following disease processes/diagnoses(primary or secondary)? Other   Prep survey completed? Yes          JOVANNY WESLEY - Registered Nurse

## 2023-01-03 NOTE — CASE MANAGEMENT/SOCIAL WORK
Continued Stay Note  [facility]     Patient Name: Nilesh Zapata  MRN: [id number]  Today's Date: 1/3/2023    Admit Date: 12/26/2022    Plan: Home with referral to [facility] (for possible o2 at home)   Discharge Plan     Row Name 01/03/23 0936       Plan    Plan Home with referral to [facility] and [place] (for possible o2 at home)    Patient/Family in Agreement with Plan yes    Provided Post Acute Provider List? Yes    Post Acute Provider List DME Supplier;Home Health    Provided Post Acute Provider Quality & Resource List? Refused    Delivered To Patient    Method of Delivery In person    Plan Comments DC orders noted and Walking oximetry pending. CCP followed up with pt at bedside, introduced self and explained CCP role. Discussed dc planning and pt states plan is home and his wife will drive him. CCP explained awaiting walking oximetry and pt states he doesnt anticipate home o2 at dc. CCP explained currently o2 at 89% and explained importance and need for o2 to prevent harm or even death. CCP explained will not setup home o2 if not needed, he verbalized understaning. Denies dme preference. CCP discussed Hh for ongoing PT to improve strength, CCP offered to make HH referrals to see who is in network with insurance and available staff in zipcode. He verablized agreement and states once stronger he will go to AA meetings. Referral to [facility] . Roland BROWN/CCP               Discharge Codes    No documentation.               Expected Discharge Date and Time     Expected Discharge Date Expected Discharge Time    Bharat 3, 2023             Emelina Villanueva RN

## 2023-01-03 NOTE — DISCHARGE PLACEMENT REQUEST
"Casie Goel (62 y.o. Male)     Date of Birth   1960    Social Security Number       Address   [address] [place] [address] [place] [address]    Home Phone   [phone]    MRN   [id number]       Mu-ism   None    Marital Status                               Admission Date   12/26/22    Admission Type   Emergency    Admitting Provider   Naldo Fuentes MD    Attending Provider   Mariusz Powers MD    Department, Room/Bed   [address], [address]/1       Discharge Date       Discharge Disposition   Home or Self Care    Discharge Destination                               Attending Provider: Mariusz Powers MD    Allergies: No Known Allergies    Isolation: None   Infection: None   Code Status: CPR    Ht: 167.6 cm (66\")   Wt: 95.3 kg (210 lb)    Admission Cmt: None   Principal Problem: Alcohol withdrawal syndrome without complication (HCC) [F10.930]                 Active Insurance as of 12/26/2022     Primary Coverage     Payor Plan Insurance Group Employer/Plan Group    ANTH MEDICARE REPLACEMENT ANTH MEDICARE ADVANTAGE [id number]     Payor Plan Address Payor Plan Phone Number Payor Plan Fax Number Effective Dates     [id number] [phone]  8/1/2016 - None Entered    [facility] [id number]       Subscriber Name Subscriber Birth Date Member ID       CASIE GOEL 1960 [id number]                 Emergency Contacts      (Rel.) Home Phone Work Phone Mobile Phone    Jenny Goel (Spouse) [phone] -- --              "

## 2023-01-03 NOTE — NURSING NOTE
02 sats 89-92% while sitting in bed on room air. Patient walked 150 ft in rodriguez on room air and 02 saturations dropped to 87%. Once 2L NC applied 02 sats were 93%.

## 2023-01-03 NOTE — DISCHARGE SUMMARY
[facility] Medicine Services  DISCHARGE SUMMARY    Patient Name: Nilesh Zapata  : 1960  MRN: [id number]    Date of Admission: 2022  1:35 PM  Date of Discharge: 1/3/2023  Primary Care Physician: Charles Farooq MD    Consults     Date and Time Order Name Status Description    2022  9:32 AM Inpatient Neurology Consult Stroke Completed     2022  3:44 PM [facility] (on-call MD unless specified) Details            Hospital Course       Active Hospital Problems    Diagnosis  POA   • Acute adjustment disorder with mixed anxiety and depressed mood [F43.23]  Yes   • Alcoholic liver disease (HCC) [K70.9]  Yes   • Hypoxemia [R09.02]  Yes   • Essential hypertension [I10]  Yes   • Alcoholism (HCC) [F10.20]  Yes   • Anxiety [F41.9]  Yes   • Tobacco abuse [Z72.0]  Yes   • Kidney cancer, primary, with metastasis from kidney to other site (HCC) [C64.9]  Yes      Resolved Hospital Problems    Diagnosis Date Resolved POA   • **Alcohol withdrawal syndrome without complication (HCC) [F10.930] 2023 Yes   • Hypophosphatemia [E83.39] 2023 Yes   • Hypomagnesemia [E83.42] 2023 Yes   • Hypokalemia [E87.6] 2023 Yes          Hospital Course:  Nilesh Zapata is a 62 y.o. male presents the hospital with slurred speech and alcohol withdrawal syndrome.  Patient was seen by neurology and slurred speech was evaluated and MRI was negative for acute stroke.     Discussion/plan today:  Alcohol withdrawal seems to be resolving.  Slurred speech has been ruled out for stroke and is likely also related to alcohol issues.  Patient today on my examination is hypoxic at 88% on room air and 92% on 2 L nasal cannula.  Patient does not have home oxygen.  Uncertain if he may need this.  Chest x-ray images reviewed and seems to show atelectasis at admission.  Plan to repeat chest x-ray today.  Start flutter valve and incentive spirometer.  Add duo nebs for probably undiagnosed COPD as patient has extensive  smoking history.  Obesity also is not helping.  Smoking cessation counseled.  I have encouraged patient to enroll in AA alcohol rehab program at discharge.  Continue physical therapy.  Patient may require a walker at discharge.  Mood disorder reasonably stable on current mood stabilizing medications.  Treatment plan discussed with the patient is in agreement.  Patient was monitored and managed per alcohol withdrawal protocol.  Ultimately alcohol withdrawal resolved.  Recommend patient continue multivitamin daily.  Patient had significant hypertension and amlodipine was added to blood pressure medications with some improvement.  Patient was treated for his dehydration, hypomagnesemia, hypokalemia, and hypophosphatemia with IV fluids and electrolyte replacement.  He was counseled on smoking cessation.  He received counseling about alcohol abuse cessation and was provided resources for alcohol treatment programs.  Patient was found to have hypoxemia and x-ray showed significant lung atelectasis.  He also likely has underlying COPD and recommend outpatient pulmonary function test to evaluate further.  I will discharge him on albuterol inhaler with spacer chamber and have him follow-up with primary care provider.  Patient is eager and adamant to discharge home today and appears stable to do so.  He will receive walking oximetry prior to discharge to further evaluate his discharge oxygen    At the time of discharge patient was told to take all medications as prescribed, keep all follow-up appointments, and call their doctor or return to the hospital with any worsening or concerning symptoms.    Please note that this note was made using Dragon voice recognition software            Day of Discharge     Subjective: Patient feels well.  Says his wife is going to pick him up from the hospital.  Agrees to enter an alcohol treatment program.  Agrees to see his primary care within 1 week.  Patient says he has had all the alcohol  removed from his house to prevent him from relapsing.  He denies any new complaints.    ROS:  No current fevers or chills  No current shortness of breath or cough  No current nausea, vomiting, or diarrhea  No current chest pain or palpitations    Vital Signs:   Temp:  [98 °F (36.7 °C)-98.6 °F (37 °C)] 98.6 °F (37 °C)  Heart Rate:  [65-70] 69  Resp:  [16-20] 18  BP: (108-123)/(62-72) 111/69     Physical Exam:  Constitutional:Awake, alert, no acute distress  HENT: NCAT, mucous membranes moist, neck supple  Respiratory: No cough, no wheezes, rare coarse sounds, respiratory effort normal, nonlabored breathing   Cardiovascular: RRR, normal radial pulses  Gastrointestinal: Positive bowel sounds, soft, nontender, nondistended  Musculoskeletal: Debilitated and obese in appearance, BMI is 34, minimal lower extremity edema  Psychiatric: Less anxious affect, cooperative, conversational  Neurologic: No slurred speech or facial droop, follows commands  Skin: No rashes or jaundice, warm      Pertinent  and/or Most Recent Results     Results from last 7 days   Lab Units 01/03/23  0502 01/02/23  0604 01/01/23  0557 12/31/22  0537 12/30/22  0506 12/29/22  0435 12/28/22  0540   WBC 10*3/mm3 6.82 6.51 6.84 7.65 6.89 7.48 5.74   HEMOGLOBIN g/dL 13.5 14.1 14.6 14.2 15.4 15.9 14.6   HEMATOCRIT % 40.4 41.1 43.2 41.4 42.8 44.3 42.5   PLATELETS 10*3/mm3 150 139* 112* 105* 96* 105* 112*   SODIUM mmol/L 134* 137 135* 133* 132* 136 135*   POTASSIUM mmol/L 3.7 3.9 3.6 3.7 3.5 3.6 4.0   CHLORIDE mmol/L 97* 98 96* 94* 93* 95* 98   CO2 mmol/L 31.0* 31.7* 31.7* 29.8* 32.9* 28.4 23.9   BUN mg/dL 18 16 11 10 4* 6* 6*   CREATININE mg/dL 0.81 0.80 0.74* 0.77 0.68* 0.67* 0.72*   GLUCOSE mg/dL 85 85 85 90 109* 53* 75   CALCIUM mg/dL 9.0 9.0 9.2 9.1 8.7 8.5* 8.3*     Results from last 7 days   Lab Units 01/03/23  0502 01/02/23  0604 01/01/23  0557 12/31/22  0537 12/30/22  0506 12/29/22  0435 12/28/22  0540   BILIRUBIN mg/dL 0.9 1.1 1.2 1.2 1.5* 1.3* 1.3*    ALK PHOS U/L 100 86 87 88 91 97 94   ALT (SGPT) U/L 30 32 25 23 21 21 18   AST (SGOT) U/L 68* 94* 87* 71* 60* 62* 43*         Imaging Results (All)     Procedure Component Value Units Date/Time    XR Chest PA & Lateral [[id number]] Collected: 01/02/23 1138     Updated: 01/02/23 1142    Narrative:      TWO-VIEW CHEST     HISTORY: Hypoxia.     FINDINGS: The lungs are well-expanded and clear except for some minimal  linear atelectasis at the right base showing improvement from  12/27/2022. The heart size remains normal.     This report was finalized on 1/2/2023 11:39 AM by Dr. Carlos Gray M.D.       MRI Brain With & Without Contrast [[id number]] Collected: 12/29/22 1738     Updated: 12/29/22 1934    Narrative:      MRI OF THE BRAIN WITH AND WITHOUT CONTRAST     CLINICAL HISTORY: Facial droop.     TECHNIQUE: MRI of the brain was obtained with sagittal pre and  postgadolinium T1, axial pre and postgadolinium T1, coronal  postgadolinium T1, axial FLAIR, axial T2, axial diffusion, and axial  gradient echo images.     FINDINGS:     The study is mild to moderately compromised by motion artifact.  Otherwise, the ventricles, sulci, and cisterns are age-appropriate.  There are no abnormal foci of restricted diffusion. There are mild  changes of chronic small vessel ischemic phenomena. The major  intracranial flow related signal voids are unremarkable. There are no  abnormal foci of susceptibility artifact. No abnormal foci of contrast  enhancement are noted. The midline intracranial anatomy is within normal  limits.     Incidental note is made of a small amount of fluid signal intensity  within the mastoid air cells that are statistically likely  representative of incidental mastoid effusions. There is  mild-to-moderate mucosal thickening appreciated within the ethmoid air  cells, the left maxillary sinus, and the right aspect of the sphenoid  sinus. A mucus retention cyst is incidentally noted within the right  maxillary  sinus.       Impression:         The study is compromised by motion artifact but otherwise demonstrates  no evidence for acute intracranial pathology.     Incidental note is made of mild-to-moderate changes of inflammatory  paranasal sinus disease.     This report was finalized on 12/29/2022 7:30 PM by Dr. Dhiraj Lerma M.D.       CT Head Without Contrast [[id number]] Collected: 12/28/22 1048     Updated: 12/28/22 1627    Narrative:      CT HEAD WITHOUT CONTRAST     HISTORY: Alcohol withdrawal, aphasia, slurred speech.     COMPARISON: CT head 10/30/2019.     FINDINGS: The brain ventricles are symmetrical. There is no evidence of  hemorrhage, hydrocephalus or of abnormal extra-axial fluid. No focal  area of decreased attenuation to suggest acute infarction is identified.  Mild-to-moderate vascular calcifications involving the carotid siphons  are noted. There is a small volume of fluid present within the left  maxillary sinus.       Impression:      There is no evidence of acute infarction, intracranial  hemorrhage, hydrocephalus or of abnormal extra-axial fluid. The etiology  for the patient's aphasia and slurred speech is not established. Further  evaluation could be performed with an MRI examination of the brain.        Radiation dose reduction techniques were utilized, including automated  exposure control and exposure modulation based on body size.     This report was finalized on 12/28/2022 4:24 PM by Dr. Mariusz Godinez M.D.       MRI Outside Films [[id number]] Resulted: 12/28/22 1056     Updated: 12/28/22 1056    Narrative:      This procedure was auto-finalized with no dictation required.    US Abdomen Limited [[id number]] Collected: 12/27/22 1647     Updated: 12/27/22 1653    Narrative:      RIGHT UPPER QUADRANT ULTRASOUND     HISTORY: Elevated LFTs     COMPARISON: 05/31/2022     TECHNIQUE: Real time ultrasound imaging of the right upper quadrant was  performed. Static images reviewed.     FINDINGS:  Visualized portions of the pancreas are unremarkable. There is  increased echogenicity of the liver. Mobile gallstones are noted in the  gallbladder. Some comet tail artifact is also noted suggesting focal  adenomyomatosis. Focal trace pericholecystic fluid is seen. Common duct  measures about 5 mm. The right kidney measures 15.1 cm in length and  contains a small probable cyst measuring about 1.3 cm.       Impression:      1. Increased echogenicity of the liver consistent with fatty  infiltration  2. Cholelithiasis. Focal trace pericholecystic fluid. Findings also  suggesting focal adenomyomatosis     This report was finalized on 12/27/2022 4:50 PM by Dr. Singh Miller M.D.       XR Chest PA & Lateral [[id number]] Collected: 12/27/22 1233     Updated: 12/27/22 1236    Narrative:      PA AND LATERAL CHEST     HISTORY: Hypoxemia     COMPARISON: None available     FINDINGS: Shallow inspiratory effort with bibasilar atelectasis. Heart  size appears normal. There appears to be a hiatal hernia.       Impression:      Bibasilar atelectasis. Apparent hiatal hernia     This report was finalized on 12/27/2022 12:33 PM by Dr. Singh Miller M.D.               Discharge Details        Discharge Medications      New Medications      Instructions Start Date   amLODIPine 2.5 MG tablet  Commonly known as: NORVASC   2.5 mg, Oral, Every 24 Hours Scheduled   Start Date: January 4, 2023     multivitamin tablet tablet   1 tablet, Oral, Daily   Start Date: January 4, 2023        Changes to Medications      Instructions Start Date   folic acid 1 MG tablet  Commonly known as: FOLVITE  What changed: additional instructions   1 mg, Oral, Daily         Continue These Medications      Instructions Start Date   cyanocobalamin 1000 MCG tablet  Commonly known as: VITAMIN B-12   1,000 mcg, Oral, Daily      fluvoxaMINE 25 MG tablet  Commonly known as: LUVOX   100 mg, Oral, Daily      mirtazapine 15 MG tablet  Commonly known as: REMERON   15  mg, Oral, Nightly      naltrexone 50 MG tablet  Commonly known as: DEPADE   50 mg, Oral, Daily      propranolol 40 MG tablet  Commonly known as: INDERAL   60 mg, Oral, 2 Times Daily      risperiDONE 2 MG tablet  Commonly known as: risperDAL   2 mg, Oral, 2 Times Daily      thiamine 100 MG tablet  Commonly known as: VITAMIN B1   100 mg, Oral, Daily         Stop These Medications    amitriptyline 25 MG tablet  Commonly known as: ELAVIL            No Known Allergies      Discharge Disposition:  Home or Self Care    Diet:  Hospital:  Diet Order   Procedures   • Diet: Regular/House Diet; Texture: Regular Texture (IDDSI 7); Fluid Consistency: Thin (IDDSI 0)       Activity:  Activity Instructions     Activity as Tolerated                 CODE STATUS:    Code Status and Medical Interventions:   Ordered at: 12/26/22 2597     Code Status (Patient has no pulse and is not breathing):    CPR (Attempt to Resuscitate)     Medical Interventions (Patient has pulse or is breathing):    Full Support           Additional Instructions for the Follow-ups that You Need to Schedule     Discharge Follow-up with PCP   As directed       Currently Documented PCP:    Charles Farooq MD    PCP Phone Number:    [phone]     Follow Up Details: Recommend primary care follow-up within 1 week for general hospital follow-up         Discharge Follow-up with Specified Provider: Recommend follow-up with an alcohol treatment program as discussed.; 2 Days   As directed      To: Recommend follow-up with an alcohol treatment program as discussed.    Follow Up: 2 Days            Follow-up Information     Charles Farooq MD .    Specialty: Family Medicine  Why: Recommend primary care follow-up within 1 week for general hospital follow-up  Contact information:  [address] [place] [address]  [phone]                             Mariusz Powers MD  01/03/23      Time Spent on Discharge:  I spent greater than 35 minutes on  this discharge activity which included: face-to-face encounter with the patient, reviewing the data in the system, coordination of the care with the nursing staff as well as consultants, documentation, and entering orders.

## 2023-01-03 NOTE — PROGRESS NOTES
"Nutrition Services    Patient Name:  Nilesh Zapata  YOB: 1960  MRN: [id number]  Admit Date:  12/26/2022  Assessment Date:  01/03/23    CLINICAL NUTRITION ASSESSMENT   Nutrition screen completed for Length of Stay. Pt tolerating po intake. Visited while he was eating breakfast. He state appetite is good and has no request at this time.  Will remain available as needed.  Encounter Information         Reason for Encounter Length of Stay    Admitting Diagnosis ETOH withdraw, slurred speech, dehydration    Pertinent Medical History ETOH/smoking abuse, depression, kidney cancer, HTN,     Current Issues  respiratory distress     Current Nutrition Orders & Evaluation of Intake       Oral Nutrition     Current PO Diet Diet: Regular/House Diet; Texture: Regular Texture (IDDSI 7); Fluid Consistency: Thin (IDDSI 0)   Supplement    PO Evaluation     Trending % PO Intake Improved, %   --  Anthropometrics          Height    Weight Height: 167.6 cm (66\")  Weight: 95.3 kg (210 lb) (12/26/22 1326)    BMI kg/m2 Body mass index is 33.89 kg/m².    Weight Trend/Change Stable    Weight History  Wt Readings from Last 15 Encounters:   12/26/22 1326 95.3 kg (210 lb)   12/27/22 1852 95.3 kg (210 lb)   06/03/22 0521 101 kg (223 lb 8.7 oz)   06/02/22 0609 98.7 kg (217 lb 11.2 oz)   05/30/22 1808 97.9 kg (215 lb 13.3 oz)   11/03/19 0524 99.9 kg (220 lb 4.8 oz)   11/02/19 0543 103 kg (226 lb 12.8 oz)   11/01/19 0557 103 kg (226 lb 12.8 oz)   10/31/19 0441 103 kg (227 lb 11.2 oz)   10/30/19 2042 100 kg (220 lb 11.2 oz)   10/30/19 1442 101 kg (223 lb)   03/25/19 0500 90.8 kg (200 lb 2.8 oz)   03/24/19 0520 94.6 kg (208 lb 8.9 oz)   03/23/19 0600 93.7 kg (206 lb 9.1 oz)   03/19/19 2347 90.1 kg (198 lb 9.6 oz)   12/18/17 0457 96.6 kg (213 lb)   12/17/17 1003 95.7 kg (211 lb)   12/17/17 0554 113 kg (250 lb)      --  Labs        Pertinent Labs Reviewed, listed below     Results from last 7 days   Lab Units 01/03/23  0502 " 01/02/23  0604 01/01/23  0557   SODIUM mmol/L 134* 137 135*   POTASSIUM mmol/L 3.7 3.9 3.6   CHLORIDE mmol/L 97* 98 96*   CO2 mmol/L 31.0* 31.7* 31.7*   BUN mg/dL 18 16 11   CREATININE mg/dL 0.81 0.80 0.74*   CALCIUM mg/dL 9.0 9.0 9.2   BILIRUBIN mg/dL 0.9 1.1 1.2   ALK PHOS U/L 100 86 87   ALT (SGPT) U/L 30 32 25   AST (SGOT) U/L 68* 94* 87*   GLUCOSE mg/dL 85 85 85     Results from last 7 days   Lab Units 01/03/23  0502 01/02/23  0604 01/01/23  0557   MAGNESIUM mg/dL 2.0 1.6 1.6   PHOSPHORUS mg/dL 3.3 3.5 3.4   HEMOGLOBIN g/dL 13.5 14.1 14.6   HEMATOCRIT % 40.4 41.1 43.2   WBC 10*3/mm3 6.82 6.51 6.84   ALBUMIN g/dL 3.4* 3.3* 3.4*     Results from last 7 days   Lab Units 01/03/23  0502 01/02/23  0604 01/01/23  0557 12/31/22  0537 12/30/22  0506   PLATELETS 10*3/mm3 150 139* 112* 105* 96*     COVID19   Date Value Ref Range Status   05/30/2022 Not Detected Not Detected - Ref. Range Final     No results found for: HGBA1C       Medications            Scheduled Medications amLODIPine, 5 mg, Oral, Q24H  aspirin, 81 mg, Oral, Daily  atorvastatin, 40 mg, Oral, Nightly  enoxaparin, 40 mg, Subcutaneous, Q24H  famotidine, 20 mg, Oral, BID AC  fluvoxaMINE, 100 mg, Oral, Nightly  folic acid, 1 mg, Oral, Daily  ipratropium-albuterol, 3 mL, Nebulization, 4x Daily - RT  mirtazapine, 15 mg, Oral, Nightly  multivitamin, 1 tablet, Oral, Daily  nicotine, 1 patch, Transdermal, Q24H  propranolol, 60 mg, Oral, Q12H  risperiDONE, 2 mg, Oral, Q12H  sodium chloride, 10 mL, Intravenous, Q12H  thiamine, 300 mg, Oral, Daily        Infusions      PRN Medications •  influenza vaccine  •  labetalol  •  magnesium sulfate **OR** magnesium sulfate **OR** magnesium sulfate  •  nitroglycerin  •  potassium phosphate infusion greater than 15 mMoles **OR** potassium phosphate infusion greater than 15 mMoles **OR** potassium phosphate **OR** sodium phosphate IVPB **OR** sodium phosphate IVPB  •  [COMPLETED] Insert Peripheral IV **AND** sodium chloride  •   sodium chloride  •  sodium chloride     Physical Findings         Skin, GI, Oral, LDA Skin bruised, BM 12/31, teeth missing    NFPE Not applicable at this time   --  PES STATEMENT / NUTRITION DIAGNOSIS      Nutrition Dx Problem  Problem: Nutrition Appropriate for Condition at this Time  Etiology: Medical Diagnosis ETOH withdraw  Signs/Symptoms:     Comment:      NUTRITION INTERVENTION / PLAN OF CARE  Intervention Goal        Intervention Goal(s) Maintain nutrition status, Maintain intake and No significant weight loss     Nutrition Intervention        RD Action Advise alternative selection, Advise available snack, Interview for preferences, Menu provided and Follow Tx Progress       Monitor/Evaluation        Monitor Per protocol   Discharge Needs Pending clinical course   Education Will instruct as appropriate     RD to follow up per protocol.    Electronically signed by:  Leslie Castro RD  01/03/23 09:13 EST

## 2023-01-05 NOTE — CASE MANAGEMENT/SOCIAL WORK
Case Management Discharge Note      Final Note: Home with [facility] for continous o2. Pt declined HH. Notified [facility], pt declines wanting HH. [facility] rn/ccp    Provided Post Acute Provider List?: Yes  Post Acute Provider List: DME Supplier, Home Health  Provided Post Acute Provider Quality & Resource List?: Refused  Delivered To: Patient  Method of Delivery: In person    Selected Continued Care - Discharged on 1/3/2023 Admission date: 12/26/2022 - Discharge disposition: Home or Self Care    Destination    No services have been selected for the patient.              Durable Medical Equipment     Service Provider Selected Services Address Phone Fax Patient Preferred    [facility] [address] [facility] [address] [address] [phone] --          Dialysis/Infusion    No services have been selected for the patient.              Home Medical Care    No services have been selected for the patient.              Therapy    No services have been selected for the patient.              Community Resources    No services have been selected for the patient.              Community & DME    No services have been selected for the patient.                  Transportation Services  Private: Car    Final Discharge Disposition Code: 01 - home or self-care

## 2023-06-03 ENCOUNTER — APPOINTMENT (OUTPATIENT)
Dept: CT IMAGING | Facility: HOSPITAL | Age: 63
End: 2023-06-03
Payer: MEDICARE

## 2023-06-03 ENCOUNTER — HOSPITAL ENCOUNTER (INPATIENT)
Facility: HOSPITAL | Age: 63
LOS: 13 days | Discharge: NURSING, REHAB OR LONG-TERM CARE | End: 2023-06-16
Attending: EMERGENCY MEDICINE | Admitting: INTERNAL MEDICINE
Payer: MEDICARE

## 2023-06-03 ENCOUNTER — APPOINTMENT (OUTPATIENT)
Dept: GENERAL RADIOLOGY | Facility: HOSPITAL | Age: 63
End: 2023-06-03
Payer: MEDICARE

## 2023-06-03 DIAGNOSIS — F10.20 ALCOHOLISM: ICD-10-CM

## 2023-06-03 DIAGNOSIS — R09.02 HYPOXIA: ICD-10-CM

## 2023-06-03 DIAGNOSIS — F10.930 ALCOHOL WITHDRAWAL SYNDROME WITHOUT COMPLICATION: Primary | ICD-10-CM

## 2023-06-03 DIAGNOSIS — S32.592A CLOSED FRACTURE OF LEFT INFERIOR PUBIC RAMUS, INITIAL ENCOUNTER: ICD-10-CM

## 2023-06-03 DIAGNOSIS — S70.02XA CONTUSION OF LEFT HIP, INITIAL ENCOUNTER: ICD-10-CM

## 2023-06-03 LAB
ALBUMIN SERPL-MCNC: 4.5 G/DL (ref 3.5–5.2)
ALBUMIN/GLOB SERPL: 1.3 G/DL
ALP SERPL-CCNC: 97 U/L (ref 39–117)
ALT SERPL W P-5'-P-CCNC: 32 U/L (ref 1–41)
AMMONIA BLD-SCNC: 25 UMOL/L (ref 16–60)
ANION GAP SERPL CALCULATED.3IONS-SCNC: 23.6 MMOL/L (ref 5–15)
ARTERIAL PATENCY WRIST A: POSITIVE
AST SERPL-CCNC: 39 U/L (ref 1–40)
ATMOSPHERIC PRESS: 746.5 MMHG
B-OH-BUTYR SERPL-SCNC: 0.2 MMOL/L (ref 0.02–0.27)
BASE EXCESS BLDA CALC-SCNC: 0.7 MMOL/L (ref 0–2)
BASOPHILS # BLD AUTO: 0.04 10*3/MM3 (ref 0–0.2)
BASOPHILS NFR BLD AUTO: 0.3 % (ref 0–1.5)
BDY SITE: ABNORMAL
BILIRUB SERPL-MCNC: 0.7 MG/DL (ref 0–1.2)
BUN SERPL-MCNC: 23 MG/DL (ref 8–23)
BUN/CREAT SERPL: 21.7 (ref 7–25)
CALCIUM SPEC-SCNC: 9.3 MG/DL (ref 8.6–10.5)
CHLORIDE SERPL-SCNC: 93 MMOL/L (ref 98–107)
CK SERPL-CCNC: 131 U/L (ref 20–200)
CO2 SERPL-SCNC: 18.4 MMOL/L (ref 22–29)
CREAT SERPL-MCNC: 1.06 MG/DL (ref 0.76–1.27)
DEPRECATED RDW RBC AUTO: 42.5 FL (ref 37–54)
EGFRCR SERPLBLD CKD-EPI 2021: 78.4 ML/MIN/1.73
EOSINOPHIL # BLD AUTO: 0 10*3/MM3 (ref 0–0.4)
EOSINOPHIL NFR BLD AUTO: 0 % (ref 0.3–6.2)
ERYTHROCYTE [DISTWIDTH] IN BLOOD BY AUTOMATED COUNT: 12.8 % (ref 12.3–15.4)
ETHANOL BLD-MCNC: 44 MG/DL (ref 0–10)
ETHANOL UR QL: 0.04 %
GAS FLOW AIRWAY: 15 LPM
GLOBULIN UR ELPH-MCNC: 3.5 GM/DL
GLUCOSE SERPL-MCNC: 130 MG/DL (ref 65–99)
HCO3 BLDA-SCNC: 25.7 MMOL/L (ref 22–28)
HCT VFR BLD AUTO: 47.2 % (ref 37.5–51)
HGB BLD-MCNC: 16.4 G/DL (ref 13–17.7)
IMM GRANULOCYTES # BLD AUTO: 0.05 10*3/MM3 (ref 0–0.05)
IMM GRANULOCYTES NFR BLD AUTO: 0.3 % (ref 0–0.5)
INR PPP: 1.05 (ref 0.9–1.1)
LYMPHOCYTES # BLD AUTO: 1.25 10*3/MM3 (ref 0.7–3.1)
LYMPHOCYTES NFR BLD AUTO: 8.7 % (ref 19.6–45.3)
MAGNESIUM SERPL-MCNC: 1.5 MG/DL (ref 1.6–2.4)
MCH RBC QN AUTO: 31.8 PG (ref 26.6–33)
MCHC RBC AUTO-ENTMCNC: 34.7 G/DL (ref 31.5–35.7)
MCV RBC AUTO: 91.5 FL (ref 79–97)
MODALITY: ABNORMAL
MONOCYTES # BLD AUTO: 0.79 10*3/MM3 (ref 0.1–0.9)
MONOCYTES NFR BLD AUTO: 5.5 % (ref 5–12)
NEUTROPHILS NFR BLD AUTO: 12.25 10*3/MM3 (ref 1.7–7)
NEUTROPHILS NFR BLD AUTO: 85.2 % (ref 42.7–76)
NRBC BLD AUTO-RTO: 0 /100 WBC (ref 0–0.2)
PCO2 BLDA: 41.3 MM HG (ref 35–45)
PH BLDA: 7.4 PH UNITS (ref 7.35–7.45)
PHOSPHATE SERPL-MCNC: 2.9 MG/DL (ref 2.5–4.5)
PLATELET # BLD AUTO: 234 10*3/MM3 (ref 140–450)
PMV BLD AUTO: 9.3 FL (ref 6–12)
PO2 BLDA: 59.1 MM HG (ref 80–100)
POTASSIUM SERPL-SCNC: 4.5 MMOL/L (ref 3.5–5.2)
PROT SERPL-MCNC: 8 G/DL (ref 6–8.5)
PROTHROMBIN TIME: 13.9 SECONDS (ref 11.7–14.2)
RBC # BLD AUTO: 5.16 10*6/MM3 (ref 4.14–5.8)
SAO2 % BLDCOA: 90.2 % (ref 92–99)
SODIUM SERPL-SCNC: 135 MMOL/L (ref 136–145)
TOTAL RATE: 16 BREATHS/MINUTE
TROPONIN T SERPL HS-MCNC: 15 NG/L
WBC NRBC COR # BLD: 14.38 10*3/MM3 (ref 3.4–10.8)

## 2023-06-03 PROCEDURE — 85025 COMPLETE CBC W/AUTO DIFF WBC: CPT | Performed by: EMERGENCY MEDICINE

## 2023-06-03 PROCEDURE — 71275 CT ANGIOGRAPHY CHEST: CPT

## 2023-06-03 PROCEDURE — 25510000001 IOPAMIDOL PER 1 ML: Performed by: EMERGENCY MEDICINE

## 2023-06-03 PROCEDURE — 84484 ASSAY OF TROPONIN QUANT: CPT | Performed by: EMERGENCY MEDICINE

## 2023-06-03 PROCEDURE — 85610 PROTHROMBIN TIME: CPT | Performed by: EMERGENCY MEDICINE

## 2023-06-03 PROCEDURE — 82140 ASSAY OF AMMONIA: CPT | Performed by: EMERGENCY MEDICINE

## 2023-06-03 PROCEDURE — 25010000002 ENOXAPARIN PER 10 MG: Performed by: INTERNAL MEDICINE

## 2023-06-03 PROCEDURE — 25010000002 LORAZEPAM PER 2 MG: Performed by: EMERGENCY MEDICINE

## 2023-06-03 PROCEDURE — 82010 KETONE BODYS QUAN: CPT | Performed by: EMERGENCY MEDICINE

## 2023-06-03 PROCEDURE — 83735 ASSAY OF MAGNESIUM: CPT | Performed by: EMERGENCY MEDICINE

## 2023-06-03 PROCEDURE — 82077 ASSAY SPEC XCP UR&BREATH IA: CPT | Performed by: EMERGENCY MEDICINE

## 2023-06-03 PROCEDURE — 25010000002 THIAMINE HCL 200 MG/2ML SOLUTION: Performed by: INTERNAL MEDICINE

## 2023-06-03 PROCEDURE — 25010000002 PHENOBARBITAL PER 120 MG: Performed by: EMERGENCY MEDICINE

## 2023-06-03 PROCEDURE — 80053 COMPREHEN METABOLIC PANEL: CPT | Performed by: EMERGENCY MEDICINE

## 2023-06-03 PROCEDURE — 25010000002 MAGNESIUM SULFATE PER 500 MG OF MAGNESIUM: Performed by: EMERGENCY MEDICINE

## 2023-06-03 PROCEDURE — 94799 UNLISTED PULMONARY SVC/PX: CPT

## 2023-06-03 PROCEDURE — 25010000002 HYDROMORPHONE 1 MG/ML SOLUTION: Performed by: INTERNAL MEDICINE

## 2023-06-03 PROCEDURE — 93010 ELECTROCARDIOGRAM REPORT: CPT | Performed by: INTERNAL MEDICINE

## 2023-06-03 PROCEDURE — 25010000002 ONDANSETRON PER 1 MG: Performed by: EMERGENCY MEDICINE

## 2023-06-03 PROCEDURE — 73502 X-RAY EXAM HIP UNI 2-3 VIEWS: CPT

## 2023-06-03 PROCEDURE — 36600 WITHDRAWAL OF ARTERIAL BLOOD: CPT

## 2023-06-03 PROCEDURE — 82550 ASSAY OF CK (CPK): CPT | Performed by: EMERGENCY MEDICINE

## 2023-06-03 PROCEDURE — 84100 ASSAY OF PHOSPHORUS: CPT | Performed by: EMERGENCY MEDICINE

## 2023-06-03 PROCEDURE — 25010000002 THIAMINE HCL 200 MG/2ML SOLUTION 2 ML VIAL: Performed by: EMERGENCY MEDICINE

## 2023-06-03 PROCEDURE — 70450 CT HEAD/BRAIN W/O DYE: CPT

## 2023-06-03 PROCEDURE — 82803 BLOOD GASES ANY COMBINATION: CPT

## 2023-06-03 PROCEDURE — 71045 X-RAY EXAM CHEST 1 VIEW: CPT

## 2023-06-03 PROCEDURE — 93005 ELECTROCARDIOGRAM TRACING: CPT | Performed by: EMERGENCY MEDICINE

## 2023-06-03 RX ORDER — AMOXICILLIN 250 MG
2 CAPSULE ORAL 2 TIMES DAILY
Status: DISCONTINUED | OUTPATIENT
Start: 2023-06-03 | End: 2023-06-11

## 2023-06-03 RX ORDER — MULTIPLE VITAMINS W/ MINERALS TAB 9MG-400MCG
1 TAB ORAL DAILY
Status: DISCONTINUED | OUTPATIENT
Start: 2023-06-03 | End: 2023-06-13 | Stop reason: SDUPTHER

## 2023-06-03 RX ORDER — DIPHENOXYLATE HYDROCHLORIDE AND ATROPINE SULFATE 2.5; .025 MG/1; MG/1
1 TABLET ORAL DAILY
Status: DISCONTINUED | OUTPATIENT
Start: 2023-06-03 | End: 2023-06-16 | Stop reason: HOSPADM

## 2023-06-03 RX ORDER — UREA 10 %
3 LOTION (ML) TOPICAL NIGHTLY PRN
Status: DISCONTINUED | OUTPATIENT
Start: 2023-06-03 | End: 2023-06-16 | Stop reason: HOSPADM

## 2023-06-03 RX ORDER — LORAZEPAM 1 MG/1
2 TABLET ORAL
Status: ACTIVE | OUTPATIENT
Start: 2023-06-03 | End: 2023-06-13

## 2023-06-03 RX ORDER — LORAZEPAM 1 MG/1
2 TABLET ORAL
Status: DISCONTINUED | OUTPATIENT
Start: 2023-06-03 | End: 2023-06-10

## 2023-06-03 RX ORDER — THIAMINE HYDROCHLORIDE 100 MG/ML
200 INJECTION, SOLUTION INTRAMUSCULAR; INTRAVENOUS EVERY 8 HOURS SCHEDULED
Status: COMPLETED | OUTPATIENT
Start: 2023-06-03 | End: 2023-06-08

## 2023-06-03 RX ORDER — BISACODYL 5 MG/1
5 TABLET, DELAYED RELEASE ORAL DAILY PRN
Status: DISCONTINUED | OUTPATIENT
Start: 2023-06-03 | End: 2023-06-11

## 2023-06-03 RX ORDER — ONDANSETRON 4 MG/1
4 TABLET, FILM COATED ORAL EVERY 6 HOURS PRN
Status: DISCONTINUED | OUTPATIENT
Start: 2023-06-03 | End: 2023-06-16 | Stop reason: HOSPADM

## 2023-06-03 RX ORDER — NALOXONE HCL 0.4 MG/ML
0.4 VIAL (ML) INJECTION
Status: DISCONTINUED | OUTPATIENT
Start: 2023-06-03 | End: 2023-06-10

## 2023-06-03 RX ORDER — PHENOBARBITAL 32.4 MG/1
32.4 TABLET ORAL ONCE
Status: COMPLETED | OUTPATIENT
Start: 2023-06-05 | End: 2023-06-05

## 2023-06-03 RX ORDER — ONDANSETRON 2 MG/ML
4 INJECTION INTRAMUSCULAR; INTRAVENOUS EVERY 6 HOURS PRN
Status: DISCONTINUED | OUTPATIENT
Start: 2023-06-03 | End: 2023-06-16 | Stop reason: HOSPADM

## 2023-06-03 RX ORDER — LORAZEPAM 2 MG/ML
1 INJECTION INTRAMUSCULAR EVERY 8 HOURS
Status: DISCONTINUED | OUTPATIENT
Start: 2023-06-04 | End: 2023-06-05

## 2023-06-03 RX ORDER — HYDROCODONE BITARTRATE AND ACETAMINOPHEN 5; 325 MG/1; MG/1
1 TABLET ORAL EVERY 4 HOURS PRN
Status: DISCONTINUED | OUTPATIENT
Start: 2023-06-03 | End: 2023-06-10

## 2023-06-03 RX ORDER — THIAMINE HYDROCHLORIDE 100 MG/ML
200 INJECTION, SOLUTION INTRAMUSCULAR; INTRAVENOUS EVERY 8 HOURS SCHEDULED
Status: DISCONTINUED | OUTPATIENT
Start: 2023-06-06 | End: 2023-06-03 | Stop reason: SDUPTHER

## 2023-06-03 RX ORDER — RISPERIDONE 2 MG/1
2 TABLET ORAL 2 TIMES DAILY
Status: DISCONTINUED | OUTPATIENT
Start: 2023-06-03 | End: 2023-06-16 | Stop reason: HOSPADM

## 2023-06-03 RX ORDER — LORAZEPAM 2 MG/ML
2 INJECTION INTRAMUSCULAR
Status: ACTIVE | OUTPATIENT
Start: 2023-06-03 | End: 2023-06-13

## 2023-06-03 RX ORDER — PHENOBARBITAL SODIUM 65 MG/ML
65 INJECTION INTRAMUSCULAR ONCE
Status: COMPLETED | OUTPATIENT
Start: 2023-06-04 | End: 2023-06-04

## 2023-06-03 RX ORDER — FOLIC ACID 1 MG/1
1 TABLET ORAL DAILY
Status: DISCONTINUED | OUTPATIENT
Start: 2023-06-03 | End: 2023-06-16 | Stop reason: HOSPADM

## 2023-06-03 RX ORDER — LORAZEPAM 2 MG/ML
1 INJECTION INTRAMUSCULAR
Status: DISCONTINUED | OUTPATIENT
Start: 2023-06-03 | End: 2023-06-10

## 2023-06-03 RX ORDER — LORAZEPAM 2 MG/ML
2 INJECTION INTRAMUSCULAR
Status: DISCONTINUED | OUTPATIENT
Start: 2023-06-03 | End: 2023-06-10

## 2023-06-03 RX ORDER — CLONIDINE HYDROCHLORIDE 0.1 MG/1
0.1 TABLET ORAL EVERY 4 HOURS PRN
Status: DISCONTINUED | OUTPATIENT
Start: 2023-06-03 | End: 2023-06-16 | Stop reason: HOSPADM

## 2023-06-03 RX ORDER — PHENOBARBITAL 32.4 MG/1
32.4 TABLET ORAL ONCE
Status: COMPLETED | OUTPATIENT
Start: 2023-06-06 | End: 2023-06-06

## 2023-06-03 RX ORDER — PROPRANOLOL HYDROCHLORIDE 20 MG/1
60 TABLET ORAL 2 TIMES DAILY
Status: DISCONTINUED | OUTPATIENT
Start: 2023-06-03 | End: 2023-06-12

## 2023-06-03 RX ORDER — LORAZEPAM 2 MG/ML
1 INJECTION INTRAMUSCULAR EVERY 6 HOURS
Status: DISCONTINUED | OUTPATIENT
Start: 2023-06-03 | End: 2023-06-04

## 2023-06-03 RX ORDER — FLUVOXAMINE MALEATE 50 MG/1
100 TABLET, COATED ORAL DAILY
Status: DISCONTINUED | OUTPATIENT
Start: 2023-06-03 | End: 2023-06-16 | Stop reason: HOSPADM

## 2023-06-03 RX ORDER — LORAZEPAM 1 MG/1
1 TABLET ORAL
Status: ACTIVE | OUTPATIENT
Start: 2023-06-03 | End: 2023-06-13

## 2023-06-03 RX ORDER — MULTIPLE VITAMINS W/ MINERALS TAB 9MG-400MCG
1 TAB ORAL DAILY
Status: DISCONTINUED | OUTPATIENT
Start: 2023-06-03 | End: 2023-06-03 | Stop reason: SDUPTHER

## 2023-06-03 RX ORDER — ONDANSETRON 2 MG/ML
4 INJECTION INTRAMUSCULAR; INTRAVENOUS ONCE
Status: COMPLETED | OUTPATIENT
Start: 2023-06-03 | End: 2023-06-03

## 2023-06-03 RX ORDER — SODIUM CHLORIDE 0.9 % (FLUSH) 0.9 %
10 SYRINGE (ML) INJECTION AS NEEDED
Status: DISCONTINUED | OUTPATIENT
Start: 2023-06-03 | End: 2023-06-16 | Stop reason: HOSPADM

## 2023-06-03 RX ORDER — MIRTAZAPINE 15 MG/1
15 TABLET, FILM COATED ORAL NIGHTLY
Status: DISCONTINUED | OUTPATIENT
Start: 2023-06-03 | End: 2023-06-16 | Stop reason: HOSPADM

## 2023-06-03 RX ORDER — POLYETHYLENE GLYCOL 3350 17 G/17G
17 POWDER, FOR SOLUTION ORAL DAILY PRN
Status: DISCONTINUED | OUTPATIENT
Start: 2023-06-03 | End: 2023-06-11

## 2023-06-03 RX ORDER — LORAZEPAM 2 MG/ML
1 INJECTION INTRAMUSCULAR
Status: DISPENSED | OUTPATIENT
Start: 2023-06-03 | End: 2023-06-13

## 2023-06-03 RX ORDER — ACETAMINOPHEN 325 MG/1
650 TABLET ORAL EVERY 4 HOURS PRN
Status: DISCONTINUED | OUTPATIENT
Start: 2023-06-03 | End: 2023-06-16 | Stop reason: HOSPADM

## 2023-06-03 RX ORDER — ENOXAPARIN SODIUM 100 MG/ML
40 INJECTION SUBCUTANEOUS EVERY 24 HOURS
Status: DISCONTINUED | OUTPATIENT
Start: 2023-06-03 | End: 2023-06-16 | Stop reason: HOSPADM

## 2023-06-03 RX ORDER — BISACODYL 10 MG
10 SUPPOSITORY, RECTAL RECTAL DAILY PRN
Status: DISCONTINUED | OUTPATIENT
Start: 2023-06-03 | End: 2023-06-11

## 2023-06-03 RX ORDER — LORAZEPAM 1 MG/1
1 TABLET ORAL
Status: DISCONTINUED | OUTPATIENT
Start: 2023-06-03 | End: 2023-06-10

## 2023-06-03 RX ORDER — AMLODIPINE BESYLATE 2.5 MG/1
2.5 TABLET ORAL
Status: DISCONTINUED | OUTPATIENT
Start: 2023-06-03 | End: 2023-06-12

## 2023-06-03 RX ADMIN — LORAZEPAM 2 MG: 2 INJECTION INTRAMUSCULAR; INTRAVENOUS at 08:15

## 2023-06-03 RX ADMIN — ENOXAPARIN SODIUM 40 MG: 100 INJECTION SUBCUTANEOUS at 17:00

## 2023-06-03 RX ADMIN — PHENOBARBITAL SODIUM 191.1 MG: 65 INJECTION INTRAMUSCULAR at 13:43

## 2023-06-03 RX ADMIN — Medication 1 MG: at 17:00

## 2023-06-03 RX ADMIN — MULTIPLE VITAMINS W/ MINERALS TAB 1 TABLET: TAB at 13:06

## 2023-06-03 RX ADMIN — MAGNESIUM SULFATE HEPTAHYDRATE 2 G: 500 INJECTION, SOLUTION INTRAMUSCULAR; INTRAVENOUS at 13:00

## 2023-06-03 RX ADMIN — FOLIC ACID 1000 ML/HR: 5 INJECTION, SOLUTION INTRAMUSCULAR; INTRAVENOUS; SUBCUTANEOUS at 09:22

## 2023-06-03 RX ADMIN — MAGNESIUM SULFATE HEPTAHYDRATE 2 G: 500 INJECTION, SOLUTION INTRAMUSCULAR; INTRAVENOUS at 14:45

## 2023-06-03 RX ADMIN — AMLODIPINE BESYLATE 2.5 MG: 2.5 TABLET ORAL at 17:00

## 2023-06-03 RX ADMIN — THIAMINE HYDROCHLORIDE 500 MG: 100 INJECTION, SOLUTION INTRAMUSCULAR; INTRAVENOUS at 11:42

## 2023-06-03 RX ADMIN — THIAMINE HYDROCHLORIDE 200 MG: 100 INJECTION, SOLUTION INTRAMUSCULAR; INTRAVENOUS at 17:00

## 2023-06-03 RX ADMIN — LORAZEPAM 1 MG: 2 INJECTION INTRAMUSCULAR; INTRAVENOUS at 17:00

## 2023-06-03 RX ADMIN — FLUVOXAMINE MALEATE 100 MG: 50 TABLET, FILM COATED ORAL at 17:00

## 2023-06-03 RX ADMIN — ONDANSETRON 4 MG: 2 INJECTION INTRAMUSCULAR; INTRAVENOUS at 09:04

## 2023-06-03 RX ADMIN — PROPRANOLOL HYDROCHLORIDE 60 MG: 20 TABLET ORAL at 20:17

## 2023-06-03 RX ADMIN — ACETAMINOPHEN 650 MG: 325 TABLET, FILM COATED ORAL at 17:00

## 2023-06-03 RX ADMIN — MIRTAZAPINE 15 MG: 15 TABLET, FILM COATED ORAL at 20:18

## 2023-06-03 RX ADMIN — THIAMINE HYDROCHLORIDE 200 MG: 100 INJECTION, SOLUTION INTRAMUSCULAR; INTRAVENOUS at 22:06

## 2023-06-03 RX ADMIN — LORAZEPAM 1 MG: 2 INJECTION INTRAMUSCULAR; INTRAVENOUS at 22:07

## 2023-06-03 RX ADMIN — THIAMINE HYDROCHLORIDE 500 MG: 100 INJECTION, SOLUTION INTRAMUSCULAR; INTRAVENOUS at 13:15

## 2023-06-03 RX ADMIN — IOPAMIDOL 95 ML: 755 INJECTION, SOLUTION INTRAVENOUS at 11:00

## 2023-06-03 RX ADMIN — PHENOBARBITAL SODIUM 191.1 MG: 65 INJECTION INTRAMUSCULAR at 11:40

## 2023-06-03 RX ADMIN — PHENOBARBITAL SODIUM 255.5 MG: 65 INJECTION INTRAMUSCULAR at 09:05

## 2023-06-03 RX ADMIN — HYDROMORPHONE HYDROCHLORIDE 1 MG: 1 INJECTION, SOLUTION INTRAMUSCULAR; INTRAVENOUS; SUBCUTANEOUS at 22:06

## 2023-06-03 RX ADMIN — DOCUSATE SODIUM 50MG AND SENNOSIDES 8.6MG 2 TABLET: 8.6; 5 TABLET, FILM COATED ORAL at 20:17

## 2023-06-03 RX ADMIN — RISPERIDONE 2 MG: 2 TABLET, FILM COATED ORAL at 20:17

## 2023-06-03 NOTE — ED PROVIDER NOTES
EMERGENCY DEPARTMENT ENCOUNTER    Room Number:  E663/1  Date of encounter:  6/3/2023  PCP: Shakira Colbert APRN  Patient Care Team:  Shakira Colbert APRN as PCP - General (Nurse Practitioner)   Independent Historians: Patient, EMS    HPI:  Chief Complaint: Acute fall, left hip pain, alcohol withdrawal    A complete HPI/ROS/PMH/PSH/SH/FH are unobtainable due to: Nothing    Chronic or social conditions impacting patient care (Social Determinants of Health): None  (Financial Resource Strain / Food Insecurity / Transportation Needs / Physical Activity / Stress / Social Connections / Intimate Partner Violence / Housing Stability)    Context: Nilesh Zapata is a 62 y.o. male who presents to the ED c/o acute fall with left hip pain and alcohol withdrawal.  The patient reports that he fell last night.  He reports he injured his left hip.  He states that he has not been able to ambulate since then.  He states he was able to climb into the recliner with significant pain and sleep in the recliner.  He reports that he usually drinks daily.  He states that he has gotten himself down to a pint a day.  He reports that he has not had any alcohol since last night at 11 PM.  He states he feels like he is going into withdrawals.  He denies any suicidal or homicidal ideation.  He states that he thinks he might of fallen because of alcohol but does not remember the event.  He denies any chest pain or shortness of breath.  He states he has been told he has cirrhosis by 1 doctor and hold he does not have cirrhosis by another doctor.    Review of prior external notes (non-ED) -and- Review of prior external test results outside of this encounter: Discharge summary dated 1/3/2023 with alcohol withdrawal syndrome and some slurred speech.  He was seen by neurology.  MRI was negative for acute stroke.    Prescription drug monitoring program review:         PAST MEDICAL HISTORY  Active Ambulatory Problems     Diagnosis Date Noted     Alcohol withdrawal syndrome with complication (HCC) 12/17/2017    Anxiety 12/17/2017    Fatty liver 12/17/2017    Tobacco abuse 12/17/2017    Kidney cancer, primary, with metastasis from kidney to other site (HCC) 12/17/2017    Alcoholism (HCC) 12/17/2017    Hyponatremia 12/21/2017    Alcohol abuse 10/30/2019    History of renal cell cancer 10/30/2019    History of left nephrectomy 2/2 RCC 10/30/2019    Liver lesion 12/04/2017    Lung nodule 08/21/2017    Vitamin D deficiency 08/16/2017    Under emotional stress 10/30/2019     from spouse 10/30/2019    Coping style affecting medical condition 10/30/2019    Thrombocytopenia 05/31/2022    Essential hypertension 05/31/2022    Acute adjustment disorder with mixed anxiety and depressed mood 12/27/2022    Alcoholic liver disease 12/27/2022    Hypoxemia 12/27/2022     Resolved Ambulatory Problems     Diagnosis Date Noted    Hypokalemia 12/21/2017    Delirium tremens 03/20/2019    Alcohol withdrawal syndrome without complication 12/26/2022    Hypomagnesemia 12/26/2022    Hypophosphatemia 12/27/2022     Past Medical History:   Diagnosis Date    FH: kidney cancer     Hypertension          PAST SURGICAL HISTORY  Past Surgical History:   Procedure Laterality Date    APPENDECTOMY      NEPHRECTOMY      left     TONSILLECTOMY           FAMILY HISTORY  Family History   Problem Relation Age of Onset    Diabetes Mother     Throat cancer Father          SOCIAL HISTORY  Social History     Socioeconomic History    Marital status:    Tobacco Use    Smoking status: Some Days     Packs/day: 0.25     Years: 15.00     Pack years: 3.75     Types: Cigarettes     Start date: 12/17/1981    Tobacco comments:     refused    Substance and Sexual Activity    Alcohol use: Yes     Comment: last drink was at 1000   drinks 1 fifth a day     Drug use: No    Sexual activity: Defer         ALLERGIES  Patient has no known allergies.        REVIEW OF SYSTEMS  Review of Systems  Included in  HPI  All systems reviewed and negative except for those discussed in HPI.      PHYSICAL EXAM    I have reviewed the triage vital signs and nursing notes.    ED Triage Vitals [06/03/23 0735]   Temp Heart Rate Resp BP SpO2   98.3 °F (36.8 °C) 120 18 138/95 96 %      Temp src Heart Rate Source Patient Position BP Location FiO2 (%)   Tympanic Monitor -- -- --       Physical Exam  GENERAL: Awake, alert, no acute distress alert and oriented x4  SKIN: Warm, dry  HENT: Normocephalic, atraumatic  EYES: no scleral icterus  CV: regular rhythm, tachycardic rate  RESPIRATORY: normal effort, lungs clear  ABDOMEN: soft, nontender, nondistended  MUSCULOSKELETAL: no deformity.  No tenderness in the cervical, thoracic, or lumbar spines.  There is tenderness in the lateral left hip.  Lower extremity sensation and motor intact.  Intact pulses to the lower extremities.  No shortening or rotation of the lower extremities.  NEURO: alert, moves all extremities, follows commands.  Tremors to the upper extremities                                                               LAB RESULTS  Recent Results (from the past 24 hour(s))   Comprehensive Metabolic Panel    Collection Time: 06/03/23  7:58 AM    Specimen: Blood   Result Value Ref Range    Glucose 130 (H) 65 - 99 mg/dL    BUN 23 8 - 23 mg/dL    Creatinine 1.06 0.76 - 1.27 mg/dL    Sodium 135 (L) 136 - 145 mmol/L    Potassium 4.5 3.5 - 5.2 mmol/L    Chloride 93 (L) 98 - 107 mmol/L    CO2 18.4 (L) 22.0 - 29.0 mmol/L    Calcium 9.3 8.6 - 10.5 mg/dL    Total Protein 8.0 6.0 - 8.5 g/dL    Albumin 4.5 3.5 - 5.2 g/dL    ALT (SGPT) 32 1 - 41 U/L    AST (SGOT) 39 1 - 40 U/L    Alkaline Phosphatase 97 39 - 117 U/L    Total Bilirubin 0.7 0.0 - 1.2 mg/dL    Globulin 3.5 gm/dL    A/G Ratio 1.3 g/dL    BUN/Creatinine Ratio 21.7 7.0 - 25.0    Anion Gap 23.6 (H) 5.0 - 15.0 mmol/L    eGFR 78.4 >60.0 mL/min/1.73   Protime-INR    Collection Time: 06/03/23  7:58 AM    Specimen: Blood   Result Value Ref  Range    Protime 13.9 11.7 - 14.2 Seconds    INR 1.05 0.90 - 1.10   Ammonia    Collection Time: 06/03/23  7:58 AM    Specimen: Blood   Result Value Ref Range    Ammonia 25 16 - 60 umol/L   Ethanol    Collection Time: 06/03/23  7:58 AM    Specimen: Blood   Result Value Ref Range    Ethanol 44 (H) 0 - 10 mg/dL    Ethanol % 0.044 %   CK    Collection Time: 06/03/23  7:58 AM    Specimen: Blood   Result Value Ref Range    Creatine Kinase 131 20 - 200 U/L   Magnesium    Collection Time: 06/03/23  7:58 AM    Specimen: Blood   Result Value Ref Range    Magnesium 1.5 (L) 1.6 - 2.4 mg/dL   High Sensitivity Troponin T    Collection Time: 06/03/23  7:58 AM    Specimen: Blood   Result Value Ref Range    HS Troponin T 15 (H) <15 ng/L   CBC Auto Differential    Collection Time: 06/03/23  7:58 AM    Specimen: Blood   Result Value Ref Range    WBC 14.38 (H) 3.40 - 10.80 10*3/mm3    RBC 5.16 4.14 - 5.80 10*6/mm3    Hemoglobin 16.4 13.0 - 17.7 g/dL    Hematocrit 47.2 37.5 - 51.0 %    MCV 91.5 79.0 - 97.0 fL    MCH 31.8 26.6 - 33.0 pg    MCHC 34.7 31.5 - 35.7 g/dL    RDW 12.8 12.3 - 15.4 %    RDW-SD 42.5 37.0 - 54.0 fl    MPV 9.3 6.0 - 12.0 fL    Platelets 234 140 - 450 10*3/mm3    Neutrophil % 85.2 (H) 42.7 - 76.0 %    Lymphocyte % 8.7 (L) 19.6 - 45.3 %    Monocyte % 5.5 5.0 - 12.0 %    Eosinophil % 0.0 (L) 0.3 - 6.2 %    Basophil % 0.3 0.0 - 1.5 %    Immature Grans % 0.3 0.0 - 0.5 %    Neutrophils, Absolute 12.25 (H) 1.70 - 7.00 10*3/mm3    Lymphocytes, Absolute 1.25 0.70 - 3.10 10*3/mm3    Monocytes, Absolute 0.79 0.10 - 0.90 10*3/mm3    Eosinophils, Absolute 0.00 0.00 - 0.40 10*3/mm3    Basophils, Absolute 0.04 0.00 - 0.20 10*3/mm3    Immature Grans, Absolute 0.05 0.00 - 0.05 10*3/mm3    nRBC 0.0 0.0 - 0.2 /100 WBC   Phosphorus    Collection Time: 06/03/23  7:58 AM    Specimen: Blood   Result Value Ref Range    Phosphorus 2.9 2.5 - 4.5 mg/dL   Beta Hydroxybutyrate Quantitative    Collection Time: 06/03/23  7:58 AM    Specimen:  Blood   Result Value Ref Range    Beta-Hydroxybutyrate Quant 0.198 0.020 - 0.270 mmol/L   ECG 12 Lead Syncope    Collection Time: 06/03/23  8:11 AM   Result Value Ref Range    QT Interval 359 ms   Blood Gas, Arterial -    Collection Time: 06/03/23  9:21 AM    Specimen: Arterial Blood   Result Value Ref Range    Site Arterial: right radial     Broderick's Test Positive     pH, Arterial 7.402 7.350 - 7.450 pH units    pCO2, Arterial 41.3 35.0 - 45.0 mm Hg    pO2, Arterial 59.1 (L) 80.0 - 100.0 mm Hg    HCO3, Arterial 25.7 22.0 - 28.0 mmol/L    Base Excess, Arterial 0.7 0.0 - 2.0 mmol/L    O2 Saturation Calculated 90.2 (L) 92.0 - 99.0 %    Barometric Pressure for Blood Gas 746.5 mmHg    Modality HFNC     Flow Rate 15 lpm    Rate 16 Breaths/minute       Ordered the above labs and independently reviewed the results.        RADIOLOGY  CT Head Without Contrast    Result Date: 6/3/2023  CT HEAD WO CONTRAST-  INDICATIONS: Head injury  TECHNIQUE: Radiation dose reduction techniques were utilized, including automated exposure control and exposure modulation based on body size. Noncontrast head CT  COMPARISON: None available  FINDINGS:    No acute intracranial hemorrhage, midline shift or mass effect. No acute territorial infarct is identified.  Mild periventricular hypodensities suggest chronic small vessel ischemic change in a patient this age.  Arterial calcifications are seen at the base of the brain.  Ventricles, cisterns, cerebral sulci are unremarkable for patient age.  Likely mucous retention cyst or polyp in right maxillary sinus. The visualized paranasal sinuses, orbits, mastoid air cells are otherwise unremarkable.           No acute intracranial hemorrhage or hydrocephalus. If there is further clinical concern, MRI could be considered for further evaluation.  This report was finalized on 6/3/2023 9:12 AM by Dr. Chase Ruiz M.D.      XR Chest 1 View    Result Date: 6/3/2023  XR CHEST 1 VW-  HISTORY: Male who is 62  years-old, trauma  TECHNIQUE: Frontal view of the chest  COMPARISON: 01/02/2023  FINDINGS: Heart, mediastinum and pulmonary vasculature are unremarkable. No focal pulmonary consolidation, pleural effusion, or pneumothorax. No acute osseous process.      No evidence for acute pulmonary process. Follow-up as clinical indications persist.  This report was finalized on 6/3/2023 8:58 AM by Dr. Chase Ruiz M.D.      CT Angiogram Chest    Result Date: 6/3/2023  CT ANGIOGRAM CHEST-  INDICATIONS: Hypoxia  TECHNIQUE: Radiation dose reduction techniques were utilized, including automated exposure control and exposure modulation based on body size. CT angiography of the chest. Three-dimensional reconstructions.  COMPARISON: None available  FINDINGS:  No pulmonary embolism. No aortic dissection. Ascending aorta is dilated, 4.1 cm.  The heart size is normal without pericardial effusion. Moderate coronary arterial calcification is apparent. A few small subcentimeter short axis mediastinal lymph nodes are seen that are not significant by size criteria.  The airways appear clear.  No pleural effusion or pneumothorax.  The lungs show mild dependent atelectasis bilaterally. A 2 mm subpleural nodule left upper lobe as seen on axial image 42.  Upper abdominal structures show no acute findings. Cholelithiasis is evident.  Degenerative changes are seen in the spine. L1 compression deformity, with 40% loss of height, is age-indeterminate, new from 01/02/2023, correlate clinically. L2 compression deformity is also apparent, but only partly included; if indicated, MRI could be obtained for further evaluation. Chronic sclerotic focus in a lower left rib. Sternal foramen is noted.       No pulmonary embolism.  Age-indeterminate L1, L2 compression deformities, correlate clinically.  This report was finalized on 6/3/2023 11:29 AM by Dr. Chase Ruiz M.D.      XR Hip With or Without Pelvis 2 - 3 View Left    Result Date: 6/3/2023  XR  HIP W OR WO PELVIS 2-3 VIEW LEFT-  INDICATIONS: Trauma  TECHNIQUE: Frontal view of the pelvis, 2 views of the left hip  COMPARISON: None available  FINDINGS:  No dislocation. Hip joint spaces appear preserved. No definite fracture is identified. If there is further clinical concern, cross-sectional imaging could be considered for further evaluation.       As described.  This report was finalized on 6/3/2023 8:58 AM by Dr. Chase Ruiz M.D.       I ordered the above noted radiological studies. Reviewed by me and discussed with radiologist.  See dictation for official radiology interpretation.      PROCEDURES    Procedures      MEDICATIONS GIVEN IN ER    Medications   sodium chloride 0.9 % flush 10 mL (has no administration in time range)   multivitamin with minerals 1 tablet (has no administration in time range)   thiamine (B-1) 500 mg in sodium chloride 0.9 % 100 mL IVPB (500 mg Intravenous New Bag 6/3/23 1142)     Followed by   thiamine (B-1) injection 200 mg (has no administration in time range)     Followed by   thiamine (VITAMIN B-1) tablet 100 mg (has no administration in time range)   folic acid 1 mg in sodium chloride 0.9 % 50 mL IVPB (1 mg Intravenous Not Given 6/3/23 0924)   LORazepam (ATIVAN) injection 1 mg (1 mg Intravenous Not Given 6/3/23 0923)     Followed by   LORazepam (ATIVAN) injection 1 mg (has no administration in time range)   LORazepam (ATIVAN) tablet 1 mg ( Oral Not Given:  See Alt 6/3/23 0815)     Or   LORazepam (ATIVAN) injection 1 mg ( Intravenous Not Given:  See Alt 6/3/23 0815)     Or   LORazepam (ATIVAN) tablet 2 mg ( Oral Not Given:  See Alt 6/3/23 0815)     Or   LORazepam (ATIVAN) injection 2 mg (2 mg Intravenous Given 6/3/23 0815)     Or   LORazepam (ATIVAN) injection 2 mg ( Intravenous Not Given:  See Alt 6/3/23 0815)     Or   LORazepam (ATIVAN) injection 2 mg ( Intramuscular Not Given:  See Alt 6/3/23 0815)   PHENobarbital 255.5 mg in sodium chloride 0.9 % 100 mL IVPB (0 mg  Intravenous Stopped 6/3/23 0925)     Followed by   PHENobarbital 191.1 mg in sodium chloride 0.9 % 100 mL IVPB (191.1 mg Intravenous New Bag 6/3/23 1140)     Followed by   PHENobarbital 191.1 mg in sodium chloride 0.9 % 100 mL IVPB (has no administration in time range)   PHENobarbital injection 65 mg (has no administration in time range)     Followed by   PHENobarbital injection 65 mg (has no administration in time range)     Followed by   PHENobarbital (LUMINAL) tablet 32.4 mg (has no administration in time range)     Followed by   PHENobarbital (LUMINAL) tablet 32.4 mg (has no administration in time range)     Followed by   PHENobarbital (LUMINAL) tablet 32.4 mg (has no administration in time range)   Magnesium Standard Dose Replacement - Follow Nurse / BPA Driven Protocol (has no administration in time range)   magnesium sulfate 2 g in dextrose (D5W) 5 % 100 mL IVPB ( Intravenous Canceled Entry 6/3/23 1152)   thiamine (B-1) 100 mg, folic acid 1 mg in sodium chloride 0.9 % 1,000 mL infusion (1,000 mL/hr Intravenous New Bag 6/3/23 0922)   ondansetron (ZOFRAN) injection 4 mg (4 mg Intravenous Given 6/3/23 0904)   iopamidol (ISOVUE-370) 76 % injection 100 mL (95 mL Intravenous Given 6/3/23 1100)         ORDERS PLACED DURING THIS VISIT:  Orders Placed This Encounter   Procedures    XR Hip With or Without Pelvis 2 - 3 View Left    XR Chest 1 View    CT Head Without Contrast    CT Angiogram Chest    Comprehensive Metabolic Panel    Protime-INR    Ammonia    Ethanol    Urine Drug Screen - Urine, Clean Catch    CK    Magnesium    High Sensitivity Troponin T    CBC Auto Differential    High Sensitivity Troponin T 2Hr    Blood Gas, Arterial -    Phosphorus    Beta Hydroxybutyrate Quantitative    Blood Gas, Arterial -    Monitor Blood Pressure    Vital Signs    Continuous Pulse Oximetry    Obtain Baseline Clinical [place] Withdrawal Assessment - Ar (CIWA-Ar), Sedation Scale & Vital Signs    Clinical [place] Withdrawal  Assessment (CIWA-Ar)    If CIWA-Ar Score Less Than 8 For 3 Consecutive Assessments, Monitor Every 4 Hours & Discontinue Assessment When CIWA-Ar Less Than 8 for 24 Hours    Obtain Pre & Post Sedation Scores With Every Lorazepam Dose - Hold For POSS Greater Than 2 or RASS of -3 or Less    Notify Provider - Withdrawal    Notify Provider of Abnormal Lab Results    Notify Provider - Vitals    [facility] (on-call MD unless specified) Details    ECG 12 Lead Syncope    Insert Peripheral IV    Inpatient Admission    Seizure Precautions    Safety Precautions    CBC & Differential         PROGRESS, DATA ANALYSIS, CONSULTS, AND MEDICAL DECISION MAKING    All labs have been independently interpreted by me.  All radiology studies have been reviewed by me and discussed with radiologist dictating the report.   EKG's independently viewed and interpreted by me.  Discussion below represents my analysis of pertinent findings related to patient's condition, differential diagnosis, treatment plan and final disposition.    Differential diagnosis includes but is not limited to hip fracture, syncope, alcohol withdrawal, seizure, intracranial hemorrhage, arrhythmia.    ED Course as of 06/03/23 1304   Sat Jun 03, 2023   0901 XR Hip With or Without Pelvis 2 - 3 View Left  My independent interpretation of the left hip x-ray is no fracture [TR]   0901 EKG          EKG time: 811  Rhythm/Rate: Sinus tachycardia, rate 115  P waves and WA: Normal P, normal WA  QRS, axis: Narrow QRS, normal axis  ST and T waves: No acute    Independently Interpreted by me  Result atrial fibrillation changed compared to prior 12/26/2022   [TR]   0907 CO2(!): 18.4 [TR]   0907 Discussing with Dr. Mccabe with A.  Agrees to admit.  It is unclear why he has hypoxia.  This was present prior to Ativan administration.  Adding a CTA of the chest to rule out PE. [TR]      ED Course User Index  [TR] Bandar Stinson MD       I interpreted the cardiac monitor rhythm and my independent  interpretation is: Sinus tachycardia    Critical care provider statement:     Critical care time (minutes): 30-74.    Critical care time was exclusive of:  Separately billable procedures and treating other patients    Critical care was necessary to treat or prevent imminent or life-threatening deterioration of the following conditions: CNS failure, alcohol withdrawal    Critical care was time spent personally by me on the following activities:  Development of treatment plan with patient or surrogate, discussions with consultants, evaluation of patient's response to treatment, examination of patient, obtaining history from patient or surrogate, ordering and performing treatments and interventions, ordering and review of laboratory studies, ordering and review of radiographic studies, pulse oximetry, re-evaluation of patient's condition and review of old charts      AS OF 13:04 EDT VITALS:    BP - 126/94  HR - 114  TEMP - 98.4 °F (36.9 °C) (Oral)  O2 SATS - 90%        DIAGNOSIS  Final diagnoses:   Alcohol withdrawal syndrome without complication   Contusion of left hip, initial encounter   Alcoholism   Hypoxia         DISPOSITION  ED Disposition       ED Disposition   Decision to Admit    Condition   --    Comment   Level of Care: Telemetry [5]   Diagnosis: Alcohol withdrawal syndrome without complication [[id number]]   Admitting Physician: BELÉN BROOKS [[id number]]   Attending Physician: BELÉN BROOKS [[id number]]   Certification: I Certify That Inpatient Hospital Services Are Medically Necessary For Greater Than 2 Midnights                    Note Disclaimer: At [facility], we believe that sharing information builds trust and better relationships. You are receiving this note because you recently visited [facility]. It is possible you will see health information before a provider has talked with you about it. This kind of information can be easy to misunderstand. To help you fully understand what it means  for your health, we urge you to discuss this note with your provider.         Bandar Stinson MD  06/03/23 [id number]

## 2023-06-03 NOTE — PROGRESS NOTES
"[facility] Clinical Pharmacy Services: Enoxaparin Consult    Nilesh Zapata has a pharmacy consult to dose prophylactic enoxaparin per Dr Mccabe's request.     Indication: VTE Prophylaxis  Home Anticoagulation: none     Relevant clinical data and objective history reviewed:  62 y.o. male 167.6 cm (66\") 95.3 kg (210 lb)   Body mass index is 33.89 kg/m².   Results from last 7 days   Lab Units 06/03/23  0758   PLATELETS 10*3/mm3 234     Estimated Creatinine Clearance: 76.1 mL/min (by C-G formula based on SCr of 1.06 mg/dL).    Assessment/Plan    Will start patient on 40mg subcutaneous every 24 hours, adjusted for renal function. Consult order will be discontinued but pharmacy will continue to follow.     Martin Altamirano [facility]  Clinical Pharmacist    "

## 2023-06-03 NOTE — CONSULTS
[facility] [[email or web address]] attempted consult for this patient. Pt is alert and oriented x4. Pt politely declined assessment and stated he is not at a point in his life where he wishes to discuss alcohol use. Pt is known to the [facility] [[email or web address]] and was last seen for ETOH in Jan. 2023. Pt further stated that he is in too much pain from his recent fall to focus on anything else. Encouraged patient that if he is to change his mind or wishes to have a discussion to let a staff member know. Pt does not wish to receive follow up visits at this time. Will sign off with strong encouragement to pt to please let a staff member know if he wishes to have a discussion.

## 2023-06-03 NOTE — H&P
Patient Name:  Nilesh Zapata  YOB: 1960  MRN:  [id number]  Admit Date:  6/3/2023  Patient Care Team:  Shakira Colbert APRN as PCP - General (Nurse Practitioner)      Subjective   History Present Illness     Chief Complaint   Patient presents with   • Fall      Patient is a 62-year-old male with known history of alcohol use, hypertension, fatty liver had a mechanical fall at home and does not recall how he fell.  Manage to get up and sit in a recliner and then started noticing pain in his left hip and could not get up and ambulate on his left leg and therefore decided to come to the emergency room.  In the ER x-rays did not reveal any evidence of femur fracture on the left side.  CT brain with no acute findings.  No reports of any urinary or bowel incontinence and he does not have any prior history of seizure disorder .  No known prior cardiac issues.  At the time of my evaluation is awake alert and oriented x3 and is complaining of pain in his left hip.  He was also found to be hypoxic and was requiring 2-3 L of oxygen and chest x-ray with no acute findings.  Given his hypoxia CT of the chest has been ordered and patient denies any cough, sputum production, PND, orthopnea, chest pain.      History of Present Illness  Review of Systems       A 12 system review has been performed and they are negative other than mentioned in the H&P    Personal History     Past Medical History:   Diagnosis Date   • Alcohol abuse    • Anxiety    • Delirium tremens 03/20/2019   • Fatty liver    • FH: kidney cancer    • Hypertension      Past Surgical History:   Procedure Laterality Date   • APPENDECTOMY     • NEPHRECTOMY      left    • TONSILLECTOMY       Family History   Problem Relation Age of Onset   • Diabetes Mother    • Throat cancer Father      Social History     Tobacco Use   • Smoking status: Some Days     Packs/day: 0.25     Years: 15.00     Pack years: 3.75     Types: Cigarettes     Start date:  12/17/1981   • Tobacco comments:     refused    Substance Use Topics   • Alcohol use: Yes     Comment: last drink was at 1000   drinks 1 fifth a day    • Drug use: No     No current facility-administered medications on file prior to encounter.     Current Outpatient Medications on File Prior to Encounter   Medication Sig Dispense Refill   • amLODIPine (NORVASC) 2.5 MG tablet Take 1 tablet by mouth Daily. 30 tablet 0   • fluvoxaMINE (LUVOX) 25 MG tablet Take 100 mg by mouth Daily.     • folic acid (FOLVITE) 1 MG tablet Take 1 tablet by mouth Daily. 30 tablet 0   • mirtazapine (REMERON) 15 MG tablet Take 1 tablet by mouth Every Night. 30 tablet 0   • multivitamin (THERAGRAN) tablet tablet Take 1 tablet by mouth Daily. 30 tablet    • naltrexone (DEPADE) 50 MG tablet Take 50 mg by mouth Daily.     • propranolol (INDERAL) 40 MG tablet Take 60 mg by mouth 2 (Two) Times a Day.     • risperiDONE (risperDAL) 2 MG tablet Take 2 mg by mouth 2 (Two) Times a Day.     • thiamine (VITAMIN B1) 100 MG tablet Take 1 tablet by mouth Daily. 30 tablet 0   • vitamin B-12 (VITAMIN B-12) 1000 MCG tablet Take 1 tablet by mouth Daily. 30 tablet 0     No Known Allergies    Objective    Objective     Vital Signs  Temp:  [98.3 °F (36.8 °C)-98.4 °F (36.9 °C)] 98.4 °F (36.9 °C)  Heart Rate:  [] 114  Resp:  [18] 18  BP: (126-139)/(94-98) 126/94  SpO2:  [90 %-96 %] 90 %  on  Flow (L/min):  [15] 15;   Device (Oxygen Therapy): nasal cannula  Body mass index is 33.89 kg/m².    Physical Exam   HEENT:  PERRLA, extraocular movements intact, sclerus no icterus   Neck: Supple, no JVD   Cardiovascular:  Regular rate and rhythm with normal S1 and S2   Respiratory: Fairly clear to auscultation bilaterally with no wheezes  GI:  Soft, nontender, bowel sounds are present   Extremities:  No edema, palpable pedal pulses , limited range of motion of the left hip secondary to pain  Neurologic:  Grossly nonfocal, no facial asymmetry   Skin:  Warm and dry with  no evidence of any rash      Results Review:  I reviewed the patient's new clinical results.  I reviewed the patient's new imaging results and agree with the interpretation.  I reviewed the patient's other test results and agree with the interpretation  I personally viewed and interpreted the patient's EKG/Telemetry data  Discussed with ED provider.    Lab Results (last 24 hours)       Procedure Component Value Units Date/Time    CBC & Differential [[id number]]  (Abnormal) Collected: 06/03/23 0758    Specimen: Blood Updated: 06/03/23 0814    Narrative:      The following orders were created for panel order CBC & Differential.  Procedure                               Abnormality         Status                     ---------                               -----------         ------                     CBC Auto Differential[[id number]]        Abnormal            Final result                 Please view results for these tests on the individual orders.    Comprehensive Metabolic Panel [[id number]]  (Abnormal) Collected: 06/03/23 0758    Specimen: Blood Updated: 06/03/23 0833     Glucose 130 mg/dL      BUN 23 mg/dL      Creatinine 1.06 mg/dL      Sodium 135 mmol/L      Potassium 4.5 mmol/L      Comment: Slight hemolysis detected by analyzer. Results may be affected.        Chloride 93 mmol/L      CO2 18.4 mmol/L      Calcium 9.3 mg/dL      Total Protein 8.0 g/dL      Albumin 4.5 g/dL      ALT (SGPT) 32 U/L      AST (SGOT) 39 U/L      Alkaline Phosphatase 97 U/L      Total Bilirubin 0.7 mg/dL      Globulin 3.5 gm/dL      A/G Ratio 1.3 g/dL      BUN/Creatinine Ratio 21.7     Anion Gap 23.6 mmol/L      eGFR 78.4 mL/min/1.73     Narrative:      GFR Normal >60  Chronic Kidney Disease <60  Kidney Failure <15      Protime-INR [[id number]]  (Normal) Collected: 06/03/23 0758    Specimen: Blood Updated: 06/03/23 0824     Protime 13.9 Seconds      INR 1.05    Ammonia [[id number]]  (Normal) Collected: 06/03/23 0758    Specimen: Blood  Updated: 06/03/23 0835     Ammonia 25 umol/L     Ethanol [[id number]]  (Abnormal) Collected: 06/03/23 0758    Specimen: Blood Updated: 06/03/23 0833     Ethanol 44 mg/dL      Ethanol % 0.044 %     CK [[id number]]  (Normal) Collected: 06/03/23 0758    Specimen: Blood Updated: 06/03/23 0833     Creatine Kinase 131 U/L     Magnesium [[id number]]  (Abnormal) Collected: 06/03/23 0758    Specimen: Blood Updated: 06/03/23 0833     Magnesium 1.5 mg/dL     High Sensitivity Troponin T [[id number]]  (Abnormal) Collected: 06/03/23 0758    Specimen: Blood Updated: 06/03/23 0833     HS Troponin T 15 ng/L     Narrative:      High Sensitive Troponin T Reference Range:  <10.0 ng/L- Negative Female for AMI  <15.0 ng/L- Negative Male for AMI  >=10 - Abnormal Female indicating possible myocardial injury.  >=15 - Abnormal Male indicating possible myocardial injury.   Clinicians would have to utilize clinical acumen, EKG, Troponin, and serial changes to determine if it is an Acute Myocardial Infarction or myocardial injury due to an underlying chronic condition.         CBC Auto Differential [[id number]]  (Abnormal) Collected: 06/03/23 0758    Specimen: Blood Updated: 06/03/23 0814     WBC 14.38 10*3/mm3      RBC 5.16 10*6/mm3      Hemoglobin 16.4 g/dL      Hematocrit 47.2 %      MCV 91.5 fL      MCH 31.8 pg      MCHC 34.7 g/dL      RDW 12.8 %      RDW-SD 42.5 fl      MPV 9.3 fL      Platelets 234 10*3/mm3      Neutrophil % 85.2 %      Lymphocyte % 8.7 %      Monocyte % 5.5 %      Eosinophil % 0.0 %      Basophil % 0.3 %      Immature Grans % 0.3 %      Neutrophils, Absolute 12.25 10*3/mm3      Lymphocytes, Absolute 1.25 10*3/mm3      Monocytes, Absolute 0.79 10*3/mm3      Eosinophils, Absolute 0.00 10*3/mm3      Basophils, Absolute 0.04 10*3/mm3      Immature Grans, Absolute 0.05 10*3/mm3      nRBC 0.0 /100 WBC     Phosphorus [[id number]]  (Normal) Collected: 06/03/23 0758    Specimen: Blood Updated: 06/03/23 0937     Phosphorus 2.9 mg/dL      Beta Hydroxybutyrate Quantitative [[id number]]  (Normal) Collected: 06/03/23 0758    Specimen: Blood Updated: 06/03/23 0937     Beta-Hydroxybutyrate Quant 0.198 mmol/L     Narrative:      In the assessment of possible diabetic ketoacidosis, the test should be interpreted along with other clinical and laboratory findings.  A level greater than 1 mmol/L should require further evaluation and levels of more than 3 mmol/L require immediate medical review.    Blood Gas, Arterial - [[id number]]  (Abnormal) Collected: 06/03/23 0921    Specimen: Arterial Blood Updated: 06/03/23 0924     Site Arterial: right radial     Broderick's Test Positive     pH, Arterial 7.402 pH units      pCO2, Arterial 41.3 mm Hg      pO2, Arterial 59.1 mm Hg      HCO3, Arterial 25.7 mmol/L      Base Excess, Arterial 0.7 mmol/L      O2 Saturation Calculated 90.2 %      Comment: SPO2 92 Meter: [id number] : [id number] Frandy Coyne        Barometric Pressure for Blood Gas 746.5 mmHg      Modality HFNC     Flow Rate 15 lpm      Rate 16 Breaths/minute             Imaging Results (Last 24 Hours)       Procedure Component Value Units Date/Time    CT Angiogram Chest [[id number]] Collected: 06/03/23 1117     Updated: 06/03/23 1132    Narrative:      CT ANGIOGRAM CHEST-     INDICATIONS: Hypoxia     TECHNIQUE: Radiation dose reduction techniques were utilized, including  automated exposure control and exposure modulation based on body size.  CT angiography of the chest. Three-dimensional reconstructions.     COMPARISON: None available      FINDINGS:     No pulmonary embolism. No aortic dissection. Ascending aorta is dilated,  4.1 cm.     The heart size is normal without pericardial effusion. Moderate coronary  arterial calcification is apparent. A few small subcentimeter short axis  mediastinal lymph nodes are seen that are not significant by size  criteria.     The airways appear clear.     No pleural effusion or pneumothorax.     The lungs show mild  dependent atelectasis bilaterally. A 2 mm subpleural  nodule left upper lobe as seen on axial image 42.     Upper abdominal structures show no acute findings. Cholelithiasis is  evident.     Degenerative changes are seen in the spine. L1 compression deformity,  with 40% loss of height, is age-indeterminate, new from 01/02/2023,  correlate clinically. L2 compression deformity is also apparent, but  only partly included; if indicated, MRI could be obtained for further  evaluation. Chronic sclerotic focus in a lower left rib. Sternal foramen  is noted.        Impression:         No pulmonary embolism.     Age-indeterminate L1, L2 compression deformities, correlate clinically.     This report was finalized on 6/3/2023 11:29 AM by Dr. Chase Ruiz M.D.       CT Head Without Contrast [[id number]] Collected: 06/03/23 0901     Updated: 06/03/23 0915    Narrative:      CT HEAD WO CONTRAST-     INDICATIONS: Head injury     TECHNIQUE: Radiation dose reduction techniques were utilized, including  automated exposure control and exposure modulation based on body size.  Noncontrast head CT     COMPARISON: None available     FINDINGS:           No acute intracranial hemorrhage, midline shift or mass effect. No acute  territorial infarct is identified.     Mild periventricular hypodensities suggest chronic small vessel ischemic  change in a patient this age.     Arterial calcifications are seen at the base of the brain.     Ventricles, cisterns, cerebral sulci are unremarkable for patient age.     Likely mucous retention cyst or polyp in right maxillary sinus. The  visualized paranasal sinuses, orbits, mastoid air cells are otherwise  unremarkable.                   Impression:         No acute intracranial hemorrhage or hydrocephalus. If there is further  clinical concern, MRI could be considered for further evaluation.     This report was finalized on 6/3/2023 9:12 AM by Dr. Chase Ruiz M.D.       XR Chest 1 View  [[id number]] Collected: 06/03/23 0858     Updated: 06/03/23 0901    Narrative:      XR CHEST 1 VW-     HISTORY: Male who is 62 years-old, trauma     TECHNIQUE: Frontal view of the chest     COMPARISON: 01/02/2023     FINDINGS: Heart, mediastinum and pulmonary vasculature are unremarkable.  No focal pulmonary consolidation, pleural effusion, or pneumothorax. No  acute osseous process.       Impression:      No evidence for acute pulmonary process. Follow-up as  clinical indications persist.     This report was finalized on 6/3/2023 8:58 AM by Dr. Chase Ruiz M.D.       XR Hip With or Without Pelvis 2 - 3 View Left [[id number]] Collected: 06/03/23 0855     Updated: 06/03/23 0901    Narrative:      XR HIP W OR WO PELVIS 2-3 VIEW LEFT-     INDICATIONS: Trauma     TECHNIQUE: Frontal view of the pelvis, 2 views of the left hip     COMPARISON: None available     FINDINGS:     No dislocation. Hip joint spaces appear preserved. No definite fracture  is identified. If there is further clinical concern, cross-sectional  imaging could be considered for further evaluation.       Impression:         As described.     This report was finalized on 6/3/2023 8:58 AM by Dr. Chase Ruiz M.D.                   ECG 12 Lead Syncope   Preliminary Result   HEART RATE= 115  bpm   RR Interval= 522  ms   LA Interval= 163  ms   P Horizontal Axis= -44  deg   P Front Axis= 24  deg   QRSD Interval= 78  ms   QT Interval= 359  ms   QRS Axis= 155  deg   T Wave Axis= 2  deg   - BORDERLINE ECG -   Sinus tachycardia   Right axis deviation   Consider anterior infarct   Baseline wander in lead(s) V2   Electronically Signed By:    Date and Time of Study: 2023-06-03 08:11:07           Assessment/Plan     Active Hospital Problems    Diagnosis  POA   • Alcohol withdrawal syndrome without complication [F10.930]  Yes      Resolved Hospital Problems   No resolved problems to display.      1. Mechanical fall with left hip pain, unable to ambulate  secondary to pain therefore analgesics will be initiated and x-ray with no evidence any fracture.  Occult fracture cannot be ruled out therefore Orthopedic consultation will be obtained.  2. Ethanol abuse, trying to quit and does drink a pt a day.  Will be placed on withdrawal protocol and he will be initiated on folate and thiamine supplements.  Seizure precautions will also be undertaken.  3. Hypertension, on amlodipine and will be continued.    4. On Lovenox for DVT prophylaxis.    5. Code status is full code.         Aba Mccabe MD  [facility]  06/03/23  13:18 EDT

## 2023-06-03 NOTE — ED NOTES
"Nursing report ED to floor  Nilesh Zapata  62 y.o.  male    HPI :   Chief Complaint   Patient presents with    Fall       Admitting doctor:   Aba Mccabe MD    Admitting diagnosis:   The primary encounter diagnosis was Alcohol withdrawal syndrome without complication. Diagnoses of Contusion of left hip, initial encounter, Alcoholism, and Hypoxia were also pertinent to this visit.    Code status:   Current Code Status       Date Active Code Status Order ID Comments User Context       Prior            Allergies:   Patient has no known allergies.    Isolation:   No active isolations    Intake and Output  No intake or output data in the 24 hours ending 06/03/23 0954    Weight:       06/03/23  0736   Weight: 95.3 kg (210 lb)       Most recent vitals:   Vitals:    06/03/23 0735 06/03/23 0736 06/03/23 0919   BP: 138/95     Pulse: 120  117   Resp: 18  18   Temp: 98.3 °F (36.8 °C)     TempSrc: Tympanic     SpO2: 96%  92%   Weight:  95.3 kg (210 lb)    Height:  167.6 cm (66\")        Active LDAs/IV Access:   Lines, Drains & Airways       Active LDAs       Name Placement date Placement time Site Days    Peripheral IV 01/02/23 0954 Posterior;Right Hand 01/02/23  0954  Hand  151    Peripheral IV 06/03/23 0757 Right Antecubital 06/03/23  0757  Antecubital  less than 1                    Labs (abnormal labs have a star):   Labs Reviewed   COMPREHENSIVE METABOLIC PANEL - Abnormal; Notable for the following components:       Result Value    Glucose 130 (*)     Sodium 135 (*)     Chloride 93 (*)     CO2 18.4 (*)     Anion Gap 23.6 (*)     All other components within normal limits    Narrative:     GFR Normal >60  Chronic Kidney Disease <60  Kidney Failure <15     ETHANOL - Abnormal; Notable for the following components:    Ethanol 44 (*)     All other components within normal limits   MAGNESIUM - Abnormal; Notable for the following components:    Magnesium 1.5 (*)     All other components within normal limits   TROPONIN - " Abnormal; Notable for the following components:    HS Troponin T 15 (*)     All other components within normal limits    Narrative:     High Sensitive Troponin T Reference Range:  <10.0 ng/L- Negative Female for AMI  <15.0 ng/L- Negative Male for AMI  >=10 - Abnormal Female indicating possible myocardial injury.  >=15 - Abnormal Male indicating possible myocardial injury.   Clinicians would have to utilize clinical acumen, EKG, Troponin, and serial changes to determine if it is an Acute Myocardial Infarction or myocardial injury due to an underlying chronic condition.        CBC WITH AUTO DIFFERENTIAL - Abnormal; Notable for the following components:    WBC 14.38 (*)     Neutrophil % 85.2 (*)     Lymphocyte % 8.7 (*)     Eosinophil % 0.0 (*)     Neutrophils, Absolute 12.25 (*)     All other components within normal limits   BLOOD GAS, ARTERIAL - Abnormal; Notable for the following components:    pO2, Arterial 59.1 (*)     O2 Saturation Calculated 90.2 (*)     All other components within normal limits   PROTIME-INR - Normal   AMMONIA - Normal   CK - Normal   PHOSPHORUS - Normal   BETA HYDROXYBUTYRATE QUANTITATIVE - Normal    Narrative:     In the assessment of possible diabetic ketoacidosis, the test should be interpreted along with other clinical and laboratory findings.  A level greater than 1 mmol/L should require further evaluation and levels of more than 3 mmol/L require immediate medical review.   BLOOD GAS, ARTERIAL   URINE DRUG SCREEN   HIGH SENSITIVITIY TROPONIN T 2HR   CBC AND DIFFERENTIAL    Narrative:     The following orders were created for panel order CBC & Differential.  Procedure                               Abnormality         Status                     ---------                               -----------         ------                     CBC Auto Differential[[id number]]        Abnormal            Final result                 Please view results for these tests on the individual orders.       EKG:    ECG 12 Lead Syncope   Preliminary Result   HEART RATE= 115  bpm   RR Interval= 522  ms   KS Interval= 163  ms   P Horizontal Axis= -44  deg   P Front Axis= 24  deg   QRSD Interval= 78  ms   QT Interval= 359  ms   QRS Axis= 155  deg   T Wave Axis= 2  deg   - BORDERLINE ECG -   Sinus tachycardia   Right axis deviation   Consider anterior infarct   Baseline wander in lead(s) V2   Electronically Signed By:    Date and Time of Study: 2023-06-03 08:11:07          Meds given in ED:   Medications   sodium chloride 0.9 % flush 10 mL (has no administration in time range)   multivitamin with minerals 1 tablet (has no administration in time range)   thiamine (B-1) 500 mg in sodium chloride 0.9 % 100 mL IVPB (has no administration in time range)     Followed by   thiamine (B-1) injection 200 mg (has no administration in time range)     Followed by   thiamine (VITAMIN B-1) tablet 100 mg (has no administration in time range)   folic acid 1 mg in sodium chloride 0.9 % 50 mL IVPB (1 mg Intravenous Not Given 6/3/23 0924)   LORazepam (ATIVAN) injection 1 mg (1 mg Intravenous Not Given 6/3/23 0923)     Followed by   LORazepam (ATIVAN) injection 1 mg (has no administration in time range)   LORazepam (ATIVAN) tablet 1 mg ( Oral Not Given:  See Alt 6/3/23 0815)     Or   LORazepam (ATIVAN) injection 1 mg ( Intravenous Not Given:  See Alt 6/3/23 0815)     Or   LORazepam (ATIVAN) tablet 2 mg ( Oral Not Given:  See Alt 6/3/23 0815)     Or   LORazepam (ATIVAN) injection 2 mg (2 mg Intravenous Given 6/3/23 0815)     Or   LORazepam (ATIVAN) injection 2 mg ( Intravenous Not Given:  See Alt 6/3/23 0815)     Or   LORazepam (ATIVAN) injection 2 mg ( Intramuscular Not Given:  See Alt 6/3/23 0815)   PHENobarbital 255.5 mg in sodium chloride 0.9 % 100 mL IVPB (0 mg Intravenous Stopped 6/3/23 0925)     Followed by   PHENobarbital 191.1 mg in sodium chloride 0.9 % 100 mL IVPB (has no administration in time range)     Followed by   PHENobarbital 191.1  mg in sodium chloride 0.9 % 100 mL IVPB (has no administration in time range)   PHENobarbital injection 65 mg (has no administration in time range)     Followed by   PHENobarbital injection 65 mg (has no administration in time range)     Followed by   PHENobarbital (LUMINAL) tablet 32.4 mg (has no administration in time range)     Followed by   PHENobarbital (LUMINAL) tablet 32.4 mg (has no administration in time range)     Followed by   PHENobarbital (LUMINAL) tablet 32.4 mg (has no administration in time range)   Magnesium Standard Dose Replacement - Follow Nurse / BPA Driven Protocol (has no administration in time range)   thiamine (B-1) 100 mg, folic acid 1 mg in sodium chloride 0.9 % 1,000 mL infusion (1,000 mL/hr Intravenous New Bag 6/3/23 0922)   ondansetron (ZOFRAN) injection 4 mg (4 mg Intravenous Given 6/3/23 0904)       Imaging results:  CT Head Without Contrast    Result Date: 6/3/2023   No acute intracranial hemorrhage or hydrocephalus. If there is further clinical concern, MRI could be considered for further evaluation.  This report was finalized on 6/3/2023 9:12 AM by Dr. Chase Ruiz M.D.      XR Chest 1 View    Result Date: 6/3/2023  No evidence for acute pulmonary process. Follow-up as clinical indications persist.  This report was finalized on 6/3/2023 8:58 AM by Dr. Chase Ruiz M.D.      XR Hip With or Without Pelvis 2 - 3 View Left    Result Date: 6/3/2023   As described.  This report was finalized on 6/3/2023 8:58 AM by Dr. Chase Ruiz M.D.       Ambulatory status:   - Up with assist x2    Social issues:   Social History     Socioeconomic History    Marital status:    Tobacco Use    Smoking status: Some Days     Packs/day: 0.25     Years: 15.00     Pack years: 3.75     Types: Cigarettes     Start date: 12/17/1981    Tobacco comments:     refused    Substance and Sexual Activity    Alcohol use: Yes     Comment: last drink was at 1000   drinks 1 fifth a day     Drug  use: No    Sexual activity: Defer       NIH Stroke Scale:         Guicho White RN  06/03/23 09:54 EDT

## 2023-06-03 NOTE — ED NOTES
Pt fell 0200.  He is unsure of where he fell or what happened.  He is an alcoholic but it is unclear if he had been drinking.  He called someone and told them that he fell.  He c/o left hip pain

## 2023-06-04 LAB
ALBUMIN SERPL-MCNC: 3.8 G/DL (ref 3.5–5.2)
ALBUMIN/GLOB SERPL: 1.4 G/DL
ALP SERPL-CCNC: 82 U/L (ref 39–117)
ALT SERPL W P-5'-P-CCNC: 20 U/L (ref 1–41)
AMPHET+METHAMPHET UR QL: NEGATIVE
ANION GAP SERPL CALCULATED.3IONS-SCNC: 8 MMOL/L (ref 5–15)
AST SERPL-CCNC: 32 U/L (ref 1–40)
BARBITURATES UR QL SCN: POSITIVE
BASOPHILS # BLD AUTO: 0.06 10*3/MM3 (ref 0–0.2)
BASOPHILS NFR BLD AUTO: 0.9 % (ref 0–1.5)
BENZODIAZ UR QL SCN: NEGATIVE
BILIRUB SERPL-MCNC: 1.1 MG/DL (ref 0–1.2)
BUN SERPL-MCNC: 24 MG/DL (ref 8–23)
BUN/CREAT SERPL: 21.8 (ref 7–25)
CALCIUM SPEC-SCNC: 8.5 MG/DL (ref 8.6–10.5)
CANNABINOIDS SERPL QL: NEGATIVE
CHLORIDE SERPL-SCNC: 98 MMOL/L (ref 98–107)
CO2 SERPL-SCNC: 29 MMOL/L (ref 22–29)
COCAINE UR QL: NEGATIVE
CREAT SERPL-MCNC: 1.1 MG/DL (ref 0.76–1.27)
DEPRECATED RDW RBC AUTO: 41.1 FL (ref 37–54)
EGFRCR SERPLBLD CKD-EPI 2021: 75 ML/MIN/1.73
EOSINOPHIL # BLD AUTO: 0.06 10*3/MM3 (ref 0–0.4)
EOSINOPHIL NFR BLD AUTO: 0.9 % (ref 0.3–6.2)
ERYTHROCYTE [DISTWIDTH] IN BLOOD BY AUTOMATED COUNT: 12.5 % (ref 12.3–15.4)
FENTANYL UR-MCNC: NEGATIVE NG/ML
GLOBULIN UR ELPH-MCNC: 2.8 GM/DL
GLUCOSE SERPL-MCNC: 110 MG/DL (ref 65–99)
HCT VFR BLD AUTO: 37.9 % (ref 37.5–51)
HGB BLD-MCNC: 13.4 G/DL (ref 13–17.7)
LYMPHOCYTES # BLD AUTO: 1.69 10*3/MM3 (ref 0.7–3.1)
LYMPHOCYTES NFR BLD AUTO: 24 % (ref 19.6–45.3)
MCH RBC QN AUTO: 32.3 PG (ref 26.6–33)
MCHC RBC AUTO-ENTMCNC: 35.4 G/DL (ref 31.5–35.7)
MCV RBC AUTO: 91.3 FL (ref 79–97)
METHADONE UR QL SCN: NEGATIVE
MONOCYTES # BLD AUTO: 0.62 10*3/MM3 (ref 0.1–0.9)
MONOCYTES NFR BLD AUTO: 8.8 % (ref 5–12)
NEUTROPHILS NFR BLD AUTO: 4.6 10*3/MM3 (ref 1.7–7)
NEUTROPHILS NFR BLD AUTO: 65.1 % (ref 42.7–76)
OPIATES UR QL: NEGATIVE
OXYCODONE UR QL SCN: NEGATIVE
PLATELET # BLD AUTO: 125 10*3/MM3 (ref 140–450)
PMV BLD AUTO: 10.1 FL (ref 6–12)
POTASSIUM SERPL-SCNC: 4 MMOL/L (ref 3.5–5.2)
PROT SERPL-MCNC: 6.6 G/DL (ref 6–8.5)
RBC # BLD AUTO: 4.15 10*6/MM3 (ref 4.14–5.8)
SODIUM SERPL-SCNC: 135 MMOL/L (ref 136–145)
WBC NRBC COR # BLD: 7.05 10*3/MM3 (ref 3.4–10.8)

## 2023-06-04 PROCEDURE — 80307 DRUG TEST PRSMV CHEM ANLYZR: CPT | Performed by: EMERGENCY MEDICINE

## 2023-06-04 PROCEDURE — 25010000002 LORAZEPAM PER 2 MG: Performed by: EMERGENCY MEDICINE

## 2023-06-04 PROCEDURE — 25010000002 ENOXAPARIN PER 10 MG: Performed by: INTERNAL MEDICINE

## 2023-06-04 PROCEDURE — 25010000002 THIAMINE HCL 200 MG/2ML SOLUTION: Performed by: INTERNAL MEDICINE

## 2023-06-04 PROCEDURE — 25010000002 PHENOBARBITAL PER 120 MG: Performed by: EMERGENCY MEDICINE

## 2023-06-04 PROCEDURE — 80053 COMPREHEN METABOLIC PANEL: CPT | Performed by: INTERNAL MEDICINE

## 2023-06-04 PROCEDURE — 97161 PT EVAL LOW COMPLEX 20 MIN: CPT

## 2023-06-04 PROCEDURE — 97530 THERAPEUTIC ACTIVITIES: CPT

## 2023-06-04 PROCEDURE — 85025 COMPLETE CBC W/AUTO DIFF WBC: CPT | Performed by: INTERNAL MEDICINE

## 2023-06-04 RX ADMIN — MULTIPLE VITAMINS W/ MINERALS TAB 1 TABLET: TAB at 08:25

## 2023-06-04 RX ADMIN — DOCUSATE SODIUM 50MG AND SENNOSIDES 8.6MG 2 TABLET: 8.6; 5 TABLET, FILM COATED ORAL at 08:26

## 2023-06-04 RX ADMIN — PHENOBARBITAL SODIUM 65 MG: 65 INJECTION INTRAMUSCULAR at 14:12

## 2023-06-04 RX ADMIN — ENOXAPARIN SODIUM 40 MG: 100 INJECTION SUBCUTANEOUS at 14:13

## 2023-06-04 RX ADMIN — ACETAMINOPHEN 650 MG: 325 TABLET, FILM COATED ORAL at 14:15

## 2023-06-04 RX ADMIN — THIAMINE HYDROCHLORIDE 200 MG: 100 INJECTION, SOLUTION INTRAMUSCULAR; INTRAVENOUS at 06:54

## 2023-06-04 RX ADMIN — PROPRANOLOL HYDROCHLORIDE 60 MG: 20 TABLET ORAL at 20:21

## 2023-06-04 RX ADMIN — Medication 1 TABLET: at 08:26

## 2023-06-04 RX ADMIN — RISPERIDONE 2 MG: 2 TABLET, FILM COATED ORAL at 20:21

## 2023-06-04 RX ADMIN — FLUVOXAMINE MALEATE 100 MG: 50 TABLET, FILM COATED ORAL at 08:25

## 2023-06-04 RX ADMIN — THIAMINE HYDROCHLORIDE 200 MG: 100 INJECTION, SOLUTION INTRAMUSCULAR; INTRAVENOUS at 23:48

## 2023-06-04 RX ADMIN — PHENOBARBITAL SODIUM 65 MG: 65 INJECTION INTRAMUSCULAR; INTRAVENOUS at 03:58

## 2023-06-04 RX ADMIN — MIRTAZAPINE 15 MG: 15 TABLET, FILM COATED ORAL at 20:21

## 2023-06-04 RX ADMIN — AMLODIPINE BESYLATE 2.5 MG: 2.5 TABLET ORAL at 08:25

## 2023-06-04 RX ADMIN — Medication 1 MG: at 08:25

## 2023-06-04 RX ADMIN — LORAZEPAM 1 MG: 2 INJECTION INTRAMUSCULAR; INTRAVENOUS at 20:53

## 2023-06-04 RX ADMIN — RISPERIDONE 2 MG: 2 TABLET, FILM COATED ORAL at 08:25

## 2023-06-04 RX ADMIN — THIAMINE HYDROCHLORIDE 200 MG: 100 INJECTION, SOLUTION INTRAMUSCULAR; INTRAVENOUS at 14:12

## 2023-06-04 RX ADMIN — PROPRANOLOL HYDROCHLORIDE 60 MG: 20 TABLET ORAL at 08:25

## 2023-06-04 NOTE — PLAN OF CARE
Goal Outcome Evaluation:  Plan of Care Reviewed With: patient           Outcome Evaluation: Patient is a 62 y.o male who presents to [facility] following a fall with resulting L hip pain. Xray showed no fracture. Patient with hx of alcohol abuse. Patient reports he had been drinking and he is not sure how he fell. Patient AOx3 upon arrival though appeared drowsy, with increased processing time to follow commands. Patient reports seeing frogs flying across the ceiling during session. Patient reports he lives alone and is independent at baseline. Patient sat up to EOB with modA this date. Patient required assistance with bringing L LE to EOB and bringing trunk to midline. Once at EOB patient maintainted sitting balance with Baltazar. VCs required for hand placement to improve balance. Patient with R lean due to pain when shifting weight to midline. When attempting LAQ at EOB on L patient with LOB leaning to R requiring modA-maxA to correct. Once returning to supine patient able to use R LE along with B UEs on handrails to assist therapist with scooting patient up in bed. Further transfers deferred this date due to poor sitting balance and awaiting ortho eval to clear patient for WB on L LE. Anticipate patient will need SNF at d/c. PT will continue to monitor.

## 2023-06-04 NOTE — THERAPY EVALUATION
Patient Name: Nilesh Zapata  : 1960    MRN: [id number]                              Today's Date: 2023       Admit Date: 6/3/2023    Visit Dx:     ICD-10-CM ICD-9-CM   1. Alcohol withdrawal syndrome without complication  F10.930 291.81   2. Contusion of left hip, initial encounter  S70.02XA 924.01   3. Alcoholism  F10.20 303.90   4. Hypoxia  R09.02 799.02     Patient Active Problem List   Diagnosis    Alcohol withdrawal syndrome with complication (HCC)    Anxiety    Fatty liver    Tobacco abuse    Kidney cancer, primary, with metastasis from kidney to other site (HCC)    Alcoholism (HCC)    Hyponatremia    Alcohol abuse    History of renal cell cancer    History of left nephrectomy 2/2 RCC    Liver lesion    Lung nodule    Vitamin D deficiency    Under emotional stress     from spouse    Coping style affecting medical condition    Thrombocytopenia    Essential hypertension    Acute adjustment disorder with mixed anxiety and depressed mood    Alcoholic liver disease    Hypoxemia    Alcohol withdrawal syndrome without complication     Past Medical History:   Diagnosis Date    Alcohol abuse     Anxiety     Delirium tremens 2019    Fatty liver     FH: kidney cancer     Hypertension      Past Surgical History:   Procedure Laterality Date    APPENDECTOMY      NEPHRECTOMY      left     TONSILLECTOMY        General Information       Row Name 23 1118          Physical Therapy Time and Intention    Document Type evaluation  -SM     Mode of Treatment individual therapy;physical therapy  -       Row Name 23 1118          General Information    Patient Profile Reviewed yes  -SM     Prior Level of Function independent:  -     Existing Precautions/Restrictions fall  -       Row Name 23 1118          Living Environment    People in Home alone  -       Row Name 23 1118          Home Main Entrance    Number of Stairs, Main Entrance none  -       Row Name 23 1118           Cognition    Orientation Status (Cognition) oriented x 3  Moments of confusion, increased processing time, hallucinations  -       Row Name 06/04/23 1118          Safety Issues, Functional Mobility    Safety Issues Affecting Function (Mobility) problem-solving;sequencing abilities;impulsivity;judgment  -     Impairments Affecting Function (Mobility) balance;postural/trunk control;strength;endurance/activity tolerance;pain  -               User Key  (r) = Recorded By, (t) = Taken By, (c) = Cosigned By      Initials Name Provider Type     Preethi Henao PT Physical Therapist                   Mobility       Row Name 06/04/23 1119          Bed Mobility    Bed Mobility supine-sit;sit-supine  -     Supine-Sit [place] (Bed Mobility) moderate assist (50% patient effort);verbal cues  -     Sit-Supine [place] (Bed Mobility) moderate assist (50% patient effort);verbal cues  -     Assistive Device (Bed Mobility) head of bed elevated;bed rails;draw sheet  -       Row Name 06/04/23 1119          Transfers    Comment, (Transfers) Deferred due to poor sitting balance  -               User Key  (r) = Recorded By, (t) = Taken By, (c) = Cosigned By      Initials Name Provider Type     Preethi Henao PT Physical Therapist                   Obj/Interventions       Row Name 06/04/23 1120          Range of Motion Comprehensive    Comment, General Range of Motion Unable to formally assess due to pain  -[facility] Name 06/04/23 1120          Strength Comprehensive (MMT)    General Manual Muscle Testing (MMT) Assessment lower extremity strength deficits identified  -     Comment, General Manual Muscle Testing (MMT) Assessment Unable to formally assess due to pain. Patient able to perform LAQ on L through partial ROM  -       Row Name 06/04/23 1120          Balance    Balance Assessment sitting static balance;sitting dynamic balance  -     Static Sitting Balance minimal assist;verbal cues  -      Dynamic Sitting Balance moderate assist;maximum assist;verbal cues  -SM     Position, Sitting Balance sitting edge of bed  -SM     Balance Interventions sitting;static;dynamic  -SM     Comment, Balance Patient demonstrates a strong lean to the R when attempting to perform LAQs on L.  -SM               User Key  (r) = Recorded By, (t) = Taken By, (c) = Cosigned By      Initials Name Provider Type     Preethi Henao PT Physical Therapist                   Goals/Plan       Row Name 06/04/23 1144          Bed Mobility Goal 1 (PT)    Activity/Assistive Device (Bed Mobility Goal 1, PT) bed mobility activities, all  -SM     [place] Level/Cues Needed (Bed Mobility Goal 1, PT) contact guard required  -SM     Time Frame (Bed Mobility Goal 1, PT) 2 weeks  -SM       Row Name 06/04/23 1144          Transfer Goal 1 (PT)    Activity/Assistive Device (Transfer Goal 1, PT) sit-to-stand/stand-to-sit;bed-to-chair/chair-to-bed  -SM     [place] Level/Cues Needed (Transfer Goal 1, PT) minimum assist (75% or more patient effort)  -SM     Time Frame (Transfer Goal 1, PT) 2 weeks  -SM       Row Name 06/04/23 1144          Gait Training Goal 1 (PT)    Activity/Assistive Device (Gait Training Goal 1, PT) gait (walking locomotion);walker, rolling  -SM     [place] Level (Gait Training Goal 1, PT) contact guard required  -SM     Distance (Gait Training Goal 1, PT) 50ft  -SM     Time Frame (Gait Training Goal 1, PT) 2 weeks  -SM               User Key  (r) = Recorded By, (t) = Taken By, (c) = Cosigned By      Initials Name Provider Type     Preethi Henao PT Physical Therapist                   Clinical Impression       Row Name 06/04/23 1122          Pain    Pain Location - Side/Orientation Left  -SM     Pain Location - hip  -SM     Pre/Posttreatment Pain Comment Patient did not rate pain  -SM     Pain Intervention(s) Rest;Repositioned  -SM       Row Name 06/04/23 1122          Plan of Care Review    Plan of Care  Reviewed With patient  -     Outcome Evaluation Patient is a 62 y.o male who presents to [facility] following a fall with resulting L hip pain. Xray showed no fracture. Patient with hx of alcohol abuse. Patient reports he had been drinking and he is not sure how he fell. Patient AOx3 upon arrival though appeared drowsy, with increased processing time to follow commands. Patient reports seeing frogs flying across the ceiling during session. Patient reports he lives alone and is independent at baseline. Patient sat up to EOB with modA this date. Patient required assistance with bringing L LE to EOB and bringing trunk to midline. Once at EOB patient maintainted sitting balance with [place]. VCs required for hand placement to improve balance. Patient with R lean due to pain when shifting weight to midline. When attempting LAQ at EOB on L patient with LOB leaning to R requiring modA-maxA to correct. Once returning to supine patient able to use R LE along with B UEs on handrails to assist therapist with scooting patient up in bed. Further transfers deferred this date due to poor sitting balance and awaiting ortho eval to clear patient for WB on L LE. Anticipate patient will need SNF at d/c. PT will continue to monitor.  -       Row Name 06/04/23 1122          Therapy Assessment/Plan (PT)    Rehab Potential (PT) good, to achieve stated therapy goals  -     Criteria for Skilled Interventions Met (PT) yes  -     Therapy Frequency (PT) 5 times/wk  -       Row Name 06/04/23 1122          Vital Signs    O2 Delivery Pre Treatment room air  -SM     O2 Delivery Intra Treatment room air  -SM     O2 Delivery Post Treatment room air  -SM     Pre Patient Position Supine  -SM     Intra Patient Position Sitting  -SM     Post Patient Position Supine  -SM       Row Name 06/04/23 1122          Positioning and Restraints    Pre-Treatment Position in bed  -SM     Post Treatment Position bed  -SM     In Bed notified nsg;encouraged to call for  assist;exit alarm on;call light within reach  -               User Key  (r) = Recorded By, (t) = Taken By, (c) = Cosigned By      Initials Name Provider Type    Preethi Ramos PT Physical Therapist                   Outcome Measures       Row Name 06/04/23 1145 06/04/23 0845       How much help from another person do you currently need...    Turning from your back to your side while in flat bed without using bedrails? 3  -SM 3  -JK    Moving from lying on back to sitting on the side of a flat bed without bedrails? 2  -SM 3  -JK    Moving to and from a bed to a chair (including a wheelchair)? 1  -SM 2  -JK    Standing up from a chair using your arms (e.g., wheelchair, bedside chair)? 1  -SM 2  -JK    Climbing 3-5 steps with a railing? 1  -SM 2  -JK    To walk in hospital room? 1  -SM 2  -JK    AM-PAC 6 Clicks Score (PT) 9  - 14  -JK    Highest level of mobility 3 --> Sat at edge of bed  - 4 --> Transferred to chair/commode  -JK              User Key  (r) = Recorded By, (t) = Taken By, (c) = Cosigned By      Initials Name Provider Type    Ute Levy, RN Registered Nurse    Preethi Ramos, DORETHA Physical Therapist                                 Physical Therapy Education       Title: PT OT SLP Therapies (Done)       Topic: Physical Therapy (Done)       Point: Mobility training (Done)       Learning Progress Summary             Patient Acceptance, E, NR,VU by  at 6/4/2023 1146                         Point: Home exercise program (Done)       Learning Progress Summary             Patient Acceptance, E, NR,VU by  at 6/4/2023 1146                         Point: Body mechanics (Done)       Learning Progress Summary             Patient Acceptance, E, NR,VU by  at 6/4/2023 1146                         Point: Precautions (Done)       Learning Progress Summary             Patient Acceptance, E, NR,VU by  at 6/4/2023 1146                                         User Key       Initials  Effective Dates Name Provider Type Discipline     05/02/22 -  Pretehi Henao, PT Physical Therapist PT                  PT Recommendation and Plan     Plan of Care Reviewed With: patient  Outcome Evaluation: Patient is a 62 y.o male who presents to [facility] following a fall with resulting L hip pain. Xray showed no fracture. Patient with hx of alcohol abuse. Patient reports he had been drinking and he is not sure how he fell. Patient AOx3 upon arrival though appeared drowsy, with increased processing time to follow commands. Patient reports seeing frogs flying across the ceiling during session. Patient reports he lives alone and is independent at baseline. Patient sat up to EOB with modA this date. Patient required assistance with bringing L LE to EOB and bringing trunk to midline. Once at EOB patient maintainted sitting balance with [place]. VCs required for hand placement to improve balance. Patient with R lean due to pain when shifting weight to midline. When attempting LAQ at EOB on L patient with LOB leaning to R requiring modA-maxA to correct. Once returning to supine patient able to use R LE along with B UEs on handrails to assist therapist with scooting patient up in bed. Further transfers deferred this date due to poor sitting balance and awaiting ortho eval to clear patient for WB on L LE. Anticipate patient will need SNF at d/c. PT will continue to monitor.     Time Calculation:    PT Charges       Row Name 06/04/23 1147             Time Calculation    Start Time 1011  -SM      Stop Time 1032  -SM      Time Calculation (min) 21 min  -SM      PT Received On 06/04/23  -      PT - Next Appointment 06/05/23  -      PT Goal Re-Cert Due Date 06/18/23  -         Time Calculation- PT    Total Timed Code Minutes- PT 10 minute(s)  -SM         Timed Charges    96227 - PT Therapeutic Activity Minutes 10  -SM         Total Minutes    Timed Charges Total Minutes 10  -SM       Total Minutes 10  -SM                 User Key  (r) = Recorded By, (t) = Taken By, (c) = Cosigned By      Initials Name Provider Type     Preethi Henao, PT Physical Therapist                  Therapy Charges for Today       Code Description Service Date Service Provider Modifiers Qty    65068004047 HC PT THERAPEUTIC ACT EA 15 MIN 6/4/2023 Preethi Henao, PT GP 1    35993898752 HC PT EVAL LOW COMPLEXITY 3 6/4/2023 Preethi Henao, PT GP 1            PT G-Codes  AM-PAC 6 Clicks Score (PT): 9  PT Discharge Summary  Anticipated Discharge Disposition (PT): skilled nursing facility    Preethi Henao PT  6/4/2023

## 2023-06-04 NOTE — PLAN OF CARE
Goal Outcome Evaluation:   Pt AO x 4, 3.5 L o2 nc, st on monitor, administered meds per mar, dilaudid x 1 for pain, no ss of acute distress noted, will continue to monitor.        Problem: Adult Inpatient Plan of Care  Goal: Plan of Care Review  Outcome: Ongoing, Progressing  Goal: Patient-Specific Goal (Individualized)  Outcome: Ongoing, Progressing  Goal: Absence of Hospital-Acquired Illness or Injury  Outcome: Ongoing, Progressing  Intervention: Identify and Manage Fall Risk  Recent Flowsheet Documentation  Taken 6/4/2023 0405 by Lucio Hall RN  Safety Promotion/Fall Prevention:   activity supervised   safety round/check completed   room organization consistent  Taken 6/4/2023 0214 by Lucio Hall RN  Safety Promotion/Fall Prevention:   activity supervised   safety round/check completed  Taken 6/4/2023 0001 by Lucio Hall RN  Safety Promotion/Fall Prevention:   activity supervised   safety round/check completed   room organization consistent  Taken 6/3/2023 2204 by Lucio Hall RN  Safety Promotion/Fall Prevention:   activity supervised   safety round/check completed  Taken 6/3/2023 2010 by Lucio Hall RN  Safety Promotion/Fall Prevention:   activity supervised   safety round/check completed   room organization consistent  Intervention: Prevent Skin Injury  Recent Flowsheet Documentation  Taken 6/4/2023 0405 by Lucio Hall RN  Body Position: position changed independently  Taken 6/4/2023 0214 by Lucio Hall RN  Body Position: position changed independently  Taken 6/4/2023 0001 by Lucio Hall RN  Body Position: position changed independently  Skin Protection: adhesive use limited  Taken 6/3/2023 2204 by Lucio Hall RN  Body Position: position changed independently  Taken 6/3/2023 2010 by Lucio Hall RN  Body Position: position changed independently  Skin Protection: adhesive use limited  Intervention: Prevent and Manage VTE (Venous  Thromboembolism) Risk  Recent Flowsheet Documentation  Taken 6/4/2023 0001 by Lucio Hall RN  Activity Management: bedrest  Range of Motion: active ROM (range of motion) encouraged  Taken 6/3/2023 2010 by Lucio Hall RN  Activity Management: bedrest  VTE Prevention/Management: other (see comments)  Range of Motion: active ROM (range of motion) encouraged  Intervention: Prevent Infection  Recent Flowsheet Documentation  Taken 6/4/2023 0405 by Lucio Hall RN  Infection Prevention:   hand hygiene promoted   rest/sleep promoted  Taken 6/4/2023 0214 by Lucio Hall RN  Infection Prevention:   hand hygiene promoted   rest/sleep promoted  Taken 6/4/2023 0001 by Lucio Hall RN  Infection Prevention:   hand hygiene promoted   rest/sleep promoted  Taken 6/3/2023 2204 by Lucio Hall RN  Infection Prevention:   hand hygiene promoted   rest/sleep promoted  Taken 6/3/2023 2010 by Lucio Hall RN  Infection Prevention:   hand hygiene promoted   rest/sleep promoted  Goal: Optimal Comfort and Wellbeing  Outcome: Ongoing, Progressing  Intervention: Monitor Pain and Promote Comfort  Recent Flowsheet Documentation  Taken 6/3/2023 2010 by Lucio Hall RN  Pain Management Interventions:   quiet environment facilitated   pillow support provided  Intervention: Provide Person-Centered Care  Recent Flowsheet Documentation  Taken 6/3/2023 2010 by Lucio Hall RN  Trust Relationship/Rapport: care explained  Goal: Readiness for Transition of Care  Outcome: Ongoing, Progressing     Problem: Skin Injury Risk Increased  Goal: Skin Health and Integrity  Outcome: Ongoing, Progressing  Intervention: Optimize Skin Protection  Recent Flowsheet Documentation  Taken 6/4/2023 0405 by Lucio Hall RN  Head of Bed (HOB) Positioning: HOB elevated  Taken 6/4/2023 0214 by Lucio Hall RN  Head of Bed (HOB) Positioning: HOB elevated  Taken 6/4/2023 0001 by Lucio Hall RN  Pressure  Reduction Techniques:   frequent weight shift encouraged   weight shift assistance provided  Head of Bed (HOB) Positioning: HOB elevated  Pressure Reduction Devices:   alternating pressure pump (ADD)   pressure-redistributing mattress utilized  Skin Protection: adhesive use limited  Taken 6/3/2023 2204 by Lucio Hall RN  Head of Bed (HOB) Positioning: HOB elevated  Taken 6/3/2023 2010 by Lucio Hall RN  Pressure Reduction Techniques:   frequent weight shift encouraged   weight shift assistance provided  Head of Bed (HOB) Positioning: HOB elevated  Pressure Reduction Devices:   alternating pressure pump (ADD)   pressure-redistributing mattress utilized  Skin Protection: adhesive use limited     Problem: Behavioral Health Comorbidity  Goal: Maintenance of Behavioral Health Symptom Control  Outcome: Ongoing, Progressing  Intervention: Maintain Behavioral Health Symptom Control  Recent Flowsheet Documentation  Taken 6/4/2023 0405 by Lucio Hall RN  Medication Review/Management: medications reviewed  Taken 6/4/2023 0214 by Lucio Hall RN  Medication Review/Management: medications reviewed  Taken 6/4/2023 0001 by Lucio Hall RN  Medication Review/Management: medications reviewed  Taken 6/3/2023 2204 by Lucio Hall RN  Medication Review/Management: medications reviewed  Taken 6/3/2023 2010 by Lucio Hall RN  Medication Review/Management: medications reviewed     Problem: COPD (Chronic Obstructive Pulmonary Disease) Comorbidity  Goal: Maintenance of COPD Symptom Control  Outcome: Ongoing, Progressing  Intervention: Maintain COPD-Symptom Control  Recent Flowsheet Documentation  Taken 6/4/2023 0405 by Lucio Hall RN  Medication Review/Management: medications reviewed  Taken 6/4/2023 0214 by Lucio Hall RN  Medication Review/Management: medications reviewed  Taken 6/4/2023 0001 by Lucio Hall RN  Medication Review/Management: medications reviewed  Taken  6/3/2023 2204 by Lucio Hall RN  Medication Review/Management: medications reviewed  Taken 6/3/2023 2010 by Lucio Hall RN  Medication Review/Management: medications reviewed     Problem: Hypertension Comorbidity  Goal: Blood Pressure in Desired Range  Outcome: Ongoing, Progressing  Intervention: Maintain Blood Pressure Management  Recent Flowsheet Documentation  Taken 6/4/2023 0405 by Lucio Hall RN  Medication Review/Management: medications reviewed  Taken 6/4/2023 0214 by Lucio Hall RN  Medication Review/Management: medications reviewed  Taken 6/4/2023 0001 by Lucio Hall RN  Medication Review/Management: medications reviewed  Taken 6/3/2023 2204 by Lucio Hall RN  Medication Review/Management: medications reviewed  Taken 6/3/2023 2010 by Lucio Hall RN  Medication Review/Management: medications reviewed     Problem: Pain Chronic (Persistent) (Comorbidity Management)  Goal: Acceptable Pain Control and Functional Ability  Outcome: Ongoing, Progressing  Intervention: Manage Persistent Pain  Recent Flowsheet Documentation  Taken 6/4/2023 0405 by Lucio Hall RN  Medication Review/Management: medications reviewed  Taken 6/4/2023 0214 by Lucio Hall RN  Medication Review/Management: medications reviewed  Taken 6/4/2023 0001 by Lucio Hall RN  Medication Review/Management: medications reviewed  Taken 6/3/2023 2204 by Lucio Hall RN  Medication Review/Management: medications reviewed  Taken 6/3/2023 2010 by Lucio Hall RN  Medication Review/Management: medications reviewed  Intervention: Develop Pain Management Plan  Recent Flowsheet Documentation  Taken 6/3/2023 2010 by Lucio Hall RN  Pain Management Interventions:   quiet environment facilitated   pillow support provided  Intervention: Optimize Psychosocial Wellbeing  Recent Flowsheet Documentation  Taken 6/4/2023 0001 by Lucio Hall RN  Diversional Activities:  television  Taken 6/3/2023 2010 by Lucio Hall RN  Diversional Activities: television     Problem: Seizure Disorder Comorbidity  Goal: Maintenance of Seizure Control  Outcome: Ongoing, Progressing     Problem: Fall Injury Risk  Goal: Absence of Fall and Fall-Related Injury  Outcome: Ongoing, Progressing  Intervention: Identify and Manage Contributors  Recent Flowsheet Documentation  Taken 6/4/2023 0405 by Lucio Hall RN  Medication Review/Management: medications reviewed  Taken 6/4/2023 0214 by Lucio Hall RN  Medication Review/Management: medications reviewed  Taken 6/4/2023 0001 by Lucio Hall RN  Medication Review/Management: medications reviewed  Taken 6/3/2023 2204 by Lucio Hall RN  Medication Review/Management: medications reviewed  Taken 6/3/2023 2010 by Lucio Hall RN  Medication Review/Management: medications reviewed  Intervention: Promote Injury-Free Environment  Recent Flowsheet Documentation  Taken 6/4/2023 0405 by Lucio Hall RN  Safety Promotion/Fall Prevention:   activity supervised   safety round/check completed   room organization consistent  Taken 6/4/2023 0214 by Lucio Hall RN  Safety Promotion/Fall Prevention:   activity supervised   safety round/check completed  Taken 6/4/2023 0001 by Lucio Hall RN  Safety Promotion/Fall Prevention:   activity supervised   safety round/check completed   room organization consistent  Taken 6/3/2023 2204 by Lucio Hall RN  Safety Promotion/Fall Prevention:   activity supervised   safety round/check completed  Taken 6/3/2023 2010 by Lucio Hall RN  Safety Promotion/Fall Prevention:   activity supervised   safety round/check completed   room organization consistent

## 2023-06-04 NOTE — PAYOR COMM NOTE
"Casie Zapata (62 y.o. Male)     ATTN: NURSE REVIEWER  RE: INITIAL INPT AUTH CLINICALS  AUTH: [id number]     PLEASE REPLY TO SHALOM PATEL [phone] OR FAX# [phone]      Date of Birth   1960    Social Security Number       Address   [address] [place] [address] [place] [address]    Home Phone   [phone]    MRN   [id number]       Advent   None    Marital Status                               Admission Date   6/3/23    Admission Type   Emergency    Admitting Provider   Aba Mccabe MD    Attending Provider   Aba Mccabe MD    Department, Room/Bed   [address], E663/1       Discharge Date       Discharge Disposition       Discharge Destination                                 Attending Provider: Aba Mccabe MD    Allergies: No Known Allergies    Isolation: None   Infection: None   Code Status: CPR    Ht: 167.6 cm (66\")   Wt: 95.6 kg (210 lb 12.2 oz)    Admission Cmt: None   Principal Problem: None                  Active Insurance as of 6/3/2023       Primary Coverage       Payor Plan Insurance Group Employer/Plan Group    ANTHEM MEDICARE REPLACEMENT ANTHEM MEDICARE ADVANTAGE [id number]       Payor Plan Address Payor Plan Phone Number Payor Plan Fax Number Effective Dates    [address] [phone]  8/1/2016 - None Entered    [facility] [id number]         Subscriber Name Subscriber Birth Date Member ID       CASIE ZAPATA 1960 [id number]                     Emergency Contacts        (Rel.) Home Phone Work Phone Mobile Phone    Jenny Zapata (Spouse) [phone] -- --                 History & Physical        Aba Mccabe MD at 06/03/23 1318              Patient Name:  Casie Zapata  YOB: 1960  MRN:  [id number]  Admit Date:  6/3/2023  Patient Care Team:  Shakira Colbert, APRN as PCP - General (Nurse Practitioner)      Subjective  History Present Illness     Chief Complaint   Patient presents " with    Fall      Patient is a 62-year-old male with known history of alcohol use, hypertension, fatty liver had a mechanical fall at home and does not recall how he fell.  Manage to get up and sit in a recliner and then started noticing pain in his left hip and could not get up and ambulate on his left leg and therefore decided to come to the emergency room.  In the ER x-rays did not reveal any evidence of femur fracture on the left side.  CT brain with no acute findings.  No reports of any urinary or bowel incontinence and he does not have any prior history of seizure disorder .  No known prior cardiac issues.  At the time of my evaluation is awake alert and oriented x3 and is complaining of pain in his left hip.  He was also found to be hypoxic and was requiring 2-3 L of oxygen and chest x-ray with no acute findings.  Given his hypoxia CT of the chest has been ordered and patient denies any cough, sputum production, PND, orthopnea, chest pain.      History of Present Illness  Review of Systems       A 12 system review has been performed and they are negative other than mentioned in the H&P    Personal History     Past Medical History:   Diagnosis Date    Alcohol abuse     Anxiety     Delirium tremens 03/20/2019    Fatty liver     FH: kidney cancer     Hypertension      Past Surgical History:   Procedure Laterality Date    APPENDECTOMY      NEPHRECTOMY      left     TONSILLECTOMY       Family History   Problem Relation Age of Onset    Diabetes Mother     Throat cancer Father      Social History     Tobacco Use    Smoking status: Some Days     Packs/day: 0.25     Years: 15.00     Pack years: 3.75     Types: Cigarettes     Start date: 12/17/1981    Tobacco comments:     refused    Substance Use Topics    Alcohol use: Yes     Comment: last drink was at 1000   drinks 1 fifth a day     Drug use: No     No current facility-administered medications on file prior to encounter.     Current Outpatient Medications on File  Prior to Encounter   Medication Sig Dispense Refill    amLODIPine (NORVASC) 2.5 MG tablet Take 1 tablet by mouth Daily. 30 tablet 0    fluvoxaMINE (LUVOX) 25 MG tablet Take 100 mg by mouth Daily.      folic acid (FOLVITE) 1 MG tablet Take 1 tablet by mouth Daily. 30 tablet 0    mirtazapine (REMERON) 15 MG tablet Take 1 tablet by mouth Every Night. 30 tablet 0    multivitamin (THERAGRAN) tablet tablet Take 1 tablet by mouth Daily. 30 tablet     naltrexone (DEPADE) 50 MG tablet Take 50 mg by mouth Daily.      propranolol (INDERAL) 40 MG tablet Take 60 mg by mouth 2 (Two) Times a Day.      risperiDONE (risperDAL) 2 MG tablet Take 2 mg by mouth 2 (Two) Times a Day.      thiamine (VITAMIN B1) 100 MG tablet Take 1 tablet by mouth Daily. 30 tablet 0    vitamin B-12 (VITAMIN B-12) 1000 MCG tablet Take 1 tablet by mouth Daily. 30 tablet 0     No Known Allergies    Objective   Objective     Vital Signs  Temp:  [98.3 °F (36.8 °C)-98.4 °F (36.9 °C)] 98.4 °F (36.9 °C)  Heart Rate:  [] 114  Resp:  [18] 18  BP: (126-139)/(94-98) 126/94  SpO2:  [90 %-96 %] 90 %  on  Flow (L/min):  [15] 15;   Device (Oxygen Therapy): nasal cannula  Body mass index is 33.89 kg/m².    Physical Exam   HEENT:  PERRLA, extraocular movements intact, sclerus no icterus   Neck: Supple, no JVD   Cardiovascular:  Regular rate and rhythm with normal S1 and S2   Respiratory: Fairly clear to auscultation bilaterally with no wheezes  GI:  Soft, nontender, bowel sounds are present   Extremities:  No edema, palpable pedal pulses , limited range of motion of the left hip secondary to pain  Neurologic:  Grossly nonfocal, no facial asymmetry   Skin:  Warm and dry with no evidence of any rash      Results Review:  I reviewed the patient's new clinical results.  I reviewed the patient's new imaging results and agree with the interpretation.  I reviewed the patient's other test results and agree with the interpretation  I personally viewed and interpreted the  patient's EKG/Telemetry data  Discussed with ED provider.    Lab Results (last 24 hours)       Procedure Component Value Units Date/Time    CBC & Differential [[id number]]  (Abnormal) Collected: 06/03/23 0758    Specimen: Blood Updated: 06/03/23 0814    Narrative:      The following orders were created for panel order CBC & Differential.  Procedure                               Abnormality         Status                     ---------                               -----------         ------                     CBC Auto Differential[[id number]]        Abnormal            Final result                 Please view results for these tests on the individual orders.    Comprehensive Metabolic Panel [[id number]]  (Abnormal) Collected: 06/03/23 0758    Specimen: Blood Updated: 06/03/23 0833     Glucose 130 mg/dL      BUN 23 mg/dL      Creatinine 1.06 mg/dL      Sodium 135 mmol/L      Potassium 4.5 mmol/L      Comment: Slight hemolysis detected by analyzer. Results may be affected.        Chloride 93 mmol/L      CO2 18.4 mmol/L      Calcium 9.3 mg/dL      Total Protein 8.0 g/dL      Albumin 4.5 g/dL      ALT (SGPT) 32 U/L      AST (SGOT) 39 U/L      Alkaline Phosphatase 97 U/L      Total Bilirubin 0.7 mg/dL      Globulin 3.5 gm/dL      A/G Ratio 1.3 g/dL      BUN/Creatinine Ratio 21.7     Anion Gap 23.6 mmol/L      eGFR 78.4 mL/min/1.73     Narrative:      GFR Normal >60  Chronic Kidney Disease <60  Kidney Failure <15      Protime-INR [[id number]]  (Normal) Collected: 06/03/23 0758    Specimen: Blood Updated: 06/03/23 0824     Protime 13.9 Seconds      INR 1.05    Ammonia [[id number]]  (Normal) Collected: 06/03/23 0758    Specimen: Blood Updated: 06/03/23 0835     Ammonia 25 umol/L     Ethanol [[id number]]  (Abnormal) Collected: 06/03/23 0758    Specimen: Blood Updated: 06/03/23 0833     Ethanol 44 mg/dL      Ethanol % 0.044 %     CK [[id number]]  (Normal) Collected: 06/03/23 0758    Specimen: Blood Updated: 06/03/23 0833      Creatine Kinase 131 U/L     Magnesium [[id number]]  (Abnormal) Collected: 06/03/23 0758    Specimen: Blood Updated: 06/03/23 0833     Magnesium 1.5 mg/dL     High Sensitivity Troponin T [[id number]]  (Abnormal) Collected: 06/03/23 0758    Specimen: Blood Updated: 06/03/23 0833     HS Troponin T 15 ng/L     Narrative:      High Sensitive Troponin T Reference Range:  <10.0 ng/L- Negative Female for AMI  <15.0 ng/L- Negative Male for AMI  >=10 - Abnormal Female indicating possible myocardial injury.  >=15 - Abnormal Male indicating possible myocardial injury.   Clinicians would have to utilize clinical acumen, EKG, Troponin, and serial changes to determine if it is an Acute Myocardial Infarction or myocardial injury due to an underlying chronic condition.         CBC Auto Differential [[id number]]  (Abnormal) Collected: 06/03/23 0758    Specimen: Blood Updated: 06/03/23 0814     WBC 14.38 10*3/mm3      RBC 5.16 10*6/mm3      Hemoglobin 16.4 g/dL      Hematocrit 47.2 %      MCV 91.5 fL      MCH 31.8 pg      MCHC 34.7 g/dL      RDW 12.8 %      RDW-SD 42.5 fl      MPV 9.3 fL      Platelets 234 10*3/mm3      Neutrophil % 85.2 %      Lymphocyte % 8.7 %      Monocyte % 5.5 %      Eosinophil % 0.0 %      Basophil % 0.3 %      Immature Grans % 0.3 %      Neutrophils, Absolute 12.25 10*3/mm3      Lymphocytes, Absolute 1.25 10*3/mm3      Monocytes, Absolute 0.79 10*3/mm3      Eosinophils, Absolute 0.00 10*3/mm3      Basophils, Absolute 0.04 10*3/mm3      Immature Grans, Absolute 0.05 10*3/mm3      nRBC 0.0 /100 WBC     Phosphorus [[id number]]  (Normal) Collected: 06/03/23 0758    Specimen: Blood Updated: 06/03/23 0937     Phosphorus 2.9 mg/dL     Beta Hydroxybutyrate Quantitative [[id number]]  (Normal) Collected: 06/03/23 0758    Specimen: Blood Updated: 06/03/23 0937     Beta-Hydroxybutyrate Quant 0.198 mmol/L     Narrative:      In the assessment of possible diabetic ketoacidosis, the test should be interpreted along with other  clinical and laboratory findings.  A level greater than 1 mmol/L should require further evaluation and levels of more than 3 mmol/L require immediate medical review.    Blood Gas, Arterial - [[id number]]  (Abnormal) Collected: 06/03/23 0921    Specimen: Arterial Blood Updated: 06/03/23 0924     Site Arterial: right radial     Broderick's Test Positive     pH, Arterial 7.402 pH units      pCO2, Arterial 41.3 mm Hg      pO2, Arterial 59.1 mm Hg      HCO3, Arterial 25.7 mmol/L      Base Excess, Arterial 0.7 mmol/L      O2 Saturation Calculated 90.2 %      Comment: SPO2 92 Meter: [id number] : [id number] Frandy Coyne        Barometric Pressure for Blood Gas 746.5 mmHg      Modality HFNC     Flow Rate 15 lpm      Rate 16 Breaths/minute             Imaging Results (Last 24 Hours)       Procedure Component Value Units Date/Time    CT Angiogram Chest [[id number]] Collected: 06/03/23 1117     Updated: 06/03/23 1132    Narrative:      CT ANGIOGRAM CHEST-     INDICATIONS: Hypoxia     TECHNIQUE: Radiation dose reduction techniques were utilized, including  automated exposure control and exposure modulation based on body size.  CT angiography of the chest. Three-dimensional reconstructions.     COMPARISON: None available      FINDINGS:     No pulmonary embolism. No aortic dissection. Ascending aorta is dilated,  4.1 cm.     The heart size is normal without pericardial effusion. Moderate coronary  arterial calcification is apparent. A few small subcentimeter short axis  mediastinal lymph nodes are seen that are not significant by size  criteria.     The airways appear clear.     No pleural effusion or pneumothorax.     The lungs show mild dependent atelectasis bilaterally. A 2 mm subpleural  nodule left upper lobe as seen on axial image 42.     Upper abdominal structures show no acute findings. Cholelithiasis is  evident.     Degenerative changes are seen in the spine. L1 compression deformity,  with 40% loss of height, is  age-indeterminate, new from 01/02/2023,  correlate clinically. L2 compression deformity is also apparent, but  only partly included; if indicated, MRI could be obtained for further  evaluation. Chronic sclerotic focus in a lower left rib. Sternal foramen  is noted.        Impression:         No pulmonary embolism.     Age-indeterminate L1, L2 compression deformities, correlate clinically.     This report was finalized on 6/3/2023 11:29 AM by Dr. Chase Ruiz M.D.       CT Head Without Contrast [[id number]] Collected: 06/03/23 0901     Updated: 06/03/23 0915    Narrative:      CT HEAD WO CONTRAST-     INDICATIONS: Head injury     TECHNIQUE: Radiation dose reduction techniques were utilized, including  automated exposure control and exposure modulation based on body size.  Noncontrast head CT     COMPARISON: None available     FINDINGS:           No acute intracranial hemorrhage, midline shift or mass effect. No acute  territorial infarct is identified.     Mild periventricular hypodensities suggest chronic small vessel ischemic  change in a patient this age.     Arterial calcifications are seen at the base of the brain.     Ventricles, cisterns, cerebral sulci are unremarkable for patient age.     Likely mucous retention cyst or polyp in right maxillary sinus. The  visualized paranasal sinuses, orbits, mastoid air cells are otherwise  unremarkable.                   Impression:         No acute intracranial hemorrhage or hydrocephalus. If there is further  clinical concern, MRI could be considered for further evaluation.     This report was finalized on 6/3/2023 9:12 AM by Dr. Chase Ruiz M.D.       XR Chest 1 View [[id number]] Collected: 06/03/23 0858     Updated: 06/03/23 0901    Narrative:      XR CHEST 1 VW-     HISTORY: Male who is 62 years-old, trauma     TECHNIQUE: Frontal view of the chest     COMPARISON: 01/02/2023     FINDINGS: Heart, mediastinum and pulmonary vasculature are unremarkable.  No  focal pulmonary consolidation, pleural effusion, or pneumothorax. No  acute osseous process.       Impression:      No evidence for acute pulmonary process. Follow-up as  clinical indications persist.     This report was finalized on 6/3/2023 8:58 AM by Dr. Chase Ruiz M.D.       XR Hip With or Without Pelvis 2 - 3 View Left [[id number]] Collected: 06/03/23 0855     Updated: 06/03/23 0901    Narrative:      XR HIP W OR WO PELVIS 2-3 VIEW LEFT-     INDICATIONS: Trauma     TECHNIQUE: Frontal view of the pelvis, 2 views of the left hip     COMPARISON: None available     FINDINGS:     No dislocation. Hip joint spaces appear preserved. No definite fracture  is identified. If there is further clinical concern, cross-sectional  imaging could be considered for further evaluation.       Impression:         As described.     This report was finalized on 6/3/2023 8:58 AM by Dr. Chase Ruiz M.D.                   ECG 12 Lead Syncope   Preliminary Result   HEART RATE= 115  bpm   RR Interval= 522  ms   SD Interval= 163  ms   P Horizontal Axis= -44  deg   P Front Axis= 24  deg   QRSD Interval= 78  ms   QT Interval= 359  ms   QRS Axis= 155  deg   T Wave Axis= 2  deg   - BORDERLINE ECG -   Sinus tachycardia   Right axis deviation   Consider anterior infarct   Baseline wander in lead(s) V2   Electronically Signed By:    Date and Time of Study: 2023-06-03 08:11:07           Assessment/Plan     Active Hospital Problems    Diagnosis  POA    Alcohol withdrawal syndrome without complication [F10.930]  Yes      Resolved Hospital Problems   No resolved problems to display.      1. Mechanical fall with left hip pain, unable to ambulate secondary to pain therefore analgesics will be initiated and x-ray with no evidence any fracture.  Occult fracture cannot be ruled out therefore Orthopedic consultation will be obtained.  2. Ethanol abuse, trying to quit and does drink a pt a day.  Will be placed on withdrawal protocol and he  will be initiated on folate and thiamine supplements.  Seizure precautions will also be undertaken.  3. Hypertension, on amlodipine and will be continued.    4. On Lovenox for DVT prophylaxis.    5. Code status is full code.         Aba Mccabe MD  [facility]  06/03/23  13:18 EDT      Electronically signed by Aba Mccabe MD at 06/03/23 1325       Facility-Administered Medications as of 6/4/2023   Medication Dose Route Frequency Provider Last Rate Last Admin    acetaminophen (TYLENOL) tablet 650 mg  650 mg Oral Q4H PRN Aba Mccabe MD   650 mg at 06/03/23 1700    amLODIPine (NORVASC) tablet 2.5 mg  2.5 mg Oral Q24H Aba cMcabe MD   2.5 mg at 06/04/23 0825    sennosides-docusate (PERICOLACE) 8.6-50 MG per tablet 2 tablet  2 tablet Oral BID Aba Mccabe MD   2 tablet at 06/04/23 0826    And    polyethylene glycol (MIRALAX) packet 17 g  17 g Oral Daily PRN Aba Mccabe MD        And    bisacodyl (DULCOLAX) EC tablet 5 mg  5 mg Oral Daily PRN Aba Mccabe MD        And    bisacodyl (DULCOLAX) suppository 10 mg  10 mg Rectal Daily PRN Aba Mccabe MD        Calcium Replacement - Follow Nurse / BPA Driven Protocol   Does not apply PRN Aba Mccabe MD        cloNIDine (CATAPRES) tablet 0.1 mg  0.1 mg Oral Q4H PRN Aba Mccabe MD        Enoxaparin Sodium (LOVENOX) syringe 40 mg  40 mg Subcutaneous Q24H Aba Mccabe MD   40 mg at 06/03/23 1700    fluvoxaMINE (LUVOX) tablet 100 mg  100 mg Oral Daily Aba Mccabe MD   100 mg at 06/04/23 0825    folic acid (FOLVITE) tablet 1 mg  1 mg Oral Daily Aba Mccabe MD   1 mg at 06/04/23 0825    HYDROcodone-acetaminophen (NORCO) 5-325 MG per tablet 1 tablet  1 tablet Oral Q4H PRN Aba Mccabe MD        HYDROmorphone (DILAUDID) injection 1 mg  1 mg Intravenous Q2H PRN Aba Mccabe MD   1 mg at 06/03/23 2206    And    naloxone  (NARCAN) injection 0.4 mg  0.4 mg Intravenous Q5 Min PRN Aba Mccabe MD        [COMPLETED] iopamidol (ISOVUE-370) 76 % injection 100 mL  100 mL Intravenous Once in imaging Bandar Stinson MD   95 mL at 23 1100    [] LORazepam (ATIVAN) injection 1 mg  1 mg Intravenous Q6H Bandar Stinson MD   1 mg at 23 2207    Followed by    LORazepam (ATIVAN) injection 1 mg  1 mg Intravenous Q8H Bandar Stinson MD        LORazepam (ATIVAN) tablet 1 mg  1 mg Oral Q2H PRN Bandar Stinson MD        Or    LORazepam (ATIVAN) injection 1 mg  1 mg Intravenous Q2H PRN Bandar Stinson MD        Or    LORazepam (ATIVAN) tablet 2 mg  2 mg Oral Q1H PRN Bandar Stinson MD        Or    LORazepam (ATIVAN) injection 2 mg  2 mg Intravenous Q1H PRN Bandar Stinson MD   2 mg at 23 0815    Or    LORazepam (ATIVAN) injection 2 mg  2 mg Intravenous Q15 Min PRN Bandar Stinson MD        Or    LORazepam (ATIVAN) injection 2 mg  2 mg Intramuscular Q15 Min PRN Bandar Stinson MD        LORazepam (ATIVAN) tablet 1 mg  1 mg Oral Q2H PRN Aba Mccabe MD        Or    LORazepam (ATIVAN) injection 1 mg  1 mg Intravenous Q2H PRN Aba Mccabe MD        Or    LORazepam (ATIVAN) tablet 2 mg  2 mg Oral Q1H PRN Aba Mccabe MD        Or    LORazepam (ATIVAN) injection 2 mg  2 mg Intravenous Q1H PRAba Hawkins MD        Or    LORazepam (ATIVAN) injection 2 mg  2 mg Intravenous Q15 Min PRN Aba Mccabe MD        Or    LORazepam (ATIVAN) injection 2 mg  2 mg Intramuscular Q15 Min PRAba Hawkins MD        Magnesium Standard Dose Replacement - Follow Nurse / BPA Driven Protocol   Does not apply PRN Bandar Stinson MD        Magnesium Standard Dose Replacement - Follow Nurse / BPA Driven Protocol   Does not apply PRN Aba Mccabe MD        [] magnesium sulfate 2 g in dextrose (D5W) 5 % 100 mL IVPB  2 g Intravenous Q2H Bandar Stinson  mL/hr at  06/03/23 1445 2 g at 06/03/23 1445    melatonin tablet 3 mg  3 mg Oral Nightly PRN Aba Mccabe MD        mirtazapine (REMERON) tablet 15 mg  15 mg Oral Nightly Aba Mccabe MD   15 mg at 06/03/23 2018    multivitamin (THERAGRAN) tablet 1 tablet  1 tablet Oral Daily Aba Mccabe MD   1 tablet at 06/04/23 0826    multivitamin with minerals 1 tablet  1 tablet Oral Daily Bandar Stinson MD   1 tablet at 06/04/23 0825    [COMPLETED] ondansetron (ZOFRAN) injection 4 mg  4 mg Intravenous Once Bandar Stinson MD   4 mg at 06/03/23 0904    ondansetron (ZOFRAN) tablet 4 mg  4 mg Oral Q6H PRN Aba Mccabe MD        Or    ondansetron (ZOFRAN) injection 4 mg  4 mg Intravenous Q6H PRN Aba Mccabe MD        [COMPLETED] PHENobarbital injection 65 mg  65 mg Intravenous Once Bandar Stinson MD   65 mg at 06/04/23 0358    Followed by    PHENobarbital injection 65 mg  65 mg Intravenous Once Bandar Stinson MD        Followed by    [START ON 6/5/2023] PHENobarbital (LUMINAL) tablet 32.4 mg  32.4 mg Oral Once Bandar Stinson MD        Followed by    [START ON 6/5/2023] PHENobarbital (LUMINAL) tablet 32.4 mg  32.4 mg Oral Once Bandar Stinson MD        Followed by    [START ON 6/6/2023] PHENobarbital (LUMINAL) tablet 32.4 mg  32.4 mg Oral Once Bandar Stinson MD        [COMPLETED] PHENobarbital 255.5 mg in sodium chloride 0.9 % 100 mL IVPB  4 mg/kg (Ideal) Intravenous Once Bandar Stinson MD   Stopped at 06/03/23 0925    Followed by    [COMPLETED] PHENobarbital 191.1 mg in sodium chloride 0.9 % 100 mL IVPB  3 mg/kg (Ideal) Intravenous Once Bandar Stinson  mL/hr at 06/03/23 1140 191.1 mg at 06/03/23 1140    Followed by    [COMPLETED] PHENobarbital 191.1 mg in sodium chloride 0.9 % 100 mL IVPB  3 mg/kg (Ideal) Intravenous Once Bandar tSinson  mL/hr at 06/03/23 1343 191.1 mg at 06/03/23 1343    Phosphorus Replacement - Follow Nurse / BPA Driven Protocol   Does not apply  PRN Aba Mccabe MD        Potassium Replacement - Follow Nurse / BPA Driven Protocol   Does not apply PRN Aba Mccabe MD        propranolol (INDERAL) tablet 60 mg  60 mg Oral BID Aba Mccabe MD   60 mg at 06/04/23 0825    risperiDONE (risperDAL) tablet 2 mg  2 mg Oral BID Aba Mccabe MD   2 mg at 06/04/23 0825    sodium chloride 0.9 % flush 10 mL  10 mL Intravenous PRN Bandar Stinson MD        [COMPLETED] thiamine (B-1) 100 mg, folic acid 1 mg in sodium chloride 0.9 % 1,000 mL infusion  1,000 mL/hr Intravenous Once Bandar Stinson MD 1,000 mL/hr at 06/03/23 0922 1,000 mL/hr at 06/03/23 0922    thiamine (B-1) injection 200 mg  200 mg Intravenous Q8H Aba Mccabe MD   200 mg at 06/04/23 0654    Followed by    [START ON 6/8/2023] thiamine (VITAMIN B-1) tablet 100 mg  100 mg Oral Daily Aba Mccabe MD         Lab Results (last 24 hours)       Procedure Component Value Units Date/Time    Comprehensive Metabolic Panel [[id number]]  (Abnormal) Collected: 06/04/23 0638    Specimen: Blood Updated: 06/04/23 0823     Glucose 110 mg/dL      BUN 24 mg/dL      Creatinine 1.10 mg/dL      Sodium 135 mmol/L      Potassium 4.0 mmol/L      Chloride 98 mmol/L      CO2 29.0 mmol/L      Calcium 8.5 mg/dL      Total Protein 6.6 g/dL      Albumin 3.8 g/dL      ALT (SGPT) 20 U/L      AST (SGOT) 32 U/L      Alkaline Phosphatase 82 U/L      Total Bilirubin 1.1 mg/dL      Globulin 2.8 gm/dL      A/G Ratio 1.4 g/dL      BUN/Creatinine Ratio 21.8     Anion Gap 8.0 mmol/L      eGFR 75.0 mL/min/1.73     Narrative:      GFR Normal >60  Chronic Kidney Disease <60  Kidney Failure <15      CBC & Differential [[id number]]  (Abnormal) Collected: 06/04/23 0638    Specimen: Blood Updated: 06/04/23 0747    Narrative:      The following orders were created for panel order CBC & Differential.  Procedure                               Abnormality         Status                     ---------                                -----------         ------                     CBC Auto Differential[[id number]]        Abnormal            Final result                 Please view results for these tests on the individual orders.    CBC Auto Differential [[id number]]  (Abnormal) Collected: 06/04/23 0638    Specimen: Blood Updated: 06/04/23 0747     WBC 7.05 10*3/mm3      RBC 4.15 10*6/mm3      Hemoglobin 13.4 g/dL      Hematocrit 37.9 %      MCV 91.3 fL      MCH 32.3 pg      MCHC 35.4 g/dL      RDW 12.5 %      RDW-SD 41.1 fl      MPV 10.1 fL      Platelets 125 10*3/mm3      Neutrophil % 65.1 %      Lymphocyte % 24.0 %      Monocyte % 8.8 %      Eosinophil % 0.9 %      Basophil % 0.9 %      Neutrophils, Absolute 4.60 10*3/mm3      Lymphocytes, Absolute 1.69 10*3/mm3      Monocytes, Absolute 0.62 10*3/mm3      Eosinophils, Absolute 0.06 10*3/mm3      Basophils, Absolute 0.06 10*3/mm3           Imaging Results (Last 24 Hours)       ** No results found for the last 24 hours. **          ECG/EMG Results (last 24 hours)       Procedure Component Value Units Date/Time    SCANNED - TELEMETRY   [[id number]] Resulted: 06/03/23     Updated: 06/03/23 1921    SCANNED - TELEMETRY   [[id number]] Resulted: 06/03/23     Updated: 06/03/23 1921          Orders (last 24 hrs)        Start     Ordered    06/10/23 0900  thiamine (VITAMIN B-1) tablet 100 mg  Daily,   Status:  Discontinued        See Hyperspace for full Linked Orders Report.    06/03/23 0750    06/08/23 1400  thiamine (VITAMIN B-1) tablet 100 mg  Daily        See Hyperspace for full Linked Orders Report.    06/03/23 1315    06/06/23 0600  thiamine (B-1) injection 200 mg  Every 8 Hours Scheduled,   Status:  Discontinued        See Hyperspace for full Linked Orders Report.    06/03/23 0750    06/06/23 0350  PHENobarbital (LUMINAL) tablet 32.4 mg  Once        See Hyperspace for full Linked Orders Report.    06/03/23 0750    06/05/23 1350  PHENobarbital (LUMINAL) tablet 32.4 mg  Once        See  Hyperspace for full Linked Orders Report.    06/03/23 0750    06/05/23 0150  PHENobarbital (LUMINAL) tablet 32.4 mg  Once        See Hyperspace for full Linked Orders Report.    06/03/23 0750    06/04/23 1350  PHENobarbital injection 65 mg  Once        See Hyperspace for full Linked Orders Report.    06/03/23 0750    06/04/23 1029  CT Lower Extremity Left Without Contrast  1 Time Imaging         06/04/23 1029    06/04/23 1026  CT Pelvis Without Contrast  1 Time Imaging         06/04/23 1029    06/04/23 0806  LORazepam (ATIVAN) injection 1 mg  Every 8 Hours        See Hyperspace for full Linked Orders Report.    06/03/23 0750    06/04/23 0735  Manual Differential  Once,   Status:  Canceled         06/04/23 0734    06/04/23 0600  CBC & Differential  Daily       06/03/23 1313    06/04/23 0600  Comprehensive Metabolic Panel  Daily       06/03/23 1313    06/04/23 0600  CBC Auto Differential  PROCEDURE ONCE         06/03/23 2203    06/04/23 0151  PHENobarbital injection 65 mg  Once        See Hyperspace for full Linked Orders Report.    06/03/23 0750    06/04/23 0000  PT Consult: Eval & Treat as tolerated  Once         06/03/23 1313    06/03/23 2100  sennosides-docusate (PERICOLACE) 8.6-50 MG per tablet 2 tablet  2 Times Daily        See Hyperspace for full Linked Orders Report.    06/03/23 1313    06/03/23 2100  mirtazapine (REMERON) tablet 15 mg  Nightly         06/03/23 1317    06/03/23 2100  propranolol (INDERAL) tablet 60 mg  2 Times Daily         06/03/23 1317    06/03/23 2100  risperiDONE (risperDAL) tablet 2 mg  2 Times Daily         06/03/23 1317    06/03/23 1800  Oral Care  2 Times Daily       06/03/23 1313    06/03/23 1600  Vital Signs  Every 4 Hours      Comments: Per per hospital policy    06/03/23 1313    06/03/23 1600  thiamine (B-1) injection 200 mg  Every 8 Hours Scheduled        See Hyperspace for full Linked Orders Report.    06/03/23 1315    06/03/23 1600  amLODIPine (NORVASC) tablet 2.5 mg  Every 24  Hours Scheduled         06/03/23 1317    06/03/23 1600  fluvoxaMINE (LUVOX) tablet 100 mg  Daily         06/03/23 1317    06/03/23 1600  multivitamin (THERAGRAN) tablet 1 tablet  Daily         06/03/23 1317    06/03/23 1415  folic acid (FOLVITE) tablet 1 mg  Daily         06/03/23 1315    06/03/23 1415  Enoxaparin Sodium (LOVENOX) syringe 40 mg  Every 24 Hours         06/03/23 1317    06/03/23 1405  PHENobarbital 191.1 mg in sodium chloride 0.9 % 100 mL IVPB  Once        See Hyperspace for full Linked Orders Report.    06/03/23 0750    06/03/23 1400  Strict Intake & Output  Every Hour       06/03/23 1313    06/03/23 1314  Daily Weights  Daily       06/03/23 1313    06/03/23 1313  LORazepam (ATIVAN) tablet 1 mg  Every 2 Hours PRN        See Hyperspace for full Linked Orders Report.    06/03/23 1315    06/03/23 1313  LORazepam (ATIVAN) injection 1 mg  Every 2 Hours PRN        See Hyperspace for full Linked Orders Report.    06/03/23 1315    06/03/23 1313  LORazepam (ATIVAN) tablet 2 mg  Every 1 Hour PRN        See Hyperspace for full Linked Orders Report.    06/03/23 1315    06/03/23 1313  LORazepam (ATIVAN) injection 2 mg  Every 1 Hour PRN        See Hyperspace for full Linked Orders Report.    06/03/23 1315    06/03/23 1313  LORazepam (ATIVAN) injection 2 mg  Every 15 Minutes PRN        See Hyperspace for full Linked Orders Report.    06/03/23 1315    06/03/23 1313  LORazepam (ATIVAN) injection 2 mg  Every 15 Minutes PRN        See Hyperspace for full Linked Orders Report.    06/03/23 1315    06/03/23 1313  cloNIDine (CATAPRES) tablet 0.1 mg  Every 4 Hours PRN         06/03/23 1315    06/03/23 1313  HYDROmorphone (DILAUDID) injection 1 mg  Every 2 Hours PRN        See Hyperspace for full Linked Orders Report.    06/03/23 1313    06/03/23 1313  naloxone (NARCAN) injection 0.4 mg  Every 5 Minutes PRN        See Hyperspace for full Linked Orders Report.    06/03/23 1313    06/03/23 1313  HYDROcodone-acetaminophen  (NORCO) 5-325 MG per tablet 1 tablet  Every 4 Hours PRN         06/03/23 1313    06/03/23 1312  Potassium Replacement - Follow Nurse / BPA Driven Protocol  As Needed         06/03/23 1313    06/03/23 1312  Magnesium Standard Dose Replacement - Follow Nurse / BPA Driven Protocol  As Needed         06/03/23 1313    06/03/23 1312  Phosphorus Replacement - Follow Nurse / BPA Driven Protocol  As Needed         06/03/23 1313    06/03/23 1312  Calcium Replacement - Follow Nurse / BPA Driven Protocol  As Needed         06/03/23 1313    06/03/23 1311  melatonin tablet 3 mg  Nightly PRN         06/03/23 1313    06/03/23 1311  acetaminophen (TYLENOL) tablet 650 mg  Every 4 Hours PRN         06/03/23 1313    06/03/23 1311  polyethylene glycol (MIRALAX) packet 17 g  Daily PRN        See Hyperspace for full Linked Orders Report.    06/03/23 1313    06/03/23 1311  bisacodyl (DULCOLAX) EC tablet 5 mg  Daily PRN        See Hyperspace for full Linked Orders Report.    06/03/23 1313    06/03/23 1311  bisacodyl (DULCOLAX) suppository 10 mg  Daily PRN        See Hyperspace for full Linked Orders Report.    06/03/23 1313    06/03/23 1311  ondansetron (ZOFRAN) tablet 4 mg  Every 6 Hours PRN        See Hyperspace for full Linked Orders Report.    06/03/23 1313    06/03/23 1311  ondansetron (ZOFRAN) injection 4 mg  Every 6 Hours PRN        See Hyperspace for full Linked Orders Report.    06/03/23 1313    06/03/23 1011  magnesium sulfate 2 g in dextrose (D5W) 5 % 100 mL IVPB  Every 2 Hours         06/03/23 0955    06/03/23 0948  Magnesium Standard Dose Replacement - Follow Nurse / BPA Driven Protocol  As Needed         06/03/23 0949    06/03/23 0900  multivitamin with minerals 1 tablet  Daily         06/03/23 0750    06/03/23 0806  thiamine (B-1) 500 mg in sodium chloride 0.9 % 100 mL IVPB  Every 8 Hours Scheduled,   Status:  Discontinued        See Hyperspace for full Linked Orders Report.    06/03/23 0750    06/03/23 0806  LORazepam  (ATIVAN) injection 1 mg  Every 6 Hours        See Hyperspace for full Linked Orders Report.    06/03/23 0750    06/03/23 0750  LORazepam (ATIVAN) tablet 1 mg  Every 2 Hours PRN        See Hyperspace for full Linked Orders Report.    06/03/23 0750    06/03/23 0750  LORazepam (ATIVAN) injection 1 mg  Every 2 Hours PRN        See Hyperspace for full Linked Orders Report.    06/03/23 0750    06/03/23 0750  LORazepam (ATIVAN) tablet 2 mg  Every 1 Hour PRN        See Hyperspace for full Linked Orders Report.    06/03/23 0750    06/03/23 0750  LORazepam (ATIVAN) injection 2 mg  Every 1 Hour PRN        See Hyperspace for full Linked Orders Report.    06/03/23 0750    06/03/23 0750  LORazepam (ATIVAN) injection 2 mg  Every 15 Minutes PRN        See Hyperspace for full Linked Orders Report.    06/03/23 0750    06/03/23 0750  LORazepam (ATIVAN) injection 2 mg  Every 15 Minutes PRN        See Hyperspace for full Linked Orders Report.    06/03/23 0750    06/03/23 0748  sodium chloride 0.9 % flush 10 mL  As Needed        See Hyperspace for full Linked Orders Report.    06/03/23 0750    Unscheduled  Obtain Pre & Post Sedation Scores With Every Lorazepam Dose - Hold For POSS Greater Than 2 or RASS of -3 or Less  As Needed       06/03/23 0750    Unscheduled  Up with assistance  As Needed       06/03/23 1313    --  SCANNED - TELEMETRY           06/03/23 0000    --  SCANNED - TELEMETRY           06/03/23 0000                  Operative/Procedure Notes (last 24 hours)  Notes from 06/03/23 1340 through 06/04/23 1340   No notes of this type exist for this encounter.       Physician Progress Notes (last 24 hours)  Notes from 06/03/23 1340 through 06/04/23 1340   No notes of this type exist for this encounter.       Consult Notes (last 24 hours)  Notes from 06/03/23 1340 through 06/04/23 1340   No notes of this type exist for this encounter.

## 2023-06-04 NOTE — PROGRESS NOTES
Name: Nilesh Zapata ADMIT: 6/3/2023   : 1960  PCP: Shakira Colbert GRACIE    MRN: [id number] LOS: 1 days   AGE/SEX: 62 y.o. male  ROOM: [facility]/     Subjective   Subjective     Patient is lying on the bed and continues complain of pain in his left hip.  Appears weak and lethargic today and no major withdrawal symptoms.  Denies nausea, vomiting, chest pain, shortness of breath.  Currently requiring 3 L of oxygen but not in any respiratory distress.       Objective   Objective   Vital Signs  Temp:  [97.7 °F (36.5 °C)-98.8 °F (37.1 °C)] 98.8 °F (37.1 °C)  Heart Rate:  [] 77  Resp:  [18] 18  BP: ()/(65-78) 106/73  SpO2:  [91 %] 91 %  on  Flow (L/min):  [3-3.5] 3;   Device (Oxygen Therapy): nasal cannula  Body mass index is 34.02 kg/m².  Physical Exam   HEENT:  PERRLA, extraocular movements intact, sclerus no icterus   Neck: Supple, no JVD   Cardiovascular:  Regular rate and rhythm with normal S1 and S2   Respiratory: Fairly clear to auscultation bilaterally with no wheezes  GI:  Soft, nontender, bowel sounds are present   Extremities:  No edema, palpable pedal pulses , limited range of motion of the left hip secondary to pain  Neurologic:  Grossly nonfocal, no facial asymmetry   Skin:  Warm and dry with no evidence of any rash      Results Review     I reviewed the patient's new clinical results.  Results from last 7 days   Lab Units 23  0638 23  0758   WBC 10*3/mm3 7.05 14.38*   HEMOGLOBIN g/dL 13.4 16.4   PLATELETS 10*3/mm3 125* 234     Results from last 7 days   Lab Units 23  0638 23  0758   SODIUM mmol/L 135* 135*   POTASSIUM mmol/L 4.0 4.5   CHLORIDE mmol/L 98 93*   CO2 mmol/L 29.0 18.4*   BUN mg/dL 24* 23   CREATININE mg/dL 1.10 1.06   GLUCOSE mg/dL 110* 130*   EGFR mL/min/1.73 75.0 78.4     Results from last 7 days   Lab Units 23  0638 23  0758   ALBUMIN g/dL 3.8 4.5   BILIRUBIN mg/dL 1.1 0.7   ALK PHOS U/L 82 97   AST (SGOT) U/L 32 39   ALT (SGPT) U/L 20  32     Results from last 7 days   Lab Units 06/04/23  0638 06/03/23  0758   CALCIUM mg/dL 8.5* 9.3   ALBUMIN g/dL 3.8 4.5   MAGNESIUM mg/dL  --  1.5*   PHOSPHORUS mg/dL  --  2.9       No results found for: HGBA1C, POCGLU    CT Head Without Contrast    Result Date: 6/3/2023   No acute intracranial hemorrhage or hydrocephalus. If there is further clinical concern, MRI could be considered for further evaluation.  This report was finalized on 6/3/2023 9:12 AM by Dr. Chase Ruiz M.D.      XR Chest 1 View    Result Date: 6/3/2023  No evidence for acute pulmonary process. Follow-up as clinical indications persist.  This report was finalized on 6/3/2023 8:58 AM by Dr. Chase Ruiz M.D.      CT Angiogram Chest    Result Date: 6/3/2023   No pulmonary embolism.  Age-indeterminate L1, L2 compression deformities, correlate clinically.  This report was finalized on 6/3/2023 11:29 AM by Dr. Chase Ruiz M.D.      XR Hip With or Without Pelvis 2 - 3 View Left    Result Date: 6/3/2023   As described.  This report was finalized on 6/3/2023 8:58 AM by Dr. Chase Ruiz M.D.     I have personally reviewed all medications:  Scheduled Medications  amLODIPine, 2.5 mg, Oral, Q24H  enoxaparin, 40 mg, Subcutaneous, Q24H  fluvoxaMINE, 100 mg, Oral, Daily  folic acid, 1 mg, Oral, Daily  LORazepam, 1 mg, Intravenous, Q8H  mirtazapine, 15 mg, Oral, Nightly  multivitamin, 1 tablet, Oral, Daily  multivitamin with minerals, 1 tablet, Oral, Daily  [START ON 6/5/2023] PHENobarbital, 32.4 mg, Oral, Once   Followed by  [START ON 6/5/2023] PHENobarbital, 32.4 mg, Oral, Once   Followed by  [START ON 6/6/2023] PHENobarbital, 32.4 mg, Oral, Once  propranolol, 60 mg, Oral, BID  risperiDONE, 2 mg, Oral, BID  senna-docusate sodium, 2 tablet, Oral, BID  thiamine (B-1) IV, 200 mg, Intravenous, Q8H   Followed by  [START ON 6/8/2023] thiamine, 100 mg, Oral, Daily    Infusions   Diet  Diet: Regular/House Diet; Texture: Regular Texture  (IDDSI 7); Fluid Consistency: Thin (IDDSI 0)    I have personally reviewed:  [x]  Laboratory   [x]  Microbiology   [x]  Radiology   [x]  EKG/Telemetry  [x]  Cardiology/Vascular   []  Pathology    []  Records       Assessment/Plan     Active Hospital Problems    Diagnosis  POA    Alcohol withdrawal syndrome without complication [F10.930]  Yes      Resolved Hospital Problems   No resolved problems to display.      1. Mechanical fall with left hip pain,   Unable to ambulate secondary to pain therefore CT of the pelvis and femur will be obtained.  X-rays with no evidence of any fracture.  Continue with analgesics and physical therapy is following along.  Orthopedic consultation has been obtained as well.    2. Ethanol abuse, trying to quit and does drink a pt a day.    On a CIWA protocol and not requiring much of Ativan at least as of last night.  On folate and thiamine supplements which will be continued.    3. Hypertension, on amlodipine and will be continued.      4. On Lovenox for DVT prophylaxis.      5. Code status is full code.      Aba Mccabe MD  [facility]  06/04/23  14:37 EDT

## 2023-06-05 ENCOUNTER — APPOINTMENT (OUTPATIENT)
Dept: CT IMAGING | Facility: HOSPITAL | Age: 63
End: 2023-06-05
Payer: MEDICARE

## 2023-06-05 ENCOUNTER — APPOINTMENT (OUTPATIENT)
Dept: ULTRASOUND IMAGING | Facility: HOSPITAL | Age: 63
End: 2023-06-05
Payer: MEDICARE

## 2023-06-05 LAB
ALBUMIN SERPL-MCNC: 3.8 G/DL (ref 3.5–5.2)
ALBUMIN/GLOB SERPL: 1.3 G/DL
ALP SERPL-CCNC: 83 U/L (ref 39–117)
ALT SERPL W P-5'-P-CCNC: 21 U/L (ref 1–41)
AMMONIA BLD-SCNC: 33 UMOL/L (ref 16–60)
ANION GAP SERPL CALCULATED.3IONS-SCNC: 11.8 MMOL/L (ref 5–15)
AST SERPL-CCNC: 31 U/L (ref 1–40)
BASOPHILS # BLD AUTO: 0.04 10*3/MM3 (ref 0–0.2)
BASOPHILS NFR BLD AUTO: 0.6 % (ref 0–1.5)
BILIRUB SERPL-MCNC: 1 MG/DL (ref 0–1.2)
BUN SERPL-MCNC: 20 MG/DL (ref 8–23)
BUN/CREAT SERPL: 24.4 (ref 7–25)
CALCIUM SPEC-SCNC: 9.1 MG/DL (ref 8.6–10.5)
CHLORIDE SERPL-SCNC: 97 MMOL/L (ref 98–107)
CO2 SERPL-SCNC: 24.2 MMOL/L (ref 22–29)
CREAT SERPL-MCNC: 0.82 MG/DL (ref 0.76–1.27)
DEPRECATED RDW RBC AUTO: 40.4 FL (ref 37–54)
EGFRCR SERPLBLD CKD-EPI 2021: 98.1 ML/MIN/1.73
EOSINOPHIL # BLD AUTO: 0.08 10*3/MM3 (ref 0–0.4)
EOSINOPHIL NFR BLD AUTO: 1.1 % (ref 0.3–6.2)
ERYTHROCYTE [DISTWIDTH] IN BLOOD BY AUTOMATED COUNT: 12.2 % (ref 12.3–15.4)
GLOBULIN UR ELPH-MCNC: 3 GM/DL
GLUCOSE SERPL-MCNC: 90 MG/DL (ref 65–99)
HCT VFR BLD AUTO: 38.8 % (ref 37.5–51)
HGB BLD-MCNC: 13.9 G/DL (ref 13–17.7)
LYMPHOCYTES # BLD AUTO: 1.25 10*3/MM3 (ref 0.7–3.1)
LYMPHOCYTES NFR BLD AUTO: 17.6 % (ref 19.6–45.3)
MCH RBC QN AUTO: 32.6 PG (ref 26.6–33)
MCHC RBC AUTO-ENTMCNC: 35.8 G/DL (ref 31.5–35.7)
MCV RBC AUTO: 90.9 FL (ref 79–97)
MONOCYTES # BLD AUTO: 0.65 10*3/MM3 (ref 0.1–0.9)
MONOCYTES NFR BLD AUTO: 9.1 % (ref 5–12)
NEUTROPHILS NFR BLD AUTO: 5.08 10*3/MM3 (ref 1.7–7)
NEUTROPHILS NFR BLD AUTO: 71.3 % (ref 42.7–76)
PLATELET # BLD AUTO: 99 10*3/MM3 (ref 140–450)
PMV BLD AUTO: 10.3 FL (ref 6–12)
POTASSIUM SERPL-SCNC: 4 MMOL/L (ref 3.5–5.2)
PROT SERPL-MCNC: 6.8 G/DL (ref 6–8.5)
QT INTERVAL: 359 MS
RBC # BLD AUTO: 4.27 10*6/MM3 (ref 4.14–5.8)
SODIUM SERPL-SCNC: 133 MMOL/L (ref 136–145)
WBC NRBC COR # BLD: 7.12 10*3/MM3 (ref 3.4–10.8)

## 2023-06-05 PROCEDURE — 72192 CT PELVIS W/O DYE: CPT

## 2023-06-05 PROCEDURE — 82140 ASSAY OF AMMONIA: CPT | Performed by: INTERNAL MEDICINE

## 2023-06-05 PROCEDURE — 25010000002 THIAMINE HCL 200 MG/2ML SOLUTION: Performed by: INTERNAL MEDICINE

## 2023-06-05 PROCEDURE — 76705 ECHO EXAM OF ABDOMEN: CPT

## 2023-06-05 PROCEDURE — 25010000002 ENOXAPARIN PER 10 MG: Performed by: INTERNAL MEDICINE

## 2023-06-05 PROCEDURE — 25010000002 LORAZEPAM PER 2 MG: Performed by: EMERGENCY MEDICINE

## 2023-06-05 PROCEDURE — 73700 CT LOWER EXTREMITY W/O DYE: CPT

## 2023-06-05 PROCEDURE — 85025 COMPLETE CBC W/AUTO DIFF WBC: CPT | Performed by: INTERNAL MEDICINE

## 2023-06-05 PROCEDURE — 80053 COMPREHEN METABOLIC PANEL: CPT | Performed by: INTERNAL MEDICINE

## 2023-06-05 PROCEDURE — 70450 CT HEAD/BRAIN W/O DYE: CPT

## 2023-06-05 RX ADMIN — Medication 1 TABLET: at 08:31

## 2023-06-05 RX ADMIN — MULTIPLE VITAMINS W/ MINERALS TAB 1 TABLET: TAB at 08:31

## 2023-06-05 RX ADMIN — RISPERIDONE 2 MG: 2 TABLET, FILM COATED ORAL at 08:31

## 2023-06-05 RX ADMIN — LORAZEPAM 1 MG: 2 INJECTION INTRAMUSCULAR; INTRAVENOUS at 10:22

## 2023-06-05 RX ADMIN — FLUVOXAMINE MALEATE 100 MG: 50 TABLET, FILM COATED ORAL at 08:31

## 2023-06-05 RX ADMIN — THIAMINE HYDROCHLORIDE 200 MG: 100 INJECTION, SOLUTION INTRAMUSCULAR; INTRAVENOUS at 22:30

## 2023-06-05 RX ADMIN — MIRTAZAPINE 15 MG: 15 TABLET, FILM COATED ORAL at 22:32

## 2023-06-05 RX ADMIN — THIAMINE HYDROCHLORIDE 200 MG: 100 INJECTION, SOLUTION INTRAMUSCULAR; INTRAVENOUS at 15:00

## 2023-06-05 RX ADMIN — THIAMINE HYDROCHLORIDE 200 MG: 100 INJECTION, SOLUTION INTRAMUSCULAR; INTRAVENOUS at 05:21

## 2023-06-05 RX ADMIN — PROPRANOLOL HYDROCHLORIDE 60 MG: 20 TABLET ORAL at 08:31

## 2023-06-05 RX ADMIN — PHENOBARBITAL 32.4 MG: 32.4 TABLET ORAL at 01:03

## 2023-06-05 RX ADMIN — DOCUSATE SODIUM 50MG AND SENNOSIDES 8.6MG 2 TABLET: 8.6; 5 TABLET, FILM COATED ORAL at 08:31

## 2023-06-05 RX ADMIN — ENOXAPARIN SODIUM 40 MG: 100 INJECTION SUBCUTANEOUS at 15:00

## 2023-06-05 RX ADMIN — Medication 1 MG: at 08:31

## 2023-06-05 RX ADMIN — DOCUSATE SODIUM 50MG AND SENNOSIDES 8.6MG 2 TABLET: 8.6; 5 TABLET, FILM COATED ORAL at 22:30

## 2023-06-05 RX ADMIN — PHENOBARBITAL 32.4 MG: 32.4 TABLET ORAL at 15:00

## 2023-06-05 RX ADMIN — PROPRANOLOL HYDROCHLORIDE 60 MG: 20 TABLET ORAL at 22:30

## 2023-06-05 RX ADMIN — RISPERIDONE 2 MG: 2 TABLET, FILM COATED ORAL at 22:30

## 2023-06-05 RX ADMIN — AMLODIPINE BESYLATE 2.5 MG: 2.5 TABLET ORAL at 08:31

## 2023-06-05 NOTE — SIGNIFICANT NOTE
06/05/23 1401   OTHER   Discipline physical therapist   Rehab Time/Intention   Session Not Performed other (see comments)  (Spoke with nsg, patient very disoriented today. Per nsg CT positive for fracture. PT will hold and await further ortho recommendations.)   Recommendation   PT - Next Appointment 06/06/23

## 2023-06-05 NOTE — CASE MANAGEMENT/SOCIAL WORK
Discharge Planning Assessment  [facility]     Patient Name: Nilesh Zapata  MRN: [id number]  Today's Date: 6/5/2023    Admit Date: 6/3/2023    Plan: disposition pending progress with PT- CCP will follow   Discharge Needs Assessment       Row Name 06/05/23 1728       Living Environment    People in Home alone    Potentially Unsafe Housing Conditions none    Primary Care Provided by self    Provides Primary Care For no one    Able to Return to Prior Arrangements yes       Resource/Environmental Concerns    Resource/Environmental Concerns none       Transition Planning    Patient/Family Anticipates Transition to home    Patient/Family Anticipated Services at Transition none    Transportation Anticipated family or friend will provide       Discharge Needs Assessment    Equipment Currently Used at Home walker, standard    Concerns to be Addressed denies needs/concerns at this time;no discharge needs identified                   Discharge Plan       Row Name 06/05/23 1729       Plan    Plan disposition pending progress with PT- CCP will follow    Patient/Family in Agreement with Plan yes    Plan Comments Attempted to speak with patient, but per documentation, he is confused. Called and spoke with wife, Jenny Zapata ( [phone]. She and the patient are legally , but live separately. Patient lives alone in an apartment. At baseline, he is IADLS.  He does have a walker and nocturnal o3 from [place]. He has used [facility] in the past and does not have a SNF history.  [facility] following. Advised wife that CCP will continue to follow progress to help determine disposition once patient nears dc.                  Continued Care and Services - Admitted Since 6/3/2023    Coordination has not been started for this encounter.       Expected Discharge Date and Time       Expected Discharge Date Expected Discharge Time    Jun 9, 2023            Demographic Summary       Row Name 06/05/23 1728       General Information     Admission Type inpatient    Arrived From emergency department    Required Notices Provided Important Message from Medicare    Referral Source admission list                   Functional Status       Row Name 06/05/23 1728       Functional Status    Usual Activity Tolerance good    Current Activity Tolerance fair       Functional Status, IADL    Medications independent    Meal Preparation independent    Housekeeping independent    Laundry independent    Shopping independent       Mental Status    General Appearance WDL WDL                   Psychosocial    No documentation.                  Abuse/Neglect    No documentation.                  Legal    No documentation.                  Substance Abuse    No documentation.                  Patient Forms    No documentation.                     Elizabeth Olson RN

## 2023-06-05 NOTE — PLAN OF CARE
Goal Outcome Evaluation:   Pt AO x 4, 3L o2 nc, sr on monitor, hallucinating on my first assessment, refused all scheduled meds, restless, got out of bed, took off leads, picked IVS, notified [facility], little calm and took meds after a dose of Ativan, didn't sleep all night, c/o pain but refused PRN pain meds, vss, will continue to monitor.      Problem: Adult Inpatient Plan of Care  Goal: Plan of Care Review  Outcome: Ongoing, Progressing  Goal: Patient-Specific Goal (Individualized)  Outcome: Ongoing, Progressing  Goal: Absence of Hospital-Acquired Illness or Injury  Outcome: Ongoing, Progressing  Intervention: Identify and Manage Fall Risk  Recent Flowsheet Documentation  Taken 6/5/2023 0405 by Lucio Hall RN  Safety Promotion/Fall Prevention:   activity supervised   safety round/check completed   room organization consistent  Taken 6/5/2023 0209 by Lucio Hall RN  Safety Promotion/Fall Prevention:   activity supervised   safety round/check completed   room organization consistent  Taken 6/5/2023 0001 by Lucio Hall RN  Safety Promotion/Fall Prevention:   activity supervised   safety round/check completed   room organization consistent  Taken 6/4/2023 2205 by Lucio Hall RN  Safety Promotion/Fall Prevention:   activity supervised   safety round/check completed  Taken 6/4/2023 2020 by Lucio Hall RN  Safety Promotion/Fall Prevention:   activity supervised   safety round/check completed   room organization consistent  Intervention: Prevent Skin Injury  Recent Flowsheet Documentation  Taken 6/5/2023 0405 by Lucio Hall RN  Body Position: position changed independently  Taken 6/5/2023 0209 by Lucio Hall RN  Body Position: position changed independently  Taken 6/5/2023 0001 by Lucio Hall RN  Body Position: position changed independently  Skin Protection: adhesive use limited  Taken 6/4/2023 2205 by Lucio Hall RN  Body Position: position changed  independently  Taken 6/4/2023 2020 by Lucio Hall RN  Body Position: position changed independently  Skin Protection: adhesive use limited  Intervention: Prevent and Manage VTE (Venous Thromboembolism) Risk  Recent Flowsheet Documentation  Taken 6/5/2023 0001 by Lucio Hall RN  Activity Management: bedrest  VTE Prevention/Management: (lovenox) other (see comments)  Range of Motion: active ROM (range of motion) encouraged  Taken 6/4/2023 2020 by Lucio Hall RN  Activity Management: bedrest  VTE Prevention/Management: (lovenox) other (see comments)  Range of Motion: active ROM (range of motion) encouraged  Intervention: Prevent Infection  Recent Flowsheet Documentation  Taken 6/5/2023 0405 by Lucio Hall RN  Infection Prevention:   hand hygiene promoted   rest/sleep promoted  Taken 6/5/2023 0209 by Lucio Hall RN  Infection Prevention: hand hygiene promoted  Taken 6/5/2023 0001 by Lucio Hall RN  Infection Prevention:   hand hygiene promoted   rest/sleep promoted  Taken 6/4/2023 2205 by Lucio Hall RN  Infection Prevention:   hand hygiene promoted   rest/sleep promoted  Taken 6/4/2023 2020 by Lucio Hall RN  Infection Prevention:   cohorting utilized   rest/sleep promoted  Goal: Optimal Comfort and Wellbeing  Outcome: Ongoing, Progressing  Intervention: Monitor Pain and Promote Comfort  Recent Flowsheet Documentation  Taken 6/5/2023 0001 by Lucio Hall RN  Pain Management Interventions: quiet environment facilitated  Taken 6/4/2023 2020 by Lucio Hall RN  Pain Management Interventions:   quiet environment facilitated   position adjusted  Intervention: Provide Person-Centered Care  Recent Flowsheet Documentation  Taken 6/5/2023 0001 by Lucio Hall RN  Trust Relationship/Rapport:   choices provided   care explained  Taken 6/4/2023 2020 by Lucio Hall RN  Trust Relationship/Rapport: care explained  Goal: Readiness for Transition of  Care  Outcome: Ongoing, Progressing     Problem: Skin Injury Risk Increased  Goal: Skin Health and Integrity  Outcome: Ongoing, Progressing  Intervention: Optimize Skin Protection  Recent Flowsheet Documentation  Taken 6/5/2023 0405 by Lucio Hall RN  Head of Bed (HOB) Positioning: HOB elevated  Taken 6/5/2023 0209 by Lucio Hall RN  Head of Bed (HOB) Positioning: HOB elevated  Taken 6/5/2023 0001 by Lucio Hall RN  Pressure Reduction Techniques:   frequent weight shift encouraged   weight shift assistance provided  Head of Bed (HOB) Positioning: HOB elevated  Pressure Reduction Devices:   alternating pressure pump (ADD)   pressure-redistributing mattress utilized  Skin Protection: adhesive use limited  Taken 6/4/2023 2205 by Lucio Hall RN  Head of Bed (HOB) Positioning: HOB elevated  Taken 6/4/2023 2020 by Lucio Hall RN  Pressure Reduction Techniques:   frequent weight shift encouraged   weight shift assistance provided  Head of Bed (HOB) Positioning: HOB elevated  Pressure Reduction Devices:   alternating pressure pump (ADD)   pressure-redistributing mattress utilized  Skin Protection: adhesive use limited     Problem: Behavioral Health Comorbidity  Goal: Maintenance of Behavioral Health Symptom Control  Outcome: Ongoing, Progressing  Intervention: Maintain Behavioral Health Symptom Control  Recent Flowsheet Documentation  Taken 6/5/2023 0405 by Lucio Hall RN  Medication Review/Management: medications reviewed  Taken 6/5/2023 0209 by Lucio Hall RN  Medication Review/Management: medications reviewed  Taken 6/5/2023 0001 by Lucio Hall RN  Medication Review/Management: medications reviewed  Taken 6/4/2023 2205 by Lucio Hall RN  Medication Review/Management: medications reviewed  Taken 6/4/2023 2020 by Lucio Hall RN  Medication Review/Management: medications reviewed     Problem: COPD (Chronic Obstructive Pulmonary Disease) Comorbidity  Goal:  Maintenance of COPD Symptom Control  Outcome: Ongoing, Progressing  Intervention: Maintain COPD-Symptom Control  Recent Flowsheet Documentation  Taken 6/5/2023 0405 by Lucio Hall RN  Medication Review/Management: medications reviewed  Taken 6/5/2023 0209 by Lucio Hall RN  Medication Review/Management: medications reviewed  Taken 6/5/2023 0001 by Lucio Hall RN  Medication Review/Management: medications reviewed  Taken 6/4/2023 2205 by Lucio Hall RN  Medication Review/Management: medications reviewed  Taken 6/4/2023 2020 by Lucio Hall RN  Medication Review/Management: medications reviewed     Problem: Hypertension Comorbidity  Goal: Blood Pressure in Desired Range  Outcome: Ongoing, Progressing  Intervention: Maintain Blood Pressure Management  Recent Flowsheet Documentation  Taken 6/5/2023 0405 by Lucio Hall RN  Medication Review/Management: medications reviewed  Taken 6/5/2023 0209 by Lucio Hall RN  Medication Review/Management: medications reviewed  Taken 6/5/2023 0001 by Lucio Hall RN  Medication Review/Management: medications reviewed  Taken 6/4/2023 2205 by Lucio Hall RN  Medication Review/Management: medications reviewed  Taken 6/4/2023 2020 by Lucio Hall RN  Medication Review/Management: medications reviewed     Problem: Pain Chronic (Persistent) (Comorbidity Management)  Goal: Acceptable Pain Control and Functional Ability  Outcome: Ongoing, Progressing  Intervention: Manage Persistent Pain  Recent Flowsheet Documentation  Taken 6/5/2023 0405 by Lucio Hall RN  Medication Review/Management: medications reviewed  Taken 6/5/2023 0209 by Lucio Hall RN  Medication Review/Management: medications reviewed  Taken 6/5/2023 0001 by Lucio Hall RN  Medication Review/Management: medications reviewed  Taken 6/4/2023 2205 by Lucio Hall RN  Medication Review/Management: medications reviewed  Taken 6/4/2023 2020 by  Lucio Hall RN  Medication Review/Management: medications reviewed  Intervention: Develop Pain Management Plan  Recent Flowsheet Documentation  Taken 6/5/2023 0001 by Lucio Hall RN  Pain Management Interventions: quiet environment facilitated  Taken 6/4/2023 2020 by Lucio Hall RN  Pain Management Interventions:   quiet environment facilitated   position adjusted  Intervention: Optimize Psychosocial Wellbeing  Recent Flowsheet Documentation  Taken 6/5/2023 0001 by Lucio Hall RN  Diversional Activities: television  Taken 6/4/2023 2020 by Lucio Hall RN  Diversional Activities: television     Problem: Seizure Disorder Comorbidity  Goal: Maintenance of Seizure Control  Outcome: Ongoing, Progressing     Problem: Fall Injury Risk  Goal: Absence of Fall and Fall-Related Injury  Outcome: Ongoing, Progressing  Intervention: Identify and Manage Contributors  Recent Flowsheet Documentation  Taken 6/5/2023 0405 by Lucio Hall RN  Medication Review/Management: medications reviewed  Taken 6/5/2023 0209 by Lucio Hall RN  Medication Review/Management: medications reviewed  Taken 6/5/2023 0001 by Lucio Hall RN  Medication Review/Management: medications reviewed  Taken 6/4/2023 2205 by Lucio Hall RN  Medication Review/Management: medications reviewed  Taken 6/4/2023 2020 by Lucio Hall RN  Medication Review/Management: medications reviewed  Intervention: Promote Injury-Free Environment  Recent Flowsheet Documentation  Taken 6/5/2023 0405 by Lucio Hall RN  Safety Promotion/Fall Prevention:   activity supervised   safety round/check completed   room organization consistent  Taken 6/5/2023 0209 by Lucio Hall RN  Safety Promotion/Fall Prevention:   activity supervised   safety round/check completed   room organization consistent  Taken 6/5/2023 0001 by Lucio Hall RN  Safety Promotion/Fall Prevention:   activity supervised   safety  round/check completed   room organization consistent  Taken 6/4/2023 2205 by Lucio Hall, RN  Safety Promotion/Fall Prevention:   activity supervised   safety round/check completed  Taken 6/4/2023 2020 by Lucio Hall, RN  Safety Promotion/Fall Prevention:   activity supervised   safety round/check completed   room organization consistent

## 2023-06-05 NOTE — PROGRESS NOTES
Name: Nilesh Zapata ADMIT: 6/3/2023   : 1960  PCP: Shakira Colbert GRACIE    MRN: [id number] LOS: 2 days   AGE/SEX: 62 y.o. male  ROOM: [facility], [place]     Subjective   Subjective      patient is lying on the bed and appears weak and lethargic and is confused and his speech is slurred.  No reports of nausea, vomiting, abdominal pain, chest pain.         Objective   Objective   Vital Signs  Temp:  [98.4 °F (36.9 °C)-99 °F (37.2 °C)] 98.4 °F (36.9 °C)  Heart Rate:  [75-80] 79  Resp:  [18] 18  BP: (112-138)/(71-97) 138/97  SpO2:  [91 %-92 %] 92 %  on  Flow (L/min):  [3] 3;   Device (Oxygen Therapy): nasal cannula  Body mass index is 33.41 kg/m².  Physical Exam   HEENT:  PERRLA, extraocular movements intact, sclerus no icterus   Neck: Supple, no JVD   Cardiovascular:  Regular rate and rhythm with normal S1 and S2   Respiratory: Fairly clear to auscultation bilaterally with no wheezes  GI:  Soft, nontender, bowel sounds are present   Extremities:  No edema, palpable pedal pulses , limited range of motion of the left hip secondary to pain  Neurologic:  Grossly nonfocal, no facial asymmetry   Skin:  Warm and dry with no evidence of any rash      Results Review     I reviewed the patient's new clinical results.  Results from last 7 days   Lab Units 23  0645 23  0638 23  0758   WBC 10*3/mm3 7.12 7.05 14.38*   HEMOGLOBIN g/dL 13.9 13.4 16.4   PLATELETS 10*3/mm3 99* 125* 234       Results from last 7 days   Lab Units 23  0645 23  0638 23  0758   SODIUM mmol/L 133* 135* 135*   POTASSIUM mmol/L 4.0 4.0 4.5   CHLORIDE mmol/L 97* 98 93*   CO2 mmol/L 24.2 29.0 18.4*   BUN mg/dL 20 24* 23   CREATININE mg/dL 0.82 1.10 1.06   GLUCOSE mg/dL 90 110* 130*   EGFR mL/min/1.73 98.1 75.0 78.4       Results from last 7 days   Lab Units 23  0645 23  0638 23  0758   ALBUMIN g/dL 3.8 3.8 4.5   BILIRUBIN mg/dL 1.0 1.1 0.7   ALK PHOS U/L 83 82 97   AST (SGOT) U/L 31 32 39   ALT (SGPT) U/L  21 20 32       Results from last 7 days   Lab Units 06/05/23  0645 06/04/23  0638 06/03/23  0758   CALCIUM mg/dL 9.1 8.5* 9.3   ALBUMIN g/dL 3.8 3.8 4.5   MAGNESIUM mg/dL  --   --  1.5*   PHOSPHORUS mg/dL  --   --  2.9         No results found for: HGBA1C, POCGLU    CT Pelvis Without Contrast    Result Date: 6/5/2023  1. Comminuted nondisplaced left acetabular fracture. 2. Fractures of the left superior and inferior pubic rami. 3. Potential subtle, nondisplaced fracture of the left sacral ala. 4. 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  Radiation dose reduction techniques were utilized, including automated exposure control and exposure modulation based on body size.  This report was finalized on 6/5/2023 3:01 PM by Dr. Brandin Conti M.D.      CT Lower Extremity Left Without Contrast    Result Date: 6/5/2023  1. Comminuted nondisplaced left acetabular fracture. 2. Fractures of the left superior and inferior pubic rami. 3. Potential subtle, nondisplaced fracture of the left sacral ala. 4. 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  Radiation dose reduction techniques were utilized, including automated exposure control and exposure modulation based on body size.  This report was finalized on 6/5/2023 3:01 PM by Dr. Brandin Conti M.D.     I have personally reviewed all medications:  Scheduled Medications  amLODIPine, 2.5 mg, Oral, Q24H  enoxaparin, 40 mg, Subcutaneous, Q24H  fluvoxaMINE, 100 mg, Oral, Daily  folic acid, 1 mg, Oral, Daily  mirtazapine, 15 mg, Oral, Nightly  multivitamin, 1 tablet, Oral, Daily  multivitamin with minerals, 1 tablet, Oral, Daily  [START ON 6/6/2023] PHENobarbital, 32.4 mg, Oral, Once  propranolol, 60 mg, Oral, BID  risperiDONE, 2 mg, Oral, BID  senna-docusate sodium, 2 tablet, Oral, BID  thiamine (B-1) IV, 200 mg, Intravenous, Q8H   Followed  by  [START ON 6/8/2023] thiamine, 100 mg, Oral, Daily    Infusions   Diet  Diet: Regular/House Diet; Texture: Regular Texture (IDDSI 7); Fluid Consistency: Thin (IDDSI 0)    I have personally reviewed:  [x]  Laboratory   [x]  Microbiology   [x]  Radiology   [x]  EKG/Telemetry  [x]  Cardiology/Vascular   []  Pathology    []  Records       Assessment/Plan     Active Hospital Problems    Diagnosis  POA    Alcohol withdrawal syndrome without complication [F10.930]  Yes      Resolved Hospital Problems   No resolved problems to display.      1. Mechanical fall with left hip pain,  X-ray no acute findings however CT was obtained which revealed left acetabular fracture and fractures of left superior and inferior pubic rami.  Sacral ala fracture was also noted.  Continue with analgesics and physical therapy and orthopedic consultation has been obtained.    2. Ethanol abuse, trying to quit and does drink a pt a day.    On CIWA protocol and is on folate and thiamine supplements which will be continued.  Today is more confused and lethargic therefore CT brain is being obtained and ammonia level will be checked.  Liver ultrasound will also be ordered.    3. Hypertension, on amlodipine and will be continued.      4. On Lovenox for DVT prophylaxis.      5. Code status is full code.    Copied text on this note has been reviewed by me on 6/5/2023    Aba Mccabe MD  [facility]  06/05/23  15:16 EDT

## 2023-06-06 LAB
ALBUMIN SERPL-MCNC: 3.7 G/DL (ref 3.5–5.2)
ALBUMIN/GLOB SERPL: 1.1 G/DL
ALP SERPL-CCNC: 85 U/L (ref 39–117)
ALT SERPL W P-5'-P-CCNC: 18 U/L (ref 1–41)
ANION GAP SERPL CALCULATED.3IONS-SCNC: 10 MMOL/L (ref 5–15)
AST SERPL-CCNC: 33 U/L (ref 1–40)
BASOPHILS # BLD AUTO: 0.04 10*3/MM3 (ref 0–0.2)
BASOPHILS NFR BLD AUTO: 0.6 % (ref 0–1.5)
BILIRUB SERPL-MCNC: 1 MG/DL (ref 0–1.2)
BUN SERPL-MCNC: 19 MG/DL (ref 8–23)
BUN/CREAT SERPL: 25.3 (ref 7–25)
CALCIUM SPEC-SCNC: 8.7 MG/DL (ref 8.6–10.5)
CHLORIDE SERPL-SCNC: 97 MMOL/L (ref 98–107)
CO2 SERPL-SCNC: 27 MMOL/L (ref 22–29)
CREAT SERPL-MCNC: 0.75 MG/DL (ref 0.76–1.27)
DEPRECATED RDW RBC AUTO: 43.2 FL (ref 37–54)
EGFRCR SERPLBLD CKD-EPI 2021: 100.8 ML/MIN/1.73
EOSINOPHIL # BLD AUTO: 0.07 10*3/MM3 (ref 0–0.4)
EOSINOPHIL NFR BLD AUTO: 1 % (ref 0.3–6.2)
ERYTHROCYTE [DISTWIDTH] IN BLOOD BY AUTOMATED COUNT: 12.7 % (ref 12.3–15.4)
GLOBULIN UR ELPH-MCNC: 3.3 GM/DL
GLUCOSE SERPL-MCNC: 73 MG/DL (ref 65–99)
HCT VFR BLD AUTO: 41.9 % (ref 37.5–51)
HGB BLD-MCNC: 14.6 G/DL (ref 13–17.7)
LYMPHOCYTES # BLD AUTO: 0.81 10*3/MM3 (ref 0.7–3.1)
LYMPHOCYTES NFR BLD AUTO: 12.1 % (ref 19.6–45.3)
MCH RBC QN AUTO: 32.3 PG (ref 26.6–33)
MCHC RBC AUTO-ENTMCNC: 34.8 G/DL (ref 31.5–35.7)
MCV RBC AUTO: 92.7 FL (ref 79–97)
MONOCYTES # BLD AUTO: 0.55 10*3/MM3 (ref 0.1–0.9)
MONOCYTES NFR BLD AUTO: 8.2 % (ref 5–12)
NEUTROPHILS NFR BLD AUTO: 5.2 10*3/MM3 (ref 1.7–7)
NEUTROPHILS NFR BLD AUTO: 77.7 % (ref 42.7–76)
PLATELET # BLD AUTO: 101 10*3/MM3 (ref 140–450)
PMV BLD AUTO: 10.4 FL (ref 6–12)
POTASSIUM SERPL-SCNC: 3.9 MMOL/L (ref 3.5–5.2)
PROT SERPL-MCNC: 7 G/DL (ref 6–8.5)
RBC # BLD AUTO: 4.52 10*6/MM3 (ref 4.14–5.8)
SODIUM SERPL-SCNC: 134 MMOL/L (ref 136–145)
WBC NRBC COR # BLD: 6.7 10*3/MM3 (ref 3.4–10.8)

## 2023-06-06 PROCEDURE — 25010000002 ENOXAPARIN PER 10 MG: Performed by: INTERNAL MEDICINE

## 2023-06-06 PROCEDURE — 25010000002 LORAZEPAM PER 2 MG: Performed by: EMERGENCY MEDICINE

## 2023-06-06 PROCEDURE — 25010000002 HYDROMORPHONE 1 MG/ML SOLUTION: Performed by: INTERNAL MEDICINE

## 2023-06-06 PROCEDURE — 97530 THERAPEUTIC ACTIVITIES: CPT

## 2023-06-06 PROCEDURE — 85025 COMPLETE CBC W/AUTO DIFF WBC: CPT | Performed by: INTERNAL MEDICINE

## 2023-06-06 PROCEDURE — 25010000002 THIAMINE HCL 200 MG/2ML SOLUTION: Performed by: INTERNAL MEDICINE

## 2023-06-06 PROCEDURE — 80053 COMPREHEN METABOLIC PANEL: CPT | Performed by: INTERNAL MEDICINE

## 2023-06-06 RX ADMIN — THIAMINE HYDROCHLORIDE 200 MG: 100 INJECTION, SOLUTION INTRAMUSCULAR; INTRAVENOUS at 22:00

## 2023-06-06 RX ADMIN — PROPRANOLOL HYDROCHLORIDE 60 MG: 20 TABLET ORAL at 08:29

## 2023-06-06 RX ADMIN — HYDROMORPHONE HYDROCHLORIDE 1 MG: 1 INJECTION, SOLUTION INTRAMUSCULAR; INTRAVENOUS; SUBCUTANEOUS at 23:48

## 2023-06-06 RX ADMIN — DOCUSATE SODIUM 50MG AND SENNOSIDES 8.6MG 2 TABLET: 8.6; 5 TABLET, FILM COATED ORAL at 20:40

## 2023-06-06 RX ADMIN — PHENOBARBITAL 32.4 MG: 32.4 TABLET ORAL at 03:52

## 2023-06-06 RX ADMIN — RISPERIDONE 2 MG: 2 TABLET, FILM COATED ORAL at 20:40

## 2023-06-06 RX ADMIN — MIRTAZAPINE 15 MG: 15 TABLET, FILM COATED ORAL at 20:40

## 2023-06-06 RX ADMIN — THIAMINE HYDROCHLORIDE 200 MG: 100 INJECTION, SOLUTION INTRAMUSCULAR; INTRAVENOUS at 12:23

## 2023-06-06 RX ADMIN — FLUVOXAMINE MALEATE 100 MG: 50 TABLET, FILM COATED ORAL at 08:29

## 2023-06-06 RX ADMIN — PROPRANOLOL HYDROCHLORIDE 60 MG: 20 TABLET ORAL at 20:40

## 2023-06-06 RX ADMIN — HYDROCODONE BITARTRATE AND ACETAMINOPHEN 1 TABLET: 5; 325 TABLET ORAL at 20:40

## 2023-06-06 RX ADMIN — Medication 1 TABLET: at 08:29

## 2023-06-06 RX ADMIN — HYDROCODONE BITARTRATE AND ACETAMINOPHEN 1 TABLET: 5; 325 TABLET ORAL at 16:46

## 2023-06-06 RX ADMIN — AMLODIPINE BESYLATE 2.5 MG: 2.5 TABLET ORAL at 08:29

## 2023-06-06 RX ADMIN — THIAMINE HYDROCHLORIDE 200 MG: 100 INJECTION, SOLUTION INTRAMUSCULAR; INTRAVENOUS at 05:57

## 2023-06-06 RX ADMIN — LORAZEPAM 1 MG: 2 INJECTION INTRAMUSCULAR; INTRAVENOUS at 00:12

## 2023-06-06 RX ADMIN — DOCUSATE SODIUM 50MG AND SENNOSIDES 8.6MG 2 TABLET: 8.6; 5 TABLET, FILM COATED ORAL at 08:29

## 2023-06-06 RX ADMIN — RISPERIDONE 2 MG: 2 TABLET, FILM COATED ORAL at 08:29

## 2023-06-06 RX ADMIN — ENOXAPARIN SODIUM 40 MG: 100 INJECTION SUBCUTANEOUS at 12:22

## 2023-06-06 RX ADMIN — MULTIPLE VITAMINS W/ MINERALS TAB 1 TABLET: TAB at 08:29

## 2023-06-06 RX ADMIN — Medication 1 MG: at 08:29

## 2023-06-06 NOTE — PLAN OF CARE
Goal Outcome Evaluation:  Plan of Care Reviewed With: patient      Patient continues to be lethargic.VSS.On 2.5L.of oxygen.Ativan given once with a CIWA score of 10.Continues to hallucinate.Restless and pulling things off at times.No signs of pain noted.One time dose of Phenobarbital given this am.US of liver obtained.SR.Will continue to monitor and assist as needed.

## 2023-06-06 NOTE — CONSULTS
Orthopaedic & Sports Medicine Surgery  Dr. Negro Torrez MD  [phone]    Orthopaedic Surgery  Consult Note      HPI:  Patient is a 62 y.o. male who presents with left hip pain after same level fall.  Admitted to the hospital for ethanol abuse and alcohol withdrawal syndrome.  A couple days ago he says he fell and injured the left hip.  Believe he was able to get up to the recliner.  He ended up coming to the hospital on 6/3/2023.  CT scan was obtained and found to have left acetabular and superior and inferior pubic rami fractures.  Orthopedic surgery was consulted.  Does not really hurt him at rest but is more with walking.  Says he drinks 1 pint of bourbon a day.    MEDICAL HISTORY  Past Medical History:   Diagnosis Date    Alcohol abuse     Anxiety     Delirium tremens 03/20/2019    Fatty liver     FH: kidney cancer     Hypertension      Past Surgical History:   Procedure Laterality Date    APPENDECTOMY      NEPHRECTOMY      left     TONSILLECTOMY       Prior to Admission medications    Medication Sig Start Date End Date Taking? Authorizing Provider   amLODIPine (NORVASC) 2.5 MG tablet Take 1 tablet by mouth Daily. 1/4/23   Mariusz Powers MD   fluvoxaMINE (LUVOX) 25 MG tablet Take 4 tablets by mouth Daily.    ProviderIbeth MD   folic acid (FOLVITE) 1 MG tablet Take 1 tablet by mouth Daily. 3/26/19   Nneka Underwood MD   mirtazapine (REMERON) 15 MG tablet Take 1 tablet by mouth Every Night. 11/3/19   Gino Wise MD   multivitamin (THERAGRAN) tablet tablet Take 1 tablet by mouth Daily. 1/4/23   Mariusz Powers MD   naltrexone (DEPADE) 50 MG tablet Take 1 tablet by mouth Daily.    Ibeth Villareal MD   propranolol (INDERAL) 40 MG tablet Take 1.5 tablets by mouth 2 (Two) Times a Day.    Ibeth Villareal MD   risperiDONE (risperDAL) 2 MG tablet Take 1 tablet by mouth 2 (Two) Times a Day.    Ibeth Villareal MD   thiamine (VITAMIN B1) 100 MG tablet Take 1  "tablet by mouth Daily. 3/26/19   Nneka Underwood MD   vitamin B-12 (VITAMIN B-12) 1000 MCG tablet Take 1 tablet by mouth Daily. 6/4/22   Aannda Gar MD     No Known Allergies  Most Recent Immunizations   Administered Date(s) Administered    COVID-19 (PFIZER) Purple Cap Monovalent 04/06/2021    FluLaval/Fluzone >6mos 01/03/2023    Influenza Quad Vaccine (Inpatient) 12/23/2017    Pneumococcal Polysaccharide (PPSV23) 12/23/2017     Social History     Tobacco Use    Smoking status: Some Days     Packs/day: 0.25     Years: 15.00     Pack years: 3.75     Types: Cigarettes     Start date: 12/17/1981    Smokeless tobacco: Not on file    Tobacco comments:     refused    Substance Use Topics    Alcohol use: Yes     Comment: last drink was at 1000   drinks 1 fifth a day       Social History     Substance and Sexual Activity   Drug Use No       VITALS: /80 (BP Location: Left arm, Patient Position: Lying)   Pulse 76   Temp 97.5 °F (36.4 °C) (Oral)   Resp 18   Ht 167.6 cm (66\")   Wt 93.9 kg (207 lb 0.2 oz)   SpO2 91%   BMI 33.41 kg/m²  Body mass index is 33.41 kg/m².    PHYSICAL EXAM:   CONSTITUTIONAL: No acute distress  LUNGS: Equal chest rise, no shortness of air  CARDIOVASCULAR: palpable peripheral pulses  EXTREMITY:   Left lower extremity  Tenderness to Palpation: Tenderness to palpation at the over pubic rami, and mildly over lateral posterior hip.  No pain in mid thigh and distal.  Gross Deformity: No Gross Deformity  Pulses:  Brisk Capillary Refill  Sensation: Intact to Saphenous, Sural, Deep Peroneal, Superficial Peroneal, and Tibial Nerves and grossly throughout extremity  Motor: 5/5 EHL/FHL/TA/GS motor complexes   No pain with logroll of hip.  Does have pain with hip flexion.    Right lower extremity  Tenderness to Palpation: No tenderness to palpation  Gross Deformity: No Gross Deformity  Pulses:  Brisk Capillary Refill  Sensation: Intact to Saphenous, Sural, Deep Peroneal, Superficial Peroneal, " and Tibial Nerves and grossly throughout extremity  Motor: 5/5 EHL/FHL/TA/GS motor complexes   No pain with logroll of hip.  Grossly nontender to palpation.    RADIOLOGY REVIEW:   CT Head Without Contrast    Result Date: 6/5/2023   There is no CT evidence to suggest acute intracranial pathology. Mild changes of chronic small vessel ischemic phenomena are incidentally noted. The etiology of the patient's delirium is not further elucidated on this study; and if further assessment is warranted, one could obtain an MRI of the brain for follow-up.  Radiation dose reduction techniques were utilized, including automated exposure control and exposure modulation based on body size.  This report was finalized on 6/5/2023 3:44 PM by Dr. Dhiraj Lerma M.D.      CT Head Without Contrast    Result Date: 6/3/2023   No acute intracranial hemorrhage or hydrocephalus. If there is further clinical concern, MRI could be considered for further evaluation.  This report was finalized on 6/3/2023 9:12 AM by Dr. Chase Ruiz M.D.      CT Pelvis Without Contrast    Result Date: 6/5/2023  1. Comminuted nondisplaced left acetabular fracture. 2. Fractures of the left superior and inferior pubic rami. 3. Potential subtle, nondisplaced fracture of the left sacral ala. 4. 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  Radiation dose reduction techniques were utilized, including automated exposure control and exposure modulation based on body size.  This report was finalized on 6/5/2023 3:01 PM by Dr. Brandin Conti M.D.      XR Chest 1 View    Result Date: 6/3/2023  No evidence for acute pulmonary process. Follow-up as clinical indications persist.  This report was finalized on 6/3/2023 8:58 AM by Dr. Chase Ruiz M.D.      CT Angiogram Chest    Result Date: 6/3/2023   No pulmonary embolism.  Age-indeterminate L1, L2 compression deformities, correlate clinically.  This  report was finalized on 6/3/2023 11:29 AM by Dr. Chase Ruiz M.D.      CT Lower Extremity Left Without Contrast    Result Date: 6/5/2023  1. Comminuted nondisplaced left acetabular fracture. 2. Fractures of the left superior and inferior pubic rami. 3. Potential subtle, nondisplaced fracture of the left sacral ala. 4. 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  Radiation dose reduction techniques were utilized, including automated exposure control and exposure modulation based on body size.  This report was finalized on 6/5/2023 3:01 PM by Dr. Brandin Conti M.D.      XR Hip With or Without Pelvis 2 - 3 View Left    Result Date: 6/3/2023   As described.  This report was finalized on 6/3/2023 8:58 AM by Dr. Chase Ruiz M.D.       LABS:   Results for the past 24 hours:   Recent Results (from the past 24 hour(s))   Comprehensive Metabolic Panel    Collection Time: 06/05/23  6:45 AM    Specimen: Blood   Result Value Ref Range    Glucose 90 65 - 99 mg/dL    BUN 20 8 - 23 mg/dL    Creatinine 0.82 0.76 - 1.27 mg/dL    Sodium 133 (L) 136 - 145 mmol/L    Potassium 4.0 3.5 - 5.2 mmol/L    Chloride 97 (L) 98 - 107 mmol/L    CO2 24.2 22.0 - 29.0 mmol/L    Calcium 9.1 8.6 - 10.5 mg/dL    Total Protein 6.8 6.0 - 8.5 g/dL    Albumin 3.8 3.5 - 5.2 g/dL    ALT (SGPT) 21 1 - 41 U/L    AST (SGOT) 31 1 - 40 U/L    Alkaline Phosphatase 83 39 - 117 U/L    Total Bilirubin 1.0 0.0 - 1.2 mg/dL    Globulin 3.0 gm/dL    A/G Ratio 1.3 g/dL    BUN/Creatinine Ratio 24.4 7.0 - 25.0    Anion Gap 11.8 5.0 - 15.0 mmol/L    eGFR 98.1 >60.0 mL/min/1.73   CBC Auto Differential    Collection Time: 06/05/23  6:45 AM    Specimen: Blood   Result Value Ref Range    WBC 7.12 3.40 - 10.80 10*3/mm3    RBC 4.27 4.14 - 5.80 10*6/mm3    Hemoglobin 13.9 13.0 - 17.7 g/dL    Hematocrit 38.8 37.5 - 51.0 %    MCV 90.9 79.0 - 97.0 fL    MCH 32.6 26.6 - 33.0 pg    MCHC 35.8 (H) 31.5 -  35.7 g/dL    RDW 12.2 (L) 12.3 - 15.4 %    RDW-SD 40.4 37.0 - 54.0 fl    MPV 10.3 6.0 - 12.0 fL    Platelets 99 (L) 140 - 450 10*3/mm3    Neutrophil % 71.3 42.7 - 76.0 %    Lymphocyte % 17.6 (L) 19.6 - 45.3 %    Monocyte % 9.1 5.0 - 12.0 %    Eosinophil % 1.1 0.3 - 6.2 %    Basophil % 0.6 0.0 - 1.5 %    Neutrophils, Absolute 5.08 1.70 - 7.00 10*3/mm3    Lymphocytes, Absolute 1.25 0.70 - 3.10 10*3/mm3    Monocytes, Absolute 0.65 0.10 - 0.90 10*3/mm3    Eosinophils, Absolute 0.08 0.00 - 0.40 10*3/mm3    Basophils, Absolute 0.04 0.00 - 0.20 10*3/mm3   Ammonia    Collection Time: 06/05/23  4:16 PM    Specimen: Blood   Result Value Ref Range    Ammonia 33 16 - 60 umol/L       IMPRESSION:  Patient is a 62 y.o. male with left comminuted nondisplaced superior and inferior pubic rami fractures, nondisplaced acetabular fracture, potential nondisplaced sacral ala fracture.    PLAN:   Admited to: Aba Mccabe MD  Diet: Okay for diet from orthopedic surgery standpoint  Weight Bearing: Touchdown weightbearing left lower extremity for 6 weeks (10% or less of body weight), will then start progressively weightbearing.  PT/OT.  I do think these are likely nonoperative injuries.  He does have an acetabular fracture as well as superior and inferior pubic rami fractures at least.  These do appear to be nondisplaced.  I think these will likely heal well with nonoperative treatment.  As long as they heal they are think he will likely do well.  So we will start with protected weightbearing, and then let him start progressing after 6 weeks if x-rays appear to show some healing.  No operative intervention needed in house.  Lovenox for DVT prophylaxis per primary.  Follow-up in the office in 3 weeks.  Please call [phone] to make an appointment.  Please message with any questions or concerns.    Negro Torrez MD  [facility]  Orthopaedic Surgery, Sports Medicine  [phone]

## 2023-06-06 NOTE — PLAN OF CARE
Goal Outcome Evaluation:  Plan of Care Reviewed With: patient        Progress: no change  Outcome Evaluation: Pt seen for PT treatment today. Pt is Touchdown wbing on left LE. Pt able to answer orientation questions correctly but had a lot of difficulty following commands and simple sequencing cues. Educated pt on wbing precautions, however pt unable to maintain or follow. Pt is ModA/Max assist of 2 with bed mobility. Pt improved with sitting balance needing CGA. Pt attempted 1 STS with rwx, and Max assist of 2. Pt unable to maintain Touchdown wbing precautions despite max cueing. Pt putting full weight on left. Had pt sit back down immediately. Pt had difficulty following simple demonstration for exercises today. Will continue to follow pt for d/c needs. Recommend SNF at this time.

## 2023-06-06 NOTE — THERAPY TREATMENT NOTE
Patient Name: Nilesh Zapata  : 1960    MRN: [id number]                              Today's Date: 2023       Admit Date: 6/3/2023    Visit Dx:     ICD-10-CM ICD-9-CM   1. Alcohol withdrawal syndrome without complication  F10.930 291.81   2. Contusion of left hip, initial encounter  S70.02XA 924.01   3. Alcoholism  F10.20 303.90   4. Hypoxia  R09.02 799.02     Patient Active Problem List   Diagnosis    Alcohol withdrawal syndrome with complication (HCC)    Anxiety    Fatty liver    Tobacco abuse    Kidney cancer, primary, with metastasis from kidney to other site (HCC)    Alcoholism (HCC)    Hyponatremia    Alcohol abuse    History of renal cell cancer    History of left nephrectomy 2/2 RCC    Liver lesion    Lung nodule    Vitamin D deficiency    Under emotional stress     from spouse    Coping style affecting medical condition    Thrombocytopenia    Essential hypertension    Acute adjustment disorder with mixed anxiety and depressed mood    Alcoholic liver disease    Hypoxemia    Alcohol withdrawal syndrome without complication     Past Medical History:   Diagnosis Date    Alcohol abuse     Anxiety     Delirium tremens 2019    Fatty liver     FH: kidney cancer     Hypertension      Past Surgical History:   Procedure Laterality Date    APPENDECTOMY      NEPHRECTOMY      left     TONSILLECTOMY        General Information       Row Name 23 1127          Physical Therapy Time and Intention    Document Type therapy note (daily note)  -EB     Mode of Treatment individual therapy;physical therapy  -EB       Row Name 23 1127          General Information    Patient Profile Reviewed yes  -EB     Existing Precautions/Restrictions fall;left  Left LE Touchdown WBing precautions  -EB       Row Name 23 1127          Cognition    Orientation Status (Cognition) oriented to;person;place;situation;time  -EB       Row Name 23 1127          Safety Issues, Functional Mobility    Safety  Issues Affecting Function (Mobility) ability to follow commands;insight into deficits/self-awareness;judgment;problem-solving;safety precaution awareness;unable to maintain weight-bearing restrictions;sequencing abilities  -EB     Impairments Affecting Function (Mobility) balance;strength;endurance/activity tolerance;pain;range of motion (ROM);cognition  -EB     Comment, Safety Issues/Impairments (Mobility) pt able to answer orientation questions correctly but had difficulty following commands and simple squencing cues.  -EB               User Key  (r) = Recorded By, (t) = Taken By, (c) = Cosigned By      Initials Name Provider Type    EB Trina Cardozo PTA Physical Therapist Assistant                   Mobility       Row Name 06/06/23 1129          Bed Mobility    Supine-Sit Delta (Bed Mobility) moderate assist (50% patient effort);verbal cues  -EB     Sit-Supine Delta (Bed Mobility) maximum assist (25% patient effort);2 person assist;verbal cues  -EB     Assistive Device (Bed Mobility) bed rails;head of bed elevated;draw sheet  -EB     Comment, (Bed Mobility) assist with left LE  -EB       Row Name 06/06/23 1129          Sit-Stand Transfer    Sit-Stand Delta (Transfers) maximum assist (25% patient effort);2 person assist  -EB     Assistive Device (Sit-Stand Transfers) walker, front-wheeled  -EB     Comment, (Sit-Stand Transfer) Educated and demo'd touchdown WBing precautions. Pt attempted to stand but unable to maintain WBing precations. pt putting more weight on left side and not following commands. Had pt sit back on EOB.  -EB               User Key  (r) = Recorded By, (t) = Taken By, (c) = Cosigned By      Initials Name Provider Type    Trina Mack PTA Physical Therapist Assistant                   Obj/Interventions       Row Name 06/06/23 1132          Motor Skills    Therapeutic Exercise --  Left LE: AAROM (LAQs X10) pt  not following commands well.  -EB       Row Name 06/06/23 1132           Balance    Balance Assessment sitting static balance;sitting dynamic balance  -EB     Static Sitting Balance contact guard  -EB     Dynamic Sitting Balance contact guard  -EB     Position, Sitting Balance sitting edge of bed  -EB               User Key  (r) = Recorded By, (t) = Taken By, (c) = Cosigned By      Initials Name Provider Type    Trina Mack PTA Physical Therapist Assistant                   Goals/Plan    No documentation.                  Clinical Impression       Row Name 06/06/23 1132          Pain    Pain Location - Side/Orientation Left  -EB     Pain Location - hip  -EB       Row Name 06/06/23 1132          Plan of Care Review    Plan of Care Reviewed With patient  -EB     Progress no change  -EB     Outcome Evaluation Pt seen for PT treatment today. Pt is Touchdown wbing on left LE. Pt able to answer orientation questions correctly but had a lot of difficulty following commands and simple sequencing cues. Educated pt on wbing precautions, however pt unable to maintain or follow. Pt is ModA/Max assist of 2 with bed mobility. Pt improved with sitting balance needing CGA. Pt attempted 1 STS with rwx, and Max assist of 2. Pt unable to maintain Touchdown wbing precautions despite max cueing. Pt putting full weight on left. Had pt sit back down immediately. Pt had difficulty following simple demonstration for exercises today. Will continue to follow pt for d/c needs. Recommend SNF at this time.  -EB       Row Name 06/06/23 1132          Therapy Assessment/Plan (PT)    Therapy Frequency (PT) 5 times/wk  -EB       Row Name 06/06/23 1132          Positioning and Restraints    Pre-Treatment Position in bed  -EB     Post Treatment Position bed  -EB     In Bed supine;call light within reach;encouraged to call for assist;exit alarm on  -EB               User Key  (r) = Recorded By, (t) = Taken By, (c) = Cosigned By      Initials Name Provider Type    Trina Mack PTA Physical Therapist  Assistant                   Outcome Measures       Row Name 06/06/23 1136 06/06/23 0832       How much help from another person do you currently need...    Turning from your back to your side while in flat bed without using bedrails? 3  -EB 3  -MS    Moving from lying on back to sitting on the side of a flat bed without bedrails? 2  -EB 2  -MS    Moving to and from a bed to a chair (including a wheelchair)? 1  -EB 1  -MS    Standing up from a chair using your arms (e.g., wheelchair, bedside chair)? 2  -EB 1  -MS    Climbing 3-5 steps with a railing? 1  -EB 1  -MS    To walk in hospital room? 1  -EB 1  -MS    AM-PAC 6 Clicks Score (PT) 10  -EB 9  -MS    Highest level of mobility 4 --> Transferred to chair/commode  -EB 3 --> Sat at edge of bed  -MS              User Key  (r) = Recorded By, (t) = Taken By, (c) = Cosigned By      Initials Name Provider Type    Charles Dorsey, RN Registered Nurse    Trina Mack PTA Physical Therapist Assistant                                 Physical Therapy Education       Title: PT OT SLP Therapies (In Progress)       Topic: Physical Therapy (In Progress)       Point: Mobility training (In Progress)       Learning Progress Summary             Patient Acceptance, E,D, NR by ALMA at 6/6/2023 1137    Acceptance, E, NR,VU by MEHRAN at 6/4/2023 1146                         Point: Home exercise program (Done)       Learning Progress Summary             Patient Acceptance, E, NR,VU by  at 6/4/2023 1146                         Point: Body mechanics (In Progress)       Learning Progress Summary             Patient Acceptance, E,D, NR by ALMA at 6/6/2023 1137    Acceptance, E, NR,VU by MEHRAN at 6/4/2023 1146                         Point: Precautions (In Progress)       Learning Progress Summary             Patient Acceptance, E,D, NR by ALMA at 6/6/2023 1137    Acceptance, E, NR,VU by MEHRAN at 6/4/2023 1146                                         User Key       Initials Effective Dates Name  Provider Type Discipline     02/14/23 -  Trina Cardozo PTA Physical Therapist Assistant PT     05/02/22 -  Preethi Henao PT Physical Therapist PT                  PT Recommendation and Plan     Plan of Care Reviewed With: patient  Progress: no change  Outcome Evaluation: Pt seen for PT treatment today. Pt is Touchdown wbing on left LE. Pt able to answer orientation questions correctly but had a lot of difficulty following commands and simple sequencing cues. Educated pt on wbing precautions, however pt unable to maintain or follow. Pt is ModA/Max assist of 2 with bed mobility. Pt improved with sitting balance needing CGA. Pt attempted 1 STS with rwx, and Max assist of 2. Pt unable to maintain Touchdown wbing precautions despite max cueing. Pt putting full weight on left. Had pt sit back down immediately. Pt had difficulty following simple demonstration for exercises today. Will continue to follow pt for d/c needs. Recommend SNF at this time.     Time Calculation:    PT Charges       Row Name 06/06/23 1124             Time Calculation    Start Time 0933  -EB      Stop Time 0945  -EB      Time Calculation (min) 12 min  -EB      PT Received On 06/06/23  -EB      PT - Next Appointment 06/07/23  -EB         Time Calculation- PT    Total Timed Code Minutes- PT 12 minute(s)  -EB                User Key  (r) = Recorded By, (t) = Taken By, (c) = Cosigned By      Initials Name Provider Type    EB Trina Cardozo PTA Physical Therapist Assistant                  Therapy Charges for Today       Code Description Service Date Service Provider Modifiers Qty    44229463908  PT THER SUPP EA 15 MIN 6/6/2023 Trina Cardozo PTA GP 1    58123183818 HC PT THERAPEUTIC ACT EA 15 MIN 6/6/2023 Trina Cardozo PTA GP 1            PT G-Codes  AM-PAC 6 Clicks Score (PT): 10       Trina Cardozo PTA  6/6/2023

## 2023-06-06 NOTE — PROGRESS NOTES
Name: Nilesh Zapata ADMIT: 6/3/2023   : 1960  PCP: Shakira Colbert GRACIE    MRN: [id number] LOS: 3 days   AGE/SEX: 62 y.o. male  ROOM: [facility] ([place])     Subjective   Subjective      patient is lying on the bed and appears weak and lethargic and is confused and his speech is slurred.  No reports of nausea, vomiting, abdominal pain, chest pain.         Objective   Objective   Vital Signs  Temp:  [97.5 °F (36.4 °C)-98.3 °F (36.8 °C)] 98.1 °F (36.7 °C)  Heart Rate:  [66-76] 66  Resp:  [18] 18  BP: (103-145)/(67-97) 103/67  SpO2:  [91 %-95 %] 91 %  on  Flow (L/min):  [2-3] 2;   Device (Oxygen Therapy): nasal cannula  Body mass index is 32.52 kg/m².  Physical Exam   HEENT:  PERRLA, extraocular movements intact, sclerus no icterus   Neck: Supple, no JVD   Cardiovascular:  Regular rate and rhythm with normal S1 and S2   Respiratory: Fairly clear to auscultation bilaterally with no wheezes  GI:  Soft, nontender, bowel sounds are present   Extremities:  No edema, palpable pedal pulses , limited range of motion of the left hip secondary to pain  Neurologic:  Grossly nonfocal, no facial asymmetry   Skin:  Warm and dry with no evidence of any rash      Results Review     I reviewed the patient's new clinical results.  Results from last 7 days   Lab Units 23  0758   WBC 10*3/mm3 6.70 7.12 7.05 14.38*   HEMOGLOBIN g/dL 14.6 13.9 13.4 16.4   PLATELETS 10*3/mm3 101* 99* 125* 234       Results from last 7 days   Lab Units 2345 2338 23  0758   SODIUM mmol/L 134* 133* 135* 135*   POTASSIUM mmol/L 3.9 4.0 4.0 4.5   CHLORIDE mmol/L 97* 97* 98 93*   CO2 mmol/L 27.0 24.2 29.0 18.4*   BUN mg/dL 19 20 24* 23   CREATININE mg/dL 0.75* 0.82 1.10 1.06   GLUCOSE mg/dL 73 90 110* 130*   EGFR mL/min/1.73 100.8 98.1 75.0 78.4       Results from last 7 days   Lab Units 23  0458 23  0645 23  0638 23  0758   ALBUMIN g/dL 3.7 3.8  3.8 4.5   BILIRUBIN mg/dL 1.0 1.0 1.1 0.7   ALK PHOS U/L 85 83 82 97   AST (SGOT) U/L 33 31 32 39   ALT (SGPT) U/L 18 21 20 32       Results from last 7 days   Lab Units 06/06/23  0458 06/05/23  0645 06/04/23  0638 06/03/23  0758   CALCIUM mg/dL 8.7 9.1 8.5* 9.3   ALBUMIN g/dL 3.7 3.8 3.8 4.5   MAGNESIUM mg/dL  --   --   --  1.5*   PHOSPHORUS mg/dL  --   --   --  2.9         No results found for: HGBA1C, POCGLU    CT Head Without Contrast    Result Date: 6/5/2023   There is no CT evidence to suggest acute intracranial pathology. Mild changes of chronic small vessel ischemic phenomena are incidentally noted. The etiology of the patient's delirium is not further elucidated on this study; and if further assessment is warranted, one could obtain an MRI of the brain for follow-up.  Radiation dose reduction techniques were utilized, including automated exposure control and exposure modulation based on body size.  This report was finalized on 6/5/2023 3:44 PM by Dr. Dhiraj Lerma M.D.      CT Pelvis Without Contrast    Result Date: 6/5/2023  1. Comminuted nondisplaced left acetabular fracture. 2. Fractures of the left superior and inferior pubic rami. 3. Potential subtle, nondisplaced fracture of the left sacral ala. 4. 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  Radiation dose reduction techniques were utilized, including automated exposure control and exposure modulation based on body size.  This report was finalized on 6/5/2023 3:01 PM by Dr. Brandin Conti M.D.      CT Lower Extremity Left Without Contrast    Result Date: 6/5/2023  1. Comminuted nondisplaced left acetabular fracture. 2. Fractures of the left superior and inferior pubic rami. 3. Potential subtle, nondisplaced fracture of the left sacral ala. 4. 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  Radiation dose reduction  techniques were utilized, including automated exposure control and exposure modulation based on body size.  This report was finalized on 6/5/2023 3:01 PM by Dr. Brandin Conti M.D.     I have personally reviewed all medications:  Scheduled Medications  amLODIPine, 2.5 mg, Oral, Q24H  enoxaparin, 40 mg, Subcutaneous, Q24H  fluvoxaMINE, 100 mg, Oral, Daily  folic acid, 1 mg, Oral, Daily  mirtazapine, 15 mg, Oral, Nightly  multivitamin, 1 tablet, Oral, Daily  multivitamin with minerals, 1 tablet, Oral, Daily  propranolol, 60 mg, Oral, BID  risperiDONE, 2 mg, Oral, BID  senna-docusate sodium, 2 tablet, Oral, BID  thiamine (B-1) IV, 200 mg, Intravenous, Q8H   Followed by  [START ON 6/8/2023] thiamine, 100 mg, Oral, Daily    Infusions   Diet  Diet: Regular/House Diet; Texture: Regular Texture (IDDSI 7); Fluid Consistency: Thin (IDDSI 0)    I have personally reviewed:  [x]  Laboratory   [x]  Microbiology   [x]  Radiology   [x]  EKG/Telemetry  [x]  Cardiology/Vascular   []  Pathology    []  Records       Assessment/Plan     Active Hospital Problems    Diagnosis  POA    Alcohol withdrawal syndrome without complication [F10.930]  Yes      Resolved Hospital Problems   No resolved problems to display.      1. Mechanical fall with left hip pain,  X-ray no acute findings however CT was obtained which revealed left acetabular fracture and fractures of left superior and inferior pubic rami.  Sacral ala fracture was also noted.  Continue with analgesics and physical therapy and orthopedic consultation has been obtained.  Orthopedics recommends toe-touch weightbearing on the left lower extremity for 6 weeks.  Most likely will need rehab placement once medically stable.    2. Ethanol abuse, trying to quit and does drink a pt a day.    On CIWA protocol and is on folate and thiamine supplements which will be continued.  Given that he appeared confused and slow to respond CT brain was done on 6/5/23 with no acute findings and ammonia  level was normal.    3.  Liver cirrhosis, secondary to alcohol use and currently appears to be compensated.  No evidence of any volume overload.    4. Hypertension, on amlodipine and will be continued.      5. On Lovenox for DVT prophylaxis.      6. Code status is full code.    Copied text on this note has been reviewed by me on 6/6/2023    Aba Mccabe MD  [facility]  06/06/23  14:17 EDT

## 2023-06-06 NOTE — PLAN OF CARE
Problem: Adult Inpatient Plan of Care  Goal: Plan of Care Review  Outcome: Ongoing, Progressing  Flowsheets (Taken 6/6/2023 1419)  Progress: improving  Plan of Care Reviewed With: patient  Outcome Evaluation: Patient has been pleasantly confused during shift. CIWA 4. AOx3, assist x1, room air, SR. No complaints of pain, nausea or SOA. Patient hallucinating at tomes. Will continue to monitor and assist patient as needed.  Goal: Patient-Specific Goal (Individualized)  Outcome: Ongoing, Progressing  Goal: Absence of Hospital-Acquired Illness or Injury  Outcome: Ongoing, Progressing  Intervention: Identify and Manage Fall Risk  Recent Flowsheet Documentation  Taken 6/6/2023 1330 by Charles Mendez RN  Safety Promotion/Fall Prevention:   assistive device/personal items within reach   fall prevention program maintained   nonskid shoes/slippers when out of bed   safety round/check completed  Taken 6/6/2023 1200 by Charles Mendez RN  Safety Promotion/Fall Prevention:   assistive device/personal items within reach   fall prevention program maintained   nonskid shoes/slippers when out of bed   safety round/check completed  Taken 6/6/2023 1000 by Charles Mendez RN  Safety Promotion/Fall Prevention:   assistive device/personal items within reach   fall prevention program maintained   nonskid shoes/slippers when out of bed   safety round/check completed  Taken 6/6/2023 0832 by Charles Mendez RN  Safety Promotion/Fall Prevention:   assistive device/personal items within reach   fall prevention program maintained   nonskid shoes/slippers when out of bed   safety round/check completed  Intervention: Prevent Skin Injury  Recent Flowsheet Documentation  Taken 6/6/2023 1330 by Charles Mendez RN  Body Position: supine  Skin Protection:   tubing/devices free from skin contact   incontinence pads utilized  Taken 6/6/2023 1200 by Charles Mendez RN  Body Position: supine  Taken 6/6/2023 1000 by Charles Mendez RN  Body  Position: supine  Taken 6/6/2023 0832 by Charles Mendez RN  Body Position: supine  Skin Protection:   tubing/devices free from skin contact   incontinence pads utilized  Goal: Optimal Comfort and Wellbeing  Outcome: Ongoing, Progressing  Goal: Readiness for Transition of Care  Outcome: Ongoing, Progressing     Problem: Skin Injury Risk Increased  Goal: Skin Health and Integrity  Outcome: Ongoing, Progressing  Intervention: Optimize Skin Protection  Recent Flowsheet Documentation  Taken 6/6/2023 1330 by Charles Mendez RN  Pressure Reduction Techniques:   frequent weight shift encouraged   weight shift assistance provided  Head of Bed (HOB) Positioning: HOB at 45 degrees  Pressure Reduction Devices: pressure-redistributing mattress utilized  Skin Protection:   tubing/devices free from skin contact   incontinence pads utilized  Taken 6/6/2023 1200 by Charles Mendez RN  Head of Bed (HOB) Positioning: HOB at 30-45 degrees  Taken 6/6/2023 1000 by Charles Mendez RN  Head of Bed (HOB) Positioning: HOB at 30-45 degrees  Taken 6/6/2023 0832 by Charles Mendez RN  Pressure Reduction Techniques: frequent weight shift encouraged  Head of Bed (HOB) Positioning: HOB at 45 degrees  Pressure Reduction Devices: pressure-redistributing mattress utilized  Skin Protection:   tubing/devices free from skin contact   incontinence pads utilized     Problem: Behavioral Health Comorbidity  Goal: Maintenance of Behavioral Health Symptom Control  Outcome: Ongoing, Progressing     Problem: COPD (Chronic Obstructive Pulmonary Disease) Comorbidity  Goal: Maintenance of COPD Symptom Control  Outcome: Ongoing, Progressing     Problem: Hypertension Comorbidity  Goal: Blood Pressure in Desired Range  Outcome: Ongoing, Progressing     Problem: Pain Chronic (Persistent) (Comorbidity Management)  Goal: Acceptable Pain Control and Functional Ability  Outcome: Ongoing, Progressing     Problem: Seizure Disorder Comorbidity  Goal: Maintenance  of Seizure Control  Outcome: Ongoing, Progressing     Problem: Fall Injury Risk  Goal: Absence of Fall and Fall-Related Injury  Outcome: Ongoing, Progressing  Intervention: Promote Injury-Free Environment  Recent Flowsheet Documentation  Taken 6/6/2023 1330 by Charles Mendez RN  Safety Promotion/Fall Prevention:   assistive device/personal items within reach   fall prevention program maintained   nonskid shoes/slippers when out of bed   safety round/check completed  Taken 6/6/2023 1200 by Charles Mendez RN  Safety Promotion/Fall Prevention:   assistive device/personal items within reach   fall prevention program maintained   nonskid shoes/slippers when out of bed   safety round/check completed  Taken 6/6/2023 1000 by Charles Mendez RN  Safety Promotion/Fall Prevention:   assistive device/personal items within reach   fall prevention program maintained   nonskid shoes/slippers when out of bed   safety round/check completed  Taken 6/6/2023 0832 by Charles Mendez RN  Safety Promotion/Fall Prevention:   assistive device/personal items within reach   fall prevention program maintained   nonskid shoes/slippers when out of bed   safety round/check completed   Goal Outcome Evaluation:  Plan of Care Reviewed With: patient        Progress: improving  Outcome Evaluation: Patient has been pleasantly confused during shift. CIWA 4. AOx3, assist x1, room air, SR. No complaints of pain, nausea or SOA. Patient hallucinating at tomes. Will continue to monitor and assist patient as needed.

## 2023-06-07 ENCOUNTER — APPOINTMENT (OUTPATIENT)
Dept: GENERAL RADIOLOGY | Facility: HOSPITAL | Age: 63
End: 2023-06-07
Payer: MEDICARE

## 2023-06-07 LAB
ALBUMIN SERPL-MCNC: 3.4 G/DL (ref 3.5–5.2)
ALBUMIN/GLOB SERPL: 1 G/DL
ALP SERPL-CCNC: 86 U/L (ref 39–117)
ALT SERPL W P-5'-P-CCNC: 18 U/L (ref 1–41)
ANION GAP SERPL CALCULATED.3IONS-SCNC: 11.7 MMOL/L (ref 5–15)
AST SERPL-CCNC: 35 U/L (ref 1–40)
BASOPHILS # BLD AUTO: 0.03 10*3/MM3 (ref 0–0.2)
BASOPHILS NFR BLD AUTO: 0.4 % (ref 0–1.5)
BILIRUB SERPL-MCNC: 0.6 MG/DL (ref 0–1.2)
BUN SERPL-MCNC: 26 MG/DL (ref 8–23)
BUN/CREAT SERPL: 27.1 (ref 7–25)
CALCIUM SPEC-SCNC: 8.8 MG/DL (ref 8.6–10.5)
CHLORIDE SERPL-SCNC: 95 MMOL/L (ref 98–107)
CO2 SERPL-SCNC: 23.3 MMOL/L (ref 22–29)
CREAT SERPL-MCNC: 0.96 MG/DL (ref 0.76–1.27)
DEPRECATED RDW RBC AUTO: 41.3 FL (ref 37–54)
EGFRCR SERPLBLD CKD-EPI 2021: 88.3 ML/MIN/1.73
EOSINOPHIL # BLD AUTO: 0.17 10*3/MM3 (ref 0–0.4)
EOSINOPHIL NFR BLD AUTO: 2.3 % (ref 0.3–6.2)
ERYTHROCYTE [DISTWIDTH] IN BLOOD BY AUTOMATED COUNT: 12.1 % (ref 12.3–15.4)
GLOBULIN UR ELPH-MCNC: 3.4 GM/DL
GLUCOSE SERPL-MCNC: 85 MG/DL (ref 65–99)
HCT VFR BLD AUTO: 34.2 % (ref 37.5–51)
HGB BLD-MCNC: 12.1 G/DL (ref 13–17.7)
LYMPHOCYTES # BLD AUTO: 1.24 10*3/MM3 (ref 0.7–3.1)
LYMPHOCYTES NFR BLD AUTO: 16.6 % (ref 19.6–45.3)
MCH RBC QN AUTO: 32.7 PG (ref 26.6–33)
MCHC RBC AUTO-ENTMCNC: 35.4 G/DL (ref 31.5–35.7)
MCV RBC AUTO: 92.4 FL (ref 79–97)
MONOCYTES # BLD AUTO: 1.17 10*3/MM3 (ref 0.1–0.9)
MONOCYTES NFR BLD AUTO: 15.7 % (ref 5–12)
NEUTROPHILS NFR BLD AUTO: 4.81 10*3/MM3 (ref 1.7–7)
NEUTROPHILS NFR BLD AUTO: 64.5 % (ref 42.7–76)
PLATELET # BLD AUTO: 103 10*3/MM3 (ref 140–450)
PMV BLD AUTO: 10.6 FL (ref 6–12)
POTASSIUM SERPL-SCNC: 3.9 MMOL/L (ref 3.5–5.2)
PROT SERPL-MCNC: 6.8 G/DL (ref 6–8.5)
RBC # BLD AUTO: 3.7 10*6/MM3 (ref 4.14–5.8)
SODIUM SERPL-SCNC: 130 MMOL/L (ref 136–145)
WBC NRBC COR # BLD: 7.46 10*3/MM3 (ref 3.4–10.8)

## 2023-06-07 PROCEDURE — 25010000002 ENOXAPARIN PER 10 MG: Performed by: INTERNAL MEDICINE

## 2023-06-07 PROCEDURE — 25010000002 THIAMINE PER 100 MG: Performed by: INTERNAL MEDICINE

## 2023-06-07 PROCEDURE — 71045 X-RAY EXAM CHEST 1 VIEW: CPT

## 2023-06-07 PROCEDURE — 97110 THERAPEUTIC EXERCISES: CPT

## 2023-06-07 PROCEDURE — 25010000002 HYDROMORPHONE 1 MG/ML SOLUTION: Performed by: INTERNAL MEDICINE

## 2023-06-07 PROCEDURE — 85025 COMPLETE CBC W/AUTO DIFF WBC: CPT | Performed by: INTERNAL MEDICINE

## 2023-06-07 PROCEDURE — 25010000002 THIAMINE HCL 200 MG/2ML SOLUTION: Performed by: INTERNAL MEDICINE

## 2023-06-07 PROCEDURE — 80053 COMPREHEN METABOLIC PANEL: CPT | Performed by: INTERNAL MEDICINE

## 2023-06-07 RX ADMIN — HYDROMORPHONE HYDROCHLORIDE 1 MG: 1 INJECTION, SOLUTION INTRAMUSCULAR; INTRAVENOUS; SUBCUTANEOUS at 17:47

## 2023-06-07 RX ADMIN — FLUVOXAMINE MALEATE 100 MG: 50 TABLET, FILM COATED ORAL at 07:53

## 2023-06-07 RX ADMIN — THIAMINE HYDROCHLORIDE 200 MG: 100 INJECTION, SOLUTION INTRAMUSCULAR; INTRAVENOUS at 06:17

## 2023-06-07 RX ADMIN — Medication 1 MG: at 07:54

## 2023-06-07 RX ADMIN — AMLODIPINE BESYLATE 2.5 MG: 2.5 TABLET ORAL at 07:54

## 2023-06-07 RX ADMIN — HYDROMORPHONE HYDROCHLORIDE 1 MG: 1 INJECTION, SOLUTION INTRAMUSCULAR; INTRAVENOUS; SUBCUTANEOUS at 07:54

## 2023-06-07 RX ADMIN — THIAMINE HYDROCHLORIDE 200 MG: 100 INJECTION, SOLUTION INTRAMUSCULAR; INTRAVENOUS at 14:14

## 2023-06-07 RX ADMIN — HYDROCODONE BITARTRATE AND ACETAMINOPHEN 1 TABLET: 5; 325 TABLET ORAL at 05:19

## 2023-06-07 RX ADMIN — DOCUSATE SODIUM 50MG AND SENNOSIDES 8.6MG 2 TABLET: 8.6; 5 TABLET, FILM COATED ORAL at 20:50

## 2023-06-07 RX ADMIN — RISPERIDONE 2 MG: 2 TABLET, FILM COATED ORAL at 07:53

## 2023-06-07 RX ADMIN — MIRTAZAPINE 15 MG: 15 TABLET, FILM COATED ORAL at 20:48

## 2023-06-07 RX ADMIN — THIAMINE HYDROCHLORIDE 200 MG: 100 INJECTION, SOLUTION INTRAMUSCULAR; INTRAVENOUS at 22:40

## 2023-06-07 RX ADMIN — PROPRANOLOL HYDROCHLORIDE 60 MG: 20 TABLET ORAL at 20:48

## 2023-06-07 RX ADMIN — ENOXAPARIN SODIUM 40 MG: 100 INJECTION SUBCUTANEOUS at 14:14

## 2023-06-07 RX ADMIN — RISPERIDONE 2 MG: 2 TABLET, FILM COATED ORAL at 20:48

## 2023-06-07 RX ADMIN — DOCUSATE SODIUM 50MG AND SENNOSIDES 8.6MG 2 TABLET: 8.6; 5 TABLET, FILM COATED ORAL at 07:54

## 2023-06-07 RX ADMIN — HYDROCODONE BITARTRATE AND ACETAMINOPHEN 1 TABLET: 5; 325 TABLET ORAL at 14:14

## 2023-06-07 RX ADMIN — Medication 1 TABLET: at 07:54

## 2023-06-07 RX ADMIN — HYDROCODONE BITARTRATE AND ACETAMINOPHEN 1 TABLET: 5; 325 TABLET ORAL at 20:48

## 2023-06-07 RX ADMIN — MULTIPLE VITAMINS W/ MINERALS TAB 1 TABLET: TAB at 07:54

## 2023-06-07 RX ADMIN — PROPRANOLOL HYDROCHLORIDE 60 MG: 20 TABLET ORAL at 07:54

## 2023-06-07 NOTE — THERAPY TREATMENT NOTE
Patient Name: Nilesh Zapata  : 1960    MRN: [id number]                              Today's Date: 2023       Admit Date: 6/3/2023    Visit Dx:     ICD-10-CM ICD-9-CM   1. Alcohol withdrawal syndrome without complication  F10.930 291.81   2. Contusion of left hip, initial encounter  S70.02XA 924.01   3. Alcoholism  F10.20 303.90   4. Hypoxia  R09.02 799.02     Patient Active Problem List   Diagnosis    Alcohol withdrawal syndrome with complication (HCC)    Anxiety    Fatty liver    Tobacco abuse    Kidney cancer, primary, with metastasis from kidney to other site (HCC)    Alcoholism (HCC)    Hyponatremia    Alcohol abuse    History of renal cell cancer    History of left nephrectomy 2/2 RCC    Liver lesion    Lung nodule    Vitamin D deficiency    Under emotional stress     from spouse    Coping style affecting medical condition    Thrombocytopenia    Essential hypertension    Acute adjustment disorder with mixed anxiety and depressed mood    Alcoholic liver disease    Hypoxemia    Alcohol withdrawal syndrome without complication     Past Medical History:   Diagnosis Date    Alcohol abuse     Anxiety     Delirium tremens 2019    Fatty liver     FH: kidney cancer     Hypertension      Past Surgical History:   Procedure Laterality Date    APPENDECTOMY      NEPHRECTOMY      left     TONSILLECTOMY        General Information       Row Name 23 0955          Physical Therapy Time and Intention    Document Type therapy note (daily note)  -     Mode of Treatment individual therapy;physical therapy  -       Row Name 23 0955          General Information    Patient Profile Reviewed yes  -     Existing Precautions/Restrictions fall;left   L TDWB, NO SX  -       Row Name 23 0955          Living Environment    People in Home alone  ex-wife? to check in on  -       Row Name 23 0955          Cognition    Orientation Status (Cognition) oriented x 3  very lethargic  -        Row Name 06/07/23 0955          Safety Issues, Functional Mobility    Safety Issues Affecting Function (Mobility) ability to follow commands;insight into deficits/self-awareness;judgment;positioning of assistive device;problem-solving;safety precaution awareness;safety precautions follow-through/compliance;sequencing abilities;unable to maintain weight-bearing restrictions  initially able to TDWB L , placed shoe on R foot, but forgetting after 1 step forw  -JM     Impairments Affecting Function (Mobility) balance;coordination;endurance/activity tolerance;pain;strength  -     Comment, Safety Issues/Impairments (Mobility) falling asleep during activity, unsafe to trial further amb  -               User Key  (r) = Recorded By, (t) = Taken By, (c) = Cosigned By      Initials Name Provider Type    Rupa Madrid PTA Physical Therapist Assistant                   Mobility       Row Name 06/07/23 1002          Bed Mobility    Supine-Sit Clarke (Bed Mobility) moderate assist (50% patient effort);verbal cues;nonverbal cues (demo/gesture)  heavy use of BR  -     Sit-Supine Clarke (Bed Mobility) 2 person assist;maximum assist (25% patient effort);verbal cues;nonverbal cues (demo/gesture)  -     Assistive Device (Bed Mobility) bed rails;head of bed elevated;draw sheet  -     Comment, (Bed Mobility) difficulty staying on task  -       Row Name 06/07/23 1002          Sit-Stand Transfer    Sit-Stand Clarke (Transfers) 2 person assist;maximum assist (25% patient effort);moderate assist (50% patient effort);verbal cues;nonverbal cues (demo/gesture)  -     Assistive Device (Sit-Stand Transfers) walker, front-wheeled  -     Comment, (Sit-Stand Transfer) Pt required constant cues to perf TDWB, only able to maintain WB for 1 step forw and back  -       Row Name 06/07/23 1002          Gait/Stairs (Locomotion)    Clarke Level (Gait) 2 person assist;moderate assist (50% patient effort);verbal  cues;nonverbal cues (demo/gesture)  -     Assistive Device (Gait) walker, front-wheeled  -     Distance in Feet (Gait) 1 step forw and back, then 2 steps to L towards HOB, but side stepping difficult for pt to maintain TDWB  -     Deviations/Abnormal Patterns (Gait) antalgic;festinating/shuffling;weight shifting decreased  -     Comment, (Gait/Stairs) falling asleep or closing eyes throughout  -               User Key  (r) = Recorded By, (t) = Taken By, (c) = Cosigned By      Initials Name Provider Type    Rupa Madrid PTA Physical Therapist Assistant                   Obj/Interventions       Row Name 06/07/23 1219          Motor Skills    Therapeutic Exercise --  APs, QS x10 reps, R hip abd/add, HS  -               User Key  (r) = Recorded By, (t) = Taken By, (c) = Cosigned By      Initials Name Provider Type    Rupa Madrid PTA Physical Therapist Assistant                   Goals/Plan    No documentation.                  Clinical Impression       Row Name 06/07/23 1220          Pain    Pretreatment Pain Rating 9/10  -     Posttreatment Pain Rating 9/10  -     Pain Location - Side/Orientation Left  -     Pain Location - hip  -     Pain Intervention(s) Medication (See MAR);Repositioned;Rest  -       Row Name 06/07/23 1220          Plan of Care Review    Plan of Care Reviewed With patient  -     Outcome Evaluation Pt agreed to PT after some encouragement, pt limited by pain and wkness, unable to maintain TDWB more than 1 step forw and back , pt reports living alone, but said wife can check on him, currently req heavy assist of 2 for mobility, pt more appropriate for SNU at present  -       Row Name 06/07/23 1220          Therapy Assessment/Plan (PT)    Rehab Potential (PT) fair, will monitor progress closely  -     Criteria for Skilled Interventions Met (PT) yes  -       Row Name 06/07/23 1220          Vital Signs    Pre SpO2 (%) 90  -     O2 Delivery Pre Treatment  room air  pt had accidentally taken O2 off upon entry  -     Intra SpO2 (%) 94  -JM     O2 Delivery Intra Treatment --  while washing face , RA  -     Post SpO2 (%) 92  -JM     O2 Delivery Post Treatment supplemental O2  5L  -JM       Row Name 06/07/23 1220          Positioning and Restraints    Pre-Treatment Position in bed  -JM     Post Treatment Position bed  -JM     In Bed fowlers;call light within reach;encouraged to call for assist;exit alarm on;notified nsg  -               User Key  (r) = Recorded By, (t) = Taken By, (c) = Cosigned By      Initials Name Provider Type    Rupa Madrid PTA Physical Therapist Assistant                   Outcome Measures       Row Name 06/07/23 1233          How much help from another person do you currently need...    Turning from your back to your side while in flat bed without using bedrails? 2  -JM     Moving from lying on back to sitting on the side of a flat bed without bedrails? 2  -JM     Moving to and from a bed to a chair (including a wheelchair)? 1  -JM     Standing up from a chair using your arms (e.g., wheelchair, bedside chair)? 2  -JM     Climbing 3-5 steps with a railing? 1  -JM     To walk in hospital room? 1  -JM     AM-PAC 6 Clicks Score (PT) 9  -JM     Highest level of mobility 3 --> Sat at edge of bed  -               User Key  (r) = Recorded By, (t) = Taken By, (c) = Cosigned By      Initials Name Provider Type    Rupa Madrid PTA Physical Therapist Assistant                                 Physical Therapy Education       Title: PT OT SLP Therapies (In Progress)       Topic: Physical Therapy (In Progress)       Point: Mobility training (In Progress)       Learning Progress Summary             Patient Acceptance, E,D, NR by KASSIDY at 6/7/2023 1234    Acceptance, E,D, NR by ALMA at 6/6/2023 1137    Acceptance, E, NR,VU by MEHRAN at 6/4/2023 1146                         Point: Home exercise program (In Progress)       Learning Progress Summary              Patient Acceptance, E,D, NR by  at 6/7/2023 1234    Acceptance, E, NR,VU by  at 6/4/2023 1146                         Point: Body mechanics (In Progress)       Learning Progress Summary             Patient Acceptance, E,D, NR by  at 6/7/2023 1234    Acceptance, E,D, NR by EB at 6/6/2023 1137    Acceptance, E, NR,VU by  at 6/4/2023 1146                         Point: Precautions (In Progress)       Learning Progress Summary             Patient Acceptance, E,D, NR by  at 6/7/2023 1234    Acceptance, E,D, NR by EB at 6/6/2023 1137    Acceptance, E, NR,VU by  at 6/4/2023 1146                                         User Key       Initials Effective Dates Name Provider Type [facility] 03/07/18 -  Rupa Barraza PTA Physical Therapist Assistant PT    EB 02/14/23 -  Trina Cardozo PTA Physical Therapist Assistant PT     05/02/22 -  Preethi Henao, DORETHA Physical Therapist PT                  PT Recommendation and Plan     Plan of Care Reviewed With: patient  Outcome Evaluation: Pt agreed to PT after some encouragement, pt limited by pain and wkness, unable to maintain TDWB more than 1 step forw and back , pt reports living alone, but said wife can check on him, currently req heavy assist of 2 for mobility, pt more appropriate for SNU at present     Time Calculation:    PT Charges       Row Name 06/07/23 1051             Time Calculation    Start Time 0932  -      Stop Time 1004  -      Time Calculation (min) 32 min  -      PT Received On 06/07/23  -      PT - Next Appointment 06/08/23  -                User Key  (r) = Recorded By, (t) = Taken By, (c) = Cosigned By      Initials Name Provider Type     Rupa Barraza PTA Physical Therapist Assistant                  Therapy Charges for Today       Code Description Service Date Service Provider Modifiers Qty    51243691638 HC PT THER PROC EA 15 MIN 6/7/2023 Rupa Barraza PTA GP 2    33820553462 HC PT THER SUPP EA 15 MIN  6/7/2023 Rupa Barraza, [place] GP 2            PT G-Codes  AM-PAC 6 Clicks Score (PT): 9  PT Discharge Summary  Anticipated Discharge Disposition (PT): skilled nursing facility    Rupa Barraza PTA  6/7/2023

## 2023-06-07 NOTE — PLAN OF CARE
Goal Outcome Evaluation:     Patient A&Ox4. Complaints of severe pain in his hip, given PRN pain meds. O2 at 3L nasal cannula. CIWA minimal this shift, no PRN medications needed. No hallucinations. Medicated per orders.

## 2023-06-07 NOTE — PROGRESS NOTES
Name: Nilesh Zapata ADMIT: 6/3/2023   : 1960  PCP: Shakira Colbert GRACIE    MRN: [id number] LOS: 4 days   AGE/SEX: 62 y.o. male  ROOM: [facility]     Subjective   Subjective       patient is lying on the bed and is more awake and alert and answering questions reasonably well.No reports of nausea, vomiting, abdominal pain, chest pain.         Objective   Objective   Vital Signs  Temp:  [97.9 °F (36.6 °C)-98.6 °F (37 °C)] 97.9 °F (36.6 °C)  Heart Rate:  [63-74] 67  Resp:  [18] 18  BP: ()/(64-89) 107/67  SpO2:  [93 %-96 %] 96 %  on  Flow (L/min):  [2] 2;   Device (Oxygen Therapy): nasal cannula  Body mass index is 32.88 kg/m².  Physical Exam   HEENT:  PERRLA, extraocular movements intact, sclerus no icterus   Neck: Supple, no JVD   Cardiovascular:  Regular rate and rhythm with normal S1 and S2   Respiratory: Fairly clear to auscultation bilaterally with no wheezes  GI:  Soft, nontender, bowel sounds are present   Extremities:  No edema, palpable pedal pulses , limited range of motion of the left hip secondary to pain  Neurologic:  Grossly nonfocal, no facial asymmetry   Skin:  Warm and dry with no evidence of any rash      Results Review     I reviewed the patient's new clinical results.  Results from last 7 days   Lab Units 23  052345 23  0638   WBC 10*3/mm3 7.46 6.70 7.12 7.05   HEMOGLOBIN g/dL 12.1* 14.6 13.9 13.4   PLATELETS 10*3/mm3 103* 101* 99* 125*       Results from last 7 days   Lab Units 23  05238 23  0645 23  0638   SODIUM mmol/L 130* 134* 133* 135*   POTASSIUM mmol/L 3.9 3.9 4.0 4.0   CHLORIDE mmol/L 95* 97* 97* 98   CO2 mmol/L 23.3 27.0 24.2 29.0   BUN mg/dL 26* 19 20 24*   CREATININE mg/dL 0.96 0.75* 0.82 1.10   GLUCOSE mg/dL 85 73 90 110*   EGFR mL/min/1.73 88.3 100.8 98.1 75.0       Results from last 7 days   Lab Units 23  0511 23  0458 23  0645 23  0638   ALBUMIN g/dL 3.4* 3.7 3.8 3.8    BILIRUBIN mg/dL 0.6 1.0 1.0 1.1   ALK PHOS U/L 86 85 83 82   AST (SGOT) U/L 35 33 31 32   ALT (SGPT) U/L 18 18 21 20       Results from last 7 days   Lab Units 06/07/23  0511 06/06/23  0458 06/05/23  0645 06/04/23  0638 06/03/23  0758   CALCIUM mg/dL 8.8 8.7 9.1 8.5* 9.3   ALBUMIN g/dL 3.4* 3.7 3.8 3.8 4.5   MAGNESIUM mg/dL  --   --   --   --  1.5*   PHOSPHORUS mg/dL  --   --   --   --  2.9         No results found for: HGBA1C, POCGLU    No radiology results for the last day  I have personally reviewed all medications:  Scheduled Medications  amLODIPine, 2.5 mg, Oral, Q24H  enoxaparin, 40 mg, Subcutaneous, Q24H  fluvoxaMINE, 100 mg, Oral, Daily  folic acid, 1 mg, Oral, Daily  mirtazapine, 15 mg, Oral, Nightly  multivitamin, 1 tablet, Oral, Daily  multivitamin with minerals, 1 tablet, Oral, Daily  propranolol, 60 mg, Oral, BID  risperiDONE, 2 mg, Oral, BID  senna-docusate sodium, 2 tablet, Oral, BID  thiamine (B-1) IV, 200 mg, Intravenous, Q8H   Followed by  [START ON 6/8/2023] thiamine, 100 mg, Oral, Daily    Infusions   Diet  Diet: Regular/House Diet; Texture: Regular Texture (IDDSI 7); Fluid Consistency: Thin (IDDSI 0)    I have personally reviewed:  [x]  Laboratory   [x]  Microbiology   [x]  Radiology   [x]  EKG/Telemetry  [x]  Cardiology/Vascular   []  Pathology    []  Records       Assessment/Plan     Active Hospital Problems    Diagnosis  POA    Alcohol withdrawal syndrome without complication [F10.930]  Yes      Resolved Hospital Problems   No resolved problems to display.      1. Mechanical fall with left hip pain,  X-ray no acute findings however CT was obtained which revealed left acetabular fracture and fractures of left superior and inferior pubic rami.  Sacral ala fracture was also noted.  Continue with analgesics and physical therapy and orthopedic consultation has been obtained.  Orthopedics recommends toe-touch weightbearing on the left lower extremity for 6 weeks.  Most likely will need rehab  placement once medically stable.    2. Ethanol abuse, trying to quit and does drink a pt a day.    On CIWA protocol and is on folate and thiamine supplements which will be continued.  Given that he appeared confused and slow to respond CT brain was done on 6/5/23 with no acute findings and ammonia level was normal.    3.  Liver cirrhosis, secondary to alcohol use and currently appears to be compensated.  No evidence of any volume overload.    4. Hypertension, on amlodipine and will be continued.    5.  Hyponatremia, sodium level is at 130 and will place him on 1500 mill volume restriction.    6. On Lovenox for DVT prophylaxis.      7. Code status is full code.    Copied text on this note has been reviewed by me on 6/7/2023    Aba Mccabe MD  [facility]  06/07/23  15:23 EDT

## 2023-06-07 NOTE — PLAN OF CARE
Goal Outcome Evaluation:      Pt. Alert, oriented x4, voiced c/o at last evening time, pain medicine given x2 , v/s stable, 02 sats kept over 90% with 02 inhal. @2l/m per n/c, Pt. Woke up early this morning time, c/o pain to left hip, another pain medicine given at this early morning time, no s/s acute distress noted

## 2023-06-07 NOTE — PLAN OF CARE
Goal Outcome Evaluation:  Plan of Care Reviewed With: patient           Outcome Evaluation: Pt agreed to PT after some encouragement, pt limited by pain and wkness, unable to maintain TDWB more than 1 step forw and back , pt reports living alone, but said wife can check on him, currently req heavy assist of 2 for mobility, pt more appropriate for SNU at present

## 2023-06-08 LAB
ALBUMIN SERPL-MCNC: 3.3 G/DL (ref 3.5–5.2)
ALBUMIN/GLOB SERPL: 1.1 G/DL
ALP SERPL-CCNC: 88 U/L (ref 39–117)
ALT SERPL W P-5'-P-CCNC: 27 U/L (ref 1–41)
ANION GAP SERPL CALCULATED.3IONS-SCNC: 7 MMOL/L (ref 5–15)
AST SERPL-CCNC: 38 U/L (ref 1–40)
BASOPHILS # BLD AUTO: 0.04 10*3/MM3 (ref 0–0.2)
BASOPHILS NFR BLD AUTO: 0.6 % (ref 0–1.5)
BILIRUB SERPL-MCNC: 0.4 MG/DL (ref 0–1.2)
BUN SERPL-MCNC: 19 MG/DL (ref 8–23)
BUN/CREAT SERPL: 21.1 (ref 7–25)
CALCIUM SPEC-SCNC: 8.9 MG/DL (ref 8.6–10.5)
CHLORIDE SERPL-SCNC: 98 MMOL/L (ref 98–107)
CO2 SERPL-SCNC: 28 MMOL/L (ref 22–29)
CREAT SERPL-MCNC: 0.9 MG/DL (ref 0.76–1.27)
DEPRECATED RDW RBC AUTO: 42.7 FL (ref 37–54)
EGFRCR SERPLBLD CKD-EPI 2021: 95.4 ML/MIN/1.73
EOSINOPHIL # BLD AUTO: 0.18 10*3/MM3 (ref 0–0.4)
EOSINOPHIL NFR BLD AUTO: 2.5 % (ref 0.3–6.2)
ERYTHROCYTE [DISTWIDTH] IN BLOOD BY AUTOMATED COUNT: 12.6 % (ref 12.3–15.4)
GLOBULIN UR ELPH-MCNC: 3.1 GM/DL
GLUCOSE SERPL-MCNC: 90 MG/DL (ref 65–99)
HCT VFR BLD AUTO: 35.6 % (ref 37.5–51)
HGB BLD-MCNC: 12.3 G/DL (ref 13–17.7)
LYMPHOCYTES # BLD AUTO: 1.03 10*3/MM3 (ref 0.7–3.1)
LYMPHOCYTES NFR BLD AUTO: 14.5 % (ref 19.6–45.3)
MCH RBC QN AUTO: 32.3 PG (ref 26.6–33)
MCHC RBC AUTO-ENTMCNC: 34.6 G/DL (ref 31.5–35.7)
MCV RBC AUTO: 93.4 FL (ref 79–97)
MONOCYTES # BLD AUTO: 0.96 10*3/MM3 (ref 0.1–0.9)
MONOCYTES NFR BLD AUTO: 13.5 % (ref 5–12)
NEUTROPHILS NFR BLD AUTO: 4.85 10*3/MM3 (ref 1.7–7)
NEUTROPHILS NFR BLD AUTO: 68.5 % (ref 42.7–76)
PLATELET # BLD AUTO: 107 10*3/MM3 (ref 140–450)
PMV BLD AUTO: 10.7 FL (ref 6–12)
POTASSIUM SERPL-SCNC: 4.2 MMOL/L (ref 3.5–5.2)
PROT SERPL-MCNC: 6.4 G/DL (ref 6–8.5)
RBC # BLD AUTO: 3.81 10*6/MM3 (ref 4.14–5.8)
SODIUM SERPL-SCNC: 133 MMOL/L (ref 136–145)
WBC NRBC COR # BLD: 7.09 10*3/MM3 (ref 3.4–10.8)

## 2023-06-08 PROCEDURE — 25010000002 THIAMINE HCL 200 MG/2ML SOLUTION: Performed by: INTERNAL MEDICINE

## 2023-06-08 PROCEDURE — 97530 THERAPEUTIC ACTIVITIES: CPT

## 2023-06-08 PROCEDURE — 97110 THERAPEUTIC EXERCISES: CPT

## 2023-06-08 PROCEDURE — 25010000002 ENOXAPARIN PER 10 MG: Performed by: INTERNAL MEDICINE

## 2023-06-08 PROCEDURE — 80053 COMPREHEN METABOLIC PANEL: CPT | Performed by: INTERNAL MEDICINE

## 2023-06-08 PROCEDURE — 85025 COMPLETE CBC W/AUTO DIFF WBC: CPT | Performed by: INTERNAL MEDICINE

## 2023-06-08 RX ADMIN — Medication 1 TABLET: at 08:33

## 2023-06-08 RX ADMIN — ENOXAPARIN SODIUM 40 MG: 100 INJECTION SUBCUTANEOUS at 15:06

## 2023-06-08 RX ADMIN — DOCUSATE SODIUM 50MG AND SENNOSIDES 8.6MG 2 TABLET: 8.6; 5 TABLET, FILM COATED ORAL at 20:21

## 2023-06-08 RX ADMIN — RISPERIDONE 2 MG: 2 TABLET, FILM COATED ORAL at 08:33

## 2023-06-08 RX ADMIN — RISPERIDONE 2 MG: 2 TABLET, FILM COATED ORAL at 20:22

## 2023-06-08 RX ADMIN — FLUVOXAMINE MALEATE 100 MG: 50 TABLET, FILM COATED ORAL at 08:33

## 2023-06-08 RX ADMIN — HYDROCODONE BITARTRATE AND ACETAMINOPHEN 1 TABLET: 5; 325 TABLET ORAL at 17:31

## 2023-06-08 RX ADMIN — HYDROCODONE BITARTRATE AND ACETAMINOPHEN 1 TABLET: 5; 325 TABLET ORAL at 22:40

## 2023-06-08 RX ADMIN — MIRTAZAPINE 15 MG: 15 TABLET, FILM COATED ORAL at 20:22

## 2023-06-08 RX ADMIN — DOCUSATE SODIUM 50MG AND SENNOSIDES 8.6MG 2 TABLET: 8.6; 5 TABLET, FILM COATED ORAL at 08:33

## 2023-06-08 RX ADMIN — PROPRANOLOL HYDROCHLORIDE 60 MG: 20 TABLET ORAL at 08:33

## 2023-06-08 RX ADMIN — MULTIPLE VITAMINS W/ MINERALS TAB 1 TABLET: TAB at 08:33

## 2023-06-08 RX ADMIN — Medication 100 MG: at 15:06

## 2023-06-08 RX ADMIN — HYDROCODONE BITARTRATE AND ACETAMINOPHEN 1 TABLET: 5; 325 TABLET ORAL at 08:33

## 2023-06-08 RX ADMIN — HYDROCODONE BITARTRATE AND ACETAMINOPHEN 1 TABLET: 5; 325 TABLET ORAL at 04:10

## 2023-06-08 RX ADMIN — AMLODIPINE BESYLATE 2.5 MG: 2.5 TABLET ORAL at 08:33

## 2023-06-08 RX ADMIN — Medication 1 MG: at 08:33

## 2023-06-08 RX ADMIN — PROPRANOLOL HYDROCHLORIDE 60 MG: 20 TABLET ORAL at 20:21

## 2023-06-08 RX ADMIN — THIAMINE HYDROCHLORIDE 200 MG: 100 INJECTION, SOLUTION INTRAMUSCULAR; INTRAVENOUS at 06:45

## 2023-06-08 RX ADMIN — HYDROCODONE BITARTRATE AND ACETAMINOPHEN 1 TABLET: 5; 325 TABLET ORAL at 12:28

## 2023-06-08 NOTE — THERAPY TREATMENT NOTE
Patient Name: Nilesh Zapata  : 1960    MRN: [id number]                              Today's Date: 2023       Admit Date: 6/3/2023    Visit Dx:     ICD-10-CM ICD-9-CM   1. Alcohol withdrawal syndrome without complication  F10.930 291.81   2. Contusion of left hip, initial encounter  S70.02XA 924.01   3. Alcoholism  F10.20 303.90   4. Hypoxia  R09.02 799.02     Patient Active Problem List   Diagnosis    Alcohol withdrawal syndrome with complication    Anxiety    Fatty liver    Tobacco abuse    Kidney cancer, primary, with metastasis from kidney to other site    Alcoholism    Hyponatremia    Alcohol abuse    History of renal cell cancer    History of left nephrectomy 2/2 RCC    Liver lesion    Lung nodule    Vitamin D deficiency    Under emotional stress     from spouse    Coping style affecting medical condition    Thrombocytopenia    Essential hypertension    Acute adjustment disorder with mixed anxiety and depressed mood    Alcoholic liver disease    Hypoxemia    Alcohol withdrawal syndrome without complication     Past Medical History:   Diagnosis Date    Alcohol abuse     Anxiety     Delirium tremens 2019    Fatty liver     FH: kidney cancer     Hypertension      Past Surgical History:   Procedure Laterality Date    APPENDECTOMY      NEPHRECTOMY      left     TONSILLECTOMY        General Information       Row Name 23 1600          Physical Therapy Time and Intention    Document Type therapy note (daily note)  -EB     Mode of Treatment individual therapy;physical therapy  -EB       Row Name 23 1600          General Information    Existing Precautions/Restrictions fall;left  left TDWB  -EB       Row Name 23 1600          Cognition    Orientation Status (Cognition) oriented x 3  -EB       Row Name 23 1600          Safety Issues, Functional Mobility    Safety Issues Affecting Function (Mobility) ability to follow commands;awareness of need for assistance;insight into  deficits/self-awareness;judgment;problem-solving;safety precautions follow-through/compliance;unable to maintain weight-bearing restrictions  -EB     Impairments Affecting Function (Mobility) balance;cognition;endurance/activity tolerance;strength;pain  -EB               User Key  (r) = Recorded By, (t) = Taken By, (c) = Cosigned By      Initials Name Provider Type    Trina Mack PTA Physical Therapist Assistant                   Mobility       Row Name 06/08/23 1601          Bed Mobility    Supine-Sit Ashe (Bed Mobility) minimum assist (75% patient effort);verbal cues;nonverbal cues (demo/gesture)  -EB     Sit-Supine [place]he (Bed Mobility) not tested  -EB     Assistive Device (Bed Mobility) bed rails;head of bed elevated  -EB     Comment, (Bed Mobility) increased time and assist with left LE to EOB  -EB       Row Name 06/08/23 1601          Bed-Chair Transfer    Bed-Chair [place]he (Transfers) moderate assist (50% patient effort);2 person assist  -EB     Assistive Device (Bed-Chair Transfers) walker, front-wheeled  -EB     Comment, (Bed-Chair Transfer) stand/pivot pt unable to maintain TDWB on left.  -EB       Row Name 06/08/23 1601          Sit-Stand Transfer    Sit-Stand [place]he (Transfers) moderate assist (50% patient effort);2 person assist;verbal cues;nonverbal cues (demo/gesture)  -EB     Assistive Device (Sit-Stand Transfers) walker, front-wheeled  -EB     Comment, (Sit-Stand Transfer) bed height elevated, max cues for TDWB  -EB               User Key  (r) = Recorded By, (t) = Taken By, (c) = Cosigned By      Initials Name Provider Type    Trina Mack PTA Physical Therapist Assistant                   Obj/Interventions    No documentation.                  Goals/Plan    No documentation.                  Clinical Impression       Row Name 06/08/23 1603          Plan of Care Review    Plan of Care Reviewed With patient  -EB     Progress no change  -EB     Outcome Evaluation Pt  seen this AM for PT treatment. Pt is oriented and more alert but still has difficulty following commands and difficulty with maintaining TDWB of left LE. Pt is Eleanor with bed mobility and ModAX2 with STS transfers. Pt required demo and max cues to maintain TDWB. Pt performed a stand pivot transfer from bed to chair but was unable to maintain TDWB. Had an extensive discussion with pt about going to SNF before home due to pt needing assist of 2 and unable to maintain TDWB. Recommend SNF. Will continue to progress pt as able.  -EB       Row Name 06/08/23 1603          Therapy Assessment/Plan (PT)    Therapy Frequency (PT) 5 times/wk  -EB       Row Name 06/08/23 1603          Positioning and Restraints    Pre-Treatment Position in bed  -EB     Post Treatment Position chair  -EB     In Chair reclined;call light within reach;encouraged to call for assist;exit alarm on  -EB               User Key  (r) = Recorded By, (t) = Taken By, (c) = Cosigned By      Initials Name Provider Type    Trina Mack PTA Physical Therapist Assistant                   Outcome Measures       Row Name 06/08/23 1606          How much help from another person do you currently need...    Turning from your back to your side while in flat bed without using bedrails? 3  -EB     Moving from lying on back to sitting on the side of a flat bed without bedrails? 3  -EB     Moving to and from a bed to a chair (including a wheelchair)? 2  -EB     Standing up from a chair using your arms (e.g., wheelchair, bedside chair)? 2  -EB     Climbing 3-5 steps with a railing? 1  -EB     To walk in hospital room? 1  -EB     AM-PAC 6 Clicks Score (PT) 12  -EB     Highest level of mobility 4 --> Transferred to chair/commode  -EB               User Key  (r) = Recorded By, (t) = Taken By, (c) = Cosigned By      Initials Name Provider Type    Trina Mack PTA Physical Therapist Assistant                                 Physical Therapy Education       Title: PT OT  SLP Therapies (In Progress)       Topic: Physical Therapy (In Progress)       Point: Mobility training (In Progress)       Learning Progress Summary             Patient Acceptance, E,D, NR by  at 6/8/2023 1607    Acceptance, E,D, NR by  at 6/7/2023 1234    Acceptance, E,D, NR by EB at 6/6/2023 1137    Acceptance, E, NR,VU by  at 6/4/2023 1146                         Point: Home exercise program (In Progress)       Learning Progress Summary             Patient Acceptance, E,D, NR by  at 6/7/2023 1234    Acceptance, E, NR,VU by  at 6/4/2023 1146                         Point: Body mechanics (In Progress)       Learning Progress Summary             Patient Acceptance, E,D, NR by ALMA at 6/8/2023 1607    Acceptance, E,D, NR by  at 6/7/2023 1234    Acceptance, E,D, NR by  at 6/6/2023 1137    Acceptance, E, NR,VU by  at 6/4/2023 1146                         Point: Precautions (In Progress)       Learning Progress Summary             Patient Acceptance, E,D, NR by EB at 6/8/2023 1607    Acceptance, E,D, NR by  at 6/7/2023 1234    Acceptance, E,D, NR by  at 6/6/2023 1137    Acceptance, E, NR,VU by  at 6/4/2023 1146                                         User Key       Initials Effective Dates Name Provider Type Discipline     03/07/18 -  Rupa Barraza PTA Physical Therapist Assistant PT     02/14/23 -  Trina Cardozo PTA Physical Therapist Assistant PT     05/02/22 -  Preethi Henao PT Physical Therapist PT                  PT Recommendation and Plan     Plan of Care Reviewed With: patient  Progress: no change  Outcome Evaluation: Pt seen this AM for PT treatment. Pt is oriented and more alert but still has difficulty following commands and difficulty with maintaining TDWB of left LE. Pt is Eleanor with bed mobility and ModAX2 with STS transfers. Pt required demo and max cues to maintain TDWB. Pt performed a stand pivot transfer from bed to chair but was unable to maintain TDWB. Had an  extensive discussion with pt about going to SNF before home due to pt needing assist of 2 and unable to maintain TDWB. Recommend SNF. Will continue to progress pt as able.     Time Calculation:    PT Charges       Row Name 06/08/23 1559             Time Calculation    Start Time 1049  -EB      Stop Time 1112  -EB      Time Calculation (min) 23 min  -EB      PT Received On 06/08/23  -EB      PT - Next Appointment 06/09/23  -EB         Time Calculation- PT    Total Timed Code Minutes- PT 23 minute(s)  -EB                User Key  (r) = Recorded By, (t) = Taken By, (c) = Cosigned By      Initials Name Provider Type    EB Trina Cardozo PTA Physical Therapist Assistant                  Therapy Charges for Today       Code Description Service Date Service Provider Modifiers Qty    76652368045 HC PT THER PROC EA 15 MIN 6/8/2023 Trina Cardozo PTA GP 1    28500899626 HC PT THER SUPP EA 15 MIN 6/8/2023 Trina Cardozo PTA GP 1    00428950191 HC PT THERAPEUTIC ACT EA 15 MIN 6/8/2023 Trina Cardozo PTA GP 1            PT G-Codes  AM-PAC 6 Clicks Score (PT): 12       Trina Cardozo PTA  6/8/2023

## 2023-06-08 NOTE — PROGRESS NOTES
Name: Nilesh Zapata ADMIT: 6/3/2023   : 1960  PCP: Shakira Colbert GRACIE    MRN: [id number] LOS: 5 days   AGE/SEX: 62 y.o. male  ROOM: [facility]     Subjective   Subjective       patient is lying on the bed and is more awake and alert and answering questions reasonably well.No reports of nausea, vomiting, abdominal pain, chest pain.         Objective   Objective   Vital Signs  Temp:  [97.4 °F (36.3 °C)-97.7 °F (36.5 °C)] 97.6 °F (36.4 °C)  Heart Rate:  [60-73] 60  Resp:  [16-20] 16  BP: ()/(64-77) 108/66  SpO2:  [90 %-96 %] 96 %  on  Flow (L/min):  [2] 2;   Device (Oxygen Therapy): nasal cannula  Body mass index is 33.13 kg/m².  Physical Exam   HEENT:  PERRLA, extraocular movements intact, sclerus no icterus   Neck: Supple, no JVD   Cardiovascular:  Regular rate and rhythm with normal S1 and S2   Respiratory: Fairly clear to auscultation bilaterally with no wheezes  GI:  Soft, nontender, bowel sounds are present   Extremities:  No edema, palpable pedal pulses , limited range of motion of the left hip secondary to pain  Neurologic:  Grossly nonfocal, no facial asymmetry   Skin:  Warm and dry with no evidence of any rash      Results Review     I reviewed the patient's new clinical results.  Results from last 7 days   Lab Units 23  05023  0523  0645   WBC 10*3/mm3 7.09 7.46 6.70 7.12   HEMOGLOBIN g/dL 12.3* 12.1* 14.6 13.9   PLATELETS 10*3/mm3 107* 103* 101* 99*       Results from last 7 days   Lab Units 23  05023  0523  0645   SODIUM mmol/L 133* 130* 134* 133*   POTASSIUM mmol/L 4.2 3.9 3.9 4.0   CHLORIDE mmol/L 98 95* 97* 97*   CO2 mmol/L 28.0 23.3 27.0 24.2   BUN mg/dL 19 26* 19 20   CREATININE mg/dL 0.90 0.96 0.75* 0.82   GLUCOSE mg/dL 90 85 73 90   EGFR mL/min/1.73 95.4 88.3 100.8 98.1       Results from last 7 days   Lab Units 23  0509 23  0511 23  0458 23  0645   ALBUMIN g/dL 3.3* 3.4* 3.7 3.8    BILIRUBIN mg/dL 0.4 0.6 1.0 1.0   ALK PHOS U/L 88 86 85 83   AST (SGOT) U/L 38 35 33 31   ALT (SGPT) U/L 27 18 18 21       Results from last 7 days   Lab Units 06/08/23  0509 06/07/23  0511 06/06/23  0458 06/05/23  0645 06/04/23  0638 06/03/23  0758   CALCIUM mg/dL 8.9 8.8 8.7 9.1   < > 9.3   ALBUMIN g/dL 3.3* 3.4* 3.7 3.8   < > 4.5   MAGNESIUM mg/dL  --   --   --   --   --  1.5*   PHOSPHORUS mg/dL  --   --   --   --   --  2.9    < > = values in this interval not displayed.         No results found for: HGBA1C, POCGLU    No radiology results for the last day  I have personally reviewed all medications:  Scheduled Medications  amLODIPine, 2.5 mg, Oral, Q24H  enoxaparin, 40 mg, Subcutaneous, Q24H  fluvoxaMINE, 100 mg, Oral, Daily  folic acid, 1 mg, Oral, Daily  mirtazapine, 15 mg, Oral, Nightly  multivitamin, 1 tablet, Oral, Daily  multivitamin with minerals, 1 tablet, Oral, Daily  propranolol, 60 mg, Oral, BID  risperiDONE, 2 mg, Oral, BID  senna-docusate sodium, 2 tablet, Oral, BID  thiamine, 100 mg, Oral, Daily    Infusions   Diet  Diet: Fluid Restriction (240 mL/tray) Diets; 1500 mL/day; Texture: Regular Texture (IDDSI 7); Fluid Consistency: Thin (IDDSI 0)    I have personally reviewed:  [x]  Laboratory   [x]  Microbiology   [x]  Radiology   [x]  EKG/Telemetry  [x]  Cardiology/Vascular   []  Pathology    []  Records       Assessment/Plan     Active Hospital Problems    Diagnosis  POA    Alcohol withdrawal syndrome without complication [F10.930]  Yes      Resolved Hospital Problems   No resolved problems to display.      1. Mechanical fall with left hip pain,  X-ray no acute findings however CT was obtained which revealed left acetabular fracture and fractures of left superior and inferior pubic rami.  Sacral ala fracture was also noted.  Continue with analgesics and physical therapy and orthopedic consultation has been obtained.  Orthopedics recommends toe-touch weightbearing on the left lower extremity for 6  weeks.  Most likely will need rehab placement once medically stable.    2. Ethanol abuse, trying to quit and does drink a pt a day.    On CIWA protocol and is on folate and thiamine supplements which will be continued.  Given that he appeared confused and slow to respond CT brain was done on 6/5/23 with no acute findings and ammonia level was normal.  Now mental status is back to baseline and answering questions quite well.    3.  Liver cirrhosis, secondary to alcohol use and currently appears to be compensated.  No evidence of any volume overload.    4. Hypertension, on amlodipine and will be continued.    5.  Hyponatremia, sodium level is improved from 130-133 and is on a 1500 mill volume restriction.    6. On Lovenox for DVT prophylaxis.      7. Code status is full code.    8.  Estimated discharge date, to rehab once a bed is available.    Copied text on this note has been reviewed by me on 6/8/2023    Aba Mccabe MD  [facility]  06/08/23  16:23 EDT

## 2023-06-08 NOTE — PLAN OF CARE
Goal Outcome Evaluation:         Pt. Alert, oriented x4, being monitored for o2 sats , 02 sats not going down  below 90% at this shift, kept 92 to 93% with 02 inhal. @2l/m per n/c, voiced c/o pain, pain medicine PO given x2 until this time, Pt. Sleeping in bed quietly at this shift, v/s stable, no s/s acute distress noted

## 2023-06-08 NOTE — DISCHARGE PLACEMENT REQUEST
"Casie Goel (62 y.o. Male)       Date of Birth   1960    Social Security Number       Address   [address] [place] [address] [place] [address]    Home Phone   [phone]    MRN   [id number]       Mu-ism   None    Marital Status                               Admission Date   6/3/23    Admission Type   Emergency    Admitting Provider   Aba Mccabe MD    Attending Provider   Aba Mccabe MD    Department, Room/Bed   [address], E663/1       Discharge Date       Discharge Disposition       Discharge Destination                                 Attending Provider: Aba Mccabe MD    Allergies: No Known Allergies    Isolation: None   Infection: None   Code Status: CPR    Ht: 167.6 cm (66\")   Wt: 93.1 kg (205 lb 4 oz)    Admission Cmt: None   Principal Problem: None                  Active Insurance as of 6/3/2023       Primary Coverage       Payor Plan Insurance Group Employer/Plan Group    ANTHEM MEDICARE REPLACEMENT ANTHEM MEDICARE ADVANTAGE [id number]       Payor Plan Address Payor Plan Phone Number Payor Plan Fax Number Effective Dates    [id number] [id number] [phone]  8/1/2016 - None Entered    [facility] [id number]         Subscriber Name Subscriber Birth Date Member ID       CASIE GOEL 1960 [id number]                     Emergency Contacts        (Rel.) Home Phone Work Phone Mobile Phone    Jenny Goel (Spouse) [phone] -- --                "

## 2023-06-08 NOTE — PLAN OF CARE
Goal Outcome Evaluation:     Patient A&Ox4. Tolerated sitting in the chair for several hours. CIWA scores minimal not requiring medications. PRN pain meds as requested.

## 2023-06-08 NOTE — PLAN OF CARE
Goal Outcome Evaluation:  Plan of Care Reviewed With: patient        Progress: no change  Outcome Evaluation: Pt seen this AM for PT treatment. Pt is oriented and more alert but still has difficulty following commands and difficulty with maintaining TDWB of left LE. Pt is Eleanor with bed mobility and ModAX2 with STS transfers. Pt required demo and max cues to maintain TDWB. Pt performed a stand pivot transfer from bed to chair but was unable to maintain TDWB. Had an extensive discussion with pt about going to SNF before home due to pt needing assist of 2 and unable to maintain TDWB. Recommend SNF. Will continue to progress pt as able.

## 2023-06-08 NOTE — CASE MANAGEMENT/SOCIAL WORK
Continued Stay Note  [facility]     Patient Name: Nilesh Zapata  MRN: [id number]  Today's Date: 6/8/2023    Admit Date: 6/3/2023    Plan: Plan skilled care at accepting facility- pre cert needed.   GINA Marie RN   Discharge Plan       Row Name 06/08/23 1450       Plan    Plan Plan skilled care at accepting facility- pre cert needed.   GINA Marie RN    Patient/Family in Agreement with Plan yes    Plan Comments Spoke with pt at bedside.  Pt states his choice for skilled care is [facility].  Pt states his father was in [facility] until his death last November.  Ginny  ( [phone]) called with referral.  Plan skilled care at accepting facility- pre cert needed.   GINA Marie RN                   Discharge Codes    No documentation.                 Expected Discharge Date and Time       Expected Discharge Date Expected Discharge Time    Jun 5, 2023               Sona Marie RN

## 2023-06-09 PROBLEM — S32.592A CLOSED FRACTURE OF LEFT INFERIOR PUBIC RAMUS: Status: ACTIVE | Noted: 2023-06-09

## 2023-06-09 PROBLEM — S32.512A CLOSED FRACTURE OF LEFT SUPERIOR PUBIC RAMUS: Status: ACTIVE | Noted: 2023-06-09

## 2023-06-09 PROBLEM — S32.10XA CLOSED FRACTURE OF SACRUM: Status: ACTIVE | Noted: 2023-06-09

## 2023-06-09 LAB
ALBUMIN SERPL-MCNC: 3.4 G/DL (ref 3.5–5.2)
ALBUMIN/GLOB SERPL: 1.1 G/DL
ALP SERPL-CCNC: 90 U/L (ref 39–117)
ALT SERPL W P-5'-P-CCNC: 24 U/L (ref 1–41)
ANION GAP SERPL CALCULATED.3IONS-SCNC: 7.7 MMOL/L (ref 5–15)
AST SERPL-CCNC: 31 U/L (ref 1–40)
BASOPHILS # BLD AUTO: 0.02 10*3/MM3 (ref 0–0.2)
BASOPHILS NFR BLD AUTO: 0.3 % (ref 0–1.5)
BILIRUB SERPL-MCNC: 0.5 MG/DL (ref 0–1.2)
BUN SERPL-MCNC: 16 MG/DL (ref 8–23)
BUN/CREAT SERPL: 19.8 (ref 7–25)
CALCIUM SPEC-SCNC: 9.3 MG/DL (ref 8.6–10.5)
CHLORIDE SERPL-SCNC: 97 MMOL/L (ref 98–107)
CO2 SERPL-SCNC: 30.3 MMOL/L (ref 22–29)
CREAT SERPL-MCNC: 0.81 MG/DL (ref 0.76–1.27)
DEPRECATED RDW RBC AUTO: 42.2 FL (ref 37–54)
EGFRCR SERPLBLD CKD-EPI 2021: 98.5 ML/MIN/1.73
EOSINOPHIL # BLD AUTO: 0.18 10*3/MM3 (ref 0–0.4)
EOSINOPHIL NFR BLD AUTO: 3 % (ref 0.3–6.2)
ERYTHROCYTE [DISTWIDTH] IN BLOOD BY AUTOMATED COUNT: 12.5 % (ref 12.3–15.4)
GLOBULIN UR ELPH-MCNC: 3.1 GM/DL
GLUCOSE SERPL-MCNC: 85 MG/DL (ref 65–99)
HCT VFR BLD AUTO: 35.4 % (ref 37.5–51)
HGB BLD-MCNC: 12.3 G/DL (ref 13–17.7)
LYMPHOCYTES # BLD AUTO: 1.37 10*3/MM3 (ref 0.7–3.1)
LYMPHOCYTES NFR BLD AUTO: 22.5 % (ref 19.6–45.3)
MCH RBC QN AUTO: 32.5 PG (ref 26.6–33)
MCHC RBC AUTO-ENTMCNC: 34.7 G/DL (ref 31.5–35.7)
MCV RBC AUTO: 93.4 FL (ref 79–97)
MONOCYTES # BLD AUTO: 0.91 10*3/MM3 (ref 0.1–0.9)
MONOCYTES NFR BLD AUTO: 14.9 % (ref 5–12)
NEUTROPHILS NFR BLD AUTO: 3.58 10*3/MM3 (ref 1.7–7)
NEUTROPHILS NFR BLD AUTO: 58.8 % (ref 42.7–76)
PLATELET # BLD AUTO: 119 10*3/MM3 (ref 140–450)
PMV BLD AUTO: 10.6 FL (ref 6–12)
POTASSIUM SERPL-SCNC: 4.2 MMOL/L (ref 3.5–5.2)
PROT SERPL-MCNC: 6.5 G/DL (ref 6–8.5)
RBC # BLD AUTO: 3.79 10*6/MM3 (ref 4.14–5.8)
SODIUM SERPL-SCNC: 135 MMOL/L (ref 136–145)
WBC NRBC COR # BLD: 6.09 10*3/MM3 (ref 3.4–10.8)

## 2023-06-09 PROCEDURE — 80053 COMPREHEN METABOLIC PANEL: CPT | Performed by: INTERNAL MEDICINE

## 2023-06-09 PROCEDURE — 25010000002 ENOXAPARIN PER 10 MG: Performed by: INTERNAL MEDICINE

## 2023-06-09 PROCEDURE — 25010000002 HYDROMORPHONE 1 MG/ML SOLUTION: Performed by: INTERNAL MEDICINE

## 2023-06-09 PROCEDURE — 85025 COMPLETE CBC W/AUTO DIFF WBC: CPT | Performed by: INTERNAL MEDICINE

## 2023-06-09 PROCEDURE — 97530 THERAPEUTIC ACTIVITIES: CPT

## 2023-06-09 PROCEDURE — 97110 THERAPEUTIC EXERCISES: CPT

## 2023-06-09 RX ADMIN — HYDROCODONE BITARTRATE AND ACETAMINOPHEN 1 TABLET: 5; 325 TABLET ORAL at 09:01

## 2023-06-09 RX ADMIN — PROPRANOLOL HYDROCHLORIDE 60 MG: 20 TABLET ORAL at 21:08

## 2023-06-09 RX ADMIN — HYDROCODONE BITARTRATE AND ACETAMINOPHEN 1 TABLET: 5; 325 TABLET ORAL at 15:23

## 2023-06-09 RX ADMIN — HYDROCODONE BITARTRATE AND ACETAMINOPHEN 1 TABLET: 5; 325 TABLET ORAL at 21:07

## 2023-06-09 RX ADMIN — Medication 1 MG: at 09:01

## 2023-06-09 RX ADMIN — DOCUSATE SODIUM 50MG AND SENNOSIDES 8.6MG 2 TABLET: 8.6; 5 TABLET, FILM COATED ORAL at 21:08

## 2023-06-09 RX ADMIN — PROPRANOLOL HYDROCHLORIDE 60 MG: 20 TABLET ORAL at 09:01

## 2023-06-09 RX ADMIN — Medication 100 MG: at 09:01

## 2023-06-09 RX ADMIN — MULTIPLE VITAMINS W/ MINERALS TAB 1 TABLET: TAB at 09:02

## 2023-06-09 RX ADMIN — RISPERIDONE 2 MG: 2 TABLET, FILM COATED ORAL at 21:08

## 2023-06-09 RX ADMIN — HYDROMORPHONE HYDROCHLORIDE 1 MG: 1 INJECTION, SOLUTION INTRAMUSCULAR; INTRAVENOUS; SUBCUTANEOUS at 10:04

## 2023-06-09 RX ADMIN — HYDROMORPHONE HYDROCHLORIDE 1 MG: 1 INJECTION, SOLUTION INTRAMUSCULAR; INTRAVENOUS; SUBCUTANEOUS at 17:58

## 2023-06-09 RX ADMIN — RISPERIDONE 2 MG: 2 TABLET, FILM COATED ORAL at 09:01

## 2023-06-09 RX ADMIN — MIRTAZAPINE 15 MG: 15 TABLET, FILM COATED ORAL at 21:08

## 2023-06-09 RX ADMIN — Medication 1 TABLET: at 09:01

## 2023-06-09 RX ADMIN — ENOXAPARIN SODIUM 40 MG: 100 INJECTION SUBCUTANEOUS at 15:15

## 2023-06-09 RX ADMIN — AMLODIPINE BESYLATE 2.5 MG: 2.5 TABLET ORAL at 09:01

## 2023-06-09 RX ADMIN — DOCUSATE SODIUM 50MG AND SENNOSIDES 8.6MG 2 TABLET: 8.6; 5 TABLET, FILM COATED ORAL at 09:02

## 2023-06-09 RX ADMIN — HYDROCODONE BITARTRATE AND ACETAMINOPHEN 1 TABLET: 5; 325 TABLET ORAL at 02:18

## 2023-06-09 RX ADMIN — FLUVOXAMINE MALEATE 100 MG: 50 TABLET, FILM COATED ORAL at 09:01

## 2023-06-09 NOTE — PLAN OF CARE
Goal Outcome Evaluation:   Pt resting on the, AO x 3, 2 L o2 nc, SR/SB on monitor, administered narco x 2 for pain, no ss of acute distress noted, will continue to monitor.        Problem: Adult Inpatient Plan of Care  Goal: Plan of Care Review  Outcome: Ongoing, Progressing  Goal: Patient-Specific Goal (Individualized)  Outcome: Ongoing, Progressing  Goal: Absence of Hospital-Acquired Illness or Injury  Outcome: Ongoing, Progressing  Intervention: Identify and Manage Fall Risk  Recent Flowsheet Documentation  Taken 6/9/2023 0608 by Lucio Hall RN  Safety Promotion/Fall Prevention:   activity supervised   safety round/check completed   room organization consistent  Taken 6/9/2023 0357 by Lucio Hall RN  Safety Promotion/Fall Prevention:   activity supervised   safety round/check completed  Taken 6/9/2023 0205 by Lucio Hall RN  Safety Promotion/Fall Prevention:   activity supervised   safety round/check completed  Taken 6/9/2023 0011 by Lucio Hall RN  Safety Promotion/Fall Prevention:   activity supervised   safety round/check completed  Taken 6/8/2023 2201 by Lucio Hall RN  Safety Promotion/Fall Prevention:   activity supervised   safety round/check completed  Taken 6/8/2023 2015 by Lucio Hall RN  Safety Promotion/Fall Prevention:   activity supervised   room organization consistent   safety round/check completed  Intervention: Prevent Skin Injury  Recent Flowsheet Documentation  Taken 6/9/2023 0608 by Lucio Hall RN  Body Position: position changed independently  Taken 6/9/2023 0357 by Lucio Hall RN  Body Position: position changed independently  Taken 6/9/2023 0205 by Lucio Hall RN  Body Position: position changed independently  Taken 6/9/2023 0011 by Lucio Hall RN  Body Position: position changed independently  Skin Protection: adhesive use limited  Taken 6/8/2023 2201 by Lucio Hall RN  Body Position: position changed  independently  Taken 6/8/2023 2015 by Lucio Hall RN  Body Position: position changed independently  Skin Protection: adhesive use limited  Intervention: Prevent and Manage VTE (Venous Thromboembolism) Risk  Recent Flowsheet Documentation  Taken 6/9/2023 0011 by Lucio Hall RN  Range of Motion: active ROM (range of motion) encouraged  Taken 6/8/2023 2015 by Lucio Hall RN  Activity Management: bedrest  Range of Motion: active ROM (range of motion) encouraged  Intervention: Prevent Infection  Recent Flowsheet Documentation  Taken 6/9/2023 0608 by Lucio Hall RN  Infection Prevention:   hand hygiene promoted   rest/sleep promoted  Taken 6/9/2023 0357 by Luico Hall RN  Infection Prevention:   hand hygiene promoted   rest/sleep promoted  Taken 6/9/2023 0205 by Lucio Hall RN  Infection Prevention:   hand hygiene promoted   rest/sleep promoted  Taken 6/9/2023 0011 by Lucio Hall RN  Infection Prevention:   hand hygiene promoted   rest/sleep promoted  Taken 6/8/2023 2201 by Lucio Hall RN  Infection Prevention:   hand hygiene promoted   rest/sleep promoted  Taken 6/8/2023 2015 by Lucio Hall RN  Infection Prevention:   environmental surveillance performed   hand hygiene promoted   rest/sleep promoted  Goal: Optimal Comfort and Wellbeing  Outcome: Ongoing, Progressing  Intervention: Monitor Pain and Promote Comfort  Recent Flowsheet Documentation  Taken 6/9/2023 0011 by Lucio Hall RN  Pain Management Interventions:   quiet environment facilitated   position adjusted  Taken 6/8/2023 2015 by Lucio Hall RN  Pain Management Interventions:   see MAR   quiet environment facilitated  Intervention: Provide Person-Centered Care  Recent Flowsheet Documentation  Taken 6/9/2023 0011 by Lucio Hall RN  Trust Relationship/Rapport: care explained  Taken 6/8/2023 2015 by Lucio Hall RN  Trust Relationship/Rapport: care explained  Goal: Readiness  for Transition of Care  Outcome: Ongoing, Progressing     Problem: Skin Injury Risk Increased  Goal: Skin Health and Integrity  Outcome: Ongoing, Progressing  Intervention: Optimize Skin Protection  Recent Flowsheet Documentation  Taken 6/9/2023 0608 by Lucio Hall RN  Head of Bed (HOB) Positioning: HOB elevated  Taken 6/9/2023 0357 by Lucio Hall RN  Head of Bed (HOB) Positioning: HOB elevated  Taken 6/9/2023 0205 by Lucio Hall RN  Head of Bed (HOB) Positioning: HOB elevated  Taken 6/9/2023 0011 by Lucio Hall RN  Pressure Reduction Techniques:   frequent weight shift encouraged   weight shift assistance provided  Head of Bed (HOB) Positioning: HOB elevated  Pressure Reduction Devices:   alternating pressure pump (ADD)   pressure-redistributing mattress utilized  Skin Protection: adhesive use limited  Taken 6/8/2023 2201 by Lucio Hall RN  Head of Bed (HOB) Positioning: HOB elevated  Taken 6/8/2023 2015 by Lucio Hall RN  Pressure Reduction Techniques:   frequent weight shift encouraged   weight shift assistance provided  Head of Bed (HOB) Positioning: HOB elevated  Pressure Reduction Devices:   alternating pressure pump (ADD)   pressure-redistributing mattress utilized  Skin Protection: adhesive use limited     Problem: Behavioral Health Comorbidity  Goal: Maintenance of Behavioral Health Symptom Control  Outcome: Ongoing, Progressing  Intervention: Maintain Behavioral Health Symptom Control  Recent Flowsheet Documentation  Taken 6/9/2023 0608 by Lucio Hall RN  Medication Review/Management: medications reviewed  Taken 6/9/2023 0357 by Lucio Hall RN  Medication Review/Management: medications reviewed  Taken 6/9/2023 0205 by Lucio Hall RN  Medication Review/Management: medications reviewed  Taken 6/9/2023 0011 by Lucio Hall RN  Medication Review/Management: medications reviewed  Taken 6/8/2023 2201 by Lucio Hall RN  Medication  Review/Management: medications reviewed  Taken 6/8/2023 2015 by Lucio Hall RN  Medication Review/Management: medications reviewed     Problem: COPD (Chronic Obstructive Pulmonary Disease) Comorbidity  Goal: Maintenance of COPD Symptom Control  Outcome: Ongoing, Progressing  Intervention: Maintain COPD-Symptom Control  Recent Flowsheet Documentation  Taken 6/9/2023 0608 by Lucio Hall RN  Medication Review/Management: medications reviewed  Taken 6/9/2023 0357 by Lucio Hall RN  Medication Review/Management: medications reviewed  Taken 6/9/2023 0205 by Lucio Hall RN  Medication Review/Management: medications reviewed  Taken 6/9/2023 0011 by Lucio Hall RN  Supportive Measures: active listening utilized  Medication Review/Management: medications reviewed  Taken 6/8/2023 2201 by Lucio Hall RN  Medication Review/Management: medications reviewed  Taken 6/8/2023 2015 by Lucio Hall RN  Supportive Measures: active listening utilized  Medication Review/Management: medications reviewed     Problem: Hypertension Comorbidity  Goal: Blood Pressure in Desired Range  Outcome: Ongoing, Progressing  Intervention: Maintain Blood Pressure Management  Recent Flowsheet Documentation  Taken 6/9/2023 0608 by uLcio Hall RN  Medication Review/Management: medications reviewed  Taken 6/9/2023 0357 by Lucio Hall RN  Medication Review/Management: medications reviewed  Taken 6/9/2023 0205 by Lucio Hall RN  Medication Review/Management: medications reviewed  Taken 6/9/2023 0011 by Lucio Hall RN  Medication Review/Management: medications reviewed  Taken 6/8/2023 2201 by Lucio Hall RN  Medication Review/Management: medications reviewed  Taken 6/8/2023 2015 by Lucio Hall RN  Medication Review/Management: medications reviewed     Problem: Pain Chronic (Persistent) (Comorbidity Management)  Goal: Acceptable Pain Control and Functional Ability  Outcome:  Ongoing, Progressing  Intervention: Manage Persistent Pain  Recent Flowsheet Documentation  Taken 6/9/2023 0608 by Lucio Hall RN  Medication Review/Management: medications reviewed  Taken 6/9/2023 0357 by uLcio Hall RN  Medication Review/Management: medications reviewed  Taken 6/9/2023 0205 by Lucio Hall RN  Medication Review/Management: medications reviewed  Taken 6/9/2023 0011 by Lucio Hall RN  Medication Review/Management: medications reviewed  Taken 6/8/2023 2201 by Lucio Hall RN  Medication Review/Management: medications reviewed  Taken 6/8/2023 2015 by Lucio Hall RN  Medication Review/Management: medications reviewed  Intervention: Develop Pain Management Plan  Recent Flowsheet Documentation  Taken 6/9/2023 0011 by Lucio Hall RN  Pain Management Interventions:   quiet environment facilitated   position adjusted  Taken 6/8/2023 2015 by Lucio Hall RN  Pain Management Interventions:   see MAR   quiet environment facilitated  Intervention: Optimize Psychosocial Wellbeing  Recent Flowsheet Documentation  Taken 6/9/2023 0011 by Lucio Hall RN  Supportive Measures: active listening utilized  Diversional Activities:   smartphone   television  Taken 6/8/2023 2015 by Lucio Hall RN  Supportive Measures: active listening utilized  Diversional Activities:   television   smartphone     Problem: Seizure Disorder Comorbidity  Goal: Maintenance of Seizure Control  Outcome: Ongoing, Progressing     Problem: Fall Injury Risk  Goal: Absence of Fall and Fall-Related Injury  Outcome: Ongoing, Progressing  Intervention: Identify and Manage Contributors  Recent Flowsheet Documentation  Taken 6/9/2023 0608 by Lucio Hall RN  Medication Review/Management: medications reviewed  Taken 6/9/2023 0357 by Lucio Hall RN  Medication Review/Management: medications reviewed  Taken 6/9/2023 0205 by Lucio Hall RN  Medication Review/Management:  medications reviewed  Taken 6/9/2023 0011 by Lucio Hall RN  Medication Review/Management: medications reviewed  Taken 6/8/2023 2201 by Lucio Hall RN  Medication Review/Management: medications reviewed  Taken 6/8/2023 2015 by Lucio Hall RN  Medication Review/Management: medications reviewed  Intervention: Promote Injury-Free Environment  Recent Flowsheet Documentation  Taken 6/9/2023 0608 by Lucio Hall RN  Safety Promotion/Fall Prevention:   activity supervised   safety round/check completed   room organization consistent  Taken 6/9/2023 0357 by Lucio Hall RN  Safety Promotion/Fall Prevention:   activity supervised   safety round/check completed  Taken 6/9/2023 0205 by Lucio Hall RN  Safety Promotion/Fall Prevention:   activity supervised   safety round/check completed  Taken 6/9/2023 0011 by uLcio Hall RN  Safety Promotion/Fall Prevention:   activity supervised   safety round/check completed  Taken 6/8/2023 2201 by Lucio Hall RN  Safety Promotion/Fall Prevention:   activity supervised   safety round/check completed  Taken 6/8/2023 2015 by Lucio Hall RN  Safety Promotion/Fall Prevention:   activity supervised   room organization consistent   safety round/check completed

## 2023-06-09 NOTE — CASE MANAGEMENT/SOCIAL WORK
Continued Stay Note  [facility]     Patient Name: Nilesh Zapata  MRN: [id number]  Today's Date: 6/9/2023    Admit Date: 6/3/2023    Plan: Plan home with HH or skilled care at accepting facility- pre cert needed.   GINA Marie RN   Discharge Plan       Row Name 06/09/23 1525       Plan    Plan Plan home with HH or skilled care at accepting facility- pre cert needed.   GINA Marie RN    Patient/Family in Agreement with Plan yes    Plan Comments Spoke with pt at bedside.  Per [facility] declined pt.   CCP requested other skilled care facilities to make referrals.  Pt states he wants to research prior to making any decisions.  Pt asking about PT in the home.  Pt requested HH referral to [facility].  Jenna  ([id number]) called with referral.  Plan home with HH or skilled care at accepting facility- pre cert needed. GINA Marie RN                   Discharge Codes    No documentation.                 Expected Discharge Date and Time       Expected Discharge Date Expected Discharge Time    Jun 13, 2023               Sona Marie, RN

## 2023-06-09 NOTE — PROGRESS NOTES
Name: Nilesh Zapata ADMIT: 6/3/2023   : 1960  PCP: Shakira Colbert GRACIE MCGUIRE    MRN: [id number] LOS: 6 days   AGE/SEX: 62 y.o. male  ROOM: [facility]     Subjective   Subjective   No acute events. Patient has no new complaints. Pain is reasonably controlled. Taking PO. No CP/dyspnea/f/c/n/v/d.    Objective   Objective   Vital Signs  Temp:  [98 °F (36.7 °C)-98.5 °F (36.9 °C)] 98.1 °F (36.7 °C)  Heart Rate:  [58-66] 58  Resp:  [16-18] 18  BP: (103-122)/(57-76) 103/57  SpO2:  [92 %-96 %] 93 %  on  Flow (L/min):  [2] 2;   Device (Oxygen Therapy): nasal cannula  Body mass index is 33.73 kg/m².  Physical Exam  Vitals and nursing note reviewed.   Constitutional:       General: He is not in acute distress.     Appearance: He is not toxic-appearing or diaphoretic.   HENT:      Head: Normocephalic and atraumatic.      Nose: Nose normal.      Mouth/Throat:      Mouth: Mucous membranes are moist.      Pharynx: Oropharynx is clear.   Eyes:      Conjunctiva/sclera: Conjunctivae normal.      Pupils: Pupils are equal, round, and reactive to light.   Cardiovascular:      Rate and Rhythm: Normal rate and regular rhythm.      Pulses: Normal pulses.   Pulmonary:      Effort: Pulmonary effort is normal.      Breath sounds: Normal breath sounds.   Abdominal:      General: Bowel sounds are normal.      Palpations: Abdomen is soft.      Tenderness: There is no abdominal tenderness.   Musculoskeletal:         General: No swelling or tenderness.      Cervical back: Normal range of motion and neck supple.   Skin:     General: Skin is warm and dry.      Capillary Refill: Capillary refill takes less than 2 seconds.   Neurological:      General: No focal deficit present.      Mental Status: He is alert.   Psychiatric:         Mood and Affect: Mood normal.         Behavior: Behavior normal.       Results Review     I reviewed the patient's new clinical results.  Results from last 7 days   Lab Units 23  0738 23  0509  06/07/23  0511 06/06/23  0458   WBC 10*3/mm3 6.09 7.09 7.46 6.70   HEMOGLOBIN g/dL 12.3* 12.3* 12.1* 14.6   PLATELETS 10*3/mm3 119* 107* 103* 101*     Results from last 7 days   Lab Units 06/09/23  0740 06/08/23  0509 06/07/23  0511 06/06/23  0458   SODIUM mmol/L 135* 133* 130* 134*   POTASSIUM mmol/L 4.2 4.2 3.9 3.9   CHLORIDE mmol/L 97* 98 95* 97*   CO2 mmol/L 30.3* 28.0 23.3 27.0   BUN mg/dL 16 19 26* 19   CREATININE mg/dL 0.81 0.90 0.96 0.75*   GLUCOSE mg/dL 85 90 85 73   EGFR mL/min/1.73 98.5 95.4 88.3 100.8     Results from last 7 days   Lab Units 06/09/23  0740 06/08/23  0509 06/07/23  0511 06/06/23  0458   ALBUMIN g/dL 3.4* 3.3* 3.4* 3.7   BILIRUBIN mg/dL 0.5 0.4 0.6 1.0   ALK PHOS U/L 90 88 86 85   AST (SGOT) U/L 31 38 35 33   ALT (SGPT) U/L 24 27 18 18     Results from last 7 days   Lab Units 06/09/23  0740 06/08/23  0509 06/07/23  0511 06/06/23  0458 06/04/23  0638 06/03/23  0758   CALCIUM mg/dL 9.3 8.9 8.8 8.7   < > 9.3   ALBUMIN g/dL 3.4* 3.3* 3.4* 3.7   < > 4.5   MAGNESIUM mg/dL  --   --   --   --   --  1.5*   PHOSPHORUS mg/dL  --   --   --   --   --  2.9    < > = values in this interval not displayed.       No results found for: HGBA1C, POCGLU    No radiology results for the last day  I have personally reviewed all medications:  Scheduled Medications  amLODIPine, 2.5 mg, Oral, Q24H  enoxaparin, 40 mg, Subcutaneous, Q24H  fluvoxaMINE, 100 mg, Oral, Daily  folic acid, 1 mg, Oral, Daily  mirtazapine, 15 mg, Oral, Nightly  multivitamin, 1 tablet, Oral, Daily  multivitamin with minerals, 1 tablet, Oral, Daily  propranolol, 60 mg, Oral, BID  risperiDONE, 2 mg, Oral, BID  senna-docusate sodium, 2 tablet, Oral, BID  thiamine, 100 mg, Oral, Daily    Infusions   Diet  Diet: Fluid Restriction (240 mL/tray) Diets; 1500 mL/day; Texture: Regular Texture (IDDSI 7); Fluid Consistency: Thin (IDDSI 0)    I have personally reviewed:  [x]  Laboratory   []  Microbiology   [x]  Radiology   [x]  EKG/Telemetry  []   Cardiology/Vascular   []  Pathology    [x]  Records       Assessment/Plan     Active Hospital Problems    Diagnosis  POA   • Closed fracture of left superior pubic ramus [S32.512A]  Yes   • Closed fracture of left inferior pubic ramus [S32.592A]  Yes   • Closed fracture of sacrum [S32.10XA]  Yes   • Alcohol withdrawal syndrome without complication [F10.930]  Yes   • Essential hypertension [I10]  Yes   • Thrombocytopenia [D69.6]  Yes   • Alcohol abuse [F10.10]  Yes   • History of left nephrectomy 2/2 RCC [Z90.5]  Not Applicable      Resolved Hospital Problems   No resolved problems to display.   Left Superior and Inferior Pubic Rami Fractures with Sacral Ala Fracture  - from mechanical fall  - appreciate orthopedic surgery evaluation, recommending conservative treatment  - toe-touch weightbearing on the LLE  - pain control, bowel regimen    Alcohol Abuse/Liver Cirrhosis  - no evidence of withdrawal, cirrhosis is well-compensated  - continue on current regimen, vitamin replacement    HTN  - BP acceptable  - continue current regimen    TCP  - due to EtOH abuse, stable  - will follow      Lovenox 40 mg SC daily for DVT prophylaxis.  Full code.  Discussed with patient and nursing staff.  Anticipate discharge to SNU facility once arrangements have been made..      Savage Elizabeth MD  [facility]  06/09/23  19:24 EDT

## 2023-06-09 NOTE — PLAN OF CARE
Goal Outcome Evaluation:           Progress: improving  Outcome Evaluation: Pt AOx4. On RA-2L when asleep. NSR on monitor. Prn pain meds per MAR. PT got pt to chair and sat up a couple of hours. CCP working w/ pt to try and figure out rehab destination. Pt not in any distress. Nursing will continue to monitor.

## 2023-06-09 NOTE — PLAN OF CARE
Goal Outcome Evaluation:  Plan of Care Reviewed With: patient        Progress: no change  Outcome Evaluation: Pt seen for PT treatment today. Pt requires Eleanor with bed mobility and ModAX2 with STS transfers. Pt ambulated 3ft to the chair with rwx, and ModAX2. max cues for pt to maintain TDWB precautions, however, pt unable to maintain. Pt able to complete biateral LE exercises. Will continue to progress pt as able.

## 2023-06-09 NOTE — THERAPY TREATMENT NOTE
Patient Name: Nilesh Zapata  : 1960    MRN: [id number]                              Today's Date: 2023       Admit Date: 6/3/2023    Visit Dx:     ICD-10-CM ICD-9-CM   1. Alcohol withdrawal syndrome without complication  F10.930 291.81   2. Contusion of left hip, initial encounter  S70.02XA 924.01   3. Alcoholism  F10.20 303.90   4. Hypoxia  R09.02 799.02     Patient Active Problem List   Diagnosis   • Alcohol withdrawal syndrome with complication   • Anxiety   • Fatty liver   • Tobacco abuse   • Kidney cancer, primary, with metastasis from kidney to other site   • Alcoholism   • Hyponatremia   • Alcohol abuse   • History of renal cell cancer   • History of left nephrectomy  RCC   • Liver lesion   • Lung nodule   • Vitamin D deficiency   • Under emotional stress   •  from spouse   • Coping style affecting medical condition   • Thrombocytopenia   • Essential hypertension   • Acute adjustment disorder with mixed anxiety and depressed mood   • Alcoholic liver disease   • Hypoxemia   • Alcohol withdrawal syndrome without complication     Past Medical History:   Diagnosis Date   • Alcohol abuse    • Anxiety    • Delirium tremens 2019   • Fatty liver    • FH: kidney cancer    • Hypertension      Past Surgical History:   Procedure Laterality Date   • APPENDECTOMY     • NEPHRECTOMY      left    • TONSILLECTOMY        General Information     Row Name 23 1543          Physical Therapy Time and Intention    Document Type therapy note (daily note)  -EB     Mode of Treatment individual therapy;physical therapy  -EB     Row Name 23 1543          General Information    Existing Precautions/Restrictions fall;left  TDWBing  -EB     Row Name 23 1543          Cognition    Orientation Status (Cognition) oriented x 3  -EB     Row Name 23 1543          Safety Issues, Functional Mobility    Safety Issues Affecting Function (Mobility) ability to follow commands;insight into  deficits/self-awareness;positioning of assistive device  -EB     Impairments Affecting Function (Mobility) balance;cognition;endurance/activity tolerance;strength;pain  -EB           User Key  (r) = Recorded By, (t) = Taken By, (c) = Cosigned By    Initials Name Provider Type    Trina Mack PTA Physical Therapist Assistant               Mobility     Row Name 06/09/23 1543          Bed Mobility    Supine-Sit [place] (Bed Mobility) minimum assist (75% patient effort);verbal cues  -EB     Sit-Supine [place] (Bed Mobility) not tested  -EB     Assistive Device (Bed Mobility) bed rails;head of bed elevated  -EB     Row Name 06/09/23 1543          Sit-Stand Transfer    Sit-Stand [place] (Transfers) moderate assist (50% patient effort);2 person assist;verbal cues;nonverbal cues (demo/gesture)  -EB     Assistive Device (Sit-Stand Transfers) walker, front-wheeled  -EB     Comment, (Sit-Stand Transfer) bed height elevated, max  cues to maintain TDWB  -EB     Row Name 06/09/23 1545          Gait/Stairs (Locomotion)    [place] Level (Gait) 2 person assist;moderate assist (50% patient effort);verbal cues;nonverbal cues (demo/gesture)  -EB     Assistive Device (Gait) walker, front-wheeled  -EB     Distance in Feet (Gait) 3ft  -EB     Deviations/Abnormal Patterns (Gait) antalgic;festinating/shuffling;weight shifting decreased  -EB     Comment, (Gait/Stairs) max cues for pt to maintain TDWB however pt is unable to maintain.  -EB           User Key  (r) = Recorded By, (t) = Taken By, (c) = Cosigned By    Initials Name Provider Type    Trina Mack PTA Physical Therapist Assistant               Obj/Interventions     Row Name 06/09/23 1545          Motor Skills    Therapeutic Exercise --  BLE: AP, LAQs, seated marches, QS, hip abd/add (X10)  -EB           User Key  (r) = Recorded By, (t) = Taken By, (c) = Cosigned By    Initials Name Provider Type    Trina Mack PTA Physical Therapist Assistant                Goals/Plan    No documentation.                Clinical Impression     Row Name 06/09/23 1545          Plan of Care Review    Plan of Care Reviewed With patient  -EB     Progress no change  -EB     Outcome Evaluation Pt seen for PT treatment today. Pt requires Eleanor with bed mobility and ModAX2 with STS transfers. Pt ambulated 3ft to the chair with rwx, and ModAX2. max cues for pt to maintain TDWB precautions, however, pt unable to maintain. Pt able to complete biateral LE exercises. Will continue to progress pt as able.  -EB     Row Name 06/09/23 1545          Therapy Assessment/Plan (PT)    Therapy Frequency (PT) 5 times/wk  -EB     Row Name 06/09/23 1545          Positioning and Restraints    Pre-Treatment Position in bed  -EB     Post Treatment Position chair  -EB     In Chair reclined;call light within reach;encouraged to call for assist;exit alarm on  -EB           User Key  (r) = Recorded By, (t) = Taken By, (c) = Cosigned By    Initials Name Provider Type    EB Trina Cardozo PTA Physical Therapist Assistant               Outcome Measures     Row Name 06/09/23 1547 06/09/23 0859       How much help from another person do you currently need...    Turning from your back to your side while in flat bed without using bedrails? 3  -EB 4  -PT    Moving from lying on back to sitting on the side of a flat bed without bedrails? 3  -EB 3  -PT    Moving to and from a bed to a chair (including a wheelchair)? 2  -EB 2  -PT    Standing up from a chair using your arms (e.g., wheelchair, bedside chair)? 2  -EB 2  -PT    Climbing 3-5 steps with a railing? 1  -EB 1  -PT    To walk in hospital room? 1  -EB 1  -PT    AM-PAC 6 Clicks Score (PT) 12  -EB 13  -PT    Highest level of mobility 4 --> Transferred to chair/commode  -EB 4 --> Transferred to chair/commode  -PT          User Key  (r) = Recorded By, (t) = Taken By, (c) = Cosigned By    Initials Name Provider Type    PT Katerin Tobias, RN Registered Nurse    EB  Trina Cardozo PTA Physical Therapist Assistant                             Physical Therapy Education     Title: PT OT SLP Therapies (Done)     Topic: Physical Therapy (Done)     Point: Mobility training (Done)     Learning Progress Summary           Patient Acceptance, E, VU,NR by EB at 6/9/2023 1548    Acceptance, E,TB, NR by JOSE at 6/8/2023 1803    Acceptance, E,D, NR by EB at 6/8/2023 1607    Acceptance, E,D, NR by KASSIDY at 6/7/2023 1234    Acceptance, E,D, NR by EB at 6/6/2023 1137    Acceptance, E, NR,VU by SM at 6/4/2023 1146                   Point: Home exercise program (Done)     Learning Progress Summary           Patient Acceptance, E, VU,NR by EB at 6/9/2023 1548    Acceptance, E,TB, NR by JOSE at 6/8/2023 1803    Acceptance, E,D, NR by KASSIDY at 6/7/2023 1234    Acceptance, E, NR,VU by  at 6/4/2023 1146                   Point: Body mechanics (Done)     Learning Progress Summary           Patient Acceptance, E, VU,NR by EB at 6/9/2023 1548    Acceptance, E,TB, NR by JOSE at 6/8/2023 1803    Acceptance, E,D, NR by EB at 6/8/2023 1607    Acceptance, E,D, NR by KASSIDY at 6/7/2023 1234    Acceptance, E,D, NR by EB at 6/6/2023 1137    Acceptance, E, NR,VU by  at 6/4/2023 1146                   Point: Precautions (Done)     Learning Progress Summary           Patient Acceptance, E, VU,NR by EB at 6/9/2023 1548    Acceptance, E,TB, NR by JOSE at 6/8/2023 1803    Acceptance, E,D, NR by EB at 6/8/2023 1607    Acceptance, E,D, NR by KASSIDY at 6/7/2023 1234    Acceptance, E,D, NR by EB at 6/6/2023 1137    Acceptance, E, NR,VU by  at 6/4/2023 1146                               User Key     Initials Effective Dates Name Provider Type Discipline    KASSIDY 03/07/18 -  Rupa Barraza PTA Physical Therapist Assistant PT    EB 02/14/23 -  Cardozo, Trina, PTA Physical Therapist Assistant PT    LN 06/01/21 -  Shakira Del Rosario, RN Registered Nurse Nurse     05/02/22 -  Preethi Henao, DORETHA Physical Therapist PT              PT  Recommendation and Plan     Plan of Care Reviewed With: patient  Progress: no change  Outcome Evaluation: Pt seen for PT treatment today. Pt requires Eleanor with bed mobility and ModAX2 with STS transfers. Pt ambulated 3ft to the chair with rwx, and ModAX2. max cues for pt to maintain TDWB precautions, however, pt unable to maintain. Pt able to complete biateral LE exercises. Will continue to progress pt as able.     Time Calculation:    PT Charges     Row Name 06/09/23 1529             Time Calculation    Start Time 1407  -EB      Stop Time 1430  -EB      Time Calculation (min) 23 min  -EB      PT Received On 06/09/23  -EB      PT - Next Appointment 06/12/23  -EB         Time Calculation- PT    Total Timed Code Minutes- PT 23 minute(s)  -EB            User Key  (r) = Recorded By, (t) = Taken By, (c) = Cosigned By    Initials Name Provider Type    EB Trina Cardozo PTA Physical Therapist Assistant              Therapy Charges for Today     Code Description Service Date Service Provider Modifiers Qty    81281988387 HC PT THER PROC EA 15 MIN 6/8/2023 Trina Cardozo PTA GP 1    70363610235 HC PT THER SUPP EA 15 MIN 6/8/2023 Trina Cardozo, [place] GP 1    37982747557 HC PT THERAPEUTIC ACT EA 15 MIN 6/8/2023 Trina Cardozo PTA GP 1    80723242418 HC PT THERAPEUTIC ACT EA 15 MIN 6/9/2023 Trina Cardozo PTA GP 1    69074297305 HC PT THER PROC EA 15 MIN 6/9/2023 Trina Cardozo, [place] GP 1          PT G-Codes  AM-PAC 6 Clicks Score (PT): 12       Trina Cardozo PTA  6/9/2023

## 2023-06-10 LAB
ALBUMIN SERPL-MCNC: 3.1 G/DL (ref 3.5–5.2)
ALBUMIN/GLOB SERPL: 1 G/DL
ALP SERPL-CCNC: 90 U/L (ref 39–117)
ALT SERPL W P-5'-P-CCNC: 25 U/L (ref 1–41)
ANION GAP SERPL CALCULATED.3IONS-SCNC: 7.8 MMOL/L (ref 5–15)
AST SERPL-CCNC: 31 U/L (ref 1–40)
BASOPHILS # BLD AUTO: 0.03 10*3/MM3 (ref 0–0.2)
BASOPHILS NFR BLD AUTO: 0.5 % (ref 0–1.5)
BILIRUB SERPL-MCNC: 0.3 MG/DL (ref 0–1.2)
BUN SERPL-MCNC: 18 MG/DL (ref 8–23)
BUN/CREAT SERPL: 23.7 (ref 7–25)
CALCIUM SPEC-SCNC: 9 MG/DL (ref 8.6–10.5)
CHLORIDE SERPL-SCNC: 95 MMOL/L (ref 98–107)
CO2 SERPL-SCNC: 26.2 MMOL/L (ref 22–29)
CREAT SERPL-MCNC: 0.76 MG/DL (ref 0.76–1.27)
DEPRECATED RDW RBC AUTO: 42 FL (ref 37–54)
EGFRCR SERPLBLD CKD-EPI 2021: 100.4 ML/MIN/1.73
EOSINOPHIL # BLD AUTO: 0.16 10*3/MM3 (ref 0–0.4)
EOSINOPHIL NFR BLD AUTO: 2.4 % (ref 0.3–6.2)
ERYTHROCYTE [DISTWIDTH] IN BLOOD BY AUTOMATED COUNT: 12.3 % (ref 12.3–15.4)
GLOBULIN UR ELPH-MCNC: 3.1 GM/DL
GLUCOSE SERPL-MCNC: 90 MG/DL (ref 65–99)
HCT VFR BLD AUTO: 34.7 % (ref 37.5–51)
HGB BLD-MCNC: 11.9 G/DL (ref 13–17.7)
IMM GRANULOCYTES # BLD AUTO: 0.03 10*3/MM3 (ref 0–0.05)
IMM GRANULOCYTES NFR BLD AUTO: 0.5 % (ref 0–0.5)
LYMPHOCYTES # BLD AUTO: 1.25 10*3/MM3 (ref 0.7–3.1)
LYMPHOCYTES NFR BLD AUTO: 18.9 % (ref 19.6–45.3)
MCH RBC QN AUTO: 32.2 PG (ref 26.6–33)
MCHC RBC AUTO-ENTMCNC: 34.3 G/DL (ref 31.5–35.7)
MCV RBC AUTO: 94 FL (ref 79–97)
MONOCYTES # BLD AUTO: 1.01 10*3/MM3 (ref 0.1–0.9)
MONOCYTES NFR BLD AUTO: 15.3 % (ref 5–12)
NEUTROPHILS NFR BLD AUTO: 4.12 10*3/MM3 (ref 1.7–7)
NEUTROPHILS NFR BLD AUTO: 62.4 % (ref 42.7–76)
NRBC BLD AUTO-RTO: 0 /100 WBC (ref 0–0.2)
PLATELET # BLD AUTO: 143 10*3/MM3 (ref 140–450)
PMV BLD AUTO: 10.3 FL (ref 6–12)
POTASSIUM SERPL-SCNC: 4.4 MMOL/L (ref 3.5–5.2)
PROT SERPL-MCNC: 6.2 G/DL (ref 6–8.5)
RBC # BLD AUTO: 3.69 10*6/MM3 (ref 4.14–5.8)
SODIUM SERPL-SCNC: 129 MMOL/L (ref 136–145)
URATE SERPL-MCNC: 7.3 MG/DL (ref 3.4–7)
WBC NRBC COR # BLD: 6.6 10*3/MM3 (ref 3.4–10.8)

## 2023-06-10 PROCEDURE — 25010000002 ENOXAPARIN PER 10 MG: Performed by: INTERNAL MEDICINE

## 2023-06-10 PROCEDURE — 85025 COMPLETE CBC W/AUTO DIFF WBC: CPT | Performed by: INTERNAL MEDICINE

## 2023-06-10 PROCEDURE — 84550 ASSAY OF BLOOD/URIC ACID: CPT | Performed by: INTERNAL MEDICINE

## 2023-06-10 PROCEDURE — 84300 ASSAY OF URINE SODIUM: CPT | Performed by: INTERNAL MEDICINE

## 2023-06-10 PROCEDURE — 25010000002 HYDROMORPHONE 1 MG/ML SOLUTION: Performed by: INTERNAL MEDICINE

## 2023-06-10 PROCEDURE — 80053 COMPREHEN METABOLIC PANEL: CPT | Performed by: INTERNAL MEDICINE

## 2023-06-10 RX ORDER — HYDROMORPHONE HYDROCHLORIDE 1 MG/ML
0.5 INJECTION, SOLUTION INTRAMUSCULAR; INTRAVENOUS; SUBCUTANEOUS EVERY 8 HOURS PRN
Status: DISCONTINUED | OUTPATIENT
Start: 2023-06-10 | End: 2023-06-15

## 2023-06-10 RX ORDER — LIDOCAINE 50 MG/G
2 PATCH TOPICAL
Status: DISCONTINUED | OUTPATIENT
Start: 2023-06-10 | End: 2023-06-16 | Stop reason: HOSPADM

## 2023-06-10 RX ORDER — OXYCODONE HYDROCHLORIDE AND ACETAMINOPHEN 5; 325 MG/1; MG/1
1 TABLET ORAL EVERY 4 HOURS PRN
Status: DISCONTINUED | OUTPATIENT
Start: 2023-06-10 | End: 2023-06-15

## 2023-06-10 RX ADMIN — HYDROMORPHONE HYDROCHLORIDE 1 MG: 1 INJECTION, SOLUTION INTRAMUSCULAR; INTRAVENOUS; SUBCUTANEOUS at 09:50

## 2023-06-10 RX ADMIN — LIDOCAINE 2 PATCH: 50 PATCH CUTANEOUS at 21:29

## 2023-06-10 RX ADMIN — ENOXAPARIN SODIUM 40 MG: 100 INJECTION SUBCUTANEOUS at 16:50

## 2023-06-10 RX ADMIN — Medication 100 MG: at 09:09

## 2023-06-10 RX ADMIN — PROPRANOLOL HYDROCHLORIDE 60 MG: 20 TABLET ORAL at 09:09

## 2023-06-10 RX ADMIN — MULTIPLE VITAMINS W/ MINERALS TAB 1 TABLET: TAB at 09:16

## 2023-06-10 RX ADMIN — FLUVOXAMINE MALEATE 100 MG: 50 TABLET, FILM COATED ORAL at 09:09

## 2023-06-10 RX ADMIN — Medication 1 MG: at 09:16

## 2023-06-10 RX ADMIN — POLYETHYLENE GLYCOL 3350 17 G: 17 POWDER, FOR SOLUTION ORAL at 17:42

## 2023-06-10 RX ADMIN — MIRTAZAPINE 15 MG: 15 TABLET, FILM COATED ORAL at 21:29

## 2023-06-10 RX ADMIN — HYDROCODONE BITARTRATE AND ACETAMINOPHEN 1 TABLET: 5; 325 TABLET ORAL at 05:32

## 2023-06-10 RX ADMIN — DOCUSATE SODIUM 50MG AND SENNOSIDES 8.6MG 2 TABLET: 8.6; 5 TABLET, FILM COATED ORAL at 09:09

## 2023-06-10 RX ADMIN — RISPERIDONE 2 MG: 2 TABLET, FILM COATED ORAL at 09:09

## 2023-06-10 RX ADMIN — DOCUSATE SODIUM 50MG AND SENNOSIDES 8.6MG 2 TABLET: 8.6; 5 TABLET, FILM COATED ORAL at 21:29

## 2023-06-10 RX ADMIN — OXYCODONE AND ACETAMINOPHEN 1 TABLET: 5; 325 TABLET ORAL at 17:42

## 2023-06-10 RX ADMIN — HYDROCODONE BITARTRATE AND ACETAMINOPHEN 1 TABLET: 5; 325 TABLET ORAL at 12:17

## 2023-06-10 RX ADMIN — HYDROCODONE BITARTRATE AND ACETAMINOPHEN 1 TABLET: 5; 325 TABLET ORAL at 01:19

## 2023-06-10 RX ADMIN — Medication 10 ML: at 09:10

## 2023-06-10 RX ADMIN — Medication 1 TABLET: at 09:16

## 2023-06-10 RX ADMIN — AMLODIPINE BESYLATE 2.5 MG: 2.5 TABLET ORAL at 09:09

## 2023-06-10 RX ADMIN — RISPERIDONE 2 MG: 2 TABLET, FILM COATED ORAL at 21:29

## 2023-06-10 NOTE — PLAN OF CARE
Goal Outcome Evaluation:      Pt. Alert, oriented x4, with left hip fracture, pain medicine given x2 at this shift for left hip pain, no short of air noted, o2 sats kept over 90% with 02 inhal. @2l/m per n/c, v/s stable, no s/s acute distress noted at this time

## 2023-06-10 NOTE — PROGRESS NOTES
Name: Nilesh Zapata ADMIT: 6/3/2023   : 1960  PCP: Shakira Colbert GRACIE MCGUIRE    MRN: [id number] LOS: 7 days   AGE/SEX: 62 y.o. male  ROOM: [facility]     Subjective   Subjective   No acute events. Patient has no new complaints. Pain is still significant with activity. Taking PO. No CP/dyspnea/f/c/n/v/d.    Objective   Objective   Vital Signs  Temp:  [98 °F (36.7 °C)-98.5 °F (36.9 °C)] 98.2 °F (36.8 °C)  Heart Rate:  [58-79] 79  Resp:  [18] 18  BP: ()/(57-83) 118/83  SpO2:  [93 %-94 %] 94 %  on  Flow (L/min):  [1.5-2] 1.5;   Device (Oxygen Therapy): room air  Body mass index is 33.73 kg/m².  Physical Exam  Vitals and nursing note reviewed.   Constitutional:       General: He is not in acute distress.     Appearance: He is not toxic-appearing or diaphoretic.   HENT:      Head: Normocephalic and atraumatic.      Nose: Nose normal.      Mouth/Throat:      Mouth: Mucous membranes are moist.      Pharynx: Oropharynx is clear.   Eyes:      Conjunctiva/sclera: Conjunctivae normal.      Pupils: Pupils are equal, round, and reactive to light.   Cardiovascular:      Rate and Rhythm: Normal rate and regular rhythm.      Pulses: Normal pulses.   Pulmonary:      Effort: Pulmonary effort is normal.      Breath sounds: Normal breath sounds.   Abdominal:      General: Bowel sounds are normal.      Palpations: Abdomen is soft.      Tenderness: There is no abdominal tenderness.   Musculoskeletal:         General: No swelling or tenderness.      Cervical back: Normal range of motion and neck supple.   Skin:     General: Skin is warm and dry.      Capillary Refill: Capillary refill takes less than 2 seconds.   Neurological:      General: No focal deficit present.      Mental Status: He is alert.   Psychiatric:         Mood and Affect: Mood normal.         Behavior: Behavior normal.     Results Review     I reviewed the patient's new clinical results.  Results from last 7 days   Lab Units 06/10/23  0450 23  0738  06/08/23  0509 06/07/23  0511   WBC 10*3/mm3 6.60 6.09 7.09 7.46   HEMOGLOBIN g/dL 11.9* 12.3* 12.3* 12.1*   PLATELETS 10*3/mm3 143 119* 107* 103*       Results from last 7 days   Lab Units 06/10/23  0450 06/09/23  0740 06/08/23  0509 06/07/23  0511   SODIUM mmol/L 129* 135* 133* 130*   POTASSIUM mmol/L 4.4 4.2 4.2 3.9   CHLORIDE mmol/L 95* 97* 98 95*   CO2 mmol/L 26.2 30.3* 28.0 23.3   BUN mg/dL 18 16 19 26*   CREATININE mg/dL 0.76 0.81 0.90 0.96   GLUCOSE mg/dL 90 85 90 85   EGFR mL/min/1.73 100.4 98.5 95.4 88.3       Results from last 7 days   Lab Units 06/10/23  0450 06/09/23  0740 06/08/23  0509 06/07/23  0511   ALBUMIN g/dL 3.1* 3.4* 3.3* 3.4*   BILIRUBIN mg/dL 0.3 0.5 0.4 0.6   ALK PHOS U/L 90 90 88 86   AST (SGOT) U/L 31 31 38 35   ALT (SGPT) U/L 25 24 27 18       Results from last 7 days   Lab Units 06/10/23  0450 06/09/23  0740 06/08/23  0509 06/07/23  0511   CALCIUM mg/dL 9.0 9.3 8.9 8.8   ALBUMIN g/dL 3.1* 3.4* 3.3* 3.4*         No results found for: HGBA1C, POCGLU    No radiology results for the last day  I have personally reviewed all medications:  Scheduled Medications  amLODIPine, 2.5 mg, Oral, Q24H  enoxaparin, 40 mg, Subcutaneous, Q24H  fluvoxaMINE, 100 mg, Oral, Daily  folic acid, 1 mg, Oral, Daily  lidocaine, 2 patch, Transdermal, Q24H  mirtazapine, 15 mg, Oral, Nightly  multivitamin, 1 tablet, Oral, Daily  multivitamin with minerals, 1 tablet, Oral, Daily  propranolol, 60 mg, Oral, BID  risperiDONE, 2 mg, Oral, BID  senna-docusate sodium, 2 tablet, Oral, BID  thiamine, 100 mg, Oral, Daily    Infusions   Diet  Diet: Fluid Restriction (240 mL/tray) Diets; 1500 mL/day; Texture: Regular Texture (IDDSI 7); Fluid Consistency: Thin (IDDSI 0)    I have personally reviewed:  [x]  Laboratory   []  Microbiology   [x]  Radiology   [x]  EKG/Telemetry  []  Cardiology/Vascular   []  Pathology    []  Records       Assessment/Plan     Active Hospital Problems    Diagnosis  POA    Closed fracture of left superior  pubic ramus [S32.512A]  Yes    Closed fracture of left inferior pubic ramus [S32.592A]  Yes    Closed fracture of sacrum [S32.10XA]  Yes    Essential hypertension [I10]  Yes    Thrombocytopenia [D69.6]  Yes    Alcohol abuse [F10.10]  Yes    History of left nephrectomy 2/2 RCC [Z90.5]  Not Applicable      Resolved Hospital Problems   No resolved problems to display.   Left Superior and Inferior Pubic Rami Fractures with Sacral Ala Fracture  - from mechanical fall  - appreciate orthopedic surgery evaluation, recommending conservative treatment  - toe-touch weightbearing on the LLE  - continue bowel regimen  - change norco to percocet and decrease the frequency of IV dilaudid, add lidocaine patches    Alcohol Abuse/Liver Cirrhosis  - no evidence of withdrawal, cirrhosis is well-compensated  - continue on current regimen, vitamin replacement    HTN  - BP acceptable  - continue current regimen    TCP  - due to EtOH abuse, stable  - will follow    Hyponatremia  - worse today  - send uric acid and urine sodium      Lovenox 40 mg SC daily for DVT prophylaxis.  Full code.  Discussed with patient and nursing staff.  Anticipate discharge to SNU facility once arrangements have been made..      Savage Elizabeth MD  [facility]  06/10/23  17:35 EDT

## 2023-06-10 NOTE — PLAN OF CARE
Goal Outcome Evaluation:  Plan of Care Reviewed With: patient        Progress: no change  Outcome Evaluation: Pt lying  in bed, c/o pain ,scale of 8, prn Percocet given (new order) for pain, eating and voiding well, pt, c/o no bm since 6/4, prn miralax given and fluids given, sr on tele, nad, bed alarm on, call light wtihin reach. Weight shifting for pressure releif.

## 2023-06-11 PROBLEM — K59.00 CONSTIPATION: Status: ACTIVE | Noted: 2023-06-11

## 2023-06-11 LAB
ALBUMIN SERPL-MCNC: 3.4 G/DL (ref 3.5–5.2)
ALBUMIN/GLOB SERPL: 1.2 G/DL
ALP SERPL-CCNC: 83 U/L (ref 39–117)
ALT SERPL W P-5'-P-CCNC: 23 U/L (ref 1–41)
ANION GAP SERPL CALCULATED.3IONS-SCNC: 7.5 MMOL/L (ref 5–15)
AST SERPL-CCNC: 28 U/L (ref 1–40)
BASOPHILS # BLD AUTO: 0.02 10*3/MM3 (ref 0–0.2)
BASOPHILS NFR BLD AUTO: 0.4 % (ref 0–1.5)
BILIRUB SERPL-MCNC: 0.4 MG/DL (ref 0–1.2)
BUN SERPL-MCNC: 17 MG/DL (ref 8–23)
BUN/CREAT SERPL: 22.7 (ref 7–25)
CALCIUM SPEC-SCNC: 9.4 MG/DL (ref 8.6–10.5)
CHLORIDE SERPL-SCNC: 99 MMOL/L (ref 98–107)
CO2 SERPL-SCNC: 30.5 MMOL/L (ref 22–29)
CREAT SERPL-MCNC: 0.75 MG/DL (ref 0.76–1.27)
DEPRECATED RDW RBC AUTO: 44 FL (ref 37–54)
EGFRCR SERPLBLD CKD-EPI 2021: 100.8 ML/MIN/1.73
EOSINOPHIL # BLD AUTO: 0.13 10*3/MM3 (ref 0–0.4)
EOSINOPHIL NFR BLD AUTO: 2.4 % (ref 0.3–6.2)
ERYTHROCYTE [DISTWIDTH] IN BLOOD BY AUTOMATED COUNT: 12.8 % (ref 12.3–15.4)
GLOBULIN UR ELPH-MCNC: 2.9 GM/DL
GLUCOSE SERPL-MCNC: 85 MG/DL (ref 65–99)
HCT VFR BLD AUTO: 34.4 % (ref 37.5–51)
HGB BLD-MCNC: 11.7 G/DL (ref 13–17.7)
IMM GRANULOCYTES # BLD AUTO: 0.02 10*3/MM3 (ref 0–0.05)
IMM GRANULOCYTES NFR BLD AUTO: 0.4 % (ref 0–0.5)
LYMPHOCYTES # BLD AUTO: 1.31 10*3/MM3 (ref 0.7–3.1)
LYMPHOCYTES NFR BLD AUTO: 23.8 % (ref 19.6–45.3)
MCH RBC QN AUTO: 32.3 PG (ref 26.6–33)
MCHC RBC AUTO-ENTMCNC: 34 G/DL (ref 31.5–35.7)
MCV RBC AUTO: 95 FL (ref 79–97)
MONOCYTES # BLD AUTO: 0.69 10*3/MM3 (ref 0.1–0.9)
MONOCYTES NFR BLD AUTO: 12.5 % (ref 5–12)
NEUTROPHILS NFR BLD AUTO: 3.34 10*3/MM3 (ref 1.7–7)
NEUTROPHILS NFR BLD AUTO: 60.5 % (ref 42.7–76)
NRBC BLD AUTO-RTO: 0 /100 WBC (ref 0–0.2)
PLATELET # BLD AUTO: 160 10*3/MM3 (ref 140–450)
PMV BLD AUTO: 10.1 FL (ref 6–12)
POTASSIUM SERPL-SCNC: 4.3 MMOL/L (ref 3.5–5.2)
PROT SERPL-MCNC: 6.3 G/DL (ref 6–8.5)
RBC # BLD AUTO: 3.62 10*6/MM3 (ref 4.14–5.8)
SODIUM SERPL-SCNC: 137 MMOL/L (ref 136–145)
SODIUM UR-SCNC: 115 MMOL/L
WBC NRBC COR # BLD: 5.51 10*3/MM3 (ref 3.4–10.8)

## 2023-06-11 PROCEDURE — 25010000002 HYDROMORPHONE PER 4 MG: Performed by: INTERNAL MEDICINE

## 2023-06-11 PROCEDURE — 80053 COMPREHEN METABOLIC PANEL: CPT | Performed by: INTERNAL MEDICINE

## 2023-06-11 PROCEDURE — 25010000002 ENOXAPARIN PER 10 MG: Performed by: INTERNAL MEDICINE

## 2023-06-11 PROCEDURE — 85025 COMPLETE CBC W/AUTO DIFF WBC: CPT | Performed by: INTERNAL MEDICINE

## 2023-06-11 RX ORDER — BISACODYL 10 MG
10 SUPPOSITORY, RECTAL RECTAL DAILY PRN
Status: DISCONTINUED | OUTPATIENT
Start: 2023-06-11 | End: 2023-06-16 | Stop reason: HOSPADM

## 2023-06-11 RX ORDER — BISACODYL 5 MG/1
5 TABLET, DELAYED RELEASE ORAL DAILY PRN
Status: DISCONTINUED | OUTPATIENT
Start: 2023-06-11 | End: 2023-06-16 | Stop reason: HOSPADM

## 2023-06-11 RX ORDER — POLYETHYLENE GLYCOL 3350 17 G/17G
17 POWDER, FOR SOLUTION ORAL 2 TIMES DAILY
Status: DISCONTINUED | OUTPATIENT
Start: 2023-06-11 | End: 2023-06-16 | Stop reason: HOSPADM

## 2023-06-11 RX ORDER — AMOXICILLIN 250 MG
2 CAPSULE ORAL 2 TIMES DAILY
Status: DISCONTINUED | OUTPATIENT
Start: 2023-06-11 | End: 2023-06-16 | Stop reason: HOSPADM

## 2023-06-11 RX ADMIN — HYDROMORPHONE HYDROCHLORIDE 0.5 MG: 1 INJECTION, SOLUTION INTRAMUSCULAR; INTRAVENOUS; SUBCUTANEOUS at 15:21

## 2023-06-11 RX ADMIN — OXYCODONE AND ACETAMINOPHEN 1 TABLET: 5; 325 TABLET ORAL at 18:48

## 2023-06-11 RX ADMIN — HYDROMORPHONE HYDROCHLORIDE 0.5 MG: 1 INJECTION, SOLUTION INTRAMUSCULAR; INTRAVENOUS; SUBCUTANEOUS at 04:59

## 2023-06-11 RX ADMIN — OXYCODONE AND ACETAMINOPHEN 1 TABLET: 5; 325 TABLET ORAL at 01:56

## 2023-06-11 RX ADMIN — MIRTAZAPINE 15 MG: 15 TABLET, FILM COATED ORAL at 20:48

## 2023-06-11 RX ADMIN — OXYCODONE AND ACETAMINOPHEN 1 TABLET: 5; 325 TABLET ORAL at 09:33

## 2023-06-11 RX ADMIN — Medication 1 TABLET: at 09:34

## 2023-06-11 RX ADMIN — DOCUSATE SODIUM 50MG AND SENNOSIDES 8.6MG 2 TABLET: 8.6; 5 TABLET, FILM COATED ORAL at 09:33

## 2023-06-11 RX ADMIN — AMLODIPINE BESYLATE 2.5 MG: 2.5 TABLET ORAL at 09:34

## 2023-06-11 RX ADMIN — ENOXAPARIN SODIUM 40 MG: 100 INJECTION SUBCUTANEOUS at 17:23

## 2023-06-11 RX ADMIN — LIDOCAINE 2 PATCH: 50 PATCH CUTANEOUS at 09:35

## 2023-06-11 RX ADMIN — FLUVOXAMINE MALEATE 100 MG: 50 TABLET, FILM COATED ORAL at 09:34

## 2023-06-11 RX ADMIN — Medication 10 ML: at 09:35

## 2023-06-11 RX ADMIN — RISPERIDONE 2 MG: 2 TABLET, FILM COATED ORAL at 09:34

## 2023-06-11 RX ADMIN — Medication 100 MG: at 09:34

## 2023-06-11 RX ADMIN — POLYETHYLENE GLYCOL 3350 17 G: 17 POWDER, FOR SOLUTION ORAL at 09:32

## 2023-06-11 RX ADMIN — RISPERIDONE 2 MG: 2 TABLET, FILM COATED ORAL at 20:50

## 2023-06-11 RX ADMIN — OXYCODONE AND ACETAMINOPHEN 1 TABLET: 5; 325 TABLET ORAL at 22:55

## 2023-06-11 RX ADMIN — PROPRANOLOL HYDROCHLORIDE 60 MG: 20 TABLET ORAL at 20:48

## 2023-06-11 RX ADMIN — Medication 1 MG: at 09:34

## 2023-06-11 RX ADMIN — PROPRANOLOL HYDROCHLORIDE 60 MG: 20 TABLET ORAL at 09:34

## 2023-06-11 NOTE — PLAN OF CARE
Goal Outcome Evaluation:      Pt. Alert, oriented x4, with FX to left hip, pain patches applied to left hip area as ordered at this shift, c/o pain, pain medicine po x2 , Pt. C/o pain at this time, pain injection done per requested by Pt. V/s stable, 02 sats 94% with o2 inhal. @2l/m per n/c, no s/s acute distress noted

## 2023-06-11 NOTE — PROGRESS NOTES
Name: Nilesh Zapata ADMIT: 6/3/2023   : 1960  PCP: Shakira Colbert GRACIE MCGUIRE    MRN: [id number] LOS: 8 days   AGE/SEX: 62 y.o. male  ROOM: [facility]     Subjective   Subjective   No acute events. Patient denies new complaints. Pain control has improved. Taking PO. No CP/dyspnea/f/c/n/v/d. +constipation.    Objective   Objective   Vital Signs  Temp:  [97.8 °F (36.6 °C)-98.6 °F (37 °C)] 97.8 °F (36.6 °C)  Heart Rate:  [64-77] 77  Resp:  [18] 18  BP: (101-118)/(61-73) 118/70  SpO2:  [92 %-95 %] 95 %  on  Flow (L/min):  [1.5] 1.5;   Device (Oxygen Therapy): nasal cannula  Body mass index is 35.23 kg/m².  Physical Exam  Vitals and nursing note reviewed.   Constitutional:       General: He is not in acute distress.     Appearance: He is not toxic-appearing or diaphoretic.   HENT:      Head: Normocephalic and atraumatic.      Nose: Nose normal.      Mouth/Throat:      Mouth: Mucous membranes are moist.      Pharynx: Oropharynx is clear.   Eyes:      Conjunctiva/sclera: Conjunctivae normal.      Pupils: Pupils are equal, round, and reactive to light.   Cardiovascular:      Rate and Rhythm: Normal rate and regular rhythm.      Pulses: Normal pulses.   Pulmonary:      Effort: Pulmonary effort is normal.      Breath sounds: Normal breath sounds.   Abdominal:      General: Bowel sounds are normal.      Palpations: Abdomen is soft.      Tenderness: There is no abdominal tenderness.   Musculoskeletal:         General: No swelling or tenderness.      Cervical back: Normal range of motion and neck supple.   Skin:     General: Skin is warm and dry.      Capillary Refill: Capillary refill takes less than 2 seconds.   Neurological:      General: No focal deficit present.      Mental Status: He is alert.   Psychiatric:         Mood and Affect: Mood normal.         Behavior: Behavior normal.     Results Review     I reviewed the patient's new clinical results.  Results from last 7 days   Lab Units 23  0530 06/10/23  0452  06/09/23  0738 06/08/23  0509   WBC 10*3/mm3 5.51 6.60 6.09 7.09   HEMOGLOBIN g/dL 11.7* 11.9* 12.3* 12.3*   PLATELETS 10*3/mm3 160 143 119* 107*       Results from last 7 days   Lab Units 06/11/23  0530 06/10/23  0450 06/09/23  0740 06/08/23  0509   SODIUM mmol/L 137 129* 135* 133*   POTASSIUM mmol/L 4.3 4.4 4.2 4.2   CHLORIDE mmol/L 99 95* 97* 98   CO2 mmol/L 30.5* 26.2 30.3* 28.0   BUN mg/dL 17 18 16 19   CREATININE mg/dL 0.75* 0.76 0.81 0.90   GLUCOSE mg/dL 85 90 85 90   EGFR mL/min/1.73 100.8 100.4 98.5 95.4       Results from last 7 days   Lab Units 06/11/23  0530 06/10/23  0450 06/09/23  0740 06/08/23  0509   ALBUMIN g/dL 3.4* 3.1* 3.4* 3.3*   BILIRUBIN mg/dL 0.4 0.3 0.5 0.4   ALK PHOS U/L 83 90 90 88   AST (SGOT) U/L 28 31 31 38   ALT (SGPT) U/L 23 25 24 27       Results from last 7 days   Lab Units 06/11/23  0530 06/10/23  0450 06/09/23  0740 06/08/23  0509   CALCIUM mg/dL 9.4 9.0 9.3 8.9   ALBUMIN g/dL 3.4* 3.1* 3.4* 3.3*         No results found for: HGBA1C, POCGLU    No radiology results for the last day  I have personally reviewed all medications:  Scheduled Medications  amLODIPine, 2.5 mg, Oral, Q24H  enoxaparin, 40 mg, Subcutaneous, Q24H  fluvoxaMINE, 100 mg, Oral, Daily  folic acid, 1 mg, Oral, Daily  lidocaine, 2 patch, Transdermal, Q24H  mirtazapine, 15 mg, Oral, Nightly  multivitamin, 1 tablet, Oral, Daily  multivitamin with minerals, 1 tablet, Oral, Daily  propranolol, 60 mg, Oral, BID  risperiDONE, 2 mg, Oral, BID  senna-docusate sodium, 2 tablet, Oral, BID  thiamine, 100 mg, Oral, Daily    Infusions   Diet  Diet: Fluid Restriction (240 mL/tray) Diets; 1500 mL/day; Texture: Regular Texture (IDDSI 7); Fluid Consistency: Thin (IDDSI 0)    I have personally reviewed:  [x]  Laboratory   []  Microbiology   [x]  Radiology   [x]  EKG/Telemetry  []  Cardiology/Vascular   []  Pathology    []  Records      Assessment/Plan     Active Hospital Problems    Diagnosis  POA    Constipation [K59.00]  Yes     Closed fracture of left superior pubic ramus [S32.512A]  Yes    Closed fracture of left inferior pubic ramus [S32.592A]  Yes    Closed fracture of sacrum [S32.10XA]  Yes    Essential hypertension [I10]  Yes    Thrombocytopenia [D69.6]  Yes    Alcohol abuse [F10.10]  Yes    History of left nephrectomy 2/2 RCC [Z90.5]  Not Applicable      Resolved Hospital Problems   No resolved problems to display.   Left Superior and Inferior Pubic Rami Fractures with Sacral Ala Fracture  - from mechanical fall  - appreciate orthopedic surgery evaluation, recommending conservative treatment  - toe-touch weightbearing on the LLE  - escalate bowel regimen  - change norco to percocet and decrease the frequency of IV dilaudid, add lidocaine patches    Alcohol Abuse/Liver Cirrhosis  - no evidence of withdrawal, cirrhosis is well-compensated  - continue on current regimen, vitamin replacement    HTN  - BP acceptable  - continue current regimen    TCP  - due to EtOH abuse and possibly a degree of consumption from acute fracture  - platelet count has normalized  - will follow    Hyponatremia  - resolved  - will follow      Lovenox 40 mg SC daily for DVT prophylaxis.  Full code.  Discussed with patient and nursing staff.  Anticipate discharge to SNU facility once arrangements have been made..      Savage Elizabeth MD  [facility]  06/11/23  13:54 EDT

## 2023-06-11 NOTE — PLAN OF CARE
Goal Outcome Evaluation:  Plan of Care Reviewed With: patient        Progress: no change  Outcome Evaluation: Pt lying in bed, weight shifting for pressure releif, medicated with po and inj prn pain meds this shift, meds effective, no c/o nausea, eating and drinking adequately, meds given to promte bm, sr on tele, nad, O2 1.5L use, satting at 93%.

## 2023-06-12 LAB
ALBUMIN SERPL-MCNC: 3.4 G/DL (ref 3.5–5.2)
ALBUMIN/GLOB SERPL: 1.1 G/DL
ALP SERPL-CCNC: 90 U/L (ref 39–117)
ALT SERPL W P-5'-P-CCNC: 23 U/L (ref 1–41)
ANION GAP SERPL CALCULATED.3IONS-SCNC: 5.6 MMOL/L (ref 5–15)
AST SERPL-CCNC: 26 U/L (ref 1–40)
BASOPHILS # BLD AUTO: 0.03 10*3/MM3 (ref 0–0.2)
BASOPHILS NFR BLD AUTO: 0.6 % (ref 0–1.5)
BILIRUB SERPL-MCNC: 0.4 MG/DL (ref 0–1.2)
BUN SERPL-MCNC: 18 MG/DL (ref 8–23)
BUN/CREAT SERPL: 22.8 (ref 7–25)
CALCIUM SPEC-SCNC: 9.6 MG/DL (ref 8.6–10.5)
CHLORIDE SERPL-SCNC: 97 MMOL/L (ref 98–107)
CO2 SERPL-SCNC: 32.4 MMOL/L (ref 22–29)
CREAT SERPL-MCNC: 0.79 MG/DL (ref 0.76–1.27)
DEPRECATED RDW RBC AUTO: 42.5 FL (ref 37–54)
EGFRCR SERPLBLD CKD-EPI 2021: 99.2 ML/MIN/1.73
EOSINOPHIL # BLD AUTO: 0.14 10*3/MM3 (ref 0–0.4)
EOSINOPHIL NFR BLD AUTO: 2.8 % (ref 0.3–6.2)
ERYTHROCYTE [DISTWIDTH] IN BLOOD BY AUTOMATED COUNT: 12.3 % (ref 12.3–15.4)
GLOBULIN UR ELPH-MCNC: 3.1 GM/DL
GLUCOSE SERPL-MCNC: 89 MG/DL (ref 65–99)
HCT VFR BLD AUTO: 33.9 % (ref 37.5–51)
HGB BLD-MCNC: 11.7 G/DL (ref 13–17.7)
IMM GRANULOCYTES # BLD AUTO: 0.02 10*3/MM3 (ref 0–0.05)
IMM GRANULOCYTES NFR BLD AUTO: 0.4 % (ref 0–0.5)
LYMPHOCYTES # BLD AUTO: 1.05 10*3/MM3 (ref 0.7–3.1)
LYMPHOCYTES NFR BLD AUTO: 21.3 % (ref 19.6–45.3)
MCH RBC QN AUTO: 32.1 PG (ref 26.6–33)
MCHC RBC AUTO-ENTMCNC: 34.5 G/DL (ref 31.5–35.7)
MCV RBC AUTO: 93.1 FL (ref 79–97)
MONOCYTES # BLD AUTO: 0.65 10*3/MM3 (ref 0.1–0.9)
MONOCYTES NFR BLD AUTO: 13.2 % (ref 5–12)
NEUTROPHILS NFR BLD AUTO: 3.03 10*3/MM3 (ref 1.7–7)
NEUTROPHILS NFR BLD AUTO: 61.7 % (ref 42.7–76)
NRBC BLD AUTO-RTO: 0.2 /100 WBC (ref 0–0.2)
PLATELET # BLD AUTO: 185 10*3/MM3 (ref 140–450)
PMV BLD AUTO: 10.3 FL (ref 6–12)
POTASSIUM SERPL-SCNC: 4.2 MMOL/L (ref 3.5–5.2)
PROT SERPL-MCNC: 6.5 G/DL (ref 6–8.5)
RBC # BLD AUTO: 3.64 10*6/MM3 (ref 4.14–5.8)
SODIUM SERPL-SCNC: 135 MMOL/L (ref 136–145)
WBC NRBC COR # BLD: 4.92 10*3/MM3 (ref 3.4–10.8)

## 2023-06-12 PROCEDURE — 25010000002 ENOXAPARIN PER 10 MG: Performed by: INTERNAL MEDICINE

## 2023-06-12 PROCEDURE — 80053 COMPREHEN METABOLIC PANEL: CPT | Performed by: INTERNAL MEDICINE

## 2023-06-12 PROCEDURE — 85025 COMPLETE CBC W/AUTO DIFF WBC: CPT | Performed by: INTERNAL MEDICINE

## 2023-06-12 PROCEDURE — 97110 THERAPEUTIC EXERCISES: CPT

## 2023-06-12 PROCEDURE — 97530 THERAPEUTIC ACTIVITIES: CPT

## 2023-06-12 RX ORDER — PROPRANOLOL HYDROCHLORIDE 20 MG/1
40 TABLET ORAL 2 TIMES DAILY
Status: DISCONTINUED | OUTPATIENT
Start: 2023-06-12 | End: 2023-06-14

## 2023-06-12 RX ADMIN — RISPERIDONE 2 MG: 2 TABLET, FILM COATED ORAL at 20:57

## 2023-06-12 RX ADMIN — OXYCODONE AND ACETAMINOPHEN 1 TABLET: 5; 325 TABLET ORAL at 02:55

## 2023-06-12 RX ADMIN — POLYETHYLENE GLYCOL 3350 17 G: 17 POWDER, FOR SOLUTION ORAL at 09:26

## 2023-06-12 RX ADMIN — Medication 10 ML: at 09:27

## 2023-06-12 RX ADMIN — OXYCODONE AND ACETAMINOPHEN 1 TABLET: 5; 325 TABLET ORAL at 10:54

## 2023-06-12 RX ADMIN — DOCUSATE SODIUM 50MG AND SENNOSIDES 8.6MG 2 TABLET: 8.6; 5 TABLET, FILM COATED ORAL at 20:57

## 2023-06-12 RX ADMIN — LIDOCAINE 2 PATCH: 50 PATCH CUTANEOUS at 09:24

## 2023-06-12 RX ADMIN — POLYETHYLENE GLYCOL 3350 17 G: 17 POWDER, FOR SOLUTION ORAL at 20:57

## 2023-06-12 RX ADMIN — DOCUSATE SODIUM 50MG AND SENNOSIDES 8.6MG 2 TABLET: 8.6; 5 TABLET, FILM COATED ORAL at 09:26

## 2023-06-12 RX ADMIN — Medication 1 TABLET: at 10:54

## 2023-06-12 RX ADMIN — OXYCODONE AND ACETAMINOPHEN 1 TABLET: 5; 325 TABLET ORAL at 06:43

## 2023-06-12 RX ADMIN — AMLODIPINE BESYLATE 2.5 MG: 2.5 TABLET ORAL at 09:42

## 2023-06-12 RX ADMIN — Medication 1 MG: at 09:26

## 2023-06-12 RX ADMIN — MIRTAZAPINE 15 MG: 15 TABLET, FILM COATED ORAL at 20:57

## 2023-06-12 RX ADMIN — FLUVOXAMINE MALEATE 100 MG: 50 TABLET, FILM COATED ORAL at 09:25

## 2023-06-12 RX ADMIN — OXYCODONE AND ACETAMINOPHEN 1 TABLET: 5; 325 TABLET ORAL at 20:57

## 2023-06-12 RX ADMIN — Medication 100 MG: at 09:26

## 2023-06-12 RX ADMIN — MULTIPLE VITAMINS W/ MINERALS TAB 1 TABLET: TAB at 09:25

## 2023-06-12 RX ADMIN — RISPERIDONE 2 MG: 2 TABLET, FILM COATED ORAL at 09:25

## 2023-06-12 RX ADMIN — ENOXAPARIN SODIUM 40 MG: 100 INJECTION SUBCUTANEOUS at 16:48

## 2023-06-12 RX ADMIN — BISACODYL 10 MG: 10 SUPPOSITORY RECTAL at 18:48

## 2023-06-12 RX ADMIN — OXYCODONE AND ACETAMINOPHEN 1 TABLET: 5; 325 TABLET ORAL at 16:48

## 2023-06-12 NOTE — PLAN OF CARE
Goal Outcome Evaluation:  Plan of Care Reviewed With: patient        Progress: no change  Outcome Evaluation: Pt transferred to recliner chair this am by PT, krissy well, per pt partial wt bearing to L leg/foot, encouraged to sit longer in chair, sat for x2hrs. Transferred bacl to bed with wwalker and gait belt. sr on tele, very flat affect. medicated with PRN norco x2 this shift, eating well, refused to use bsc, will give dulcolax supp after dinner. nad, bp low, am propanolol hel, Dr. Elizabeth adjusted bp meds. Offered pastoral care or access for support or referral but pt refused, not needed right now per pt.

## 2023-06-12 NOTE — PROGRESS NOTES
Name: Nilesh Zapata ADMIT: 6/3/2023   : 1960  PCP: Shakira Colbert GRACIE MCGUIRE    MRN: [id number] LOS: 9 days   AGE/SEX: 62 y.o. male  ROOM: [facility]     Subjective   Subjective   No acute events. Patient has new complaints. Pain control is reasonable. Taking PO. No CP/dyspnea/f/c/n/v/d. +BM.    Objective   Objective   Vital Signs  Temp:  [97.9 °F (36.6 °C)-98.1 °F (36.7 °C)] 98.1 °F (36.7 °C)  Heart Rate:  [63-77] 71  Resp:  [17-18] 17  BP: ()/(57-71) 100/57  SpO2:  [92 %-95 %] 92 %  on  Flow (L/min):  [1.5] 1.5;   Device (Oxygen Therapy): nasal cannula  Body mass index is 35.3 kg/m².  Physical Exam  Vitals and nursing note reviewed.   Constitutional:       General: He is not in acute distress.     Appearance: He is not toxic-appearing or diaphoretic.   HENT:      Head: Normocephalic and atraumatic.      Nose: Nose normal.      Mouth/Throat:      Mouth: Mucous membranes are moist.      Pharynx: Oropharynx is clear.   Eyes:      Conjunctiva/sclera: Conjunctivae normal.      Pupils: Pupils are equal, round, and reactive to light.   Cardiovascular:      Rate and Rhythm: Normal rate and regular rhythm.      Pulses: Normal pulses.   Pulmonary:      Effort: Pulmonary effort is normal.      Breath sounds: Normal breath sounds.   Abdominal:      General: Bowel sounds are normal.      Palpations: Abdomen is soft.      Tenderness: There is no abdominal tenderness.   Musculoskeletal:         General: No swelling or tenderness.      Cervical back: Normal range of motion and neck supple.   Skin:     General: Skin is warm and dry.      Capillary Refill: Capillary refill takes less than 2 seconds.   Neurological:      General: No focal deficit present.      Mental Status: He is alert.   Psychiatric:         Mood and Affect: Mood normal.         Behavior: Behavior normal.     Results Review     I reviewed the patient's new clinical results.  Results from last 7 days   Lab Units 23  0516 23  0533  06/10/23  0450 06/09/23  0738   WBC 10*3/mm3 4.92 5.51 6.60 6.09   HEMOGLOBIN g/dL 11.7* 11.7* 11.9* 12.3*   PLATELETS 10*3/mm3 185 160 143 119*       Results from last 7 days   Lab Units 06/12/23  0516 06/11/23  0530 06/10/23  0450 06/09/23  0740   SODIUM mmol/L 135* 137 129* 135*   POTASSIUM mmol/L 4.2 4.3 4.4 4.2   CHLORIDE mmol/L 97* 99 95* 97*   CO2 mmol/L 32.4* 30.5* 26.2 30.3*   BUN mg/dL 18 17 18 16   CREATININE mg/dL 0.79 0.75* 0.76 0.81   GLUCOSE mg/dL 89 85 90 85   EGFR mL/min/1.73 99.2 100.8 100.4 98.5       Results from last 7 days   Lab Units 06/12/23  0516 06/11/23  0530 06/10/23  0450 06/09/23  0740   ALBUMIN g/dL 3.4* 3.4* 3.1* 3.4*   BILIRUBIN mg/dL 0.4 0.4 0.3 0.5   ALK PHOS U/L 90 83 90 90   AST (SGOT) U/L 26 28 31 31   ALT (SGPT) U/L 23 23 25 24       Results from last 7 days   Lab Units 06/12/23 0516 06/11/23 0530 06/10/23  0450 06/09/23  0740   CALCIUM mg/dL 9.6 9.4 9.0 9.3   ALBUMIN g/dL 3.4* 3.4* 3.1* 3.4*         No results found for: HGBA1C, POCGLU    No radiology results for the last day  I have personally reviewed all medications:  Scheduled Medications  amLODIPine, 2.5 mg, Oral, Q24H  enoxaparin, 40 mg, Subcutaneous, Q24H  fluvoxaMINE, 100 mg, Oral, Daily  folic acid, 1 mg, Oral, Daily  lidocaine, 2 patch, Transdermal, Q24H  mirtazapine, 15 mg, Oral, Nightly  multivitamin, 1 tablet, Oral, Daily  multivitamin with minerals, 1 tablet, Oral, Daily  senna-docusate sodium, 2 tablet, Oral, BID   And  polyethylene glycol, 17 g, Oral, BID  propranolol, 60 mg, Oral, BID  risperiDONE, 2 mg, Oral, BID  thiamine, 100 mg, Oral, Daily    Infusions   Diet  Diet: Fluid Restriction (240 mL/tray) Diets; 1500 mL/day; Texture: Regular Texture (IDDSI 7); Fluid Consistency: Thin (IDDSI 0)    I have personally reviewed:  [x]  Laboratory   []  Microbiology   [x]  Radiology   [x]  EKG/Telemetry  []  Cardiology/Vascular   []  Pathology    []  Records      Assessment/Plan     Active Hospital Problems     Diagnosis  POA    Constipation [K59.00]  Yes    Closed fracture of left superior pubic ramus [S32.512A]  Yes    Closed fracture of left inferior pubic ramus [S32.592A]  Yes    Closed fracture of sacrum [S32.10XA]  Yes    Essential hypertension [I10]  Yes    Thrombocytopenia [D69.6]  Yes    Alcohol abuse [F10.10]  Yes    History of left nephrectomy 2/2 RCC [Z90.5]  Not Applicable      Resolved Hospital Problems   No resolved problems to display.   Left Superior and Inferior Pubic Rami Fractures with Sacral Ala Fracture  - from mechanical fall  - appreciate orthopedic surgery evaluation, recommending conservative treatment  - toe-touch weightbearing on the LLE  - continue bowel regimen  - continue percocet and lidocaine patches, has IV dilaudid if needed but I encouraged him not to rely on this    Alcohol Abuse/Liver Cirrhosis  - no evidence of withdrawal, cirrhosis is well-compensated  - continue on current regimen with the exception of reduced propranolol (see below), vitamin replacement    HTN  - BP on the low end  - will stop norvasc and decrease propranolol to 40mg BID    TCP  - due to EtOH abuse and possibly a degree of consumption from acute fracture  - platelet count has normalized  - will follow    Hyponatremia  - improved  - no further workup or treatment at this time      Lovenox 40 mg SC daily for DVT prophylaxis.  Full code.  Discussed with patient, nursing staff, and CCP.  Anticipate discharge to SNU facility once arrangements have been made..      Savage Elizabeth MD  [facility]  06/12/23  14:18 EDT

## 2023-06-12 NOTE — THERAPY TREATMENT NOTE
Patient Name: Nilesh Zapata  : 1960    MRN: [id number]                              Today's Date: 2023       Admit Date: 6/3/2023    Visit Dx:     ICD-10-CM ICD-9-CM   1. Alcohol withdrawal syndrome without complication  F10.930 291.81   2. Contusion of left hip, initial encounter  S70.02XA 924.01   3. Alcoholism  F10.20 303.90   4. Hypoxia  R09.02 799.02     Patient Active Problem List   Diagnosis    Alcohol withdrawal syndrome with complication    Anxiety    Fatty liver    Tobacco abuse    Kidney cancer, primary, with metastasis from kidney to other site    Alcoholism    Hyponatremia    Alcohol abuse    History of renal cell cancer    History of left nephrectomy 2/2 RCC    Liver lesion    Lung nodule    Vitamin D deficiency    Under emotional stress     from spouse    Coping style affecting medical condition    Thrombocytopenia    Essential hypertension    Acute adjustment disorder with mixed anxiety and depressed mood    Alcoholic liver disease    Hypoxemia    Closed fracture of left superior pubic ramus    Closed fracture of left inferior pubic ramus    Closed fracture of sacrum    Constipation     Past Medical History:   Diagnosis Date    Alcohol abuse     Anxiety     Delirium tremens 2019    Fatty liver     FH: kidney cancer     Hypertension      Past Surgical History:   Procedure Laterality Date    APPENDECTOMY      NEPHRECTOMY      left     TONSILLECTOMY        General Information       Row Name 23 1149          Physical Therapy Time and Intention    Document Type therapy note (daily note)  -EB     Mode of Treatment individual therapy;physical therapy  -EB       Row Name 23 1149          General Information    Patient Profile Reviewed yes  -EB     Existing Precautions/Restrictions fall;left  TDWBing left LE  -EB       Row Name 23 1149          Cognition    Orientation Status (Cognition) oriented x 3  -EB       Row Name 23 1149          Safety Issues,  Functional Mobility    Safety Issues Affecting Function (Mobility) ability to follow commands;insight into deficits/self-awareness;unable to maintain weight-bearing restrictions  -EB     Impairments Affecting Function (Mobility) balance;endurance/activity tolerance;strength;pain;range of motion (ROM)  -EB               User Key  (r) = Recorded By, (t) = Taken By, (c) = Cosigned By      Initials Name Provider Type     Trina Cardozo PTA Physical Therapist Assistant                   Mobility       Row Name 06/12/23 1149          Bed Mobility    Supine-Sit [place] (Bed Mobility) contact guard  -EB     Sit-Supine [place] (Bed Mobility) not tested  -EB     Assistive Device (Bed Mobility) bed rails;head of bed elevated  -EB       Row Name 06/12/23 1149          Sit-Stand Transfer    Sit-Stand [place] (Transfers) minimum assist (75% patient effort);moderate assist (50% patient effort)  -EB     Assistive Device (Sit-Stand Transfers) walker, front-wheeled  -EB     Comment, (Sit-Stand Transfer) pt cued on TDWB precautions.  -EB       Row Name 06/12/23 1149          Gait/Stairs (Locomotion)    [place] Level (Gait) minimum assist (75% patient effort);moderate assist (50% patient effort)  -EB     Assistive Device (Gait) walker, front-wheeled  -EB     Distance in Feet (Gait) 3ft  -EB     Deviations/Abnormal Patterns (Gait) antalgic;festinating/shuffling  -EB     Left Sided Gait Deviations weight shift ability decreased  -EB     Comment, (Gait/Stairs) max cues to maintain TDWBing.  Pt unable to maintain.  -EB               User Key  (r) = Recorded By, (t) = Taken By, (c) = Cosigned By      Initials Name Provider Type    Trina Mack PTA Physical Therapist Assistant                   Obj/Interventions       Row Name 06/12/23 1152          Motor Skills    Therapeutic Exercise --  BLE: AP, LAQs, seated marches, hip add squeezes, HS, QS, hip abd/add (X10)  -       Row Name 06/12/23 1155          Balance     Balance Assessment sitting static balance  -EB     Position, Sitting Balance sitting edge of bed  -EB               User Key  (r) = Recorded By, (t) = Taken By, (c) = Cosigned By      Initials Name Provider Type    Trina Mack PTA Physical Therapist Assistant                   Goals/Plan    No documentation.                  Clinical Impression       Row Name 06/12/23 1154          Pain    Pain Location - Side/Orientation Left  -     Pain Location - hip  -EB       Row Name 06/12/23 1154          Plan of Care Review    Plan of Care Reviewed With patient  -EB     Progress improving  -EB     Outcome Evaluation Pt see for PT treatment today. Pt worked on BLE exercises with minimal c/o difficulty. Pt continues to be unable to maintain TDwbing precautions with transfers. Pt did improve with bed mobility needing CGA and STS transfers Eleanor/modA. Max cueing for wbing precautions, however pt continues to have difficulty. Will continue to progress pt as able.  -EB       Row Name 06/12/23 1154          Therapy Assessment/Plan (PT)    Therapy Frequency (PT) 5 times/wk  -EB       Row Name 06/12/23 1154          Positioning and Restraints    Pre-Treatment Position in bed  -EB     Post Treatment Position chair  -EB     In Chair reclined;call light within reach;encouraged to call for assist;exit alarm on  -EB               User Key  (r) = Recorded By, (t) = Taken By, (c) = Cosigned By      Initials Name Provider Type    Trina Mack PTA Physical Therapist Assistant                   Outcome Measures       Row Name 06/12/23 1158 06/12/23 0800       How much help from another person do you currently need...    Turning from your back to your side while in flat bed without using bedrails? 3  -EB 3  -JS    Moving from lying on back to sitting on the side of a flat bed without bedrails? 3  -EB 3  -JS    Moving to and from a bed to a chair (including a wheelchair)? 2  -EB 2  -JS    Standing up from a chair using your arms  (e.g., wheelchair, bedside chair)? 2  -EB 2  -JS    Climbing 3-5 steps with a railing? 1  -EB 1  -JS    To walk in hospital room? 2  -EB 1  -JS    AM-PAC 6 Clicks Score (PT) 13  -EB 12  -JS    Highest level of mobility 4 --> Transferred to chair/commode  -EB 4 --> Transferred to chair/commode  -JS              User Key  (r) = Recorded By, (t) = Taken By, (c) = Cosigned By      Initials Name Provider Type    Marcel May, RN Registered Nurse    Trina Mack PTA Physical Therapist Assistant                                 Physical Therapy Education       Title: PT OT SLP Therapies (Done)       Topic: Physical Therapy (Done)       Point: Mobility training (Done)       Learning Progress Summary             Patient Acceptance, E,D, NR,VU by ALMA at 6/12/2023 1158    Acceptance, E, VU,NR by ALMA at 6/9/2023 1548    Acceptance, E,TB, NR by JOSE at 6/8/2023 1803    Acceptance, E,D, NR by ALMA at 6/8/2023 1607    Acceptance, E,D, NR by KASSIDY at 6/7/2023 1234    Acceptance, E,D, NR by ALMA at 6/6/2023 1137    Acceptance, E, NR,VU by MEHRAN at 6/4/2023 1146                         Point: Home exercise program (Done)       Learning Progress Summary             Patient Acceptance, E,D, NR,VU by EB at 6/12/2023 1158    Acceptance, E, VU,NR by ALMA at 6/9/2023 1548    Acceptance, E,TB, NR by JOSE at 6/8/2023 1803    Acceptance, E,D, NR by KASSIDY at 6/7/2023 1234    Acceptance, E, NR,VU by MEHRAN at 6/4/2023 1146                         Point: Body mechanics (Done)       Learning Progress Summary             Patient Acceptance, E,D, NR,VU by EB at 6/12/2023 1158    Acceptance, E, VU,NR by EB at 6/9/2023 1548    Acceptance, E,TB, NR by JOSE at 6/8/2023 1803    Acceptance, E,D, NR by ALMA at 6/8/2023 1607    Acceptance, E,D, NR by KASSIDY at 6/7/2023 1234    Acceptance, E,D, NR by EB at 6/6/2023 1137    Acceptance, E, NR,VU by SM at 6/4/2023 1146                         Point: Precautions (Done)       Learning Progress Summary             Patient Acceptance,  E,D, NR,VU by EB at 6/12/2023 1158    Acceptance, E, VU,NR by EB at 6/9/2023 1548    Acceptance, E,TB, NR by LN at 6/8/2023 1803    Acceptance, E,D, NR by EB at 6/8/2023 1607    Acceptance, E,D, NR by JM at 6/7/2023 1234    Acceptance, E,D, NR by EB at 6/6/2023 1137    Acceptance, E, NR,VU by  at 6/4/2023 1146                                         User Key       Initials Effective Dates Name Provider Type Discipline     03/07/18 -  Rupa Barraza PTA Physical Therapist Assistant PT    EB 02/14/23 -  Trina Cardozo PTA Physical Therapist Assistant PT    LN 06/01/21 -  Shakira Del Rosario, RN Registered Nurse Nurse     05/02/22 -  Preethi Henao, DORETHA Physical Therapist PT                  PT Recommendation and Plan     Plan of Care Reviewed With: patient  Progress: improving  Outcome Evaluation: Pt see for PT treatment today. Pt worked on BLE exercises with minimal c/o difficulty. Pt continues to be unable to maintain TDwbing precautions with transfers. Pt did improve with bed mobility needing CGA and STS transfers Eleanor/modA. Max cueing for wbing precautions, however pt continues to have difficulty. Will continue to progress pt as able.     Time Calculation:    PT Charges       Row Name 06/12/23 1148             Time Calculation    Start Time 0851  -EB      Stop Time 0914  -EB      Time Calculation (min) 23 min  -EB      PT Received On 06/12/23  -EB      PT - Next Appointment 06/13/23  -EB         Time Calculation- PT    Total Timed Code Minutes- PT 23 minute(s)  -EB                User Key  (r) = Recorded By, (t) = Taken By, (c) = Cosigned By      Initials Name Provider Type    EB Trina Cardozo PTA Physical Therapist Assistant                  Therapy Charges for Today       Code Description Service Date Service Provider Modifiers Qty    66461335557 HC PT THER PROC EA 15 MIN 6/12/2023 Trina Cardozo PTA GP 1    81763041079 HC PT THERAPEUTIC ACT EA 15 MIN 6/12/2023 Trina Cardozo PTA GP 1            PT  G-Codes  AM-[facility] 6 Clicks Score (PT): 13       Trina Cardozo, PTA  6/12/2023

## 2023-06-12 NOTE — PLAN OF CARE
Goal Outcome Evaluation:         Pt is alert and oriented, pt has called out for pain med,see MAR. Pt's VSS. Pt remains on 1.5. Pt made small BM on bed pan. Pt has refused weight shifting and turning, has positioned himself at times. Will continue to monitor.

## 2023-06-12 NOTE — PLAN OF CARE
Goal Outcome Evaluation:  Plan of Care Reviewed With: patient        Progress: improving  Outcome Evaluation: Pt see for PT treatment today. Pt worked on BLE exercises with minimal c/o difficulty. Pt continues to be unable to maintain TDwbing precautions with transfers. Pt did improve with bed mobility needing CGA and STS transfers Eleanor/modA. Max cueing for wbing precautions, however pt continues to have difficulty. Will continue to progress pt as able.

## 2023-06-13 LAB
ALBUMIN SERPL-MCNC: 3.4 G/DL (ref 3.5–5.2)
ALBUMIN/GLOB SERPL: 1.1 G/DL
ALP SERPL-CCNC: 98 U/L (ref 39–117)
ALT SERPL W P-5'-P-CCNC: 18 U/L (ref 1–41)
ANION GAP SERPL CALCULATED.3IONS-SCNC: 8.5 MMOL/L (ref 5–15)
AST SERPL-CCNC: 26 U/L (ref 1–40)
BASOPHILS # BLD AUTO: 0.05 10*3/MM3 (ref 0–0.2)
BASOPHILS NFR BLD AUTO: 1 % (ref 0–1.5)
BILIRUB SERPL-MCNC: 0.4 MG/DL (ref 0–1.2)
BUN SERPL-MCNC: 16 MG/DL (ref 8–23)
BUN/CREAT SERPL: 18.4 (ref 7–25)
CALCIUM SPEC-SCNC: 9.3 MG/DL (ref 8.6–10.5)
CHLORIDE SERPL-SCNC: 97 MMOL/L (ref 98–107)
CO2 SERPL-SCNC: 30.5 MMOL/L (ref 22–29)
CREAT SERPL-MCNC: 0.87 MG/DL (ref 0.76–1.27)
DEPRECATED RDW RBC AUTO: 43 FL (ref 37–54)
EGFRCR SERPLBLD CKD-EPI 2021: 96.4 ML/MIN/1.73
EOSINOPHIL # BLD AUTO: 0.15 10*3/MM3 (ref 0–0.4)
EOSINOPHIL NFR BLD AUTO: 3 % (ref 0.3–6.2)
ERYTHROCYTE [DISTWIDTH] IN BLOOD BY AUTOMATED COUNT: 12.3 % (ref 12.3–15.4)
GLOBULIN UR ELPH-MCNC: 3 GM/DL
GLUCOSE SERPL-MCNC: 88 MG/DL (ref 65–99)
HCT VFR BLD AUTO: 33.3 % (ref 37.5–51)
HGB BLD-MCNC: 11.7 G/DL (ref 13–17.7)
IMM GRANULOCYTES # BLD AUTO: 0.01 10*3/MM3 (ref 0–0.05)
IMM GRANULOCYTES NFR BLD AUTO: 0.2 % (ref 0–0.5)
LYMPHOCYTES # BLD AUTO: 1.1 10*3/MM3 (ref 0.7–3.1)
LYMPHOCYTES NFR BLD AUTO: 22.1 % (ref 19.6–45.3)
MCH RBC QN AUTO: 33.1 PG (ref 26.6–33)
MCHC RBC AUTO-ENTMCNC: 35.1 G/DL (ref 31.5–35.7)
MCV RBC AUTO: 94.1 FL (ref 79–97)
MONOCYTES # BLD AUTO: 0.59 10*3/MM3 (ref 0.1–0.9)
MONOCYTES NFR BLD AUTO: 11.9 % (ref 5–12)
NEUTROPHILS NFR BLD AUTO: 3.07 10*3/MM3 (ref 1.7–7)
NEUTROPHILS NFR BLD AUTO: 61.8 % (ref 42.7–76)
NRBC BLD AUTO-RTO: 0 /100 WBC (ref 0–0.2)
PLATELET # BLD AUTO: 196 10*3/MM3 (ref 140–450)
PMV BLD AUTO: 9.9 FL (ref 6–12)
POTASSIUM SERPL-SCNC: 4.2 MMOL/L (ref 3.5–5.2)
PROT SERPL-MCNC: 6.4 G/DL (ref 6–8.5)
RBC # BLD AUTO: 3.54 10*6/MM3 (ref 4.14–5.8)
SODIUM SERPL-SCNC: 136 MMOL/L (ref 136–145)
WBC NRBC COR # BLD: 4.97 10*3/MM3 (ref 3.4–10.8)

## 2023-06-13 PROCEDURE — 80053 COMPREHEN METABOLIC PANEL: CPT | Performed by: INTERNAL MEDICINE

## 2023-06-13 PROCEDURE — 97116 GAIT TRAINING THERAPY: CPT

## 2023-06-13 PROCEDURE — 97110 THERAPEUTIC EXERCISES: CPT

## 2023-06-13 PROCEDURE — 85025 COMPLETE CBC W/AUTO DIFF WBC: CPT | Performed by: INTERNAL MEDICINE

## 2023-06-13 PROCEDURE — 25010000002 ENOXAPARIN PER 10 MG: Performed by: INTERNAL MEDICINE

## 2023-06-13 RX ADMIN — Medication 100 MG: at 08:16

## 2023-06-13 RX ADMIN — LIDOCAINE 2 PATCH: 50 PATCH CUTANEOUS at 08:17

## 2023-06-13 RX ADMIN — Medication 1 TABLET: at 08:16

## 2023-06-13 RX ADMIN — ENOXAPARIN SODIUM 40 MG: 100 INJECTION SUBCUTANEOUS at 14:32

## 2023-06-13 RX ADMIN — Medication 10 ML: at 22:06

## 2023-06-13 RX ADMIN — PROPRANOLOL HYDROCHLORIDE 40 MG: 20 TABLET ORAL at 22:05

## 2023-06-13 RX ADMIN — Medication 1 MG: at 08:16

## 2023-06-13 RX ADMIN — RISPERIDONE 2 MG: 2 TABLET, FILM COATED ORAL at 08:16

## 2023-06-13 RX ADMIN — OXYCODONE AND ACETAMINOPHEN 1 TABLET: 5; 325 TABLET ORAL at 08:23

## 2023-06-13 RX ADMIN — PROPRANOLOL HYDROCHLORIDE 40 MG: 20 TABLET ORAL at 08:16

## 2023-06-13 RX ADMIN — OXYCODONE AND ACETAMINOPHEN 1 TABLET: 5; 325 TABLET ORAL at 01:54

## 2023-06-13 RX ADMIN — OXYCODONE AND ACETAMINOPHEN 1 TABLET: 5; 325 TABLET ORAL at 19:23

## 2023-06-13 RX ADMIN — RISPERIDONE 2 MG: 2 TABLET, FILM COATED ORAL at 22:05

## 2023-06-13 RX ADMIN — MIRTAZAPINE 15 MG: 15 TABLET, FILM COATED ORAL at 20:12

## 2023-06-13 RX ADMIN — POLYETHYLENE GLYCOL 3350 17 G: 17 POWDER, FOR SOLUTION ORAL at 20:12

## 2023-06-13 RX ADMIN — OXYCODONE AND ACETAMINOPHEN 1 TABLET: 5; 325 TABLET ORAL at 14:32

## 2023-06-13 RX ADMIN — FLUVOXAMINE MALEATE 100 MG: 50 TABLET, FILM COATED ORAL at 08:16

## 2023-06-13 NOTE — PROGRESS NOTES
"Nutrition Services    Patient Name:  Nilesh Zapata  YOB: 1960  MRN: [id number]  Admit Date:  6/3/2023    Assessment Date:  06/13/23    Comment: Nutrition assessment triggered by LOS x 10 days.  S/p fall.  ETOH abuse, trying to quit, drinks pint/day.  L acetabular and superior and inferior pubic rami fractures - conservative treatment.  Eating well, 100% at meals.    Unavailable at time of attempted visit.  CCP working on placement.    CLINICAL NUTRITION ASSESSMENT      Reason for Assessment Length of Stay     Diagnosis/Problem   Fall    Medical/Surgical History Past Medical History:   Diagnosis Date    Alcohol abuse     Anxiety     Delirium tremens 03/20/2019    Fatty liver     FH: kidney cancer     Hypertension        Past Surgical History:   Procedure Laterality Date    APPENDECTOMY      NEPHRECTOMY      left     TONSILLECTOMY          Encounter Information        Nutrition History:     Food Preferences:    Supplements:    Factors Affecting Intake: No factors at this time     Anthropometrics        Current Height  Current Weight  BMI kg/m2 Height: 167.6 cm (66\")  Weight: 99.9 kg (220 lb 3.8 oz) (06/13/23 0600)  Body mass index is 35.55 kg/m².   Adjusted BMI (if applicable)    BMI Category Obese, Class II (35 - 39.9)       Admission Weight 220 lb (6/13)       Ideal Body Weight (IBW) 142 lb (64.5 kg)   Adjusted IBW (if applicable)        Usual Body Weight (UBW) 217/223 lb (6/2022)   Weight Change/Trend Stable       Weight History Wt Readings from Last 30 Encounters:   06/13/23 0600 99.9 kg (220 lb 3.8 oz)   06/13/23 0334 99.9 kg (220 lb 3.8 oz)   06/12/23 0500 99.2 kg (218 lb 11.1 oz)   06/12/23 0300 99.2 kg (218 lb 11.1 oz)   06/11/23 0337 99 kg (218 lb 4.1 oz)   06/10/23 0600 94.8 kg (208 lb 15.9 oz)   06/09/23 0300 94.8 kg (208 lb 15.9 oz)   06/08/23 0415 93.1 kg (205 lb 4 oz)   06/07/23 0400 92.4 kg (203 lb 11.3 oz)   06/06/23 0300 91.4 kg (201 lb 8 oz)   06/05/23 0303 93.9 kg (207 lb 0.2 oz) "   06/04/23 0438 95.6 kg (210 lb 12.2 oz)   06/03/23 0736 95.3 kg (210 lb)   12/26/22 1326 95.3 kg (210 lb)   12/27/22 1852 95.3 kg (210 lb)   06/03/22 0521 101 kg (223 lb 8.7 oz)   06/02/22 0609 98.7 kg (217 lb 11.2 oz)   05/30/22 1808 97.9 kg (215 lb 13.3 oz)   11/03/19 0524 99.9 kg (220 lb 4.8 oz)   11/02/19 0543 103 kg (226 lb 12.8 oz)   11/01/19 0557 103 kg (226 lb 12.8 oz)   10/31/19 0441 103 kg (227 lb 11.2 oz)   10/30/19 2042 100 kg (220 lb 11.2 oz)   10/30/19 1442 101 kg (223 lb)   03/25/19 0500 90.8 kg (200 lb 2.8 oz)   03/24/19 0520 94.6 kg (208 lb 8.9 oz)   03/23/19 0600 93.7 kg (206 lb 9.1 oz)   03/19/19 2347 90.1 kg (198 lb 9.6 oz)   12/18/17 0457 96.6 kg (213 lb)   12/17/17 1003 95.7 kg (211 lb)   12/17/17 0554 113 kg (250 lb)           --  Tests/Procedures        Tests/Procedures No new tests/procedures     Labs       Pertinent Labs    Results from last 7 days   Lab Units 06/13/23  0538 06/12/23  0516 06/11/23  0530   SODIUM mmol/L 136 135* 137   POTASSIUM mmol/L 4.2 4.2 4.3   CHLORIDE mmol/L 97* 97* 99   CO2 mmol/L 30.5* 32.4* 30.5*   BUN mg/dL 16 18 17   CREATININE mg/dL 0.87 0.79 0.75*   CALCIUM mg/dL 9.3 9.6 9.4   BILIRUBIN mg/dL 0.4 0.4 0.4   ALK PHOS U/L 98 90 83   ALT (SGPT) U/L 18 23 23   AST (SGOT) U/L 26 26 28   GLUCOSE mg/dL 88 89 85     Results from last 7 days   Lab Units 06/13/23  0538   HEMOGLOBIN g/dL 11.7*   HEMATOCRIT % 33.3*   WBC 10*3/mm3 4.97   ALBUMIN g/dL 3.4*     Results from last 7 days   Lab Units 06/13/23  0538 06/12/23  0516 06/11/23  0530 06/10/23  0450 06/09/23  0738   PLATELETS 10*3/mm3 196 185 160 143 119*     COVID19   Date Value Ref Range Status   05/30/2022 Not Detected Not Detected - Ref. Range Final     No results found for: HGBA1C       Medications           Scheduled Medications enoxaparin, 40 mg, Subcutaneous, Q24H  fluvoxaMINE, 100 mg, Oral, Daily  folic acid, 1 mg, Oral, Daily  lidocaine, 2 patch, Transdermal, Q24H  mirtazapine, 15 mg, Oral,  Nightly  multivitamin, 1 tablet, Oral, Daily  senna-docusate sodium, 2 tablet, Oral, BID   And  polyethylene glycol, 17 g, Oral, BID  propranolol, 40 mg, Oral, BID  risperiDONE, 2 mg, Oral, BID  thiamine, 100 mg, Oral, Daily       Infusions     PRN Medications   acetaminophen    senna-docusate sodium **AND** polyethylene glycol **AND** bisacodyl **AND** bisacodyl    Calcium Replacement - Follow Nurse / BPA Driven Protocol    cloNIDine    HYDROmorphone    Magnesium Standard Dose Replacement - Follow Nurse / BPA Driven Protocol    Magnesium Standard Dose Replacement - Follow Nurse / BPA Driven Protocol    melatonin    ondansetron **OR** ondansetron    oxyCODONE-acetaminophen    Phosphorus Replacement - Follow Nurse / BPA Driven Protocol    Potassium Replacement - Follow Nurse / BPA Driven Protocol    [COMPLETED] Insert Peripheral IV **AND** sodium chloride     Physical Findings          Physical Appearance alert, obese, oriented, on oxygen therapy   Oral/Mouth Cavity WNL   Edema  no edema   Gastrointestinal normoactive, last bowel movement:6/12   Skin  bruising   Tubes/Drains/Lines none   NFPE Not indicated at this time   --  Current Nutrition Orders & Evaluation of Intake       Oral Nutrition     Food Allergies NKFA   Current PO Diet Diet: Fluid Restriction (240 mL/tray) Diets; 1500 mL/day; Texture: Regular Texture (IDDSI 7); Fluid Consistency: Thin (IDDSI 0)   Supplement n/a   PO Evaluation     % PO Intake 100% x 4 meals    # of Days Evaluated 4   --  PES STATEMENT / NUTRITION DIAGNOSIS      Nutrition Dx Problem  Problem: Nutrition Appropriate for Condition at this Time  Etiology: Medical Diagnosis s/p fall, fractures (conservative tx)  Signs/Symptoms: PO intake 100% at meals    Comment:    --  NUTRITION INTERVENTION / PLAN OF CARE      Intervention Goal(s) Maintain nutrition status, Reduce/improve symptoms, Disease management/therapy, Maintain intake, and Appropriate weight loss         RD Intervention/Action  Follow Tx Progress and Care plan reviewed         Prescription/Orders:       PO Diet       Supplements       Snacks       Enteral Nutrition       Parenteral Nutrition    New Prescription Ordered? No changes at this time   --      Monitor/Evaluation Per protocol, PO intake, Pertinent labs, Weight, Symptoms   Discharge Plan/Needs Pending clinical course   Education Will instruct as appropriate   --    RD to follow per protocol.      Electronically signed by:  Eliane Christensen RD  06/13/23 16:33 EDT

## 2023-06-13 NOTE — PLAN OF CARE
Goal Outcome Evaluation:  Plan of Care Reviewed With: patient        Progress: improving  Outcome Evaluation: Pt seen for PT treatment this PM. Pt needed CGA with bed mobility and Eleanor with STS transfers. Max cueing for WBing precautions of left LE. Pt ambulated about 8ft with rwx, Eleanor/ModA. Pt continues to have difficulty with WBing precautions despite max cues. Pt able to peform bilateral LE exercises. Will continue to progress pt as able. Recommend SNF at d/c          BUE grossly assessed to be at least 3+/5 as observed functionally against gravity and during transfers

## 2023-06-13 NOTE — CASE MANAGEMENT/SOCIAL WORK
Continued Stay Note  [facility]     Patient Name: Nilesh Zapata  MRN: [id number]  Today's Date: 6/13/2023    Admit Date: 6/3/2023    Plan: Plan home with HH or skilled care at accepting facility- pre cert needed.   GINA Marie RN   Discharge Plan       Row Name 06/13/23 1321       Plan    Plan Plan home with HH or skilled care at accepting facility- pre cert needed.   GINA Marie RN    Patient/Family in Agreement with Plan yes    Plan Comments Ginny  ( [phone]) called for update on bed status at [facility]. Per Ginny no beds available at this time.  Pt provided a SNF list and [facility] for reference. Pt requested referral to [facility].  Yana  ( [phone]) called to follow.  Plan home with HH or skilled care at accepting facility- pre cert needed. GINA Marie RN                   Discharge Codes    No documentation.                 Expected Discharge Date and Time       Expected Discharge Date Expected Discharge Time    Jun 13, 2023               Sona Marie RN

## 2023-06-13 NOTE — PLAN OF CARE
Goal Outcome Evaluation:               Pt. Alert, oriented x4, being monitored for BM, Pt used bedside commode with1 staff assisted, reported Pt had BM in moderate amounts, voiced c/o pain, pain medicine po given x2 as ordered, v/s stable with checking BP lowering, 02 sats kept over 90% with 02 inhal. @2l/m per n/c, no s/s acute distress noted

## 2023-06-13 NOTE — PROGRESS NOTES
Name: Nilesh Zapata ADMIT: 6/3/2023   : 1960  PCP: Shakira Colbert GRACIE    MRN: [id number] LOS: 10 days   AGE/SEX: 62 y.o. male  ROOM: [facility]     Subjective   Subjective   Patient seen and examined this morning. Hospital day 10. At time of my examination, patient is awake, alert, resting comfortably in bed. Discussed with nursing, no reported acute events overnight. Patient with no acute complaints at present, pain better controlled. Planning to work more with PT today.         Objective   Objective   Vital Signs  Temp:  [97.4 °F (36.3 °C)-98 °F (36.7 °C)] 97.9 °F (36.6 °C)  Heart Rate:  [71-86] 71  Resp:  [16-18] 16  BP: ()/(64-76) 119/76  SpO2:  [90 %-95 %] 94 %  on  Flow (L/min):  [1.5] 1.5;   Device (Oxygen Therapy): nasal cannula  Body mass index is 35.55 kg/m².  Physical Exam  Vitals and nursing note reviewed.   Constitutional:       General: He is awake. He is not in acute distress.     Appearance: He is obese. He is ill-appearing.   Cardiovascular:      Rate and Rhythm: Normal rate and regular rhythm.      Pulses: Normal pulses.      Heart sounds: Normal heart sounds.   Pulmonary:      Effort: Pulmonary effort is normal. No respiratory distress.      Breath sounds: Normal breath sounds.   Abdominal:      General: Bowel sounds are normal. There is no distension.      Palpations: Abdomen is soft.      Tenderness: There is no abdominal tenderness.   Skin:     General: Skin is warm and dry.   Neurological:      General: No focal deficit present.      Mental Status: He is alert.   Psychiatric:         Behavior: Behavior is cooperative.     Results Review     I reviewed the patient's new clinical results.  Results from last 7 days   Lab Units 23  0538 23  0516 23  0530 06/10/23  0450   WBC 10*3/mm3 4.97 4.92 5.51 6.60   HEMOGLOBIN g/dL 11.7* 11.7* 11.7* 11.9*   PLATELETS 10*3/mm3 196 185 160 143     Results from last 7 days   Lab Units 23  0538 23  0516  06/11/23  0530 06/10/23  0450   SODIUM mmol/L 136 135* 137 129*   POTASSIUM mmol/L 4.2 4.2 4.3 4.4   CHLORIDE mmol/L 97* 97* 99 95*   CO2 mmol/L 30.5* 32.4* 30.5* 26.2   BUN mg/dL 16 18 17 18   CREATININE mg/dL 0.87 0.79 0.75* 0.76   GLUCOSE mg/dL 88 89 85 90   EGFR mL/min/1.73 96.4 99.2 100.8 100.4     Results from last 7 days   Lab Units 06/13/23 0538 06/12/23 0516 06/11/23  0530 06/10/23  0450   ALBUMIN g/dL 3.4* 3.4* 3.4* 3.1*   BILIRUBIN mg/dL 0.4 0.4 0.4 0.3   ALK PHOS U/L 98 90 83 90   AST (SGOT) U/L 26 26 28 31   ALT (SGPT) U/L 18 23 23 25     Results from last 7 days   Lab Units 06/13/23  0538 06/12/23  0516 06/11/23  0530 06/10/23  0450   CALCIUM mg/dL 9.3 9.6 9.4 9.0   ALBUMIN g/dL 3.4* 3.4* 3.4* 3.1*       No results found for: HGBA1C, POCGLU    No radiology results for the last day  I have personally reviewed all medications:  Scheduled Medications  enoxaparin, 40 mg, Subcutaneous, Q24H  fluvoxaMINE, 100 mg, Oral, Daily  folic acid, 1 mg, Oral, Daily  lidocaine, 2 patch, Transdermal, Q24H  mirtazapine, 15 mg, Oral, Nightly  multivitamin, 1 tablet, Oral, Daily  senna-docusate sodium, 2 tablet, Oral, BID   And  polyethylene glycol, 17 g, Oral, BID  propranolol, 40 mg, Oral, BID  risperiDONE, 2 mg, Oral, BID  thiamine, 100 mg, Oral, Daily    Infusions   Diet  Diet: Fluid Restriction (240 mL/tray) Diets; 1500 mL/day; Texture: Regular Texture (IDDSI 7); Fluid Consistency: Thin (IDDSI 0)    I have personally reviewed:  [x]  Laboratory   [x]  Microbiology   [x]  Radiology   [x]  EKG/Telemetry  [x]  Cardiology/Vascular   []  Pathology    []  Records       Assessment/Plan     Active Hospital Problems    Diagnosis  POA   • Constipation [K59.00]  Yes   • Closed fracture of left superior pubic ramus [S32.512A]  Yes   • Closed fracture of left inferior pubic ramus [S32.592A]  Yes   • Closed fracture of sacrum [S32.10XA]  Yes   • Essential hypertension [I10]  Yes   • Thrombocytopenia [D69.6]  Yes   • Alcohol abuse  [F10.10]  Yes   • History of left nephrectomy 2/2 RCC [Z90.5]  Not Applicable      Resolved Hospital Problems   No resolved problems to display.       62 y.o. male admitted with <principal problem not specified>.    Left Superior and Inferior Pubic Rami Fractures with Sacral Ala Fracture  Acute pain secondary to trauma/post-operative pain  - from mechanical fall  - appreciate orthopedic surgery evaluation, recommending conservative treatment  - toe-touch weightbearing on the LLE  - continue bowel regimen  - continue percocet and lidocaine patches, has IV PRN dilaudid, however, oral medications preferred, especially in setting of pending discharge soon to SNF.   - Patient currently receiving IV pain medication PRN, requiring close monitoring. Need to closely monitor for over-sedation, respiratory depression and constipation.    Alcohol Abuse/Liver Cirrhosis  - no evidence of withdrawal, cirrhosis is well-compensated  - continue on current regimen with the exception of reduced propranolol (see below), vitamin replacement  - Closely monitor volume status, monitor for withdrawal symptoms. Monitor on telemetry.     Hypertension  - BP previously on the low end, adjusted medications (stopped norvasc and decrease propranolol to 40mg BID) which has led to slight improvement.  - At present, BP acceptable acutely. Appears stable. No indications to warrant acute changes/intervention at this time.  - Continue current medications as prescribed. Trend BP to guide ongoing management decisions.     Hyponatremia, improving  - Sodium found to be low previously, now improving, can monitor for now, no further workup or treatment at this time.  - Order repeat BMP in AM for reassessment.    Thrombocytopenia, resolved  - Platelets found to be low previously, due to EtOH abuse and possibly a degree of consumption from acute fracture, now within normal limits.   - Order repeat CBC in AM for reassessment.       Disposition pending, discussed  with CCP, could have difficulty with finding placement, patient strongly encouraged to work with PT today during my visit, he voiced understanding and was agreeable.    All workup, problems and plans new to me today. First day taking care of patient.    Lovenox 40 mg SC daily for DVT prophylaxis.  Full code.  Discussed with patient, nursing staff, and CCP.  Anticipate discharge to SNU facility once arrangements have been made..      Aayush Ferrer DO  [facility]  06/13/23  12:32 EDT

## 2023-06-13 NOTE — THERAPY TREATMENT NOTE
Patient Name: Nilesh Zapata  : 1960    MRN: [id number]                              Today's Date: 2023       Admit Date: 6/3/2023    Visit Dx:     ICD-10-CM ICD-9-CM   1. Alcohol withdrawal syndrome without complication  F10.930 291.81   2. Contusion of left hip, initial encounter  S70.02XA 924.01   3. Alcoholism  F10.20 303.90   4. Hypoxia  R09.02 799.02     Patient Active Problem List   Diagnosis    Alcohol withdrawal syndrome with complication    Anxiety    Fatty liver    Tobacco abuse    Kidney cancer, primary, with metastasis from kidney to other site    Alcoholism    Hyponatremia    Alcohol abuse    History of renal cell cancer    History of left nephrectomy 2/2 RCC    Liver lesion    Lung nodule    Vitamin D deficiency    Under emotional stress     from spouse    Coping style affecting medical condition    Thrombocytopenia    Essential hypertension    Acute adjustment disorder with mixed anxiety and depressed mood    Alcoholic liver disease    Hypoxemia    Closed fracture of left superior pubic ramus    Closed fracture of left inferior pubic ramus    Closed fracture of sacrum    Constipation     Past Medical History:   Diagnosis Date    Alcohol abuse     Anxiety     Delirium tremens 2019    Fatty liver     FH: kidney cancer     Hypertension      Past Surgical History:   Procedure Laterality Date    APPENDECTOMY      NEPHRECTOMY      left     TONSILLECTOMY        General Information       Row Name 23 1603          Physical Therapy Time and Intention    Document Type therapy note (daily note)  -EB     Mode of Treatment individual therapy;physical therapy  -EB       Row Name 23 1603          General Information    Patient Profile Reviewed yes  -EB     Existing Precautions/Restrictions fall;left  TDWB left LE  -EB       Row Name 23 1603          Cognition    Orientation Status (Cognition) oriented x 3  -EB       Row Name 23 1607          Safety Issues, Functional  Mobility    Impairments Affecting Function (Mobility) balance;endurance/activity tolerance;strength;pain;range of motion (ROM)  -EB               User Key  (r) = Recorded By, (t) = Taken By, (c) = Cosigned By      Initials Name Provider Type    Trina Mack PTA Physical Therapist Assistant                   Mobility       Row Name 06/13/23 1603          Bed Mobility    Supine-Sit [place] (Bed Mobility) contact guard  -EB     Sit-Supine [place] (Bed Mobility) not tested  -EB     Assistive Device (Bed Mobility) bed rails;head of bed elevated  -EB       Row Name 06/13/23 1603          Sit-Stand Transfer    Sit-Stand [place] (Transfers) minimum assist (75% patient effort)  -EB     Assistive Device (Sit-Stand Transfers) walker, front-wheeled  -EB     Comment, (Sit-Stand Transfer) cues for TDWBing precautions  -EB       Row Name 06/13/23 1603          Gait/Stairs (Locomotion)    Paris Level (Gait) minimum assist (75% patient effort);moderate assist (50% patient effort)  -EB     Assistive Device (Gait) walker, front-wheeled  -EB     Distance in Feet (Gait) 8ft  -EB     Deviations/Abnormal Patterns (Gait) antalgic;festinating/shuffling;stride length decreased  -EB     Left Sided Gait Deviations weight shift ability decreased  -EB     Comment, (Gait/Stairs) max cues for TDWBing precautions. Pt still has difficulty maintaining. fatigues quickly  -EB               User Key  (r) = Recorded By, (t) = Taken By, (c) = Cosigned By      Initials Name Provider Type    Trina Mack PTA Physical Therapist Assistant                   Obj/Interventions       Row Name 06/13/23 1604          Motor Skills    Therapeutic Exercise --  BLE: LAQs, seated marches, QS, hip abd/add (X10)  -EB               User Key  (r) = Recorded By, (t) = Taken By, (c) = Cosigned By      Initials Name Provider Type    Trina Mack PTA Physical Therapist Assistant                   Goals/Plan    No documentation.                   Clinical Impression       Row Name 06/13/23 1604          Plan of Care Review    Plan of Care Reviewed With patient  -EB     Progress improving  -EB     Outcome Evaluation Pt seen for PT treatment this PM. Pt needed CGA with bed mobility and Eleanor with STS transfers. Max cueing for WBing precautions of left LE. Pt ambulated about 8ft with rwx, Eleanor/ModA. Pt continues to have difficulty with WBing precautions despite max cues. Pt able to peform bilateral LE exercises. Will continue to progress pt as able. Recommend SNF at d/c  -EB       Row Name 06/13/23 1604          Therapy Assessment/Plan (PT)    Therapy Frequency (PT) 5 times/wk  -EB       Row Name 06/13/23 1604          Positioning and Restraints    Pre-Treatment Position in bed  -EB     Post Treatment Position chair  -EB     In Chair reclined;call light within reach;encouraged to call for assist;exit alarm on  -EB               User Key  (r) = Recorded By, (t) = Taken By, (c) = Cosigned By      Initials Name Provider Type    Trina Mack PTA Physical Therapist Assistant                   Outcome Measures       Row Name 06/13/23 1606          How much help from another person do you currently need...    Turning from your back to your side while in flat bed without using bedrails? 3  -EB     Moving from lying on back to sitting on the side of a flat bed without bedrails? 3  -EB     Moving to and from a bed to a chair (including a wheelchair)? 3  -EB     Standing up from a chair using your arms (e.g., wheelchair, bedside chair)? 2  -EB     Climbing 3-5 steps with a railing? 1  -EB     To walk in hospital room? 2  -EB     AM-PAC 6 Clicks Score (PT) 14  -EB     Highest level of mobility 4 --> Transferred to chair/commode  -EB               User Key  (r) = Recorded By, (t) = Taken By, (c) = Cosigned By      Initials Name Provider Type    Trina Mack PTA Physical Therapist Assistant                                 Physical Therapy Education       Title:  PT OT SLP Therapies (In Progress)       Topic: Physical Therapy (In Progress)       Point: Mobility training (In Progress)       Learning Progress Summary             Patient Acceptance, E,D, NR by EB at 6/13/2023 1606    Acceptance, E,D, NR,VU by EB at 6/12/2023 1158    Acceptance, E, VU,NR by EB at 6/9/2023 1548    Acceptance, E,TB, NR by JOSE at 6/8/2023 1803    Acceptance, E,D, NR by EB at 6/8/2023 1607    Acceptance, E,D, NR by KASSIDY at 6/7/2023 1234    Acceptance, E,D, NR by EB at 6/6/2023 1137    Acceptance, E, NR,VU by MEHRAN at 6/4/2023 1146                         Point: Home exercise program (In Progress)       Learning Progress Summary             Patient Acceptance, E,D, NR by EB at 6/13/2023 1606    Acceptance, E,D, NR,VU by EB at 6/12/2023 1158    Acceptance, E, VU,NR by EB at 6/9/2023 1548    Acceptance, E,TB, NR by JOSE at 6/8/2023 1803    Acceptance, E,D, NR by KASSIDY at 6/7/2023 1234    Acceptance, E, NR,VU by MEHRAN at 6/4/2023 1146                         Point: Body mechanics (In Progress)       Learning Progress Summary             Patient Acceptance, E,D, NR by EB at 6/13/2023 1606    Acceptance, E,D, NR,VU by EB at 6/12/2023 1158    Acceptance, E, VU,NR by EB at 6/9/2023 1548    Acceptance, E,TB, NR by JOSE at 6/8/2023 1803    Acceptance, E,D, NR by EB at 6/8/2023 1607    Acceptance, E,D, NR by KASSIDY at 6/7/2023 1234    Acceptance, E,D, NR by EB at 6/6/2023 1137    Acceptance, E, NR,VU by SM at 6/4/2023 1146                         Point: Precautions (In Progress)       Learning Progress Summary             Patient Acceptance, E,D, NR by EB at 6/13/2023 1606    Acceptance, E,D, NR,VU by EB at 6/12/2023 1158    Acceptance, E, VU,NR by EB at 6/9/2023 1548    Acceptance, E,TB, NR by LN at 6/8/2023 1803    Acceptance, E,D, NR by EB at 6/8/2023 1607    Acceptance, E,D, NR by KASSIDY at 6/7/2023 1234    Acceptance, E,D, NR by EB at 6/6/2023 1137    Acceptance, E, NR,VU by MEHRAN at 6/4/2023 1146                                          User Key       Initials Effective Dates Name Provider Type Discipline     03/07/18 -  Rupa Barraza PTA Physical Therapist Assistant PT    EB 02/14/23 -  Trina Cardozo PTA Physical Therapist Assistant PT    LN 06/01/21 -  Shakira Del Rosario, RN Registered Nurse Nurse     05/02/22 -  Preethi Henao, DORETHA Physical Therapist PT                  PT Recommendation and Plan     Plan of Care Reviewed With: patient  Progress: improving  Outcome Evaluation: Pt seen for PT treatment this PM. Pt needed CGA with bed mobility and Eleanor with STS transfers. Max cueing for WBing precautions of left LE. Pt ambulated about 8ft with rwx, Eleanor/ModA. Pt continues to have difficulty with WBing precautions despite max cues. Pt able to peform bilateral LE exercises. Will continue to progress pt as able. Recommend SNF at d/c     Time Calculation:    PT Charges       Row Name 06/13/23 1602             Time Calculation    Start Time 1449  -EB      Stop Time 1512  -EB      Time Calculation (min) 23 min  -EB      PT Received On 06/13/23  -EB      PT - Next Appointment 06/14/23  -EB         Time Calculation- PT    Total Timed Code Minutes- PT 23 minute(s)  -EB                User Key  (r) = Recorded By, (t) = Taken By, (c) = Cosigned By      Initials Name Provider Type    EB Trina Cardozo PTA Physical Therapist Assistant                  Therapy Charges for Today       Code Description Service Date Service Provider Modifiers Qty    46646335268 HC PT THER PROC EA 15 MIN 6/12/2023 Trina Cardozo, PTA GP 1    85995986045 HC PT THERAPEUTIC ACT EA 15 MIN 6/12/2023 Trina Cardozo, PTA GP 1    83664831094 HC GAIT TRAINING EA 15 MIN 6/13/2023 Trina Cardozo, PTA GP 1    55982864442 HC PT THER PROC EA 15 MIN 6/13/2023 Trina Cardozo, [place] GP 1            PT G-Codes  AM-PAC 6 Clicks Score (PT): 14       Trina Cardozo PTA  6/13/2023

## 2023-06-14 PROBLEM — K59.00 CONSTIPATION: Status: RESOLVED | Noted: 2023-06-11 | Resolved: 2023-06-14

## 2023-06-14 PROBLEM — D69.6 THROMBOCYTOPENIA: Status: RESOLVED | Noted: 2022-05-31 | Resolved: 2023-06-14

## 2023-06-14 PROBLEM — I95.9 HYPOTENSION: Status: ACTIVE | Noted: 2023-06-14

## 2023-06-14 PROBLEM — D64.9 ANEMIA: Status: ACTIVE | Noted: 2023-06-14

## 2023-06-14 LAB
ALBUMIN SERPL-MCNC: 3.5 G/DL (ref 3.5–5.2)
ALBUMIN/GLOB SERPL: 1.1 G/DL
ALP SERPL-CCNC: 112 U/L (ref 39–117)
ALT SERPL W P-5'-P-CCNC: 20 U/L (ref 1–41)
ANION GAP SERPL CALCULATED.3IONS-SCNC: 8.9 MMOL/L (ref 5–15)
AST SERPL-CCNC: 25 U/L (ref 1–40)
BASOPHILS # BLD AUTO: 0.03 10*3/MM3 (ref 0–0.2)
BASOPHILS NFR BLD AUTO: 0.5 % (ref 0–1.5)
BILIRUB SERPL-MCNC: 0.3 MG/DL (ref 0–1.2)
BUN SERPL-MCNC: 15 MG/DL (ref 8–23)
BUN/CREAT SERPL: 17.9 (ref 7–25)
CALCIUM SPEC-SCNC: 9.8 MG/DL (ref 8.6–10.5)
CHLORIDE SERPL-SCNC: 96 MMOL/L (ref 98–107)
CO2 SERPL-SCNC: 29.1 MMOL/L (ref 22–29)
CREAT SERPL-MCNC: 0.84 MG/DL (ref 0.76–1.27)
DEPRECATED RDW RBC AUTO: 42.3 FL (ref 37–54)
EGFRCR SERPLBLD CKD-EPI 2021: 97.4 ML/MIN/1.73
EOSINOPHIL # BLD AUTO: 0.12 10*3/MM3 (ref 0–0.4)
EOSINOPHIL NFR BLD AUTO: 2.1 % (ref 0.3–6.2)
ERYTHROCYTE [DISTWIDTH] IN BLOOD BY AUTOMATED COUNT: 12.4 % (ref 12.3–15.4)
GLOBULIN UR ELPH-MCNC: 3.2 GM/DL
GLUCOSE SERPL-MCNC: 85 MG/DL (ref 65–99)
HCT VFR BLD AUTO: 35 % (ref 37.5–51)
HGB BLD-MCNC: 12.1 G/DL (ref 13–17.7)
IMM GRANULOCYTES # BLD AUTO: 0.03 10*3/MM3 (ref 0–0.05)
IMM GRANULOCYTES NFR BLD AUTO: 0.5 % (ref 0–0.5)
LYMPHOCYTES # BLD AUTO: 1.26 10*3/MM3 (ref 0.7–3.1)
LYMPHOCYTES NFR BLD AUTO: 22.5 % (ref 19.6–45.3)
MCH RBC QN AUTO: 32.1 PG (ref 26.6–33)
MCHC RBC AUTO-ENTMCNC: 34.6 G/DL (ref 31.5–35.7)
MCV RBC AUTO: 92.8 FL (ref 79–97)
MONOCYTES # BLD AUTO: 0.62 10*3/MM3 (ref 0.1–0.9)
MONOCYTES NFR BLD AUTO: 11.1 % (ref 5–12)
NEUTROPHILS NFR BLD AUTO: 3.53 10*3/MM3 (ref 1.7–7)
NEUTROPHILS NFR BLD AUTO: 63.3 % (ref 42.7–76)
NRBC BLD AUTO-RTO: 0 /100 WBC (ref 0–0.2)
PLATELET # BLD AUTO: 224 10*3/MM3 (ref 140–450)
PMV BLD AUTO: 9.9 FL (ref 6–12)
POTASSIUM SERPL-SCNC: 4.2 MMOL/L (ref 3.5–5.2)
PROT SERPL-MCNC: 6.7 G/DL (ref 6–8.5)
RBC # BLD AUTO: 3.77 10*6/MM3 (ref 4.14–5.8)
SODIUM SERPL-SCNC: 134 MMOL/L (ref 136–145)
WBC NRBC COR # BLD: 5.59 10*3/MM3 (ref 3.4–10.8)

## 2023-06-14 PROCEDURE — 25010000002 ENOXAPARIN PER 10 MG: Performed by: INTERNAL MEDICINE

## 2023-06-14 PROCEDURE — 80053 COMPREHEN METABOLIC PANEL: CPT | Performed by: INTERNAL MEDICINE

## 2023-06-14 PROCEDURE — 85025 COMPLETE CBC W/AUTO DIFF WBC: CPT | Performed by: INTERNAL MEDICINE

## 2023-06-14 RX ORDER — PROPRANOLOL HYDROCHLORIDE 20 MG/1
20 TABLET ORAL 2 TIMES DAILY
Status: DISCONTINUED | OUTPATIENT
Start: 2023-06-14 | End: 2023-06-15

## 2023-06-14 RX ADMIN — OXYCODONE AND ACETAMINOPHEN 1 TABLET: 5; 325 TABLET ORAL at 14:04

## 2023-06-14 RX ADMIN — POLYETHYLENE GLYCOL 3350 17 G: 17 POWDER, FOR SOLUTION ORAL at 08:26

## 2023-06-14 RX ADMIN — POLYETHYLENE GLYCOL 3350 17 G: 17 POWDER, FOR SOLUTION ORAL at 20:20

## 2023-06-14 RX ADMIN — RISPERIDONE 2 MG: 2 TABLET, FILM COATED ORAL at 20:20

## 2023-06-14 RX ADMIN — MIRTAZAPINE 15 MG: 15 TABLET, FILM COATED ORAL at 20:20

## 2023-06-14 RX ADMIN — OXYCODONE AND ACETAMINOPHEN 1 TABLET: 5; 325 TABLET ORAL at 21:45

## 2023-06-14 RX ADMIN — Medication 100 MG: at 08:26

## 2023-06-14 RX ADMIN — FLUVOXAMINE MALEATE 100 MG: 50 TABLET, FILM COATED ORAL at 08:26

## 2023-06-14 RX ADMIN — OXYCODONE AND ACETAMINOPHEN 1 TABLET: 5; 325 TABLET ORAL at 02:47

## 2023-06-14 RX ADMIN — PROPRANOLOL HYDROCHLORIDE 40 MG: 20 TABLET ORAL at 08:25

## 2023-06-14 RX ADMIN — Medication 3 MG: at 21:45

## 2023-06-14 RX ADMIN — PROPRANOLOL HYDROCHLORIDE 20 MG: 20 TABLET ORAL at 20:20

## 2023-06-14 RX ADMIN — ACETAMINOPHEN 650 MG: 325 TABLET, FILM COATED ORAL at 15:11

## 2023-06-14 RX ADMIN — ENOXAPARIN SODIUM 40 MG: 100 INJECTION SUBCUTANEOUS at 15:09

## 2023-06-14 RX ADMIN — Medication 1 MG: at 08:26

## 2023-06-14 RX ADMIN — LIDOCAINE 2 PATCH: 50 PATCH CUTANEOUS at 08:26

## 2023-06-14 RX ADMIN — DOCUSATE SODIUM 50MG AND SENNOSIDES 8.6MG 2 TABLET: 8.6; 5 TABLET, FILM COATED ORAL at 20:20

## 2023-06-14 RX ADMIN — RISPERIDONE 2 MG: 2 TABLET, FILM COATED ORAL at 08:26

## 2023-06-14 RX ADMIN — DOCUSATE SODIUM 50MG AND SENNOSIDES 8.6MG 2 TABLET: 8.6; 5 TABLET, FILM COATED ORAL at 08:25

## 2023-06-14 RX ADMIN — Medication 1 TABLET: at 08:25

## 2023-06-14 NOTE — CASE MANAGEMENT/SOCIAL WORK
Continued Stay Note  [facility]     Patient Name: Nilesh Zapata  MRN: [id number]  Today's Date: 6/14/2023    Admit Date: 6/3/2023    Plan: Plan home with HH or skilled care at accepting facility- pre cert needed.   GINA Marie RN   Discharge Plan       Row Name 06/14/23 1210       Plan    Plan Plan home with HH or skilled care at accepting facility- pre cert needed.   GINA Marie RN    Patient/Family in Agreement with Plan yes    Plan Comments Per Yana  ( [phone]) [facility].  Pt requesting referral so [facility] and [facility].   Aminah  ([phone]) called with referrals.  Plan home with HH or skilled care at accepting facility- pre cert needed. GINA Marie RN                   Discharge Codes    No documentation.                 Expected Discharge Date and Time       Expected Discharge Date Expected Discharge Time    Elmo 15, 2023               Sona Marie RN

## 2023-06-14 NOTE — PROGRESS NOTES
Name: Nilesh Zapata ADMIT: 6/3/2023   : 1960  PCP: Shakira Colbert GRACIE    MRN: [id number] LOS: 11 days   AGE/SEX: 62 y.o. male  ROOM: [facility]/     Subjective   Subjective   Patient seen and examined this morning. Hospital day 11. At time of my examination, patient is awake, alert, resting comfortably in bed. Discussed with nursing, no reported acute events overnight. Patient with mild ongoing pain 2/2 fractures, however, stable at present. Did have discussion with nursing regarding documented drop in 02 sat, she states that patient has intermittent drops in oxygen while sleeping. However currently he has O2 sat 96%.         Objective   Objective   Vital Signs  Temp:  [97.5 °F (36.4 °C)-97.9 °F (36.6 °C)] 97.5 °F (36.4 °C)  Heart Rate:  [60-68] 65  Resp:  [16] 16  BP: ()/(58-71) 91/58  SpO2:  [86 %-96 %] 96 %  on  Flow (L/min):  [1.5] 1.5;   Device (Oxygen Therapy): nasal cannula  Body mass index is 35.55 kg/m².  Physical Exam  Vitals and nursing note reviewed.   Constitutional:       General: He is awake. He is not in acute distress.     Appearance: He is obese. He is ill-appearing.   Cardiovascular:      Rate and Rhythm: Normal rate and regular rhythm.      Pulses: Normal pulses.      Heart sounds: Normal heart sounds.   Pulmonary:      Effort: Pulmonary effort is normal. No respiratory distress.      Breath sounds: Normal breath sounds. No wheezing.   Abdominal:      General: Bowel sounds are normal. There is no distension.      Palpations: Abdomen is soft.      Tenderness: There is no abdominal tenderness.   Musculoskeletal:         General: Tenderness and signs of injury present.   Skin:     General: Skin is warm and dry.   Neurological:      General: No focal deficit present.      Mental Status: He is alert.   Psychiatric:         Behavior: Behavior is cooperative.     Results Review     I reviewed the patient's new clinical results.  Results from last 7 days   Lab Units 23  0521  06/13/23  0538 06/12/23  0516 06/11/23  0530   WBC 10*3/mm3 5.59 4.97 4.92 5.51   HEMOGLOBIN g/dL 12.1* 11.7* 11.7* 11.7*   PLATELETS 10*3/mm3 224 196 185 160       Results from last 7 days   Lab Units 06/14/23  0552 06/13/23  0538 06/12/23  0516 06/11/23  0530   SODIUM mmol/L 134* 136 135* 137   POTASSIUM mmol/L 4.2 4.2 4.2 4.3   CHLORIDE mmol/L 96* 97* 97* 99   CO2 mmol/L 29.1* 30.5* 32.4* 30.5*   BUN mg/dL 15 16 18 17   CREATININE mg/dL 0.84 0.87 0.79 0.75*   GLUCOSE mg/dL 85 88 89 85   EGFR mL/min/1.73 97.4 96.4 99.2 100.8       Results from last 7 days   Lab Units 06/14/23  0552 06/13/23  0538 06/12/23  0516 06/11/23  0530   ALBUMIN g/dL 3.5 3.4* 3.4* 3.4*   BILIRUBIN mg/dL 0.3 0.4 0.4 0.4   ALK PHOS U/L 112 98 90 83   AST (SGOT) U/L 25 26 26 28   ALT (SGPT) U/L 20 18 23 23       Results from last 7 days   Lab Units 06/14/23  0552 06/13/23  0538 06/12/23  0516 06/11/23  0530   CALCIUM mg/dL 9.8 9.3 9.6 9.4   ALBUMIN g/dL 3.5 3.4* 3.4* 3.4*         No results found for: HGBA1C, POCGLU    No radiology results for the last day  I have personally reviewed all medications:  Scheduled Medications  enoxaparin, 40 mg, Subcutaneous, Q24H  fluvoxaMINE, 100 mg, Oral, Daily  folic acid, 1 mg, Oral, Daily  lidocaine, 2 patch, Transdermal, Q24H  mirtazapine, 15 mg, Oral, Nightly  multivitamin, 1 tablet, Oral, Daily  senna-docusate sodium, 2 tablet, Oral, BID   And  polyethylene glycol, 17 g, Oral, BID  propranolol, 40 mg, Oral, BID  risperiDONE, 2 mg, Oral, BID  thiamine, 100 mg, Oral, Daily    Infusions   Diet  Diet: Fluid Restriction (240 mL/tray) Diets; 1500 mL/day; Texture: Regular Texture (IDDSI 7); Fluid Consistency: Thin (IDDSI 0)    I have personally reviewed:  [x]  Laboratory   [x]  Microbiology   [x]  Radiology   [x]  EKG/Telemetry  [x]  Cardiology/Vascular   []  Pathology    []  Records       Assessment/Plan     Active Hospital Problems    Diagnosis  POA   • **Closed fracture of left superior pubic ramus  [S32.512A]  Yes   • Anemia [D64.9]  Yes   • Constipation [K59.00]  Yes   • Closed fracture of left inferior pubic ramus [S32.592A]  Yes   • Closed fracture of sacrum [S32.10XA]  Yes   • Alcoholic liver disease [K70.9]  Yes   • Essential hypertension [I10]  Yes   • Thrombocytopenia [D69.6]  Yes   • Alcohol abuse [F10.10]  Yes   • History of left nephrectomy 2/2 RCC [Z90.5]  Not Applicable      Resolved Hospital Problems   No resolved problems to display.       62 y.o. male admitted with Closed fracture of left superior pubic ramus.    Left Superior and Inferior Pubic Rami Fractures with Sacral Ala Fracture  Acute pain secondary to trauma/post-operative pain  - from mechanical fall  - appreciate orthopedic surgery evaluation, recommending conservative treatment  - toe-touch weightbearing on the LLE  - continue bowel regimen  - continue percocet and lidocaine patches, has IV PRN dilaudid, however, oral medications preferred, especially in setting of pending discharge soon to SNF.   - Patient currently receiving pain medication PRN, requiring close monitoring. Need to closely monitor for over-sedation, respiratory depression and constipation.    Alcohol Abuse/Liver Cirrhosis  - no evidence of withdrawal, CIWA score 0. Cirrhosis appears to be well compensated at present.  - Continue on current regimen with the exception of reduced propranolol (see below), vitamin replacement  - Closely monitor volume status, monitor for withdrawal symptoms. Monitor on telemetry.     History of Hypertension now with intermittent hypotension  - BP previously on the low end, adjusted medications (stopped norvasc and decrease propranolol to 40mg BID) which initially led to slight improvement, however, on most recent vital checks, BP remains soft with SBP <100. Need to further adjust medications at this time.  - Decrease propanolol further to 20 mg BID.   - Trend BP to guide ongoing management decisions.    Anemia  - Hemoglobin low on most  recent labs, however, stable from prior. No evidence of overt blood loss. No indications for acute intervention at this time.    - Order repeat CBC in AM for reassessment. Continue to monitor, transfuse for hemoglobin <7.     Hyponatremia  - Sodium only slightly low but stable from prior, can monitor for now, no further workup or treatment at this time.  - Order repeat BMP in AM for reassessment.    Thrombocytopenia, resolved  - Platelets found to be low previously, due to EtOH abuse and possibly a degree of consumption from acute fracture, now within normal limits.   - Order repeat CBC in AM for reassessment.    Nocturnal hypoxemia  - Noted by nursing, patient could have component of sleep apnea, will recommend outpatient formal sleep study. Can consider nocturnal oximetry while here.     Disposition pending, discussed with CCP, could have difficulty with finding placement, patient strongly encouraged to work with PT, he voiced understanding and was agreeable.      Lovenox 40 mg SC daily for DVT prophylaxis.  Full code.  Discussed with patient, nursing staff, and CCP.  Anticipate discharge to SNU facility once arrangements have been made..      Aayush Ferrer DO  [facility]  06/14/23  10:53 EDT

## 2023-06-14 NOTE — PLAN OF CARE
Goal Outcome Evaluation:     Patient A&Ox4. Medicated for pain as needed. Awaiting rehab placement vs. .

## 2023-06-14 NOTE — PLAN OF CARE
Goal Outcome Evaluation:  Plan of Care Reviewed With: patient        Progress: no change  Outcome Evaluation: Pt sleeping most of this shift, woke up once and requested prn pain medication for L hip, remains on 2L of O2 satting at 91-93%, taking fluids, voiding adequately, able to shift weights with reminders. Bed alarm on. sr on tele.

## 2023-06-15 LAB
ALBUMIN SERPL-MCNC: 3.7 G/DL (ref 3.5–5.2)
ALBUMIN/GLOB SERPL: 1.1 G/DL
ALP SERPL-CCNC: 131 U/L (ref 39–117)
ALT SERPL W P-5'-P-CCNC: 22 U/L (ref 1–41)
ANION GAP SERPL CALCULATED.3IONS-SCNC: 10 MMOL/L (ref 5–15)
AST SERPL-CCNC: 32 U/L (ref 1–40)
BASOPHILS # BLD AUTO: 0.04 10*3/MM3 (ref 0–0.2)
BASOPHILS NFR BLD AUTO: 0.6 % (ref 0–1.5)
BILIRUB SERPL-MCNC: 0.2 MG/DL (ref 0–1.2)
BUN SERPL-MCNC: 14 MG/DL (ref 8–23)
BUN/CREAT SERPL: 17.5 (ref 7–25)
CALCIUM SPEC-SCNC: 9.4 MG/DL (ref 8.6–10.5)
CHLORIDE SERPL-SCNC: 97 MMOL/L (ref 98–107)
CO2 SERPL-SCNC: 30 MMOL/L (ref 22–29)
CREAT SERPL-MCNC: 0.8 MG/DL (ref 0.76–1.27)
DEPRECATED RDW RBC AUTO: 42.6 FL (ref 37–54)
EGFRCR SERPLBLD CKD-EPI 2021: 98.8 ML/MIN/1.73
EOSINOPHIL # BLD AUTO: 0.13 10*3/MM3 (ref 0–0.4)
EOSINOPHIL NFR BLD AUTO: 1.9 % (ref 0.3–6.2)
ERYTHROCYTE [DISTWIDTH] IN BLOOD BY AUTOMATED COUNT: 12.4 % (ref 12.3–15.4)
GLOBULIN UR ELPH-MCNC: 3.4 GM/DL
GLUCOSE SERPL-MCNC: 96 MG/DL (ref 65–99)
HCT VFR BLD AUTO: 37.1 % (ref 37.5–51)
HGB BLD-MCNC: 12.7 G/DL (ref 13–17.7)
IMM GRANULOCYTES # BLD AUTO: 0.03 10*3/MM3 (ref 0–0.05)
IMM GRANULOCYTES NFR BLD AUTO: 0.4 % (ref 0–0.5)
LYMPHOCYTES # BLD AUTO: 1.19 10*3/MM3 (ref 0.7–3.1)
LYMPHOCYTES NFR BLD AUTO: 17.6 % (ref 19.6–45.3)
MCH RBC QN AUTO: 32.1 PG (ref 26.6–33)
MCHC RBC AUTO-ENTMCNC: 34.2 G/DL (ref 31.5–35.7)
MCV RBC AUTO: 93.7 FL (ref 79–97)
MONOCYTES # BLD AUTO: 0.8 10*3/MM3 (ref 0.1–0.9)
MONOCYTES NFR BLD AUTO: 11.8 % (ref 5–12)
NEUTROPHILS NFR BLD AUTO: 4.57 10*3/MM3 (ref 1.7–7)
NEUTROPHILS NFR BLD AUTO: 67.7 % (ref 42.7–76)
NRBC BLD AUTO-RTO: 0 /100 WBC (ref 0–0.2)
PLATELET # BLD AUTO: 229 10*3/MM3 (ref 140–450)
PMV BLD AUTO: 10.1 FL (ref 6–12)
POTASSIUM SERPL-SCNC: 4.2 MMOL/L (ref 3.5–5.2)
PROT SERPL-MCNC: 7.1 G/DL (ref 6–8.5)
RBC # BLD AUTO: 3.96 10*6/MM3 (ref 4.14–5.8)
SODIUM SERPL-SCNC: 137 MMOL/L (ref 136–145)
WBC NRBC COR # BLD: 6.76 10*3/MM3 (ref 3.4–10.8)

## 2023-06-15 PROCEDURE — 85025 COMPLETE CBC W/AUTO DIFF WBC: CPT | Performed by: INTERNAL MEDICINE

## 2023-06-15 PROCEDURE — 97110 THERAPEUTIC EXERCISES: CPT

## 2023-06-15 PROCEDURE — 80053 COMPREHEN METABOLIC PANEL: CPT | Performed by: INTERNAL MEDICINE

## 2023-06-15 PROCEDURE — 25010000002 ENOXAPARIN PER 10 MG: Performed by: INTERNAL MEDICINE

## 2023-06-15 RX ORDER — OXYCODONE HYDROCHLORIDE AND ACETAMINOPHEN 5; 325 MG/1; MG/1
1 TABLET ORAL EVERY 4 HOURS PRN
Status: DISCONTINUED | OUTPATIENT
Start: 2023-06-15 | End: 2023-06-16 | Stop reason: HOSPADM

## 2023-06-15 RX ORDER — PROPRANOLOL HYDROCHLORIDE 20 MG/1
10 TABLET ORAL 2 TIMES DAILY
Status: DISCONTINUED | OUTPATIENT
Start: 2023-06-15 | End: 2023-06-16 | Stop reason: HOSPADM

## 2023-06-15 RX ADMIN — LIDOCAINE 2 PATCH: 50 PATCH CUTANEOUS at 09:04

## 2023-06-15 RX ADMIN — OXYCODONE AND ACETAMINOPHEN 1 TABLET: 5; 325 TABLET ORAL at 09:04

## 2023-06-15 RX ADMIN — POLYETHYLENE GLYCOL 3350 17 G: 17 POWDER, FOR SOLUTION ORAL at 09:08

## 2023-06-15 RX ADMIN — RISPERIDONE 2 MG: 2 TABLET, FILM COATED ORAL at 09:04

## 2023-06-15 RX ADMIN — DOCUSATE SODIUM 50MG AND SENNOSIDES 8.6MG 2 TABLET: 8.6; 5 TABLET, FILM COATED ORAL at 21:35

## 2023-06-15 RX ADMIN — DOCUSATE SODIUM 50MG AND SENNOSIDES 8.6MG 2 TABLET: 8.6; 5 TABLET, FILM COATED ORAL at 13:13

## 2023-06-15 RX ADMIN — MIRTAZAPINE 15 MG: 15 TABLET, FILM COATED ORAL at 21:35

## 2023-06-15 RX ADMIN — ENOXAPARIN SODIUM 40 MG: 100 INJECTION SUBCUTANEOUS at 17:05

## 2023-06-15 RX ADMIN — RISPERIDONE 2 MG: 2 TABLET, FILM COATED ORAL at 21:36

## 2023-06-15 RX ADMIN — OXYCODONE HYDROCHLORIDE AND ACETAMINOPHEN 1 TABLET: 5; 325 TABLET ORAL at 21:35

## 2023-06-15 RX ADMIN — Medication 1 TABLET: at 09:04

## 2023-06-15 RX ADMIN — PROPRANOLOL HYDROCHLORIDE 20 MG: 20 TABLET ORAL at 09:08

## 2023-06-15 RX ADMIN — PROPRANOLOL HYDROCHLORIDE 10 MG: 20 TABLET ORAL at 21:35

## 2023-06-15 RX ADMIN — FLUVOXAMINE MALEATE 100 MG: 50 TABLET, FILM COATED ORAL at 09:04

## 2023-06-15 RX ADMIN — Medication 100 MG: at 09:04

## 2023-06-15 RX ADMIN — OXYCODONE HYDROCHLORIDE AND ACETAMINOPHEN 1 TABLET: 5; 325 TABLET ORAL at 13:13

## 2023-06-15 RX ADMIN — OXYCODONE HYDROCHLORIDE AND ACETAMINOPHEN 1 TABLET: 5; 325 TABLET ORAL at 17:05

## 2023-06-15 RX ADMIN — Medication 1 MG: at 09:04

## 2023-06-15 RX ADMIN — OXYCODONE AND ACETAMINOPHEN 1 TABLET: 5; 325 TABLET ORAL at 03:06

## 2023-06-15 NOTE — PLAN OF CARE
Goal Outcome Evaluation:   Pain meds as needed, denies nausea, krissy meals, vitals stable, up to chair.

## 2023-06-15 NOTE — PLAN OF CARE
Goal Outcome Evaluation:   Pt alert and oriented, RA/ 2L o2 nc, sr on monitor, administered percocet x 2 for pain, vss, will continue to follow.          Problem: Adult Inpatient Plan of Care  Goal: Plan of Care Review  Outcome: Ongoing, Progressing  Goal: Patient-Specific Goal (Individualized)  Outcome: Ongoing, Progressing  Goal: Absence of Hospital-Acquired Illness or Injury  Outcome: Ongoing, Progressing  Intervention: Identify and Manage Fall Risk  Recent Flowsheet Documentation  Taken 6/15/2023 0419 by Lucio Hall RN  Safety Promotion/Fall Prevention:   activity supervised   safety round/check completed   room organization consistent  Taken 6/15/2023 0214 by Lucio Hall RN  Safety Promotion/Fall Prevention:   activity supervised   safety round/check completed   room organization consistent  Taken 6/15/2023 0012 by Lucio Hall RN  Safety Promotion/Fall Prevention:   activity supervised   safety round/check completed   room organization consistent  Taken 6/14/2023 2218 by Lucio Hall RN  Safety Promotion/Fall Prevention:   activity supervised   safety round/check completed   room organization consistent  Taken 6/14/2023 2020 by Lucio Hall RN  Safety Promotion/Fall Prevention:   activity supervised   safety round/check completed   room organization consistent  Intervention: Prevent Skin Injury  Recent Flowsheet Documentation  Taken 6/15/2023 0419 by Lucio Hall RN  Body Position: position changed independently  Taken 6/15/2023 0214 by Lucio Hall RN  Body Position: position changed independently  Taken 6/15/2023 0012 by Lucio Hall RN  Body Position: position changed independently  Skin Protection: adhesive use limited  Taken 6/14/2023 2218 by Lucio Hall RN  Body Position: position changed independently  Taken 6/14/2023 2020 by Lucio Hall RN  Body Position: position changed independently  Skin Protection: adhesive use limited  Intervention:  Prevent and Manage VTE (Venous Thromboembolism) Risk  Recent Flowsheet Documentation  Taken 6/15/2023 0012 by Lucio Hall RN  Activity Management: bedrest  VTE Prevention/Management: (lovenox) other (see comments)  Range of Motion: active ROM (range of motion) encouraged  Taken 6/14/2023 2020 by Lucio Hall RN  Activity Management: back to bed  VTE Prevention/Management: (lovenox) other (see comments)  Range of Motion: active ROM (range of motion) encouraged  Intervention: Prevent Infection  Recent Flowsheet Documentation  Taken 6/15/2023 0419 by Lucio Hall RN  Infection Prevention:   hand hygiene promoted   rest/sleep promoted  Taken 6/15/2023 0214 by Lucio Hall RN  Infection Prevention:   hand hygiene promoted   rest/sleep promoted  Taken 6/15/2023 0012 by Lucio Hall RN  Infection Prevention:   hand hygiene promoted   rest/sleep promoted  Taken 6/14/2023 2218 by Lucio Hall RN  Infection Prevention:   hand hygiene promoted   rest/sleep promoted  Taken 6/14/2023 2020 by Lucio Hall RN  Infection Prevention:   hand hygiene promoted   rest/sleep promoted  Goal: Optimal Comfort and Wellbeing  Outcome: Ongoing, Progressing  Intervention: Monitor Pain and Promote Comfort  Recent Flowsheet Documentation  Taken 6/15/2023 0012 by Lucio Hall RN  Pain Management Interventions:   see MAR   quiet environment facilitated  Intervention: Provide Person-Centered Care  Recent Flowsheet Documentation  Taken 6/15/2023 0012 by Lucio Hall RN  Trust Relationship/Rapport: care explained  Taken 6/14/2023 2020 by Lucio Hall RN  Trust Relationship/Rapport: care explained  Goal: Readiness for Transition of Care  Outcome: Ongoing, Progressing     Problem: Skin Injury Risk Increased  Goal: Skin Health and Integrity  Outcome: Ongoing, Progressing  Intervention: Optimize Skin Protection  Recent Flowsheet Documentation  Taken 6/15/2023 0419 by Lucio Hall RN  Head  of Bed (HOB) Positioning: HOB elevated  Taken 6/15/2023 0214 by Lucio Hall RN  Head of Bed (HOB) Positioning: HOB elevated  Taken 6/15/2023 0012 by Lucio Hall RN  Pressure Reduction Techniques:   frequent weight shift encouraged   weight shift assistance provided  Head of Bed (HOB) Positioning: HOB elevated  Pressure Reduction Devices:   alternating pressure pump (ADD)   pressure-redistributing mattress utilized  Skin Protection: adhesive use limited  Taken 6/14/2023 2218 by Lucio Hall RN  Head of Bed (HOB) Positioning: HOB elevated  Taken 6/14/2023 2020 by Lucio Hall RN  Pressure Reduction Techniques:   frequent weight shift encouraged   weight shift assistance provided  Head of Bed (HOB) Positioning: HOB elevated  Pressure Reduction Devices:   alternating pressure pump (ADD)   pressure-redistributing mattress utilized  Skin Protection: adhesive use limited     Problem: Behavioral Health Comorbidity  Goal: Maintenance of Behavioral Health Symptom Control  Outcome: Ongoing, Progressing  Intervention: Maintain Behavioral Health Symptom Control  Recent Flowsheet Documentation  Taken 6/15/2023 0419 by Lucio Hall RN  Medication Review/Management: medications reviewed  Taken 6/15/2023 0214 by Lucio Hall RN  Medication Review/Management: medications reviewed  Taken 6/15/2023 0012 by Lucio Hall RN  Medication Review/Management: medications reviewed  Taken 6/14/2023 2218 by Lucio Hall RN  Medication Review/Management: medications reviewed  Taken 6/14/2023 2020 by Lucio Hall RN  Medication Review/Management: medications reviewed     Problem: COPD (Chronic Obstructive Pulmonary Disease) Comorbidity  Goal: Maintenance of COPD Symptom Control  Outcome: Ongoing, Progressing  Intervention: Maintain COPD-Symptom Control  Recent Flowsheet Documentation  Taken 6/15/2023 0419 by Lucio Hall RN  Medication Review/Management: medications reviewed  Taken  6/15/2023 0214 by Lucio Hall RN  Medication Review/Management: medications reviewed  Taken 6/15/2023 0012 by Lucio Hall RN  Supportive Measures: active listening utilized  Medication Review/Management: medications reviewed  Taken 6/14/2023 2218 by Lucio Hall RN  Medication Review/Management: medications reviewed  Taken 6/14/2023 2020 by Lucio Hall RN  Supportive Measures: active listening utilized  Medication Review/Management: medications reviewed     Problem: Hypertension Comorbidity  Goal: Blood Pressure in Desired Range  Outcome: Ongoing, Progressing  Intervention: Maintain Blood Pressure Management  Recent Flowsheet Documentation  Taken 6/15/2023 0419 by Lucio Hall RN  Medication Review/Management: medications reviewed  Taken 6/15/2023 0214 by Lucio Hall RN  Medication Review/Management: medications reviewed  Taken 6/15/2023 0012 by Lucio Hall RN  Medication Review/Management: medications reviewed  Taken 6/14/2023 2218 by Lucio Hall RN  Medication Review/Management: medications reviewed  Taken 6/14/2023 2020 by Lucio Hall RN  Medication Review/Management: medications reviewed     Problem: Pain Chronic (Persistent) (Comorbidity Management)  Goal: Acceptable Pain Control and Functional Ability  Outcome: Ongoing, Progressing  Intervention: Manage Persistent Pain  Recent Flowsheet Documentation  Taken 6/15/2023 0419 by Lucio Hall RN  Medication Review/Management: medications reviewed  Taken 6/15/2023 0214 by Lucio Hall RN  Medication Review/Management: medications reviewed  Taken 6/15/2023 0012 by Lucio Hall RN  Bowel Elimination Promotion: adequate fluid intake promoted  Medication Review/Management: medications reviewed  Taken 6/14/2023 2218 by Lucio Hall RN  Medication Review/Management: medications reviewed  Taken 6/14/2023 2020 by Lucio Hall RN  Bowel Elimination Promotion: adequate fluid intake  promoted  Medication Review/Management: medications reviewed  Intervention: Develop Pain Management Plan  Recent Flowsheet Documentation  Taken 6/15/2023 0012 by Lucio Hall RN  Pain Management Interventions:   see MAR   quiet environment facilitated  Intervention: Optimize Psychosocial Wellbeing  Recent Flowsheet Documentation  Taken 6/15/2023 0012 by Lucio Hall RN  Supportive Measures: active listening utilized  Diversional Activities:   television   smartphone  Taken 6/14/2023 2020 by Lucio Hall RN  Supportive Measures: active listening utilized  Diversional Activities:   television   smartphone     Problem: Seizure Disorder Comorbidity  Goal: Maintenance of Seizure Control  Outcome: Ongoing, Progressing     Problem: Fall Injury Risk  Goal: Absence of Fall and Fall-Related Injury  Outcome: Ongoing, Progressing  Intervention: Identify and Manage Contributors  Recent Flowsheet Documentation  Taken 6/15/2023 0419 by Lucio Hall RN  Medication Review/Management: medications reviewed  Taken 6/15/2023 0214 by Lucio Hall RN  Medication Review/Management: medications reviewed  Taken 6/15/2023 0012 by Lucio Hall RN  Medication Review/Management: medications reviewed  Taken 6/14/2023 2218 by Lucio Hall RN  Medication Review/Management: medications reviewed  Taken 6/14/2023 2020 by Lucio Hall RN  Medication Review/Management: medications reviewed  Intervention: Promote Injury-Free Environment  Recent Flowsheet Documentation  Taken 6/15/2023 0419 by Lucio Hall RN  Safety Promotion/Fall Prevention:   activity supervised   safety round/check completed   room organization consistent  Taken 6/15/2023 0214 by Lucio Hall RN  Safety Promotion/Fall Prevention:   activity supervised   safety round/check completed   room organization consistent  Taken 6/15/2023 0012 by Lucio Hall RN  Safety Promotion/Fall Prevention:   activity supervised   safety  round/check completed   room organization consistent  Taken 6/14/2023 2218 by Lucio Hall RN  Safety Promotion/Fall Prevention:   activity supervised   safety round/check completed   room organization consistent  Taken 6/14/2023 2020 by Lucio Hall RN  Safety Promotion/Fall Prevention:   activity supervised   safety round/check completed   room organization consistent     Problem: Pain Acute  Goal: Acceptable Pain Control and Functional Ability  Outcome: Ongoing, Progressing  Intervention: Prevent or Manage Pain  Recent Flowsheet Documentation  Taken 6/15/2023 0419 by Lucio Hall RN  Medication Review/Management: medications reviewed  Taken 6/15/2023 0214 by Lucio Hall RN  Medication Review/Management: medications reviewed  Taken 6/15/2023 0012 by Lucio Hall RN  Sensory Stimulation Regulation: quiet environment promoted  Bowel Elimination Promotion: adequate fluid intake promoted  Medication Review/Management: medications reviewed  Taken 6/14/2023 2218 by Lucio Hall RN  Medication Review/Management: medications reviewed  Taken 6/14/2023 2020 by Lucio Hall RN  Sensory Stimulation Regulation: quiet environment promoted  Bowel Elimination Promotion: adequate fluid intake promoted  Medication Review/Management: medications reviewed  Intervention: Develop Pain Management Plan  Recent Flowsheet Documentation  Taken 6/15/2023 0012 by Lucio Hall RN  Pain Management Interventions:   see MAR   quiet environment facilitated  Intervention: Optimize Psychosocial Wellbeing  Recent Flowsheet Documentation  Taken 6/15/2023 0012 by Lucio Hall RN  Supportive Measures: active listening utilized  Diversional Activities:   television   smartphone  Taken 6/14/2023 2020 by Lucio Hall RN  Supportive Measures: active listening utilized  Diversional Activities:   television   smartphone     Problem: Bleeding (Orthopaedic Fracture)  Goal: Absence of  Bleeding  Outcome: Ongoing, Progressing     Problem: Embolism (Orthopaedic Fracture)  Goal: Absence of Embolism Signs and Symptoms  Outcome: Ongoing, Progressing  Intervention: Prevent or Manage Embolism Risk  Recent Flowsheet Documentation  Taken 6/15/2023 0012 by Lucio Hall RN  VTE Prevention/Management: (lovenox) other (see comments)  Taken 6/14/2023 2020 by Lucio Hall RN  VTE Prevention/Management: (lovenox) other (see comments)     Problem: Fracture Stabilization and Management (Orthopaedic Fracture)  Goal: Fracture Stability  Outcome: Ongoing, Progressing     Problem: Functional Ability Impaired (Orthopaedic Fracture)  Goal: Optimal Functional Ability  Outcome: Ongoing, Progressing  Intervention: Optimize Functional Ability  Recent Flowsheet Documentation  Taken 6/15/2023 0419 by Lucio Hall RN  Positioning/Transfer Devices:   pillows   in use  Taken 6/15/2023 0214 by Lucio Hall RN  Positioning/Transfer Devices:   pillows   in use  Taken 6/15/2023 0012 by Lucio Hall RN  Activity Management: bedrest  Activity Assistance Provided: assistance, 1 person  Positioning/Transfer Devices:   pillows   in use  Range of Motion: active ROM (range of motion) encouraged  Taken 6/14/2023 2218 by Lucio Hall RN  Positioning/Transfer Devices:   pillows   in use  Taken 6/14/2023 2020 by Lucio Hall RN  Activity Management: back to bed  Activity Assistance Provided: assistance, 1 person  Positioning/Transfer Devices:   pillows   in use  Range of Motion: active ROM (range of motion) encouraged     Problem: Infection (Orthopaedic Fracture)  Goal: Absence of Infection Signs and Symptoms  Outcome: Ongoing, Progressing  Intervention: Prevent or Manage Infection  Recent Flowsheet Documentation  Taken 6/15/2023 0419 by Lucio Hall RN  Infection Prevention:   hand hygiene promoted   rest/sleep promoted  Taken 6/15/2023 0214 by Lucio Hall RN  Infection Prevention:   hand  hygiene promoted   rest/sleep promoted  Taken 6/15/2023 0012 by Lucio Hall RN  Infection Prevention:   hand hygiene promoted   rest/sleep promoted  Taken 6/14/2023 2218 by Lucio Hall RN  Infection Prevention:   hand hygiene promoted   rest/sleep promoted  Taken 6/14/2023 2020 by Lucio Hall RN  Infection Prevention:   hand hygiene promoted   rest/sleep promoted     Problem: Neurovascular Compromise (Orthopaedic Fracture)  Goal: Effective Tissue Perfusion  Outcome: Ongoing, Progressing     Problem: Pain (Orthopaedic Fracture)  Goal: Acceptable Pain Control  Outcome: Ongoing, Progressing  Intervention: Manage Acute Orthopaedic-Related Pain  Recent Flowsheet Documentation  Taken 6/15/2023 0012 by Lucio Hall RN  Pain Management Interventions:   see MAR   quiet environment facilitated  Diversional Activities:   television   smartphone  Taken 6/14/2023 2020 by Lucio Hall RN  Diversional Activities:   television   smartphone     Problem: Respiratory Compromise (Orthopaedic Fracture)  Goal: Effective Oxygenation and Ventilation  Outcome: Ongoing, Progressing  Intervention: Promote Airway Secretion Clearance  Recent Flowsheet Documentation  Taken 6/15/2023 0012 by Lucio Hall RN  Cough And Deep Breathing: done independently per patient  Taken 6/14/2023 2020 by Lucio Hall RN  Cough And Deep Breathing: done independently per patient

## 2023-06-15 NOTE — PLAN OF CARE
Goal Outcome Evaluation:  Plan of Care Reviewed With: patient        Progress: no change  Outcome Evaluation: Pt seen for PT treatment this AM. Pt minimally c/o left hip pain. Pt required CGA with bed mobility and with STS transfers. pt able to ambulate 3ft with rwx. Eleanor. Pt continues to be unable to maintain TDWBing precautions which limits gait distance. Pt able to complete BLE exercises without complaints. Will continue to  progress pt as able. SNF recommended.

## 2023-06-15 NOTE — CASE MANAGEMENT/SOCIAL WORK
Continued Stay Note  [facility]     Patient Name: Nilesh Zapata  MRN: [id number]  Today's Date: 6/15/2023    Admit Date: 6/3/2023    Plan: Plan home with HH or skilled care at accepting facility- pre cet needed.  GINA Marie RN   Discharge Plan       Row Name 06/15/23 1149       Plan    Plan Plan home with HH or skilled care at accepting facility- pre cet needed.  GINA Marie RN    Patient/Family in Agreement with Plan yes    Plan Comments Spoke with pt at bedside.  Per Aminah  ( [phone]) [facility] and [facility] cannot accept pt.   Pt is agreeable to referral to [facility].  Edwige ( [phone]) called with referral.  Plan home with HH or skilled care at accepting facility.   GINA Marie RN      Row Name 06/15/23 5799       Plan    Plan Plan home with HH or skilled care at accepting facility- pre cert needed.   GINA Marie RN    Patient/Family in Agreement with Plan yes    Plan Comments Ginny  ( [phone]) called for update on bed status at [facility].  Aminah  ([phone]) called for bed availability at [facility] and [facility].  Awaiting return calls.  Plan home with HH or skilled care at accepting facility- pre cert needed. GINA Marie RN                   Discharge Codes    No documentation.                 Expected Discharge Date and Time       Expected Discharge Date Expected Discharge Time    Elmo 15, 2023               Sona Marie RN

## 2023-06-15 NOTE — CASE MANAGEMENT/SOCIAL WORK
Continued Stay Note  [facility]     Patient Name: Nilesh Zapata  MRN: [id number]  Today's Date: 6/15/2023    Admit Date: 6/3/2023    Plan: Plan home with HH or skilled care at accepting facilit- pre cert needed.   GINA Marie RN   Discharge Plan       Row Name 06/15/23 1059       Plan    Plan Plan home with HH or skilled care at accepting facility- pre cert needed.   GINA Marie RN    Patient/Family in Agreement with Plan yes    Plan Comments Ginny  ( [phone]) called for update on bed status at [facility].  Aminah  ([phone]) called for bed availability at [facility] and [facility].  Awaiting return calls.  Plan home with HH or skilled care at accepting facility- pre cert needed. GINA Marie RN                   Discharge Codes    No documentation.                 Expected Discharge Date and Time       Expected Discharge Date Expected Discharge Time    Elmo 15, 2023               Sona Marie RN

## 2023-06-15 NOTE — PROGRESS NOTES
[facility] Medicine Services  PROGRESS NOTE    Patient Name: Nilesh Zapata  : 1960  MRN: [id number]    Date of Admission: 6/3/2023  Primary Care Physician: Shakira Colbert, GRACIE    Subjective   Subjective     CC:  Follow-up fracture    Subjective:  Pain improving.  Fracture improving.  Still feels he needs to go to rehab as he lives alone if possible.    Review of Systems  No current fevers or chills  No current shortness of breath or cough  No current nausea, vomiting, or diarrhea  No current chest pain or palpitations      Objective   Objective     Vital Signs:   Temp:  [97.5 °F (36.4 °C)-98.7 °F (37.1 °C)] 97.5 °F (36.4 °C)  Heart Rate:  [55-68] 55  Resp:  [16-18] 18  BP: (116-128)/(67-82) 118/67        Physical Exam:  Constitutional:Awake, alert  HENT: NCAT, mucous membranes moist, neck supple  Respiratory: No cough or wheezing, respiratory effort normal, nonlabored breathing   Cardiovascular: Pulse rate is borderline bradycardic, normal radial pulses  Gastrointestinal: Positive bowel sounds, soft, nontender, nondistended  Musculoskeletal: Pelvis is tender normal musculature for age, minimal lower extremity edema, BMI 35  Psychiatric: Appropriate affect, cooperative, conversational  Neurologic: No slurred speech or facial droop, follows commands  Skin: No rashes or jaundice, warm      Results Reviewed:  Results from last 7 days   Lab Units 06/15/23  0538 23  0552 23  0538   WBC 10*3/mm3 6.76 5.59 4.97   HEMOGLOBIN g/dL 12.7* 12.1* 11.7*   HEMATOCRIT % 37.1* 35.0* 33.3*   PLATELETS 10*3/mm3 229 224 196     Results from last 7 days   Lab Units 06/15/23  0538 23  0552 23  0538   SODIUM mmol/L 137 134* 136   POTASSIUM mmol/L 4.2 4.2 4.2   CHLORIDE mmol/L 97* 96* 97*   CO2 mmol/L 30.0* 29.1* 30.5*   BUN mg/dL 14 15 16   CREATININE mg/dL 0.80 0.84 0.87   GLUCOSE mg/dL 96 85 88   CALCIUM mg/dL 9.4 9.8 9.3   ALT (SGPT) U/L 22 20 18   AST (SGOT) U/L 32 25 26     Estimated  Creatinine Clearance: 103.3 mL/min (by C-G formula based on SCr of 0.8 mg/dL).    Microbiology Results Abnormal       None            Imaging Results (Last 24 Hours)       ** No results found for the last 24 hours. **                I have reviewed the medications:  Scheduled Meds:enoxaparin, 40 mg, Subcutaneous, Q24H  fluvoxaMINE, 100 mg, Oral, Daily  folic acid, 1 mg, Oral, Daily  lidocaine, 2 patch, Transdermal, Q24H  mirtazapine, 15 mg, Oral, Nightly  multivitamin, 1 tablet, Oral, Daily  senna-docusate sodium, 2 tablet, Oral, BID   And  polyethylene glycol, 17 g, Oral, BID  propranolol, 20 mg, Oral, BID  risperiDONE, 2 mg, Oral, BID  thiamine, 100 mg, Oral, Daily      Continuous Infusions:   PRN Meds:.•  acetaminophen  •  senna-docusate sodium **AND** polyethylene glycol **AND** bisacodyl **AND** bisacodyl  •  Calcium Replacement - Follow Nurse / BPA Driven Protocol  •  cloNIDine  •  Magnesium Standard Dose Replacement - Follow Nurse / BPA Driven Protocol  •  Magnesium Standard Dose Replacement - Follow Nurse / BPA Driven Protocol  •  melatonin  •  ondansetron **OR** ondansetron  •  oxyCODONE-acetaminophen  •  Phosphorus Replacement - Follow Nurse / BPA Driven Protocol  •  Potassium Replacement - Follow Nurse / BPA Driven Protocol  •  [COMPLETED] Insert Peripheral IV **AND** sodium chloride    Assessment & Plan   Assessment & Plan     Active Hospital Problems    Diagnosis  POA   • **Closed fracture of left superior pubic ramus [S32.512A]  Yes   • Anemia [D64.9]  Yes   • Closed fracture of left inferior pubic ramus [S32.592A]  Yes   • Closed fracture of sacrum [S32.10XA]  Yes   • Alcoholic liver disease [K70.9]  Yes   • Essential hypertension [I10]  Yes   • Alcohol abuse [F10.10]  Yes   • History of left nephrectomy 2/2 RCC [Z90.5]  Not Applicable      Resolved Hospital Problems    Diagnosis Date Resolved POA   • Constipation [K59.00] 06/14/2023 Yes   • Thrombocytopenia [D69.6] 06/14/2023 Yes        Brief  Hospital Course to date:  Nilesh Zapata is a 62 y.o. male with superior pubic ramus fracture.    Discussion/plan for today:  All medical problems are new under my management today.  Patient appears he medically stable.  Discontinue IV hydromorphone.  Continue supportive care and symptom treatment.  Likely transition from Percocet to Norco or perhaps tramadol soon.  Labs reviewed and stable.  Case discussed with nursing and case management awaiting placement.  Alcohol cessation counseled.  Amlodipine on hold for hypertension.  Continue new respite all for probable mood disorder which is on medication.  Decrease propranolol due to persistent bradycardia.  Bowel regimen.  Treatment plan discussed with patient who is in agreement.    DVT Prophylaxis: Lovenox      Disposition: Likely SNF    CODE STATUS:   Code Status and Medical Interventions:   Ordered at: 06/03/23 1313     Level Of Support Discussed With:    Patient     Code Status (Patient has no pulse and is not breathing):    CPR (Attempt to Resuscitate)     Medical Interventions (Patient has pulse or is breathing):    Full       Mariusz Powers MD  06/15/23     [id number] Comprehensive

## 2023-06-15 NOTE — THERAPY TREATMENT NOTE
Patient Name: Nilesh Zapata  : 1960    MRN: [id number]                              Today's Date: 6/15/2023       Admit Date: 6/3/2023    Visit Dx:     ICD-10-CM ICD-9-CM   1. Alcohol withdrawal syndrome without complication  F10.930 291.81   2. Contusion of left hip, initial encounter  S70.02XA 924.01   3. Alcoholism  F10.20 303.90   4. Hypoxia  R09.02 799.02     Patient Active Problem List   Diagnosis    Alcohol withdrawal syndrome with complication    Anxiety    Fatty liver    Tobacco abuse    Kidney cancer, primary, with metastasis from kidney to other site    Alcoholism    Hyponatremia    Alcohol abuse    History of renal cell cancer    History of left nephrectomy 2/2 RCC    Liver lesion    Lung nodule    Vitamin D deficiency    Under emotional stress     from spouse    Coping style affecting medical condition    Essential hypertension    Acute adjustment disorder with mixed anxiety and depressed mood    Alcoholic liver disease    Hypoxemia    Closed fracture of left superior pubic ramus    Closed fracture of left inferior pubic ramus    Closed fracture of sacrum    Anemia    Hypotension     Past Medical History:   Diagnosis Date    Alcohol abuse     Anxiety     Delirium tremens 2019    Fatty liver     FH: kidney cancer     Hypertension      Past Surgical History:   Procedure Laterality Date    APPENDECTOMY      NEPHRECTOMY      left     TONSILLECTOMY        General Information       Row Name 06/15/23 1112          Physical Therapy Time and Intention    Document Type therapy note (daily note)  -EB     Mode of Treatment individual therapy;physical therapy  -EB       Row Name 06/15/23 1112          General Information    Patient Profile Reviewed yes  -EB     Existing Precautions/Restrictions fall;left  TDWB left LE  -EB       Row Name 06/15/23 1112          Cognition    Orientation Status (Cognition) oriented x 3  -EB       Row Name 06/15/23 1112          Safety Issues, Functional Mobility     Safety Issues Affecting Function (Mobility) unable to maintain weight-bearing restrictions  -EB     Impairments Affecting Function (Mobility) balance;endurance/activity tolerance;strength;pain;postural/trunk control  -EB               User Key  (r) = Recorded By, (t) = Taken By, (c) = Cosigned By      Initials Name Provider Type    Trina Mack PTA Physical Therapist Assistant                   Mobility       Row Name 06/15/23 1113          Bed Mobility    Supine-Sit [place] (Bed Mobility) contact guard  -EB     Sit-Supine [place] (Bed Mobility) not tested  -EB     Assistive Device (Bed Mobility) bed rails;head of bed elevated  -EB       Row Name 06/15/23 1113          Sit-Stand Transfer    Sit-Stand [place] (Transfers) contact guard  -EB     Assistive Device (Sit-Stand Transfers) walker, front-wheeled  -EB       Row Name 06/15/23 1113          Gait/Stairs (Locomotion)    [place] Level (Gait) minimum assist (75% patient effort)  -EB     Assistive Device (Gait) walker, front-wheeled  -EB     Distance in Feet (Gait) 3ft  -EB     Deviations/Abnormal Patterns (Gait) antalgic;stride length decreased  -EB     Left Sided Gait Deviations weight shift ability decreased  -EB     Comment, (Gait/Stairs) unable to maintain TDWB precautions. Fatigues quickly.  -EB               User Key  (r) = Recorded By, (t) = Taken By, (c) = Cosigned By      Initials Name Provider Type    Trina Mack PTA Physical Therapist Assistant                   Obj/Interventions       Row Name 06/15/23 1114          Motor Skills    Therapeutic Exercise --  BLE: LAQs, seated marches, QS, Hip abd/add (X10)  -EB               User Key  (r) = Recorded By, (t) = Taken By, (c) = Cosigned By      Initials Name Provider Type    Trina Mack PTA Physical Therapist Assistant                   Goals/Plan    No documentation.                  Clinical Impression       Row Name 06/15/23 1115          Pain    Pain Location -  Side/Orientation Left  -EB     Pain Location - hip  -EB       Row Name 06/15/23 1115          Plan of Care Review    Plan of Care Reviewed With patient  -EB     Progress no change  -EB     Outcome Evaluation Pt seen for PT treatment this AM. Pt minimally c/o left hip pain. Pt required CGA with bed mobility and with STS transfers. pt able to ambulate 3ft with rwx. Eleanor. Pt continues to be unable to maintain TDWBing precautions which limits gait distance. Pt able to complete BLE exercises without complaints. Will continue to  progress pt as able. SNF recommended.  -EB       Row Name 06/15/23 1115          Positioning and Restraints    Pre-Treatment Position in bed  -EB     Post Treatment Position chair  -EB     In Chair reclined;call light within reach;encouraged to call for assist;exit alarm on  -EB               User Key  (r) = Recorded By, (t) = Taken By, (c) = Cosigned By      Initials Name Provider Type    Trina Mack PTA Physical Therapist Assistant                   Outcome Measures       Row Name 06/15/23 1117          How much help from another person do you currently need...    Turning from your back to your side while in flat bed without using bedrails? 3  -EB     Moving from lying on back to sitting on the side of a flat bed without bedrails? 3  -EB     Moving to and from a bed to a chair (including a wheelchair)? 3  -EB     Standing up from a chair using your arms (e.g., wheelchair, bedside chair)? 2  -EB     Climbing 3-5 steps with a railing? 1  -EB     To walk in hospital room? 2  -EB     AM-PAC 6 Clicks Score (PT) 14  -EB     Highest level of mobility 4 --> Transferred to chair/commode  -EB               User Key  (r) = Recorded By, (t) = Taken By, (c) = Cosigned By      Initials Name Provider Type    Trina Mack PTA Physical Therapist Assistant                                 Physical Therapy Education       Title: PT OT SLP Therapies (Done)       Topic: Physical Therapy (Done)        Point: Mobility training (Done)       Learning Progress Summary             Patient Acceptance, E,D, VU,NR by EB at 6/15/2023 1118    Acceptance, E,D, NR by EB at 6/13/2023 1606    Acceptance, E,D, NR,VU by EB at 6/12/2023 1158    Acceptance, E, VU,NR by EB at 6/9/2023 1548    Acceptance, E,TB, NR by JOSE at 6/8/2023 1803    Acceptance, E,D, NR by EB at 6/8/2023 1607    Acceptance, E,D, NR by KASSIDY at 6/7/2023 1234    Acceptance, E,D, NR by EB at 6/6/2023 1137    Acceptance, E, NR,VU by MEHRAN at 6/4/2023 1146                         Point: Home exercise program (Done)       Learning Progress Summary             Patient Acceptance, E,D, VU,NR by EB at 6/15/2023 1118    Acceptance, E,D, NR by EB at 6/13/2023 1606    Acceptance, E,D, NR,VU by EB at 6/12/2023 1158    Acceptance, E, VU,NR by EB at 6/9/2023 1548    Acceptance, E,TB, NR by JOSE at 6/8/2023 1803    Acceptance, E,D, NR by KASSIDY at 6/7/2023 1234    Acceptance, E, NR,VU by MEHRAN at 6/4/2023 1146                         Point: Body mechanics (Done)       Learning Progress Summary             Patient Acceptance, E,D, VU,NR by EB at 6/15/2023 1118    Acceptance, E,D, NR by EB at 6/13/2023 1606    Acceptance, E,D, NR,VU by EB at 6/12/2023 1158    Acceptance, E, VU,NR by EB at 6/9/2023 1548    Acceptance, E,TB, NR by JOSE at 6/8/2023 1803    Acceptance, E,D, NR by EB at 6/8/2023 1607    Acceptance, E,D, NR by KASSIDY at 6/7/2023 1234    Acceptance, E,D, NR by EB at 6/6/2023 1137    Acceptance, E, NR,VU by MEHRAN at 6/4/2023 1146                         Point: Precautions (Done)       Learning Progress Summary             Patient Acceptance, E,D, VU,NR by EB at 6/15/2023 1118    Acceptance, E,D, NR by EB at 6/13/2023 1606    Acceptance, E,D, NR,VU by EB at 6/12/2023 1158    Acceptance, E, VU,NR by EB at 6/9/2023 1548    Acceptance, E,TB, NR by LN at 6/8/2023 1803    Acceptance, E,D, NR by EB at 6/8/2023 1607    Acceptance, E,D, NR by JM at 6/7/2023 1234    Acceptance, E,D, NR by EB at  6/6/2023 1137    Acceptance, E, NR,VU by  at 6/4/2023 1146                                         User Key       Initials Effective Dates Name Provider Type Discipline     03/07/18 -  Rupa Barraza PTA Physical Therapist Assistant PT    EB 02/14/23 -  Trina Cardozo PTA Physical Therapist Assistant PT    LN 06/01/21 -  Shakira Del Rosario, RN Registered Nurse Nurse     05/02/22 -  Preethi Henao, DORETHA Physical Therapist PT                  PT Recommendation and Plan     Plan of Care Reviewed With: patient  Progress: no change  Outcome Evaluation: Pt seen for PT treatment this AM. Pt minimally c/o left hip pain. Pt required CGA with bed mobility and with STS transfers. pt able to ambulate 3ft with rwx. Eleanor. Pt continues to be unable to maintain TDWBing precautions which limits gait distance. Pt able to complete BLE exercises without complaints. Will continue to  progress pt as able. SNF recommended.     Time Calculation:    PT Charges       Row Name 06/15/23 1112             Time Calculation    Start Time 0836  -EB      Stop Time 0850  -EB      Time Calculation (min) 14 min  -EB      PT Received On 06/15/23  -EB      PT - Next Appointment 06/16/23  -EB         Time Calculation- PT    Total Timed Code Minutes- PT 14 minute(s)  -EB                User Key  (r) = Recorded By, (t) = Taken By, (c) = Cosigned By      Initials Name Provider Type    EB Trina Cardozo PTA Physical Therapist Assistant                  Therapy Charges for Today       Code Description Service Date Service Provider Modifiers Qty    68814320333 HC PT THER PROC EA 15 MIN 6/15/2023 Trina Cardozo PTA GP 1            PT G-Codes  AM-PAC 6 Clicks Score (PT): 14       Trina Cardozo PTA  6/15/2023

## 2023-06-16 VITALS
TEMPERATURE: 98.4 F | BODY MASS INDEX: 34.01 KG/M2 | HEART RATE: 79 BPM | WEIGHT: 211.64 LBS | HEIGHT: 66 IN | OXYGEN SATURATION: 90 % | SYSTOLIC BLOOD PRESSURE: 104 MMHG | DIASTOLIC BLOOD PRESSURE: 63 MMHG | RESPIRATION RATE: 18 BRPM

## 2023-06-16 PROBLEM — S70.02XA CONTUSION OF LEFT HIP: Status: ACTIVE | Noted: 2023-06-16

## 2023-06-16 PROCEDURE — 25010000002 ENOXAPARIN PER 10 MG: Performed by: INTERNAL MEDICINE

## 2023-06-16 RX ORDER — PROPRANOLOL HYDROCHLORIDE 10 MG/1
10 TABLET ORAL 2 TIMES DAILY
Start: 2023-06-16

## 2023-06-16 RX ORDER — ACETAMINOPHEN 325 MG/1
650 TABLET ORAL EVERY 6 HOURS PRN
Start: 2023-06-16

## 2023-06-16 RX ORDER — LANOLIN ALCOHOL/MO/W.PET/CERES
3 CREAM (GRAM) TOPICAL NIGHTLY PRN
Qty: 30 TABLET
Start: 2023-06-16

## 2023-06-16 RX ORDER — HYDROCODONE BITARTRATE AND ACETAMINOPHEN 5; 325 MG/1; MG/1
1 TABLET ORAL EVERY 6 HOURS PRN
Qty: 12 TABLET | Refills: 0 | Status: SHIPPED | OUTPATIENT
Start: 2023-06-16

## 2023-06-16 RX ADMIN — OXYCODONE HYDROCHLORIDE AND ACETAMINOPHEN 1 TABLET: 5; 325 TABLET ORAL at 05:48

## 2023-06-16 RX ADMIN — RISPERIDONE 2 MG: 2 TABLET, FILM COATED ORAL at 08:33

## 2023-06-16 RX ADMIN — OXYCODONE HYDROCHLORIDE AND ACETAMINOPHEN 1 TABLET: 5; 325 TABLET ORAL at 13:46

## 2023-06-16 RX ADMIN — OXYCODONE HYDROCHLORIDE AND ACETAMINOPHEN 1 TABLET: 5; 325 TABLET ORAL at 01:40

## 2023-06-16 RX ADMIN — FLUVOXAMINE MALEATE 100 MG: 50 TABLET, FILM COATED ORAL at 08:33

## 2023-06-16 RX ADMIN — POLYETHYLENE GLYCOL 3350 17 G: 17 POWDER, FOR SOLUTION ORAL at 08:33

## 2023-06-16 RX ADMIN — Medication 100 MG: at 08:33

## 2023-06-16 RX ADMIN — PROPRANOLOL HYDROCHLORIDE 10 MG: 20 TABLET ORAL at 08:33

## 2023-06-16 RX ADMIN — Medication 1 MG: at 08:33

## 2023-06-16 RX ADMIN — DOCUSATE SODIUM 50MG AND SENNOSIDES 8.6MG 2 TABLET: 8.6; 5 TABLET, FILM COATED ORAL at 08:33

## 2023-06-16 RX ADMIN — Medication 1 TABLET: at 08:33

## 2023-06-16 RX ADMIN — LIDOCAINE 2 PATCH: 50 PATCH CUTANEOUS at 08:34

## 2023-06-16 RX ADMIN — ENOXAPARIN SODIUM 40 MG: 100 INJECTION SUBCUTANEOUS at 13:46

## 2023-06-16 NOTE — CASE MANAGEMENT/SOCIAL WORK
Continued Stay Note  [facility]     Patient Name: Nilesh Zapata  MRN: [id number]  Today's Date: 6/16/2023    Admit Date: 6/3/2023    Plan: Plan [facility] for skilled care- pre cert obtained.   GINA Marie RN   Discharge Plan       Row Name 06/16/23 1040       Plan    Plan Plan [facility] for skilled care- pre cert obtained.   GINA Marie RN    Patient/Family in Agreement with Plan yes    Plan Comments Per Edwige  ( [phone]) pt has a bed at [facility] and can be accepted today.  Pt has pre cert.  Spoke with pt at bedside.  Pt informed [employer] [facility] [employer] has been arranged to transport him to [facility] with a  time of 1500.  Pt will cover the cost of $102.   Discharge Summary faxed to [facility].  Discharge Summary in packet. Prescriptions to be sent to [facility].  Packet o RN.  Called and spoke with pt's spouse  ( Jenny Zapata [phone]) to inform her of pt's discharge to [facility].  Plan [facility] for skilled care - pre cert obtained.  GINA Marie RN                   Discharge Codes    No documentation.                 Expected Discharge Date and Time       Expected Discharge Date Expected Discharge Time    Jun 16, 2023               Sona Marie, RN

## 2023-06-16 NOTE — PLAN OF CARE
Goal Outcome Evaluation:  Plan of Care Reviewed With: patient        Progress: improving  Outcome Evaluation: Meddiated for pain as needed,  Periarea    exoriated periare done. SR/SB on the monitor. Posible disharge to rehab. Nursing will continue to monitor.

## 2023-06-16 NOTE — DISCHARGE SUMMARY
"    [facility] Medicine Services  DISCHARGE SUMMARY    Patient Name: Nilesh Zapata  : 1960  MRN: [id number]    Date of Admission: 6/3/2023  7:35 AM  Date of Discharge: 2023  Primary Care Physician: Shakira Colbert APRN    Consults       Date and Time Order Name Status Description    6/3/2023  1:16 PM Inpatient Orthopedic Surgery Consult Completed     6/3/2023  9:06 AM [facility] (on-call MD unless specified) Details              Hospital Course       Active Hospital Problems    Diagnosis  POA    **Closed fracture of left superior pubic ramus [S32.512A]  Yes    Contusion of left hip [S70.02XA]  Yes    Anemia [D64.9]  Yes    Closed fracture of left inferior pubic ramus [S32.592A]  Yes    Closed fracture of sacrum [S32.10XA]  Yes    Alcoholic liver disease [K70.9]  Yes    Essential hypertension [I10]  Yes    Alcohol abuse [F10.10]  Yes    History of left nephrectomy 2/2 RCC [Z90.5]  Not Applicable    Alcoholism [F10.20]  Yes      Resolved Hospital Problems    Diagnosis Date Resolved POA    Constipation [K59.00] 2023 Yes    Thrombocytopenia [D69.6] 2023 Yes        Fall      History of present illness as written by the admitting physician on 6/3/2023:  \" Patient is a 62-year-old male with known history of alcohol use, hypertension, fatty liver had a mechanical fall at home and does not recall how he fell.  Manage to get up and sit in a recliner and then started noticing pain in his left hip and could not get up and ambulate on his left leg and therefore decided to come to the emergency room.  In the ER x-rays did not reveal any evidence of femur fracture on the left side.  CT brain with no acute findings.  No reports of any urinary or bowel incontinence and he does not have any prior history of seizure disorder .  No known prior cardiac issues.  At the time of my evaluation is awake alert and oriented x3 and is complaining of pain in his left hip.  He was also found to be hypoxic and was requiring " "2-3 L of oxygen and chest x-ray with no acute findings.  Given his hypoxia CT of the chest has been ordered and patient denies any cough, sputum production, PND, orthopnea, chest pain.\"    Hospital Course:  Nilesh Zapata is a 62 y.o. male  with Closed fracture of left superior pubic ramus.       Left Superior and Inferior Pubic Rami Fractures with Sacral Ala Fracture  Acute pain secondary to trauma/post-operative pain  - from mechanical fall  - appreciate orthopedic surgery evaluation, recommending conservative treatment and he is to follow-up in their clinic in 3 weeks  - toe-touch weightbearing on the LLE  - continue bowel regimen  -Discharge to SNF, pain control with Tylenol and Norco.  Hold naltrexone which was home medication while he is on these and he can follow-up with primary care provider about the appropriate time to restart.     Alcohol Abuse/Liver Cirrhosis  - no evidence of withdrawal, CIWA score 0. Cirrhosis appears to be well compensated at present.  - Continue on current regimen with the exception of reduced propranolol (see below)  - Closely monitor volume status, monitor for withdrawal symptoms. Monitor on telemetry.     History of Hypertension now with intermittent hypotension  - BP previously on the low end, adjusted medications (stopped norvasc and decrease propranolol)   - Decrease propanolol   - Trend BP to guide ongoing management decisions.  Primary care follow-up for this issue.     Anemia  - Hemoglobin low on most recent labs, however, stable from prior. No evidence of overt blood loss. No indications for acute intervention at this time.    - Order repeat CBC in AM for reassessment. Continue to monitor, transfuse for hemoglobin <7.     Hyponatremia  - Sodium only slightly low but stable from prior, can monitor for now, no further workup or treatment at this time.  - Order repeat BMP in AM for reassessment.  Fluid restriction gentle at discharge.     Thrombocytopenia, resolved  - Platelets " found to be low previously, due to EtOH abuse and cirrhosis.  No active bleeding     Nocturnal hypoxemia  - Noted by nursing, patient could have component of sleep apnea, will recommend outpatient formal sleep study to be arranged by primary care follow-up.  Likely worsened with pain medicine.  We are weaning down on pain medicine and seems to be doing better.    At the time of discharge take all medications as prescribed, keep all follow-up appointments, and call your doctor or return to the hospital if you have any worsening or concerning symptoms.    Please note that this note was made using Dragon voice recognition software          Day of Discharge     Subjective: Patient says he feels better.  He is eager to go to rehab.  Denies any new complaints      Vital Signs:   Temp:  [97.9 °F (36.6 °C)-98.6 °F (37 °C)] 98.6 °F (37 °C)  Heart Rate:  [59-71] 71  Resp:  [16-18] 18  BP: ()/(58-70) 97/65     Physical Exam:    Constitutional:Awake, alert  HENT: NCAT, mucous membranes moist, neck supple  Respiratory: No cough or wheezing, respiratory effort normal, nonlabored breathing   Cardiovascular: Pulse rate is normal, normal radial pulses  Gastrointestinal: Positive bowel sounds, soft, nontender, nondistended  Musculoskeletal: Pelvis is tender normal musculature for age, minimal lower extremity edema, BMI 35  Psychiatric: Appropriate affect, cooperative, conversational  Neurologic: No slurred speech or facial droop, follows commands  Skin: No rashes or jaundice, warm       Pertinent  and/or Most Recent Results     Results from last 7 days   Lab Units 06/15/23  0538 06/14/23  0552 06/13/23  0538 06/12/23  0516 06/11/23  0530 06/10/23  0450   WBC 10*3/mm3 6.76 5.59 4.97 4.92 5.51 6.60   HEMOGLOBIN g/dL 12.7* 12.1* 11.7* 11.7* 11.7* 11.9*   HEMATOCRIT % 37.1* 35.0* 33.3* 33.9* 34.4* 34.7*   PLATELETS 10*3/mm3 229 224 196 185 160 143   SODIUM mmol/L 137 134* 136 135* 137 129*   POTASSIUM mmol/L 4.2 4.2 4.2 4.2 4.3 4.4    CHLORIDE mmol/L 97* 96* 97* 97* 99 95*   CO2 mmol/L 30.0* 29.1* 30.5* 32.4* 30.5* 26.2   BUN mg/dL 14 15 16 18 17 18   CREATININE mg/dL 0.80 0.84 0.87 0.79 0.75* 0.76   GLUCOSE mg/dL 96 85 88 89 85 90   CALCIUM mg/dL 9.4 9.8 9.3 9.6 9.4 9.0     Results from last 7 days   Lab Units 06/15/23  0538 06/14/23  0552 06/13/23  0538 06/12/23  0516 06/11/23  0530 06/10/23  0450   BILIRUBIN mg/dL 0.2 0.3 0.4 0.4 0.4 0.3   ALK PHOS U/L 131* 112 98 90 83 90   ALT (SGPT) U/L 22 20 18 23 23 25   AST (SGOT) U/L 32 25 26 26 28 31     Imaging Results (All)       Procedure Component Value Units Date/Time    XR Chest 1 View [[id number]] Collected: 06/07/23 1947     Updated: 06/07/23 1951    Narrative:      XR CHEST 1 VW-     HISTORY: 62-year-old male with shortness of breath.     FINDINGS: This is a limited apical lordotic projection. There is no  evidence for CHF and no definite pneumonia is seen. Upright PA and  lateral views of the chest are recommended when the patient is able.     This report was finalized on 6/7/2023 7:48 PM by Dr. Sue Gottlieb M.D.       US Liver [[id number]] Collected: 06/05/23 2322     Updated: 06/05/23 2328    Narrative:      US LIVER-     Clinical: Evaluate for cirrhosis     FINDINGS: Visualized portions of the pancreas within normal limits,  majority of the pancreas obscured due to gas within the overlying  stomach.     There is mild surface irregularity of the liver. The echotexture is  within normal limits. No focal liver lesion is demonstrated. No intra or  extrahepatic biliary duct dilatation, CBD diameter is 6 mm.     Gallstone seen within the dependent portion of the gallbladder neck, no  hydrops, gallbladder wall thickening or pericholecystic fluid. Gallstone  measures 10 mm. No free fluid within the right upper quadrant.     The right kidney measures 13.8 cm in length. Echotexture is normal. No  calculus, mass or obstructive uropathy seen.     CONCLUSION: There is surface irregularity of the liver  consistent with  cirrhosis. No free fluid within the right upper quadrant. Solitary  gallstone within the neck of the gallbladder. No biliary duct  dilatation.     This report was finalized on 6/5/2023 11:25 PM by Dr. Israel Woo M.D.       CT Head Without Contrast [[id number]] Collected: 06/05/23 1523     Updated: 06/05/23 1548    Narrative:      CT HEAD WITHOUT CONTRAST     CLINICAL HISTORY: Delirium.     TECHNIQUE: CT scan of the head was obtained with 3 mm axial soft tissue  algorithm images. No intravenous contrast was administered. Sagittal and  coronal reconstructions were obtained.     COMPARISON: CT head dated 06/03/2023.     FINDINGS:       The ventricles, sulci, and cisterns are age appropriate. There are mild  changes of chronic small vessel ischemic phenomena. Old lacunar disease  is seen within the right caudate. Atherosclerotic calcifications are  incidentally appreciated within the intracranial vasculature. The  gray-white matter differentiation is within normal limits. The posterior  fossa structures are unremarkable.     There is a small mucus retention cyst within the right maxillary sinus.  Mild mucosal thickening is seen within the ethmoid air cells.       Impression:         There is no CT evidence to suggest acute intracranial pathology. Mild  changes of chronic small vessel ischemic phenomena are incidentally  noted. The etiology of the patient's delirium is not further elucidated  on this study; and if further assessment is warranted, one could obtain  an MRI of the brain for follow-up.     Radiation dose reduction techniques were utilized, including automated  exposure control and exposure modulation based on body size.     This report was finalized on 6/5/2023 3:44 PM by Dr. Dhiraj Lerma M.D.       CT Pelvis Without Contrast [[id number]] Collected: 06/05/23 1127     Updated: 06/05/23 1504    Narrative:      CT PELVIS AND CT LEFT FEMUR WITHOUT CONTRAST     HISTORY: Hip trauma. Suspect  fracture.     TECHNIQUE: CT includes axial imaging through the pelvis and left femur  without contrast.     COMPARISON: X-rays of the pelvis and left hip 06/03/2023.     FINDINGS: There is a nondisplaced left acetabular fracture. Fracture  extends in an oblique sagittal plane through the left acetabular roof  and this is contiguous with a coronally oriented fracture that extends  through the medial acetabular roof and through the quadrilateral plate.  Fracture extends anteriorly through the left anterior acetabular column  and to the pubic root. There is also a nondisplaced comminuted fracture  of the left inferior pubic ramus. There is subtle cortical buckling of  the anterolateral left sacral ala suspicious for a small nondisplaced  fracture.     Sigmoid diverticulosis is present without evidence for diverticulitis.  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       Impression:      1. Comminuted nondisplaced left acetabular fracture.  2. Fractures of the left superior and inferior pubic rami.  3. Potential subtle, nondisplaced fracture of the left sacral ala.  4. 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     Radiation dose reduction techniques were utilized, including automated  exposure control and exposure modulation based on body size.     This report was finalized on 6/5/2023 3:01 PM by Dr. Brandin Conti M.D.       CT Lower Extremity Left Without Contrast [[id number]] Collected: 06/05/23 1127     Updated: 06/05/23 1504    Narrative:      CT PELVIS AND CT LEFT FEMUR WITHOUT CONTRAST     HISTORY: Hip trauma. Suspect fracture.     TECHNIQUE: CT includes axial imaging through the pelvis and left femur  without contrast.     COMPARISON: X-rays of the pelvis and left hip 06/03/2023.     FINDINGS:  There is a nondisplaced left acetabular fracture. Fracture  extends in an oblique sagittal plane through the left acetabular roof  and this is contiguous with a coronally oriented fracture that extends  through the medial acetabular roof and through the quadrilateral plate.  Fracture extends anteriorly through the left anterior acetabular column  and to the pubic root. There is also a nondisplaced comminuted fracture  of the left inferior pubic ramus. There is subtle cortical buckling of  the anterolateral left sacral ala suspicious for a small nondisplaced  fracture.     Sigmoid diverticulosis is present without evidence for diverticulitis.  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       Impression:      1. Comminuted nondisplaced left acetabular fracture.  2. Fractures of the left superior and inferior pubic rami.  3. Potential subtle, nondisplaced fracture of the left sacral ala.  4. 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     Radiation dose reduction techniques were utilized, including automated  exposure control and exposure modulation based on body size.     This report was finalized on 6/5/2023 3:01 PM by Dr. Brandin Conti M.D.       CT Angiogram Chest [[id number]] Collected: 06/03/23 1117     Updated: 06/03/23 1132    Narrative:      CT ANGIOGRAM CHEST-     INDICATIONS: Hypoxia     TECHNIQUE: Radiation dose reduction techniques were utilized, including  automated exposure control and exposure modulation based on body size.  CT angiography of the chest. Three-dimensional reconstructions.     COMPARISON: None available      FINDINGS:     No pulmonary embolism. No aortic dissection. Ascending aorta is dilated,  4.1 cm.     The heart size is normal without pericardial effusion. Moderate  coronary  arterial calcification is apparent. A few small subcentimeter short axis  mediastinal lymph nodes are seen that are not significant by size  criteria.     The airways appear clear.     No pleural effusion or pneumothorax.     The lungs show mild dependent atelectasis bilaterally. A 2 mm subpleural  nodule left upper lobe as seen on axial image 42.     Upper abdominal structures show no acute findings. Cholelithiasis is  evident.     Degenerative changes are seen in the spine. L1 compression deformity,  with 40% loss of height, is age-indeterminate, new from 01/02/2023,  correlate clinically. L2 compression deformity is also apparent, but  only partly included; if indicated, MRI could be obtained for further  evaluation. Chronic sclerotic focus in a lower left rib. Sternal foramen  is noted.        Impression:         No pulmonary embolism.     Age-indeterminate L1, L2 compression deformities, correlate clinically.     This report was finalized on 6/3/2023 11:29 AM by Dr. Chase Ruiz M.D.       CT Head Without Contrast [[id number]] Collected: 06/03/23 0901     Updated: 06/03/23 0915    Narrative:      CT HEAD WO CONTRAST-     INDICATIONS: Head injury     TECHNIQUE: Radiation dose reduction techniques were utilized, including  automated exposure control and exposure modulation based on body size.  Noncontrast head CT     COMPARISON: None available     FINDINGS:           No acute intracranial hemorrhage, midline shift or mass effect. No acute  territorial infarct is identified.     Mild periventricular hypodensities suggest chronic small vessel ischemic  change in a patient this age.     Arterial calcifications are seen at the base of the brain.     Ventricles, cisterns, cerebral sulci are unremarkable for patient age.     Likely mucous retention cyst or polyp in right maxillary sinus. The  visualized paranasal sinuses, orbits, mastoid air cells are otherwise  unremarkable.                    Impression:         No acute intracranial hemorrhage or hydrocephalus. If there is further  clinical concern, MRI could be considered for further evaluation.     This report was finalized on 6/3/2023 9:12 AM by Dr. Chase Ruiz M.D.       XR Chest 1 View [[id number]] Collected: 06/03/23 0858     Updated: 06/03/23 0901    Narrative:      XR CHEST 1 VW-     HISTORY: Male who is 62 years-old, trauma     TECHNIQUE: Frontal view of the chest     COMPARISON: 01/02/2023     FINDINGS: Heart, mediastinum and pulmonary vasculature are unremarkable.  No focal pulmonary consolidation, pleural effusion, or pneumothorax. No  acute osseous process.       Impression:      No evidence for acute pulmonary process. Follow-up as  clinical indications persist.     This report was finalized on 6/3/2023 8:58 AM by Dr. Chase Ruiz M.D.       XR Hip With or Without Pelvis 2 - 3 View Left [[id number]] Collected: 06/03/23 0855     Updated: 06/03/23 0901    Narrative:      XR HIP W OR WO PELVIS 2-3 VIEW LEFT-     INDICATIONS: Trauma     TECHNIQUE: Frontal view of the pelvis, 2 views of the left hip     COMPARISON: None available     FINDINGS:     No dislocation. Hip joint spaces appear preserved. No definite fracture  is identified. If there is further clinical concern, cross-sectional  imaging could be considered for further evaluation.       Impression:         As described.     This report was finalized on 6/3/2023 8:58 AM by Dr. Chase Ruiz M.D.                 Discharge Details        Discharge Medications        New Medications        Instructions Start Date   acetaminophen 325 MG tablet  Commonly known as: TYLENOL   650 mg, Oral, Every 6 Hours PRN      docusate sodium 50 MG capsule  Commonly known as: COLACE   100 mg, Oral, 2 Times Daily PRN      HYDROcodone-acetaminophen 5-325 MG per tablet  Commonly known as: Norco   1 tablet, Oral, Every 6 Hours PRN      melatonin 3 MG tablet   3 mg, Oral, Nightly PRN              Changes to Medications        Instructions Start Date   propranolol 10 MG tablet  Commonly known as: INDERAL  What changed:   medication strength  how much to take   10 mg, Oral, 2 Times Daily             Continue These Medications        Instructions Start Date   cyanocobalamin 1000 MCG tablet  Commonly known as: VITAMIN B-12   1,000 mcg, Oral, Daily      fluvoxaMINE 25 MG tablet  Commonly known as: LUVOX   100 mg, Oral, Daily      folic acid 1 MG tablet  Commonly known as: FOLVITE   1 mg, Oral, Daily      mirtazapine 15 MG tablet  Commonly known as: REMERON   15 mg, Oral, Nightly      multivitamin tablet tablet   1 tablet, Oral, Daily      risperiDONE 2 MG tablet  Commonly known as: risperDAL   2 mg, Oral, 2 Times Daily      thiamine 100 MG tablet  Commonly known as: VITAMIN B1   100 mg, Oral, Daily             Stop These Medications      amLODIPine 2.5 MG tablet  Commonly known as: NORVASC     naltrexone 50 MG tablet  Commonly known as: DEPADE              No Known Allergies      Discharge Disposition:  Skilled Nursing Facility (DC - External)    Diet:  Hospital:  Diet Order   Procedures    Diet: Fluid Restriction (240 mL/tray) Diets; 1500 mL/day; Texture: Regular Texture (IDDSI 7); Fluid Consistency: Thin (IDDSI 0)       Activity:  Activity Instructions       Activity as Tolerated                   CODE STATUS:    Code Status and Medical Interventions:   Ordered at: 06/03/23 1313     Level Of Support Discussed With:    Patient     Code Status (Patient has no pulse and is not breathing):    CPR (Attempt to Resuscitate)     Medical Interventions (Patient has pulse or is breathing):    Full       Additional Instructions for the Follow-ups that You Need to Schedule       Discharge Follow-up with PCP   As directed       Currently Documented PCP:    Shakira Colbert APRN    PCP Phone Number:    [phone]     Follow Up Details: Recommend primary care provider follow-up within 1 week after discharge from  skilled facility                Contact information for follow-up providers       Shakira Colbert, APRN .    Specialty: Nurse Practitioner  Contact information:  [address] [facility] [address]  [phone]               Negro Torrez MD Follow up in 3 week(s).    Specialties: Orthopedic Surgery, Sports Medicine  Why: Follow-up in the office in 3 weeks  Contact information:  [address]  [address]  [place] [address]  [phone]               Shakira Colbert, APRN .    Specialty: Nurse Practitioner  Why: Recommend primary care provider follow-up within 1 week after discharge from skilled facility  Contact information:  [address] [facility] [address]  [phone]                       Contact information for after-discharge care       Destination       [facility] .    Service: Skilled Nursing  Contact information:  [address] [facility] [address]  [phone]                                       Mariusz Powers MD  06/16/23      Time Spent on Discharge:  I spent greater than 40 minutes on this discharge activity which included: face-to-face encounter with the patient, reviewing the data in the system, coordination of the care with the nursing staff as well as consultants, documentation, and entering orders.

## 2023-06-16 NOTE — PLAN OF CARE
Goal Outcome Evaluation:     Report called to [facility], spoke with nurse Lauren Quintanilla scheduled at 1500.

## 2023-06-16 NOTE — CASE MANAGEMENT/SOCIAL WORK
Case Management Discharge Note      Final Note: Pt discharged to [facility] for skilled care.   GINA Marie RN         Selected Continued Care - Discharged on 6/16/2023 Admission date: 6/3/2023 - Discharge disposition: Skilled Nursing Facility (DC - External)      Destination Coordination complete.      Service Provider Selected Services Address Phone Fax Patient Preferred    [facility] [address] [facility] , [facility] [id number] [phone] --              Durable Medical Equipment    No services have been selected for the patient.                Dialysis/Infusion    No services have been selected for the patient.                Home Medical Care    No services have been selected for the patient.                Therapy    No services have been selected for the patient.                Community Resources    No services have been selected for the patient.                Community & DME    No services have been selected for the patient.                    Transportation Services  W/C Van: [facility]    Final Discharge Disposition Code: 03 - skilled nursing facility (SNF)
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
## 2023-07-03 PROBLEM — F10.920 ALCOHOLIC INTOXICATION WITHOUT COMPLICATION: Status: ACTIVE | Noted: 2023-07-03

## 2023-07-03 PROBLEM — S32.402A LEFT ACETABULAR FRACTURE: Status: ACTIVE | Noted: 2023-07-03

## 2023-07-03 PROBLEM — J44.9 COPD (CHRONIC OBSTRUCTIVE PULMONARY DISEASE): Status: ACTIVE | Noted: 2023-07-03

## 2023-07-03 PROBLEM — F10.239 ALCOHOL DEPENDENCE WITH WITHDRAWAL: Status: ACTIVE | Noted: 2017-12-17

## 2023-08-13 ENCOUNTER — HOSPITAL ENCOUNTER (INPATIENT)
Facility: HOSPITAL | Age: 63
LOS: 5 days | Discharge: HOME | DRG: 897 | End: 2023-08-18
Attending: EMERGENCY MEDICINE | Admitting: INTERNAL MEDICINE
Payer: MEDICARE

## 2023-08-13 DIAGNOSIS — F10.939 ALCOHOL WITHDRAWAL SYNDROME WITH COMPLICATION: Primary | ICD-10-CM

## 2023-08-13 DIAGNOSIS — R79.89 ELEVATED LFTS: ICD-10-CM

## 2023-08-13 LAB
ALBUMIN SERPL-MCNC: 4.4 G/DL (ref 3.5–5.2)
ALBUMIN/GLOB SERPL: 1.4 G/DL
ALP SERPL-CCNC: 167 U/L (ref 39–117)
ALT SERPL W P-5'-P-CCNC: 28 U/L (ref 1–41)
AMPHET+METHAMPHET UR QL: NEGATIVE
ANION GAP SERPL CALCULATED.3IONS-SCNC: 20.5 MMOL/L (ref 5–15)
AST SERPL-CCNC: 52 U/L (ref 1–40)
BARBITURATES UR QL SCN: NEGATIVE
BASOPHILS # BLD AUTO: 0.03 10*3/MM3 (ref 0–0.2)
BASOPHILS NFR BLD AUTO: 0.4 % (ref 0–1.5)
BENZODIAZ UR QL SCN: NEGATIVE
BILIRUB SERPL-MCNC: 1 MG/DL (ref 0–1.2)
BILIRUB UR QL STRIP: NEGATIVE
BUN SERPL-MCNC: 4 MG/DL (ref 8–23)
BUN/CREAT SERPL: 4.8 (ref 7–25)
CALCIUM SPEC-SCNC: 9.2 MG/DL (ref 8.6–10.5)
CANNABINOIDS SERPL QL: NEGATIVE
CHLORIDE SERPL-SCNC: 95 MMOL/L (ref 98–107)
CLARITY UR: CLEAR
CO2 SERPL-SCNC: 24.5 MMOL/L (ref 22–29)
COCAINE UR QL: NEGATIVE
COLOR UR: YELLOW
CREAT SERPL-MCNC: 0.84 MG/DL (ref 0.76–1.27)
DEPRECATED RDW RBC AUTO: 49.4 FL (ref 37–54)
EGFRCR SERPLBLD CKD-EPI 2021: 97.4 ML/MIN/1.73
EOSINOPHIL # BLD AUTO: 0.02 10*3/MM3 (ref 0–0.4)
EOSINOPHIL NFR BLD AUTO: 0.3 % (ref 0.3–6.2)
ERYTHROCYTE [DISTWIDTH] IN BLOOD BY AUTOMATED COUNT: 13.8 % (ref 12.3–15.4)
ETHANOL BLD-MCNC: <10 MG/DL (ref 0–10)
ETHANOL UR QL: <0.01 %
FENTANYL UR-MCNC: NEGATIVE NG/ML
GLOBULIN UR ELPH-MCNC: 3.1 GM/DL
GLUCOSE SERPL-MCNC: 103 MG/DL (ref 65–99)
GLUCOSE UR STRIP-MCNC: NEGATIVE MG/DL
HCT VFR BLD AUTO: 47.8 % (ref 37.5–51)
HGB BLD-MCNC: 16.3 G/DL (ref 13–17.7)
HGB UR QL STRIP.AUTO: NEGATIVE
IMM GRANULOCYTES # BLD AUTO: 0.01 10*3/MM3 (ref 0–0.05)
IMM GRANULOCYTES NFR BLD AUTO: 0.1 % (ref 0–0.5)
KETONES UR QL STRIP: NEGATIVE
LEUKOCYTE ESTERASE UR QL STRIP.AUTO: NEGATIVE
LIPASE SERPL-CCNC: 23 U/L (ref 13–60)
LYMPHOCYTES # BLD AUTO: 1.26 10*3/MM3 (ref 0.7–3.1)
LYMPHOCYTES NFR BLD AUTO: 17.7 % (ref 19.6–45.3)
MCH RBC QN AUTO: 32.9 PG (ref 26.6–33)
MCHC RBC AUTO-ENTMCNC: 34.1 G/DL (ref 31.5–35.7)
MCV RBC AUTO: 96.6 FL (ref 79–97)
METHADONE UR QL SCN: NEGATIVE
MONOCYTES # BLD AUTO: 0.46 10*3/MM3 (ref 0.1–0.9)
MONOCYTES NFR BLD AUTO: 6.5 % (ref 5–12)
NEUTROPHILS NFR BLD AUTO: 5.34 10*3/MM3 (ref 1.7–7)
NEUTROPHILS NFR BLD AUTO: 75 % (ref 42.7–76)
NITRITE UR QL STRIP: NEGATIVE
NRBC BLD AUTO-RTO: 0 /100 WBC (ref 0–0.2)
OPIATES UR QL: NEGATIVE
OXYCODONE UR QL SCN: NEGATIVE
PH UR STRIP.AUTO: 6.5 [PH] (ref 5–8)
PLATELET # BLD AUTO: 181 10*3/MM3 (ref 140–450)
PMV BLD AUTO: 9.5 FL (ref 6–12)
POTASSIUM SERPL-SCNC: 3.3 MMOL/L (ref 3.5–5.2)
PROT SERPL-MCNC: 7.5 G/DL (ref 6–8.5)
PROT UR QL STRIP: NEGATIVE
QT INTERVAL: 419 MS
RBC # BLD AUTO: 4.95 10*6/MM3 (ref 4.14–5.8)
SODIUM SERPL-SCNC: 140 MMOL/L (ref 136–145)
SP GR UR STRIP: 1.01 (ref 1–1.03)
UROBILINOGEN UR QL STRIP: NORMAL
WBC NRBC COR # BLD: 7.12 10*3/MM3 (ref 3.4–10.8)

## 2023-08-13 PROCEDURE — 80307 DRUG TEST PRSMV CHEM ANLYZR: CPT | Performed by: PHYSICIAN ASSISTANT

## 2023-08-13 PROCEDURE — 93005 ELECTROCARDIOGRAM TRACING: CPT | Performed by: PHYSICIAN ASSISTANT

## 2023-08-13 PROCEDURE — 82077 ASSAY SPEC XCP UR&BREATH IA: CPT | Performed by: PHYSICIAN ASSISTANT

## 2023-08-13 PROCEDURE — 25010000002 THIAMINE HCL 200 MG/2ML SOLUTION: Performed by: INTERNAL MEDICINE

## 2023-08-13 PROCEDURE — 83690 ASSAY OF LIPASE: CPT | Performed by: PHYSICIAN ASSISTANT

## 2023-08-13 PROCEDURE — 25010000002 SODIUM CHLORIDE 0.9 % WITH KCL 20 MEQ 20-0.9 MEQ/L-% SOLUTION: Performed by: INTERNAL MEDICINE

## 2023-08-13 PROCEDURE — 25010000002 PHENOBARBITAL PER 120 MG: Performed by: EMERGENCY MEDICINE

## 2023-08-13 PROCEDURE — 99285 EMERGENCY DEPT VISIT HI MDM: CPT

## 2023-08-13 PROCEDURE — 85025 COMPLETE CBC W/AUTO DIFF WBC: CPT | Performed by: PHYSICIAN ASSISTANT

## 2023-08-13 PROCEDURE — 81003 URINALYSIS AUTO W/O SCOPE: CPT | Performed by: PHYSICIAN ASSISTANT

## 2023-08-13 PROCEDURE — 25010000002 PHENOBARBITAL PER 120 MG: Performed by: INTERNAL MEDICINE

## 2023-08-13 PROCEDURE — 93010 ELECTROCARDIOGRAM REPORT: CPT | Performed by: INTERNAL MEDICINE

## 2023-08-13 PROCEDURE — 80053 COMPREHEN METABOLIC PANEL: CPT | Performed by: PHYSICIAN ASSISTANT

## 2023-08-13 RX ORDER — POTASSIUM CHLORIDE 1.5 G/1.58G
20 POWDER, FOR SOLUTION ORAL DAILY
Status: DISCONTINUED | OUTPATIENT
Start: 2023-08-13 | End: 2023-08-18 | Stop reason: HOSPADM

## 2023-08-13 RX ORDER — PHENOBARBITAL 32.4 MG/1
32.4 TABLET ORAL ONCE
Status: DISCONTINUED | OUTPATIENT
Start: 2023-08-15 | End: 2023-08-14

## 2023-08-13 RX ORDER — PROPRANOLOL HYDROCHLORIDE 10 MG/1
10 TABLET ORAL DAILY
Status: DISCONTINUED | OUTPATIENT
Start: 2023-08-13 | End: 2023-08-18 | Stop reason: HOSPADM

## 2023-08-13 RX ORDER — MIRTAZAPINE 15 MG/1
15 TABLET, FILM COATED ORAL NIGHTLY
Status: DISCONTINUED | OUTPATIENT
Start: 2023-08-13 | End: 2023-08-18 | Stop reason: HOSPADM

## 2023-08-13 RX ORDER — FLUVOXAMINE MALEATE 50 MG/1
100 TABLET, COATED ORAL DAILY
Status: DISCONTINUED | OUTPATIENT
Start: 2023-08-14 | End: 2023-08-18 | Stop reason: HOSPADM

## 2023-08-13 RX ORDER — AMLODIPINE BESYLATE 5 MG/1
5 TABLET ORAL DAILY
Status: DISCONTINUED | OUTPATIENT
Start: 2023-08-13 | End: 2023-08-18 | Stop reason: HOSPADM

## 2023-08-13 RX ORDER — SODIUM CHLORIDE AND POTASSIUM CHLORIDE 150; 900 MG/100ML; MG/100ML
100 INJECTION, SOLUTION INTRAVENOUS CONTINUOUS
Status: DISCONTINUED | OUTPATIENT
Start: 2023-08-13 | End: 2023-08-15

## 2023-08-13 RX ORDER — SODIUM CHLORIDE 9 MG/ML
40 INJECTION, SOLUTION INTRAVENOUS AS NEEDED
Status: DISCONTINUED | OUTPATIENT
Start: 2023-08-13 | End: 2023-08-18 | Stop reason: HOSPADM

## 2023-08-13 RX ORDER — CHOLECALCIFEROL (VITAMIN D3) 125 MCG
1000 CAPSULE ORAL DAILY
Status: DISCONTINUED | OUTPATIENT
Start: 2023-08-13 | End: 2023-08-18 | Stop reason: HOSPADM

## 2023-08-13 RX ORDER — NITROGLYCERIN 0.4 MG/1
0.4 TABLET SUBLINGUAL
Status: DISCONTINUED | OUTPATIENT
Start: 2023-08-13 | End: 2023-08-18 | Stop reason: HOSPADM

## 2023-08-13 RX ORDER — RISPERIDONE 2 MG/1
2 TABLET ORAL 2 TIMES DAILY
Status: DISCONTINUED | OUTPATIENT
Start: 2023-08-13 | End: 2023-08-18 | Stop reason: HOSPADM

## 2023-08-13 RX ORDER — PHENOBARBITAL SODIUM 65 MG/ML
65 INJECTION INTRAMUSCULAR ONCE
Status: DISCONTINUED | OUTPATIENT
Start: 2023-08-14 | End: 2023-08-14

## 2023-08-13 RX ORDER — FOLIC ACID 1 MG/1
1 TABLET ORAL DAILY
Status: DISCONTINUED | OUTPATIENT
Start: 2023-08-13 | End: 2023-08-18 | Stop reason: HOSPADM

## 2023-08-13 RX ORDER — THIAMINE HYDROCHLORIDE 100 MG/ML
200 INJECTION, SOLUTION INTRAMUSCULAR; INTRAVENOUS EVERY 8 HOURS SCHEDULED
Status: DISCONTINUED | OUTPATIENT
Start: 2023-08-13 | End: 2023-08-18 | Stop reason: HOSPADM

## 2023-08-13 RX ORDER — DIPHENOXYLATE HYDROCHLORIDE AND ATROPINE SULFATE 2.5; .025 MG/1; MG/1
1 TABLET ORAL DAILY
Status: DISCONTINUED | OUTPATIENT
Start: 2023-08-13 | End: 2023-08-18 | Stop reason: HOSPADM

## 2023-08-13 RX ORDER — CLONIDINE HYDROCHLORIDE 0.1 MG/1
0.1 TABLET ORAL 2 TIMES DAILY
Status: DISCONTINUED | OUTPATIENT
Start: 2023-08-13 | End: 2023-08-18 | Stop reason: HOSPADM

## 2023-08-13 RX ORDER — SODIUM CHLORIDE 0.9 % (FLUSH) 0.9 %
10 SYRINGE (ML) INJECTION EVERY 12 HOURS SCHEDULED
Status: DISCONTINUED | OUTPATIENT
Start: 2023-08-13 | End: 2023-08-18 | Stop reason: HOSPADM

## 2023-08-13 RX ORDER — BISACODYL 10 MG
10 SUPPOSITORY, RECTAL RECTAL DAILY PRN
Status: DISCONTINUED | OUTPATIENT
Start: 2023-08-13 | End: 2023-08-18 | Stop reason: HOSPADM

## 2023-08-13 RX ORDER — PHENOBARBITAL 32.4 MG/1
32.4 TABLET ORAL ONCE
Status: DISCONTINUED | OUTPATIENT
Start: 2023-08-16 | End: 2023-08-14

## 2023-08-13 RX ORDER — SODIUM CHLORIDE 0.9 % (FLUSH) 0.9 %
10 SYRINGE (ML) INJECTION AS NEEDED
Status: DISCONTINUED | OUTPATIENT
Start: 2023-08-13 | End: 2023-08-18 | Stop reason: HOSPADM

## 2023-08-13 RX ORDER — AMOXICILLIN 250 MG
2 CAPSULE ORAL 2 TIMES DAILY
Status: DISCONTINUED | OUTPATIENT
Start: 2023-08-13 | End: 2023-08-18 | Stop reason: HOSPADM

## 2023-08-13 RX ORDER — POLYETHYLENE GLYCOL 3350 17 G/17G
17 POWDER, FOR SOLUTION ORAL DAILY PRN
Status: DISCONTINUED | OUTPATIENT
Start: 2023-08-13 | End: 2023-08-18 | Stop reason: HOSPADM

## 2023-08-13 RX ORDER — BISACODYL 5 MG/1
5 TABLET, DELAYED RELEASE ORAL DAILY PRN
Status: DISCONTINUED | OUTPATIENT
Start: 2023-08-13 | End: 2023-08-18 | Stop reason: HOSPADM

## 2023-08-13 RX ORDER — FAMOTIDINE 10 MG/ML
20 INJECTION, SOLUTION INTRAVENOUS EVERY 12 HOURS SCHEDULED
Status: DISCONTINUED | OUTPATIENT
Start: 2023-08-13 | End: 2023-08-14

## 2023-08-13 RX ADMIN — RISPERIDONE 2 MG: 2 TABLET, FILM COATED ORAL at 21:06

## 2023-08-13 RX ADMIN — POTASSIUM CHLORIDE AND SODIUM CHLORIDE 100 ML/HR: 900; 150 INJECTION, SOLUTION INTRAVENOUS at 21:57

## 2023-08-13 RX ADMIN — Medication 1000 MCG: at 21:56

## 2023-08-13 RX ADMIN — SODIUM CHLORIDE 1000 ML: 9 INJECTION, SOLUTION INTRAVENOUS at 13:45

## 2023-08-13 RX ADMIN — MIRTAZAPINE 15 MG: 15 TABLET, FILM COATED ORAL at 21:56

## 2023-08-13 RX ADMIN — PROPRANOLOL HYDROCHLORIDE 10 MG: 10 TABLET ORAL at 21:06

## 2023-08-13 RX ADMIN — FAMOTIDINE 20 MG: 10 INJECTION INTRAVENOUS at 21:06

## 2023-08-13 RX ADMIN — PHENOBARBITAL SODIUM 146.3 MG: 65 INJECTION INTRAMUSCULAR at 18:42

## 2023-08-13 RX ADMIN — CLONIDINE HYDROCHLORIDE 0.1 MG: 0.1 TABLET ORAL at 21:57

## 2023-08-13 RX ADMIN — FOLIC ACID 1 MG: 1 TABLET ORAL at 21:57

## 2023-08-13 RX ADMIN — Medication 1 TABLET: at 21:56

## 2023-08-13 RX ADMIN — PHENOBARBITAL SODIUM 146.3 MG: 65 INJECTION INTRAMUSCULAR at 15:38

## 2023-08-13 RX ADMIN — POTASSIUM CHLORIDE 20 MEQ: 1.5 POWDER, FOR SOLUTION ORAL at 21:06

## 2023-08-13 RX ADMIN — AMLODIPINE BESYLATE 5 MG: 5 TABLET ORAL at 21:57

## 2023-08-13 RX ADMIN — THIAMINE HYDROCHLORIDE 200 MG: 100 INJECTION, SOLUTION INTRAMUSCULAR; INTRAVENOUS at 21:06

## 2023-08-13 RX ADMIN — Medication 10 ML: at 21:07

## 2023-08-13 RX ADMIN — PHENOBARBITAL SODIUM 146.3 MG: 65 INJECTION INTRAMUSCULAR at 21:07

## 2023-08-13 NOTE — ED PROVIDER NOTES
EMERGENCY DEPARTMENT ENCOUNTER    Room Number:  E660/1  PCP: Shakira Colbert APRN  Discussed/ obtained information from independent historians: patient      HPI:  Chief Complaint: alcohol withdrawal   A complete HPI/ROS/PMH/PSH/SH/FH are unobtainable due to: none  Context: Nilesh Zapata is a 62 y.o. male with a history of COPD and alcohol abuse who presents to the ED c/o symptoms of alcohol withdrawal.  He states he drinks about a pint and a half of liquor daily, today decided to quit.  Last drink was around 10 or 11:00 last night.  Reports a couple episodes of diarrhea, agitation and tremulousness.  He denies any drug use.  He reports prior admissions for alcohol withdrawal, denies any history of alcohol withdrawal seizure.      External (non-ED) record review: Patient admitted 7/3/2023 until 7/7/2023 for alcohol dependence with withdrawal.  He went through alcohol DTs while hospitalized and CIWA protocol was initiated.  Lab abnormalities at the time included thrombocytopenia and anemia and hypophosphatemia.      PAST MEDICAL HISTORY  Active Ambulatory Problems     Diagnosis Date Noted    Alcohol withdrawal syndrome with complication 12/17/2017    Anxiety 12/17/2017    Fatty liver 12/17/2017    Tobacco abuse 12/17/2017    Kidney cancer, primary, with metastasis from kidney to other site 12/17/2017    Alcohol dependence with withdrawal 12/17/2017    Hyponatremia 12/21/2017    Alcohol abuse 10/30/2019    History of renal cell cancer 10/30/2019    History of left nephrectomy 2/2 RCC 10/30/2019    Liver lesion 12/04/2017    Lung nodule 08/21/2017    Vitamin D deficiency 08/16/2017    Under emotional stress 10/30/2019     from spouse 10/30/2019    Coping style affecting medical condition 10/30/2019    Essential hypertension 05/31/2022    Acute adjustment disorder with mixed anxiety and depressed mood 12/27/2022    Alcoholic liver disease 12/27/2022    Hypoxemia 12/27/2022    Closed fracture of left  superior pubic ramus 06/09/2023    Closed fracture of left inferior pubic ramus 06/09/2023    Closed fracture of sacrum 06/09/2023    Anemia 06/14/2023    Hypotension 06/14/2023    Contusion of left hip 06/16/2023    Alcoholic intoxication without complication 07/03/2023    Left acetabular fracture 07/03/2023    Fall 07/03/2023    COPD (chronic obstructive pulmonary disease) 07/03/2023     Resolved Ambulatory Problems     Diagnosis Date Noted    Hypokalemia 12/21/2017    Delirium tremens 03/20/2019    Thrombocytopenia 05/31/2022    Alcohol withdrawal syndrome without complication 12/26/2022    Hypomagnesemia 12/26/2022    Hypophosphatemia 12/27/2022    Constipation 06/11/2023     Past Medical History:   Diagnosis Date    FH: kidney cancer     Hypertension          PAST SURGICAL HISTORY  Past Surgical History:   Procedure Laterality Date    APPENDECTOMY      NEPHRECTOMY      left     TONSILLECTOMY           FAMILY HISTORY  Family History   Problem Relation Age of Onset    Diabetes Mother     Throat cancer Father          SOCIAL HISTORY  Social History     Socioeconomic History    Marital status:    Tobacco Use    Smoking status: Some Days     Packs/day: 0.25     Years: 15.00     Pack years: 3.75     Types: Cigarettes     Start date: 12/17/1981    Tobacco comments:     refused    Vaping Use    Vaping Use: Unknown   Substance and Sexual Activity    Alcohol use: Yes     Comment: last drink was at 1000   drinks 1 fifth a day     Drug use: Never    Sexual activity: Defer         ALLERGIES  Ativan [lorazepam]        REVIEW OF SYSTEMS  Review of Systems         PHYSICAL EXAM  ED Triage Vitals [08/13/23 1318]   Temp Heart Rate Resp BP SpO2   97.4 øF (36.3 øC) (!) 147 28 -- 92 %      Temp src Heart Rate Source Patient Position BP Location FiO2 (%)   -- -- -- -- --       Physical Exam      GENERAL: Chronically ill-appearing, tremulous  HENT: normocephalic, atraumatic  EYES: no scleral icterus  CV: regular rhythm,  tachycardic, hypertensive  RESPIRATORY: normal effort, CTA B  ABDOMEN: nondistended soft nontender  MUSCULOSKELETAL: no deformity  NEURO: alert, moves all extremities, follows commands  PSYCH:  calm, cooperative  SKIN: warm, dry    Vital signs and nursing notes reviewed.          LAB RESULTS  Recent Results (from the past 24 hour(s))   Comprehensive Metabolic Panel    Collection Time: 08/13/23  1:46 PM    Specimen: Blood   Result Value Ref Range    Glucose 103 (H) 65 - 99 mg/dL    BUN 4 (L) 8 - 23 mg/dL    Creatinine 0.84 0.76 - 1.27 mg/dL    Sodium 140 136 - 145 mmol/L    Potassium 3.3 (L) 3.5 - 5.2 mmol/L    Chloride 95 (L) 98 - 107 mmol/L    CO2 24.5 22.0 - 29.0 mmol/L    Calcium 9.2 8.6 - 10.5 mg/dL    Total Protein 7.5 6.0 - 8.5 g/dL    Albumin 4.4 3.5 - 5.2 g/dL    ALT (SGPT) 28 1 - 41 U/L    AST (SGOT) 52 (H) 1 - 40 U/L    Alkaline Phosphatase 167 (H) 39 - 117 U/L    Total Bilirubin 1.0 0.0 - 1.2 mg/dL    Globulin 3.1 gm/dL    A/G Ratio 1.4 g/dL    BUN/Creatinine Ratio 4.8 (L) 7.0 - 25.0    Anion Gap 20.5 (H) 5.0 - 15.0 mmol/L    eGFR 97.4 >60.0 mL/min/1.73   Lipase    Collection Time: 08/13/23  1:46 PM    Specimen: Blood   Result Value Ref Range    Lipase 23 13 - 60 U/L   Ethanol    Collection Time: 08/13/23  1:46 PM    Specimen: Blood   Result Value Ref Range    Ethanol <10 0 - 10 mg/dL    Ethanol % <0.010 %   CBC Auto Differential    Collection Time: 08/13/23  1:46 PM    Specimen: Blood   Result Value Ref Range    WBC 7.12 3.40 - 10.80 10*3/mm3    RBC 4.95 4.14 - 5.80 10*6/mm3    Hemoglobin 16.3 13.0 - 17.7 g/dL    Hematocrit 47.8 37.5 - 51.0 %    MCV 96.6 79.0 - 97.0 fL    MCH 32.9 26.6 - 33.0 pg    MCHC 34.1 31.5 - 35.7 g/dL    RDW 13.8 12.3 - 15.4 %    RDW-SD 49.4 37.0 - 54.0 fl    MPV 9.5 6.0 - 12.0 fL    Platelets 181 140 - 450 10*3/mm3    Neutrophil % 75.0 42.7 - 76.0 %    Lymphocyte % 17.7 (L) 19.6 - 45.3 %    Monocyte % 6.5 5.0 - 12.0 %    Eosinophil % 0.3 0.3 - 6.2 %    Basophil % 0.4 0.0 - 1.5 %     Immature Grans % 0.1 0.0 - 0.5 %    Neutrophils, Absolute 5.34 1.70 - 7.00 10*3/mm3    Lymphocytes, Absolute 1.26 0.70 - 3.10 10*3/mm3    Monocytes, Absolute 0.46 0.10 - 0.90 10*3/mm3    Eosinophils, Absolute 0.02 0.00 - 0.40 10*3/mm3    Basophils, Absolute 0.03 0.00 - 0.20 10*3/mm3    Immature Grans, Absolute 0.01 0.00 - 0.05 10*3/mm3    nRBC 0.0 0.0 - 0.2 /100 WBC   Urinalysis With Microscopic If Indicated (No Culture) - Urine, Clean Catch    Collection Time: 08/13/23  3:22 PM    Specimen: Urine, Clean Catch   Result Value Ref Range    Color, UA Yellow Yellow, Straw    Appearance, UA Clear Clear    pH, UA 6.5 5.0 - 8.0    Specific Gravity, UA 1.006 1.005 - 1.030    Glucose, UA Negative Negative    Ketones, UA Negative Negative    Bilirubin, UA Negative Negative    Blood, UA Negative Negative    Protein, UA Negative Negative    Leuk Esterase, UA Negative Negative    Nitrite, UA Negative Negative    Urobilinogen, UA 1.0 E.U./dL 0.2 - 1.0 E.U./dL   Urine Drug Screen - Urine, Clean Catch    Collection Time: 08/13/23  3:22 PM    Specimen: Urine, Clean Catch   Result Value Ref Range    Amphet/Methamphet, Screen Negative Negative    Barbiturates Screen, Urine Negative Negative    Benzodiazepine Screen, Urine Negative Negative    Cocaine Screen, Urine Negative Negative    Opiate Screen Negative Negative    THC, Screen, Urine Negative Negative    Methadone Screen, Urine Negative Negative    Oxycodone Screen, Urine Negative Negative    Fentanyl, Urine Negative Negative   ECG 12 Lead Tachycardia    Collection Time: 08/13/23  4:03 PM   Result Value Ref Range    QT Interval 419 ms       Ordered the above labs and reviewed the results.          PROCEDURES  Procedures              MEDICATIONS GIVEN IN ER  Medications   PHENobarbital 146.3 mg in sodium chloride 0.9 % 100 mL IVPB (0 mg Intravenous Stopped 8/13/23 7265)     Followed by   PHENobarbital 146.3 mg in sodium chloride 0.9 % 100 mL IVPB (146.3 mg Intravenous New Bag  8/13/23 9032)     Followed by   PHENobarbital 146.3 mg in sodium chloride 0.9 % 100 mL IVPB (has no administration in time range)   vitamin B-12 (CYANOCOBALAMIN) tablet 1,000 mcg (has no administration in time range)   risperiDONE (risperDAL) tablet 2 mg (has no administration in time range)   propranolol (INDERAL) tablet 10 mg (has no administration in time range)   potassium chloride (KLOR-CON) packet 20 mEq (has no administration in time range)   multivitamin (THERAGRAN) tablet 1 tablet (has no administration in time range)   mirtazapine (REMERON) tablet 15 mg (has no administration in time range)   folic acid (FOLVITE) tablet 1 mg (has no administration in time range)   fluvoxaMINE (LUVOX) tablet 100 mg (has no administration in time range)   cloNIDine (CATAPRES) tablet 0.1 mg (has no administration in time range)   amLODIPine (NORVASC) tablet 5 mg (has no administration in time range)   sodium chloride 0.9 % flush 10 mL (has no administration in time range)   sodium chloride 0.9 % flush 10 mL (has no administration in time range)   sodium chloride 0.9 % infusion 40 mL (has no administration in time range)   sennosides-docusate (PERICOLACE) 8.6-50 MG per tablet 2 tablet (has no administration in time range)     And   polyethylene glycol (MIRALAX) packet 17 g (has no administration in time range)     And   bisacodyl (DULCOLAX) EC tablet 5 mg (has no administration in time range)     And   bisacodyl (DULCOLAX) suppository 10 mg (has no administration in time range)   nitroglycerin (NITROSTAT) SL tablet 0.4 mg (has no administration in time range)   sodium chloride 0.9 % with KCl 20 mEq/L infusion (has no administration in time range)   Potassium Replacement - Follow Nurse / BPA Driven Protocol (has no administration in time range)   Magnesium Standard Dose Replacement - Follow Nurse / BPA Driven Protocol (has no administration in time range)   Phosphorus Replacement - Follow Nurse / BPA Driven Protocol (has no  administration in time range)   Calcium Replacement - Follow Nurse / BPA Driven Protocol (has no administration in time range)   thiamine (B-1) injection 200 mg (has no administration in time range)     Followed by   thiamine (VITAMIN B-1) tablet 100 mg (has no administration in time range)   PHENobarbital injection 65 mg (has no administration in time range)     Followed by   PHENobarbital injection 65 mg (has no administration in time range)     Followed by   PHENobarbital (LUMINAL) tablet 32.4 mg (has no administration in time range)     Followed by   PHENobarbital (LUMINAL) tablet 32.4 mg (has no administration in time range)     Followed by   PHENobarbital (LUMINAL) tablet 32.4 mg (has no administration in time range)   famotidine (PEPCID) injection 20 mg (has no administration in time range)   sodium chloride 0.9 % bolus 1,000 mL (0 mL Intravenous Stopped 8/13/23 1418)                   MEDICAL DECISION MAKING, PROGRESS, and CONSULTS    All labs have been independently reviewed by me.  All radiology studies have been reviewed by me and I have also reviewed the radiology report.   EKG's independently viewed and interpreted by me.  Discussion below represents my analysis of pertinent findings related to patient's condition, differential diagnosis, treatment plan and final disposition.            Orders placed during this visit:  Orders Placed This Encounter   Procedures    Comprehensive Metabolic Panel    Lipase    Urinalysis With Microscopic If Indicated (No Culture) - Urine, Clean Catch    Ethanol    Urine Drug Screen - Urine, Clean Catch    CBC Auto Differential    CBC Auto Differential    Comprehensive Metabolic Panel    Lactic Acid, Plasma    Diet: Liquid Diets; Clear Liquid; Texture: Regular Texture (IDDSI 7); Fluid Consistency: Thin (IDDSI 0)    Monitor Blood Pressure    Intake & Output    Weigh Patient    Oral Care    Obtain Baseline Clinical [place] Withdrawal Assessment - Ar (CIWA-Ar), Sedation Scale  & Vital Signs    Clinical [place] Withdrawal Assessment (CIWA-Ar)    If CIWA-Ar Score Less Than 8 For 3 Consecutive Assessments, Monitor Every 4 Hours & Discontinue Assessment When CIWA-Ar Less Than 8 for 24 Hours    Obtain Pre & Post Sedation Scores With Every Lorazepam Dose - Hold For POSS Greater Than 2 or RASS of -3 or Less    Notify Provider - Withdrawal    Notify Provider of Abnormal Lab Results    Notify Provider - Vitals    Place Sequential Compression Device    Maintain Sequential Compression Device    Telemetry - Maintain IV Access    Telemetry - Place Orders & Notify Provider of Results When Patient Experiences Acute Chest Pain, Dysrhythmia or Respiratory Distress    May Be Off Telemetry for Tests    Continuous Pulse Oximetry    Advance Diet As Tolerated -    Code Status and Medical Interventions:    [facility] (on-call MD unless specified) Details    Inpatient Access Center Consult    ECG 12 Lead Tachycardia    Insert Peripheral IV    Inpatient Admission    Seizure Precautions    Safety Precautions    CBC & Differential           Differential diagnosis:  Acute intoxication, alcohol withdrawal, delirium tremens, electrolyte abnormality, dehydration      Independent interpretation of labs, radiology studies, and discussions with consultants:  ED Course as of 08/13/23 2104   Sun Aug 13, 2023   1451 Ethanol: <10 [KA]   1451 Glucose(!): 103 [KA]   1451 Sodium: 140 [KA]   1451 Potassium(!): 3.3 [KA]   1451 Alkaline Phosphatase(!): 167 [KA]   1451 AST (SGOT)(!): 52 [KA]   1451 WBC: 7.12 [KA]   1451 Hemoglobin: 16.3 [KA]   1506 Potassium(!): 3.3 [MM]   1512 AST (SGOT)(!): 52  Likely related to alcohol hepatitis [MM]   1513 Ethanol: <10 [MM]   1704 I discussed patient with Dr. Fuentes, hospitalist including history presentation labs and imaging and he agrees to admit for further evaluation and treatment. [KA]      ED Course User Index  [KA] Elizabeth Faria PA  [MM] Jeff Lewis MD       - Shared decision making:  Recommended admission and patient is agreeable         Additional sources:    - Chronic or social conditions impacting care: Alcoholism          DIAGNOSIS  Final diagnoses:   Alcohol withdrawal syndrome with complication   Elevated LFTs       Latest Documented Vital Signs:  As of 21:04 EDT  BP- 146/89 HR- 71 Temp- 98.6 øF (37 øC) (Oral) O2 sat- 96%              --    Please note that portions of this were completed with a voice recognition program.       Note Disclaimer: At [facility], we believe that sharing information builds trust and better relationships. You are receiving this note because you are receiving care at [facility] or recently visited. It is possible you will see health information before a provider has talked with you about it. This kind of information can be easy to misunderstand. To help you fully understand what it means for your health, we urge you to discuss this note with your provider.             Elizabeth Faria PA  08/13/23 5439

## 2023-08-13 NOTE — ED PROVIDER NOTES
I supervised care provided by the midlevel provider.   We have discussed this patient's history, physical exam, and treatment plan.  I have reviewed the note and personally saw and examined the patient and agree with the plan of care.   I have seen and evaluated this patient.  He comes here because he believes an alcohol withdrawal.  He suffered alcohol withdrawal in the past.  He states that his last alcohol drink was about 10 PM last night.  He drinks about 1-1/2 fifths of bourbon a day.  Long history of alcohol abuse.  He has had a history of alcohol withdrawal.  He is not suicidal or homicidal.  He does want to stop breathing drinking.  He has been in programs in the past.  He has nausea denies vomiting or diarrhea.  Denies fevers or chills.  He states that he cannot take Ativan because it causes him to hallucinate.    GENERAL: Elderly man.  Appears older than stated age.  Patient is anxious and tremulous.  Has the appearance of someone in alcohol withdrawal.  He was hypertensive and tachycardia at triage.  Currently his heart rate is improved to 90s to low 100s.  HENT: nares patent  Head/neck/ face are symmetric without gross deformity or swelling  EYES: no scleral icterus  CV: regular rhythm, regular rate with intact distal pulses  RESPIRATORY: normal effort and no respiratory distress  ABDOMEN: soft and nontender  MUSCULOSKELETAL: no deformity  NEURO: alert and appropriate, moves all extremities, follows commands  SKIN: warm, dry    Vital signs and nursing notes reviewed.    Plan  This is a gentleman that very likely has alcohol withdrawal.  His ethanol level is 0 when his last drink of alcohol was at about 10 PM last night.  Long history of alcohol abuse and similar symptoms in the past.  He cannot take Ativan we are getting give him some phenobarbital.  I think this gentleman is getting need to be admitted to the hospital.  He looks uncomfortable and agitated.  Discussed the plan with Chelsie as well as the  patient.  All questions answered at this time.    ED Course as of 08/15/23 0052   Sun Aug 13, 2023   1451 Ethanol: <10 [KA]   1451 Glucose(!): 103 [KA]   1451 Sodium: 140 [KA]   1451 Potassium(!): 3.3 [KA]   1451 Alkaline Phosphatase(!): 167 [KA]   1451 AST (SGOT)(!): 52 [KA]   1451 WBC: 7.12 [KA]   1451 Hemoglobin: 16.3 [KA]   1506 Potassium(!): 3.3 [MM]   1512 AST (SGOT)(!): 52  Likely related to alcohol hepatitis [MM]   1513 Ethanol: <10 [MM]   1704 I discussed patient with Dr. Fuentes, hospitalist including history presentation labs and imaging and he agrees to admit for further evaluation and treatment. [KA]      ED Course User Index  [KA] Elizabeth Faria PA  [MM] Jeff Lewis MD Molinari, Mark, MD  08/13/23 1621  Per the request of medical records the ED chart has been updated and they request that I refresh and signed the chart again.     Jeff Lewis MD  08/15/23 0052

## 2023-08-13 NOTE — H&P
Internal medicine history and physical  INTERNAL MEDICINE   [facility]       Patient Identification:  Name: Nilesh Zapata  Age: 62 y.o.  Sex: male  :  1960  MRN: [id number]                   Primary Care Physician: Shakira Colbert APRN                               Date of admission:2023    Chief Complaint: Feeling bad with agitation and tremulousness and shakes and diarrhea and unable to keep anything down since last night.    History of Present Illness:   Patient is a 62-year-old male with history of alcohol abuse, hypertension and anxiety and recent hospitalization about a month ago for alcohol dependence with withdrawal and DTs requiring CIWA protocol.  According the patient since his discharge from the hospital he went back to drinking but does not drink too much and advised to have last alcoholic drink consisting of vodka and tonic last night.  Patient had decreased appetite has tremors and shakes and feels anxious and agitated and cannot keep anything down and having diarrhea.  Patient was brought to the emergency room by his wife where evaluation revealed blood alcohol level less than 10, potassium of 3.3 mildly elevated LFTs and essentially unremarkable CBC.  Because of heart rate of 147 upon arrival to the emergency room patient was started on CIWA protocol with IV phenobarb since his CIWA score was reported to be greater than 4 and is being admitted for further care.  Patient has been started on vitamins including folic acid and thiamine and Pepcid with improvement in sense of wellbeing.  Patient has not completely better.  Patient denies any hematemesis and melena.      Past Medical History:  Past Medical History:   Diagnosis Date    Alcohol abuse     Anxiety     Delirium tremens 2019    Fatty liver     FH: kidney cancer     Hypertension      Past Surgical History:  Past Surgical History:   Procedure Laterality Date    APPENDECTOMY      NEPHRECTOMY      left      TONSILLECTOMY        Home Meds:  Medications Prior to Admission   Medication Sig Dispense Refill Last Dose    amLODIPine (NORVASC) 5 MG tablet Take 1 tablet by mouth Daily.       cloNIDine (CATAPRES) 0.1 MG tablet Take 1 tablet by mouth 2 (Two) Times a Day. take 1x/day       Fluticasone-Umeclidin-Vilant (Trelegy Ellipta) 100-62.5-25 MCG/ACT inhaler Inhale 1 puff Daily.       fluvoxaMINE (LUVOX) 25 MG tablet Take 4 tablets by mouth Daily.       folic acid (FOLVITE) 1 MG tablet Take 1 tablet by mouth Daily. 30 tablet 0     hydrOXYzine (ATARAX) 25 MG tablet Take 1 tablet by mouth Every 8 (Eight) Hours As Needed.       Ibuprofen 200 MG capsule Take 200 mg by mouth As Needed. PRN for pain        melatonin 3 MG tablet Take 1 tablet by mouth At Night As Needed for Sleep. 30 tablet      mirtazapine (REMERON) 15 MG tablet Take 1 tablet by mouth Every Night. 30 tablet 0     multivitamin (THERAGRAN) tablet tablet Take 1 tablet by mouth Daily. 30 tablet      Neomycin-Bacitracin-Polymyxin (TRIPLE ANTIBIOTIC EX) Apply 1 application  topically to the appropriate area as directed As Needed. PRN for skin irritation        nystatin (MYCOSTATIN) 113155 UNIT/GM cream Apply 1 application  topically to the appropriate area as directed 2 (Two) Times a Day.       potassium chloride (KLOR-CON) 20 MEQ packet Take 20 mEq by mouth Daily.       propranolol (INDERAL) 10 MG tablet Take 1 tablet by mouth 2 (Two) Times a Day. (Patient taking differently: Take 1 tablet by mouth Daily. take 1 tablet 60 mg daily)       risperiDONE (risperDAL) 2 MG tablet Take 1 tablet by mouth 2 (Two) Times a Day.       vitamin B-12 (VITAMIN B-12) 1000 MCG tablet Take 1 tablet by mouth Daily. 30 tablet 0     O2 (OXYGEN) Inhale 2 L/min As Needed. use PRN for SOB        Current Meds:     Current Facility-Administered Medications:     [COMPLETED] PHENobarbital 146.3 mg in sodium chloride 0.9 % 100 mL IVPB, 2 mg/kg (Ideal), Intravenous, Once, Stopped at 08/13/23 4403  "**FOLLOWED BY** [COMPLETED] PHENobarbital 146.3 mg in sodium chloride 0.9 % 100 mL IVPB, 2 mg/kg (Ideal), Intravenous, Once, Last Rate: 600 mL/hr at 08/13/23 1842, 146.3 mg at 08/13/23 1842 **FOLLOWED BY** PHENobarbital 146.3 mg in sodium chloride 0.9 % 100 mL IVPB, 2 mg/kg (Ideal), Intravenous, Once, Jeff Lewis MD  Allergies:  Allergies   Allergen Reactions    Ativan [Lorazepam] Hallucinations     Social History:   Social History     Tobacco Use    Smoking status: Some Days     Packs/day: 0.25     Years: 15.00     Pack years: 3.75     Types: Cigarettes     Start date: 12/17/1981    Smokeless tobacco: Not on file    Tobacco comments:     refused    Substance Use Topics    Alcohol use: Yes     Comment: last drink was at 1000   drinks 1 fifth a day       Family History:  Family History   Problem Relation Age of Onset    Diabetes Mother     Throat cancer Father           Review of Systems  See history of present illness and past medical history.    As described in history presenting illness.    Vitals:   /89 (BP Location: Left arm, Patient Position: Lying)   Pulse 71   Temp 98.6 øF (37 øC) (Oral)   Resp 22   Ht 177.8 cm (70\")   Wt 97.5 kg (215 lb)   SpO2 96%   BMI 30.85 kg/mý   I/O:   Intake/Output Summary (Last 24 hours) at 8/13/2023 1944  Last data filed at 8/13/2023 1555  Gross per 24 hour   Intake 100 ml   Output --   Net 100 ml     Exam:  Patient is examined using the personal protective equipment as per guidelines from infection control for this particular patient as enacted.  Hand washing was performed before and after patient interaction.  General Appearance:  Alert tremulous cooperative unkempt appearing male does not appear to be in any acute distress   Head:    Normocephalic, without obvious abnormality, atraumatic   Eyes:    PERRL, conjunctiva/corneas clear, EOM's intact, both eyes   Ears:    Normal external ear canals, both ears   Nose:   Nares normal, septum midline, mucosa normal, no " drainage    or sinus tenderness   Throat:   Lips, tongue, gums normal; oral mucosa pink and moist   Neck:   Supple, symmetrical, trachea midline, no adenopathy;     thyroid:  no enlargement/tenderness/nodules; no carotid    bruit or JVD   Back:     Symmetric, no curvature, ROM normal, no CVA tenderness   Lungs:     Clear to auscultation bilaterally, respirations unlabored   Chest Wall:    No tenderness or deformity    Heart:  S1-S2 regular   Abdomen:   Soft nontender no guarding rigidity or rebound noted   Extremities:   Extremities normal, atraumatic, no cyanosis or edema   Pulses:   Pulses palpable in all extremities; symmetric all extremities   Skin: Abrasions and ecchymotic changes noted   Neurologic: Tremulous and shaking but oriented to place and time.     Data Review:      I reviewed the patient's new clinical results.  Results from last 7 days   Lab Units 08/13/23  1346   WBC 10*3/mm3 7.12   HEMOGLOBIN g/dL 16.3   PLATELETS 10*3/mm3 181     Results from last 7 days   Lab Units 08/13/23  1346   SODIUM mmol/L 140   POTASSIUM mmol/L 3.3*   CHLORIDE mmol/L 95*   CO2 mmol/L 24.5   BUN mg/dL 4*   CREATININE mg/dL 0.84   CALCIUM mg/dL 9.2   GLUCOSE mg/dL 103*     No radiology results for the last 30 days.  Microbiology Results (last 10 days)       ** No results found for the last 240 hours. **            Assessment:  Active Hospital Problems    Diagnosis  POA    **Alcohol withdrawal [F10.939]  Yes    COPD (chronic obstructive pulmonary disease) [J44.9]  Yes    Alcoholic liver disease [K70.9]  Yes    Essential hypertension [I10]  Yes    Alcohol abuse [F10.10]  Yes    History of left nephrectomy 2/2 RCC [Z90.5]  Not Applicable    Anxiety [F41.9]  Yes    Kidney cancer, primary, with metastasis from kidney to other site [C64.9]  Yes       Medical decision making/care plan: See admitting orders  Alcohol withdrawal in the setting of ongoing alcohol consumption with last drink last night and blood alcohol level less than  10-continue CIWA protocol, vitamins IV Pepcid and allow him to have diet clear liquid diet and advance as tolerated.  Access consultation.  Provided with continuous pulse ox monitoring while receiving phenobarb protocol given his initial CIWA greater than 4.  Continue with fluids and electrolyte replacement per protocol.  Hypertension-continue antihypertensive regimen avoid hypotensive episode.  COPD-continue his DuoNebs and provide him with continuous pulse ox monitoring.    Naldo Fuentes MD   8/13/2023  19:44 EDT    Parts of this note may be an electronic transcription/translation of spoken language to printed text using the Dragon dictation system.

## 2023-08-13 NOTE — ED NOTES
Pt continues to desaturate after phenobarbital administration. Pt O2 probe changed, pt woken up and sat up in bed, these interventions were not effective. LOU Faria made aware, pt placed on 2L nasal cannula.

## 2023-08-13 NOTE — ED NOTES
"Nursing report ED to floor  Nilesh Zapata  62 y.o.  male    HPI :   Chief Complaint   Patient presents with    Alcohol Intoxication       Admitting doctor:   Naldo Fuentes MD    Admitting diagnosis:   There were no encounter diagnoses.    Code status:   Current Code Status       Date Active Code Status Order ID Comments User Context       Prior            Allergies:   Patient has no known allergies.    Isolation:   No active isolations    Intake and Output    Intake/Output Summary (Last 24 hours) at 8/13/2023 1718  Last data filed at 8/13/2023 1555  Gross per 24 hour   Intake 100 ml   Output --   Net 100 ml       Weight:       08/13/23  1349   Weight: 97.5 kg (215 lb)       Most recent vitals:   Vitals:    08/13/23 1349 08/13/23 1415 08/13/23 1542 08/13/23 1620   BP: (!) 147/101 141/89 125/83    BP Location: Right arm      Patient Position: Lying      Pulse:  88 80 78   Resp:  22     Temp:       SpO2:  98% 91% 91%   Weight: 97.5 kg (215 lb)      Height: 177.8 cm (70\")          Active LDAs/IV Access:   Lines, Drains & Airways       Active LDAs       Name Placement date Placement time Site Days    Peripheral IV 08/13/23 1337 Anterior;Left Forearm 08/13/23  1337  Forearm  less than 1                    Labs (abnormal labs have a star):   Labs Reviewed   COMPREHENSIVE METABOLIC PANEL - Abnormal; Notable for the following components:       Result Value    Glucose 103 (*)     BUN 4 (*)     Potassium 3.3 (*)     Chloride 95 (*)     AST (SGOT) 52 (*)     Alkaline Phosphatase 167 (*)     BUN/Creatinine Ratio 4.8 (*)     Anion Gap 20.5 (*)     All other components within normal limits    Narrative:     GFR Normal >60  Chronic Kidney Disease <60  Kidney Failure <15     CBC WITH AUTO DIFFERENTIAL - Abnormal; Notable for the following components:    Lymphocyte % 17.7 (*)     All other components within normal limits   LIPASE - Normal   URINALYSIS W/ MICROSCOPIC IF INDICATED (NO CULTURE) - Normal    Narrative:     Urine microscopic " not indicated.   URINE DRUG SCREEN - Normal    Narrative:     Negative Thresholds Per Drugs Screened:    Amphetamines                 500 ng/ml  Barbiturates                 200 ng/ml  Benzodiazepines              100 ng/ml  Cocaine                      300 ng/ml  Methadone                    300 ng/ml  Opiates                      300 ng/ml  Oxycodone                    100 ng/ml  THC                           50 ng/ml  Fentanyl                       5 ng/ml      The Normal Value for all drugs tested is negative. This report includes final unconfirmed screening results to be used for medical treatment purposes only. Unconfirmed results must not be used for non-medical purposes such as employment or legal testing. Clinical consideration should be applied to any drug of abuse test, particularly when unconfirmed results are used.           ETHANOL   CBC AND DIFFERENTIAL    Narrative:     The following orders were created for panel order CBC & Differential.  Procedure                               Abnormality         Status                     ---------                               -----------         ------                     CBC Auto Differential[[id number]]        Abnormal            Final result                 Please view results for these tests on the individual orders.       EKG:   ECG 12 Lead Tachycardia   Final Result   HEART RATE= 80  bpm   RR Interval= 750  ms   DE Interval= 160  ms   P Horizontal Axis= 4  deg   P Front Axis= -15  deg   QRSD Interval= 92  ms   QT Interval= 419  ms   QRS Axis= -58  deg   T Wave Axis= 30  deg   - ABNORMAL ECG -   Sinus rhythm   Left anterior fascicular block   Low voltage, extremity leads   Borderline prolonged QT interval   Rate has improved   Electronically Signed By: Marge Randolph ([facility]) 13-Aug-2023 17:00:34   Date and Time of Study: 2023-08-13 16:03:04          Meds given in ED:   Medications   PHENobarbital 146.3 mg in sodium chloride 0.9 % 100 mL IVPB (0 mg Intravenous  Stopped 8/13/23 1555)     Followed by   PHENobarbital 146.3 mg in sodium chloride 0.9 % 100 mL IVPB (has no administration in time range)     Followed by   PHENobarbital 146.3 mg in sodium chloride 0.9 % 100 mL IVPB (has no administration in time range)   sodium chloride 0.9 % bolus 1,000 mL (0 mL Intravenous Stopped 8/13/23 1418)       Imaging results:  No radiology results for the last day    Ambulatory status:   - Standby     Social issues:   Social History     Socioeconomic History    Marital status:    Tobacco Use    Smoking status: Some Days     Packs/day: 0.25     Years: 15.00     Pack years: 3.75     Types: Cigarettes     Start date: 12/17/1981    Tobacco comments:     refused    Vaping Use    Vaping Use: Unknown   Substance and Sexual Activity    Alcohol use: Yes     Comment: last drink was at 1000   drinks 1 fifth a day     Drug use: Never    Sexual activity: Defer       NIH Stroke Scale:       Lynsey Valencia RN  08/13/23 17:18 EDT     Call Lynsey #[phone] with any questions

## 2023-08-13 NOTE — ED TRIAGE NOTES
Patient to ED c/o alcohol withdrawal symptoms. Patient reports his last drink was around 2200 last night.

## 2023-08-14 PROBLEM — E83.42 HYPOMAGNESEMIA: Status: ACTIVE | Noted: 2023-08-14

## 2023-08-14 PROBLEM — E87.6 HYPOKALEMIA: Status: ACTIVE | Noted: 2023-08-14

## 2023-08-14 LAB
ALBUMIN SERPL-MCNC: 3.4 G/DL (ref 3.5–5.2)
ALBUMIN/GLOB SERPL: 1.3 G/DL
ALP SERPL-CCNC: 132 U/L (ref 39–117)
ALT SERPL W P-5'-P-CCNC: 20 U/L (ref 1–41)
ANION GAP SERPL CALCULATED.3IONS-SCNC: 11.6 MMOL/L (ref 5–15)
AST SERPL-CCNC: 35 U/L (ref 1–40)
BASOPHILS # BLD AUTO: 0.03 10*3/MM3 (ref 0–0.2)
BASOPHILS NFR BLD AUTO: 0.6 % (ref 0–1.5)
BILIRUB SERPL-MCNC: 1.6 MG/DL (ref 0–1.2)
BUN SERPL-MCNC: 5 MG/DL (ref 8–23)
BUN/CREAT SERPL: 5.7 (ref 7–25)
CALCIUM SPEC-SCNC: 8.4 MG/DL (ref 8.6–10.5)
CHLORIDE SERPL-SCNC: 100 MMOL/L (ref 98–107)
CO2 SERPL-SCNC: 28.4 MMOL/L (ref 22–29)
CREAT SERPL-MCNC: 0.87 MG/DL (ref 0.76–1.27)
D-LACTATE SERPL-SCNC: 1.3 MMOL/L (ref 0.5–2)
DEPRECATED RDW RBC AUTO: 45.5 FL (ref 37–54)
EGFRCR SERPLBLD CKD-EPI 2021: 96.4 ML/MIN/1.73
EOSINOPHIL # BLD AUTO: 0.07 10*3/MM3 (ref 0–0.4)
EOSINOPHIL NFR BLD AUTO: 1.4 % (ref 0.3–6.2)
ERYTHROCYTE [DISTWIDTH] IN BLOOD BY AUTOMATED COUNT: 13.1 % (ref 12.3–15.4)
GLOBULIN UR ELPH-MCNC: 2.7 GM/DL
GLUCOSE SERPL-MCNC: 109 MG/DL (ref 65–99)
HBA1C MFR BLD: 4.4 % (ref 4.8–5.6)
HCT VFR BLD AUTO: 41.4 % (ref 37.5–51)
HGB BLD-MCNC: 14 G/DL (ref 13–17.7)
LYMPHOCYTES # BLD AUTO: 1.53 10*3/MM3 (ref 0.7–3.1)
LYMPHOCYTES NFR BLD AUTO: 30.3 % (ref 19.6–45.3)
MAGNESIUM SERPL-MCNC: 1.2 MG/DL (ref 1.6–2.4)
MCH RBC QN AUTO: 32.1 PG (ref 26.6–33)
MCHC RBC AUTO-ENTMCNC: 33.8 G/DL (ref 31.5–35.7)
MCV RBC AUTO: 95 FL (ref 79–97)
MONOCYTES # BLD AUTO: 0.37 10*3/MM3 (ref 0.1–0.9)
MONOCYTES NFR BLD AUTO: 7.3 % (ref 5–12)
NEUTROPHILS NFR BLD AUTO: 3.03 10*3/MM3 (ref 1.7–7)
NEUTROPHILS NFR BLD AUTO: 60 % (ref 42.7–76)
PLATELET # BLD AUTO: 136 10*3/MM3 (ref 140–450)
PMV BLD AUTO: 10 FL (ref 6–12)
POTASSIUM SERPL-SCNC: 3.2 MMOL/L (ref 3.5–5.2)
POTASSIUM SERPL-SCNC: 3.7 MMOL/L (ref 3.5–5.2)
PROT SERPL-MCNC: 6.1 G/DL (ref 6–8.5)
RBC # BLD AUTO: 4.36 10*6/MM3 (ref 4.14–5.8)
SODIUM SERPL-SCNC: 140 MMOL/L (ref 136–145)
WBC NRBC COR # BLD: 5.05 10*3/MM3 (ref 3.4–10.8)

## 2023-08-14 PROCEDURE — 25010000002 PHENOBARBITAL PER 120 MG: Performed by: HOSPITALIST

## 2023-08-14 PROCEDURE — 83605 ASSAY OF LACTIC ACID: CPT | Performed by: INTERNAL MEDICINE

## 2023-08-14 PROCEDURE — 0 MAGNESIUM SULFATE 4 GM/100ML SOLUTION: Performed by: HOSPITALIST

## 2023-08-14 PROCEDURE — 83735 ASSAY OF MAGNESIUM: CPT | Performed by: HOSPITALIST

## 2023-08-14 PROCEDURE — 90791 PSYCH DIAGNOSTIC EVALUATION: CPT

## 2023-08-14 PROCEDURE — 83036 HEMOGLOBIN GLYCOSYLATED A1C: CPT | Performed by: HOSPITALIST

## 2023-08-14 PROCEDURE — 25010000002 THIAMINE HCL 200 MG/2ML SOLUTION: Performed by: INTERNAL MEDICINE

## 2023-08-14 PROCEDURE — 94640 AIRWAY INHALATION TREATMENT: CPT

## 2023-08-14 PROCEDURE — 94799 UNLISTED PULMONARY SVC/PX: CPT

## 2023-08-14 PROCEDURE — 84132 ASSAY OF SERUM POTASSIUM: CPT | Performed by: HOSPITALIST

## 2023-08-14 PROCEDURE — 85025 COMPLETE CBC W/AUTO DIFF WBC: CPT | Performed by: INTERNAL MEDICINE

## 2023-08-14 PROCEDURE — 80053 COMPREHEN METABOLIC PANEL: CPT | Performed by: INTERNAL MEDICINE

## 2023-08-14 PROCEDURE — 25010000002 SODIUM CHLORIDE 0.9 % WITH KCL 20 MEQ 20-0.9 MEQ/L-% SOLUTION: Performed by: INTERNAL MEDICINE

## 2023-08-14 RX ORDER — LORAZEPAM 1 MG/1
1 TABLET ORAL EVERY 6 HOURS PRN
Status: DISCONTINUED | OUTPATIENT
Start: 2023-08-14 | End: 2023-08-16

## 2023-08-14 RX ORDER — PHENOBARBITAL 32.4 MG/1
32.4 TABLET ORAL ONCE
Status: COMPLETED | OUTPATIENT
Start: 2023-08-15 | End: 2023-08-15

## 2023-08-14 RX ORDER — MAGNESIUM SULFATE HEPTAHYDRATE 40 MG/ML
4 INJECTION, SOLUTION INTRAVENOUS ONCE
Status: COMPLETED | OUTPATIENT
Start: 2023-08-14 | End: 2023-08-14

## 2023-08-14 RX ORDER — HYDROXYZINE HYDROCHLORIDE 25 MG/1
25 TABLET, FILM COATED ORAL 3 TIMES DAILY PRN
Status: DISCONTINUED | OUTPATIENT
Start: 2023-08-14 | End: 2023-08-18 | Stop reason: HOSPADM

## 2023-08-14 RX ORDER — BUDESONIDE AND FORMOTEROL FUMARATE DIHYDRATE 160; 4.5 UG/1; UG/1
2 AEROSOL RESPIRATORY (INHALATION)
Status: DISCONTINUED | OUTPATIENT
Start: 2023-08-14 | End: 2023-08-18 | Stop reason: HOSPADM

## 2023-08-14 RX ORDER — PHENOBARBITAL SODIUM 65 MG/ML
65 INJECTION INTRAMUSCULAR ONCE
Status: COMPLETED | OUTPATIENT
Start: 2023-08-14 | End: 2023-08-14

## 2023-08-14 RX ORDER — MAGNESIUM SULFATE HEPTAHYDRATE 40 MG/ML
4 INJECTION, SOLUTION INTRAVENOUS ONCE
Status: DISCONTINUED | OUTPATIENT
Start: 2023-08-14 | End: 2023-08-14

## 2023-08-14 RX ORDER — PHENOBARBITAL 32.4 MG/1
32.4 TABLET ORAL ONCE
Status: COMPLETED | OUTPATIENT
Start: 2023-08-16 | End: 2023-08-16

## 2023-08-14 RX ORDER — POTASSIUM CHLORIDE 750 MG/1
40 TABLET, FILM COATED, EXTENDED RELEASE ORAL EVERY 4 HOURS
Status: COMPLETED | OUTPATIENT
Start: 2023-08-14 | End: 2023-08-14

## 2023-08-14 RX ORDER — FAMOTIDINE 20 MG/1
20 TABLET, FILM COATED ORAL
Status: DISCONTINUED | OUTPATIENT
Start: 2023-08-14 | End: 2023-08-18 | Stop reason: HOSPADM

## 2023-08-14 RX ADMIN — Medication 10 ML: at 21:03

## 2023-08-14 RX ADMIN — MAGNESIUM SULFATE HEPTAHYDRATE 4 G: 40 INJECTION, SOLUTION INTRAVENOUS at 15:15

## 2023-08-14 RX ADMIN — THIAMINE HYDROCHLORIDE 200 MG: 100 INJECTION, SOLUTION INTRAMUSCULAR; INTRAVENOUS at 15:19

## 2023-08-14 RX ADMIN — POTASSIUM CHLORIDE 40 MEQ: 750 TABLET, EXTENDED RELEASE ORAL at 15:15

## 2023-08-14 RX ADMIN — FLUVOXAMINE MALEATE 100 MG: 50 TABLET, COATED ORAL at 10:02

## 2023-08-14 RX ADMIN — BUDESONIDE AND FORMOTEROL FUMARATE DIHYDRATE 2 PUFF: 160; 4.5 AEROSOL RESPIRATORY (INHALATION) at 20:10

## 2023-08-14 RX ADMIN — CLONIDINE HYDROCHLORIDE 0.1 MG: 0.1 TABLET ORAL at 10:02

## 2023-08-14 RX ADMIN — RISPERIDONE 2 MG: 2 TABLET, FILM COATED ORAL at 21:02

## 2023-08-14 RX ADMIN — THIAMINE HYDROCHLORIDE 200 MG: 100 INJECTION, SOLUTION INTRAMUSCULAR; INTRAVENOUS at 21:02

## 2023-08-14 RX ADMIN — PROPRANOLOL HYDROCHLORIDE 10 MG: 10 TABLET ORAL at 10:02

## 2023-08-14 RX ADMIN — MIRTAZAPINE 15 MG: 15 TABLET, FILM COATED ORAL at 21:01

## 2023-08-14 RX ADMIN — FAMOTIDINE 20 MG: 20 TABLET, FILM COATED ORAL at 18:09

## 2023-08-14 RX ADMIN — POTASSIUM CHLORIDE 40 MEQ: 750 TABLET, EXTENDED RELEASE ORAL at 10:04

## 2023-08-14 RX ADMIN — FOLIC ACID 1 MG: 1 TABLET ORAL at 10:02

## 2023-08-14 RX ADMIN — AMLODIPINE BESYLATE 5 MG: 5 TABLET ORAL at 10:02

## 2023-08-14 RX ADMIN — Medication 10 ML: at 10:03

## 2023-08-14 RX ADMIN — Medication 1 TABLET: at 10:02

## 2023-08-14 RX ADMIN — POTASSIUM CHLORIDE AND SODIUM CHLORIDE 100 ML/HR: 900; 150 INJECTION, SOLUTION INTRAVENOUS at 10:01

## 2023-08-14 RX ADMIN — PHENOBARBITAL SODIUM 65 MG: 65 INJECTION INTRAMUSCULAR at 21:02

## 2023-08-14 RX ADMIN — RISPERIDONE 2 MG: 2 TABLET, FILM COATED ORAL at 10:02

## 2023-08-14 RX ADMIN — THIAMINE HYDROCHLORIDE 200 MG: 100 INJECTION, SOLUTION INTRAMUSCULAR; INTRAVENOUS at 07:03

## 2023-08-14 RX ADMIN — Medication 1000 MCG: at 10:02

## 2023-08-14 RX ADMIN — PHENOBARBITAL SODIUM 65 MG: 65 INJECTION INTRAMUSCULAR at 10:04

## 2023-08-14 NOTE — PLAN OF CARE
Goal Outcome Evaluation:      Pt resting on the bed, AO x 4, 2L o2 nc, sr/st on monitor, c/o feeling uncomfortable, anxious, no c/o pain or soa, assist x 1 to use toilet, vss, no ss of acute distress noted, will continue to monitor.        Problem: Adult Inpatient Plan of Care  Goal: Plan of Care Review  Outcome: Ongoing, Progressing  Goal: Patient-Specific Goal (Individualized)  Outcome: Ongoing, Progressing  Goal: Absence of Hospital-Acquired Illness or Injury  Outcome: Ongoing, Progressing  Intervention: Identify and Manage Fall Risk  Recent Flowsheet Documentation  Taken 8/14/2023 0601 by Lucio Hall RN  Safety Promotion/Fall Prevention:   activity supervised   safety round/check completed   room organization consistent  Taken 8/14/2023 0404 by Lucio Hall RN  Safety Promotion/Fall Prevention:   activity supervised   safety round/check completed   room organization consistent  Taken 8/14/2023 0212 by Lucio Hall RN  Safety Promotion/Fall Prevention:   activity supervised   safety round/check completed   room organization consistent  Taken 8/14/2023 0012 by Lucio Hall RN  Safety Promotion/Fall Prevention:   activity supervised   safety round/check completed   room organization consistent  Taken 8/13/2023 2208 by Lucio Hall RN  Safety Promotion/Fall Prevention:   activity supervised   safety round/check completed   room organization consistent  Taken 8/13/2023 2001 by Lucio Hall RN  Safety Promotion/Fall Prevention:   activity supervised   room organization consistent   safety round/check completed  Intervention: Prevent Skin Injury  Recent Flowsheet Documentation  Taken 8/14/2023 0601 by Lucio Hall RN  Body Position: position changed independently  Taken 8/14/2023 0404 by Lucio Hall RN  Body Position: position changed independently  Taken 8/14/2023 0212 by Lucio Hall RN  Body Position: position changed independently  Taken 8/14/2023 0012 by  Lucio Hall RN  Body Position: position changed independently  Taken 8/13/2023 2208 by Lucio Hall RN  Body Position: position changed independently  Taken 8/13/2023 2001 by Lucio Hall RN  Body Position: position changed independently  Intervention: Prevent and Manage VTE (Venous Thromboembolism) Risk  Recent Flowsheet Documentation  Taken 8/14/2023 0012 by Lucio Hall RN  Range of Motion: active ROM (range of motion) encouraged  Taken 8/13/2023 2001 by Lucio Hall RN  Activity Management: ambulated to bathroom  VTE Prevention/Management: patient refused intervention  Range of Motion: active ROM (range of motion) encouraged  Intervention: Prevent Infection  Recent Flowsheet Documentation  Taken 8/14/2023 0601 by Lucio Hall RN  Infection Prevention:   rest/sleep promoted   hand hygiene promoted  Taken 8/14/2023 0404 by Lucio Hall RN  Infection Prevention:   hand hygiene promoted   rest/sleep promoted  Taken 8/14/2023 0212 by Lucio Hall RN  Infection Prevention:   rest/sleep promoted   hand hygiene promoted  Taken 8/14/2023 0012 by Lucio Hall RN  Infection Prevention:   hand hygiene promoted   rest/sleep promoted  Taken 8/13/2023 2208 by Lucio Hall RN  Infection Prevention:   rest/sleep promoted   hand hygiene promoted  Taken 8/13/2023 2001 by Lucio Hall RN  Infection Prevention:   hand hygiene promoted   rest/sleep promoted  Goal: Optimal Comfort and Wellbeing  Outcome: Ongoing, Progressing  Intervention: Provide Person-Centered Care  Recent Flowsheet Documentation  Taken 8/14/2023 0012 by Lucio Hall RN  Trust Relationship/Rapport:   care explained   choices provided  Taken 8/13/2023 2001 by Lucio Hall RN  Trust Relationship/Rapport:   care explained   choices provided  Goal: Readiness for Transition of Care  Outcome: Ongoing, Progressing     Problem: Fall Injury Risk  Goal: Absence of Fall and Fall-Related  Injury  Outcome: Ongoing, Progressing  Intervention: Identify and Manage Contributors  Recent Flowsheet Documentation  Taken 8/14/2023 0601 by Lucio Hall RN  Medication Review/Management: medications reviewed  Taken 8/14/2023 0404 by Lucio Hall RN  Medication Review/Management: medications reviewed  Taken 8/14/2023 0212 by Lucio Hall RN  Medication Review/Management: medications reviewed  Taken 8/14/2023 0012 by Lucio Hall RN  Medication Review/Management: medications reviewed  Taken 8/13/2023 2208 by Lucio Hall RN  Medication Review/Management: medications reviewed  Taken 8/13/2023 2001 by Lucio Hall RN  Medication Review/Management: medications reviewed  Intervention: Promote Injury-Free Environment  Recent Flowsheet Documentation  Taken 8/14/2023 0601 by Lucio Hall RN  Safety Promotion/Fall Prevention:   activity supervised   safety round/check completed   room organization consistent  Taken 8/14/2023 0404 by Lucio Hall RN  Safety Promotion/Fall Prevention:   activity supervised   safety round/check completed   room organization consistent  Taken 8/14/2023 0212 by Lucio Hall RN  Safety Promotion/Fall Prevention:   activity supervised   safety round/check completed   room organization consistent  Taken 8/14/2023 0012 by Lucio Hall RN  Safety Promotion/Fall Prevention:   activity supervised   safety round/check completed   room organization consistent  Taken 8/13/2023 2208 by Lucio Hall RN  Safety Promotion/Fall Prevention:   activity supervised   safety round/check completed   room organization consistent  Taken 8/13/2023 2001 by Lucio Hall RN  Safety Promotion/Fall Prevention:   activity supervised   room organization consistent   safety round/check completed     Problem: Alcohol Withdrawal  Goal: Alcohol Withdrawal Symptom Control  Outcome: Ongoing, Progressing  Intervention: Minimize or Manage Alcohol Withdrawal  Symptoms  Recent Flowsheet Documentation  Taken 8/14/2023 0012 by Lucio Hall RN  Seizure Precautions: side rails padded  Taken 8/13/2023 2001 by Lucio Hall RN  Seizure Precautions: side rails padded     Problem: Acute Neurologic Deterioration (Alcohol Withdrawal)  Goal: Optimal Neurologic Function  Outcome: Ongoing, Progressing  Intervention: Prevent Seizure-Related Injury  Recent Flowsheet Documentation  Taken 8/14/2023 0012 by Lucio Hall RN  Seizure Precautions: side rails padded  Taken 8/13/2023 2001 by Lucio Hall RN  Seizure Precautions: side rails padded     Problem: Substance Misuse (Alcohol Withdrawal)  Goal: Readiness for Change Identified  Outcome: Ongoing, Progressing

## 2023-08-14 NOTE — PLAN OF CARE
Goal Outcome Evaluation:  Plan of Care Reviewed With: patient      Pt A&Ox4. Pt was anxious this am but is better this afternoon. CIWA protocol and ivf's continued. Replaced mg and potassium today. Pt switched to regular diet and is tolerating well. VSS. NAD noted. Will continue to monitor.

## 2023-08-14 NOTE — CONSULTS
"Patient seen by [facility] d/t ETOH. Patient interviewed alone in room 660 (^E). Introduced self and role. Patient agreed to be evaluated. Patient A/OX4.    The patient is a 62 y.o. male. Patient is  from his wife and lives alone.  Congregational/Spiritual: \"I don't go to Sikhism anymore.\"  Children: 2 daughters  Occupation: Retired.  Hobbies: Reading, watching sports  Legal: Nothing current. DUI in past  : No  Support system: Wife, children  Hx of Violence: Denies  Hx of Abuse/Trauma: Denies  Feel safe at home: Yes    The patient presented to the Ed on 8/13 d/t ETOH withdrawal. Patient's BAL negative. The patient has a history of ETOH abuse and has been seen by [facility] multiple times with the last time being in July 2023 when he refused evaluation. The patient did receive resource for ETOH treatment from [facility] at that time.     The patient stated after he was discharged in July he was sober for about 3 weeks before relapsing. The patient stated he had been drinking 1 pint/day but it recently increased to 1.5 pints/day. The patient stated he typically drinks bourbon. The patient deiced to stop ETOH and his last drink was 8/12/23. The patient stated he was at [facility] 10-15 years ago and he did some AA meetings 10 years ago but has not done any ETOH treatment recently. The patient stated his longest period of sobriety has been 6 months \"5-8 years ago.\" The patient has a history of hallucinations from ETOH withdrawal but denied any history of seizures. The patient stated Ativan gives him hallucinations so patient is only on phenobarbital for withdrawal. Last CIWA 8 @ 0012. The patient also smoke 1/2 pack of cigarettes/day.     The patient has a mental health history of anxiety and depression. The patient is currently prescribed Atarax, Remeron, Risperdal, Luvox, and Inderal. The medications are prescribed by his psychiatrist through [facility]. The patient has been seeing their current " psychiatrist for 2 years. The patient does not currently have a therapist.    The patient rated anxiety and depression 5/10. The patient denied SI, wish to be dead, HI, or hallucinations. The patient denied history of inpatient psychiatric treatment. The patient denied any history of suicidal thoughts or attempts. The patient reported fair sleep and improving appetite.     The patient stated his plan is to abstain from ETOH. The patient stated he is interested in outpatient treatment. The patient stated he has a list of places he can call. The patient was provided information on [facility]'s programs. [facility] will follow.

## 2023-08-14 NOTE — PROGRESS NOTES
Name: Nilesh Zapata ADMIT: 2023   : 1960  PCP: Shakira Colbert GRACIE MCGUIRE    MRN: [id number] LOS: 1 days   AGE/SEX: 62 y.o. male  ROOM: 60/     Subjective   Subjective   Feeling better today. Tremor improved. No hallucinations. Tolerating po--would like diet advanced. No N/V/D/abd pain. Voiding well. No F/C/NS.        Objective   Objective   Vital Signs  Temp:  [97.4 øF (36.3 øC)-99.1 øF (37.3 øC)] 97.9 øF (36.6 øC)  Heart Rate:  [] 71  Resp:  [16-28] 18  BP: (124-159)/() 124/76  SpO2:  [91 %-98 %] 91 %  on  Flow (L/min):  [2] 2;   Device (Oxygen Therapy): room air  Body mass index is 30.85 kg/mý.  Physical Exam  Vitals and nursing note reviewed.   Constitutional:       General: He is not in acute distress.     Appearance: He is ill-appearing (chronically). He is not toxic-appearing or diaphoretic.   HENT:      Head: Normocephalic.      Mouth/Throat:      Mouth: Mucous membranes are moist.      Pharynx: Oropharynx is clear.   Eyes:      General: No scleral icterus.        Right eye: No discharge.         Left eye: No discharge.      Extraocular Movements: Extraocular movements intact.      Conjunctiva/sclera: Conjunctivae normal.   Cardiovascular:      Rate and Rhythm: Normal rate and regular rhythm.      Pulses: Normal pulses.   Pulmonary:      Effort: Pulmonary effort is normal. No respiratory distress.      Breath sounds: Normal breath sounds. No wheezing or rales.   Abdominal:      General: Bowel sounds are normal. There is no distension.      Palpations: Abdomen is soft.      Tenderness: There is no abdominal tenderness.   Musculoskeletal:         General: No swelling or deformity. Normal range of motion.      Cervical back: Neck supple. No rigidity.   Skin:     General: Skin is warm and dry.      Capillary Refill: Capillary refill takes less than 2 seconds.      Coloration: Skin is not jaundiced.   Neurological:      General: No focal deficit present.      Mental Status: He is  alert and oriented to person, place, and time. Mental status is at baseline.      Cranial Nerves: No cranial nerve deficit.      Coordination: Coordination normal.   Psychiatric:         Mood and Affect: Mood normal.         Behavior: Behavior normal.      Comments: Flat affect     Results Review     I reviewed the patient's new clinical results.  Results from last 7 days   Lab Units 08/14/23  0532 08/13/23  1346   WBC 10*3/mm3 5.05 7.12   HEMOGLOBIN g/dL 14.0 16.3   PLATELETS 10*3/mm3 136* 181     Results from last 7 days   Lab Units 08/14/23  0532 08/13/23  1346   SODIUM mmol/L 140 140   POTASSIUM mmol/L 3.2* 3.3*   CHLORIDE mmol/L 100 95*   CO2 mmol/L 28.4 24.5   BUN mg/dL 5* 4*   CREATININE mg/dL 0.87 0.84   GLUCOSE mg/dL 109* 103*   EGFR mL/min/1.73 96.4 97.4     Results from last 7 days   Lab Units 08/14/23  0532 08/13/23  1346   ALBUMIN g/dL 3.4* 4.4   BILIRUBIN mg/dL 1.6* 1.0   ALK PHOS U/L 132* 167*   AST (SGOT) U/L 35 52*   ALT (SGPT) U/L 20 28     Results from last 7 days   Lab Units 08/14/23  0532 08/13/23  1346   CALCIUM mg/dL 8.4* 9.2   ALBUMIN g/dL 3.4* 4.4   MAGNESIUM mg/dL 1.2*  --      Results from last 7 days   Lab Units 08/14/23  0532   LACTATE mmol/L 1.3     No results found for: HGBA1C, POCGLU    No radiology results for the last day    I have personally reviewed all medications:  Scheduled Medications  amLODIPine, 5 mg, Oral, Daily  cloNIDine, 0.1 mg, Oral, BID  famotidine, 20 mg, Oral, BID AC  fluvoxaMINE, 100 mg, Oral, Daily  folic acid, 1 mg, Oral, Daily  magnesium sulfate, 4 g, Intravenous, Once  mirtazapine, 15 mg, Oral, Nightly  multivitamin, 1 tablet, Oral, Daily  PHENobarbital, 65 mg, Intravenous, Once   Followed by  [START ON 8/15/2023] PHENobarbital, 32.4 mg, Oral, Once   Followed by  [START ON 8/15/2023] PHENobarbital, 32.4 mg, Oral, Once   Followed by  [START ON 8/16/2023] PHENobarbital, 32.4 mg, Oral, Once  potassium chloride ER, 40 mEq, Oral, Q4H  potassium chloride, 20 mEq,  Oral, Daily  propranolol, 10 mg, Oral, Daily  risperiDONE, 2 mg, Oral, BID  senna-docusate sodium, 2 tablet, Oral, BID  sodium chloride, 10 mL, Intravenous, Q12H  thiamine (B-1) IV, 200 mg, Intravenous, Q8H   Followed by  [START ON 8/19/2023] thiamine, 100 mg, Oral, Daily  cyanocobalamin, 1,000 mcg, Oral, Daily    Infusions  sodium chloride 0.9 % with KCl 20 mEq, 100 mL/hr, Last Rate: 100 mL/hr (08/14/23 1001)    Diet  Diet: Regular/House Diet; Texture: Regular Texture (IDDSI 7); Fluid Consistency: Thin (IDDSI 0)    I have personally reviewed:  [x]  Laboratory   []  Microbiology   []  Radiology   [x]  EKG/Telemetry  []  Cardiology/Vascular   []  Pathology    [x]  Records       Assessment/Plan     Active Hospital Problems    Diagnosis  POA    **Alcohol withdrawal [F10.939]  Yes    Hypokalemia [E87.6]  Yes    Hypomagnesemia [E83.42]  Yes    COPD (chronic obstructive pulmonary disease) [J44.9]  Yes    Alcoholic liver disease [K70.9]  Yes    Essential hypertension [I10]  Yes    Alcohol abuse [F10.10]  Yes    History of left nephrectomy 2/2 RCC [Z90.5]  Not Applicable    Anxiety [F41.9]  Yes    Kidney cancer, primary, with metastasis from kidney to other site [C64.9]  Yes      Resolved Hospital Problems   No resolved problems to display.       63yo gentleman with h/o anxiety, alcohol abuse, RCC with left nephrectomy, HTN, alcoholic liver disease, COPD, and tobacco abuse, hospitalized here just last month for alcohol withdrawal, who presented to ER with c/o early withdrawal symptoms (anorexia, tremors, agitation, N/V/D).    Alcohol withdrawal: continue Phenobarbital protocol, have added PRN Ativan after d/w Pharm (see below)  Continue MVI/folate/thiamine  Appreciate Access attention to pt, outpt [facility] tx resources provided to pt    ?Ativan allergy: pt's reaction to Ativan was hallucinations, this occurred while he was being treated for acute withdrawal so difficult to say that this is a reaction to Ativan, d/w Pharm who  agrees that trial of PRN Ativan is reasonable and safe    Hypokalemia: replace with protocol, replace Mg++    Hypomagnesemia: Mg++ only 1.2, have ordered IV replacement and will monitor    HTN: BPs acceptable on amlodipine, clonidine, and propranolol    COPD  Tobacco abuse: supposed to be on Trelegy Ellipta since last discharge, will start Symbicort/Spiriva, lung exam fine, no hypoxia at present    RCC, left nephrectomy 2014: Cr wnl, voiding well, followed by [facility]--last seen in May of this year      SCDs for DVT prophylaxis.  Full code.  Discussed with patient, nursing staff, CCP, and care team on multidisciplinary rounds.  Anticipate discharge home with family, timing yet to be determined..      Chaz Ponce MD  [facility]  08/14/23  12:35 EDT

## 2023-08-15 LAB
ALBUMIN SERPL-MCNC: 3.4 G/DL (ref 3.5–5.2)
ALBUMIN/GLOB SERPL: 1.4 G/DL
ALP SERPL-CCNC: 131 U/L (ref 39–117)
ALT SERPL W P-5'-P-CCNC: 17 U/L (ref 1–41)
ANION GAP SERPL CALCULATED.3IONS-SCNC: 9.6 MMOL/L (ref 5–15)
AST SERPL-CCNC: 34 U/L (ref 1–40)
BILIRUB SERPL-MCNC: 0.9 MG/DL (ref 0–1.2)
BUN SERPL-MCNC: 5 MG/DL (ref 8–23)
BUN/CREAT SERPL: 6 (ref 7–25)
CALCIUM SPEC-SCNC: 8.1 MG/DL (ref 8.6–10.5)
CHLORIDE SERPL-SCNC: 101 MMOL/L (ref 98–107)
CO2 SERPL-SCNC: 27.4 MMOL/L (ref 22–29)
CREAT SERPL-MCNC: 0.84 MG/DL (ref 0.76–1.27)
DEPRECATED RDW RBC AUTO: 44 FL (ref 37–54)
EGFRCR SERPLBLD CKD-EPI 2021: 97.4 ML/MIN/1.73
ERYTHROCYTE [DISTWIDTH] IN BLOOD BY AUTOMATED COUNT: 12.8 % (ref 12.3–15.4)
GLOBULIN UR ELPH-MCNC: 2.5 GM/DL
GLUCOSE SERPL-MCNC: 76 MG/DL (ref 65–99)
HCT VFR BLD AUTO: 40.2 % (ref 37.5–51)
HGB BLD-MCNC: 13.7 G/DL (ref 13–17.7)
MAGNESIUM SERPL-MCNC: 1.7 MG/DL (ref 1.6–2.4)
MCH RBC QN AUTO: 32.5 PG (ref 26.6–33)
MCHC RBC AUTO-ENTMCNC: 34.1 G/DL (ref 31.5–35.7)
MCV RBC AUTO: 95.5 FL (ref 79–97)
PLATELET # BLD AUTO: 115 10*3/MM3 (ref 140–450)
PMV BLD AUTO: 10.3 FL (ref 6–12)
POTASSIUM SERPL-SCNC: 3.7 MMOL/L (ref 3.5–5.2)
PROT SERPL-MCNC: 5.9 G/DL (ref 6–8.5)
RBC # BLD AUTO: 4.21 10*6/MM3 (ref 4.14–5.8)
SODIUM SERPL-SCNC: 138 MMOL/L (ref 136–145)
WBC NRBC COR # BLD: 4.61 10*3/MM3 (ref 3.4–10.8)

## 2023-08-15 PROCEDURE — 85027 COMPLETE CBC AUTOMATED: CPT | Performed by: HOSPITALIST

## 2023-08-15 PROCEDURE — 25010000002 SODIUM CHLORIDE 0.9 % WITH KCL 20 MEQ 20-0.9 MEQ/L-% SOLUTION: Performed by: INTERNAL MEDICINE

## 2023-08-15 PROCEDURE — 94799 UNLISTED PULMONARY SVC/PX: CPT

## 2023-08-15 PROCEDURE — 80053 COMPREHEN METABOLIC PANEL: CPT | Performed by: HOSPITALIST

## 2023-08-15 PROCEDURE — 25010000002 THIAMINE HCL 200 MG/2ML SOLUTION: Performed by: INTERNAL MEDICINE

## 2023-08-15 PROCEDURE — 94761 N-INVAS EAR/PLS OXIMETRY MLT: CPT

## 2023-08-15 PROCEDURE — 94664 DEMO&/EVAL PT USE INHALER: CPT

## 2023-08-15 PROCEDURE — 83735 ASSAY OF MAGNESIUM: CPT | Performed by: HOSPITALIST

## 2023-08-15 RX ADMIN — CLONIDINE HYDROCHLORIDE 0.1 MG: 0.1 TABLET ORAL at 09:08

## 2023-08-15 RX ADMIN — PHENOBARBITAL 32.4 MG: 32.4 TABLET ORAL at 21:02

## 2023-08-15 RX ADMIN — FAMOTIDINE 20 MG: 20 TABLET, FILM COATED ORAL at 19:07

## 2023-08-15 RX ADMIN — POTASSIUM CHLORIDE 20 MEQ: 1.5 POWDER, FOR SOLUTION ORAL at 09:08

## 2023-08-15 RX ADMIN — FLUVOXAMINE MALEATE 100 MG: 50 TABLET, COATED ORAL at 09:07

## 2023-08-15 RX ADMIN — PROPRANOLOL HYDROCHLORIDE 10 MG: 10 TABLET ORAL at 09:08

## 2023-08-15 RX ADMIN — RISPERIDONE 2 MG: 2 TABLET, FILM COATED ORAL at 21:02

## 2023-08-15 RX ADMIN — THIAMINE HYDROCHLORIDE 200 MG: 100 INJECTION, SOLUTION INTRAMUSCULAR; INTRAVENOUS at 21:03

## 2023-08-15 RX ADMIN — TIOTROPIUM BROMIDE INHALATION SPRAY 2 PUFF: 3.12 SPRAY, METERED RESPIRATORY (INHALATION) at 08:42

## 2023-08-15 RX ADMIN — CLONIDINE HYDROCHLORIDE 0.1 MG: 0.1 TABLET ORAL at 21:02

## 2023-08-15 RX ADMIN — HYDROXYZINE HYDROCHLORIDE 25 MG: 25 TABLET ORAL at 23:19

## 2023-08-15 RX ADMIN — FOLIC ACID 1 MG: 1 TABLET ORAL at 09:08

## 2023-08-15 RX ADMIN — THIAMINE HYDROCHLORIDE 200 MG: 100 INJECTION, SOLUTION INTRAMUSCULAR; INTRAVENOUS at 13:21

## 2023-08-15 RX ADMIN — HYDROXYZINE HYDROCHLORIDE 25 MG: 25 TABLET ORAL at 01:32

## 2023-08-15 RX ADMIN — MIRTAZAPINE 15 MG: 15 TABLET, FILM COATED ORAL at 21:02

## 2023-08-15 RX ADMIN — POTASSIUM CHLORIDE AND SODIUM CHLORIDE 100 ML/HR: 900; 150 INJECTION, SOLUTION INTRAVENOUS at 00:01

## 2023-08-15 RX ADMIN — BUDESONIDE AND FORMOTEROL FUMARATE DIHYDRATE 2 PUFF: 160; 4.5 AEROSOL RESPIRATORY (INHALATION) at 18:42

## 2023-08-15 RX ADMIN — Medication 1 TABLET: at 09:08

## 2023-08-15 RX ADMIN — RISPERIDONE 2 MG: 2 TABLET, FILM COATED ORAL at 09:08

## 2023-08-15 RX ADMIN — THIAMINE HYDROCHLORIDE 200 MG: 100 INJECTION, SOLUTION INTRAMUSCULAR; INTRAVENOUS at 06:39

## 2023-08-15 RX ADMIN — Medication 10 ML: at 21:03

## 2023-08-15 RX ADMIN — Medication 10 ML: at 09:09

## 2023-08-15 RX ADMIN — BUDESONIDE AND FORMOTEROL FUMARATE DIHYDRATE 2 PUFF: 160; 4.5 AEROSOL RESPIRATORY (INHALATION) at 08:44

## 2023-08-15 RX ADMIN — AMLODIPINE BESYLATE 5 MG: 5 TABLET ORAL at 09:08

## 2023-08-15 RX ADMIN — Medication 1000 MCG: at 09:08

## 2023-08-15 RX ADMIN — PHENOBARBITAL 32.4 MG: 32.4 TABLET ORAL at 09:07

## 2023-08-15 RX ADMIN — FAMOTIDINE 20 MG: 20 TABLET, FILM COATED ORAL at 06:39

## 2023-08-15 NOTE — PLAN OF CARE
Goal Outcome Evaluation:  Plan of Care Reviewed With: patient      Pt A&Ox4. Phenobarb continued but slowly weaning pt off. Pt is calm and anxious at times. He says he has not slept much. Last CIWA was a 5. No c/o pain. Wound consulted for rash and redness in bilateral groin and scrotum. VSS. NAD noted. Will continue to monitor.

## 2023-08-15 NOTE — PROGRESS NOTES
[facility] follow up d/t EtOH; this writer reviewed chart and spoke with RN Lucio. Per RN, patient experiencing agitation and some anxiety; he did not sleep much overnight.  Last CIWA 5 at 00:01; discharge plan is home with family.  Patient has a list of outpatient [facility] treatment options; he was provided info about [facility] treatment program(s). No further needs/concerns noted at this time per RN and/or medical team; [facility] to continue following.

## 2023-08-15 NOTE — PROGRESS NOTES
Name: Nilesh Zapata ADMIT: 2023   : 1960  PCP: Shakira Colbert MAYNOR APRMAYNOR    MRN: [id number] LOS: 2 days   AGE/SEX: 62 y.o. male  ROOM: 60/     Subjective   Subjective   Feeling much better today. Tremor resolved. Having some hallucinations. Tolerating regular diet. No N/V/abd pain. Had some loose stool overnight. Voiding well. No F/C/NS.        Objective   Objective   Vital Signs  Temp:  [97.9 øF (36.6 øC)-98.9 øF (37.2 øC)] 98.9 øF (37.2 øC)  Heart Rate:  [] 80  Resp:  [16-18] 18  BP: ()/(69-98) 144/98  SpO2:  [87 %-95 %] 93 %  on   ;   Device (Oxygen Therapy): room air  Body mass index is 30.85 kg/mý.    (No change in exam today)    Physical Exam  Vitals and nursing note reviewed.   Constitutional:       General: He is not in acute distress.     Appearance: He is ill-appearing (chronically). He is not toxic-appearing or diaphoretic.   HENT:      Head: Normocephalic.      Mouth/Throat:      Mouth: Mucous membranes are moist.      Pharynx: Oropharynx is clear.   Eyes:      General: No scleral icterus.        Right eye: No discharge.         Left eye: No discharge.      Extraocular Movements: Extraocular movements intact.      Conjunctiva/sclera: Conjunctivae normal.   Cardiovascular:      Rate and Rhythm: Normal rate and regular rhythm.      Pulses: Normal pulses.   Pulmonary:      Effort: Pulmonary effort is normal. No respiratory distress.      Breath sounds: Normal breath sounds. No wheezing or rales.   Abdominal:      General: Bowel sounds are normal. There is no distension.      Palpations: Abdomen is soft.      Tenderness: There is no abdominal tenderness.   Musculoskeletal:         General: No swelling or deformity. Normal range of motion.      Cervical back: Neck supple. No rigidity.   Skin:     General: Skin is warm and dry.      Capillary Refill: Capillary refill takes less than 2 seconds.      Coloration: Skin is not jaundiced.   Neurological:      General: No focal deficit  present.      Mental Status: He is alert and oriented to person, place, and time. Mental status is at baseline.      Cranial Nerves: No cranial nerve deficit.      Coordination: Coordination normal.   Psychiatric:         Mood and Affect: Mood normal.         Behavior: Behavior normal.      Comments: Flat affect     Results Review     I reviewed the patient's new clinical results.  Results from last 7 days   Lab Units 08/15/23  0541 08/14/23  0532 08/13/23  1346   WBC 10*3/mm3 4.61 5.05 7.12   HEMOGLOBIN g/dL 13.7 14.0 16.3   PLATELETS 10*3/mm3 115* 136* 181       Results from last 7 days   Lab Units 08/15/23  0541 08/14/23  1907 08/14/23  0532 08/13/23  1346   SODIUM mmol/L 138  --  140 140   POTASSIUM mmol/L 3.7 3.7 3.2* 3.3*   CHLORIDE mmol/L 101  --  100 95*   CO2 mmol/L 27.4  --  28.4 24.5   BUN mg/dL 5*  --  5* 4*   CREATININE mg/dL 0.84  --  0.87 0.84   GLUCOSE mg/dL 76  --  109* 103*   EGFR mL/min/1.73 97.4  --  96.4 97.4       Results from last 7 days   Lab Units 08/15/23  0541 08/14/23  0532 08/13/23  1346   ALBUMIN g/dL 3.4* 3.4* 4.4   BILIRUBIN mg/dL 0.9 1.6* 1.0   ALK PHOS U/L 131* 132* 167*   AST (SGOT) U/L 34 35 52*   ALT (SGPT) U/L 17 20 28       Results from last 7 days   Lab Units 08/15/23  0541 08/14/23  0532 08/13/23  1346   CALCIUM mg/dL 8.1* 8.4* 9.2   ALBUMIN g/dL 3.4* 3.4* 4.4   MAGNESIUM mg/dL 1.7 1.2*  --        Results from last 7 days   Lab Units 08/14/23  0532   LACTATE mmol/L 1.3       Hemoglobin A1C   Date/Time Value Ref Range Status   08/14/2023 0532 4.40 (L) 4.80 - 5.60 % Final       No radiology results for the last day    I have personally reviewed all medications:  Scheduled Medications  amLODIPine, 5 mg, Oral, Daily  budesonide-formoterol, 2 puff, Inhalation, BID - RT   And  tiotropium bromide monohydrate, 2 puff, Inhalation, Daily - RT  cloNIDine, 0.1 mg, Oral, BID  famotidine, 20 mg, Oral, BID AC  fluvoxaMINE, 100 mg, Oral, Daily  folic acid, 1 mg, Oral, Daily  mirtazapine, 15  mg, Oral, Nightly  multivitamin, 1 tablet, Oral, Daily  PHENobarbital, 32.4 mg, Oral, Once   Followed by  [START ON 8/16/2023] PHENobarbital, 32.4 mg, Oral, Once  potassium chloride, 20 mEq, Oral, Daily  propranolol, 10 mg, Oral, Daily  risperiDONE, 2 mg, Oral, BID  senna-docusate sodium, 2 tablet, Oral, BID  sodium chloride, 10 mL, Intravenous, Q12H  thiamine (B-1) IV, 200 mg, Intravenous, Q8H   Followed by  [START ON 8/19/2023] thiamine, 100 mg, Oral, Daily  cyanocobalamin, 1,000 mcg, Oral, Daily    Infusions  sodium chloride 0.9 % with KCl 20 mEq, 100 mL/hr, Last Rate: 100 mL/hr (08/15/23 0001)    Diet  Diet: Regular/House Diet; Texture: Regular Texture (IDDSI 7); Fluid Consistency: Thin (IDDSI 0)    I have personally reviewed:  [x]  Laboratory   []  Microbiology   []  Radiology   [x]  EKG/Telemetry  []  Cardiology/Vascular   []  Pathology    []  Records       Assessment/Plan     Active Hospital Problems    Diagnosis  POA    **Alcohol withdrawal [F10.939]  Yes    Hypokalemia [E87.6]  Yes    Hypomagnesemia [E83.42]  Yes    COPD (chronic obstructive pulmonary disease) [J44.9]  Yes    Alcoholic liver disease [K70.9]  Yes    Essential hypertension [I10]  Yes    Alcohol abuse [F10.10]  Yes    History of left nephrectomy 2/2 RCC [Z90.5]  Not Applicable    Anxiety [F41.9]  Yes    Kidney cancer, primary, with metastasis from kidney to other site [C64.9]  Yes      Resolved Hospital Problems   No resolved problems to display.       63yo gentleman with h/o anxiety, alcohol abuse, RCC with left nephrectomy, HTN, alcoholic liver disease, COPD, and tobacco abuse, hospitalized here just last month for alcohol withdrawal, who presented to ER with c/o early withdrawal symptoms (anorexia, tremors, agitation, N/V/D).    Alcohol withdrawal: continue Phenobarbital protocol, continue PRN Ativan for breakthrough symptoms  Continue MVI/folate/thiamine  Appreciate Access attention to pt, outpt [facility] tx resources provided to pt    ?Ativan  allergy: pt's reaction to Ativan was hallucinations, this occurred while he was being treated for acute withdrawal so difficult to say that this was a reaction to Ativan, d/w Pharm who agreed that trial of PRN Ativan was reasonable and safe (has not required any so far)    Hypokalemia: replaced K+ with protocol and replaced Mg++    Hypomagnesemia: Mg++ level 1.7 after replacement 8/14    HTN: currently on amlodipine, clonidine, and propranolol. Had to hold a dose of clonidine (per parameters) last night, but BPs fine now    COPD  Tobacco abuse: supposed to be on Trelegy Ellipta since last discharge, have started Symbicort/Spiriva here, lung exam fine, no hypoxia at present    RCC, left nephrectomy 2014: Cr wnl, voiding well, followed by [facility]--last seen in May of this year    Thrombocytopenia: suspect due to alcohol abuse, down to 115 this AM, will continue to monitor      SCDs for DVT prophylaxis.  Full code.  Discussed with patient, nursing staff, CCP, and care team on multidisciplinary rounds.  Anticipate discharge home with family, timing yet to be determined..      Chaz Ponce MD  [facility]  08/15/23  12:07 EDT

## 2023-08-15 NOTE — PROGRESS NOTES
Discharge Planning Assessment  [facility]     Patient Name: Nilesh Zapata  MRN: [id number]  Today's Date: 8/15/2023    Admit Date: 8/13/2023    Plan: Home,  OP [facility] program   Discharge Needs Assessment       Row Name 08/15/23 1449       Living Environment    People in Home alone    Current Living Arrangements apartment    Primary Care Provided by self    Provides Primary Care For no one    Quality of Family Relationships unable to assess    Able to Return to Prior Arrangements yes       Resource/Environmental Concerns    Resource/Environmental Concerns none       Food Insecurity    Within the past 12 months, you worried that your food would run out before you got the money to buy more. Never true    Within the past 12 months, the food you bought just didn't last and you didn't have money to get more. Never true       Transition Planning    Patient/Family Anticipates Transition to home    Patient/Family Anticipated Services at Transition outpatient care    Transportation Anticipated family or friend will provide       Discharge Needs Assessment    Equipment Currently Used at Home oxygen;respiratory supplies;walker, standard    Concerns to be Addressed adjustment to diagnosis/illness;substance/tobacco abuse/use;coping/stress;compliance issue    Outpatient/Agency/Support Group Needs outpatient substance abuse treatment    Provided Post Acute Provider List? N/A    Provided Post Acute Provider Quality & Resource List? N/A    Current Discharge Risk substance use/abuse                   Discharge Plan       Row Name 08/15/23 1452       Plan    Plan Home,  OP [facility] program    Patient/Family in Agreement with Plan yes    Plan Comments Met with patient at the bedside, verified facesheet. Patient lives alone in an apartment, he is  from his wife. Patient states he is normally IADL. Patient uses home o2 at nighttime through [facility].  He has a walker and wheelchair if needed. He has been to [facility] for short term rehab  and he has used [facility] for HH. PCP is Shakira Colbert and preferred pharmacy [facility] [place]. Patient denies issues filling or affording meds. Patient owns a vehicle/drives, he denies issues with transportation.  Patient stated he is planning to attend [facility], Access following. Patient  may need cab home. CCP will follow progress.                  Continued Care and Services - Admitted Since 8/13/2023    Coordination has not been started for this encounter.       Selected Continued Care - Prior Encounters Includes continued care and service providers with selected services from prior encounters from 5/15/2023 to 8/15/2023      Discharged on 7/7/2023 Admission date: 7/2/2023 - Discharge disposition: Home-Health Care [facility]      Home Medical Care       Service Provider Selected Services Address Phone Fax Patient Preferred    [facility] [address] [address] [phone] --                      Discharged on 6/16/2023 Admission date: 6/3/2023 - Discharge disposition: Skilled Nursing Facility (DC - External)      Destination       Service Provider Selected Services Address Phone Fax Patient Preferred    [facility] [address] , [facility] [address] [phone] --                          Expected Discharge Date and Time       Expected Discharge Date Expected Discharge Time    Aug 18, 2023            Demographic Summary    No documentation.                  Functional Status       Row Name 08/15/23 1452       Functional Status    Usual Activity Tolerance moderate       Assessment of Health Literacy    How often do you have someone help you read hospital materials? Never    How often do you have problems learning about your medical condition because of difficulty understanding written information? Never    How often do you have a problem understanding what is told to you about your medical  condition? Never    How confident are you filling out medical forms by yourself? Quite a bit    Health Literacy Moderate       Functional Status, IADL    Medications independent    Meal Preparation independent    Housekeeping independent    Laundry independent    Shopping independent       Mental Status    General Appearance WDL WDL       Mental Status Summary    Recent Changes in Mental Status/Cognitive Functioning no changes       Employment/    Employment Status disabled                   Psychosocial    No documentation.                  Abuse/Neglect    No documentation.                  Legal    No documentation.                  Substance Abuse    No documentation.                  Patient Forms    No documentation.                     Amanda Deutsch RN

## 2023-08-15 NOTE — PLAN OF CARE
Goal Outcome Evaluation:   Pt alert and oriented, sr/st on monitor, RA, stand by assist to use toilet, no c/o pain or soa, anxious, administered meds as scheduled, atarax x 1, changed IV dressing and wrapped with kurlex, on continuous IVF, bp on low side hold clonidine,no ss of acute distress noted, will continue to monitor.           Problem: Adult Inpatient Plan of Care  Goal: Plan of Care Review  8/15/2023 0732 by Lucio Hall RN  Outcome: Ongoing, Progressing  8/15/2023 0732 by Lucio Hall RN  Outcome: Ongoing, Progressing  Goal: Patient-Specific Goal (Individualized)  8/15/2023 0732 by Lucio Hall RN  Outcome: Ongoing, Progressing  8/15/2023 0732 by Lucio Hall RN  Outcome: Ongoing, Progressing  Goal: Absence of Hospital-Acquired Illness or Injury  8/15/2023 0732 by Lucio Hall RN  Outcome: Ongoing, Progressing  8/15/2023 0732 by Lucio Hall RN  Outcome: Ongoing, Progressing  Intervention: Identify and Manage Fall Risk  Recent Flowsheet Documentation  Taken 8/15/2023 0608 by Lucio Hall RN  Safety Promotion/Fall Prevention:   activity supervised   safety round/check completed   room organization consistent  Taken 8/15/2023 0401 by Lucio Hall RN  Safety Promotion/Fall Prevention:   activity supervised   safety round/check completed   room organization consistent  Taken 8/15/2023 0204 by Lucio Hall RN  Safety Promotion/Fall Prevention:   activity supervised   safety round/check completed   room organization consistent  Taken 8/15/2023 0001 by Lucio Hall RN  Safety Promotion/Fall Prevention:   activity supervised   safety round/check completed   room organization consistent  Taken 8/14/2023 2208 by Lucio Hall RN  Safety Promotion/Fall Prevention:   activity supervised   safety round/check completed   room organization consistent  Taken 8/14/2023 2001 by Lucio Hall RN  Safety Promotion/Fall Prevention:   activity supervised    room organization consistent   safety round/check completed  Intervention: Prevent Skin Injury  Recent Flowsheet Documentation  Taken 8/15/2023 0608 by Lucio Hall RN  Body Position: position changed independently  Taken 8/15/2023 0401 by Lucio Hall RN  Body Position: position changed independently  Taken 8/15/2023 0204 by Lucio Hall RN  Body Position: position changed independently  Taken 8/15/2023 0001 by Lucio Hall RN  Body Position: position changed independently  Taken 8/14/2023 2208 by Lucio Hall RN  Body Position: position changed independently  Taken 8/14/2023 2001 by Lucio Hall RN  Body Position: position changed independently  Intervention: Prevent and Manage VTE (Venous Thromboembolism) Risk  Recent Flowsheet Documentation  Taken 8/15/2023 0001 by Lucio Hall RN  Activity Management: bedrest  Range of Motion: active ROM (range of motion) encouraged  Taken 8/14/2023 2001 by Lucio Hall RN  Activity Management: ambulated to bathroom  Range of Motion: active ROM (range of motion) encouraged  Intervention: Prevent Infection  Recent Flowsheet Documentation  Taken 8/15/2023 0608 by Lucio Hall RN  Infection Prevention:   hand hygiene promoted   rest/sleep promoted  Taken 8/15/2023 0401 by Lucio Hall RN  Infection Prevention:   hand hygiene promoted   rest/sleep promoted  Taken 8/15/2023 0204 by Lucio Hall RN  Infection Prevention:   hand hygiene promoted   rest/sleep promoted  Taken 8/15/2023 0001 by Lucio Hall RN  Infection Prevention:   hand hygiene promoted   rest/sleep promoted  Taken 8/14/2023 2208 by Lucio Hall RN  Infection Prevention:   hand hygiene promoted   rest/sleep promoted  Taken 8/14/2023 2001 by Lucio Hall RN  Infection Prevention:   hand hygiene promoted   rest/sleep promoted  Goal: Optimal Comfort and Wellbeing  8/15/2023 0732 by Lucio Hall RN  Outcome: Ongoing,  Progressing  8/15/2023 0732 by Lucio Hall RN  Outcome: Ongoing, Progressing  Intervention: Provide Person-Centered Care  Recent Flowsheet Documentation  Taken 8/14/2023 2001 by Lucio Hall RN  Trust Relationship/Rapport: care explained  Goal: Readiness for Transition of Care  8/15/2023 0732 by Lucio Hall RN  Outcome: Ongoing, Progressing  8/15/2023 0732 by Lucio Hall RN  Outcome: Ongoing, Progressing     Problem: Fall Injury Risk  Goal: Absence of Fall and Fall-Related Injury  8/15/2023 0732 by Lucio Hall RN  Outcome: Ongoing, Progressing  8/15/2023 0732 by Lucio Hall RN  Outcome: Ongoing, Progressing  Intervention: Identify and Manage Contributors  Recent Flowsheet Documentation  Taken 8/15/2023 0608 by Lucio Hall RN  Medication Review/Management: medications reviewed  Taken 8/15/2023 0401 by Lucio Hall RN  Medication Review/Management: medications reviewed  Taken 8/15/2023 0204 by Lucio Hall RN  Medication Review/Management: medications reviewed  Taken 8/15/2023 0001 by Lucio Hall RN  Medication Review/Management: medications reviewed  Taken 8/14/2023 2208 by Lucio Hall RN  Medication Review/Management: medications reviewed  Taken 8/14/2023 2001 by Lucio Hall RN  Medication Review/Management: medications reviewed  Intervention: Promote Injury-Free Environment  Recent Flowsheet Documentation  Taken 8/15/2023 0608 by Lucio Hall RN  Safety Promotion/Fall Prevention:   activity supervised   safety round/check completed   room organization consistent  Taken 8/15/2023 0401 by Lucio Hall RN  Safety Promotion/Fall Prevention:   activity supervised   safety round/check completed   room organization consistent  Taken 8/15/2023 0204 by Lucio Hall RN  Safety Promotion/Fall Prevention:   activity supervised   safety round/check completed   room organization consistent  Taken 8/15/2023 0001 by Isabel  KEVIN Valdes  Safety Promotion/Fall Prevention:   activity supervised   safety round/check completed   room organization consistent  Taken 8/14/2023 2208 by Lucio Hall RN  Safety Promotion/Fall Prevention:   activity supervised   safety round/check completed   room organization consistent  Taken 8/14/2023 2001 by Lucio Hall RN  Safety Promotion/Fall Prevention:   activity supervised   room organization consistent   safety round/check completed     Problem: Alcohol Withdrawal  Goal: Alcohol Withdrawal Symptom Control  8/15/2023 0732 by Lucio Hall RN  Outcome: Ongoing, Progressing  8/15/2023 0732 by Lucio Hall RN  Outcome: Ongoing, Progressing  Intervention: Minimize or Manage Alcohol Withdrawal Symptoms  Recent Flowsheet Documentation  Taken 8/15/2023 0001 by Lucio Hall RN  Seizure Precautions: side rails padded  Taken 8/14/2023 2001 by Lucio Hall RN  Seizure Precautions: side rails padded     Problem: Acute Neurologic Deterioration (Alcohol Withdrawal)  Goal: Optimal Neurologic Function  8/15/2023 0732 by Lucio Hall RN  Outcome: Ongoing, Progressing  8/15/2023 0732 by Lucio Hall RN  Outcome: Ongoing, Progressing  Intervention: Prevent Seizure-Related Injury  Recent Flowsheet Documentation  Taken 8/15/2023 0001 by Lucio Hall RN  Seizure Precautions: side rails padded  Taken 8/14/2023 2001 by Lucio Hall RN  Seizure Precautions: side rails padded     Problem: Substance Misuse (Alcohol Withdrawal)  Goal: Readiness for Change Identified  8/15/2023 0732 by Lucio Hall RN  Outcome: Ongoing, Progressing  8/15/2023 0732 by Lucio Hall RN  Outcome: Ongoing, Progressing

## 2023-08-16 LAB
ALBUMIN SERPL-MCNC: 3.4 G/DL (ref 3.5–5.2)
ALBUMIN/GLOB SERPL: 1.2 G/DL
ALP SERPL-CCNC: 125 U/L (ref 39–117)
ALT SERPL W P-5'-P-CCNC: 18 U/L (ref 1–41)
ANION GAP SERPL CALCULATED.3IONS-SCNC: 10.9 MMOL/L (ref 5–15)
AST SERPL-CCNC: 33 U/L (ref 1–40)
BILIRUB SERPL-MCNC: 0.7 MG/DL (ref 0–1.2)
BUN SERPL-MCNC: 7 MG/DL (ref 8–23)
BUN/CREAT SERPL: 9 (ref 7–25)
CALCIUM SPEC-SCNC: 8.6 MG/DL (ref 8.6–10.5)
CHLORIDE SERPL-SCNC: 102 MMOL/L (ref 98–107)
CO2 SERPL-SCNC: 27.1 MMOL/L (ref 22–29)
CREAT SERPL-MCNC: 0.78 MG/DL (ref 0.76–1.27)
DEPRECATED RDW RBC AUTO: 46.9 FL (ref 37–54)
EGFRCR SERPLBLD CKD-EPI 2021: 99.6 ML/MIN/1.73
ERYTHROCYTE [DISTWIDTH] IN BLOOD BY AUTOMATED COUNT: 13.1 % (ref 12.3–15.4)
GLOBULIN UR ELPH-MCNC: 2.9 GM/DL
GLUCOSE SERPL-MCNC: 86 MG/DL (ref 65–99)
HCT VFR BLD AUTO: 41 % (ref 37.5–51)
HGB BLD-MCNC: 14.1 G/DL (ref 13–17.7)
MAGNESIUM SERPL-MCNC: 1.5 MG/DL (ref 1.6–2.4)
MCH RBC QN AUTO: 33.3 PG (ref 26.6–33)
MCHC RBC AUTO-ENTMCNC: 34.4 G/DL (ref 31.5–35.7)
MCV RBC AUTO: 96.7 FL (ref 79–97)
PLATELET # BLD AUTO: 114 10*3/MM3 (ref 140–450)
PMV BLD AUTO: 10.4 FL (ref 6–12)
POTASSIUM SERPL-SCNC: 3.8 MMOL/L (ref 3.5–5.2)
PROT SERPL-MCNC: 6.3 G/DL (ref 6–8.5)
RBC # BLD AUTO: 4.24 10*6/MM3 (ref 4.14–5.8)
SODIUM SERPL-SCNC: 140 MMOL/L (ref 136–145)
WBC NRBC COR # BLD: 4.98 10*3/MM3 (ref 3.4–10.8)

## 2023-08-16 PROCEDURE — 25010000002 THIAMINE PER 100 MG: Performed by: INTERNAL MEDICINE

## 2023-08-16 PROCEDURE — 94664 DEMO&/EVAL PT USE INHALER: CPT

## 2023-08-16 PROCEDURE — 94799 UNLISTED PULMONARY SVC/PX: CPT

## 2023-08-16 PROCEDURE — 25010000002 MAGNESIUM SULFATE 2 GM/50ML SOLUTION: Performed by: HOSPITALIST

## 2023-08-16 PROCEDURE — 85027 COMPLETE CBC AUTOMATED: CPT | Performed by: HOSPITALIST

## 2023-08-16 PROCEDURE — 94761 N-INVAS EAR/PLS OXIMETRY MLT: CPT

## 2023-08-16 PROCEDURE — 83735 ASSAY OF MAGNESIUM: CPT | Performed by: HOSPITALIST

## 2023-08-16 PROCEDURE — 80053 COMPREHEN METABOLIC PANEL: CPT | Performed by: HOSPITALIST

## 2023-08-16 RX ORDER — LORAZEPAM 2 MG/ML
2 INJECTION INTRAMUSCULAR
Status: DISCONTINUED | OUTPATIENT
Start: 2023-08-16 | End: 2023-08-18 | Stop reason: HOSPADM

## 2023-08-16 RX ORDER — LORAZEPAM 1 MG/1
2 TABLET ORAL
Status: DISCONTINUED | OUTPATIENT
Start: 2023-08-16 | End: 2023-08-18 | Stop reason: HOSPADM

## 2023-08-16 RX ORDER — LORAZEPAM 1 MG/1
1 TABLET ORAL
Status: DISCONTINUED | OUTPATIENT
Start: 2023-08-16 | End: 2023-08-18 | Stop reason: HOSPADM

## 2023-08-16 RX ORDER — MAGNESIUM SULFATE HEPTAHYDRATE 40 MG/ML
2 INJECTION, SOLUTION INTRAVENOUS
Status: COMPLETED | OUTPATIENT
Start: 2023-08-16 | End: 2023-08-16

## 2023-08-16 RX ORDER — LORAZEPAM 2 MG/ML
1 INJECTION INTRAMUSCULAR
Status: DISCONTINUED | OUTPATIENT
Start: 2023-08-16 | End: 2023-08-18 | Stop reason: HOSPADM

## 2023-08-16 RX ADMIN — SENNOSIDES AND DOCUSATE SODIUM 2 TABLET: 50; 8.6 TABLET ORAL at 20:49

## 2023-08-16 RX ADMIN — FAMOTIDINE 20 MG: 20 TABLET, FILM COATED ORAL at 06:46

## 2023-08-16 RX ADMIN — FAMOTIDINE 20 MG: 20 TABLET, FILM COATED ORAL at 18:07

## 2023-08-16 RX ADMIN — MAGNESIUM SULFATE HEPTAHYDRATE 2 G: 40 INJECTION, SOLUTION INTRAVENOUS at 09:19

## 2023-08-16 RX ADMIN — MIRTAZAPINE 15 MG: 15 TABLET, FILM COATED ORAL at 20:49

## 2023-08-16 RX ADMIN — CLONIDINE HYDROCHLORIDE 0.1 MG: 0.1 TABLET ORAL at 09:17

## 2023-08-16 RX ADMIN — ZINC OXIDE 1 APPLICATION: 200 OINTMENT TOPICAL at 20:50

## 2023-08-16 RX ADMIN — Medication 10 ML: at 20:49

## 2023-08-16 RX ADMIN — Medication 10 ML: at 09:18

## 2023-08-16 RX ADMIN — AMLODIPINE BESYLATE 5 MG: 5 TABLET ORAL at 09:17

## 2023-08-16 RX ADMIN — POTASSIUM CHLORIDE 20 MEQ: 1.5 POWDER, FOR SOLUTION ORAL at 09:17

## 2023-08-16 RX ADMIN — THIAMINE HYDROCHLORIDE 200 MG: 100 INJECTION, SOLUTION INTRAMUSCULAR; INTRAVENOUS at 22:21

## 2023-08-16 RX ADMIN — TIOTROPIUM BROMIDE INHALATION SPRAY 2 PUFF: 3.12 SPRAY, METERED RESPIRATORY (INHALATION) at 09:40

## 2023-08-16 RX ADMIN — RISPERIDONE 2 MG: 2 TABLET, FILM COATED ORAL at 20:49

## 2023-08-16 RX ADMIN — CLONIDINE HYDROCHLORIDE 0.1 MG: 0.1 TABLET ORAL at 20:49

## 2023-08-16 RX ADMIN — PROPRANOLOL HYDROCHLORIDE 10 MG: 10 TABLET ORAL at 09:17

## 2023-08-16 RX ADMIN — FLUVOXAMINE MALEATE 100 MG: 50 TABLET, COATED ORAL at 09:18

## 2023-08-16 RX ADMIN — THIAMINE HYDROCHLORIDE 200 MG: 100 INJECTION, SOLUTION INTRAMUSCULAR; INTRAVENOUS at 06:47

## 2023-08-16 RX ADMIN — Medication 1000 MCG: at 09:17

## 2023-08-16 RX ADMIN — THIAMINE HYDROCHLORIDE 200 MG: 100 INJECTION, SOLUTION INTRAMUSCULAR; INTRAVENOUS at 17:25

## 2023-08-16 RX ADMIN — BUDESONIDE AND FORMOTEROL FUMARATE DIHYDRATE 2 PUFF: 160; 4.5 AEROSOL RESPIRATORY (INHALATION) at 09:40

## 2023-08-16 RX ADMIN — LORAZEPAM 1 MG: 1 TABLET ORAL at 20:49

## 2023-08-16 RX ADMIN — MAGNESIUM SULFATE HEPTAHYDRATE 2 G: 40 INJECTION, SOLUTION INTRAVENOUS at 17:26

## 2023-08-16 RX ADMIN — BUDESONIDE AND FORMOTEROL FUMARATE DIHYDRATE 2 PUFF: 160; 4.5 AEROSOL RESPIRATORY (INHALATION) at 20:06

## 2023-08-16 RX ADMIN — RISPERIDONE 2 MG: 2 TABLET, FILM COATED ORAL at 09:17

## 2023-08-16 RX ADMIN — FOLIC ACID 1 MG: 1 TABLET ORAL at 09:17

## 2023-08-16 RX ADMIN — MAGNESIUM SULFATE HEPTAHYDRATE 2 G: 40 INJECTION, SOLUTION INTRAVENOUS at 13:32

## 2023-08-16 RX ADMIN — Medication 1 TABLET: at 09:17

## 2023-08-16 RX ADMIN — PHENOBARBITAL 32.4 MG: 32.4 TABLET ORAL at 13:32

## 2023-08-16 NOTE — PLAN OF CARE
Goal Outcome Evaluation:   Pt AO x 4, sr/st on monitor, RA, restless, anxious most of the shift, up to the toilet couple of time, urinals with in reach, applied barrier cream to redness in bottom area, administered meds as scheduled, PRN atarax x 1 given, very anxious, hallucinating, and confused after woke up around 3am, CIWA scored 12, vss, [facility] notified, resting at this time, will continue to monitor.           Problem: Adult Inpatient Plan of Care  Goal: Plan of Care Review  Outcome: Ongoing, Progressing  Goal: Patient-Specific Goal (Individualized)  Outcome: Ongoing, Progressing  Goal: Absence of Hospital-Acquired Illness or Injury  Outcome: Ongoing, Progressing  Intervention: Identify and Manage Fall Risk  Recent Flowsheet Documentation  Taken 8/16/2023 0404 by Lucio Hall RN  Safety Promotion/Fall Prevention:   activity supervised   safety round/check completed   room organization consistent  Taken 8/16/2023 0204 by Lucio Hall RN  Safety Promotion/Fall Prevention:   activity supervised   room organization consistent   safety round/check completed  Taken 8/16/2023 0006 by Lucio Hall RN  Safety Promotion/Fall Prevention:   activity supervised   safety round/check completed   room organization consistent  Taken 8/15/2023 2204 by Lucio Hall RN  Safety Promotion/Fall Prevention:   activity supervised   safety round/check completed   room organization consistent  Taken 8/15/2023 2001 by Lucio Hall RN  Safety Promotion/Fall Prevention:   activity supervised   safety round/check completed   room organization consistent  Intervention: Prevent Skin Injury  Recent Flowsheet Documentation  Taken 8/16/2023 0404 by Lucio Hall RN  Body Position: position changed independently  Taken 8/16/2023 0204 by Lucio Hall RN  Body Position: position changed independently  Taken 8/16/2023 0006 by Lucio Hall RN  Body Position: position changed independently  Taken 8/15/2023  2204 by Lucio Hall RN  Body Position: position changed independently  Taken 8/15/2023 2001 by Lucio Hall RN  Body Position: position changed independently  Intervention: Prevent and Manage VTE (Venous Thromboembolism) Risk  Recent Flowsheet Documentation  Taken 8/16/2023 0006 by Lucio Hall RN  Activity Management: ambulated to bathroom  Taken 8/15/2023 2001 by Lucio Hall RN  Activity Management: ambulated to bathroom  VTE Prevention/Management: patient refused intervention  Range of Motion: active ROM (range of motion) encouraged  Intervention: Prevent Infection  Recent Flowsheet Documentation  Taken 8/16/2023 0404 by Lucio Hall RN  Infection Prevention:   hand hygiene promoted   rest/sleep promoted  Taken 8/16/2023 0204 by Lucio Hall RN  Infection Prevention:   hand hygiene promoted   rest/sleep promoted  Taken 8/16/2023 0006 by Lucio Hall RN  Infection Prevention:   hand hygiene promoted   rest/sleep promoted  Taken 8/15/2023 2204 by Lucio Hall RN  Infection Prevention:   hand hygiene promoted   rest/sleep promoted  Taken 8/15/2023 2001 by Lucio Hall RN  Infection Prevention:   hand hygiene promoted   rest/sleep promoted  Goal: Optimal Comfort and Wellbeing  Outcome: Ongoing, Progressing  Intervention: Provide Person-Centered Care  Recent Flowsheet Documentation  Taken 8/15/2023 2001 by Lucio Hall RN  Trust Relationship/Rapport:   care explained   choices provided  Goal: Readiness for Transition of Care  Outcome: Ongoing, Progressing     Problem: Fall Injury Risk  Goal: Absence of Fall and Fall-Related Injury  Outcome: Ongoing, Progressing  Intervention: Identify and Manage Contributors  Recent Flowsheet Documentation  Taken 8/16/2023 0404 by Lucio Hall RN  Medication Review/Management: medications reviewed  Taken 8/16/2023 0204 by Lucio Hall RN  Medication Review/Management: medications reviewed  Taken 8/16/2023 0006  by Lucio Hall RN  Medication Review/Management: medications reviewed  Taken 8/15/2023 2204 by Lucio Hall RN  Medication Review/Management: medications reviewed  Taken 8/15/2023 2001 by Lucio Hall RN  Medication Review/Management: medications reviewed  Intervention: Promote Injury-Free Environment  Recent Flowsheet Documentation  Taken 8/16/2023 0404 by Lucio Hall RN  Safety Promotion/Fall Prevention:   activity supervised   safety round/check completed   room organization consistent  Taken 8/16/2023 0204 by Lucio Hall RN  Safety Promotion/Fall Prevention:   activity supervised   room organization consistent   safety round/check completed  Taken 8/16/2023 0006 by Lucio Hall RN  Safety Promotion/Fall Prevention:   activity supervised   safety round/check completed   room organization consistent  Taken 8/15/2023 2204 by Lucio Hall RN  Safety Promotion/Fall Prevention:   activity supervised   safety round/check completed   room organization consistent  Taken 8/15/2023 2001 by Lucio Hall RN  Safety Promotion/Fall Prevention:   activity supervised   safety round/check completed   room organization consistent     Problem: Alcohol Withdrawal  Goal: Alcohol Withdrawal Symptom Control  Outcome: Ongoing, Progressing  Intervention: Minimize or Manage Alcohol Withdrawal Symptoms  Recent Flowsheet Documentation  Taken 8/16/2023 0006 by Lucio Hall RN  Seizure Precautions: activity supervised  Taken 8/15/2023 2001 by Lucio Hall RN  Seizure Precautions: activity supervised     Problem: Acute Neurologic Deterioration (Alcohol Withdrawal)  Goal: Optimal Neurologic Function  Outcome: Ongoing, Progressing  Intervention: Prevent Seizure-Related Injury  Recent Flowsheet Documentation  Taken 8/16/2023 0006 by Lucio Hall RN  Seizure Precautions: activity supervised  Taken 8/15/2023 2001 by Lucio Hall RN  Seizure Precautions: activity supervised      Problem: Substance Misuse (Alcohol Withdrawal)  Goal: Readiness for Change Identified  Outcome: Ongoing, Progressing

## 2023-08-16 NOTE — PROGRESS NOTES
Name: Nilesh Zapata ADMIT: 2023   : 1960  PCP: Shakira Colbert MAYNOR APRMAYNOR    MRN: [id number] LOS: 3 days   AGE/SEX: 62 y.o. male  ROOM: 60/     Subjective   Subjective   Feeling a little worse today. Was up all night with hallucinations. Sleeping much of the day so far today. Tremor resolved. Tolerating regular diet. No N/V/D/abd pain. Voiding well. No F/C/NS.        Objective   Objective   Vital Signs  Temp:  [98.1 øF (36.7 øC)-98.9 øF (37.2 øC)] 98.8 øF (37.1 øC)  Heart Rate:  [72-93] 93  Resp:  [18-20] 20  BP: (128-149)/() 133/85  SpO2:  [90 %-95 %] 90 %  on   ;   Device (Oxygen Therapy): room air  Body mass index is 30.85 kg/mý.    (No change in exam today)    Physical Exam  Vitals and nursing note reviewed.   Constitutional:       General: He is not in acute distress.     Appearance: He is ill-appearing (chronically). He is not toxic-appearing or diaphoretic.   HENT:      Head: Normocephalic.      Mouth/Throat:      Mouth: Mucous membranes are moist.      Pharynx: Oropharynx is clear.   Eyes:      General: No scleral icterus.        Right eye: No discharge.         Left eye: No discharge.      Extraocular Movements: Extraocular movements intact.      Conjunctiva/sclera: Conjunctivae normal.   Cardiovascular:      Rate and Rhythm: Normal rate and regular rhythm.      Pulses: Normal pulses.   Pulmonary:      Effort: Pulmonary effort is normal. No respiratory distress.      Breath sounds: Normal breath sounds. No wheezing or rales.   Abdominal:      General: Bowel sounds are normal. There is no distension.      Palpations: Abdomen is soft.      Tenderness: There is no abdominal tenderness.   Musculoskeletal:         General: No swelling or deformity. Normal range of motion.      Cervical back: Neck supple. No rigidity.   Skin:     General: Skin is warm and dry.      Capillary Refill: Capillary refill takes less than 2 seconds.      Coloration: Skin is not jaundiced.   Neurological:       General: No focal deficit present.      Mental Status: He is alert and oriented to person, place, and time. Mental status is at baseline.      Cranial Nerves: No cranial nerve deficit.      Coordination: Coordination normal.   Psychiatric:         Mood and Affect: Mood normal.         Behavior: Behavior normal.      Comments: Flat affect     Results Review     I reviewed the patient's new clinical results.  Results from last 7 days   Lab Units 08/16/23  0549 08/15/23  0541 08/14/23  0532 08/13/23  1346   WBC 10*3/mm3 4.98 4.61 5.05 7.12   HEMOGLOBIN g/dL 14.1 13.7 14.0 16.3   PLATELETS 10*3/mm3 114* 115* 136* 181       Results from last 7 days   Lab Units 08/16/23  0549 08/15/23  0541 08/14/23  1907 08/14/23  0532 08/13/23  1346   SODIUM mmol/L 140 138  --  140 140   POTASSIUM mmol/L 3.8 3.7 3.7 3.2* 3.3*   CHLORIDE mmol/L 102 101  --  100 95*   CO2 mmol/L 27.1 27.4  --  28.4 24.5   BUN mg/dL 7* 5*  --  5* 4*   CREATININE mg/dL 0.78 0.84  --  0.87 0.84   GLUCOSE mg/dL 86 76  --  109* 103*   EGFR mL/min/1.73 99.6 97.4  --  96.4 97.4       Results from last 7 days   Lab Units 08/16/23  0549 08/15/23  0541 08/14/23  0532 08/13/23  1346   ALBUMIN g/dL 3.4* 3.4* 3.4* 4.4   BILIRUBIN mg/dL 0.7 0.9 1.6* 1.0   ALK PHOS U/L 125* 131* 132* 167*   AST (SGOT) U/L 33 34 35 52*   ALT (SGPT) U/L 18 17 20 28       Results from last 7 days   Lab Units 08/16/23  0549 08/15/23  0541 08/14/23  0532 08/13/23  1346   CALCIUM mg/dL 8.6 8.1* 8.4* 9.2   ALBUMIN g/dL 3.4* 3.4* 3.4* 4.4   MAGNESIUM mg/dL 1.5* 1.7 1.2*  --        Results from last 7 days   Lab Units 08/14/23  0532   LACTATE mmol/L 1.3       Hemoglobin A1C   Date/Time Value Ref Range Status   08/14/2023 0532 4.40 (L) 4.80 - 5.60 % Final       No radiology results for the last day    I have personally reviewed all medications:  Scheduled Medications  amLODIPine, 5 mg, Oral, Daily  budesonide-formoterol, 2 puff, Inhalation, BID - RT   And  tiotropium bromide monohydrate, 2 puff,  Inhalation, Daily - RT  cloNIDine, 0.1 mg, Oral, BID  famotidine, 20 mg, Oral, BID AC  fluvoxaMINE, 100 mg, Oral, Daily  folic acid, 1 mg, Oral, Daily  hydrocortisone-bacitracin-zinc oxide-nystatin, 1 application , Topical, BID  magnesium sulfate, 2 g, Intravenous, Q2H  mirtazapine, 15 mg, Oral, Nightly  multivitamin, 1 tablet, Oral, Daily  PHENobarbital, 32.4 mg, Oral, Once  potassium chloride, 20 mEq, Oral, Daily  propranolol, 10 mg, Oral, Daily  risperiDONE, 2 mg, Oral, BID  senna-docusate sodium, 2 tablet, Oral, BID  sodium chloride, 10 mL, Intravenous, Q12H  thiamine (B-1) IV, 200 mg, Intravenous, Q8H   Followed by  [START ON 8/19/2023] thiamine, 100 mg, Oral, Daily  cyanocobalamin, 1,000 mcg, Oral, Daily    Infusions     Diet  Diet: Regular/House Diet; Texture: Regular Texture (IDDSI 7); Fluid Consistency: Thin (IDDSI 0)    I have personally reviewed:  [x]  Laboratory   []  Microbiology   []  Radiology   [x]  EKG/Telemetry  []  Cardiology/Vascular   []  Pathology    []  Records       Assessment/Plan     Active Hospital Problems    Diagnosis  POA    **Alcohol withdrawal [F10.939]  Yes    Hypokalemia [E87.6]  Yes    Hypomagnesemia [E83.42]  Yes    COPD (chronic obstructive pulmonary disease) [J44.9]  Yes    Alcoholic liver disease [K70.9]  Yes    Essential hypertension [I10]  Yes    Alcohol abuse [F10.10]  Yes    History of left nephrectomy 2/2 RCC [Z90.5]  Not Applicable    Anxiety [F41.9]  Yes    Kidney cancer, primary, with metastasis from kidney to other site [C64.9]  Yes      Resolved Hospital Problems   No resolved problems to display.       61yo gentleman with h/o anxiety, alcohol abuse, RCC with left nephrectomy, HTN, alcoholic liver disease, COPD, and tobacco abuse, hospitalized here just last month for alcohol withdrawal, who presented to ER with c/o early withdrawal symptoms (anorexia, tremors, agitation, N/V/D).    Alcohol withdrawal: completes Phenobarbital taper per protocol today, continue PRN  Ativan for breakthrough symptoms  Continue MVI/folate/thiamine  Appreciate [facility] attention to pt, outpt [facility] tx resources provided to pt  If not Ativan requirement overnight could be dc'd tomorrow    ?Ativan allergy: pt's reaction to Ativan was hallucinations, this occurred while he was being treated for acute withdrawal so difficult to say that this was a reaction to Ativan, d/w Pharm who agreed that trial of PRN Ativan was reasonable and safe (has not required any so far)    Hypokalemia: replaced K+ with protocol    Hypomagnesemia: Mg++ level 1.5 after replacement 8/14, continue to replace per protocol    HTN: currently on amlodipine, clonidine, and propranolol. BPs acceptable    COPD  Tobacco abuse: supposed to be on Trelegy Ellipta since last discharge, have started Symbicort/Spiriva here, lung exam fine, no hypoxia at present    RCC, left nephrectomy 2014: Cr wnl, voiding well, followed by [facility]--last seen in May of this year    Thrombocytopenia: suspect due to alcohol abuse, down to 114 this AM, will continue to monitor      SCDs for DVT prophylaxis.  Full code.  Discussed with patient, nursing staff, CCP, and care team on multidisciplinary rounds.  Anticipate discharge home with family, timing yet to be determined.      Chaz Ponce MD  [facility]  08/16/23  11:56 EDT

## 2023-08-16 NOTE — PROGRESS NOTES
"[facility] f/u d/t ETOH. Chart reviewed, spoke w/ RN and met w/ pt. KRYSTLE 12 @1626. Chart indicates pt anxious, hallucinating and confused. RN reports pt receiving phenobarbital. Pt asleep upon entry, work to voice, A&Ox4. Pt calm and cooperative. Rates both anxiety and depression as a \"8/10\" (10 being worst). Denies SI/HI, hallucinations or wish to be dead. Pt reports unknown of plan to tx ETOH upon discharge at this time, \"still working on it\".   [facility] following.   "

## 2023-08-16 NOTE — PLAN OF CARE
Goal Outcome Evaluation:      Pt doing well with protocol with relativity low to no symptoms, will continue to monitor

## 2023-08-16 NOTE — NURSING NOTE
WOCN consult: Bilateral groin and scrotum red, appears yeast in appearance due to moisture, Recommend magic barrier cream. Please call if any further needs.

## 2023-08-17 ENCOUNTER — APPOINTMENT (OUTPATIENT)
Dept: GENERAL RADIOLOGY | Facility: HOSPITAL | Age: 63
DRG: 897 | End: 2023-08-17
Payer: MEDICARE

## 2023-08-17 LAB
ALBUMIN SERPL-MCNC: 3.5 G/DL (ref 3.5–5.2)
ALBUMIN/GLOB SERPL: 1.3 G/DL
ALP SERPL-CCNC: 111 U/L (ref 39–117)
ALT SERPL W P-5'-P-CCNC: 19 U/L (ref 1–41)
ANION GAP SERPL CALCULATED.3IONS-SCNC: 8.7 MMOL/L (ref 5–15)
ARTERIAL PATENCY WRIST A: POSITIVE
AST SERPL-CCNC: 43 U/L (ref 1–40)
ATMOSPHERIC PRESS: 743.2 MMHG
B PARAPERT DNA SPEC QL NAA+PROBE: NOT DETECTED
B PERT DNA SPEC QL NAA+PROBE: NOT DETECTED
BASE EXCESS BLDA CALC-SCNC: 2.3 MMOL/L (ref 0–2)
BASOPHILS # BLD AUTO: 0.02 10*3/MM3 (ref 0–0.2)
BASOPHILS NFR BLD AUTO: 0.4 % (ref 0–1.5)
BDY SITE: ABNORMAL
BILIRUB SERPL-MCNC: 0.6 MG/DL (ref 0–1.2)
BUN SERPL-MCNC: 8 MG/DL (ref 8–23)
BUN/CREAT SERPL: 9.5 (ref 7–25)
C PNEUM DNA NPH QL NAA+NON-PROBE: NOT DETECTED
CALCIUM SPEC-SCNC: 9 MG/DL (ref 8.6–10.5)
CHLORIDE SERPL-SCNC: 101 MMOL/L (ref 98–107)
CO2 SERPL-SCNC: 27.3 MMOL/L (ref 22–29)
CREAT SERPL-MCNC: 0.84 MG/DL (ref 0.76–1.27)
DEPRECATED RDW RBC AUTO: 46.2 FL (ref 37–54)
EGFRCR SERPLBLD CKD-EPI 2021: 97.4 ML/MIN/1.73
EOSINOPHIL # BLD AUTO: 0.16 10*3/MM3 (ref 0–0.4)
EOSINOPHIL NFR BLD AUTO: 3.1 % (ref 0.3–6.2)
ERYTHROCYTE [DISTWIDTH] IN BLOOD BY AUTOMATED COUNT: 13.1 % (ref 12.3–15.4)
FLUAV SUBTYP SPEC NAA+PROBE: NOT DETECTED
FLUBV RNA ISLT QL NAA+PROBE: NOT DETECTED
GLOBULIN UR ELPH-MCNC: 2.8 GM/DL
GLUCOSE SERPL-MCNC: 86 MG/DL (ref 65–99)
HADV DNA SPEC NAA+PROBE: NOT DETECTED
HCO3 BLDA-SCNC: 27.4 MMOL/L (ref 22–28)
HCOV 229E RNA SPEC QL NAA+PROBE: NOT DETECTED
HCOV HKU1 RNA SPEC QL NAA+PROBE: NOT DETECTED
HCOV NL63 RNA SPEC QL NAA+PROBE: NOT DETECTED
HCOV OC43 RNA SPEC QL NAA+PROBE: NOT DETECTED
HCT VFR BLD AUTO: 41.4 % (ref 37.5–51)
HGB BLD-MCNC: 14.1 G/DL (ref 13–17.7)
HMPV RNA NPH QL NAA+NON-PROBE: NOT DETECTED
HPIV1 RNA ISLT QL NAA+PROBE: NOT DETECTED
HPIV2 RNA SPEC QL NAA+PROBE: NOT DETECTED
HPIV3 RNA NPH QL NAA+PROBE: NOT DETECTED
HPIV4 P GENE NPH QL NAA+PROBE: NOT DETECTED
LYMPHOCYTES # BLD AUTO: 1.15 10*3/MM3 (ref 0.7–3.1)
LYMPHOCYTES NFR BLD AUTO: 22.5 % (ref 19.6–45.3)
M PNEUMO IGG SER IA-ACNC: NOT DETECTED
MAGNESIUM SERPL-MCNC: 2.2 MG/DL (ref 1.6–2.4)
MCH RBC QN AUTO: 32.9 PG (ref 26.6–33)
MCHC RBC AUTO-ENTMCNC: 34.1 G/DL (ref 31.5–35.7)
MCV RBC AUTO: 96.5 FL (ref 79–97)
MODALITY: ABNORMAL
MONOCYTES # BLD AUTO: 0.57 10*3/MM3 (ref 0.1–0.9)
MONOCYTES NFR BLD AUTO: 11.1 % (ref 5–12)
NEUTROPHILS NFR BLD AUTO: 3.21 10*3/MM3 (ref 1.7–7)
NEUTROPHILS NFR BLD AUTO: 62.7 % (ref 42.7–76)
PCO2 BLDA: 43 MM HG (ref 35–45)
PH BLDA: 7.41 PH UNITS (ref 7.35–7.45)
PHOSPHATE SERPL-MCNC: 3.3 MG/DL (ref 2.5–4.5)
PLATELET # BLD AUTO: 110 10*3/MM3 (ref 140–450)
PMV BLD AUTO: 10.8 FL (ref 6–12)
PO2 BLDA: 57.6 MM HG (ref 80–100)
POTASSIUM SERPL-SCNC: 4 MMOL/L (ref 3.5–5.2)
PROT SERPL-MCNC: 6.3 G/DL (ref 6–8.5)
RBC # BLD AUTO: 4.29 10*6/MM3 (ref 4.14–5.8)
RHINOVIRUS RNA SPEC NAA+PROBE: NOT DETECTED
RSV RNA NPH QL NAA+NON-PROBE: NOT DETECTED
SAO2 % BLDCOA: 89.7 % (ref 92–99)
SARS-COV-2 RNA NPH QL NAA+NON-PROBE: NOT DETECTED
SET MECH RESP RATE: 20
SODIUM SERPL-SCNC: 137 MMOL/L (ref 136–145)
WBC NRBC COR # BLD: 5.12 10*3/MM3 (ref 3.4–10.8)

## 2023-08-17 PROCEDURE — 36600 WITHDRAWAL OF ARTERIAL BLOOD: CPT

## 2023-08-17 PROCEDURE — 85025 COMPLETE CBC W/AUTO DIFF WBC: CPT | Performed by: HOSPITALIST

## 2023-08-17 PROCEDURE — 71045 X-RAY EXAM CHEST 1 VIEW: CPT

## 2023-08-17 PROCEDURE — 80053 COMPREHEN METABOLIC PANEL: CPT | Performed by: HOSPITALIST

## 2023-08-17 PROCEDURE — 97162 PT EVAL MOD COMPLEX 30 MIN: CPT

## 2023-08-17 PROCEDURE — 94799 UNLISTED PULMONARY SVC/PX: CPT

## 2023-08-17 PROCEDURE — 83735 ASSAY OF MAGNESIUM: CPT | Performed by: HOSPITALIST

## 2023-08-17 PROCEDURE — 84100 ASSAY OF PHOSPHORUS: CPT | Performed by: HOSPITALIST

## 2023-08-17 PROCEDURE — 25010000002 THIAMINE HCL 200 MG/2ML SOLUTION: Performed by: INTERNAL MEDICINE

## 2023-08-17 PROCEDURE — 82803 BLOOD GASES ANY COMBINATION: CPT

## 2023-08-17 PROCEDURE — 94664 DEMO&/EVAL PT USE INHALER: CPT

## 2023-08-17 PROCEDURE — 0202U NFCT DS 22 TRGT SARS-COV-2: CPT | Performed by: STUDENT IN AN ORGANIZED HEALTH CARE EDUCATION/TRAINING PROGRAM

## 2023-08-17 RX ADMIN — TIOTROPIUM BROMIDE INHALATION SPRAY 2 PUFF: 3.12 SPRAY, METERED RESPIRATORY (INHALATION) at 11:02

## 2023-08-17 RX ADMIN — Medication 1 TABLET: at 09:05

## 2023-08-17 RX ADMIN — Medication 1000 MCG: at 09:05

## 2023-08-17 RX ADMIN — CLONIDINE HYDROCHLORIDE 0.1 MG: 0.1 TABLET ORAL at 09:05

## 2023-08-17 RX ADMIN — Medication 10 ML: at 20:20

## 2023-08-17 RX ADMIN — RISPERIDONE 2 MG: 2 TABLET, FILM COATED ORAL at 20:20

## 2023-08-17 RX ADMIN — FOLIC ACID 1 MG: 1 TABLET ORAL at 09:05

## 2023-08-17 RX ADMIN — POTASSIUM CHLORIDE 20 MEQ: 1.5 POWDER, FOR SOLUTION ORAL at 09:05

## 2023-08-17 RX ADMIN — BUDESONIDE AND FORMOTEROL FUMARATE DIHYDRATE 2 PUFF: 160; 4.5 AEROSOL RESPIRATORY (INHALATION) at 11:02

## 2023-08-17 RX ADMIN — MIRTAZAPINE 15 MG: 15 TABLET, FILM COATED ORAL at 20:20

## 2023-08-17 RX ADMIN — ZINC OXIDE 1 APPLICATION: 200 OINTMENT TOPICAL at 09:06

## 2023-08-17 RX ADMIN — RISPERIDONE 2 MG: 2 TABLET, FILM COATED ORAL at 09:05

## 2023-08-17 RX ADMIN — CLONIDINE HYDROCHLORIDE 0.1 MG: 0.1 TABLET ORAL at 20:20

## 2023-08-17 RX ADMIN — BUDESONIDE AND FORMOTEROL FUMARATE DIHYDRATE 2 PUFF: 160; 4.5 AEROSOL RESPIRATORY (INHALATION) at 19:56

## 2023-08-17 RX ADMIN — AMLODIPINE BESYLATE 5 MG: 5 TABLET ORAL at 09:05

## 2023-08-17 RX ADMIN — SENNOSIDES AND DOCUSATE SODIUM 2 TABLET: 50; 8.6 TABLET ORAL at 09:05

## 2023-08-17 RX ADMIN — PROPRANOLOL HYDROCHLORIDE 10 MG: 10 TABLET ORAL at 09:05

## 2023-08-17 RX ADMIN — THIAMINE HYDROCHLORIDE 200 MG: 100 INJECTION, SOLUTION INTRAMUSCULAR; INTRAVENOUS at 06:46

## 2023-08-17 RX ADMIN — THIAMINE HYDROCHLORIDE 200 MG: 100 INJECTION, SOLUTION INTRAMUSCULAR; INTRAVENOUS at 15:56

## 2023-08-17 RX ADMIN — FLUVOXAMINE MALEATE 100 MG: 50 TABLET, COATED ORAL at 09:05

## 2023-08-17 RX ADMIN — Medication 10 ML: at 09:06

## 2023-08-17 RX ADMIN — FAMOTIDINE 20 MG: 20 TABLET, FILM COATED ORAL at 06:46

## 2023-08-17 RX ADMIN — FAMOTIDINE 20 MG: 20 TABLET, FILM COATED ORAL at 17:17

## 2023-08-17 RX ADMIN — THIAMINE HYDROCHLORIDE 200 MG: 100 INJECTION, SOLUTION INTRAMUSCULAR; INTRAVENOUS at 22:32

## 2023-08-17 RX ADMIN — ZINC OXIDE 1 APPLICATION: 200 OINTMENT TOPICAL at 20:20

## 2023-08-17 NOTE — CASE MANAGEMENT/SOCIAL WORK
Continued Stay Note  [facility]     Patient Name: Nilesh Zapata  MRN: [id number]  Today's Date: 8/17/2023    Admit Date: 8/13/2023    Plan: Plan home with OP [facility] program.   GINA Marie RN   Discharge Plan       Row Name 08/17/23 5718       Plan    Plan Plan home with OP [facility] program.   GINA Marie RN    Patient/Family in Agreement with Plan yes    Plan Comments Spoke with pt at bedside.  Pt denies any discharge needs.  Pt stated earlier he plans on attending OP [facility] program at [facility].  Pt may need assistance with transportation home.   Pt denies any discharge needs.    Plan home with OP [facility] program.   GINA Marie RN.                   Discharge Codes    No documentation.                 Expected Discharge Date and Time       Expected Discharge Date Expected Discharge Time    Aug 21, 2023               Sona Marie RN

## 2023-08-17 NOTE — PROGRESS NOTES
Name: Nilesh Zapata ADMIT: 2023   : 1960  PCP: Shakira Colbert GRACIE    MRN: [id number] LOS: 4 days   AGE/SEX: 62 y.o. male  ROOM: [facility] [place]/     Subjective   Subjective   Patient seen and examined this morning.  Hospital day 4.  At time of my examination, patient is resting comfortably in bed, he states that he feels okay today, denies withdrawal symptoms at present.  Has required 1 mg of Ativan over the past 24 hours.  No acute events overnight per nursing.       Objective   Objective   Vital Signs  Temp:  [97.3 øF (36.3 øC)-98.5 øF (36.9 øC)] 97.3 øF (36.3 øC)  Heart Rate:  [68-71] 68  Resp:  [18-20] 18  BP: (108-133)/(69-84) 133/84  SpO2:  [88 %-98 %] 98 %  on  Flow (L/min):  [2] 2;   Device (Oxygen Therapy): nasal cannula  Body mass index is 30.85 kg/mý.  Physical Exam  Vitals and nursing note reviewed.   Constitutional:       General: He is awake. He is not in acute distress.     Appearance: He is obese. He is ill-appearing.   Eyes:      General: No scleral icterus.     Conjunctiva/sclera: Conjunctivae normal.   Cardiovascular:      Rate and Rhythm: Normal rate and regular rhythm.      Pulses: Normal pulses.      Heart sounds: Normal heart sounds.   Pulmonary:      Effort: Pulmonary effort is normal. No respiratory distress.      Breath sounds: Normal breath sounds. No wheezing or rhonchi.   Abdominal:      General: Bowel sounds are normal. There is no distension.      Palpations: Abdomen is soft.      Tenderness: There is no abdominal tenderness.   Skin:     General: Skin is warm and dry.   Neurological:      Mental Status: He is alert.   Psychiatric:         Behavior: Behavior is cooperative.     Results Review     I reviewed the patient's new clinical results.  Results from last 7 days   Lab Units 23  0705 23  0549 08/15/23  0541 23  0532   WBC 10*3/mm3 5.12 4.98 4.61 5.05   HEMOGLOBIN g/dL 14.1 14.1 13.7 14.0   PLATELETS 10*3/mm3 110* 114* 115* 136*     Results from last 7  days   Lab Units 08/17/23  0705 08/16/23  0549 08/15/23  0541 08/14/23  1907 08/14/23  0532   SODIUM mmol/L 137 140 138  --  140   POTASSIUM mmol/L 4.0 3.8 3.7 3.7 3.2*   CHLORIDE mmol/L 101 102 101  --  100   CO2 mmol/L 27.3 27.1 27.4  --  28.4   BUN mg/dL 8 7* 5*  --  5*   CREATININE mg/dL 0.84 0.78 0.84  --  0.87   GLUCOSE mg/dL 86 86 76  --  109*   EGFR mL/min/1.73 97.4 99.6 97.4  --  96.4     Results from last 7 days   Lab Units 08/17/23  0705 08/16/23  0549 08/15/23  0541 08/14/23  0532   ALBUMIN g/dL 3.5 3.4* 3.4* 3.4*   BILIRUBIN mg/dL 0.6 0.7 0.9 1.6*   ALK PHOS U/L 111 125* 131* 132*   AST (SGOT) U/L 43* 33 34 35   ALT (SGPT) U/L 19 18 17 20     Results from last 7 days   Lab Units 08/17/23  0705 08/16/23  0549 08/15/23  0541 08/14/23  0532   CALCIUM mg/dL 9.0 8.6 8.1* 8.4*   ALBUMIN g/dL 3.5 3.4* 3.4* 3.4*   MAGNESIUM mg/dL 2.2 1.5* 1.7 1.2*   PHOSPHORUS mg/dL 3.3  --   --   --      Results from last 7 days   Lab Units 08/14/23  0532   LACTATE mmol/L 1.3     No results found for: HGBA1C, POCGLU    No radiology results for the last day    I have personally reviewed all medications:  Scheduled Medications  amLODIPine, 5 mg, Oral, Daily  budesonide-formoterol, 2 puff, Inhalation, BID - RT   And  tiotropium bromide monohydrate, 2 puff, Inhalation, Daily - RT  cloNIDine, 0.1 mg, Oral, BID  famotidine, 20 mg, Oral, BID AC  fluvoxaMINE, 100 mg, Oral, Daily  folic acid, 1 mg, Oral, Daily  hydrocortisone-bacitracin-zinc oxide-nystatin, 1 application , Topical, BID  mirtazapine, 15 mg, Oral, Nightly  multivitamin, 1 tablet, Oral, Daily  potassium chloride, 20 mEq, Oral, Daily  propranolol, 10 mg, Oral, Daily  risperiDONE, 2 mg, Oral, BID  senna-docusate sodium, 2 tablet, Oral, BID  sodium chloride, 10 mL, Intravenous, Q12H  thiamine (B-1) IV, 200 mg, Intravenous, Q8H   Followed by  [START ON 8/19/2023] thiamine, 100 mg, Oral, Daily  cyanocobalamin, 1,000 mcg, Oral, Daily    Infusions   Diet  Diet: Regular/House  Diet; Texture: Regular Texture (IDDSI 7); Fluid Consistency: Thin (IDDSI 0)    I have personally reviewed:  [x]  Laboratory   [x]  Microbiology   [x]  Radiology   [x]  EKG/Telemetry  [x]  Cardiology/Vascular   []  Pathology    []  Records       Assessment/Plan     Active Hospital Problems    Diagnosis  POA    **Alcohol withdrawal [F10.939]  Yes    Hypokalemia [E87.6]  Yes    Hypomagnesemia [E83.42]  Yes    COPD (chronic obstructive pulmonary disease) [J44.9]  Yes    Alcoholic liver disease [K70.9]  Yes    Essential hypertension [I10]  Yes    Alcohol abuse [F10.10]  Yes    History of left nephrectomy 2/2 RCC [Z90.5]  Not Applicable    Anxiety [F41.9]  Yes    Kidney cancer, primary, with metastasis from kidney to other site [C64.9]  Yes      Resolved Hospital Problems   No resolved problems to display.     63 yo male with h/o anxiety, alcohol abuse, RCC with left nephrectomy, HTN, alcoholic liver disease, COPD, and tobacco abuse, hospitalized here just last month for alcohol withdrawal, who presented to ER with c/o early withdrawal symptoms (anorexia, tremors, agitation, N/V/D), admitted with Alcohol withdrawal.     Alcohol use with concern for withdrawal  - Patient endorses history of ETOH use, last drink reportedly 1000 on 08/13. ETOH level on admission: <10.  - He completed treatment with Phenobarbital taper. Remains on CIWA protocol, multivitamin, thiamine, folic acid. Withdrawal symptoms seemingly improved today from prior. Has required 1 mg ativan over past 24 hours.  - Continue CIWA protocol, MV, thiamine, folic acid  - Telemetry, pulse oximetry, seizure precautions, aspiration precautions  - Consult [facility] consulted and following. Will follow their plans/recommendations. Greatly appreciate their help.     ?Ativan allergy  -  pt's reaction to Ativan was hallucinations, this occurred while he was being treated for acute withdrawal so difficult to say that this was a reaction to Ativan, d/w Pharm who  agreed that trial of PRN Ativan was reasonable and safe. He has tolerated Ativan thus far without noticeable side effects.    Hypoxemia  - O2 sat noted to be 88% on most recent vitals, however, patient without respiratory complaints at present.  - Order CXR and ABG for further evaluation.    Hypertension  - BP acceptable acutely. Appears stable. No indications to warrant acute changes/intervention at this time.  - Continue current medications as prescribed with Amlodipine, Clonidine, Propanolol. Trend BP to guide ongoing management decisions.    COPD  Tobacco abuse  - Appears stable from this perspective at present.  No evidence to suggest acute exacerbation.  Continue current medications as prescribed and closely monitor.  - Supplemental oxygen as needed to maintain O2 sats 88 to 92%, pulse oximetry, telemetry monitoring.    RCC, left nephrectomy 2014  - Renal function stable at present, creatinine within normal limits. He is voiding well, followed by [facility]--last seen in May of this year.    Thrombocytopenia  - Platelets found to be low on most recent labs. No indications for urgent intervention at present. Will monitor for now.  - Order repeat CBC in AM for reassessment.    Hypokalemia, resolved  Hypomagnesemia, resolved  - Values low previously, repleted per protocol, normalized on repeat testing.    All workup, problems and plans new to me today. First day taking care of patient.    SCDs for DVT prophylaxis.  Full code.  Discussed with patient, nursing staff, CCP, and care team on multidisciplinary rounds.  Anticipate discharge  TBD  timing yet to be determined..      Aayush Ferrer DO  [facility]  08/17/23  10:19 EDT

## 2023-08-17 NOTE — NURSING NOTE
"Patient seen by [facility] d/t ETOH.    Patient sitting up in bed. The patient reported he was doing \"pretty good.\" The patient reported good sleep. The patient denied any ETOH withdrawal or craving. Last CIWA 0.  The patient stated he is looking into [facility] for treatment but has bot made a final decision. The patient denied any other needs from [facility] at this time. Access following.   "

## 2023-08-17 NOTE — PLAN OF CARE
Goal Outcome Evaluation:  Plan of Care Reviewed With: patient      Patient resting in bed.Alert and oriented.VSS.Remains on 2L of oxygen.Ativan given once per CIWA score.No acute changes.NSR

## 2023-08-17 NOTE — THERAPY EVALUATION
Patient Name: Nilesh Zapata  : 1960    MRN: [id number]                              Today's Date: 2023       Admit Date: 2023    Visit Dx:     ICD-10-CM ICD-9-CM   1. Alcohol withdrawal syndrome with complication  F10.939 291.81   2. Elevated LFTs  R79.89 790.6     Patient Active Problem List   Diagnosis    Alcohol withdrawal syndrome with complication    Anxiety    Fatty liver    Tobacco abuse    Kidney cancer, primary, with metastasis from kidney to other site    Alcohol dependence with withdrawal    Hyponatremia    Alcohol abuse    History of renal cell cancer    History of left nephrectomy 2/2 RCC    Liver lesion    Lung nodule    Vitamin D deficiency    Under emotional stress     from spouse    Coping style affecting medical condition    Essential hypertension    Acute adjustment disorder with mixed anxiety and depressed mood    Alcoholic liver disease    Hypoxemia    Closed fracture of left superior pubic ramus    Closed fracture of left inferior pubic ramus    Closed fracture of sacrum    Anemia    Hypotension    Contusion of left hip    Alcoholic intoxication without complication    Left acetabular fracture    Fall    COPD (chronic obstructive pulmonary disease)    Alcohol withdrawal    Hypokalemia    Hypomagnesemia     Past Medical History:   Diagnosis Date    Alcohol abuse     Anxiety     Delirium tremens 2019    Fatty liver     FH: kidney cancer     Hypertension      Past Surgical History:   Procedure Laterality Date    APPENDECTOMY      NEPHRECTOMY      left     TONSILLECTOMY        General Information       Row Name 23 1420          Physical Therapy Time and Intention    Document Type evaluation  -MG     Mode of Treatment individual therapy;physical therapy  -MG       Row Name 23 1420          General Information    Patient Profile Reviewed yes  -MG     Prior Level of Function independent:  No AD. Owns a RW and WC from prior hip fx.  -MG     Existing  Precautions/Restrictions fall  -MG     Barriers to Rehab none identified  -MG       Row Name 08/17/23 1420          Living Environment    People in Home alone  -MG       Row Name 08/17/23 1420          Home Main Entrance    Number of Stairs, Main Entrance none  1st floor apt  -MG       Row Name 08/17/23 1420          Stairs Within Home, Primary    Number of Stairs, Within Home, Primary none  -MG       Row Name 08/17/23 1420          Cognition    Orientation Status (Cognition) oriented x 3  -MG       Row Name 08/17/23 1420          Safety Issues, Functional Mobility    Impairments Affecting Function (Mobility) balance;endurance/activity tolerance  -MG     Comment, Safety Issues/Impairments (Mobility) Gait belt and non-skid socks donned.  -MG               User Key  (r) = Recorded By, (t) = Taken By, (c) = Cosigned By      Initials Name Provider Type    MG Tracey Ferreira, PT Physical Therapist                   Mobility       Row Name 08/17/23 1421          Bed Mobility    Bed Mobility supine-sit;sit-supine  -MG     Supine-Sit [place] (Bed Mobility) standby assist  -MG     Sit-Supine [place] (Bed Mobility) standby assist  -MG     Assistive Device (Bed Mobility) head of bed elevated  -MG     Comment, (Bed Mobility) Increased time to complete. Denied dizziness on sitting.  -MG       Row Name 08/17/23 1421          Sit-Stand Transfer    Sit-Stand [place] (Transfers) standby assist  -MG     Assistive Device (Sit-Stand Transfers) other (see comments)  no AD  -MG     Comment, (Sit-Stand Transfer) Pt able to adjust brief in standing.  -MG       Row Name 08/17/23 1421          Gait/Stairs (Locomotion)    [place] Level (Gait) contact guard  -MG     Assistive Device (Gait) other (see comments)  no AD  -MG     Distance in Feet (Gait) 60  -MG     Deviations/Abnormal Patterns (Gait) gait speed decreased;base of support, narrow  -MG     Comment, (Gait/Stairs) Pt ambulated 4 laps inside room, requested to not  "leave the room this date. Pt mildly unsteady at times, but no overt LOB. Denied dizziness or SOB. Stated feeling unbalance and BLEs \"shaky\".  -MG               User Key  (r) = Recorded By, (t) = Taken By, (c) = Cosigned By      Initials Name Provider Type    MG Tracey Ferreira PT Physical Therapist                   Obj/Interventions       Row Name 08/17/23 1427          Range of Motion Comprehensive    General Range of Motion bilateral lower extremity ROM WFL  -MG       Row Name 08/17/23 1427          Strength Comprehensive (MMT)    General Manual Muscle Testing (MMT) Assessment lower extremity strength deficits identified  -MG     Comment, General Manual Muscle Testing (MMT) Assessment Generalized weakness. Grossly 4+/5.  -MG       Row Name 08/17/23 1427          Balance    Balance Assessment sitting static balance;sitting dynamic balance;standing static balance;standing dynamic balance  -MG     Static Sitting Balance standby assist  -MG     Dynamic Sitting Balance standby assist  -MG     Position, Sitting Balance sitting edge of bed  -MG     Static Standing Balance contact guard  -MG     Position/Device Used, Standing Balance unsupported  -MG       Row Name 08/17/23 1427          Sensory Assessment (Somatosensory)    Sensory Assessment (Somatosensory) sensation intact  Denies N/T  -MG               User Key  (r) = Recorded By, (t) = Taken By, (c) = Cosigned By      Initials Name Provider Type    Tracey Dunne PT Physical Therapist                   Goals/Plan       Row Name 08/17/23 1430          Bed Mobility Goal 1 (PT)    Activity/Assistive Device (Bed Mobility Goal 1, PT) bed mobility activities, all  -MG     [place] Level/Cues Needed (Bed Mobility Goal 1, PT) independent  -MG     Time Frame (Bed Mobility Goal 1, PT) 1 week  -MG       Row Name 08/17/23 1430          Transfer Goal 1 (PT)    Activity/Assistive Device (Transfer Goal 1, PT) transfers, all  -MG     [place] Level/Cues Needed (Transfer " "Goal 1, PT) modified independence;independent  -MG     Time Frame (Transfer Goal 1, PT) 1 week  -MG       Row Name 08/17/23 1430          Gait Training Goal 1 (PT)    Activity/Assistive Device (Gait Training Goal 1, PT) gait (walking locomotion)  -MG     [place] Level (Gait Training Goal 1, PT) independent;modified independence  -MG     Distance (Gait Training Goal 1, PT) 150  -MG       Row Name 08/17/23 1430          Therapy Assessment/Plan (PT)    Planned Therapy Interventions (PT) balance training;bed mobility training;gait training;home exercise program;ROM (range of motion);neuromuscular re-education;strengthening;stretching;transfer training;patient/family education  -MG               User Key  (r) = Recorded By, (t) = Taken By, (c) = Cosigned By      Initials Name Provider Type    MG Tracey Ferreira, PT Physical Therapist                   Clinical Impression       Row Name 08/17/23 5021          Pain    Pretreatment Pain Rating 0/10 - no pain  -MG     Posttreatment Pain Rating 0/10 - no pain  -MG     Pre/Posttreatment Pain Comment States feeling \"shaky\" at times still.  -MG     Pain Intervention(s) Medication (See MAR);Ambulation/increased activity;Repositioned;Rest  -MG       Row Name 08/17/23 1746          Plan of Care Review    Plan of Care Reviewed With patient  -MG     Outcome Evaluation Pt is a 63 y/o M who presented to ED with c/o decreased appetite, tremors, feeling anxious/agitated and diarrhea. Per notes pt was admitted dx of alcohol withdrawal. Pt reports he is independent at baseline without use of AD, but owns a RW and WC. Pt states he lives in a first floor apt alone with no step. Pt is currently SBA for bed mobility, SBA for STS/transfers and CGA for ambulation of 60' without AD. Pt demos intermittent mildly unsteady gait, but no overt LOB, knee buckling, dizziness or SOB. Pt presents below his baseline with generalized weakness and impaired balance. Pt will benefit from skilled PT during " this admission to progress his functional mobility. No skilled PT required at [place].  -MG       Row Name 08/17/23 1427          Therapy Assessment/Plan (PT)    Rehab Potential (PT) good, to achieve stated therapy goals  -MG     Criteria for Skilled Interventions Met (PT) yes;meets criteria  -MG     Therapy Frequency (PT) 5 times/wk  -MG       Row Name 08/17/23 1427          Vital Signs    O2 Delivery Pre Treatment room air  -MG     O2 Delivery Post Treatment room air  -MG     Pre Patient Position Supine  -MG     Intra Patient Position Standing  -MG     Post Patient Position Supine  -MG       Row Name 08/17/23 1427          Positioning and Restraints    Pre-Treatment Position in bed  -MG     Post Treatment Position bed  -MG     In Bed notified nsg;supine;fowlers;call light within reach;encouraged to call for assist;exit alarm on;side rails up x2;SCD pump applied  -MG               User Key  (r) = Recorded By, (t) = Taken By, (c) = Cosigned By      Initials Name Provider Type    Tracey Dunne, PT Physical Therapist                   Outcome Measures       Row Name 08/17/23 1431          How much help from another person do you currently need...    Turning from your back to your side while in flat bed without using bedrails? 4  -MG     Moving from lying on back to sitting on the side of a flat bed without bedrails? 3  -MG     Moving to and from a bed to a chair (including a wheelchair)? 3  -MG     Standing up from a chair using your arms (e.g., wheelchair, bedside chair)? 3  -MG     Climbing 3-5 steps with a railing? 3  -MG     To walk in hospital room? 3  -MG     AM-PAC 6 Clicks Score (PT) 19  -MG     Highest level of mobility 6 --> Walked 10 steps or more  -MG       Row Name 08/17/23 1431          Functional Assessment    Outcome Measure Options AM-PAC 6 Clicks Basic Mobility (PT)  -MG               User Key  (r) = Recorded By, (t) = Taken By, (c) = Cosigned By      Initials Name Provider Type    Tracey Dunne,  PT Physical Therapist                                 Physical Therapy Education       Title: PT OT SLP Therapies (In Progress)       Topic: Physical Therapy (Not Started)       Point: Mobility training (Not Started)       Learner Progress:  Not documented in this visit.              Point: Home exercise program (Not Started)       Learner Progress:  Not documented in this visit.              Point: Precautions (Done)       Learning Progress Summary             Patient Acceptance, E, VU by  at 8/17/2023 1431                                         User Key       Initials Effective Dates Name Provider Type Discipline     05/24/22 -  Tracey Ferreira, DORETHA Physical Therapist PT                  PT Recommendation and Plan  Planned Therapy Interventions (PT): balance training, bed mobility training, gait training, home exercise program, ROM (range of motion), neuromuscular re-education, strengthening, stretching, transfer training, patient/family education  Plan of Care Reviewed With: patient  Outcome Evaluation: Pt is a 63 y/o M who presented to ED with c/o decreased appetite, tremors, feeling anxious/agitated and diarrhea. Per notes pt was admitted dx of alcohol withdrawal. Pt reports he is independent at baseline without use of AD, but owns a RW and WC. Pt states he lives in a first floor apt alone with no step. Pt is currently SBA for bed mobility, SBA for STS/transfers and CGA for ambulation of 60' without AD. Pt demos intermittent mildly unsteady gait, but no overt LOB, knee buckling, dizziness or SOB. Pt presents below his baseline with generalized weakness and impaired balance. Pt will benefit from skilled PT during this admission to progress his functional mobility. No skilled PT required at [place].     Time Calculation:         PT Charges       Row Name 08/17/23 1431             Time Calculation    Start Time 1311  -MG      Stop Time 1323  -MG      Time Calculation (min) 12 min  -MG      PT Received On 08/17/23   -MG      PT - Next Appointment 08/18/23  -MG      PT Goal Re-Cert Due Date 08/24/23  -MG                User Key  (r) = Recorded By, (t) = Taken By, (c) = Cosigned By      Initials Name Provider Type    Tracey Dunne, PT Physical Therapist                  Therapy Charges for Today       Code Description Service Date Service Provider Modifiers Qty    15293810251 HC PT EVAL MOD COMPLEXITY 3 8/17/2023 Tracey Ferreira, PT GP 1            PT G-Codes  Outcome Measure Options: AM-PAC 6 Clicks Basic Mobility (PT)  AM-PAC 6 Clicks Score (PT): 19  PT Discharge Summary  Anticipated Discharge Disposition (PT): home, home with assist    Tracey Ferreira, DORETHA  8/17/2023

## 2023-08-18 ENCOUNTER — READMISSION MANAGEMENT (OUTPATIENT)
Dept: CALL CENTER | Facility: HOSPITAL | Age: 63
End: 2023-08-18
Payer: MEDICARE

## 2023-08-18 VITALS
WEIGHT: 215 LBS | OXYGEN SATURATION: 93 % | SYSTOLIC BLOOD PRESSURE: 93 MMHG | DIASTOLIC BLOOD PRESSURE: 59 MMHG | TEMPERATURE: 97.3 F | HEIGHT: 70 IN | RESPIRATION RATE: 18 BRPM | BODY MASS INDEX: 30.78 KG/M2 | HEART RATE: 62 BPM

## 2023-08-18 PROBLEM — J96.11 CHRONIC RESPIRATORY FAILURE WITH HYPOXIA: Status: ACTIVE | Noted: 2023-08-18

## 2023-08-18 PROCEDURE — 94799 UNLISTED PULMONARY SVC/PX: CPT

## 2023-08-18 PROCEDURE — 97530 THERAPEUTIC ACTIVITIES: CPT

## 2023-08-18 PROCEDURE — 94761 N-INVAS EAR/PLS OXIMETRY MLT: CPT

## 2023-08-18 PROCEDURE — 25010000002 THIAMINE HCL 200 MG/2ML SOLUTION: Performed by: INTERNAL MEDICINE

## 2023-08-18 PROCEDURE — 94664 DEMO&/EVAL PT USE INHALER: CPT

## 2023-08-18 RX ORDER — FAMOTIDINE 20 MG/1
20 TABLET, FILM COATED ORAL
Qty: 60 TABLET | Refills: 0 | Status: SHIPPED | OUTPATIENT
Start: 2023-08-18

## 2023-08-18 RX ADMIN — Medication 1000 MCG: at 08:55

## 2023-08-18 RX ADMIN — FLUVOXAMINE MALEATE 100 MG: 50 TABLET, COATED ORAL at 08:55

## 2023-08-18 RX ADMIN — THIAMINE HYDROCHLORIDE 200 MG: 100 INJECTION, SOLUTION INTRAMUSCULAR; INTRAVENOUS at 06:15

## 2023-08-18 RX ADMIN — POTASSIUM CHLORIDE 20 MEQ: 1.5 POWDER, FOR SOLUTION ORAL at 08:57

## 2023-08-18 RX ADMIN — AMLODIPINE BESYLATE 5 MG: 5 TABLET ORAL at 08:55

## 2023-08-18 RX ADMIN — ZINC OXIDE 1 APPLICATION: 200 OINTMENT TOPICAL at 08:56

## 2023-08-18 RX ADMIN — PROPRANOLOL HYDROCHLORIDE 10 MG: 10 TABLET ORAL at 08:55

## 2023-08-18 RX ADMIN — FOLIC ACID 1 MG: 1 TABLET ORAL at 08:56

## 2023-08-18 RX ADMIN — TIOTROPIUM BROMIDE INHALATION SPRAY 2 PUFF: 3.12 SPRAY, METERED RESPIRATORY (INHALATION) at 07:34

## 2023-08-18 RX ADMIN — FAMOTIDINE 20 MG: 20 TABLET, FILM COATED ORAL at 08:55

## 2023-08-18 RX ADMIN — CLONIDINE HYDROCHLORIDE 0.1 MG: 0.1 TABLET ORAL at 08:56

## 2023-08-18 RX ADMIN — BUDESONIDE AND FORMOTEROL FUMARATE DIHYDRATE 2 PUFF: 160; 4.5 AEROSOL RESPIRATORY (INHALATION) at 07:35

## 2023-08-18 RX ADMIN — Medication 10 ML: at 08:56

## 2023-08-18 RX ADMIN — RISPERIDONE 2 MG: 2 TABLET, FILM COATED ORAL at 08:55

## 2023-08-18 RX ADMIN — Medication 1 TABLET: at 08:55

## 2023-08-18 NOTE — DISCHARGE SUMMARY
Patient Name: Nilesh Zapata  : 1960  MRN: [id number]    Date of Admission: 2023  Date of Discharge:  2023  Primary Care Physician: Shakira Colbert APRN      Chief Complaint:   Alcohol Intoxication      Discharge Diagnoses     Active Hospital Problems    Diagnosis  POA    **Alcohol withdrawal [F10.939]  Yes    Chronic respiratory failure with hypoxia [J96.11]  Yes    Hypokalemia [E87.6]  Yes    Hypomagnesemia [E83.42]  Yes    COPD (chronic obstructive pulmonary disease) [J44.9]  Yes    Alcoholic liver disease [K70.9]  Yes    Essential hypertension [I10]  Yes    Alcohol abuse [F10.10]  Yes    History of left nephrectomy 2/2 RCC [Z90.5]  Not Applicable    Anxiety [F41.9]  Yes    Kidney cancer, primary, with metastasis from kidney to other site [C64.9]  Yes    Tobacco abuse [Z72.0]  Yes      Resolved Hospital Problems   No resolved problems to display.        Hospital Course     Mr. Zapata is a 62 y.o. 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  Please see the admitting history and physical for further details.  He was admitted to the hospital for further evaluation and treatment.     Alcohol use with withdrawal  - Patient endorses history of ETOH use, last drink reportedly 1000 on . ETOH level on admission: <10. Initially there was question about possible ativan allergy? Further evaluation pursued, his reaction to Ativan was hallucinations, this occurred while he was being treated for acute withdrawal so difficult to say that this was a reaction to Ativan, case was discussed with pharmacy who felt that trial of Ativan was reasonable and safe, he did subsequently receive intermittent doses of Ativan during  hospital stay, tolerated well without noticeable side effects. During hospital stay, patient completed treatment with Phenobarbital taper, and was treated with CIWA protocol, multivitamin, thiamine, folic acid.  All of these interventions led to improvement in patient's symptoms, and on day of discharge, patient was stable without acute complaints of withdrawal symptoms.  He had not required Ativan in over 24 hours. Access center was consulted and followed patient during his hospital stay.  CCP and physician discussed disposition plans for patient after discharge, he stated that he plans to attend outpatient MACIEJ program at the [place].  Strongly encouraged patient to attend outpatient MACIEJ program and that he remain diligent and refraining from further alcohol use.  Recommend close follow up with PCP within 1 week after discharge for reassessment to guide ongoing management decisions.    Hypertension  - BP acceptable acutely. Appears stable. No indications to warrant acute changes/intervention at this time.Continue treatment with Amlodipine, Clonidine, Propanolol as previously prescribed after discharge. Recommend that patient check his blood pressure 2-3 times daily and record those values in log book and take with him to next PCP visit to allow for greater insight into treatment efficacy to guide further management decisions. Recommend heart healthy/low sodium diet.     COPD  Chronic respiratory failure with hypoxia  Tobacco abuse  - As a pertains to patient's COPD, he appeared stable from this perspective at present.  No evidence to suggest acute exacerbation.  Chest x-ray obtained during patient's hospital stay, which showed findings of atelectasis but no focal consolidation/infiltrate. Prior to discharge, patient was on room air with O2 sats >90%. He worked with physical therapy and walked with them, on room air, and per their note, did not have oxygen desaturations at that time.  After discharge, continue Trelegy  "as previously prescribed. Continue supplemental oxygen as prescribed. Strongly encourage medication compliance and tobacco cessation. Recommend close follow up with PCP and patient's primary Pulmonologist within 1 week after discharge for reassessment to guide ongoing management decisions. During prior admissions, there was note of concern for possible underlying sleep apnea, recommend PCP address this at follow up and make referral for formal sleep study if deemed appropriate, at their discretion.    Cirrhosis of liver secondary to alcohol use  - Noted on recent imaging, per [facility] (06/05/23) which showed \"surface irregularity of the liver consistent with cirrhosis.\"  At present, patient without acute abdominal complaints, AST only minimally elevated, other liver function test within normal limits.  Strongly encourage ongoing alcohol cessation and after discharge, discussed with patient, he voiced understanding. Recommend close follow up with PCP within 1 week after discharge for reassessment to guide ongoing management decisions.  Also, recommend consideration of outpatient gastroenterology referral, as indicated, at discretion of PCP.    RCC, left nephrectomy 2014  - Renal function stable at present, creatinine within normal limits. He is voiding well, followed by [facility]--last seen in May of this year.  Recommend follow-up with providers previously seen, as scheduled after discharge for ongoing evaluation.     Thrombocytopenia  - Platelets found to be low on most recent labs, however, relatively stable from prior, etiology likely result of underlying liver disease.  No indications for urgent intervention at present, however, do recommend monitoring him for discharge. Recommend repeat CBC with PCP within 1 week for reassessment.    GERD  - Continue Famotidine as prescribed. Recommend close follow up with PCP within 1 week after discharge for reassessment to guide ongoing management " decisions.    Hypokalemia, resolved  Hypomagnesemia, resolved  - Values low previously, repleted per protocol, normalized on repeat testing.  Recommend repeat levels check with PCP within 1 week after discharge for reassessment.    Day of Discharge     Subjective:  Patient seen and examined this morning.  Hospital day 5.  At time of my examination, patient is awake, alert, resting comfortably in bed.  He states that he feels well today, no acute complaints at present, denies withdrawal symptoms.  Discussed with nursing, patient has not required Ativan in over 24 hours.  Discussed with patient, he plans to attend outpatient MACIEJ program at the [place].    Physical Exam:  Temp:  [97.5 øF (36.4 øC)-98.4 øF (36.9 øC)] 97.5 øF (36.4 øC)  Heart Rate:  [] 100  Resp:  [16-18] 18  BP: (111-134)/(66-94) 134/94  Body mass index is 30.85 kg/mý.  Physical Exam  Vitals and nursing note reviewed.   Constitutional:       General: He is awake. He is not in acute distress.     Appearance: He is obese.      Comments: Chronically ill-appearing   Eyes:      General: No scleral icterus.     Conjunctiva/sclera: Conjunctivae normal.   Cardiovascular:      Rate and Rhythm: Normal rate and regular rhythm.      Pulses: Normal pulses.      Heart sounds: Normal heart sounds.   Pulmonary:      Effort: Pulmonary effort is normal. No respiratory distress.      Breath sounds: Normal breath sounds. No wheezing.   Abdominal:      General: Bowel sounds are normal. There is no distension.      Palpations: Abdomen is soft.      Tenderness: There is no abdominal tenderness.   Skin:     General: Skin is warm and dry.   Neurological:      Mental Status: He is alert.   Psychiatric:         Behavior: Behavior is cooperative.       Consultants     Consult Orders (all) (From admission, onward)       Start     Ordered    08/13/23 1948  Inpatient Access Center Consult  Once        Provider:  (Not yet assigned)    08/13/23 1949 08/13/23 1644  A (on-call  MD unless specified) Details  Once        Specialty:  Hospitalist  Provider:  Naldo Fuentes MD    08/13/23 1643                  Procedures     * Surgery not found *    Imaging Results (All)       Procedure Component Value Units Date/Time    XR Chest 1 View [[id number]] Collected: 08/17/23 1419     Updated: 08/17/23 1423    Narrative:      XR CHEST 1 VW-     HISTORY: Male who is 62 years-old, hypoxia     TECHNIQUE: Frontal view of the chest     COMPARISON: 6/7/2023     FINDINGS: The heart size is normal. Aorta is tortuous, calcified.  Pulmonary vasculature is unremarkable. No small likely atelectasis at  the bases. No pleural effusion, or pneumothorax. No acute osseous  process.       Impression:      Small likely atelectasis at the bases. Tortuous aorta.  Follow-up as clinical indications persist.     This report was finalized on 8/17/2023 2:20 PM by Dr. Chase Ruiz M.D.                 Pertinent Labs     Results from last 7 days   Lab Units 08/17/23  0705 08/16/23  0549 08/15/23  0541 08/14/23  0532   WBC 10*3/mm3 5.12 4.98 4.61 5.05   HEMOGLOBIN g/dL 14.1 14.1 13.7 14.0   PLATELETS 10*3/mm3 110* 114* 115* 136*     Results from last 7 days   Lab Units 08/17/23  0705 08/16/23  0549 08/15/23  0541 08/14/23  1907 08/14/23  0532   SODIUM mmol/L 137 140 138  --  140   POTASSIUM mmol/L 4.0 3.8 3.7 3.7 3.2*   CHLORIDE mmol/L 101 102 101  --  100   CO2 mmol/L 27.3 27.1 27.4  --  28.4   BUN mg/dL 8 7* 5*  --  5*   CREATININE mg/dL 0.84 0.78 0.84  --  0.87   GLUCOSE mg/dL 86 86 76  --  109*   EGFR mL/min/1.73 97.4 99.6 97.4  --  96.4     Results from last 7 days   Lab Units 08/17/23  0705 08/16/23  0549 08/15/23  0541 08/14/23  0532   ALBUMIN g/dL 3.5 3.4* 3.4* 3.4*   BILIRUBIN mg/dL 0.6 0.7 0.9 1.6*   ALK PHOS U/L 111 125* 131* 132*   AST (SGOT) U/L 43* 33 34 35   ALT (SGPT) U/L 19 18 17 20     Results from last 7 days   Lab Units 08/17/23  0705 08/16/23  0549 08/15/23  0541 08/14/23  0532   CALCIUM mg/dL 9.0 8.6  8.1* 8.4*   ALBUMIN g/dL 3.5 3.4* 3.4* 3.4*   MAGNESIUM mg/dL 2.2 1.5* 1.7 1.2*   PHOSPHORUS mg/dL 3.3  --   --   --      Results from last 7 days   Lab Units 08/13/23  1346   LIPASE U/L 23             Invalid input(s): LDLCALC      Results from last 7 days   Lab Units 08/17/23  1717   COVID19  Not Detected       Test Results Pending at Discharge       Discharge Details        Discharge Medications        New Medications        Instructions Start Date   famotidine 20 MG tablet  Commonly known as: PEPCID   20 mg, Oral, 2 Times Daily Before Meals             Continue These Medications        Instructions Start Date   amLODIPine 5 MG tablet  Commonly known as: NORVASC   5 mg, Oral, Daily      cloNIDine 0.1 MG tablet  Commonly known as: CATAPRES   0.1 mg, Oral, 2 Times Daily, take 1x/day       cyanocobalamin 1000 MCG tablet  Commonly known as: VITAMIN B-12   1,000 mcg, Oral, Daily      fluvoxaMINE 25 MG tablet  Commonly known as: LUVOX   100 mg, Oral, Daily      folic acid 1 MG tablet  Commonly known as: FOLVITE   1 mg, Oral, Daily      hydrOXYzine 25 MG tablet  Commonly known as: ATARAX   25 mg, Oral, Every 8 Hours PRN      melatonin 3 MG tablet   3 mg, Oral, Nightly PRN      mirtazapine 15 MG tablet  Commonly known as: REMERON   15 mg, Oral, Nightly      multivitamin tablet tablet   1 tablet, Oral, Daily      nystatin 880770 UNIT/GM cream  Commonly known as: MYCOSTATIN   1 application , Topical, 2 Times Daily      O2  Commonly known as: OXYGEN   2 L/min, Inhalation, As Needed, use PRN for SOB      potassium chloride 20 MEQ packet  Commonly known as: KLOR-CON   20 mEq, Oral, Daily      propranolol 10 MG tablet  Commonly known as: INDERAL   10 mg, Oral, 2 Times Daily      risperiDONE 2 MG tablet  Commonly known as: risperDAL   2 mg, Oral, 2 Times Daily      Trelegy Ellipta 100-62.5-25 MCG/ACT inhaler  Generic drug: Fluticasone-Umeclidin-Vilant   1 puff, Inhalation, Daily - RT      TRIPLE ANTIBIOTIC EX   1 application ,  Topical, As Needed, PRN for skin irritation              Stop These Medications      Ibuprofen 200 MG capsule              No Active Allergies    Discharge Disposition:  Home or Self Care      Discharge Diet:  Diet Order   Procedures    Diet: Regular/House Diet; Texture: Regular Texture (IDDSI 7); Fluid Consistency: Thin (IDDSI 0)       Discharge Activity:   Activity Instructions       Gradually Increase Activity Until at Pre-Hospitalization Level              CODE STATUS:    Code Status and Medical Interventions:   Ordered at: 08/13/23 1949     Code Status (Patient has no pulse and is not breathing):    CPR (Attempt to Resuscitate)     Medical Interventions (Patient has pulse or is breathing):    Full Support       No future appointments.  Additional Instructions for the Follow-ups that You Need to Schedule       Discharge Follow-up with PCP   As directed       Currently Documented PCP:    Shakira Colbert APRN    PCP Phone Number:    [phone]     Follow Up Details: Within 1 week after discharge for reassessment, medication and symptom review, blood pressure check, CMP and CBC        Discharge Follow-up with Specialty: Psychiatry, pulmonology   As directed      Specialty: Psychiatry, pulmonology   Follow Up Details: Please follow-up with these services, with your primary provider within 1 to 2 weeks after discharge or as scheduled.  Please call to ensure a follow-up appointment is made.               Follow-up Information       Shakira Colbert APRN .    Specialty: Nurse Practitioner  Why: Within 1 week after discharge for reassessment, medication and symptom review, blood pressure check, CMP and CBC  Contact information:  [address] [id number] [phone]                             Additional Instructions for the Follow-ups that You Need to Schedule       Discharge Follow-up with PCP   As directed       Currently Documented PCP:    Shakira Colbert APRN    PCP Phone Number:     [phone]     Follow Up Details: Within 1 week after discharge for reassessment, medication and symptom review, blood pressure check, CMP and CBC        Discharge Follow-up with Specialty: Psychiatry, pulmonology   As directed      Specialty: Psychiatry, pulmonology   Follow Up Details: Please follow-up with these services, with your primary provider within 1 to 2 weeks after discharge or as scheduled.  Please call to ensure a follow-up appointment is made.            Time Spent on Discharge:  Greater than 30 minutes      Aayush Ferrer DO  [facility]  08/18/23  13:33 EDT

## 2023-08-18 NOTE — THERAPY DISCHARGE NOTE
Patient Name: Nilesh Zapata  : 1960    MRN: [id number]                              Today's Date: 2023       Admit Date: 2023    Visit Dx:     ICD-10-CM ICD-9-CM   1. Alcohol withdrawal syndrome with complication  F10.939 291.81   2. Elevated LFTs  R79.89 790.6     Patient Active Problem List   Diagnosis    Alcohol withdrawal syndrome with complication    Anxiety    Fatty liver    Tobacco abuse    Kidney cancer, primary, with metastasis from kidney to other site    Alcohol dependence with withdrawal    Hyponatremia    Alcohol abuse    History of renal cell cancer    History of left nephrectomy 2/2 RCC    Liver lesion    Lung nodule    Vitamin D deficiency    Under emotional stress     from spouse    Coping style affecting medical condition    Essential hypertension    Acute adjustment disorder with mixed anxiety and depressed mood    Alcoholic liver disease    Hypoxemia    Closed fracture of left superior pubic ramus    Closed fracture of left inferior pubic ramus    Closed fracture of sacrum    Anemia    Hypotension    Contusion of left hip    Alcoholic intoxication without complication    Left acetabular fracture    Fall    COPD (chronic obstructive pulmonary disease)    Alcohol withdrawal    Hypokalemia    Hypomagnesemia     Past Medical History:   Diagnosis Date    Alcohol abuse     Anxiety     Delirium tremens 2019    Fatty liver     FH: kidney cancer     Hypertension      Past Surgical History:   Procedure Laterality Date    APPENDECTOMY      NEPHRECTOMY      left     TONSILLECTOMY        General Information       Row Name 23 1114          Physical Therapy Time and Intention    Document Type therapy note (daily note);discharge evaluation/summary  -MG     Mode of Treatment individual therapy;physical therapy  -MG       Row Name 23 1114          General Information    Patient Profile Reviewed yes  -MG     Existing Precautions/Restrictions fall  -MG       Row Name  "08/18/23 1114          Cognition    Orientation Status (Cognition) oriented x 3  -MG       Row Name 08/18/23 1114          Safety Issues, Functional Mobility    Impairments Affecting Function (Mobility) balance;endurance/activity tolerance  -MG     Comment, Safety Issues/Impairments (Mobility) Gait belt and non-skid socks donned.  -MG               User Key  (r) = Recorded By, (t) = Taken By, (c) = Cosigned By      Initials Name Provider Type    MG Tracey Ferreira, PT Physical Therapist                   Mobility       Row Name 08/18/23 1116          Bed Mobility    Supine-Sit Hart (Bed Mobility) modified independence  -MG     Sit-Supine Hart (Bed Mobility) independent  -MG     Assistive Device (Bed Mobility) bed rails  -MG     Comment, (Bed Mobility) No c/o dizziness this date.  -MG       Row Name 08/18/23 1116          Sit-Stand Transfer    Sit-Stand Hart (Transfers) independent  -MG     Comment, (Sit-Stand Transfer) No AD.  -MG       Row Name 08/18/23 1116          Gait/Stairs (Locomotion)    Hart Level (Gait) standby assist;independent  -MG     Assistive Device (Gait) other (see comments)  No AD  -MG     Distance in Feet (Gait) 250  -MG     Deviations/Abnormal Patterns (Gait) gait speed decreased  -MG     Comment, (Gait/Stairs) Pt ambulated in room and two laps around [facility] station. Improved gait mechanics and balance this date; no LE \"shaking\". Pt stating how he was ambulating is at his baseline.  -MG               User Key  (r) = Recorded By, (t) = Taken By, (c) = Cosigned By      Initials Name Provider Type    Tracey Dunne, PT Physical Therapist                   Obj/Interventions       Row Name 08/18/23 1118          Balance    Static Sitting Balance independent  -MG     Dynamic Sitting Balance standby assist  -MG     Position, Sitting Balance sitting edge of bed  -MG     Static Standing Balance standby assist;independent  -MG     Dynamic Standing Balance standby " assist;independent  -MG     Position/Device Used, Standing Balance unsupported  -MG               User Key  (r) = Recorded By, (t) = Taken By, (c) = Cosigned By      Initials Name Provider Type    Tracey Dunne, PT Physical Therapist                   Goals/Plan       Row Name 08/18/23 1121          Bed Mobility Goal 1 (PT)    Progress/Outcomes (Bed Mobility Goal 1, PT) goal met  -MG       Row Name 08/18/23 1121          Transfer Goal 1 (PT)    Progress/Outcome (Transfer Goal 1, PT) goal met  -MG       Row Name 08/18/23 1121          Gait Training Goal 1 (PT)    Progress/Outcome (Gait Training Goal 1, PT) goal met  -MG               User Key  (r) = Recorded By, (t) = Taken By, (c) = Cosigned By      Initials Name Provider Type    MG Tracey Ferreira, PT Physical Therapist                   Clinical Impression       Row Name 08/18/23 1118          Pain    Pretreatment Pain Rating 0/10 - no pain  -MG     Posttreatment Pain Rating 0/10 - no pain  -MG     Pain Intervention(s) Medication (See MAR);Ambulation/increased activity;Repositioned;Rest  -MG       Row Name 08/18/23 1118          Plan of Care Review    Plan of Care Reviewed With patient  -MG     Progress improving  -MG     Outcome Evaluation Pt seen for PT tx this AM with pt stating feeling better since yesterday. Pt is currently independent with mobility without use of AD and ambulated 250'. Noted improvement in gait mechanics and balance with decreased to no LE tremors this session. Pt reports feeling back to his mobility baseline and has no concerns for return home. Educated pt on how to get HH PT or OP PT referral once home if he feels his strength or balance are declining; pt v/u. No further skilled PT needs at this time. Please re-consult if there is a status change. Thank you. DIOGENES w/ RN following session.  -MG       Row Name 08/18/23 1118          Therapy Assessment/Plan (PT)    Criteria for Skilled Interventions Met (PT) no;no problems identified which  require skilled intervention  -MG       Row Name 08/18/23 1118          Vital Signs    Pre SpO2 (%) 93  -MG     O2 Delivery Pre Treatment room air  -MG     O2 Delivery Intra Treatment room air  -MG     Post SpO2 (%) 92  -MG     O2 Delivery Post Treatment room air  -MG     Pre Patient Position Supine  -MG     Intra Patient Position Standing  -MG     Post Patient Position Supine  -MG       Row Name 08/18/23 1118          Positioning and Restraints    Pre-Treatment Position in bed  -MG     Post Treatment Position bed  -MG     In Bed notified nsg;supine;fowlers;call light within reach;encouraged to call for assist;exit alarm on  -MG               User Key  (r) = Recorded By, (t) = Taken By, (c) = Cosigned By      Initials Name Provider Type    Tracey Dunne, PT Physical Therapist                   Outcome Measures       Row Name 08/18/23 1121 08/18/23 0805       How much help from another person do you currently need...    Turning from your back to your side while in flat bed without using bedrails? 4  -MG 4  -BR    Moving from lying on back to sitting on the side of a flat bed without bedrails? 4  -MG 3  -BR    Moving to and from a bed to a chair (including a wheelchair)? 4  -MG 3  -BR    Standing up from a chair using your arms (e.g., wheelchair, bedside chair)? 4  -MG 3  -BR    Climbing 3-5 steps with a railing? 3  -MG 3  -BR    To walk in hospital room? 4  -MG 3  -BR    AM-PAC 6 Clicks Score (PT) 23  -MG 19  -BR    Highest level of mobility 7 --> Walked 25 feet or more  -MG 6 --> Walked 10 steps or more  -BR              User Key  (r) = Recorded By, (t) = Taken By, (c) = Cosigned By      Initials Name Provider Type    Josh Billingsley, RN Registered Nurse    Tracey Dunne, PT Physical Therapist                  Physical Therapy Education       Title: PT OT SLP Therapies (In Progress)       Topic: Physical Therapy (Not Started)       Point: Mobility training (Not Started)       Learner Progress:  Not  documented in this visit.              Point: Home exercise program (Not Started)       Learner Progress:  Not documented in this visit.              Point: Precautions (Done)       Learning Progress Summary             Patient Acceptance, E,TB, VU by  at 8/18/2023 1122    Acceptance, E, VU by MG at 8/17/2023 1431                                         User Key       Initials Effective Dates Name Provider Type Discipline     05/24/22 -  Tracey Ferreira PT Physical Therapist PT                  PT Recommendation and Plan  Planned Therapy Interventions (PT): balance training, bed mobility training, gait training, home exercise program, ROM (range of motion), neuromuscular re-education, strengthening, stretching, transfer training, patient/family education  Plan of Care Reviewed With: patient  Progress: improving  Outcome Evaluation: Pt seen for PT tx this AM with pt stating feeling better since yesterday. Pt is currently independent with mobility without use of AD and ambulated 250'. Noted improvement in gait mechanics and balance with decreased to no LE tremors this session. Pt reports feeling back to his mobility baseline and has no concerns for return home. Educated pt on how to get HH PT or OP PT referral once home if he feels his strength or balance are declining; pt v/u. No further skilled PT needs at this time. Please re-consult if there is a status change. Thank you. DIOGENES w/ RN following session.     Time Calculation:         PT Charges       Row Name 08/18/23 1122             Time Calculation    Start Time 0912  -MG      Stop Time 0922  -MG      Time Calculation (min) 10 min  -MG      PT Received On 08/18/23  -MG                User Key  (r) = Recorded By, (t) = Taken By, (c) = Cosigned By      Initials Name Provider Type     Tracey Ferreira, PT Physical Therapist                  Therapy Charges for Today       Code Description Service Date Service Provider Modifiers Qty    68202802869 HC PT EVAL MOD  COMPLEXITY 3 8/17/2023 Tracey Ferreira, PT GP 1    30328259965 HC PT THERAPEUTIC ACT EA 15 MIN 8/18/2023 Tracey Ferreira, PT GP 1            PT G-Codes  Outcome Measure Options: AM-PAC 6 Clicks Basic Mobility (PT)  AM-PAC 6 Clicks Score (PT): 23    PT Discharge Summary  Anticipated Discharge Disposition (PT): home, home with assist    Tracey Ferreira, DORETHA  8/18/2023

## 2023-08-18 NOTE — PLAN OF CARE
Goal Outcome Evaluation:  Plan of Care Reviewed With: patient      Patient resting in bed.No voiced concerns.No SOA or pain reported.No ativan this shift.Remains on 2L of O2.VSS.Possible D/C.NSR

## 2023-08-18 NOTE — PLAN OF CARE
Goal Outcome Evaluation:  Plan of Care Reviewed With: patient        Progress: improving  Outcome Evaluation: Pt seen for PT tx this AM with pt stating feeling better since yesterday. Pt is currently independent with mobility without use of AD and ambulated 250'. Noted improvement in gait mechanics and balance with decreased to no LE tremors this session. Pt reports feeling back to his mobility baseline and has no concerns for return home. Educated pt on how to get HH PT or OP PT referral once home if he feels his strength or balance are declining; pt v/u. No further skilled PT needs at this time. Please re-consult if there is a status change. Thank you. DIOGENES martins/ RN following session.      Anticipated Discharge Disposition (PT): home, home with assist

## 2023-08-19 NOTE — OUTREACH NOTE
Prep Survey      Flowsheet Row Responses   [facility] patient discharged from? [place]   Is LACE score < 7 ? No   Eligibility Not Eligible   What are the reasons patient is not eligible? Other   Does the patient have one of the following disease processes/diagnoses(primary or secondary)? Other   Prep survey completed? Yes            KAIN THAKKAR - Registered Nurse

## 2023-08-20 NOTE — CASE MANAGEMENT/SOCIAL WORK
Case Management Discharge Note      Final Note: dc home    Provided Post Acute Provider List?: N/A  Provided Post Acute Provider Quality & Resource List?: N/A    Selected Continued Care - Discharged on 8/18/2023 Admission date: 8/13/2023 - Discharge disposition: Home or Self Care      Destination    No services have been selected for the patient.                Durable Medical Equipment    No services have been selected for the patient.                Dialysis/Infusion    No services have been selected for the patient.                Home Medical Care    No services have been selected for the patient.                Therapy    No services have been selected for the patient.                Community Resources    No services have been selected for the patient.                Community & DME    No services have been selected for the patient.                    Selected Continued Care - Prior Encounters Includes continued care and service providers with selected services from prior encounters from 5/15/2023 to 8/18/2023      Discharged on 7/7/2023 Admission date: 7/2/2023 - Discharge disposition: Home-Health Care Svc      Home Medical Care       Service Provider Selected Services Address Phone Fax Patient Preferred    [facility] [address] [address] [phone] --                      Discharged on 6/16/2023 Admission date: 6/3/2023 - Discharge disposition: Skilled Nursing Facility (DC - External)      Destination       Service Provider Selected Services Address Phone Fax Patient Preferred    [facility] [address] [facility] [address] [phone] --                               Final Discharge Disposition Code: 01 - home or self-care

## 2023-08-21 NOTE — PAYOR COMM NOTE
"Casie Goel (62 y.o. Male)     ATTN: NURSE REVIEWER  RE: REQUESTED MRN  MRN: [id number]  AUTH: [id number]                                               PLEASE REPLY TO SHALOM PATEL [phone] OR FAX# [phone]       Date of Birth   1960    Social Security Number       Address   [address] [place] [address] [place] [address]    Home Phone   [phone]    MRN   [id number]       Hinduism   None    Marital Status                               Admission Date   8/13/23    Admission Type   Emergency    Admitting Provider   Naldo Fuentes MD    Attending Provider       Department, Room/Bed   [address], E[address]/1       Discharge Date   8/18/2023    Discharge Disposition   Home or Self Care    Discharge Destination                                 Attending Provider: (none)   Allergies: No Active Allergies    Isolation: None   Infection: None   Code Status: Prior    Ht: 177.8 cm (70\")   Wt: 97.5 kg (215 lb)    Admission Cmt: None   Principal Problem: Alcohol withdrawal [F10.939]                   Active Insurance as of 8/13/2023       Primary Coverage       Payor Plan Insurance Group Employer/Plan Group    ANTH MEDICARE REPLACEMENT ANTH MEDICARE ADVANTAGE [id number]       Payor Plan Address Payor Plan Phone Number Payor Plan Fax Number Effective Dates    [address] [phone]  8/1/2016 - None Entered    [facility] [address]         Subscriber Name Subscriber Birth Date Member ID       CASIE GOEL 1960 [id number]                     Emergency Contacts        (Rel.) Home Phone Work Phone Mobile Phone    Jenny Goel (Spouse) [phone] -- --                "

## 2023-09-03 ENCOUNTER — APPOINTMENT (OUTPATIENT)
Dept: GENERAL RADIOLOGY | Facility: HOSPITAL | Age: 63
DRG: 897 | End: 2023-09-03
Payer: MEDICARE

## 2023-09-03 ENCOUNTER — HOSPITAL ENCOUNTER (INPATIENT)
Facility: HOSPITAL | Age: 63
LOS: 5 days | Discharge: HOME | DRG: 897 | End: 2023-09-11
Attending: EMERGENCY MEDICINE | Admitting: INTERNAL MEDICINE
Payer: MEDICARE

## 2023-09-03 DIAGNOSIS — F10.930 ALCOHOL WITHDRAWAL SYNDROME WITHOUT COMPLICATION: ICD-10-CM

## 2023-09-03 DIAGNOSIS — R07.9 CHEST PAIN, UNSPECIFIED TYPE: Primary | ICD-10-CM

## 2023-09-03 LAB
ALBUMIN SERPL-MCNC: 4.3 G/DL (ref 3.5–5.2)
ALBUMIN/GLOB SERPL: 1.3 G/DL
ALP SERPL-CCNC: 127 U/L (ref 39–117)
ALT SERPL W P-5'-P-CCNC: 24 U/L (ref 1–41)
AMPHET+METHAMPHET UR QL: NEGATIVE
ANION GAP SERPL CALCULATED.3IONS-SCNC: 15.8 MMOL/L (ref 5–15)
AST SERPL-CCNC: 39 U/L (ref 1–40)
BARBITURATES UR QL SCN: POSITIVE
BASOPHILS # BLD AUTO: 0.05 10*3/MM3 (ref 0–0.2)
BASOPHILS NFR BLD AUTO: 0.5 % (ref 0–1.5)
BENZODIAZ UR QL SCN: NEGATIVE
BILIRUB SERPL-MCNC: 0.6 MG/DL (ref 0–1.2)
BUN SERPL-MCNC: 6 MG/DL (ref 8–23)
BUN/CREAT SERPL: 7.1 (ref 7–25)
CALCIUM SPEC-SCNC: 9.4 MG/DL (ref 8.6–10.5)
CANNABINOIDS SERPL QL: NEGATIVE
CHLORIDE SERPL-SCNC: 99 MMOL/L (ref 98–107)
CO2 SERPL-SCNC: 23.2 MMOL/L (ref 22–29)
COCAINE UR QL: NEGATIVE
CREAT SERPL-MCNC: 0.85 MG/DL (ref 0.76–1.27)
DEPRECATED RDW RBC AUTO: 46.8 FL (ref 37–54)
EGFRCR SERPLBLD CKD-EPI 2021: 97 ML/MIN/1.73
EOSINOPHIL # BLD AUTO: 0.06 10*3/MM3 (ref 0–0.4)
EOSINOPHIL NFR BLD AUTO: 0.6 % (ref 0.3–6.2)
ERYTHROCYTE [DISTWIDTH] IN BLOOD BY AUTOMATED COUNT: 13.2 % (ref 12.3–15.4)
ETHANOL BLD-MCNC: <10 MG/DL (ref 0–10)
ETHANOL UR QL: <0.01 %
FENTANYL UR-MCNC: NEGATIVE NG/ML
GEN 5 2HR TROPONIN T REFLEX: 11 NG/L
GLOBULIN UR ELPH-MCNC: 3.2 GM/DL
GLUCOSE SERPL-MCNC: 110 MG/DL (ref 65–99)
HCT VFR BLD AUTO: 47.3 % (ref 37.5–51)
HGB BLD-MCNC: 16.1 G/DL (ref 13–17.7)
IMM GRANULOCYTES # BLD AUTO: 0.03 10*3/MM3 (ref 0–0.05)
IMM GRANULOCYTES NFR BLD AUTO: 0.3 % (ref 0–0.5)
LIPASE SERPL-CCNC: 36 U/L (ref 13–60)
LYMPHOCYTES # BLD AUTO: 1.48 10*3/MM3 (ref 0.7–3.1)
LYMPHOCYTES NFR BLD AUTO: 13.9 % (ref 19.6–45.3)
MCH RBC QN AUTO: 32.4 PG (ref 26.6–33)
MCHC RBC AUTO-ENTMCNC: 34 G/DL (ref 31.5–35.7)
MCV RBC AUTO: 95.2 FL (ref 79–97)
METHADONE UR QL SCN: NEGATIVE
MONOCYTES # BLD AUTO: 0.79 10*3/MM3 (ref 0.1–0.9)
MONOCYTES NFR BLD AUTO: 7.4 % (ref 5–12)
NEUTROPHILS NFR BLD AUTO: 77.3 % (ref 42.7–76)
NEUTROPHILS NFR BLD AUTO: 8.21 10*3/MM3 (ref 1.7–7)
NRBC BLD AUTO-RTO: 0 /100 WBC (ref 0–0.2)
NT-PROBNP SERPL-MCNC: 36.1 PG/ML (ref 0–900)
OPIATES UR QL: NEGATIVE
OXYCODONE UR QL SCN: NEGATIVE
PLATELET # BLD AUTO: 195 10*3/MM3 (ref 140–450)
PMV BLD AUTO: 9.9 FL (ref 6–12)
POTASSIUM SERPL-SCNC: 3.6 MMOL/L (ref 3.5–5.2)
PROT SERPL-MCNC: 7.5 G/DL (ref 6–8.5)
QT INTERVAL: 365 MS
QTC INTERVAL: 462 MS
RBC # BLD AUTO: 4.97 10*6/MM3 (ref 4.14–5.8)
SODIUM SERPL-SCNC: 138 MMOL/L (ref 136–145)
TROPONIN T DELTA: 1 NG/L
TROPONIN T SERPL HS-MCNC: 10 NG/L
WBC NRBC COR # BLD: 10.62 10*3/MM3 (ref 3.4–10.8)

## 2023-09-03 PROCEDURE — 25010000002 THIAMINE HCL 200 MG/2ML SOLUTION: Performed by: INTERNAL MEDICINE

## 2023-09-03 PROCEDURE — 93010 ELECTROCARDIOGRAM REPORT: CPT | Performed by: INTERNAL MEDICINE

## 2023-09-03 PROCEDURE — 25010000002 THIAMINE PER 100 MG: Performed by: INTERNAL MEDICINE

## 2023-09-03 PROCEDURE — 80053 COMPREHEN METABOLIC PANEL: CPT | Performed by: EMERGENCY MEDICINE

## 2023-09-03 PROCEDURE — 85025 COMPLETE CBC W/AUTO DIFF WBC: CPT | Performed by: EMERGENCY MEDICINE

## 2023-09-03 PROCEDURE — 93005 ELECTROCARDIOGRAM TRACING: CPT | Performed by: EMERGENCY MEDICINE

## 2023-09-03 PROCEDURE — G0378 HOSPITAL OBSERVATION PER HR: HCPCS

## 2023-09-03 PROCEDURE — HZ2ZZZZ DETOXIFICATION SERVICES FOR SUBSTANCE ABUSE TREATMENT: ICD-10-PCS | Performed by: INTERNAL MEDICINE

## 2023-09-03 PROCEDURE — 80307 DRUG TEST PRSMV CHEM ANLYZR: CPT | Performed by: EMERGENCY MEDICINE

## 2023-09-03 PROCEDURE — 90791 PSYCH DIAGNOSTIC EVALUATION: CPT

## 2023-09-03 PROCEDURE — 84484 ASSAY OF TROPONIN QUANT: CPT | Performed by: EMERGENCY MEDICINE

## 2023-09-03 PROCEDURE — 94799 UNLISTED PULMONARY SVC/PX: CPT

## 2023-09-03 PROCEDURE — 25010000002 LORAZEPAM PER 2 MG: Performed by: EMERGENCY MEDICINE

## 2023-09-03 PROCEDURE — 99285 EMERGENCY DEPT VISIT HI MDM: CPT

## 2023-09-03 PROCEDURE — 82077 ASSAY SPEC XCP UR&BREATH IA: CPT | Performed by: EMERGENCY MEDICINE

## 2023-09-03 PROCEDURE — 36415 COLL VENOUS BLD VENIPUNCTURE: CPT | Performed by: EMERGENCY MEDICINE

## 2023-09-03 PROCEDURE — 94640 AIRWAY INHALATION TREATMENT: CPT

## 2023-09-03 PROCEDURE — 71045 X-RAY EXAM CHEST 1 VIEW: CPT

## 2023-09-03 PROCEDURE — 83880 ASSAY OF NATRIURETIC PEPTIDE: CPT | Performed by: EMERGENCY MEDICINE

## 2023-09-03 PROCEDURE — 83690 ASSAY OF LIPASE: CPT | Performed by: EMERGENCY MEDICINE

## 2023-09-03 RX ORDER — FAMOTIDINE 20 MG/1
20 TABLET, FILM COATED ORAL
Status: DISCONTINUED | OUTPATIENT
Start: 2023-09-03 | End: 2023-09-11 | Stop reason: HOSPADM

## 2023-09-03 RX ORDER — UREA 10 %
3 LOTION (ML) TOPICAL NIGHTLY PRN
Status: DISCONTINUED | OUTPATIENT
Start: 2023-09-03 | End: 2023-09-11 | Stop reason: HOSPADM

## 2023-09-03 RX ORDER — CLONIDINE HYDROCHLORIDE 0.1 MG/1
0.1 TABLET ORAL EVERY 4 HOURS PRN
Status: DISCONTINUED | OUTPATIENT
Start: 2023-09-03 | End: 2023-09-11 | Stop reason: HOSPADM

## 2023-09-03 RX ORDER — HYDROXYZINE HYDROCHLORIDE 25 MG/1
25 TABLET, FILM COATED ORAL EVERY 8 HOURS PRN
Status: DISCONTINUED | OUTPATIENT
Start: 2023-09-03 | End: 2023-09-11 | Stop reason: HOSPADM

## 2023-09-03 RX ORDER — LORAZEPAM 1 MG/1
2 TABLET ORAL
Status: DISCONTINUED | OUTPATIENT
Start: 2023-09-03 | End: 2023-09-06

## 2023-09-03 RX ORDER — LORAZEPAM 2 MG/ML
1 INJECTION INTRAMUSCULAR ONCE
Status: COMPLETED | OUTPATIENT
Start: 2023-09-03 | End: 2023-09-03

## 2023-09-03 RX ORDER — LORAZEPAM 2 MG/ML
2 INJECTION INTRAMUSCULAR
Status: DISCONTINUED | OUTPATIENT
Start: 2023-09-03 | End: 2023-09-06

## 2023-09-03 RX ORDER — POLYETHYLENE GLYCOL 3350 17 G/17G
17 POWDER, FOR SOLUTION ORAL DAILY PRN
Status: DISCONTINUED | OUTPATIENT
Start: 2023-09-03 | End: 2023-09-11 | Stop reason: HOSPADM

## 2023-09-03 RX ORDER — PROPRANOLOL HYDROCHLORIDE 10 MG/1
10 TABLET ORAL DAILY
Status: DISCONTINUED | OUTPATIENT
Start: 2023-09-03 | End: 2023-09-11 | Stop reason: HOSPADM

## 2023-09-03 RX ORDER — MIRTAZAPINE 15 MG/1
15 TABLET, FILM COATED ORAL NIGHTLY
Status: DISCONTINUED | OUTPATIENT
Start: 2023-09-03 | End: 2023-09-11 | Stop reason: HOSPADM

## 2023-09-03 RX ORDER — POTASSIUM CHLORIDE 1.5 G/1.58G
20 POWDER, FOR SOLUTION ORAL DAILY
Status: DISCONTINUED | OUTPATIENT
Start: 2023-09-03 | End: 2023-09-11 | Stop reason: HOSPADM

## 2023-09-03 RX ORDER — LORAZEPAM 1 MG/1
1 TABLET ORAL EVERY 6 HOURS
Status: DISCONTINUED | OUTPATIENT
Start: 2023-09-04 | End: 2023-09-04

## 2023-09-03 RX ORDER — ONDANSETRON 2 MG/ML
4 INJECTION INTRAMUSCULAR; INTRAVENOUS EVERY 6 HOURS PRN
Status: DISCONTINUED | OUTPATIENT
Start: 2023-09-03 | End: 2023-09-11 | Stop reason: HOSPADM

## 2023-09-03 RX ORDER — SODIUM CHLORIDE 0.9 % (FLUSH) 0.9 %
10 SYRINGE (ML) INJECTION AS NEEDED
Status: DISCONTINUED | OUTPATIENT
Start: 2023-09-03 | End: 2023-09-11 | Stop reason: HOSPADM

## 2023-09-03 RX ORDER — AMOXICILLIN 250 MG
2 CAPSULE ORAL 2 TIMES DAILY
Status: DISCONTINUED | OUTPATIENT
Start: 2023-09-03 | End: 2023-09-11 | Stop reason: HOSPADM

## 2023-09-03 RX ORDER — BUDESONIDE AND FORMOTEROL FUMARATE DIHYDRATE 160; 4.5 UG/1; UG/1
2 AEROSOL RESPIRATORY (INHALATION)
Status: DISCONTINUED | OUTPATIENT
Start: 2023-09-03 | End: 2023-09-11 | Stop reason: HOSPADM

## 2023-09-03 RX ORDER — DIPHENOXYLATE HYDROCHLORIDE AND ATROPINE SULFATE 2.5; .025 MG/1; MG/1
1 TABLET ORAL DAILY
Status: DISCONTINUED | OUTPATIENT
Start: 2023-09-03 | End: 2023-09-08 | Stop reason: SDUPTHER

## 2023-09-03 RX ORDER — LORAZEPAM 1 MG/1
1 TABLET ORAL
Status: DISCONTINUED | OUTPATIENT
Start: 2023-09-03 | End: 2023-09-06

## 2023-09-03 RX ORDER — ONDANSETRON 4 MG/1
4 TABLET, FILM COATED ORAL EVERY 6 HOURS PRN
Status: DISCONTINUED | OUTPATIENT
Start: 2023-09-03 | End: 2023-09-11 | Stop reason: HOSPADM

## 2023-09-03 RX ORDER — LORAZEPAM 1 MG/1
2 TABLET ORAL EVERY 6 HOURS
Status: DISCONTINUED | OUTPATIENT
Start: 2023-09-03 | End: 2023-09-04

## 2023-09-03 RX ORDER — MULTIPLE VITAMINS W/ MINERALS TAB 9MG-400MCG
1 TAB ORAL DAILY
Status: DISCONTINUED | OUTPATIENT
Start: 2023-09-03 | End: 2023-09-11 | Stop reason: HOSPADM

## 2023-09-03 RX ORDER — ACETAMINOPHEN 325 MG/1
650 TABLET ORAL EVERY 4 HOURS PRN
Status: DISCONTINUED | OUTPATIENT
Start: 2023-09-03 | End: 2023-09-11 | Stop reason: HOSPADM

## 2023-09-03 RX ORDER — BISACODYL 10 MG
10 SUPPOSITORY, RECTAL RECTAL DAILY PRN
Status: DISCONTINUED | OUTPATIENT
Start: 2023-09-03 | End: 2023-09-11 | Stop reason: HOSPADM

## 2023-09-03 RX ORDER — BISACODYL 5 MG/1
5 TABLET, DELAYED RELEASE ORAL DAILY PRN
Status: DISCONTINUED | OUTPATIENT
Start: 2023-09-03 | End: 2023-09-11 | Stop reason: HOSPADM

## 2023-09-03 RX ORDER — RISPERIDONE 2 MG/1
2 TABLET ORAL 2 TIMES DAILY
Status: DISCONTINUED | OUTPATIENT
Start: 2023-09-03 | End: 2023-09-11 | Stop reason: HOSPADM

## 2023-09-03 RX ORDER — AMLODIPINE BESYLATE 5 MG/1
5 TABLET ORAL DAILY
Status: DISCONTINUED | OUTPATIENT
Start: 2023-09-03 | End: 2023-09-09

## 2023-09-03 RX ORDER — ALBUTEROL SULFATE 2.5 MG/3ML
2.5 SOLUTION RESPIRATORY (INHALATION) EVERY 6 HOURS PRN
Status: DISCONTINUED | OUTPATIENT
Start: 2023-09-03 | End: 2023-09-11 | Stop reason: HOSPADM

## 2023-09-03 RX ORDER — FOLIC ACID 1 MG/1
1 TABLET ORAL DAILY
Status: DISCONTINUED | OUTPATIENT
Start: 2023-09-03 | End: 2023-09-11 | Stop reason: HOSPADM

## 2023-09-03 RX ORDER — CHOLECALCIFEROL (VITAMIN D3) 125 MCG
1000 CAPSULE ORAL DAILY
Status: DISCONTINUED | OUTPATIENT
Start: 2023-09-03 | End: 2023-09-11 | Stop reason: HOSPADM

## 2023-09-03 RX ORDER — THIAMINE HYDROCHLORIDE 100 MG/ML
200 INJECTION, SOLUTION INTRAMUSCULAR; INTRAVENOUS EVERY 8 HOURS SCHEDULED
Status: COMPLETED | OUTPATIENT
Start: 2023-09-03 | End: 2023-09-08

## 2023-09-03 RX ORDER — LORAZEPAM 2 MG/ML
1 INJECTION INTRAMUSCULAR
Status: DISCONTINUED | OUTPATIENT
Start: 2023-09-03 | End: 2023-09-06

## 2023-09-03 RX ADMIN — FOLIC ACID 1 MG: 1 TABLET ORAL at 18:34

## 2023-09-03 RX ADMIN — MULTIPLE VITAMINS W/ MINERALS TAB 1 TABLET: TAB at 18:34

## 2023-09-03 RX ADMIN — THIAMINE HYDROCHLORIDE 200 MG: 100 INJECTION, SOLUTION INTRAMUSCULAR; INTRAVENOUS at 23:00

## 2023-09-03 RX ADMIN — POTASSIUM CHLORIDE 20 MEQ: 1.5 POWDER, FOR SOLUTION ORAL at 20:31

## 2023-09-03 RX ADMIN — RISPERIDONE 2 MG: 2 TABLET, FILM COATED ORAL at 20:29

## 2023-09-03 RX ADMIN — Medication 1000 MCG: at 20:29

## 2023-09-03 RX ADMIN — LORAZEPAM 1 MG: 2 INJECTION INTRAMUSCULAR; INTRAVENOUS at 13:27

## 2023-09-03 RX ADMIN — LORAZEPAM 2 MG: 1 TABLET ORAL at 23:17

## 2023-09-03 RX ADMIN — BUDESONIDE AND FORMOTEROL FUMARATE DIHYDRATE 2 PUFF: 160; 4.5 AEROSOL RESPIRATORY (INHALATION) at 21:59

## 2023-09-03 RX ADMIN — PROPRANOLOL HYDROCHLORIDE 10 MG: 10 TABLET ORAL at 20:29

## 2023-09-03 RX ADMIN — MIRTAZAPINE 15 MG: 15 TABLET, FILM COATED ORAL at 20:29

## 2023-09-03 RX ADMIN — HYDROXYZINE HYDROCHLORIDE 25 MG: 25 TABLET ORAL at 20:29

## 2023-09-03 RX ADMIN — LORAZEPAM 2 MG: 1 TABLET ORAL at 16:47

## 2023-09-03 RX ADMIN — Medication 1 TABLET: at 20:29

## 2023-09-03 RX ADMIN — THIAMINE HYDROCHLORIDE 200 MG: 100 INJECTION, SOLUTION INTRAMUSCULAR; INTRAVENOUS at 18:34

## 2023-09-03 RX ADMIN — FAMOTIDINE 20 MG: 20 TABLET, FILM COATED ORAL at 20:29

## 2023-09-03 RX ADMIN — AMLODIPINE BESYLATE 5 MG: 5 TABLET ORAL at 20:29

## 2023-09-03 NOTE — ED NOTES
Pt to ED for withdrawal from alcohol. Last drink last night. Pt drinks about a pint and a half per day. Pt also c/o of cp.

## 2023-09-03 NOTE — CONSULTS
"[facility] consult d/t ETOH. Pt well known to [facility], last seen 8/14/23-8/17/23 and pt has had 10 consults since 2017 (see notes). KRYSTLE 16 @1320. Pt admitted today for chest pain and alcohol withdrawal. BAL negative, UDS positive for barbiturates. RN reports pt doing fine, has plan for discharge already.     Pt RIB upon entry, A&Ox4. Pt pleasant and calm. Pt rates depression \"3/10\" and anxiety \"7/10\" today. Pt denies SI/HI, hallucinations or wish to be dead. Pt denies issues with sleep and appetite. Current stressors: relapsed immediately after discharge 2.5 weeks ago.     SUBSTANCE USE HX UPDATE: Pt continues to drink 1.5 pints per day every day since discharge from hospital on 8 /17/23. Pt reports has not had other detox hospitalizations outside of today. Pt reports did not have a good plan update discharge last time, found out [facility] and [facility] did not take his insurance \"[employer]\" which led to him being discouraged and relapsed immediately. Pt reports has not been to AA since relapse.     MENTAL HEALTH HX UPDATE: Pt reports continues to see psychiatrist at [facility], Dr. Blue. Continues to take Atarax, Remeron, Risperdal, Luvox, and Inderal. No attempts since discharge 2.5 weeks ago to end his life, no recent psychiatric hospitalizations.     SOCIAL: The patient is a 62 y.o. male. Patient is  from his wife and lives alone. Zoroastrian/Spiritual: \"I don't go to Gnosticism anymore.\" Children: 2 daughters Occupation: Retired. Hobbies: Reading, watching sports  Legal: Nothing current. DUI in past. : No. Support system: Wife, children. Hx of Violence: Denies. Hx of Abuse/Trauma: Denies. Feel safe at home: Yes    PLAN: Pt reports does not need resources, pt requesting CCP assist for sending medical clearance records when appropriate and help assist with transport to [facility] in [place]. Pt open to being followed for support and assistance in discharge planning when appropriate. Gave " report to RN. Discussed pt hx of report of ativan causing hallucinations; however RN reports this was noted in chart as an error and hallucinations were found to not be related to ativan and only d/t alcohol withdrawal, pt able to receive ativan for CIWA protocol as previously was only receiving phenobarbital.   [facility] following.

## 2023-09-03 NOTE — H&P
HISTORY AND PHYSICAL   [facility]        Date of Admission: 9/3/2023  Patient Identification:  Name: Nilesh Zapata  Age: 62 y.o.  Sex: male  :  1960  MRN: [id number]                     Primary Care Physician: Provider, No Known    Chief Complaint:  62 year old gentleman who presented to the emergency room with chest pain and tremors as well as palpitations; he stopped drinking alcohol yesterday;he has a history of alcohol dependence and complicate withdrawal; he was admitted for the same last month and has been drinking daily since discharge; no fever or chills; no visitors are present    History of Present Illness:   As above    Past Medical History:  Past Medical History:   Diagnosis Date    Alcohol abuse     Anxiety     Delirium tremens 2019    Fatty liver     FH: kidney cancer     Hypertension      Past Surgical History:  Past Surgical History:   Procedure Laterality Date    APPENDECTOMY      NEPHRECTOMY      left     TONSILLECTOMY        Home Meds:  Medications Prior to Admission   Medication Sig Dispense Refill Last Dose    amLODIPine (NORVASC) 5 MG tablet Take 1 tablet by mouth Daily.       cloNIDine (CATAPRES) 0.1 MG tablet Take 1 tablet by mouth 2 (Two) Times a Day. take 1x/day       famotidine (PEPCID) 20 MG tablet Take 1 tablet by mouth 2 (Two) Times a Day Before Meals. 60 tablet 0     Fluticasone-Umeclidin-Vilant (Trelegy Ellipta) 100-62.5-25 MCG/ACT inhaler Inhale 1 puff Daily.       fluvoxaMINE (LUVOX) 25 MG tablet Take 4 tablets by mouth Daily.       folic acid (FOLVITE) 1 MG tablet Take 1 tablet by mouth Daily. 30 tablet 0     hydrOXYzine (ATARAX) 25 MG tablet Take 1 tablet by mouth Every 8 (Eight) Hours As Needed.       melatonin 3 MG tablet Take 1 tablet by mouth At Night As Needed for Sleep. 30 tablet      mirtazapine (REMERON) 15 MG tablet Take 1 tablet by mouth Every Night. 30 tablet 0     multivitamin (THERAGRAN) tablet tablet Take 1 tablet by mouth Daily. 30 tablet       Neomycin-Bacitracin-Polymyxin (TRIPLE ANTIBIOTIC EX) Apply 1 application  topically to the appropriate area as directed As Needed. PRN for skin irritation        nystatin (MYCOSTATIN) 609903 UNIT/GM cream Apply 1 application  topically to the appropriate area as directed 2 (Two) Times a Day.       O2 (OXYGEN) Inhale 2 L/min As Needed. use PRN for SOB       potassium chloride (KLOR-CON) 20 MEQ packet Take 20 mEq by mouth Daily.       propranolol (INDERAL) 10 MG tablet Take 1 tablet by mouth 2 (Two) Times a Day. (Patient taking differently: Take 1 tablet by mouth Daily. take 1 tablet 60 mg daily)       risperiDONE (risperDAL) 2 MG tablet Take 1 tablet by mouth 2 (Two) Times a Day.       vitamin B-12 (VITAMIN B-12) 1000 MCG tablet Take 1 tablet by mouth Daily. 30 tablet 0        Allergies:  No Known Allergies  Immunizations:  Immunization History   Administered Date(s) Administered    COVID-19 (PFIZER) Purple Cap Monovalent 03/16/2021, 04/06/2021, 11/01/2021    Fluzone >6mos 01/03/2023    Influenza Quad Vaccine (Inpatient) 12/23/2017    Influenza Split Preservative Free ID 10/30/2014    Influenza, Unspecified 12/07/2018, 11/11/2022    Pneumococcal Conjugate 13-Valent (PCV13) 12/07/2018    Pneumococcal Polysaccharide (PPSV23) 12/23/2017     Social History:   Social History     Social History Narrative    Not on file     Social History     Socioeconomic History    Marital status: Legally    Tobacco Use    Smoking status: Some Days     Packs/day: 0.25     Years: 15.00     Pack years: 3.75     Types: Cigarettes     Start date: 12/17/1981    Tobacco comments:     refused    Vaping Use    Vaping Use: Unknown   Substance and Sexual Activity    Alcohol use: Yes     Comment: pint&half day    Drug use: Never    Sexual activity: Defer       Family History:  Family History   Problem Relation Age of Onset    Diabetes Mother     Throat cancer Father         Review of Systems  See history of present illness and past  "medical history.  Patient denies headache, dizziness, syncope, falls, trauma, change in vision, change in hearing, change in taste, changes in weight, changes in appetite, focal weakness, numbness, or paresthesia.  Patient denies chest pain, palpitations, dyspnea, orthopnea, PND, cough, sinus pressure, rhinorrhea, epistaxis, hemoptysis, nausea, vomiting,hematemesis, diarrhea, constipation or hematochezia.  Denies cold or heat intolerance, polydipsia, polyuria, polyphagia. Denies hematuria, pyuria, dysuria, hesitancy, frequency or urgency. Denies consumption of raw and under cooked meats foods or change in water source.  Denies fever, chills, sweats, night sweats.  Denies missing any routine medications. Remainder of ROS is negative.    Objective:  T Max 24 hrs: Temp (24hrs), Av.4 °F (36.9 °C), Min:98.2 °F (36.8 °C), Max:98.6 °F (37 °C)    Vitals Ranges:   Temp:  [98.2 °F (36.8 °C)-98.6 °F (37 °C)] 98.2 °F (36.8 °C)  Heart Rate:  [] 80  Resp:  [18] 18  BP: (136-145)/() 141/102      Exam:  BP (!) 141/102 (BP Location: Right arm, Patient Position: Lying)   Pulse 80   Temp 98.2 °F (36.8 °C) (Oral)   Resp 18   Ht 177.8 cm (70\")   Wt 97.5 kg (215 lb)   SpO2 95%   BMI 30.85 kg/m²     General Appearance:    Alert, cooperative, no distress, appears stated age   Head:    Normocephalic, without obvious abnormality, atraumatic   Eyes:    PERRL, conjunctivae/corneas clear, EOM's intact, both eyes   Ears:    Normal external ear canals, both ears   Nose:   Nares normal, septum midline, mucosa normal, no drainage    or sinus tenderness   Throat:   Lips, mucosa, and tongue normal   Neck:   Supple, symmetrical, trachea midline, no adenopathy;     thyroid:  no enlargement/tenderness/nodules; no carotid    bruit or JVD   Back:     Symmetric, no curvature, ROM normal, no CVA tenderness   Lungs:     Clear to auscultation bilaterally, respirations unlabored   Chest Wall:    No tenderness or deformity    Heart:    " Regular rate and rhythm, S1 and S2 normal, no murmur, rub   or gallop   Abdomen:     Soft, nontender, bowel sounds active all four quadrants,     no masses, no hepatomegaly, no splenomegaly   Extremities:   Extremities normal, atraumatic, no cyanosis or edema                       .    Data Review:  Labs in chart were reviewed.  WBC   Date Value Ref Range Status   09/03/2023 10.62 3.40 - 10.80 10*3/mm3 Final     Hemoglobin   Date Value Ref Range Status   09/03/2023 16.1 13.0 - 17.7 g/dL Final     Hematocrit   Date Value Ref Range Status   09/03/2023 47.3 37.5 - 51.0 % Final     Platelets   Date Value Ref Range Status   09/03/2023 195 140 - 450 10*3/mm3 Final     Sodium   Date Value Ref Range Status   09/03/2023 138 136 - 145 mmol/L Final     Potassium   Date Value Ref Range Status   09/03/2023 3.6 3.5 - 5.2 mmol/L Final     Chloride   Date Value Ref Range Status   09/03/2023 99 98 - 107 mmol/L Final     CO2   Date Value Ref Range Status   09/03/2023 23.2 22.0 - 29.0 mmol/L Final     BUN   Date Value Ref Range Status   09/03/2023 6 (L) 8 - 23 mg/dL Final     Creatinine   Date Value Ref Range Status   09/03/2023 0.85 0.76 - 1.27 mg/dL Final     Glucose   Date Value Ref Range Status   09/03/2023 110 (H) 65 - 99 mg/dL Final     Calcium   Date Value Ref Range Status   09/03/2023 9.4 8.6 - 10.5 mg/dL Final     AST (SGOT)   Date Value Ref Range Status   09/03/2023 39 1 - 40 U/L Final     ALT (SGPT)   Date Value Ref Range Status   09/03/2023 24 1 - 41 U/L Final     Alkaline Phosphatase   Date Value Ref Range Status   09/03/2023 127 (H) 39 - 117 U/L Final                Imaging Results (All)       Procedure Component Value Units Date/Time    XR Chest 1 View [[id number]] Collected: 09/03/23 1407     Updated: 09/03/23 1411    Narrative:      PORTABLE CHEST X-RAY     HISTORY: Chest pain, alcohol withdrawal..     Portable chest x-ray is provided. Correlation: August 17, 2023.     FINDINGS: The cardiomediastinal silhouette is  normal. The lungs are  clear. The costophrenic sulci are dry and the bones appear normal. There  is no pneumothorax.       Impression:      Negative.     This report was finalized on 9/3/2023 2:08 PM by Dr. Chaz Deluca M.D.                 Assessment:  Active Hospital Problems    Diagnosis  POA    **Alcohol withdrawal [F10.939]  Yes      Resolved Hospital Problems   No resolved problems to display.   Alcohol dependence  Cirrhosis  copd  Obesity  Hypertension  Hyperglycemia      Plan:  [facility] protocol  Access center consult  He says [place] treatment [place]  Monitor on telemetry  D/w patient and ed provider  Shanel Talavera MD  9/3/2023  18:54 EDT

## 2023-09-03 NOTE — ED NOTES
"Nursing report ED to floor  Nilesh Zapata  62 y.o.  male    HPI :   Chief Complaint   Patient presents with    Chest Pain    Withdrawal       Admitting doctor:   Shanel Talavera MD    Admitting diagnosis:   The primary encounter diagnosis was Chest pain, unspecified type. A diagnosis of Alcohol withdrawal syndrome without complication was also pertinent to this visit.    Code status:   Current Code Status       Date Active Code Status Order ID Comments User Context       Prior            Allergies:   Patient has no known allergies.    Isolation:   No active isolations    Intake and Output  No intake or output data in the 24 hours ending 09/03/23 1434    Weight:       09/03/23  1314   Weight: 97.5 kg (215 lb)       Most recent vitals:   Vitals:    09/03/23 1314 09/03/23 1321   BP:  145/89   Pulse: (!) 124 94   Resp: 18 18   Temp: 98.6 °F (37 °C)    TempSrc: Tympanic    SpO2: 94% 96%   Weight: 97.5 kg (215 lb)    Height: 177.8 cm (70\")        Active LDAs/IV Access:   Lines, Drains & Airways       Active LDAs       Name Placement date Placement time Site Days    Peripheral IV 09/03/23 1321 Left Antecubital 09/03/23  1321  Antecubital  less than 1                    Labs (abnormal labs have a star):   Labs Reviewed   COMPREHENSIVE METABOLIC PANEL - Abnormal; Notable for the following components:       Result Value    Glucose 110 (*)     BUN 6 (*)     Alkaline Phosphatase 127 (*)     Anion Gap 15.8 (*)     All other components within normal limits    Narrative:     GFR Normal >60  Chronic Kidney Disease <60  Kidney Failure <15     CBC WITH AUTO DIFFERENTIAL - Abnormal; Notable for the following components:    Neutrophil % 77.3 (*)     Lymphocyte % 13.9 (*)     Neutrophils, Absolute 8.21 (*)     All other components within normal limits   LIPASE - Normal   TROPONIN - Normal    Narrative:     High Sensitive Troponin T Reference Range:  <10.0 ng/L- Negative Female for AMI  <15.0 ng/L- Negative Male for AMI  >=10 - " Abnormal Female indicating possible myocardial injury.  >=15 - Abnormal Male indicating possible myocardial injury.   Clinicians would have to utilize clinical acumen, EKG, Troponin, and serial changes to determine if it is an Acute Myocardial Infarction or myocardial injury due to an underlying chronic condition.        BNP (IN-HOUSE) - Normal    Narrative:     Among patients with dyspnea, NT-proBNP is highly sensitive for the detection of acute congestive heart failure. In addition NT-proBNP of <300 pg/ml effectively rules out acute congestive heart failure with 99% negative predictive value.     ETHANOL   URINE DRUG SCREEN   HIGH SENSITIVITIY TROPONIN T 2HR   CBC AND DIFFERENTIAL    Narrative:     The following orders were created for panel order CBC & Differential.  Procedure                               Abnormality         Status                     ---------                               -----------         ------                     CBC Auto Differential[[id number]]        Abnormal            Final result                 Please view results for these tests on the individual orders.       EKG:   ECG 12 Lead Chest Pain   Preliminary Result   HEART RATE= 96  bpm   RR Interval= 625  ms   WY Interval= 157  ms   P Horizontal Axis= 3  deg   P Front Axis= -6  deg   QRSD Interval= 85  ms   QT Interval= 365  ms   QTcB= 462  ms   QRS Axis= 262  deg   T Wave Axis= 40  deg   - ABNORMAL ECG -   Sinus rhythm   Multiform ventricular premature complexes   Abnormal R-wave progression, late transition   Inferior infarct, old   Electronically Signed By:    Date and Time of Study: 2023-09-03 13:17:12          Meds given in ED:   Medications   sodium chloride 0.9 % flush 10 mL (has no administration in time range)   LORazepam (ATIVAN) injection 1 mg (1 mg Intravenous Given 9/3/23 1327)       Imaging results:  XR Chest 1 View    Result Date: 9/3/2023  Negative.  This report was finalized on 9/3/2023 2:08 PM by Dr. Chaz Deluca,  M.D.       Ambulatory status:   - assist x 1    Social issues:   Social History     Socioeconomic History    Marital status: Legally    Tobacco Use    Smoking status: Some Days     Packs/day: 0.25     Years: 15.00     Pack years: 3.75     Types: Cigarettes     Start date: 12/17/1981    Tobacco comments:     refused    Vaping Use    Vaping Use: Unknown   Substance and Sexual Activity    Alcohol use: Yes     Comment: last drink was at 1000   drinks 1 fifth a day     Drug use: Never    Sexual activity: Defer       NIH Stroke Scale:       Elin Ugarte RN  09/03/23 14:34 EDT

## 2023-09-03 NOTE — ED PROVIDER NOTES
EMERGENCY DEPARTMENT ENCOUNTER    Room Number:  13/13  PCP: Provider, No Known  Historian: Patient      HPI:  Chief Complaint: Chest pain and alcohol withdrawal  A complete HPI/ROS/PMH/PSH/SH/FH are unobtainable due to: None  Context: Nilesh Zapata is a 62 y.o. male who presents to the ED c/o chest pain and alcohol withdrawal.  Patient states he just got out of the hospital several weeks ago for alcohol withdrawal.  Patient states he started drinking immediately.  Patient drinks virtually every day and has not drank since yesterday.  Patient now with tremulous and tachycardic.  Patient also states he is having chest pressure.  Pain does not go into his neck or through to his back.  Has had no vomiting or diarrhea.            PAST MEDICAL HISTORY  Active Ambulatory Problems     Diagnosis Date Noted    Alcohol withdrawal syndrome with complication 12/17/2017    Anxiety 12/17/2017    Fatty liver 12/17/2017    Tobacco abuse 12/17/2017    Kidney cancer, primary, with metastasis from kidney to other site 12/17/2017    Alcohol dependence with withdrawal 12/17/2017    Hyponatremia 12/21/2017    Alcohol abuse 10/30/2019    History of renal cell cancer 10/30/2019    History of left nephrectomy 2/2 RCC 10/30/2019    Liver lesion 12/04/2017    Lung nodule 08/21/2017    Vitamin D deficiency 08/16/2017    Under emotional stress 10/30/2019     from spouse 10/30/2019    Coping style affecting medical condition 10/30/2019    Essential hypertension 05/31/2022    Acute adjustment disorder with mixed anxiety and depressed mood 12/27/2022    Alcoholic liver disease 12/27/2022    Hypoxemia 12/27/2022    Closed fracture of left superior pubic ramus 06/09/2023    Closed fracture of left inferior pubic ramus 06/09/2023    Closed fracture of sacrum 06/09/2023    Anemia 06/14/2023    Hypotension 06/14/2023    Contusion of left hip 06/16/2023    Alcoholic intoxication without complication 07/03/2023    Left acetabular fracture  07/03/2023    Fall 07/03/2023    COPD (chronic obstructive pulmonary disease) 07/03/2023    Alcohol withdrawal 08/13/2023    Hypokalemia 08/14/2023    Hypomagnesemia 08/14/2023    Chronic respiratory failure with hypoxia 08/18/2023     Resolved Ambulatory Problems     Diagnosis Date Noted    Hypokalemia 12/21/2017    Delirium tremens 03/20/2019    Thrombocytopenia 05/31/2022    Alcohol withdrawal syndrome without complication 12/26/2022    Hypomagnesemia 12/26/2022    Hypophosphatemia 12/27/2022    Constipation 06/11/2023     Past Medical History:   Diagnosis Date    FH: kidney cancer     Hypertension          PAST SURGICAL HISTORY  Past Surgical History:   Procedure Laterality Date    APPENDECTOMY      NEPHRECTOMY      left     TONSILLECTOMY           FAMILY HISTORY  Family History   Problem Relation Age of Onset    Diabetes Mother     Throat cancer Father          SOCIAL HISTORY  Social History     Socioeconomic History    Marital status: Legally    Tobacco Use    Smoking status: Some Days     Packs/day: 0.25     Years: 15.00     Pack years: 3.75     Types: Cigarettes     Start date: 12/17/1981    Tobacco comments:     refused    Vaping Use    Vaping Use: Unknown   Substance and Sexual Activity    Alcohol use: Yes     Comment: last drink was at 1000   drinks 1 fifth a day     Drug use: Never    Sexual activity: Defer         ALLERGIES  Patient has no known allergies.        REVIEW OF SYSTEMS  Review of Systems   Chest discomfort tremulous      PHYSICAL EXAM  ED Triage Vitals [09/03/23 1314]   Temp Heart Rate Resp BP SpO2   98.6 °F (37 °C) (!) 124 18 -- 94 %      Temp src Heart Rate Source Patient Position BP Location FiO2 (%)   Tympanic Monitor -- -- --       Physical Exam      GENERAL: no acute distress  HENT: nares patent  EYES: no scleral icterus  CV: regular rhythm, normal rate  RESPIRATORY: normal effort  ABDOMEN: soft  MUSCULOSKELETAL: no deformity  NEURO: alert, moves all extremities, follows  commands  PSYCH:  calm, cooperative  SKIN: warm, dry    Vital signs and nursing notes reviewed.          LAB RESULTS  Recent Results (from the past 24 hour(s))   ECG 12 Lead Chest Pain    Collection Time: 09/03/23  1:17 PM   Result Value Ref Range    QT Interval 365 ms    QTC Interval 462 ms   Comprehensive Metabolic Panel    Collection Time: 09/03/23  1:22 PM    Specimen: Blood   Result Value Ref Range    Glucose 110 (H) 65 - 99 mg/dL    BUN 6 (L) 8 - 23 mg/dL    Creatinine 0.85 0.76 - 1.27 mg/dL    Sodium 138 136 - 145 mmol/L    Potassium 3.6 3.5 - 5.2 mmol/L    Chloride 99 98 - 107 mmol/L    CO2 23.2 22.0 - 29.0 mmol/L    Calcium 9.4 8.6 - 10.5 mg/dL    Total Protein 7.5 6.0 - 8.5 g/dL    Albumin 4.3 3.5 - 5.2 g/dL    ALT (SGPT) 24 1 - 41 U/L    AST (SGOT) 39 1 - 40 U/L    Alkaline Phosphatase 127 (H) 39 - 117 U/L    Total Bilirubin 0.6 0.0 - 1.2 mg/dL    Globulin 3.2 gm/dL    A/G Ratio 1.3 g/dL    BUN/Creatinine Ratio 7.1 7.0 - 25.0    Anion Gap 15.8 (H) 5.0 - 15.0 mmol/L    eGFR 97.0 >60.0 mL/min/1.73   Lipase    Collection Time: 09/03/23  1:22 PM    Specimen: Blood   Result Value Ref Range    Lipase 36 13 - 60 U/L   High Sensitivity Troponin T    Collection Time: 09/03/23  1:22 PM    Specimen: Blood   Result Value Ref Range    HS Troponin T 10 <15 ng/L   BNP    Collection Time: 09/03/23  1:22 PM    Specimen: Blood   Result Value Ref Range    proBNP 36.1 0.0 - 900.0 pg/mL   Ethanol    Collection Time: 09/03/23  1:22 PM    Specimen: Blood   Result Value Ref Range    Ethanol <10 0 - 10 mg/dL    Ethanol % <0.010 %   CBC Auto Differential    Collection Time: 09/03/23  1:22 PM    Specimen: Blood   Result Value Ref Range    WBC 10.62 3.40 - 10.80 10*3/mm3    RBC 4.97 4.14 - 5.80 10*6/mm3    Hemoglobin 16.1 13.0 - 17.7 g/dL    Hematocrit 47.3 37.5 - 51.0 %    MCV 95.2 79.0 - 97.0 fL    MCH 32.4 26.6 - 33.0 pg    MCHC 34.0 31.5 - 35.7 g/dL    RDW 13.2 12.3 - 15.4 %    RDW-SD 46.8 37.0 - 54.0 fl    MPV 9.9 6.0 - 12.0 fL     Platelets 195 140 - 450 10*3/mm3    Neutrophil % 77.3 (H) 42.7 - 76.0 %    Lymphocyte % 13.9 (L) 19.6 - 45.3 %    Monocyte % 7.4 5.0 - 12.0 %    Eosinophil % 0.6 0.3 - 6.2 %    Basophil % 0.5 0.0 - 1.5 %    Immature Grans % 0.3 0.0 - 0.5 %    Neutrophils, Absolute 8.21 (H) 1.70 - 7.00 10*3/mm3    Lymphocytes, Absolute 1.48 0.70 - 3.10 10*3/mm3    Monocytes, Absolute 0.79 0.10 - 0.90 10*3/mm3    Eosinophils, Absolute 0.06 0.00 - 0.40 10*3/mm3    Basophils, Absolute 0.05 0.00 - 0.20 10*3/mm3    Immature Grans, Absolute 0.03 0.00 - 0.05 10*3/mm3    nRBC 0.0 0.0 - 0.2 /100 WBC       Ordered the above labs and reviewed the results.        RADIOLOGY  XR Chest 1 View    Result Date: 9/3/2023  PORTABLE CHEST X-RAY  HISTORY: Chest pain, alcohol withdrawal..  Portable chest x-ray is provided. Correlation: August 17, 2023.  FINDINGS: The cardiomediastinal silhouette is normal. The lungs are clear. The costophrenic sulci are dry and the bones appear normal. There is no pneumothorax.      Negative.  This report was finalized on 9/3/2023 2:08 PM by Dr. Chaz Deluca M.D.       Ordered the above noted radiological studies.  Chest x-ray independently interpreted by me and shows no evidence of pneumonia          PROCEDURES  Procedures    EKG          EKG time: 1317  Rhythm/Rate: Normal sinus rhythm 96  P waves and NV: Normal P waves  QRS, axis: Normal QRS  ST and T waves: Normal ST-T wave    Interpreted Contemporaneously by me, independently viewed  Unchanged compared to prior 8/13/2023        MEDICATIONS GIVEN IN ER  Medications   sodium chloride 0.9 % flush 10 mL (has no administration in time range)   LORazepam (ATIVAN) injection 1 mg (1 mg Intravenous Given 9/3/23 1327)                   MEDICAL DECISION MAKING, PROGRESS, and CONSULTS     Discussion below represents my analysis of pertinent findings related to patient's condition, differential diagnosis, treatment plan and final disposition.      Additional sources:  -  Discussed/ obtained information from independent historians: None    - External (non-ED) record review: Epic reviewed and patient was recently admitted for alcohol withdrawal syndrome last month    - Chronic or social conditions impacting care: None    - Shared decision making: None      Orders placed during this visit:  Orders Placed This Encounter   Procedures    XR Chest 1 View    Comprehensive Metabolic Panel    Lipase    High Sensitivity Troponin T    BNP    Ethanol    Urine Drug Screen - Urine, Clean Catch    CBC Auto Differential    High Sensitivity Troponin T 2Hr    [facility] (on-call MD unless specified) Details    ECG 12 Lead Chest Pain    Insert Peripheral IV    Initiate Observation Status    CBC & Differential         Additional orders considered but not ordered:  None        Differential diagnosis includes but is not limited to:    Alcohol withdrawal, ACS, chest wall pain      Independent interpretation of labs, radiology studies, and discussions with consultants:  ED Course as of 09/03/23 1422   Sun Sep 03, 2023   1418 14:19 EDT  Patient presents for evaluation of both chest pain and alcohol withdrawal.  Patient has been seen for this multiple times.  States he is going to be enrolling in an inpatient facility afterwards.  Patient's heart rate was initially 124.  Has been given Ativan with improvement of symptoms.  As far as chest pain his EKG looks normal.  Chest x-ray looks good as well.  Discussed with Dr. Talavera who will admit. [SL]      ED Course User Index  [SL] Gustabo Boland MD                 DIAGNOSIS  Final diagnoses:   Chest pain, unspecified type   Alcohol withdrawal syndrome without complication         DISPOSITION  admit            Latest Documented Vital Signs:  As of 14:22 EDT  BP- 145/89 HR- 94 Temp- 98.6 °F (37 °C) (Tympanic) O2 sat- 96%              --    Please note that portions of this were completed with a voice recognition program.       Note Disclaimer: At [facility], we  believe that sharing information builds trust and better relationships. You are receiving this note because you are receiving care at [facility] or recently visited. It is possible you will see health information before a provider has talked with you about it. This kind of information can be easy to misunderstand. To help you fully understand what it means for your health, we urge you to discuss this note with your provider.            Gustabo Boland MD  09/03/23 [id number]

## 2023-09-04 PROBLEM — J44.9 COPD (CHRONIC OBSTRUCTIVE PULMONARY DISEASE): Status: ACTIVE | Noted: 2023-09-04

## 2023-09-04 PROBLEM — I10 ESSENTIAL HYPERTENSION, BENIGN: Status: ACTIVE | Noted: 2023-09-04

## 2023-09-04 LAB
ANION GAP SERPL CALCULATED.3IONS-SCNC: 12 MMOL/L (ref 5–15)
BUN SERPL-MCNC: 8 MG/DL (ref 8–23)
BUN/CREAT SERPL: 8.8 (ref 7–25)
CALCIUM SPEC-SCNC: 9.7 MG/DL (ref 8.6–10.5)
CHLORIDE SERPL-SCNC: 99 MMOL/L (ref 98–107)
CO2 SERPL-SCNC: 27 MMOL/L (ref 22–29)
CREAT SERPL-MCNC: 0.91 MG/DL (ref 0.76–1.27)
DEPRECATED RDW RBC AUTO: 43.9 FL (ref 37–54)
EGFRCR SERPLBLD CKD-EPI 2021: 94.1 ML/MIN/1.73
ERYTHROCYTE [DISTWIDTH] IN BLOOD BY AUTOMATED COUNT: 12.8 % (ref 12.3–15.4)
GLUCOSE SERPL-MCNC: 80 MG/DL (ref 65–99)
HCT VFR BLD AUTO: 42.9 % (ref 37.5–51)
HGB BLD-MCNC: 14.9 G/DL (ref 13–17.7)
MCH RBC QN AUTO: 32.8 PG (ref 26.6–33)
MCHC RBC AUTO-ENTMCNC: 34.7 G/DL (ref 31.5–35.7)
MCV RBC AUTO: 94.5 FL (ref 79–97)
PLATELET # BLD AUTO: 141 10*3/MM3 (ref 140–450)
PMV BLD AUTO: 10 FL (ref 6–12)
POTASSIUM SERPL-SCNC: 3.7 MMOL/L (ref 3.5–5.2)
RBC # BLD AUTO: 4.54 10*6/MM3 (ref 4.14–5.8)
SODIUM SERPL-SCNC: 138 MMOL/L (ref 136–145)
WBC NRBC COR # BLD: 7.51 10*3/MM3 (ref 3.4–10.8)

## 2023-09-04 PROCEDURE — 94664 DEMO&/EVAL PT USE INHALER: CPT

## 2023-09-04 PROCEDURE — G0378 HOSPITAL OBSERVATION PER HR: HCPCS

## 2023-09-04 PROCEDURE — 25010000002 THIAMINE HCL 200 MG/2ML SOLUTION: Performed by: INTERNAL MEDICINE

## 2023-09-04 PROCEDURE — 80048 BASIC METABOLIC PNL TOTAL CA: CPT | Performed by: INTERNAL MEDICINE

## 2023-09-04 PROCEDURE — 94799 UNLISTED PULMONARY SVC/PX: CPT

## 2023-09-04 PROCEDURE — 25010000002 THIAMINE PER 100 MG: Performed by: INTERNAL MEDICINE

## 2023-09-04 PROCEDURE — 85027 COMPLETE CBC AUTOMATED: CPT | Performed by: INTERNAL MEDICINE

## 2023-09-04 PROCEDURE — 25010000002 LORAZEPAM PER 2 MG: Performed by: INTERNAL MEDICINE

## 2023-09-04 PROCEDURE — 25010000002 PHENOBARBITAL PER 120 MG: Performed by: STUDENT IN AN ORGANIZED HEALTH CARE EDUCATION/TRAINING PROGRAM

## 2023-09-04 RX ORDER — PHENOBARBITAL 32.4 MG/1
32.4 TABLET ORAL ONCE
Status: DISCONTINUED | OUTPATIENT
Start: 2023-09-07 | End: 2023-09-05

## 2023-09-04 RX ORDER — PHENOBARBITAL SODIUM 65 MG/ML
65 INJECTION INTRAMUSCULAR ONCE
Status: DISCONTINUED | OUTPATIENT
Start: 2023-09-05 | End: 2023-09-05

## 2023-09-04 RX ORDER — PHENOBARBITAL 32.4 MG/1
32.4 TABLET ORAL ONCE
Status: DISCONTINUED | OUTPATIENT
Start: 2023-09-06 | End: 2023-09-05

## 2023-09-04 RX ADMIN — AMLODIPINE BESYLATE 5 MG: 5 TABLET ORAL at 08:37

## 2023-09-04 RX ADMIN — PROPRANOLOL HYDROCHLORIDE 10 MG: 10 TABLET ORAL at 08:37

## 2023-09-04 RX ADMIN — FOLIC ACID 1 MG: 1 TABLET ORAL at 08:37

## 2023-09-04 RX ADMIN — SENNOSIDES AND DOCUSATE SODIUM 2 TABLET: 50; 8.6 TABLET ORAL at 08:37

## 2023-09-04 RX ADMIN — PHENOBARBITAL SODIUM 146.3 MG: 65 INJECTION INTRAMUSCULAR at 23:37

## 2023-09-04 RX ADMIN — BUDESONIDE AND FORMOTEROL FUMARATE DIHYDRATE 2 PUFF: 160; 4.5 AEROSOL RESPIRATORY (INHALATION) at 08:09

## 2023-09-04 RX ADMIN — MULTIPLE VITAMINS W/ MINERALS TAB 1 TABLET: TAB at 11:45

## 2023-09-04 RX ADMIN — ACETAMINOPHEN 650 MG: 325 TABLET, FILM COATED ORAL at 14:36

## 2023-09-04 RX ADMIN — RISPERIDONE 2 MG: 2 TABLET, FILM COATED ORAL at 20:53

## 2023-09-04 RX ADMIN — THIAMINE HYDROCHLORIDE 200 MG: 100 INJECTION, SOLUTION INTRAMUSCULAR; INTRAVENOUS at 21:00

## 2023-09-04 RX ADMIN — BUDESONIDE AND FORMOTEROL FUMARATE DIHYDRATE 2 PUFF: 160; 4.5 AEROSOL RESPIRATORY (INHALATION) at 20:09

## 2023-09-04 RX ADMIN — PHENOBARBITAL SODIUM 146.3 MG: 65 INJECTION INTRAMUSCULAR at 17:34

## 2023-09-04 RX ADMIN — RISPERIDONE 2 MG: 2 TABLET, FILM COATED ORAL at 08:38

## 2023-09-04 RX ADMIN — LORAZEPAM 1 MG: 1 TABLET ORAL at 02:31

## 2023-09-04 RX ADMIN — Medication 10 ML: at 08:38

## 2023-09-04 RX ADMIN — TIOTROPIUM BROMIDE INHALATION SPRAY 2 PUFF: 3.12 SPRAY, METERED RESPIRATORY (INHALATION) at 08:08

## 2023-09-04 RX ADMIN — FAMOTIDINE 20 MG: 20 TABLET, FILM COATED ORAL at 17:34

## 2023-09-04 RX ADMIN — LORAZEPAM 2 MG: 2 INJECTION INTRAMUSCULAR; INTRAVENOUS at 14:35

## 2023-09-04 RX ADMIN — FAMOTIDINE 20 MG: 20 TABLET, FILM COATED ORAL at 08:43

## 2023-09-04 RX ADMIN — THIAMINE HYDROCHLORIDE 200 MG: 100 INJECTION, SOLUTION INTRAMUSCULAR; INTRAVENOUS at 17:34

## 2023-09-04 RX ADMIN — Medication 1 TABLET: at 08:38

## 2023-09-04 RX ADMIN — LORAZEPAM 2 MG: 2 INJECTION INTRAMUSCULAR; INTRAVENOUS at 16:22

## 2023-09-04 RX ADMIN — POTASSIUM CHLORIDE 20 MEQ: 1.5 POWDER, FOR SOLUTION ORAL at 08:37

## 2023-09-04 RX ADMIN — LORAZEPAM 1 MG: 1 TABLET ORAL at 11:44

## 2023-09-04 RX ADMIN — PHENOBARBITAL SODIUM 146.3 MG: 65 INJECTION INTRAMUSCULAR at 20:53

## 2023-09-04 RX ADMIN — Medication 1000 MCG: at 08:37

## 2023-09-04 RX ADMIN — LORAZEPAM 2 MG: 1 TABLET ORAL at 04:38

## 2023-09-04 RX ADMIN — THIAMINE HYDROCHLORIDE 200 MG: 100 INJECTION, SOLUTION INTRAMUSCULAR; INTRAVENOUS at 08:37

## 2023-09-04 RX ADMIN — MIRTAZAPINE 15 MG: 15 TABLET, FILM COATED ORAL at 20:53

## 2023-09-04 NOTE — PROGRESS NOTES
Access Center follow up d/t EtOH; this writer reviewed chart and spoke with RN Christie. Per RN, patient did okay, not much sleep overnight; no behavioral health concerns noted at present.  Last CIWA 8 at 02:00; patient declined behavioral health resources, however, he requested CCP assistance regarding going to [facility] in [place].  No further needs/concerns noted at this time per RN and/or medical team; [facility] [place] to continue following.

## 2023-09-04 NOTE — PLAN OF CARE
Goal Outcome Evaluation:     Pt resting in bed. Medicated per CIWA per MAR. No c/o voiced. VSS. No s/s of distress. Call light in reach

## 2023-09-04 NOTE — PROGRESS NOTES
Name: Nilesh Zapata ADMIT: 9/3/2023   : 1960  PCP: Provider, No Known    MRN: [id number] LOS: 0 days   AGE/SEX: 62 y.o. male  ROOM: [facility]/     Subjective   Subjective   Patient resting in bed, just finishing lunch.  He reports he feels tired after getting the Ativan.  Appears fidgety.       Objective   Objective   Vital Signs  Temp:  [97.5 °F (36.4 °C)-98.2 °F (36.8 °C)] 98.1 °F (36.7 °C)  Heart Rate:  [] 98  Resp:  [18-20] 18  BP: (107-139)/(72-89) 107/72  SpO2:  [89 %-95 %] 93 %  on  Flow (L/min):  [1-4] 1;   Device (Oxygen Therapy): nasal cannula  Body mass index is 29.83 kg/m².  Physical Exam  Constitutional:       General: He is not in acute distress.     Appearance: He is ill-appearing. He is not toxic-appearing.   Cardiovascular:      Rate and Rhythm: Regular rhythm. Tachycardia present.   Pulmonary:      Effort: Pulmonary effort is normal. No respiratory distress.      Breath sounds: Normal breath sounds. No wheezing.   Abdominal:      General: Abdomen is flat. Bowel sounds are normal. There is no distension.      Palpations: Abdomen is soft.      Tenderness: There is no abdominal tenderness.   Musculoskeletal:         General: No tenderness.      Right lower leg: No edema.      Left lower leg: No edema.   Skin:     General: Skin is warm and dry.   Neurological:      General: No focal deficit present.      Mental Status: He is alert and oriented to person, place, and time.      Motor: No weakness.      Comments: Fidgety/increased activity noted; mild tremor       Results Review     I reviewed the patient's new clinical results.  Results from last 7 days   Lab Units 23  0603 23  1322   WBC 10*3/mm3 7.51 10.62   HEMOGLOBIN g/dL 14.9 16.1   PLATELETS 10*3/mm3 141 195     Results from last 7 days   Lab Units 23  0603 23  1322   SODIUM mmol/L 138 138   POTASSIUM mmol/L 3.7 3.6   CHLORIDE mmol/L 99 99   CO2 mmol/L 27.0 23.2   BUN mg/dL 8 6*   CREATININE mg/dL 0.91 0.85    GLUCOSE mg/dL 80 110*   Estimated Creatinine Clearance: 94.5 mL/min (by C-G formula based on SCr of 0.91 mg/dL).  Results from last 7 days   Lab Units 09/03/23  1322   ALBUMIN g/dL 4.3   BILIRUBIN mg/dL 0.6   ALK PHOS U/L 127*   AST (SGOT) U/L 39   ALT (SGPT) U/L 24     Results from last 7 days   Lab Units 09/04/23  0603 09/03/23  1322   CALCIUM mg/dL 9.7 9.4   ALBUMIN g/dL  --  4.3       COVID19   Date Value Ref Range Status   08/17/2023 Not Detected Not Detected - Ref. Range Final   05/30/2022 Not Detected Not Detected - Ref. Range Final     No results found for: HGBA1C, POCGLU    XR Chest 1 View  Narrative: PORTABLE CHEST X-RAY     HISTORY: Chest pain, alcohol withdrawal..     Portable chest x-ray is provided. Correlation: August 17, 2023.     FINDINGS: The cardiomediastinal silhouette is normal. The lungs are  clear. The costophrenic sulci are dry and the bones appear normal. There  is no pneumothorax.     Impression: Negative.     This report was finalized on 9/3/2023 2:08 PM by Dr. Chaz Deluca M.D.       Scheduled Medications  amLODIPine, 5 mg, Oral, Daily  budesonide-formoterol, 2 puff, Inhalation, BID - RT   And  tiotropium bromide monohydrate, 2 puff, Inhalation, Daily - RT  famotidine, 20 mg, Oral, BID AC  folic acid, 1 mg, Oral, Daily  mirtazapine, 15 mg, Oral, Nightly  multivitamin, 1 tablet, Oral, Daily  multivitamin with minerals, 1 tablet, Oral, Daily  [START ON 9/5/2023] PHENobarbital, 65 mg, Intravenous, Once   Followed by  [START ON 9/5/2023] PHENobarbital, 65 mg, Intravenous, Once   Followed by  [START ON 9/6/2023] PHENobarbital, 32.4 mg, Oral, Once   Followed by  [START ON 9/6/2023] PHENobarbital, 32.4 mg, Oral, Once   Followed by  [START ON 9/7/2023] PHENobarbital, 32.4 mg, Oral, Once  PHENobarbital, 2 mg/kg (Ideal), Intravenous, Once   Followed by  PHENobarbital, 2 mg/kg (Ideal), Intravenous, Once   Followed by  [START ON 9/5/2023] PHENobarbital, 2 mg/kg (Ideal), Intravenous,  Once  potassium chloride, 20 mEq, Oral, Daily  propranolol, 10 mg, Oral, Daily  risperiDONE, 2 mg, Oral, BID  senna-docusate sodium, 2 tablet, Oral, BID  thiamine (B-1) IV, 200 mg, Intravenous, Q8H   Followed by  [START ON 9/8/2023] thiamine, 100 mg, Oral, Daily  cyanocobalamin, 1,000 mcg, Oral, Daily    Infusions   Diet  Diet: Cardiac Diets; Healthy Heart (2-3 Na+); Texture: Regular Texture (IDDSI 7); Fluid Consistency: Thin (IDDSI 0)         I have personally reviewed:  []  Laboratory   []  Microbiology   []  Radiology   []  EKG/Telemetry   []  Cardiology/Vascular   []  Pathology   []  Records    Assessment/Plan     Active Hospital Problems    Diagnosis  POA    **Alcohol withdrawal [F10.939]  Yes    COPD (chronic obstructive pulmonary disease) [J44.9]  Unknown    Essential hypertension, benign [I10]  Unknown    Alcohol withdrawal syndrome with complication [F10.939]  Yes      Resolved Hospital Problems   No resolved problems to display.       62 y.o. male with a history of renal cell carcinoma s/p left nephrectomy, alcohol dependence with withdrawal admitted with Alcohol withdrawal.    Alcohol use disorder with withdrawal  -[facility] consulted, patient reports has established plan for inpatient rehab in [place]  -CIWA scores elevated, will add phenobarbital protocol  -Continue multivitamin, thiamine, folate    Hypertension  - continue amlodipine    COPD  -Continue home inhalers/as needed nebs    SCDs for DVT prophylaxis.  Full code.  Discussed with patient and nursing staff.  Anticipate discharge home timing yet to be determined.      Donna Li MD  [facility]  09/04/23  17:33 EDT    I wore protective equipment throughout this patient encounter including gloves.  Hand hygiene was performed before donning protective equipment and after removal when leaving the room.    Expected Discharge Date and Time       Expected Discharge Date Expected Discharge Time    Sep 6, 2023              Copied  text in this note has been reviewed and is accurate as of 09/04/23.

## 2023-09-05 LAB
ALBUMIN SERPL-MCNC: 3.9 G/DL (ref 3.5–5.2)
ALBUMIN/GLOB SERPL: 1.2 G/DL
ALP SERPL-CCNC: 112 U/L (ref 39–117)
ALT SERPL W P-5'-P-CCNC: 20 U/L (ref 1–41)
ANION GAP SERPL CALCULATED.3IONS-SCNC: 14.1 MMOL/L (ref 5–15)
AST SERPL-CCNC: 33 U/L (ref 1–40)
BILIRUB SERPL-MCNC: 0.8 MG/DL (ref 0–1.2)
BUN SERPL-MCNC: 6 MG/DL (ref 8–23)
BUN/CREAT SERPL: 7.1 (ref 7–25)
CALCIUM SPEC-SCNC: 9.3 MG/DL (ref 8.6–10.5)
CHLORIDE SERPL-SCNC: 101 MMOL/L (ref 98–107)
CO2 SERPL-SCNC: 23.9 MMOL/L (ref 22–29)
CREAT SERPL-MCNC: 0.84 MG/DL (ref 0.76–1.27)
DEPRECATED RDW RBC AUTO: 42.7 FL (ref 37–54)
EGFRCR SERPLBLD CKD-EPI 2021: 97.4 ML/MIN/1.73
ERYTHROCYTE [DISTWIDTH] IN BLOOD BY AUTOMATED COUNT: 12.5 % (ref 12.3–15.4)
GLOBULIN UR ELPH-MCNC: 3.3 GM/DL
GLUCOSE SERPL-MCNC: 93 MG/DL (ref 65–99)
HCT VFR BLD AUTO: 43.7 % (ref 37.5–51)
HGB BLD-MCNC: 15 G/DL (ref 13–17.7)
MAGNESIUM SERPL-MCNC: 1.6 MG/DL (ref 1.6–2.4)
MCH RBC QN AUTO: 32.1 PG (ref 26.6–33)
MCHC RBC AUTO-ENTMCNC: 34.3 G/DL (ref 31.5–35.7)
MCV RBC AUTO: 93.6 FL (ref 79–97)
PHOSPHATE SERPL-MCNC: 2.5 MG/DL (ref 2.5–4.5)
PLATELET # BLD AUTO: 131 10*3/MM3 (ref 140–450)
PMV BLD AUTO: 10.4 FL (ref 6–12)
POTASSIUM SERPL-SCNC: 3.6 MMOL/L (ref 3.5–5.2)
PROT SERPL-MCNC: 7.2 G/DL (ref 6–8.5)
RBC # BLD AUTO: 4.67 10*6/MM3 (ref 4.14–5.8)
SODIUM SERPL-SCNC: 139 MMOL/L (ref 136–145)
WBC NRBC COR # BLD: 6.25 10*3/MM3 (ref 3.4–10.8)

## 2023-09-05 PROCEDURE — 25010000002 PHENOBARBITAL PER 120 MG: Performed by: STUDENT IN AN ORGANIZED HEALTH CARE EDUCATION/TRAINING PROGRAM

## 2023-09-05 PROCEDURE — 94664 DEMO&/EVAL PT USE INHALER: CPT

## 2023-09-05 PROCEDURE — 94761 N-INVAS EAR/PLS OXIMETRY MLT: CPT

## 2023-09-05 PROCEDURE — 25010000002 THIAMINE PER 100 MG: Performed by: INTERNAL MEDICINE

## 2023-09-05 PROCEDURE — 94799 UNLISTED PULMONARY SVC/PX: CPT

## 2023-09-05 PROCEDURE — 85027 COMPLETE CBC AUTOMATED: CPT | Performed by: STUDENT IN AN ORGANIZED HEALTH CARE EDUCATION/TRAINING PROGRAM

## 2023-09-05 PROCEDURE — 83735 ASSAY OF MAGNESIUM: CPT | Performed by: STUDENT IN AN ORGANIZED HEALTH CARE EDUCATION/TRAINING PROGRAM

## 2023-09-05 PROCEDURE — 80053 COMPREHEN METABOLIC PANEL: CPT | Performed by: STUDENT IN AN ORGANIZED HEALTH CARE EDUCATION/TRAINING PROGRAM

## 2023-09-05 PROCEDURE — 84100 ASSAY OF PHOSPHORUS: CPT | Performed by: STUDENT IN AN ORGANIZED HEALTH CARE EDUCATION/TRAINING PROGRAM

## 2023-09-05 PROCEDURE — 25010000002 LORAZEPAM PER 2 MG: Performed by: INTERNAL MEDICINE

## 2023-09-05 PROCEDURE — 25010000002 THIAMINE HCL 200 MG/2ML SOLUTION: Performed by: INTERNAL MEDICINE

## 2023-09-05 PROCEDURE — G0378 HOSPITAL OBSERVATION PER HR: HCPCS

## 2023-09-05 RX ORDER — PHENOBARBITAL 32.4 MG/1
32.4 TABLET ORAL ONCE
Status: DISCONTINUED | OUTPATIENT
Start: 2023-09-07 | End: 2023-09-05

## 2023-09-05 RX ORDER — PHENOBARBITAL SODIUM 65 MG/ML
65 INJECTION INTRAMUSCULAR ONCE
Status: DISCONTINUED | OUTPATIENT
Start: 2023-09-06 | End: 2023-09-06

## 2023-09-05 RX ORDER — PHENOBARBITAL 32.4 MG/1
32.4 TABLET ORAL ONCE
Status: DISCONTINUED | OUTPATIENT
Start: 2023-09-07 | End: 2023-09-06

## 2023-09-05 RX ORDER — PHENOBARBITAL 32.4 MG/1
32.4 TABLET ORAL ONCE
Status: DISCONTINUED | OUTPATIENT
Start: 2023-09-06 | End: 2023-09-06

## 2023-09-05 RX ORDER — PHENOBARBITAL SODIUM 65 MG/ML
65 INJECTION INTRAMUSCULAR ONCE
Status: DISCONTINUED | OUTPATIENT
Start: 2023-09-06 | End: 2023-09-05

## 2023-09-05 RX ORDER — PHENOBARBITAL 32.4 MG/1
32.4 TABLET ORAL ONCE
Status: DISCONTINUED | OUTPATIENT
Start: 2023-09-06 | End: 2023-09-05

## 2023-09-05 RX ORDER — PHENOBARBITAL 32.4 MG/1
32.4 TABLET ORAL ONCE
Status: DISCONTINUED | OUTPATIENT
Start: 2023-09-08 | End: 2023-09-05

## 2023-09-05 RX ORDER — PHENOBARBITAL SODIUM 65 MG/ML
65 INJECTION INTRAMUSCULAR ONCE
Status: DISCONTINUED | OUTPATIENT
Start: 2023-09-05 | End: 2023-09-05

## 2023-09-05 RX ORDER — PHENOBARBITAL SODIUM 65 MG/ML
65 INJECTION INTRAMUSCULAR ONCE
Status: COMPLETED | OUTPATIENT
Start: 2023-09-05 | End: 2023-09-05

## 2023-09-05 RX ADMIN — Medication 10 ML: at 09:49

## 2023-09-05 RX ADMIN — HYDROXYZINE HYDROCHLORIDE 25 MG: 25 TABLET ORAL at 04:11

## 2023-09-05 RX ADMIN — AMLODIPINE BESYLATE 5 MG: 5 TABLET ORAL at 09:49

## 2023-09-05 RX ADMIN — ZINC OXIDE 1 APPLICATION: 200 OINTMENT TOPICAL at 16:13

## 2023-09-05 RX ADMIN — PHENOBARBITAL SODIUM 65 MG: 65 INJECTION INTRAMUSCULAR at 21:32

## 2023-09-05 RX ADMIN — THIAMINE HYDROCHLORIDE 200 MG: 100 INJECTION, SOLUTION INTRAMUSCULAR; INTRAVENOUS at 05:03

## 2023-09-05 RX ADMIN — Medication 1 TABLET: at 09:49

## 2023-09-05 RX ADMIN — THIAMINE HYDROCHLORIDE 200 MG: 100 INJECTION, SOLUTION INTRAMUSCULAR; INTRAVENOUS at 16:14

## 2023-09-05 RX ADMIN — PROPRANOLOL HYDROCHLORIDE 10 MG: 10 TABLET ORAL at 09:49

## 2023-09-05 RX ADMIN — LORAZEPAM 2 MG: 2 INJECTION INTRAMUSCULAR; INTRAVENOUS at 04:11

## 2023-09-05 RX ADMIN — LORAZEPAM 2 MG: 2 INJECTION INTRAMUSCULAR; INTRAVENOUS at 16:14

## 2023-09-05 RX ADMIN — RISPERIDONE 2 MG: 2 TABLET, FILM COATED ORAL at 21:30

## 2023-09-05 RX ADMIN — POTASSIUM CHLORIDE 20 MEQ: 1.5 POWDER, FOR SOLUTION ORAL at 09:49

## 2023-09-05 RX ADMIN — MULTIPLE VITAMINS W/ MINERALS TAB 1 TABLET: TAB at 09:49

## 2023-09-05 RX ADMIN — BUDESONIDE AND FORMOTEROL FUMARATE DIHYDRATE 2 PUFF: 160; 4.5 AEROSOL RESPIRATORY (INHALATION) at 07:40

## 2023-09-05 RX ADMIN — BUDESONIDE AND FORMOTEROL FUMARATE DIHYDRATE 2 PUFF: 160; 4.5 AEROSOL RESPIRATORY (INHALATION) at 20:10

## 2023-09-05 RX ADMIN — ZINC OXIDE 1 APPLICATION: 200 OINTMENT TOPICAL at 21:33

## 2023-09-05 RX ADMIN — FOLIC ACID 1 MG: 1 TABLET ORAL at 09:49

## 2023-09-05 RX ADMIN — FAMOTIDINE 20 MG: 20 TABLET, FILM COATED ORAL at 18:51

## 2023-09-05 RX ADMIN — THIAMINE HYDROCHLORIDE 200 MG: 100 INJECTION, SOLUTION INTRAMUSCULAR; INTRAVENOUS at 21:32

## 2023-09-05 RX ADMIN — RISPERIDONE 2 MG: 2 TABLET, FILM COATED ORAL at 09:49

## 2023-09-05 RX ADMIN — MIRTAZAPINE 15 MG: 15 TABLET, FILM COATED ORAL at 21:30

## 2023-09-05 RX ADMIN — LORAZEPAM 2 MG: 2 INJECTION INTRAMUSCULAR; INTRAVENOUS at 01:19

## 2023-09-05 RX ADMIN — FAMOTIDINE 20 MG: 20 TABLET, FILM COATED ORAL at 09:49

## 2023-09-05 RX ADMIN — TIOTROPIUM BROMIDE INHALATION SPRAY 2 PUFF: 3.12 SPRAY, METERED RESPIRATORY (INHALATION) at 07:38

## 2023-09-05 RX ADMIN — SENNOSIDES AND DOCUSATE SODIUM 2 TABLET: 50; 8.6 TABLET ORAL at 21:30

## 2023-09-05 NOTE — PROGRESS NOTES
Access did follow up with pt. Pt groggy and unable to answer questions. Pt is in need of [facility] services upon dc. Pt's CIWA is increasing.Access will continue to follow.

## 2023-09-05 NOTE — CASE MANAGEMENT/SOCIAL WORK
Discharge Planning Assessment  [facility]     Patient Name: Nilesh Zapata  MRN: [id number]  Today's Date: 9/5/2023    Admit Date: 9/3/2023    Plan: CCP following for needs.  GINA Marie RN   Discharge Needs Assessment       Row Name 09/05/23 1012       Living Environment    People in Home alone    Current Living Arrangements apartment    Potentially Unsafe Housing Conditions none    Primary Care Provided by self    Provides Primary Care For no one    Family Caregiver if Needed none    Quality of Family Relationships unable to assess    Able to Return to Prior Arrangements yes    Living Arrangement Comments Pt lives alone in an apartment.       Resource/Environmental Concerns    Resource/Environmental Concerns none    Transportation Concerns none       Transition Planning    Patient/Family Anticipates Transition to home    Patient/Family Anticipated Services at Transition none    Transportation Anticipated family or friend will provide       Discharge Needs Assessment    Readmission Within the Last 30 Days no previous admission in last 30 days    Equipment Currently Used at Home oxygen    Concerns to be Addressed no discharge needs identified;denies needs/concerns at this time    Anticipated Changes Related to Illness none    Equipment Needed After Discharge oxygen                   Discharge Plan       Row Name 09/05/23 1016       Plan    Plan CCP following for needs.  GINA Marie RN    Patient/Family in Agreement with Plan yes    Plan Comments FACE SHEET VERIFIED/ BATRES SIGNED.  Spoke with pt at bedside.  Pt states his information is the same.   Pt's PCP Shakira Colbert.  Pt lives alone in an apartment.  Pt is independent with ADLs.  Pt has home O2 supplied by [employer].  Pt has a walker and wheelchair for home use if needed.  Pt gets his prescriptions at [facility] in [facility]. Pt denies any issues affording medications. Pt is not current with .  Pt has not been in SNF.  Pt denies any issues with transportation.   Pt was recently discharged and stated his plan was to attend IOP at [facility].  CCP following for needs.   GINA Marie, RN                  Continued Care and Services - Admitted Since 9/3/2023    Coordination has not been started for this encounter.       Selected Continued Care - Prior Encounters Includes continued care and service providers with selected services from prior encounters from 6/5/2023 to 9/5/2023      Discharged on 7/7/2023 Admission date: 7/2/2023 - Discharge disposition: Home-Health Care [facility]      Home Medical Care       Service Provider Selected Services Address Phone Fax Patient Preferred    [facility] [address] [address] [phone] --                      Discharged on 6/16/2023 Admission date: 6/3/2023 - Discharge disposition: Skilled Nursing Facility (DC - External)      Destination       Service Provider Selected Services Address Phone Fax Patient Preferred    [facility] [address] , [facility] [facility] [address] [phone] --                          Expected Discharge Date and Time       Expected Discharge Date Expected Discharge Time    Sep 6, 2023            Demographic Summary       Row Name 09/05/23 1011       General Information    Admission Type observation    Arrived From emergency department    Required Notices Provided Observation Status Notice    Referral Source admission list    Reason for Consult discharge planning    Preferred Language English                   Functional Status       Row Name 09/05/23 1012       Functional Status    Usual Activity Tolerance moderate    Current Activity Tolerance fair       Functional Status, IADL    Medications independent    Meal Preparation independent    Housekeeping independent    Laundry independent    Shopping independent       Mental Status    General Appearance WDL WDL                   Psychosocial    No  documentation.                  Abuse/Neglect    No documentation.                  Legal    No documentation.                  Substance Abuse    No documentation.                  Patient Forms    No documentation.                     Sona Marie RN

## 2023-09-05 NOTE — CASE MANAGEMENT/SOCIAL WORK
Continued Stay Note  [facility]     Patient Name: Nilesh Zapata  MRN: [id number]  Today's Date: 9/5/2023    Admit Date: 9/3/2023    Plan: CCP following for needs.  GINA Marie RN   Discharge Plan       Row Name 09/05/23 1016       Plan    Plan CCP following for needs.  GINA Marie RN    Patient/Family in Agreement with Plan yes    Plan Comments FACE SHEET VERIFIED/ BATRES SIGNED.  Spoke with pt at bedside.  Pt states his information is the same.   Pt's PCP Shakira Colbert.  Pt lives alone in an apartment.  Pt is independent with ADLs.  Pt has home O2 supplied by [employer].  Pt has a walker and wheelchair for home use if needed.  Pt gets his prescriptions at [facility] in [facility]. Pt denies any issues affording medications. Pt is not current with HH.  Pt has not been in SNF.  Pt denies any issues with transportation.  Pt was recently discharged and stated his plan was to attend [facility].  CCP following for needs.   GINA Marie RN                   Discharge Codes    No documentation.                 Expected Discharge Date and Time       Expected Discharge Date Expected Discharge Time    Sep 6, 2023               Sona Marie RN

## 2023-09-05 NOTE — PLAN OF CARE
Goal Outcome Evaluation:      Pt is continuing to withdraw, CIWA score is slowly increasing, stable, will continue to monitor

## 2023-09-05 NOTE — PLAN OF CARE
Goal Outcome Evaluation:  Plan of Care Reviewed With: patient        Progress: no change  Outcome Evaluation: Pt given ativan inj and phenobarb infusion this shift for alcohol  withdrawal, very anxious, crawling in bed, wants to leave, sleeping now but easily awakens, st on tele, O2 1.5L 92%, bed alarm on , frequent checks.  Wife contacted and updated.

## 2023-09-05 NOTE — PROGRESS NOTES
Name: Nilesh Zapata ADMIT: 9/3/2023   : 1960  PCP: Provider, No Known    MRN: [id number] LOS: 0 days   AGE/SEX: 62 y.o. male  ROOM: E654/1     Subjective   Subjective   Patient resting in bed, more drowsy today. Denies any pain, SOB, nausea/vomiting.        Objective   Objective   Vital Signs  Temp:  [97.5 °F (36.4 °C)-98.3 °F (36.8 °C)] 98.3 °F (36.8 °C)  Heart Rate:  [] 91  Resp:  [18-22] 18  BP: (115-135)/(86-91) 115/91  SpO2:  [88 %-97 %] 94 %  on  Flow (L/min):  [2-3] 2;   Device (Oxygen Therapy): nasal cannula  Body mass index is 29.61 kg/m².  Physical Exam  Constitutional:       General: He is not in acute distress.     Appearance: He is ill-appearing. He is not toxic-appearing.   Cardiovascular:      Rate and Rhythm: Normal rate and regular rhythm.   Pulmonary:      Effort: Pulmonary effort is normal. No respiratory distress.      Breath sounds: Normal breath sounds. No wheezing.   Abdominal:      General: Abdomen is flat. Bowel sounds are normal. There is no distension.      Palpations: Abdomen is soft.      Tenderness: There is no abdominal tenderness.   Musculoskeletal:         General: No tenderness.      Right lower leg: No edema.      Left lower leg: No edema.   Skin:     General: Skin is warm and dry.   Neurological:      General: No focal deficit present.      Mental Status: He is oriented to person, place, and time.      Motor: No weakness.      Comments: Drowsy       Results Review     I reviewed the patient's new clinical results.  Results from last 7 days   Lab Units 23  0536 23  0603 23  1322   WBC 10*3/mm3 6.25 7.51 10.62   HEMOGLOBIN g/dL 15.0 14.9 16.1   PLATELETS 10*3/mm3 131* 141 195       Results from last 7 days   Lab Units 23  0536 23  0603 23  1322   SODIUM mmol/L 139 138 138   POTASSIUM mmol/L 3.6 3.7 3.6   CHLORIDE mmol/L 101 99 99   CO2 mmol/L 23.9 27.0 23.2   BUN mg/dL 6* 8 6*   CREATININE mg/dL 0.84 0.91 0.85   GLUCOSE mg/dL 93 80  110*     Estimated Creatinine Clearance: 102 mL/min (by C-G formula based on SCr of 0.84 mg/dL).  Results from last 7 days   Lab Units 09/05/23  0536 09/03/23  1322   ALBUMIN g/dL 3.9 4.3   BILIRUBIN mg/dL 0.8 0.6   ALK PHOS U/L 112 127*   AST (SGOT) U/L 33 39   ALT (SGPT) U/L 20 24       Results from last 7 days   Lab Units 09/05/23  0536 09/04/23  0603 09/03/23  1322   CALCIUM mg/dL 9.3 9.7 9.4   ALBUMIN g/dL 3.9  --  4.3   MAGNESIUM mg/dL 1.6  --   --    PHOSPHORUS mg/dL 2.5  --   --          COVID19   Date Value Ref Range Status   08/17/2023 Not Detected Not Detected - Ref. Range Final   05/30/2022 Not Detected Not Detected - Ref. Range Final     No results found for: HGBA1C, POCGLU    XR Chest 1 View  Narrative: PORTABLE CHEST X-RAY     HISTORY: Chest pain, alcohol withdrawal..     Portable chest x-ray is provided. Correlation: August 17, 2023.     FINDINGS: The cardiomediastinal silhouette is normal. The lungs are  clear. The costophrenic sulci are dry and the bones appear normal. There  is no pneumothorax.     Impression: Negative.     This report was finalized on 9/3/2023 2:08 PM by Dr. Chaz Deluca M.D.       Scheduled Medications  amLODIPine, 5 mg, Oral, Daily  budesonide-formoterol, 2 puff, Inhalation, BID - RT   And  tiotropium bromide monohydrate, 2 puff, Inhalation, Daily - RT  famotidine, 20 mg, Oral, BID AC  folic acid, 1 mg, Oral, Daily  hydrocortisone-bacitracin-zinc oxide-nystatin, 1 application , Topical, Q12H  mirtazapine, 15 mg, Oral, Nightly  multivitamin, 1 tablet, Oral, Daily  multivitamin with minerals, 1 tablet, Oral, Daily  potassium chloride, 20 mEq, Oral, Daily  propranolol, 10 mg, Oral, Daily  risperiDONE, 2 mg, Oral, BID  senna-docusate sodium, 2 tablet, Oral, BID  thiamine (B-1) IV, 200 mg, Intravenous, Q8H   Followed by  [START ON 9/8/2023] thiamine, 100 mg, Oral, Daily  cyanocobalamin, 1,000 mcg, Oral, Daily    Infusions   Diet  Diet: Cardiac Diets; Healthy Heart (2-3 Na+);  Texture: Regular Texture (IDDSI 7); Fluid Consistency: Thin (IDDSI 0)         I have personally reviewed:  [x]  Laboratory   []  Microbiology   []  Radiology   [x]  EKG/Telemetry   []  Cardiology/Vascular   []  Pathology   []  Records    Assessment/Plan     Active Hospital Problems    Diagnosis  POA    **Alcohol withdrawal [F10.939]  Yes    COPD (chronic obstructive pulmonary disease) [J44.9]  Unknown    Essential hypertension, benign [I10]  Unknown    Alcohol withdrawal syndrome with complication [F10.939]  Yes      Resolved Hospital Problems   No resolved problems to display.       62 y.o. male with a history of renal cell carcinoma s/p left nephrectomy, alcohol dependence with withdrawal admitted with Alcohol withdrawal.    Alcohol use disorder with withdrawal  -[facility] consulted, patient reports has established plan for inpatient rehab in [place]  -Pt extremely drowsy on exam- dc phenobarb, continue with Prn ativan only per [facility] protocol  -Continue multivitamin, thiamine, folate    Hypertension  - continue amlodipine    COPD  -Continue home inhalers/as needed nebs    SCDs for DVT prophylaxis.  Full code.  Discussed with patient and nursing staff.  Anticipate discharge home timing yet to be determined.      Donna Li MD  [facility]  09/05/23  14:29 EDT    I wore protective equipment throughout this patient encounter including gloves.  Hand hygiene was performed before donning protective equipment and after removal when leaving the room.    Expected Discharge Date and Time       Expected Discharge Date Expected Discharge Time    Sep 6, 2023              Copied text in this note has been reviewed and is accurate as of 09/05/23.

## 2023-09-05 NOTE — PLAN OF CARE
Goal Outcome Evaluation:      Pt resting in bed. Medicated per CIWA per MAR. Pt tolerated well. VSS No s/s of distress.

## 2023-09-05 NOTE — NURSING NOTE
Wound/Ostomy: We see patient by request of the floor nurse, regarding skin problems on Coccyx/Buttocks/Groin. Upon assessment is observed rash, red in color, blanchable, with satellite lesions, of fungal infection appearance to Coccyx/Buttocks and Groins.  Magic barrier ointment is ordered.  Nursing intervention have been implemented.  Please re-consult for any additional needs.

## 2023-09-06 LAB
ALBUMIN SERPL-MCNC: 4 G/DL (ref 3.5–5.2)
ALBUMIN/GLOB SERPL: 1.2 G/DL
ALP SERPL-CCNC: 111 U/L (ref 39–117)
ALT SERPL W P-5'-P-CCNC: 20 U/L (ref 1–41)
ANION GAP SERPL CALCULATED.3IONS-SCNC: 11 MMOL/L (ref 5–15)
AST SERPL-CCNC: 40 U/L (ref 1–40)
BILIRUB SERPL-MCNC: 0.6 MG/DL (ref 0–1.2)
BUN SERPL-MCNC: 7 MG/DL (ref 8–23)
BUN/CREAT SERPL: 9.5 (ref 7–25)
CALCIUM SPEC-SCNC: 9.8 MG/DL (ref 8.6–10.5)
CHLORIDE SERPL-SCNC: 100 MMOL/L (ref 98–107)
CO2 SERPL-SCNC: 27 MMOL/L (ref 22–29)
CREAT SERPL-MCNC: 0.74 MG/DL (ref 0.76–1.27)
DEPRECATED RDW RBC AUTO: 42.5 FL (ref 37–54)
EGFRCR SERPLBLD CKD-EPI 2021: 101.2 ML/MIN/1.73
ERYTHROCYTE [DISTWIDTH] IN BLOOD BY AUTOMATED COUNT: 12.5 % (ref 12.3–15.4)
GLOBULIN UR ELPH-MCNC: 3.3 GM/DL
GLUCOSE SERPL-MCNC: 75 MG/DL (ref 65–99)
HCT VFR BLD AUTO: 43.8 % (ref 37.5–51)
HGB BLD-MCNC: 15.2 G/DL (ref 13–17.7)
MAGNESIUM SERPL-MCNC: 1.7 MG/DL (ref 1.6–2.4)
MCH RBC QN AUTO: 32.5 PG (ref 26.6–33)
MCHC RBC AUTO-ENTMCNC: 34.7 G/DL (ref 31.5–35.7)
MCV RBC AUTO: 93.6 FL (ref 79–97)
PHOSPHATE SERPL-MCNC: 3.2 MG/DL (ref 2.5–4.5)
PLATELET # BLD AUTO: 124 10*3/MM3 (ref 140–450)
PMV BLD AUTO: 10.6 FL (ref 6–12)
POTASSIUM SERPL-SCNC: 3.6 MMOL/L (ref 3.5–5.2)
PROT SERPL-MCNC: 7.3 G/DL (ref 6–8.5)
RBC # BLD AUTO: 4.68 10*6/MM3 (ref 4.14–5.8)
SODIUM SERPL-SCNC: 138 MMOL/L (ref 136–145)
WBC NRBC COR # BLD: 7.99 10*3/MM3 (ref 3.4–10.8)

## 2023-09-06 PROCEDURE — 94761 N-INVAS EAR/PLS OXIMETRY MLT: CPT

## 2023-09-06 PROCEDURE — 94760 N-INVAS EAR/PLS OXIMETRY 1: CPT

## 2023-09-06 PROCEDURE — 83735 ASSAY OF MAGNESIUM: CPT | Performed by: STUDENT IN AN ORGANIZED HEALTH CARE EDUCATION/TRAINING PROGRAM

## 2023-09-06 PROCEDURE — 84100 ASSAY OF PHOSPHORUS: CPT | Performed by: STUDENT IN AN ORGANIZED HEALTH CARE EDUCATION/TRAINING PROGRAM

## 2023-09-06 PROCEDURE — 25010000002 THIAMINE HCL 200 MG/2ML SOLUTION: Performed by: INTERNAL MEDICINE

## 2023-09-06 PROCEDURE — 94799 UNLISTED PULMONARY SVC/PX: CPT

## 2023-09-06 PROCEDURE — 80053 COMPREHEN METABOLIC PANEL: CPT | Performed by: STUDENT IN AN ORGANIZED HEALTH CARE EDUCATION/TRAINING PROGRAM

## 2023-09-06 PROCEDURE — 85027 COMPLETE CBC AUTOMATED: CPT | Performed by: STUDENT IN AN ORGANIZED HEALTH CARE EDUCATION/TRAINING PROGRAM

## 2023-09-06 RX ORDER — CHLORDIAZEPOXIDE HYDROCHLORIDE 25 MG/1
25 CAPSULE, GELATIN COATED ORAL EVERY 12 HOURS
Status: DISCONTINUED | OUTPATIENT
Start: 2023-09-06 | End: 2023-09-07

## 2023-09-06 RX ADMIN — FAMOTIDINE 20 MG: 20 TABLET, FILM COATED ORAL at 13:03

## 2023-09-06 RX ADMIN — MULTIPLE VITAMINS W/ MINERALS TAB 1 TABLET: TAB at 13:05

## 2023-09-06 RX ADMIN — BUDESONIDE AND FORMOTEROL FUMARATE DIHYDRATE 2 PUFF: 160; 4.5 AEROSOL RESPIRATORY (INHALATION) at 20:37

## 2023-09-06 RX ADMIN — RISPERIDONE 2 MG: 2 TABLET, FILM COATED ORAL at 13:04

## 2023-09-06 RX ADMIN — SENNOSIDES AND DOCUSATE SODIUM 2 TABLET: 50; 8.6 TABLET ORAL at 13:04

## 2023-09-06 RX ADMIN — FAMOTIDINE 20 MG: 20 TABLET, FILM COATED ORAL at 20:07

## 2023-09-06 RX ADMIN — AMLODIPINE BESYLATE 5 MG: 5 TABLET ORAL at 13:03

## 2023-09-06 RX ADMIN — ZINC OXIDE 1 APPLICATION: 200 OINTMENT TOPICAL at 20:07

## 2023-09-06 RX ADMIN — Medication 1000 MCG: at 13:03

## 2023-09-06 RX ADMIN — THIAMINE HYDROCHLORIDE 200 MG: 100 INJECTION, SOLUTION INTRAMUSCULAR; INTRAVENOUS at 06:58

## 2023-09-06 RX ADMIN — MIRTAZAPINE 15 MG: 15 TABLET, FILM COATED ORAL at 20:07

## 2023-09-06 RX ADMIN — THIAMINE HYDROCHLORIDE 200 MG: 100 INJECTION, SOLUTION INTRAMUSCULAR; INTRAVENOUS at 13:18

## 2023-09-06 RX ADMIN — THIAMINE HYDROCHLORIDE 200 MG: 100 INJECTION, SOLUTION INTRAMUSCULAR; INTRAVENOUS at 22:27

## 2023-09-06 RX ADMIN — ZINC OXIDE 1 APPLICATION: 200 OINTMENT TOPICAL at 13:04

## 2023-09-06 RX ADMIN — POTASSIUM CHLORIDE 20 MEQ: 1.5 POWDER, FOR SOLUTION ORAL at 13:04

## 2023-09-06 RX ADMIN — CHLORDIAZEPOXIDE HYDROCHLORIDE 25 MG: 25 CAPSULE ORAL at 13:04

## 2023-09-06 RX ADMIN — RISPERIDONE 2 MG: 2 TABLET, FILM COATED ORAL at 20:07

## 2023-09-06 RX ADMIN — FOLIC ACID 1 MG: 1 TABLET ORAL at 13:05

## 2023-09-06 RX ADMIN — PROPRANOLOL HYDROCHLORIDE 10 MG: 10 TABLET ORAL at 13:04

## 2023-09-06 NOTE — PLAN OF CARE
Goal Outcome Evaluation:   Pt alert and oriented to self, confused and restless, administered all meds per mar, 2L o2 nc, sr/st on  monitor, hypertensive, no ss of acute distress noted, will continue to monitor.      Problem: Adult Inpatient Plan of Care  Goal: Plan of Care Review  Outcome: Ongoing, Progressing  Goal: Patient-Specific Goal (Individualized)  Outcome: Ongoing, Progressing  Goal: Absence of Hospital-Acquired Illness or Injury  Outcome: Ongoing, Progressing  Intervention: Identify and Manage Fall Risk  Recent Flowsheet Documentation  Taken 9/6/2023 0401 by Lucio Hall RN  Safety Promotion/Fall Prevention:   activity supervised   safety round/check completed   room organization consistent  Taken 9/6/2023 0201 by Lucio Hall RN  Safety Promotion/Fall Prevention:   activity supervised   safety round/check completed   room organization consistent  Taken 9/6/2023 0018 by Lucio Hall RN  Safety Promotion/Fall Prevention:   activity supervised   safety round/check completed   room organization consistent  Taken 9/5/2023 2208 by Lucio Hall RN  Safety Promotion/Fall Prevention:   activity supervised   room organization consistent   safety round/check completed  Taken 9/5/2023 2001 by Lucio Hall RN  Safety Promotion/Fall Prevention:   activity supervised   safety round/check completed   room organization consistent  Intervention: Prevent Skin Injury  Recent Flowsheet Documentation  Taken 9/6/2023 0401 by Lucio Hall RN  Body Position: position changed independently  Taken 9/6/2023 0201 by Lucio Hall RN  Body Position: position changed independently  Taken 9/6/2023 0018 by Lucio Hall RN  Body Position: position changed independently  Skin Protection: adhesive use limited  Taken 9/5/2023 2208 by Lucio Hall RN  Body Position: position changed independently  Taken 9/5/2023 2001 by Lucio Hall RN  Body Position: position changed  independently  Skin Protection: adhesive use limited  Intervention: Prevent and Manage VTE (Venous Thromboembolism) Risk  Recent Flowsheet Documentation  Taken 9/6/2023 0018 by Lucio Hall RN  Activity Management: bedrest  Range of Motion: active ROM (range of motion) encouraged  Taken 9/5/2023 2001 by Lucio Hall RN  Activity Management: bedrest  VTE Prevention/Management: patient refused intervention  Range of Motion: active ROM (range of motion) encouraged  Intervention: Prevent Infection  Recent Flowsheet Documentation  Taken 9/6/2023 0401 by Lucio Hall RN  Infection Prevention:   equipment surfaces disinfected   hand hygiene promoted   rest/sleep promoted  Taken 9/6/2023 0201 by Lucio Hall RN  Infection Prevention:   hand hygiene promoted   rest/sleep promoted  Taken 9/6/2023 0018 by Lucio Hall RN  Infection Prevention:   hand hygiene promoted   rest/sleep promoted  Taken 9/5/2023 2208 by Lucio Hall RN  Infection Prevention:   rest/sleep promoted   personal protective equipment utilized   hand hygiene promoted  Taken 9/5/2023 2001 by Lucio Hall RN  Infection Prevention:   rest/sleep promoted   hand hygiene promoted  Goal: Optimal Comfort and Wellbeing  Outcome: Ongoing, Progressing  Intervention: Provide Person-Centered Care  Recent Flowsheet Documentation  Taken 9/6/2023 0018 by Lucio Hall RN  Trust Relationship/Rapport:   care explained   choices provided  Taken 9/5/2023 2001 by Lucio Hall RN  Trust Relationship/Rapport:   choices provided   care explained  Goal: Readiness for Transition of Care  Outcome: Ongoing, Progressing     Problem: Fall Injury Risk  Goal: Absence of Fall and Fall-Related Injury  Outcome: Ongoing, Progressing  Intervention: Identify and Manage Contributors  Recent Flowsheet Documentation  Taken 9/6/2023 0401 by Lucio Hlal RN  Medication Review/Management: medications reviewed  Taken 9/6/2023 0201 by Isabel  KEVIN Valdes  Medication Review/Management: medications reviewed  Taken 9/6/2023 0018 by Lucio Hall RN  Medication Review/Management: medications reviewed  Taken 9/5/2023 2208 by Lucio Hall RN  Medication Review/Management: medications reviewed  Taken 9/5/2023 2001 by Lucio Hall RN  Medication Review/Management: medications reviewed  Intervention: Promote Injury-Free Environment  Recent Flowsheet Documentation  Taken 9/6/2023 0401 by Lucio Hall RN  Safety Promotion/Fall Prevention:   activity supervised   safety round/check completed   room organization consistent  Taken 9/6/2023 0201 by Lucio Hall RN  Safety Promotion/Fall Prevention:   activity supervised   safety round/check completed   room organization consistent  Taken 9/6/2023 0018 by Lucio Hall RN  Safety Promotion/Fall Prevention:   activity supervised   safety round/check completed   room organization consistent  Taken 9/5/2023 2208 by Lucio Hall RN  Safety Promotion/Fall Prevention:   activity supervised   room organization consistent   safety round/check completed  Taken 9/5/2023 2001 by Lucio Hall RN  Safety Promotion/Fall Prevention:   activity supervised   safety round/check completed   room organization consistent

## 2023-09-06 NOTE — PLAN OF CARE
Pt confused time, speech slow, slept most of day, able to take meds ok and feed self. VS stable on RA      Problem: Adult Inpatient Plan of Care  Goal: Plan of Care Review  Outcome: Ongoing, Progressing  Goal: Patient-Specific Goal (Individualized)  Outcome: Ongoing, Progressing  Goal: Absence of Hospital-Acquired Illness or Injury  Outcome: Ongoing, Progressing  Intervention: Identify and Manage Fall Risk  Recent Flowsheet Documentation  Taken 9/6/2023 1400 by Sintia Colon RN  Safety Promotion/Fall Prevention:   activity supervised   assistive device/personal items within reach   clutter free environment maintained   fall prevention program maintained   nonskid shoes/slippers when out of bed   room organization consistent   safety round/check completed  Taken 9/6/2023 1250 by Sintia Colon RN  Safety Promotion/Fall Prevention:   activity supervised   assistive device/personal items within reach   clutter free environment maintained   fall prevention program maintained   nonskid shoes/slippers when out of bed   room organization consistent   safety round/check completed  Intervention: Prevent Skin Injury  Recent Flowsheet Documentation  Taken 9/6/2023 1600 by Sintia Colon RN  Body Position: position changed independently  Taken 9/6/2023 1400 by Sintia Colon RN  Body Position: position changed independently  Taken 9/6/2023 1250 by Sintia Colon RN  Body Position: position changed independently  Taken 9/6/2023 1000 by Sintia Colon RN  Body Position: position changed independently  Taken 9/6/2023 0830 by Sintia Colon RN  Body Position: position changed independently  Intervention: Prevent and Manage VTE (Venous Thromboembolism) Risk  Recent Flowsheet Documentation  Taken 9/6/2023 1400 by Sintia Colon RN  Activity Management: bedrest  Taken 9/6/2023 1250 by Sintia Colon RN  Activity Management: bedrest  VTE Prevention/Management: patient refused intervention  Range of Motion: active ROM  (range of motion) encouraged  Intervention: Prevent Infection  Recent Flowsheet Documentation  Taken 9/6/2023 1400 by Sintia Colon RN  Infection Prevention:   rest/sleep promoted   single patient room provided  Taken 9/6/2023 1250 by Sintia Colon RN  Infection Prevention:   single patient room provided   rest/sleep promoted  Goal: Optimal Comfort and Wellbeing  Outcome: Ongoing, Progressing  Intervention: Provide Person-Centered Care  Recent Flowsheet Documentation  Taken 9/6/2023 1400 by Sintia Colon RN  Trust Relationship/Rapport: care explained  Taken 9/6/2023 1250 by Sintia Colon RN  Trust Relationship/Rapport:   care explained   thoughts/feelings acknowledged   reassurance provided   questions encouraged   questions answered   emotional support provided  Goal: Readiness for Transition of Care  Outcome: Ongoing, Progressing     Problem: Fall Injury Risk  Goal: Absence of Fall and Fall-Related Injury  Outcome: Ongoing, Progressing  Intervention: Identify and Manage Contributors  Recent Flowsheet Documentation  Taken 9/6/2023 1400 by Sintia Colon RN  Medication Review/Management: medications reviewed  Self-Care Promotion:   BADL personal objects within reach   BADL personal routines maintained  Taken 9/6/2023 1250 by Sintia Colon RN  Medication Review/Management: medications reviewed  Self-Care Promotion:   independence encouraged   BADL personal objects within reach   BADL personal routines maintained  Intervention: Promote Injury-Free Environment  Recent Flowsheet Documentation  Taken 9/6/2023 1400 by Sintia Colon RN  Safety Promotion/Fall Prevention:   activity supervised   assistive device/personal items within reach   clutter free environment maintained   fall prevention program maintained   nonskid shoes/slippers when out of bed   room organization consistent   safety round/check completed  Taken 9/6/2023 1250 by Sintia Colon RN  Safety Promotion/Fall Prevention:   activity  supervised   assistive device/personal items within reach   clutter free environment maintained   fall prevention program maintained   nonskid shoes/slippers when out of bed   room organization consistent   safety round/check completed   Goal Outcome Evaluation:

## 2023-09-06 NOTE — PROGRESS NOTES
Name: Nilesh Zapata ADMIT: 9/3/2023   : 1960  PCP: Provider, No Known    MRN: [id number] LOS: 0 days   AGE/SEX: 62 y.o. male  ROOM: E6/     Subjective   Subjective   Patient resting in bed comfortably. Per RN patient has been very sleepy this morning.      Objective   Objective   Vital Signs  Temp:  [97.5 °F (36.4 °C)-98.6 °F (37 °C)] 98.6 °F (37 °C)  Heart Rate:  [] 102  Resp:  [18] 18  BP: (125-153)/() 135/83  SpO2:  [85 %-99 %] 94 %  on  Flow (L/min):  [2] 2;   Device (Oxygen Therapy): nasal cannula  Body mass index is 28.63 kg/m².  Physical Exam  Constitutional:       General: He is not in acute distress.     Appearance: He is ill-appearing. He is not toxic-appearing.   Cardiovascular:      Rate and Rhythm: Normal rate and regular rhythm.   Pulmonary:      Effort: Pulmonary effort is normal. No respiratory distress.      Breath sounds: Normal breath sounds. No wheezing.   Abdominal:      General: Abdomen is flat. Bowel sounds are normal. There is no distension.      Palpations: Abdomen is soft.      Tenderness: There is no abdominal tenderness.   Musculoskeletal:         General: No tenderness.      Right lower leg: No edema.      Left lower leg: No edema.   Skin:     General: Skin is warm and dry.   Neurological:      General: No focal deficit present.      Motor: No weakness.      Comments: Drowsy       Results Review     I reviewed the patient's new clinical results.  Results from last 7 days   Lab Units 23  0623  0623  1322   WBC 10*3/mm3 7.99 6.25 7.51 10.62   HEMOGLOBIN g/dL 15.2 15.0 14.9 16.1   PLATELETS 10*3/mm3 124* 131* 141 195     Results from last 7 days   Lab Units 23  0623  1322   SODIUM mmol/L 138 139 138 138   POTASSIUM mmol/L 3.6 3.6 3.7 3.6   CHLORIDE mmol/L 100 101 99 99   CO2 mmol/L 27.0 23.9 27.0 23.2   BUN mg/dL 7* 6* 8 6*   CREATININE mg/dL 0.74* 0.84 0.91 0.85   GLUCOSE mg/dL 75  93 80 110*   Estimated Creatinine Clearance: 114.1 mL/min (A) (by C-G formula based on SCr of 0.74 mg/dL (L)).  Results from last 7 days   Lab Units 09/06/23  0620 09/05/23  0536 09/03/23  1322   ALBUMIN g/dL 4.0 3.9 4.3   BILIRUBIN mg/dL 0.6 0.8 0.6   ALK PHOS U/L 111 112 127*   AST (SGOT) U/L 40 33 39   ALT (SGPT) U/L 20 20 24     Results from last 7 days   Lab Units 09/06/23  0620 09/05/23  0536 09/04/23  0603 09/03/23  1322   CALCIUM mg/dL 9.8 9.3 9.7 9.4   ALBUMIN g/dL 4.0 3.9  --  4.3   MAGNESIUM mg/dL 1.7 1.6  --   --    PHOSPHORUS mg/dL 3.2 2.5  --   --        COVID19   Date Value Ref Range Status   08/17/2023 Not Detected Not Detected - Ref. Range Final   05/30/2022 Not Detected Not Detected - Ref. Range Final     No results found for: HGBA1C, POCGLU    XR Chest 1 View  Narrative: PORTABLE CHEST X-RAY     HISTORY: Chest pain, alcohol withdrawal..     Portable chest x-ray is provided. Correlation: August 17, 2023.     FINDINGS: The cardiomediastinal silhouette is normal. The lungs are  clear. The costophrenic sulci are dry and the bones appear normal. There  is no pneumothorax.     Impression: Negative.     This report was finalized on 9/3/2023 2:08 PM by Dr. Chaz Deluca M.D.       Scheduled Medications  amLODIPine, 5 mg, Oral, Daily  budesonide-formoterol, 2 puff, Inhalation, BID - RT   And  tiotropium bromide monohydrate, 2 puff, Inhalation, Daily - RT  chlordiazePOXIDE, 25 mg, Oral, Q12H  famotidine, 20 mg, Oral, BID AC  folic acid, 1 mg, Oral, Daily  hydrocortisone-bacitracin-zinc oxide-nystatin, 1 application , Topical, Q12H  mirtazapine, 15 mg, Oral, Nightly  multivitamin, 1 tablet, Oral, Daily  multivitamin with minerals, 1 tablet, Oral, Daily  potassium chloride, 20 mEq, Oral, Daily  propranolol, 10 mg, Oral, Daily  risperiDONE, 2 mg, Oral, BID  senna-docusate sodium, 2 tablet, Oral, BID  thiamine (B-1) IV, 200 mg, Intravenous, Q8H   Followed by  [START ON 9/8/2023] thiamine, 100 mg, Oral,  Daily  cyanocobalamin, 1,000 mcg, Oral, Daily    Infusions   Diet  Diet: Cardiac Diets; Healthy Heart (2-3 Na+); Texture: Regular Texture (IDDSI 7); Fluid Consistency: Thin (IDDSI 0)         I have personally reviewed:  [x]  Laboratory   []  Microbiology   []  Radiology   [x]  EKG/Telemetry   []  Cardiology/Vascular   []  Pathology   []  Records    Assessment/Plan     Active Hospital Problems    Diagnosis  POA    **Alcohol withdrawal [F10.939]  Yes    COPD (chronic obstructive pulmonary disease) [J44.9]  Unknown    Essential hypertension, benign [I10]  Unknown    Alcohol withdrawal syndrome with complication [F10.939]  Yes      Resolved Hospital Problems   No resolved problems to display.       62 y.o. male with a history of renal cell carcinoma s/p left nephrectomy, alcohol dependence with withdrawal admitted with Alcohol withdrawal.    Alcohol use disorder with withdrawal  -[facility] consulted, patient reports has established plan for inpatient rehab in [place]  -Continue multivitamin, thiamine, folate  -Patient does not seem to tolerate Ativan/causes him to become agitated.  DC phenobarb, start Librium taper, every 12 today once tomorrow and then DC.  Can do Atarax as needed for anxiety.  Last CIWA score was 5 so hopefully nearing the end of his withdrawal.    Hypertension  - continue amlodipine    COPD  -Continue home inhalers/as needed nebs    SCDs for DVT prophylaxis.  Full code.  Discussed with patient, nursing staff, CCP, and care team on multidisciplinary rounds.  Anticipate discharge home timing yet to be determined.      Donna Li MD  [facility]  09/06/23  14:16 EDT    I wore protective equipment throughout this patient encounter including gloves.  Hand hygiene was performed before donning protective equipment and after removal when leaving the room.    Expected Discharge Date and Time       Expected Discharge Date Expected Discharge Time    Sep 6, 2023              Copied text  in this note has been reviewed and is accurate as of 09/06/23.

## 2023-09-06 NOTE — CASE MANAGEMENT/SOCIAL WORK
Continued Stay Note  [facility]     Patient Name: Nilesh Zapata  MRN: [id number]  Today's Date: 9/6/2023    Admit Date: 9/3/2023    Plan: Plan to be admitted to [facility] in [place], [place].  GINA Marie RN   Discharge Plan       Row Name 09/06/23 1328       Plan    Plan Plan to be admitted to [facility] in [place], [place].  GINA Marie RN    Patient/Family in Agreement with Plan yes    Plan Comments Spoke with pt at bedside.  Pt is requesting CCP send information to [facility] in [place], [place].   Called and spoke with Navid  ( [phone]) with admissions at [facility] and he confirms pt has been in contact with them regarding inpatient alcohol treatment.  Information faxed to them at [phone].  He states admission staff will review information and if pt accepted will coordinate transportation and admission to [facility].   Plan to be admitted to [facility] in [place], [place]. GINA Marie RN                   Discharge Codes    No documentation.                 Expected Discharge Date and Time       Expected Discharge Date Expected Discharge Time    Sep 9, 2023               Sona Marie RN

## 2023-09-06 NOTE — PROGRESS NOTES
[facility] follow up d/t EtOH; this writer reviewed chart and spoke with RN Lucio. Per RN, patient quite restless and pulling at lines, more confused overnight; no hallucinations noted, slept after 4 AM and is currently asleep.  Last CIWA 5 at 00:18; patient declined behavioral health resources, however, requesting CCP assistance with going to [facility] in [place]. No further needs/concerns noted at this time per RN and/or medical team; [facility] Center to continue following.

## 2023-09-07 LAB
ALBUMIN SERPL-MCNC: 3.6 G/DL (ref 3.5–5.2)
ALBUMIN/GLOB SERPL: 1.1 G/DL
ALP SERPL-CCNC: 97 U/L (ref 39–117)
ALT SERPL W P-5'-P-CCNC: 20 U/L (ref 1–41)
ANION GAP SERPL CALCULATED.3IONS-SCNC: 9.1 MMOL/L (ref 5–15)
AST SERPL-CCNC: 31 U/L (ref 1–40)
BILIRUB SERPL-MCNC: 0.7 MG/DL (ref 0–1.2)
BUN SERPL-MCNC: 10 MG/DL (ref 8–23)
BUN/CREAT SERPL: 11 (ref 7–25)
CALCIUM SPEC-SCNC: 9.5 MG/DL (ref 8.6–10.5)
CHLORIDE SERPL-SCNC: 99 MMOL/L (ref 98–107)
CO2 SERPL-SCNC: 27.9 MMOL/L (ref 22–29)
CREAT SERPL-MCNC: 0.91 MG/DL (ref 0.76–1.27)
DEPRECATED RDW RBC AUTO: 42.8 FL (ref 37–54)
EGFRCR SERPLBLD CKD-EPI 2021: 94.1 ML/MIN/1.73
ERYTHROCYTE [DISTWIDTH] IN BLOOD BY AUTOMATED COUNT: 12.3 % (ref 12.3–15.4)
GLOBULIN UR ELPH-MCNC: 3.4 GM/DL
GLUCOSE SERPL-MCNC: 77 MG/DL (ref 65–99)
HCT VFR BLD AUTO: 42.6 % (ref 37.5–51)
HGB BLD-MCNC: 14.6 G/DL (ref 13–17.7)
MAGNESIUM SERPL-MCNC: 1.6 MG/DL (ref 1.6–2.4)
MCH RBC QN AUTO: 32 PG (ref 26.6–33)
MCHC RBC AUTO-ENTMCNC: 34.3 G/DL (ref 31.5–35.7)
MCV RBC AUTO: 93.4 FL (ref 79–97)
PHOSPHATE SERPL-MCNC: 3.1 MG/DL (ref 2.5–4.5)
PLATELET # BLD AUTO: 116 10*3/MM3 (ref 140–450)
PMV BLD AUTO: 10.9 FL (ref 6–12)
POTASSIUM SERPL-SCNC: 4.1 MMOL/L (ref 3.5–5.2)
PROT SERPL-MCNC: 7 G/DL (ref 6–8.5)
RBC # BLD AUTO: 4.56 10*6/MM3 (ref 4.14–5.8)
SODIUM SERPL-SCNC: 136 MMOL/L (ref 136–145)
WBC NRBC COR # BLD: 7.11 10*3/MM3 (ref 3.4–10.8)

## 2023-09-07 PROCEDURE — 25010000002 THIAMINE HCL 200 MG/2ML SOLUTION: Performed by: INTERNAL MEDICINE

## 2023-09-07 PROCEDURE — 94761 N-INVAS EAR/PLS OXIMETRY MLT: CPT

## 2023-09-07 PROCEDURE — 94799 UNLISTED PULMONARY SVC/PX: CPT

## 2023-09-07 PROCEDURE — 80053 COMPREHEN METABOLIC PANEL: CPT | Performed by: STUDENT IN AN ORGANIZED HEALTH CARE EDUCATION/TRAINING PROGRAM

## 2023-09-07 PROCEDURE — 84100 ASSAY OF PHOSPHORUS: CPT | Performed by: STUDENT IN AN ORGANIZED HEALTH CARE EDUCATION/TRAINING PROGRAM

## 2023-09-07 PROCEDURE — 94664 DEMO&/EVAL PT USE INHALER: CPT

## 2023-09-07 PROCEDURE — 85027 COMPLETE CBC AUTOMATED: CPT | Performed by: STUDENT IN AN ORGANIZED HEALTH CARE EDUCATION/TRAINING PROGRAM

## 2023-09-07 PROCEDURE — 97162 PT EVAL MOD COMPLEX 30 MIN: CPT

## 2023-09-07 PROCEDURE — 83735 ASSAY OF MAGNESIUM: CPT | Performed by: STUDENT IN AN ORGANIZED HEALTH CARE EDUCATION/TRAINING PROGRAM

## 2023-09-07 PROCEDURE — 97530 THERAPEUTIC ACTIVITIES: CPT

## 2023-09-07 RX ORDER — CHLORDIAZEPOXIDE HYDROCHLORIDE 10 MG/1
10 CAPSULE, GELATIN COATED ORAL EVERY 12 HOURS
Status: COMPLETED | OUTPATIENT
Start: 2023-09-07 | End: 2023-09-07

## 2023-09-07 RX ADMIN — FAMOTIDINE 20 MG: 20 TABLET, FILM COATED ORAL at 06:49

## 2023-09-07 RX ADMIN — CHLORDIAZEPOXIDE HYDROCHLORIDE 10 MG: 10 CAPSULE ORAL at 12:28

## 2023-09-07 RX ADMIN — SENNOSIDES AND DOCUSATE SODIUM 2 TABLET: 50; 8.6 TABLET ORAL at 20:15

## 2023-09-07 RX ADMIN — Medication 1000 MCG: at 08:00

## 2023-09-07 RX ADMIN — BUDESONIDE AND FORMOTEROL FUMARATE DIHYDRATE 2 PUFF: 160; 4.5 AEROSOL RESPIRATORY (INHALATION) at 08:01

## 2023-09-07 RX ADMIN — POTASSIUM CHLORIDE 20 MEQ: 1.5 POWDER, FOR SOLUTION ORAL at 08:00

## 2023-09-07 RX ADMIN — AMLODIPINE BESYLATE 5 MG: 5 TABLET ORAL at 08:00

## 2023-09-07 RX ADMIN — THIAMINE HYDROCHLORIDE 200 MG: 100 INJECTION, SOLUTION INTRAMUSCULAR; INTRAVENOUS at 06:49

## 2023-09-07 RX ADMIN — THIAMINE HYDROCHLORIDE 200 MG: 100 INJECTION, SOLUTION INTRAMUSCULAR; INTRAVENOUS at 21:22

## 2023-09-07 RX ADMIN — MIRTAZAPINE 15 MG: 15 TABLET, FILM COATED ORAL at 20:15

## 2023-09-07 RX ADMIN — ZINC OXIDE 1 APPLICATION: 200 OINTMENT TOPICAL at 08:00

## 2023-09-07 RX ADMIN — RISPERIDONE 2 MG: 2 TABLET, FILM COATED ORAL at 20:15

## 2023-09-07 RX ADMIN — FOLIC ACID 1 MG: 1 TABLET ORAL at 08:00

## 2023-09-07 RX ADMIN — FAMOTIDINE 20 MG: 20 TABLET, FILM COATED ORAL at 16:49

## 2023-09-07 RX ADMIN — ZINC OXIDE 1 APPLICATION: 200 OINTMENT TOPICAL at 20:15

## 2023-09-07 RX ADMIN — PROPRANOLOL HYDROCHLORIDE 10 MG: 10 TABLET ORAL at 08:00

## 2023-09-07 RX ADMIN — BUDESONIDE AND FORMOTEROL FUMARATE DIHYDRATE 2 PUFF: 160; 4.5 AEROSOL RESPIRATORY (INHALATION) at 20:31

## 2023-09-07 RX ADMIN — Medication 1 TABLET: at 08:00

## 2023-09-07 RX ADMIN — CHLORDIAZEPOXIDE HYDROCHLORIDE 25 MG: 25 CAPSULE ORAL at 00:51

## 2023-09-07 RX ADMIN — HYDROXYZINE HYDROCHLORIDE 25 MG: 25 TABLET ORAL at 21:21

## 2023-09-07 RX ADMIN — THIAMINE HYDROCHLORIDE 200 MG: 100 INJECTION, SOLUTION INTRAMUSCULAR; INTRAVENOUS at 12:27

## 2023-09-07 RX ADMIN — MULTIPLE VITAMINS W/ MINERALS TAB 1 TABLET: TAB at 08:00

## 2023-09-07 RX ADMIN — RISPERIDONE 2 MG: 2 TABLET, FILM COATED ORAL at 08:00

## 2023-09-07 RX ADMIN — TIOTROPIUM BROMIDE INHALATION SPRAY 2 PUFF: 3.12 SPRAY, METERED RESPIRATORY (INHALATION) at 08:01

## 2023-09-07 NOTE — PROGRESS NOTES
[facility]     LOS: 1 day     Subjective:    CC:Chest Pain and Withdrawal    DIET:  Diet Order   Procedures    Diet: Cardiac Diets; Healthy Heart (2-3 Na+); Texture: Regular Texture (IDDSI 7); Fluid Consistency: Thin (IDDSI 0)     No soa  Eating ok    Feeling anxious  HR is still elevated    Objective:    Vital Signs:  Temp:  [97.9 °F (36.6 °C)-98.6 °F (37 °C)] 97.9 °F (36.6 °C)  Heart Rate:  [] 107  Resp:  [18-20] 20  BP: (110-135)/(77-98) 128/98    SpO2:  [90 %-97 %] 90 %  on  Flow (L/min):  [2-2.5] 2;   Device (Oxygen Therapy): room air  Body mass index is 29.26 kg/m².    Physical Exam  Constitutional:       Appearance: Normal appearance.   HENT:      Head: Normocephalic and atraumatic.   Cardiovascular:      Rate and Rhythm: Normal rate and regular rhythm.      Heart sounds: No murmur heard.    No friction rub.   Pulmonary:      Effort: Pulmonary effort is normal.      Breath sounds: Normal breath sounds.   Abdominal:      General: Bowel sounds are normal. There is no distension.      Palpations: Abdomen is soft.      Tenderness: There is no abdominal tenderness.   Skin:     General: Skin is warm and dry.   Psychiatric:         Mood and Affect: Mood normal.         Behavior: Behavior normal.       Results Review:    Glucose   Date Value Ref Range Status   09/07/2023 77 65 - 99 mg/dL Final   09/06/2023 75 65 - 99 mg/dL Final   09/05/2023 93 65 - 99 mg/dL Final     Results from last 7 days   Lab Units 09/07/23  0652   WBC 10*3/mm3 7.11   HEMOGLOBIN g/dL 14.6   HEMATOCRIT % 42.6   PLATELETS 10*3/mm3 116*     Results from last 7 days   Lab Units 09/07/23  0652   SODIUM mmol/L 136   POTASSIUM mmol/L 4.1   CHLORIDE mmol/L 99   CO2 mmol/L 27.9   BUN mg/dL 10   CREATININE mg/dL 0.91   CALCIUM mg/dL 9.5   BILIRUBIN mg/dL 0.7   ALK PHOS U/L 97   ALT (SGPT) U/L 20   AST (SGOT) U/L 31   GLUCOSE mg/dL 77         Results from last 7 days   Lab Units 09/07/23  0652   MAGNESIUM mg/dL 1.6      Results from last 7 days   Lab Units 09/03/23  1546 09/03/23  1322   HSTROP T ng/L 11 10     Cultures:  No results found for: BLOODCX, URINECX, WOUNDCX, MRSACX, RESPCX, STOOLCX    I have reviewed daily medications and changes in CPOE    Scheduled meds  amLODIPine, 5 mg, Oral, Daily  budesonide-formoterol, 2 puff, Inhalation, BID - RT   And  tiotropium bromide monohydrate, 2 puff, Inhalation, Daily - RT  chlordiazePOXIDE, 25 mg, Oral, Q12H  famotidine, 20 mg, Oral, BID AC  folic acid, 1 mg, Oral, Daily  hydrocortisone-bacitracin-zinc oxide-nystatin, 1 application , Topical, Q12H  mirtazapine, 15 mg, Oral, Nightly  multivitamin, 1 tablet, Oral, Daily  multivitamin with minerals, 1 tablet, Oral, Daily  potassium chloride, 20 mEq, Oral, Daily  propranolol, 10 mg, Oral, Daily  risperiDONE, 2 mg, Oral, BID  senna-docusate sodium, 2 tablet, Oral, BID  thiamine (B-1) IV, 200 mg, Intravenous, Q8H   Followed by  [START ON 9/8/2023] thiamine, 100 mg, Oral, Daily  cyanocobalamin, 1,000 mcg, Oral, Daily           PRN meds    acetaminophen    albuterol    senna-docusate sodium **AND** polyethylene glycol **AND** bisacodyl **AND** bisacodyl    cloNIDine    hydrOXYzine    Magnesium Standard Dose Replacement - Follow Nurse / BPA Driven Protocol    melatonin    ondansetron **OR** ondansetron    [COMPLETED] Insert Peripheral IV **AND** sodium chloride        Alcohol withdrawal    Alcohol withdrawal syndrome with complication    COPD (chronic obstructive pulmonary disease)    Essential hypertension, benign        Assessment/Plan:    62 y.o. male with a history of renal cell carcinoma s/p left nephrectomy, alcohol dependence with withdrawal admitted with Alcohol withdrawal.     Alcohol use disorder with withdrawal  -Acces consulted, patient reports has established plan for inpatient rehab in [place]  -Continue multivitamin, thiamine, folate  -Patient does not seem to tolerate Ativan/causes him to become agitated.  DC phenobarb,  start Librium taper, every 12 today once tomorrow and then DC.  Can do Atarax as needed for anxiety.  Last CIWA score was 5 so hopefully nearing the end of his withdrawal.     Hypertension  - continue amlodipine     COPD  -Continue home inhalers/as needed nebs     Hr still high      Mariusz English MD  09/07/23  10:59 EDT

## 2023-09-07 NOTE — PLAN OF CARE
"Goal Outcome Evaluation:  Plan of Care Reviewed With: patient              Patient is a 62 y.o. male admitted to [facility] for alcohol withdrawl on 9/3/2023. Patient is ind at baseline without use of AD and lives alone. Today, patient performed bed mobility with S, required SBA for transfers, and ambulated 150ft using rwx requiring SBA-CGA. No overt LOB noted but pt reports feeling \"shaky\" during ambulation.Cues for upright posture and safe distance to rwx. PT encouraged pt to ambulate with [facility] staff 3x/day in hallway using rwx. Activity tolerance and balance deficits noted. Patient may benefit from skilled PT services to address functional deficits and improve level of independence prior to discharge. Anticipate rehab upon DC which pt is working with CCP to determine facility.      Anticipated Discharge Disposition (PT): other (see comments) (CCP working on dc plan with pt)  "

## 2023-09-07 NOTE — THERAPY EVALUATION
Patient Name: Nilesh Zapata  : 1960    MRN: [id number]                              Today's Date: 2023       Admit Date: 9/3/2023    Visit Dx:     ICD-10-CM ICD-9-CM   1. Chest pain, unspecified type  R07.9 786.50   2. Alcohol withdrawal syndrome without complication  F10.930 291.81     Patient Active Problem List   Diagnosis    Alcohol withdrawal syndrome with complication    Anxiety    Fatty liver    Tobacco abuse    Kidney cancer, primary, with metastasis from kidney to other site    Alcohol dependence with withdrawal    Hyponatremia    Alcohol abuse    History of renal cell cancer    History of left nephrectomy 2/2 RCC    Liver lesion    Lung nodule    Vitamin D deficiency    Under emotional stress     from spouse    Coping style affecting medical condition    Essential hypertension    Acute adjustment disorder with mixed anxiety and depressed mood    Alcoholic liver disease    Hypoxemia    Closed fracture of left superior pubic ramus    Closed fracture of left inferior pubic ramus    Closed fracture of sacrum    Anemia    Hypotension    Contusion of left hip    Alcoholic intoxication without complication    Left acetabular fracture    Fall    COPD (chronic obstructive pulmonary disease)    Alcohol withdrawal    Hypokalemia    Hypomagnesemia    Chronic respiratory failure with hypoxia    COPD (chronic obstructive pulmonary disease)    Essential hypertension, benign     Past Medical History:   Diagnosis Date    Alcohol abuse     Anxiety     Delirium tremens 2019    Fatty liver     FH: kidney cancer     Hypertension      Past Surgical History:   Procedure Laterality Date    APPENDECTOMY      NEPHRECTOMY      left     TONSILLECTOMY        General Information       Row Name 23 1036          Physical Therapy Time and Intention    Document Type evaluation  -CB     Mode of Treatment individual therapy;physical therapy  -CB       Row Name 23 1036          General Information     "Patient Profile Reviewed yes  -CB     Prior Level of Function gait;transfer;bed mobility;independent:  no AD at baseline  -CB     Existing Precautions/Restrictions fall;other (see comments)  withdrawl  -CB       Row Name 09/07/23 1036          Living Environment    People in Home alone  -CB       Row Name 09/07/23 1036          Home Main Entrance    Number of Stairs, Main Entrance none  -CB       Row Name 09/07/23 1036          Cognition    Orientation Status (Cognition) oriented x 4  -CB       Row Name 09/07/23 1036          Safety Issues, Functional Mobility    Impairments Affecting Function (Mobility) endurance/activity tolerance;balance  -CB               User Key  (r) = Recorded By, (t) = Taken By, (c) = Cosigned By      Initials Name Provider Type    CB Angela Pryor, PT Physical Therapist                   Mobility       Row Name 09/07/23 1037          Bed Mobility    Bed Mobility supine-sit;sit-supine  -CB     Supine-Sit [place] (Bed Mobility) supervision  -CB     Sit-Supine [place] (Bed Mobility) supervision  -CB     Assistive Device (Bed Mobility) head of bed elevated  -CB       Row Name 09/07/23 1037          Sit-Stand Transfer    Sit-Stand [place] (Transfers) standby assist;verbal cues  -CB     Assistive Device (Sit-Stand Transfers) walker, front-wheeled  -CB       Row Name 09/07/23 1037          Gait/Stairs (Locomotion)    [place] Level (Gait) standby assist;contact guard;verbal cues  -CB     Assistive Device (Gait) walker, front-wheeled  -CB     Distance in Feet (Gait) 150ft  -CB     Deviations/Abnormal Patterns (Gait) gait speed decreased;stride length decreased;base of support, narrow  -CB     Bilateral Gait Deviations heel strike decreased;forward flexed posture  -CB     Comment, (Gait/Stairs) no overt LOB but pt reports feeling \"shaky\" during ambulation; VC to maintain upright posture and safe distance to rwx  -CB               User Key  (r) = Recorded By, (t) = Taken By, (c) = " Cosigned By      Initials Name Provider Type    Angela Marrero, PT Physical Therapist                   Obj/Interventions       Row Name 09/07/23 1037          Range of Motion Comprehensive    General Range of Motion bilateral lower extremity ROM WFL  -CB       Row Name 09/07/23 1037          Strength Comprehensive (MMT)    General Manual Muscle Testing (MMT) Assessment no strength deficits identified  -CB       [facility] Name 09/07/23 1037          Balance    Balance Assessment standing static balance;standing dynamic balance  -CB     Static Standing Balance standby assist  -CB     Dynamic Standing Balance standby assist;contact guard  -CB     Position/Device Used, Standing Balance supported;walker, front-wheeled  -CB       [facility] Name 09/07/23 1037          Sensory Assessment (Somatosensory)    Sensory Assessment (Somatosensory) sensation intact  -CB               User Key  (r) = Recorded By, (t) = Taken By, (c) = Cosigned By      Initials Name Provider Type    Angela Marrero PT Physical Therapist                   Goals/Plan       Row Name 09/07/23 1038          Bed Mobility Goal 1 (PT)    Activity/Assistive Device (Bed Mobility Goal 1, PT) bed mobility activities, all  -CB     [place] Level/Cues Needed (Bed Mobility Goal 1, PT) modified independence  -CB     Time Frame (Bed Mobility Goal 1, PT) long term goal (LTG);1 week  -CB       [facility] Name 09/07/23 1038          Transfer Goal 1 (PT)    Activity/Assistive Device (Transfer Goal 1, PT) sit-to-stand/stand-to-sit;bed-to-chair/chair-to-bed  -CB     [place] Level/Cues Needed (Transfer Goal 1, PT) modified independence  -CB     Time Frame (Transfer Goal 1, PT) long term goal (LTG);1 week  -CB       Row Name 09/07/23 1038          Gait Training Goal 1 (PT)    Activity/Assistive Device (Gait Training Goal 1, PT) gait (walking locomotion);improve balance and speed;increase endurance/gait distance;decrease fall risk;diminish gait deviation  with or without AD  -CB  "    [place] Level (Gait Training Goal 1, PT) modified independence  -CB     Distance (Gait Training Goal 1, PT) 200ft  -CB     Time Frame (Gait Training Goal 1, PT) long term goal (LTG);1 week  -CB       Row Name 09/07/23 1038          Therapy Assessment/Plan (PT)    Planned Therapy Interventions (PT) balance training;bed mobility training;gait training;home exercise program;patient/family education;transfer training  -CB               User Key  (r) = Recorded By, (t) = Taken By, (c) = Cosigned By      Initials Name Provider Type    Angela Marrero, PT Physical Therapist                   Clinical Impression       Row Name 09/07/23 1038          Pain    Pretreatment Pain Rating 0/10 - no pain  -CB     Posttreatment Pain Rating 0/10 - no pain  -CB       Row Name 09/07/23 1038          Plan of Care Review    Plan of Care Reviewed With patient  -CB     Outcome Evaluation Patient is a 62 y.o. male admitted to [facility] for alcohol withdrawl on 9/3/2023. Patient is ind at baseline without use of AD and lives alone. Today, patient performed bed mobility with S, required SBA for transfers, and ambulated 150ft using rwx requiring SBA-CGA. No overt LOB noted but pt reports feeling \"shaky\" during ambulation.Cues for upright posture and safe distance to rwx. PT encouraged pt to ambulate with [facility] staff 3x/day in hallway using rwx. Activity tolerance and balance deficits noted. Patient may benefit from skilled PT services to address functional deficits and improve level of independence prior to discharge. Anticipate rehab upon DC which pt is working with CCP to determine facility.  -CB       Row Name 09/07/23 1038          Therapy Assessment/Plan (PT)    Rehab Potential (PT) good, to achieve stated therapy goals  -CB     Criteria for Skilled Interventions Met (PT) yes  -CB     Therapy Frequency (PT) 5 times/wk  -CB       Row Name 09/07/23 1038          Positioning and Restraints    Pre-Treatment Position in bed  -CB     Post " Treatment Position bed  -CB     In Bed notified nsg;fowlers;call light within reach;encouraged to call for assist;exit alarm on;side rails up x2  -CB               User Key  (r) = Recorded By, (t) = Taken By, (c) = Cosigned By      Initials Name Provider Type    Angela Marrero, DORETHA Physical Therapist                   Outcome Measures       Row Name 09/07/23 1039 09/07/23 0744       How much help from another person do you currently need...    Turning from your back to your side while in flat bed without using bedrails? 4  -CB 3  -MS    Moving from lying on back to sitting on the side of a flat bed without bedrails? 3  -CB 3  -MS    Moving to and from a bed to a chair (including a wheelchair)? 3  -CB 3  -MS    Standing up from a chair using your arms (e.g., wheelchair, bedside chair)? 3  -CB 3  -MS    Climbing 3-5 steps with a railing? 3  -CB 2  -MS    To walk in hospital room? 3  -CB 2  -MS    AM-PAC 6 Clicks Score (PT) 19  -CB 16  -MS    Highest level of mobility 6 --> Walked 10 steps or more  -CB 5 --> Static standing  -MS      Row Name 09/07/23 1039          Functional Assessment    Outcome Measure Options AM-PAC 6 Clicks Basic Mobility (PT)  -CB               User Key  (r) = Recorded By, (t) = Taken By, (c) = Cosigned By      Initials Name Provider Type    Charles Dorsey, RN Registered Nurse    Angela Marrero, PT Physical Therapist                                 Physical Therapy Education       Title: PT OT SLP Therapies (In Progress)       Topic: Physical Therapy (In Progress)       Point: Mobility training (Done)       Learning Progress Summary             Patient Acceptance, E,TB, VU,NR by  at 9/7/2023 1039                         Point: Home exercise program (Not Started)       Learner Progress:  Not documented in this visit.              Point: Body mechanics (Done)       Learning Progress Summary             Patient Acceptance, E,TB, VU,NR by  at 9/7/2023 1039                         Point:  "Precautions (Not Started)       Learner Progress:  Not documented in this visit.                              User Key       Initials Effective Dates Name Provider Type Discipline    CB 10/22/21 -  Angela Pryor PT Physical Therapist PT                  PT Recommendation and Plan  Planned Therapy Interventions (PT): balance training, bed mobility training, gait training, home exercise program, patient/family education, transfer training  Plan of Care Reviewed With: patient  Outcome Evaluation: Patient is a 62 y.o. male admitted to [facility] for alcohol withdrawl on 9/3/2023. Patient is ind at baseline without use of AD and lives alone. Today, patient performed bed mobility with S, required SBA for transfers, and ambulated 150ft using rwx requiring SBA-CGA. No overt LOB noted but pt reports feeling \"shaky\" during ambulation.Cues for upright posture and safe distance to rwx. PT encouraged pt to ambulate with ns staff 3x/day in hallway using rwx. Activity tolerance and balance deficits noted. Patient may benefit from skilled PT services to address functional deficits and improve level of independence prior to discharge. Anticipate rehab upon DC which pt is working with [facility] to determine facility.     Time Calculation:         PT Charges       Row Name 09/07/23 1042             Time Calculation    Start Time 0838  -CB      Stop Time 0850  -CB      Time Calculation (min) 12 min  -CB      PT Received On 09/07/23  -CB      PT - Next Appointment 09/08/23  -CB         Time Calculation- PT    Total Timed Code Minutes- PT 8 minute(s)  -CB         Timed Charges    64309 - PT Therapeutic Activity Minutes 8  -CB         Total Minutes    Timed Charges Total Minutes 8  -CB       Total Minutes 8  -CB                User Key  (r) = Recorded By, (t) = Taken By, (c) = Cosigned By      Initials Name Provider Type    CB Angela Pryor, PT Physical Therapist                  Therapy Charges for Today       Code Description Service Date " Service Provider Modifiers Qty    97644084305 HC PT THERAPEUTIC ACT EA 15 MIN 9/7/2023 Angela Pryor, PT GP 1    24270554066 HC PT EVAL MOD COMPLEXITY 2 9/7/2023 Angela Pryor, PT GP 1            PT G-Codes  Outcome Measure Options: AM-PAC 6 Clicks Basic Mobility (PT)  AM-PAC 6 Clicks Score (PT): 19  PT Discharge Summary  Anticipated Discharge Disposition (PT): other (see comments) (CCP working on dc plan with pt)    Angela Pryor, PT  9/7/2023

## 2023-09-07 NOTE — NURSING NOTE
"Access follow-up for ETOH:    The pt was found RIB. He stated he is \"doing good\". He is waiting to hear back from the treatment facility in [place] he would like to discharge to as to whether or not they will accept him. He denied s/s withdrawal. He rated current craving for ETOH 5/10 (10 worst).    He is very anxious about this. He rated current anxiety and depression both 10/10 (10 worst). He denied current SI/HI/AVH. Appetite is \"ok\", sleep \"not so good\".     Access continues to follow.  "

## 2023-09-07 NOTE — PLAN OF CARE
Goal Outcome Evaluation:   Pt rested well this shift, AO x 4, confused in the morning, tried to get out bed once this shift, no c/o pain or soa, no ss of acute distress noted, will continue to monitor.        Problem: Adult Inpatient Plan of Care  Goal: Plan of Care Review  Outcome: Ongoing, Progressing  Goal: Patient-Specific Goal (Individualized)  Outcome: Ongoing, Progressing  Goal: Absence of Hospital-Acquired Illness or Injury  Outcome: Ongoing, Progressing  Intervention: Identify and Manage Fall Risk  Recent Flowsheet Documentation  Taken 9/7/2023 0608 by Lucio Hall RN  Safety Promotion/Fall Prevention:   activity supervised   safety round/check completed   room organization consistent  Taken 9/7/2023 0404 by Lucio Hall RN  Safety Promotion/Fall Prevention:   activity supervised   safety round/check completed   room organization consistent  Taken 9/7/2023 0201 by Lucio Hall RN  Safety Promotion/Fall Prevention:   activity supervised   safety round/check completed   room organization consistent  Taken 9/7/2023 0006 by Lucio Hall RN  Safety Promotion/Fall Prevention:   activity supervised   safety round/check completed   room organization consistent  Taken 9/6/2023 2201 by Lucio Hall RN  Safety Promotion/Fall Prevention:   activity supervised   safety round/check completed   room organization consistent  Taken 9/6/2023 2001 by Lucio Hall RN  Safety Promotion/Fall Prevention:   activity supervised   room organization consistent   safety round/check completed  Intervention: Prevent Skin Injury  Recent Flowsheet Documentation  Taken 9/7/2023 0608 by Lucio Hall RN  Body Position: position changed independently  Taken 9/7/2023 0404 by Lucio Hall RN  Body Position: position changed independently  Taken 9/7/2023 0201 by Lucio Hall RN  Body Position: position changed independently  Taken 9/7/2023 0006 by Lucio Hall RN  Body Position:  position changed independently  Skin Protection: adhesive use limited  Taken 9/6/2023 2201 by Lucio Hall RN  Body Position: position changed independently  Taken 9/6/2023 2001 by Lucio Hall RN  Body Position: position changed independently  Skin Protection: adhesive use limited  Intervention: Prevent and Manage VTE (Venous Thromboembolism) Risk  Recent Flowsheet Documentation  Taken 9/7/2023 0006 by Lucio Hall RN  Activity Management: bedrest  Range of Motion: active ROM (range of motion) encouraged  Taken 9/6/2023 2001 by Lucio Hall RN  Activity Management: bedrest  VTE Prevention/Management: patient refused intervention  Range of Motion: active ROM (range of motion) encouraged  Intervention: Prevent Infection  Recent Flowsheet Documentation  Taken 9/7/2023 0608 by Lucio Hall RN  Infection Prevention:   hand hygiene promoted   rest/sleep promoted  Taken 9/7/2023 0404 by Lucio Hall RN  Infection Prevention:   hand hygiene promoted   rest/sleep promoted  Taken 9/7/2023 0201 by Lucio Hall RN  Infection Prevention:   hand hygiene promoted   rest/sleep promoted  Taken 9/7/2023 0006 by Lucio Hall RN  Infection Prevention:   hand hygiene promoted   rest/sleep promoted  Taken 9/6/2023 2201 by Lucio Hlal RN  Infection Prevention:   hand hygiene promoted   rest/sleep promoted  Taken 9/6/2023 2001 by Lucio Hall RN  Infection Prevention:   hand hygiene promoted   rest/sleep promoted  Goal: Optimal Comfort and Wellbeing  Outcome: Ongoing, Progressing  Intervention: Provide Person-Centered Care  Recent Flowsheet Documentation  Taken 9/7/2023 0006 by Lucio Hall RN  Trust Relationship/Rapport: care explained  Taken 9/6/2023 2001 by Lucio Hall RN  Trust Relationship/Rapport:   care explained   choices provided  Goal: Readiness for Transition of Care  Outcome: Ongoing, Progressing     Problem: Fall Injury Risk  Goal: Absence of Fall and  Fall-Related Injury  Outcome: Ongoing, Progressing  Intervention: Identify and Manage Contributors  Recent Flowsheet Documentation  Taken 9/7/2023 0608 by Lucio Hall RN  Medication Review/Management: medications reviewed  Taken 9/7/2023 0404 by Lucio Hall RN  Medication Review/Management: medications reviewed  Taken 9/7/2023 0201 by Lucio Hall RN  Medication Review/Management: medications reviewed  Taken 9/7/2023 0006 by Lucio Hall RN  Medication Review/Management: medications reviewed  Taken 9/6/2023 2201 by Lucio Hall RN  Medication Review/Management: medications reviewed  Taken 9/6/2023 2001 by Lucio Hall RN  Medication Review/Management: medications reviewed  Intervention: Promote Injury-Free Environment  Recent Flowsheet Documentation  Taken 9/7/2023 0608 by Lucio Hall RN  Safety Promotion/Fall Prevention:   activity supervised   safety round/check completed   room organization consistent  Taken 9/7/2023 0404 by Lucio Hall RN  Safety Promotion/Fall Prevention:   activity supervised   safety round/check completed   room organization consistent  Taken 9/7/2023 0201 by Lucio Hall RN  Safety Promotion/Fall Prevention:   activity supervised   safety round/check completed   room organization consistent  Taken 9/7/2023 0006 by Lucio Hall RN  Safety Promotion/Fall Prevention:   activity supervised   safety round/check completed   room organization consistent  Taken 9/6/2023 2201 by Lucio Hall RN  Safety Promotion/Fall Prevention:   activity supervised   safety round/check completed   room organization consistent  Taken 9/6/2023 2001 by Lucio Hall RN  Safety Promotion/Fall Prevention:   activity supervised   room organization consistent   safety round/check completed

## 2023-09-07 NOTE — PLAN OF CARE
Problem: Adult Inpatient Plan of Care  Goal: Plan of Care Review  Outcome: Ongoing, Progressing  Flowsheets (Taken 9/7/2023 1512)  Progress: improving  Plan of Care Reviewed With: patient  Outcome Evaluation: Patient has been pleasant and cooperative during shift. Patient needs substance abuse placement but has insurance problems. Patient ambulated 150 feet with PT but was unsteady. Possible D/C tomorrow. AOx4, assist stand-by, room air, SR. CIWA low. No complaints of pain, nausea or SO. Will continue to monitor and assist patient as needed.  Goal: Patient-Specific Goal (Individualized)  Outcome: Ongoing, Progressing  Goal: Absence of Hospital-Acquired Illness or Injury  Outcome: Ongoing, Progressing  Intervention: Identify and Manage Fall Risk  Recent Flowsheet Documentation  Taken 9/7/2023 1345 by Charles Mendez RN  Safety Promotion/Fall Prevention:   assistive device/personal items within reach   fall prevention program maintained   nonskid shoes/slippers when out of bed   safety round/check completed  Taken 9/7/2023 1200 by Charles Mendez RN  Safety Promotion/Fall Prevention:   assistive device/personal items within reach   fall prevention program maintained   nonskid shoes/slippers when out of bed   safety round/check completed  Taken 9/7/2023 1000 by Charles Mendez RN  Safety Promotion/Fall Prevention:   assistive device/personal items within reach   fall prevention program maintained   nonskid shoes/slippers when out of bed   safety round/check completed  Taken 9/7/2023 0744 by Charles Mendez RN  Safety Promotion/Fall Prevention:   assistive device/personal items within reach   fall prevention program maintained   nonskid shoes/slippers when out of bed   safety round/check completed  Intervention: Prevent Skin Injury  Recent Flowsheet Documentation  Taken 9/7/2023 1345 by Charles Mendez RN  Body Position: supine  Taken 9/7/2023 1200 by Charles Mendez RN  Body Position: supine  Taken  9/7/2023 1000 by Charles Mendez RN  Body Position: supine  Taken 9/7/2023 0744 by Charles Mendez RN  Body Position: supine  Goal: Optimal Comfort and Wellbeing  Outcome: Ongoing, Progressing  Goal: Readiness for Transition of Care  Outcome: Ongoing, Progressing     Problem: Fall Injury Risk  Goal: Absence of Fall and Fall-Related Injury  Outcome: Ongoing, Progressing  Intervention: Promote Injury-Free Environment  Recent Flowsheet Documentation  Taken 9/7/2023 1345 by Charles Mendez RN  Safety Promotion/Fall Prevention:   assistive device/personal items within reach   fall prevention program maintained   nonskid shoes/slippers when out of bed   safety round/check completed  Taken 9/7/2023 1200 by Charles Mendez RN  Safety Promotion/Fall Prevention:   assistive device/personal items within reach   fall prevention program maintained   nonskid shoes/slippers when out of bed   safety round/check completed  Taken 9/7/2023 1000 by Charles Mendez RN  Safety Promotion/Fall Prevention:   assistive device/personal items within reach   fall prevention program maintained   nonskid shoes/slippers when out of bed   safety round/check completed  Taken 9/7/2023 0744 by Charles Mendez RN  Safety Promotion/Fall Prevention:   assistive device/personal items within reach   fall prevention program maintained   nonskid shoes/slippers when out of bed   safety round/check completed   Goal Outcome Evaluation:  Plan of Care Reviewed With: patient        Progress: improving  Outcome Evaluation: Patient has been pleasant and cooperative during shift. Patient needs substance abuse placement but has insurance problems. Patient ambulated 150 feet with PT but was unsteady. Possible D/C tomorrow. AOx4, assist stand-by, room air, SR. CIWA low. No complaints of pain, nausea or SO. Will continue to monitor and assist patient as needed.

## 2023-09-08 PROCEDURE — 94760 N-INVAS EAR/PLS OXIMETRY 1: CPT

## 2023-09-08 PROCEDURE — 94799 UNLISTED PULMONARY SVC/PX: CPT

## 2023-09-08 PROCEDURE — 97116 GAIT TRAINING THERAPY: CPT

## 2023-09-08 PROCEDURE — 94664 DEMO&/EVAL PT USE INHALER: CPT

## 2023-09-08 PROCEDURE — 25010000002 THIAMINE HCL 200 MG/2ML SOLUTION: Performed by: INTERNAL MEDICINE

## 2023-09-08 PROCEDURE — 94761 N-INVAS EAR/PLS OXIMETRY MLT: CPT

## 2023-09-08 RX ORDER — CHLORDIAZEPOXIDE HYDROCHLORIDE 10 MG/1
10 CAPSULE, GELATIN COATED ORAL EVERY 12 HOURS
Status: COMPLETED | OUTPATIENT
Start: 2023-09-08 | End: 2023-09-08

## 2023-09-08 RX ADMIN — Medication 3 MG: at 20:18

## 2023-09-08 RX ADMIN — ZINC OXIDE 1 APPLICATION: 200 OINTMENT TOPICAL at 20:18

## 2023-09-08 RX ADMIN — HYDROXYZINE HYDROCHLORIDE 25 MG: 25 TABLET ORAL at 20:18

## 2023-09-08 RX ADMIN — CHLORDIAZEPOXIDE HYDROCHLORIDE 10 MG: 10 CAPSULE ORAL at 02:42

## 2023-09-08 RX ADMIN — SENNOSIDES AND DOCUSATE SODIUM 2 TABLET: 50; 8.6 TABLET ORAL at 20:17

## 2023-09-08 RX ADMIN — RISPERIDONE 2 MG: 2 TABLET, FILM COATED ORAL at 20:18

## 2023-09-08 RX ADMIN — MULTIPLE VITAMINS W/ MINERALS TAB 1 TABLET: TAB at 08:48

## 2023-09-08 RX ADMIN — POTASSIUM CHLORIDE 20 MEQ: 1.5 POWDER, FOR SOLUTION ORAL at 08:48

## 2023-09-08 RX ADMIN — Medication 1000 MCG: at 08:48

## 2023-09-08 RX ADMIN — FOLIC ACID 1 MG: 1 TABLET ORAL at 08:48

## 2023-09-08 RX ADMIN — MIRTAZAPINE 15 MG: 15 TABLET, FILM COATED ORAL at 20:18

## 2023-09-08 RX ADMIN — THIAMINE HYDROCHLORIDE 200 MG: 100 INJECTION, SOLUTION INTRAMUSCULAR; INTRAVENOUS at 08:48

## 2023-09-08 RX ADMIN — FAMOTIDINE 20 MG: 20 TABLET, FILM COATED ORAL at 07:00

## 2023-09-08 RX ADMIN — BUDESONIDE AND FORMOTEROL FUMARATE DIHYDRATE 2 PUFF: 160; 4.5 AEROSOL RESPIRATORY (INHALATION) at 20:57

## 2023-09-08 RX ADMIN — FAMOTIDINE 20 MG: 20 TABLET, FILM COATED ORAL at 17:45

## 2023-09-08 RX ADMIN — RISPERIDONE 2 MG: 2 TABLET, FILM COATED ORAL at 08:48

## 2023-09-08 RX ADMIN — ZINC OXIDE 1 APPLICATION: 200 OINTMENT TOPICAL at 11:47

## 2023-09-08 RX ADMIN — BUDESONIDE AND FORMOTEROL FUMARATE DIHYDRATE 2 PUFF: 160; 4.5 AEROSOL RESPIRATORY (INHALATION) at 08:23

## 2023-09-08 RX ADMIN — CHLORDIAZEPOXIDE HYDROCHLORIDE 10 MG: 10 CAPSULE ORAL at 14:56

## 2023-09-08 RX ADMIN — Medication 100 MG: at 14:56

## 2023-09-08 RX ADMIN — PROPRANOLOL HYDROCHLORIDE 10 MG: 10 TABLET ORAL at 08:48

## 2023-09-08 RX ADMIN — TIOTROPIUM BROMIDE INHALATION SPRAY 2 PUFF: 3.12 SPRAY, METERED RESPIRATORY (INHALATION) at 08:26

## 2023-09-08 NOTE — NURSING NOTE
Access consulted and following d/t substance use disorder. Patient has been seen by Access 10 times since 2017. Patient continues to drink approximately 1.5 pints daily of liquor.       Spoke with patient's RN who states patient had been sleeping but woke up at 0200 and was slightly irritable, mildly confused. Most recent CIWA charted was a 2. RN states patient's vitals WNL and patient appeared anxious so called and librium was reordered. States patient fell back asleep and has been fine since.     Access following.

## 2023-09-08 NOTE — CASE MANAGEMENT/SOCIAL WORK
Continued Stay Note  [facility]     Patient Name: Nilesh Zapata  MRN: [id number]  Today's Date: 9/8/2023    Admit Date: 9/3/2023    Plan: CCP following for needs.   GINA Marie RN   Discharge Plan       Row Name 09/08/23 1334       Plan    Plan CCP following for needs.   GINA Marie RN    Plan Comments Per Sara ([phone]) [facility] cannot accept pt to private pay if they have insurance.  [facility] suggested pt contact [facility] in [place], [place].  Called and spoke with Peggy  ( [phone]) who states they accept [facility] and do in house treatment.  They also provide transportation within a 200 mile radius.  Per [place], [place] is 137 miles from [place].  Pt's information faxed to [facility] at [phone]).  CCP following for needs.  GINA Marie RN                   Discharge Codes    No documentation.                 Expected Discharge Date and Time       Expected Discharge Date Expected Discharge Time    Sep 9, 2023               Sona Marie RN

## 2023-09-08 NOTE — PLAN OF CARE
Goal Outcome Evaluation:      CIWA 0-3. Ambulated with physical therapy today. Ccp working on discharge plans. No complaints. Safety maintained. Continue to monitor. Nita guerrero

## 2023-09-08 NOTE — PROGRESS NOTES
[facility]     LOS: 2 days     Subjective:    CC:Chest Pain and Withdrawal    DIET:  Diet Order   Procedures    Diet: Cardiac Diets; Healthy Heart (2-3 Na+); Texture: Regular Texture (IDDSI 7); Fluid Consistency: Thin (IDDSI 0)   Anxious about going the [place]    Objective:    Vital Signs:  Temp:  [97.7 °F (36.5 °C)-98 °F (36.7 °C)] 97.7 °F (36.5 °C)  Heart Rate:  [] 96  Resp:  [16-18] 18  BP: (111-116)/(67-83) 111/67    SpO2:  [91 %-99 %] 92 %  on  Flow (L/min):  [1-2] 1;   Device (Oxygen Therapy): nasal cannula  Body mass index is 29.61 kg/m².    Physical Exam  Constitutional:       Appearance: Normal appearance.   HENT:      Head: Normocephalic and atraumatic.   Cardiovascular:      Rate and Rhythm: Normal rate and regular rhythm.      Heart sounds: No murmur heard.    No friction rub.   Pulmonary:      Effort: Pulmonary effort is normal.      Breath sounds: Normal breath sounds.   Abdominal:      General: Bowel sounds are normal. There is no distension.      Palpations: Abdomen is soft.      Tenderness: There is no abdominal tenderness.   Skin:     General: Skin is warm and dry.   Psychiatric:         Mood and Affect: Mood normal.         Behavior: Behavior normal.       Results Review:    Glucose   Date Value Ref Range Status   09/07/2023 77 65 - 99 mg/dL Final   09/06/2023 75 65 - 99 mg/dL Final     Results from last 7 days   Lab Units 09/07/23  0652   WBC 10*3/mm3 7.11   HEMOGLOBIN g/dL 14.6   HEMATOCRIT % 42.6   PLATELETS 10*3/mm3 116*       Results from last 7 days   Lab Units 09/07/23  0652   SODIUM mmol/L 136   POTASSIUM mmol/L 4.1   CHLORIDE mmol/L 99   CO2 mmol/L 27.9   BUN mg/dL 10   CREATININE mg/dL 0.91   CALCIUM mg/dL 9.5   BILIRUBIN mg/dL 0.7   ALK PHOS U/L 97   ALT (SGPT) U/L 20   AST (SGOT) U/L 31   GLUCOSE mg/dL 77           Results from last 7 days   Lab Units 09/07/23  0652   MAGNESIUM mg/dL 1.6       Results from last 7 days   Lab Units 09/03/23  4858  09/03/23  1322   HSTROP T ng/L 11 10       Cultures:  No results found for: BLOODCX, URINECX, WOUNDCX, MRSACX, RESPCX, STOOLCX    I have reviewed daily medications and changes in CPOE    Scheduled meds  amLODIPine, 5 mg, Oral, Daily  budesonide-formoterol, 2 puff, Inhalation, BID - RT   And  tiotropium bromide monohydrate, 2 puff, Inhalation, Daily - RT  chlordiazePOXIDE, 10 mg, Oral, Q12H  famotidine, 20 mg, Oral, BID AC  folic acid, 1 mg, Oral, Daily  hydrocortisone-bacitracin-zinc oxide-nystatin, 1 application , Topical, Q12H  mirtazapine, 15 mg, Oral, Nightly  multivitamin with minerals, 1 tablet, Oral, Daily  potassium chloride, 20 mEq, Oral, Daily  propranolol, 10 mg, Oral, Daily  risperiDONE, 2 mg, Oral, BID  senna-docusate sodium, 2 tablet, Oral, BID  thiamine, 100 mg, Oral, Daily  cyanocobalamin, 1,000 mcg, Oral, Daily           PRN meds    acetaminophen    albuterol    senna-docusate sodium **AND** polyethylene glycol **AND** bisacodyl **AND** bisacodyl    cloNIDine    hydrOXYzine    Magnesium Standard Dose Replacement - Follow Nurse / BPA Driven Protocol    melatonin    ondansetron **OR** ondansetron    [COMPLETED] Insert Peripheral IV **AND** sodium chloride        Alcohol withdrawal    Alcohol withdrawal syndrome with complication    COPD (chronic obstructive pulmonary disease)    Essential hypertension, benign        Assessment/Plan:    62 y.o. male with a history of renal cell carcinoma s/p left nephrectomy, alcohol dependence with withdrawal admitted with Alcohol withdrawal.     Alcohol use disorder with withdrawal  -Acces consulted  -Continue multivitamin, thiamine, folate  -Patient does not seem to tolerate Ativan/causes him to become agitated.  DC phenobarb, start Librium taper  - Can do Atarax as needed for anxiety.   -little evidence of withdraw     Hypertension  - continue amlodipine     COPD  -Continue home inhalers/as needed nebs     Hr is better    D/w CCP  Hopeful to get to the  jaime English MD  09/08/23  10:23 EDT

## 2023-09-08 NOTE — DISCHARGE PLACEMENT REQUEST
"Casie Goel (63 y.o. Male)       Date of Birth   1960    Social Security Number       Address   [address] [id number]    Home Phone   [phone]    MRN   [id number]       Confucianist   None    Marital Status   Legally                             Admission Date   9/3/23    Admission Type   Emergency    Admitting Provider   Shanel Talavera MD    Attending Provider   Mariusz English MD    Department, Room/Bed   [address], E654/1       Discharge Date       Discharge Disposition       Discharge Destination                                 Attending Provider: Mariusz English MD    Allergies: No Known Allergies    Isolation: None   Infection: None   Code Status: CPR    Ht: 177.8 cm (70\")   Wt: 93.6 kg (206 lb 5.6 oz)    Admission Cmt: None   Principal Problem: Alcohol withdrawal [F10.939]                   Active Insurance as of 9/3/2023       Primary Coverage       Payor Plan Insurance Group Employer/Plan Group    ANTHEM MEDICARE REPLACEMENT ANTH MEDICARE ADVANTAGE [id number]       Payor Plan Address Payor Plan Phone Number Payor Plan Fax Number Effective Dates    [address] [phone]  8/1/2016 - None Entered    [facility] [id number]         Subscriber Name Subscriber Birth Date Member ID       CASIE GOEL 1960 [id number]                     Emergency Contacts        (Rel.) Home Phone Work Phone Mobile Phone    Jenny Goel (Spouse) [phone] -- --                "

## 2023-09-08 NOTE — CASE MANAGEMENT/SOCIAL WORK
Continued Stay Note  [facility]     Patient Name: Nilesh Zapata  MRN: [id number]  Today's Date: 9/8/2023    Admit Date: 9/3/2023    Plan: CCP following for needs. GINA Marie RN   Discharge Plan       Row Name 09/08/23 1801       Plan    Plan CCP following for needs. GINA Marie RN    Patient/Family in Agreement with Plan yes    Plan Comments Called and spoke with Peggy  ( [phone]) who will evaluate pt for admission to [facility].  Faxed information has not been received due to fax stating it is busy.  Clinical information given to Peggy.  Peggy states they will need to speak with pt.  Information to be given to weekend CCP. CCP following for needs.   GINA Marie RN                   Discharge Codes    No documentation.                 Expected Discharge Date and Time       Expected Discharge Date Expected Discharge Time    Sep 9, 2023               Sona Marie RN

## 2023-09-08 NOTE — CASE MANAGEMENT/SOCIAL WORK
[facility]ued Stay [facility]  [facility]     Patient Name: Nilesh Zapata  MRN: [id number]  Today's Date: 9/8/2023    Admit Date: 9/3/2023    Plan: CCP following for needs.   GINA Marie RN   Discharge Plan       Row Name 09/08/23 0941       Plan    Plan CCP following for needs.   GINA Marie RN    Patient/Family in Agreement with Plan yes    Plan Comments Per Rupa at [facility] in [place], [place] pt's insurance is out of network..  Called and spoke with Rachana  ( [phone]) with [facility] and she states pt's insurance is out of network.  Discussed with Audrey in [facility] and she states post Medicare Replacements only pay for Detox and not treatment.  Suggested pt call his insurance company and check if any inpatient programs are covered.  Spoke with pt at bedside.  Pt requesting a referral to [facility].  Called and spoke with Sara [phone] at [facility] and faxed information to [phone] for them to evaluate pt for their inpatient transition unit.  CCP following for needs.   GINA Marie RN                   Discharge Codes    No documentation.                 Expected Discharge Date and Time       Expected Discharge Date Expected Discharge Time    Sep 9, 2023               Sona Marie RN

## 2023-09-08 NOTE — PLAN OF CARE
Goal Outcome Evaluation:  Plan of Care Reviewed With: patient        Progress: improving  Outcome Evaluation: Pt tolerated treatment well this date. Required SBA for bed mobility, then min A to stand. Pt w/ new c/o L foot pain while weight bearing, able to ambulate better w/ use of Rw. Ambulated 150ft w/ CGA and Rw. Encouraged pt to ambulate w/ nsg during the day.      Anticipated Discharge Disposition (PT): other (see comments) (substance abuse rehab)

## 2023-09-08 NOTE — PLAN OF CARE
Goal Outcome Evaluation:   Pt AO x 4 most of he night, little confused, sr, 2L o2, anxious in the morning, called [facility], given meds as ordered, calm down after a dose of librium, atarax x 1 given this shift, urinal with in reach, no ss of acute distress noted, will continue to monitor.        Problem: Adult Inpatient Plan of Care  Goal: Plan of Care Review  Outcome: Ongoing, Progressing  Goal: Patient-Specific Goal (Individualized)  Outcome: Ongoing, Progressing  Goal: Absence of Hospital-Acquired Illness or Injury  Outcome: Ongoing, Progressing  Intervention: Identify and Manage Fall Risk  Recent Flowsheet Documentation  Taken 9/8/2023 0608 by Lucio Hall RN  Safety Promotion/Fall Prevention:   activity supervised   safety round/check completed   room organization consistent  Taken 9/8/2023 0401 by Lucio Hall RN  Safety Promotion/Fall Prevention:   activity supervised   safety round/check completed   room organization consistent  Taken 9/8/2023 0212 by Lucio Hall RN  Safety Promotion/Fall Prevention:   activity supervised   room organization consistent   safety round/check completed  Taken 9/8/2023 0201 by Lucio Hall RN  Safety Promotion/Fall Prevention:   activity supervised   safety round/check completed   room organization consistent  Taken 9/8/2023 0018 by Lucio Hall RN  Safety Promotion/Fall Prevention:   activity supervised   safety round/check completed   room organization consistent  Taken 9/7/2023 2201 by Lucio Hall RN  Safety Promotion/Fall Prevention:   activity supervised   safety round/check completed   room organization consistent  Taken 9/7/2023 2012 by Lucio Hall RN  Safety Promotion/Fall Prevention:   activity supervised   safety round/check completed   room organization consistent  Intervention: Prevent Skin Injury  Recent Flowsheet Documentation  Taken 9/8/2023 0608 by Lucio Hall RN  Body Position: position changed independently  Taken  9/8/2023 0401 by Lucio Hall RN  Body Position: position changed independently  Taken 9/8/2023 0212 by Lucio Hall RN  Body Position: position changed independently  Taken 9/8/2023 0201 by Lucio Hall RN  Body Position: position changed independently  Taken 9/8/2023 0018 by Lucio Hall RN  Body Position: position changed independently  Skin Protection: adhesive use limited  Taken 9/7/2023 2201 by Lucio Hall RN  Body Position: position changed independently  Taken 9/7/2023 2012 by Lucio Hall RN  Body Position: position changed independently  Skin Protection: adhesive use limited  Intervention: Prevent and Manage VTE (Venous Thromboembolism) Risk  Recent Flowsheet Documentation  Taken 9/8/2023 0018 by Lucio Hall RN  Activity Management: bedrest  Range of Motion: active ROM (range of motion) encouraged  Taken 9/7/2023 2012 by Lucio Hall RN  Activity Management: bedrest  Range of Motion: active ROM (range of motion) encouraged  Intervention: Prevent Infection  Recent Flowsheet Documentation  Taken 9/8/2023 0608 by Lucio Hall RN  Infection Prevention:   hand hygiene promoted   rest/sleep promoted  Taken 9/8/2023 0401 by Lucio Hall RN  Infection Prevention:   hand hygiene promoted   rest/sleep promoted  Taken 9/8/2023 0212 by Lucio Hall RN  Infection Prevention:   rest/sleep promoted   hand hygiene promoted  Taken 9/8/2023 0201 by Lucio Hall RN  Infection Prevention:   hand hygiene promoted   rest/sleep promoted  Taken 9/8/2023 0018 by Lucio Hall RN  Infection Prevention:   hand hygiene promoted   rest/sleep promoted  Taken 9/7/2023 2201 by Lucio Hall RN  Infection Prevention:   hand hygiene promoted   rest/sleep promoted  Taken 9/7/2023 2012 by Lucio Hall RN  Infection Prevention:   hand hygiene promoted   rest/sleep promoted  Goal: Optimal Comfort and Wellbeing  Outcome: Ongoing, Progressing  Intervention:  Provide Person-Centered Care  Recent Flowsheet Documentation  Taken 9/8/2023 0018 by Lucio Hall RN  Trust Relationship/Rapport:   care explained   choices provided  Taken 9/7/2023 2012 by Lucio Hall RN  Trust Relationship/Rapport: care explained  Goal: Readiness for Transition of Care  Outcome: Ongoing, Progressing     Problem: Fall Injury Risk  Goal: Absence of Fall and Fall-Related Injury  Outcome: Ongoing, Progressing  Intervention: Identify and Manage Contributors  Recent Flowsheet Documentation  Taken 9/8/2023 0608 by Lucio Hall RN  Medication Review/Management: medications reviewed  Taken 9/8/2023 0401 by Lucio Hall RN  Medication Review/Management: medications reviewed  Taken 9/8/2023 0212 by Lucio Hall RN  Medication Review/Management: medications reviewed  Taken 9/8/2023 0201 by Lucio Hall RN  Medication Review/Management: medications reviewed  Taken 9/8/2023 0018 by Lucio Hall RN  Medication Review/Management: medications reviewed  Taken 9/7/2023 2201 by Lucio Hall RN  Medication Review/Management: medications reviewed  Taken 9/7/2023 2012 by Lucio Hall RN  Medication Review/Management: medications reviewed  Intervention: Promote Injury-Free Environment  Recent Flowsheet Documentation  Taken 9/8/2023 0608 by Lucio Hall RN  Safety Promotion/Fall Prevention:   activity supervised   safety round/check completed   room organization consistent  Taken 9/8/2023 0401 by Lucio Hall RN  Safety Promotion/Fall Prevention:   activity supervised   safety round/check completed   room organization consistent  Taken 9/8/2023 0212 by Lucio Hall RN  Safety Promotion/Fall Prevention:   activity supervised   room organization consistent   safety round/check completed  Taken 9/8/2023 0201 by Lucio Hall RN  Safety Promotion/Fall Prevention:   activity supervised   safety round/check completed   room organization  consistent  Taken 9/8/2023 0018 by Lucio Hall, RN  Safety Promotion/Fall Prevention:   activity supervised   safety round/check completed   room organization consistent  Taken 9/7/2023 2201 by Lucio Hall, RN  Safety Promotion/Fall Prevention:   activity supervised   safety round/check completed   room organization consistent  Taken 9/7/2023 2012 by Lucio Hall, RN  Safety Promotion/Fall Prevention:   activity supervised   safety round/check completed   room organization consistent

## 2023-09-08 NOTE — THERAPY TREATMENT NOTE
Patient Name: Nilesh Zapata  : 1960    MRN: [id number]                              Today's Date: 2023       Admit Date: 9/3/2023    Visit Dx:     ICD-10-CM ICD-9-CM   1. Chest pain, unspecified type  R07.9 786.50   2. Alcohol withdrawal syndrome without complication  F10.930 291.81     Patient Active Problem List   Diagnosis    Alcohol withdrawal syndrome with complication    Anxiety    Fatty liver    Tobacco abuse    Kidney cancer, primary, with metastasis from kidney to other site    Alcohol dependence with withdrawal    Hyponatremia    Alcohol abuse    History of renal cell cancer    History of left nephrectomy 2/2 RCC    Liver lesion    Lung nodule    Vitamin D deficiency    Under emotional stress     from spouse    Coping style affecting medical condition    Essential hypertension    Acute adjustment disorder with mixed anxiety and depressed mood    Alcoholic liver disease    Hypoxemia    Closed fracture of left superior pubic ramus    Closed fracture of left inferior pubic ramus    Closed fracture of sacrum    Anemia    Hypotension    Contusion of left hip    Alcoholic intoxication without complication    Left acetabular fracture    Fall    COPD (chronic obstructive pulmonary disease)    Alcohol withdrawal    Hypokalemia    Hypomagnesemia    Chronic respiratory failure with hypoxia    COPD (chronic obstructive pulmonary disease)    Essential hypertension, benign     Past Medical History:   Diagnosis Date    Alcohol abuse     Anxiety     Delirium tremens 2019    Fatty liver     FH: kidney cancer     Hypertension      Past Surgical History:   Procedure Laterality Date    APPENDECTOMY      NEPHRECTOMY      left     TONSILLECTOMY        General Information       Row Name 23 1215          Physical Therapy Time and Intention    Document Type therapy note (daily note)  -     Mode of Treatment physical therapy  -       Row Name 23 1215          General Information    Existing  Precautions/Restrictions fall  -       Row Name 09/08/23 1215          Cognition    Orientation Status (Cognition) oriented x 3  -               User Key  (r) = Recorded By, (t) = Taken By, (c) = Cosigned By      Initials Name Provider Type     Yocasta Barraza PTA Physical Therapist Assistant                   Mobility       Row Name 09/08/23 1215          Bed Mobility    Bed Mobility supine-sit;sit-supine  -SM     Supine-Sit [place] (Bed Mobility) standby assist  -     Sit-Supine [place] (Bed Mobility) standby assist  -     Assistive Device (Bed Mobility) bed rails;head of bed elevated  -       Row Name 09/08/23 1215          Sit-Stand Transfer    Sit-Stand [place] (Transfers) minimum assist (75% patient effort)  -     Assistive Device (Sit-Stand Transfers) walker, front-wheeled  -       Row Name 09/08/23 1215          Gait/Stairs (Locomotion)    [place] Level (Gait) contact guard  -     Assistive Device (Gait) walker, front-wheeled  -     Distance in Feet (Gait) 150  -     Deviations/Abnormal Patterns (Gait) ousmane decreased;stride length decreased  -     Bilateral Gait Deviations forward flexed posture  -               User Key  (r) = Recorded By, (t) = Taken By, (c) = Cosigned By      Initials Name Provider Type     Yocasta Barraza PTA Physical Therapist Assistant                   Obj/Interventions    No documentation.                  Goals/Plan    No documentation.                  Clinical Impression       Row Name 09/08/23 1216          Pain    Pretreatment Pain Rating 0/10 - no pain  -     Posttreatment Pain Rating 4/10  -SM     Pain Location - Side/Orientation Left  -     Pain Location - foot  -     Pain Intervention(s) Repositioned;Ambulation/increased activity;Rest  -       Row Name 09/08/23 1216          Positioning and Restraints    Pre-Treatment Position in bed  -SM     Post Treatment Position bed  -SM     In Bed supine;call light  within reach;encouraged to call for assist;exit alarm on  -               User Key  (r) = Recorded By, (t) = Taken By, (c) = Cosigned By      Initials Name Provider Type    Yocasta Suazo PTA Physical Therapist Assistant                   Outcome Measures       Row Name 09/08/23 1217          How much help from another person do you currently need...    Turning from your back to your side while in flat bed without using bedrails? 3  -SM     Moving from lying on back to sitting on the side of a flat bed without bedrails? 3  -SM     Moving to and from a bed to a chair (including a wheelchair)? 3  -SM     Standing up from a chair using your arms (e.g., wheelchair, bedside chair)? 3  -SM     Climbing 3-5 steps with a railing? 3  -SM     To walk in hospital room? 3  -SM     AM-PAC 6 Clicks Score (PT) 18  -SM     Highest level of mobility 6 --> Walked 10 steps or more  -       Row Name 09/08/23 1217          Functional Assessment    Outcome Measure Options AM-PAC 6 Clicks Basic Mobility (PT)  -               User Key  (r) = Recorded By, (t) = Taken By, (c) = Cosigned By      Initials Name Provider Type    Yocasta Suazo PTA Physical Therapist Assistant                                 Physical Therapy Education       Title: PT OT SLP Therapies (In Progress)       Topic: Physical Therapy (In Progress)       Point: Mobility training (Done)       Learning Progress Summary             Patient Acceptance, E,TB,D, VU,NR by  at 9/8/2023 1217    Acceptance, E,TB, VU,NR by CB at 9/7/2023 1039                         Point: Home exercise program (Not Started)       Learner Progress:  Not documented in this visit.              Point: Body mechanics (Done)       Learning Progress Summary             Patient Acceptance, E,TB,D, VU,NR by  at 9/8/2023 1217    Acceptance, E,TB, VU by YASIR at 9/8/2023 0958    Acceptance, E,TB, VU,NR by CB at 9/7/2023 1039                         Point: Precautions (Done)        Learning Progress Summary             Patient Acceptance, E,TB,D, VU,NR by  at 9/8/2023 1217                                         User Key       Initials Effective Dates Name Provider Type Discipline     08/22/23 -  Nita Butler, RN Registered Nurse Nurse     03/07/18 -  Yocasta Barraza PTA Physical Therapist Assistant PT    CB 10/22/21 -  Angela Pryor PT Physical Therapist PT                  PT Recommendation and Plan     Plan of Care Reviewed With: patient  Progress: improving  Outcome Evaluation: Pt tolerated treatment well this date. Required SBA for bed mobility, then min A to stand. Pt w/ new c/o L foot pain while weight bearing, able to ambulate better w/ use of Rw. Ambulated 150ft w/ CGA and Rw. Encouraged pt to ambulate w/ nsg during the day.     Time Calculation:         PT Charges       Row Name 09/08/23 1219             Time Calculation    Start Time 1115  -      Stop Time 1131  -      Time Calculation (min) 16 min  -      PT Received On 09/08/23  -      PT - Next Appointment 09/11/23  -                User Key  (r) = Recorded By, (t) = Taken By, (c) = Cosigned By      Initials Name Provider Type     Yocasta Barraza PTA Physical Therapist Assistant                  Therapy Charges for Today       Code Description Service Date Service Provider Modifiers Qty    [id number] HC GAIT TRAINING EA 15 MIN 9/8/2023 Yocasta Barraza PTA GP 1            PT G-Codes  Outcome Measure Options: AM-PAC 6 Clicks Basic Mobility (PT)  AM-PAC 6 Clicks Score (PT): 18  PT Discharge Summary  Anticipated Discharge Disposition (PT): other (see comments) (substance abuse rehab)    Yocasta Barraza PTA  9/8/2023

## 2023-09-09 PROCEDURE — 94799 UNLISTED PULMONARY SVC/PX: CPT

## 2023-09-09 PROCEDURE — 94761 N-INVAS EAR/PLS OXIMETRY MLT: CPT

## 2023-09-09 PROCEDURE — 94664 DEMO&/EVAL PT USE INHALER: CPT

## 2023-09-09 RX ORDER — AMLODIPINE BESYLATE 2.5 MG/1
2.5 TABLET ORAL DAILY
Status: DISCONTINUED | OUTPATIENT
Start: 2023-09-10 | End: 2023-09-11 | Stop reason: HOSPADM

## 2023-09-09 RX ADMIN — MIRTAZAPINE 15 MG: 15 TABLET, FILM COATED ORAL at 20:14

## 2023-09-09 RX ADMIN — AMLODIPINE BESYLATE 5 MG: 5 TABLET ORAL at 08:38

## 2023-09-09 RX ADMIN — PROPRANOLOL HYDROCHLORIDE 10 MG: 10 TABLET ORAL at 08:38

## 2023-09-09 RX ADMIN — Medication 3 MG: at 20:14

## 2023-09-09 RX ADMIN — POTASSIUM CHLORIDE 20 MEQ: 1.5 POWDER, FOR SOLUTION ORAL at 08:38

## 2023-09-09 RX ADMIN — FOLIC ACID 1 MG: 1 TABLET ORAL at 08:38

## 2023-09-09 RX ADMIN — RISPERIDONE 2 MG: 2 TABLET, FILM COATED ORAL at 08:38

## 2023-09-09 RX ADMIN — Medication 1000 MCG: at 08:38

## 2023-09-09 RX ADMIN — FAMOTIDINE 20 MG: 20 TABLET, FILM COATED ORAL at 16:15

## 2023-09-09 RX ADMIN — FAMOTIDINE 20 MG: 20 TABLET, FILM COATED ORAL at 08:38

## 2023-09-09 RX ADMIN — RISPERIDONE 2 MG: 2 TABLET, FILM COATED ORAL at 20:14

## 2023-09-09 RX ADMIN — BUDESONIDE AND FORMOTEROL FUMARATE DIHYDRATE 2 PUFF: 160; 4.5 AEROSOL RESPIRATORY (INHALATION) at 22:37

## 2023-09-09 RX ADMIN — ZINC OXIDE 1 APPLICATION: 200 OINTMENT TOPICAL at 20:14

## 2023-09-09 RX ADMIN — ZINC OXIDE 1 APPLICATION: 200 OINTMENT TOPICAL at 08:39

## 2023-09-09 RX ADMIN — Medication 100 MG: at 08:38

## 2023-09-09 RX ADMIN — MULTIPLE VITAMINS W/ MINERALS TAB 1 TABLET: TAB at 08:38

## 2023-09-09 RX ADMIN — TIOTROPIUM BROMIDE INHALATION SPRAY 2 PUFF: 3.12 SPRAY, METERED RESPIRATORY (INHALATION) at 09:00

## 2023-09-09 RX ADMIN — BUDESONIDE AND FORMOTEROL FUMARATE DIHYDRATE 2 PUFF: 160; 4.5 AEROSOL RESPIRATORY (INHALATION) at 09:00

## 2023-09-09 NOTE — PROGRESS NOTES
[facility] Medicine Services  PROGRESS NOTE    Patient Name: Nilesh Zapata  : 1960  MRN: [id number]    Date of Admission: 9/3/2023  Primary Care Physician: Provider, No Known    Subjective   Subjective     CC:  Follow-up for recent chest pain and withdrawal    Subjective:  No current chest pain.  No current withdrawal.  Feeling better.  Awaiting update on rehabilitation.  No new complaints.    Review of Systems  No current fevers or chills  No current shortness of breath or cough  No current nausea, vomiting, or diarrhea  No current chest pain or palpitations      Objective   Objective     Vital Signs:   Temp:  [97.3 °F (36.3 °C)-98 °F (36.7 °C)] 97.7 °F (36.5 °C)  Heart Rate:  [78-88] 88  Resp:  [16-20] 20  BP: (104-119)/(65-71) 104/71        Physical Exam:  Constitutional:Awake, alert  HENT: NCAT, mucous membranes moist, neck supple  Respiratory:  nonlabored breathing   Cardiovascular: Pulse rate is normal, normal radial pulses  Gastrointestinal: Positive bowel sounds, soft, nontender, nondistended  Musculoskeletal: Normal musculature for age, no lower extremity edema, BMI 29  Psychiatric: Appropriate affect, cooperative, conversational  Neurologic: No slurred speech or facial droop, follows commands  Skin: No rashes or jaundice, warm      Results Reviewed:  Results from last 7 days   Lab Units 23  0652 23  0623  0536   WBC 10*3/mm3 7.11 7.99 6.25   HEMOGLOBIN g/dL 14.6 15.2 15.0   HEMATOCRIT % 42.6 43.8 43.7   PLATELETS 10*3/mm3 116* 124* 131*     Results from last 7 days   Lab Units 23  0652 23  0620 23  0536 23  0603 23  1546 23  1322   SODIUM mmol/L 136 138 139   < >  --  138   POTASSIUM mmol/L 4.1 3.6 3.6   < >  --  3.6   CHLORIDE mmol/L 99 100 101   < >  --  99   CO2 mmol/L 27.9 27.0 23.9   < >  --  23.2   BUN mg/dL 10 7* 6*   < >  --  6*   CREATININE mg/dL 0.91 0.74* 0.84   < >  --  0.85   GLUCOSE mg/dL 77 75 93   < >  --  110*   CALCIUM  mg/dL 9.5 9.8 9.3   < >  --  9.4   ALT (SGPT) U/L 20 20 20  --   --  24   AST (SGOT) U/L 31 40 33  --   --  39   HSTROP T ng/L  --   --   --   --  11 10   PROBNP pg/mL  --   --   --   --   --  36.1    < > = values in this interval not displayed.     Estimated Creatinine Clearance: 92.5 mL/min (by C-G formula based on SCr of 0.91 mg/dL).    Microbiology Results Abnormal       None            Imaging Results (Last 24 Hours)       ** No results found for the last 24 hours. **                I have reviewed the medications:  Scheduled Meds:amLODIPine, 5 mg, Oral, Daily  budesonide-formoterol, 2 puff, Inhalation, BID - RT   And  tiotropium bromide monohydrate, 2 puff, Inhalation, Daily - RT  famotidine, 20 mg, Oral, BID AC  folic acid, 1 mg, Oral, Daily  hydrocortisone-bacitracin-zinc oxide-nystatin, 1 application , Topical, Q12H  mirtazapine, 15 mg, Oral, Nightly  multivitamin with minerals, 1 tablet, Oral, Daily  potassium chloride, 20 mEq, Oral, Daily  propranolol, 10 mg, Oral, Daily  risperiDONE, 2 mg, Oral, BID  senna-docusate sodium, 2 tablet, Oral, BID  thiamine, 100 mg, Oral, Daily  cyanocobalamin, 1,000 mcg, Oral, Daily      Continuous Infusions:   PRN Meds:.  acetaminophen    albuterol    senna-docusate sodium **AND** polyethylene glycol **AND** bisacodyl **AND** bisacodyl    cloNIDine    hydrOXYzine    Magnesium Standard Dose Replacement - Follow Nurse / BPA Driven Protocol    melatonin    ondansetron **OR** ondansetron    [COMPLETED] Insert Peripheral IV **AND** sodium chloride    Assessment & Plan   Assessment & Plan     Active Hospital Problems    Diagnosis  POA    **Alcohol withdrawal [F10.939]  Yes    COPD (chronic obstructive pulmonary disease) [J44.9]  Unknown    Essential hypertension, benign [I10]  Unknown    Alcohol withdrawal syndrome with complication [F10.939]  Yes      Resolved Hospital Problems   No resolved problems to display.        Brief Hospital Course to date:  Nilesh Zapata is a 63 y.o.  male with a history of renal cell carcinoma s/p left nephrectomy, alcohol dependence with withdrawal admitted with Alcohol withdrawal.     Alcohol use disorder with withdrawal  -Access team consulted to provide resources for sobriety  -Receiving multivitamin, thiamine, folate  -Patient does not seem to tolerate Ativan/causes him to become agitated.  DC phenobarb, received taper Librium  - Atarax as needed for anxiety.   -Withdrawal now resolved     Hypertension  - continue amlodipine, dose decreased     COPD  -Continue home inhalers/as needed nebs, respiratory rate stable     Mild thrombocytopenia: Likely related to alcohol use.  Recommend primary care follow-up with continued monitoring of platelet counts and could consider further outpatient work-up as needed.  Encourage nutrition    Debility: Improving.  Working with PT.  Now ambulating greater than 150 feet with use of rolling walker    Disposition: I expect the patient to be discharged either home or [facility] when able    CODE STATUS:   Code Status and Medical Interventions:   Ordered at: 09/03/23 1551     Code Status (Patient has no pulse and is not breathing):    CPR (Attempt to Resuscitate)     Medical Interventions (Patient has pulse or is breathing):    Full       Mariusz Powers MD  09/09/23

## 2023-09-09 NOTE — PLAN OF CARE
Problem: Adult Inpatient Plan of Care  Goal: Plan of Care Review  Outcome: Ongoing, Progressing  Flowsheets (Taken 9/9/2023 1433)  Progress: improving  Plan of Care Reviewed With: patient  Outcome Evaluation: Patient has been pleasant and cooperative during shift. No complaints of pain, nausea or SOA. Trying to keep patient off O2. He has need 2LO2 when sleeping due to decreased O2 Sat. Looking for placement at [facility]. AOx4, assist x1, room air/2LO2, SR. Will continue to monitor and assist patient as needed.  Goal: Patient-Specific Goal (Individualized)  Outcome: Ongoing, Progressing  Goal: Absence of Hospital-Acquired Illness or Injury  Outcome: Ongoing, Progressing  Intervention: Identify and Manage Fall Risk  Recent Flowsheet Documentation  Taken 9/9/2023 1200 by Charles Mendez RN  Safety Promotion/Fall Prevention:   assistive device/personal items within reach   fall prevention program maintained   nonskid shoes/slippers when out of bed   safety round/check completed  Taken 9/9/2023 1000 by Charles Mendez RN  Safety Promotion/Fall Prevention:   assistive device/personal items within reach   fall prevention program maintained   nonskid shoes/slippers when out of bed   safety round/check completed  Taken 9/9/2023 0834 by Charles Mendez RN  Safety Promotion/Fall Prevention:   assistive device/personal items within reach   fall prevention program maintained   nonskid shoes/slippers when out of bed   safety round/check completed  Intervention: Prevent Skin Injury  Recent Flowsheet Documentation  Taken 9/9/2023 1200 by Charles Mendez RN  Body Position: supine  Taken 9/9/2023 1000 by Charles Mendez RN  Body Position: supine  Taken 9/9/2023 0834 by Charles Mendez RN  Body Position: supine  Goal: Optimal Comfort and Wellbeing  Outcome: Ongoing, Progressing  Goal: Readiness for Transition of Care  Outcome: Ongoing, Progressing     Problem: Fall Injury Risk  Goal: Absence of Fall  and Fall-Related Injury  Outcome: Ongoing, Progressing  Intervention: Promote Injury-Free Environment  Recent Flowsheet Documentation  Taken 9/9/2023 1200 by Charles Mendez RN  Safety Promotion/Fall Prevention:   assistive device/personal items within reach   fall prevention program maintained   nonskid shoes/slippers when out of bed   safety round/check completed  Taken 9/9/2023 1000 by Charles Mendez RN  Safety Promotion/Fall Prevention:   assistive device/personal items within reach   fall prevention program maintained   nonskid shoes/slippers when out of bed   safety round/check completed  Taken 9/9/2023 0834 by Charles Mendez, RN  Safety Promotion/Fall Prevention:   assistive device/personal items within reach   fall prevention program maintained   nonskid shoes/slippers when out of bed   safety round/check completed   Goal Outcome Evaluation:  Plan of Care Reviewed With: patient        Progress: improving  Outcome Evaluation: Patient has been pleasant and cooperative during shift. No complaints of pain, nausea or SOA. Trying to keep patient off O2. He has need 2LO2 when sleeping due to decreased O2 Sat. Looking for placement at [facility]. AOx4, assist x1, room air/2LO2, SR. Will continue to monitor and assist patient as needed.

## 2023-09-09 NOTE — NURSING NOTE
"[facility] follow-up d/t ETOH.    Patient RIB. The patient reported feeling \"pretty good.\" The patient reported \"alright\" sleep. The patient denied ETOH withdrawal issues. Last CIWA 0. The patient's Rn reported the patient had some confusion a couple of nights ago but has since resolved. The patient denied any other current needs from [facility] at this time. Access following.   "

## 2023-09-10 PROCEDURE — 94761 N-INVAS EAR/PLS OXIMETRY MLT: CPT

## 2023-09-10 PROCEDURE — 94760 N-INVAS EAR/PLS OXIMETRY 1: CPT

## 2023-09-10 PROCEDURE — 94799 UNLISTED PULMONARY SVC/PX: CPT

## 2023-09-10 RX ADMIN — BUDESONIDE AND FORMOTEROL FUMARATE DIHYDRATE 2 PUFF: 160; 4.5 AEROSOL RESPIRATORY (INHALATION) at 21:14

## 2023-09-10 RX ADMIN — FAMOTIDINE 20 MG: 20 TABLET, FILM COATED ORAL at 16:32

## 2023-09-10 RX ADMIN — POTASSIUM CHLORIDE 20 MEQ: 1.5 POWDER, FOR SOLUTION ORAL at 08:19

## 2023-09-10 RX ADMIN — ZINC OXIDE 1 APPLICATION: 200 OINTMENT TOPICAL at 08:20

## 2023-09-10 RX ADMIN — FOLIC ACID 1 MG: 1 TABLET ORAL at 08:19

## 2023-09-10 RX ADMIN — Medication 100 MG: at 08:19

## 2023-09-10 RX ADMIN — AMLODIPINE BESYLATE 2.5 MG: 2.5 TABLET ORAL at 08:19

## 2023-09-10 RX ADMIN — BUDESONIDE AND FORMOTEROL FUMARATE DIHYDRATE 2 PUFF: 160; 4.5 AEROSOL RESPIRATORY (INHALATION) at 08:05

## 2023-09-10 RX ADMIN — FAMOTIDINE 20 MG: 20 TABLET, FILM COATED ORAL at 06:31

## 2023-09-10 RX ADMIN — MIRTAZAPINE 15 MG: 15 TABLET, FILM COATED ORAL at 20:12

## 2023-09-10 RX ADMIN — RISPERIDONE 2 MG: 2 TABLET, FILM COATED ORAL at 08:19

## 2023-09-10 RX ADMIN — ZINC OXIDE 1 APPLICATION: 200 OINTMENT TOPICAL at 20:12

## 2023-09-10 RX ADMIN — Medication 1000 MCG: at 08:19

## 2023-09-10 RX ADMIN — PROPRANOLOL HYDROCHLORIDE 10 MG: 10 TABLET ORAL at 08:19

## 2023-09-10 RX ADMIN — HYDROXYZINE HYDROCHLORIDE 25 MG: 25 TABLET ORAL at 23:31

## 2023-09-10 RX ADMIN — TIOTROPIUM BROMIDE INHALATION SPRAY 2 PUFF: 3.12 SPRAY, METERED RESPIRATORY (INHALATION) at 08:06

## 2023-09-10 RX ADMIN — MULTIPLE VITAMINS W/ MINERALS TAB 1 TABLET: TAB at 08:19

## 2023-09-10 RX ADMIN — RISPERIDONE 2 MG: 2 TABLET, FILM COATED ORAL at 20:12

## 2023-09-10 NOTE — PLAN OF CARE
Problem: Adult Inpatient Plan of Care  Goal: Plan of Care Review  Outcome: Ongoing, Progressing  Flowsheets  Taken 9/10/2023 1416  Plan of Care Reviewed With: patient  Outcome Evaluation: Patient has been pleasant and cooperative during shift. AOx4, assist x1, room air/1LO2, SR. Transfer order placed to move patient to med/surg. No complaints of pain, nausea or SOA. Waiting on placement to [facility]. Will continue to monitor and assist patient as needed.  Taken 9/9/2023 1433  Progress: improving  Goal: Patient-Specific Goal (Individualized)  Outcome: Ongoing, Progressing  Goal: Absence of Hospital-Acquired Illness or Injury  Outcome: Ongoing, Progressing  Intervention: Identify and Manage Fall Risk  Recent Flowsheet Documentation  Taken 9/10/2023 1330 by Charles Mendez RN  Safety Promotion/Fall Prevention:   assistive device/personal items within reach   fall prevention program maintained   nonskid shoes/slippers when out of bed   safety round/check completed  Taken 9/10/2023 1200 by Charles Mendez RN  Safety Promotion/Fall Prevention:   assistive device/personal items within reach   fall prevention program maintained   nonskid shoes/slippers when out of bed   safety round/check completed  Taken 9/10/2023 1000 by Charles Mendez RN  Safety Promotion/Fall Prevention:   assistive device/personal items within reach   fall prevention program maintained   nonskid shoes/slippers when out of bed   safety round/check completed  Taken 9/10/2023 0821 by Charles Mendez RN  Safety Promotion/Fall Prevention:   assistive device/personal items within reach   fall prevention program maintained   nonskid shoes/slippers when out of bed   safety round/check completed  Intervention: Prevent Skin Injury  Recent Flowsheet Documentation  Taken 9/10/2023 1330 by Charles Mendez RN  Body Position:   right   position changed independently   side-lying  Taken 9/10/2023 1200 by Charles Mendez RN  Body  Position: supine  Taken 9/10/2023 1000 by Charles Mendez RN  Body Position:   right   turned   position changed independently  Taken 9/10/2023 0821 by Charles Mendez RN  Body Position: supine  Goal: Optimal Comfort and Wellbeing  Outcome: Ongoing, Progressing  Goal: Readiness for Transition of Care  Outcome: Ongoing, Progressing     Problem: Fall Injury Risk  Goal: Absence of Fall and Fall-Related Injury  Outcome: Ongoing, Progressing  Intervention: Promote Injury-Free Environment  Recent Flowsheet Documentation  Taken 9/10/2023 1330 by Charles Mendez RN  Safety Promotion/Fall Prevention:   assistive device/personal items within reach   fall prevention program maintained   nonskid shoes/slippers when out of bed   safety round/check completed  Taken 9/10/2023 1200 by Charles Mendez RN  Safety Promotion/Fall Prevention:   assistive device/personal items within reach   fall prevention program maintained   nonskid shoes/slippers when out of bed   safety round/check completed  Taken 9/10/2023 1000 by Charles Mendez RN  Safety Promotion/Fall Prevention:   assistive device/personal items within reach   fall prevention program maintained   nonskid shoes/slippers when out of bed   safety round/check completed  Taken 9/10/2023 0821 by Charles Mendez RN  Safety Promotion/Fall Prevention:   assistive device/personal items within reach   fall prevention program maintained   nonskid shoes/slippers when out of bed   safety round/check completed   Goal Outcome Evaluation:  Plan of Care Reviewed With: patient        Progress: improving  Outcome Evaluation: Patient has been pleasant and cooperative during shift. AOx4, assist x1, room air/1LO2, SR. Transfer order placed to move patient to med/surg. No complaints of pain, nausea or SOA. Waiting on placement to [facility]. Will continue to monitor and assist patient as needed.

## 2023-09-10 NOTE — NURSING NOTE
"Access follow-up due to alcohol use.  Patient is currently resting in bed, he is pleasant.  He states he is eager to discharge, expresses some anxiety surrounding the unknown regarding his discharge plan.  He states he plans to go to [facility] in [place] and is awaiting facility contact regarding this.  He states his appetite is good.  He states \"anxiety of waiting and not knowing\" regarding his discharge.  He rates depression a 7/10 today which he states is around his baseline.  Supportive therapy provided.  Access will continue to follow.  "

## 2023-09-10 NOTE — PROGRESS NOTES
[facility] Medicine Services  PROGRESS NOTE    Patient Name: Nilesh Zapata  : 1960  MRN: [id number]    Date of Admission: 9/3/2023  Primary Care Physician: Provider, No Known    Subjective   Subjective     CC:  Follow-up for recent chest pain and withdrawal    Subjective:  No current withdrawal.  No current chest pain.  Patient would like to have telemetry off.  He still wants to go to rehabilitation no updates so far.    Review of Systems  No current fevers or chills  No current shortness of breath or cough  No current nausea, vomiting, or diarrhea  No current chest pain or palpitations      Objective   Objective     Vital Signs:   Temp:  [97.2 °F (36.2 °C)-98.6 °F (37 °C)] 97.5 °F (36.4 °C)  Heart Rate:  [76-89] 87  Resp:  [18-20] 18  BP: ()/(63-86) 124/86        Physical Exam:  Constitutional:Awake, alert  HENT: NCAT, mucous membranes moist, neck supple  Respiratory:  nonlabored breathing   Cardiovascular: Pulse rate is normal, normal radial pulses  Gastrointestinal: Positive bowel sounds, soft, nontender, nondistended  Musculoskeletal: Normal musculature for age, no lower extremity edema, BMI 29  Psychiatric: Appropriate affect, cooperative, conversational  Neurologic: No slurred speech or facial droop, follows commands  Skin: No rashes or jaundice, warm      Results Reviewed:  Results from last 7 days   Lab Units 23  0652 23  0620 23  0536   WBC 10*3/mm3 7.11 7.99 6.25   HEMOGLOBIN g/dL 14.6 15.2 15.0   HEMATOCRIT % 42.6 43.8 43.7   PLATELETS 10*3/mm3 116* 124* 131*       Results from last 7 days   Lab Units 23  0652 23  0620 23  0536 23  0603 23  1546 23  1322   SODIUM mmol/L 136 138 139   < >  --  138   POTASSIUM mmol/L 4.1 3.6 3.6   < >  --  3.6   CHLORIDE mmol/L 99 100 101   < >  --  99   CO2 mmol/L 27.9 27.0 23.9   < >  --  23.2   BUN mg/dL 10 7* 6*   < >  --  6*   CREATININE mg/dL 0.91 0.74* 0.84   < >  --  0.85   GLUCOSE mg/dL 77 75  93   < >  --  110*   CALCIUM mg/dL 9.5 9.8 9.3   < >  --  9.4   ALT (SGPT) U/L 20 20 20  --   --  24   AST (SGOT) U/L 31 40 33  --   --  39   HSTROP T ng/L  --   --   --   --  11 10   PROBNP pg/mL  --   --   --   --   --  36.1    < > = values in this interval not displayed.       Estimated Creatinine Clearance: 92.9 mL/min (by C-G formula based on SCr of 0.91 mg/dL).    Microbiology Results Abnormal       None            Imaging Results (Last 24 Hours)       ** No results found for the last 24 hours. **                I have reviewed the medications:  Scheduled Meds:amLODIPine, 2.5 mg, Oral, Daily  budesonide-formoterol, 2 puff, Inhalation, BID - RT   And  tiotropium bromide monohydrate, 2 puff, Inhalation, Daily - RT  famotidine, 20 mg, Oral, BID AC  folic acid, 1 mg, Oral, Daily  hydrocortisone-bacitracin-zinc oxide-nystatin, 1 application , Topical, Q12H  mirtazapine, 15 mg, Oral, Nightly  multivitamin with minerals, 1 tablet, Oral, Daily  potassium chloride, 20 mEq, Oral, Daily  propranolol, 10 mg, Oral, Daily  risperiDONE, 2 mg, Oral, BID  senna-docusate sodium, 2 tablet, Oral, BID  thiamine, 100 mg, Oral, Daily  cyanocobalamin, 1,000 mcg, Oral, Daily      Continuous Infusions:   PRN Meds:.  acetaminophen    albuterol    senna-docusate sodium **AND** polyethylene glycol **AND** bisacodyl **AND** bisacodyl    cloNIDine    hydrOXYzine    Magnesium Standard Dose Replacement - Follow Nurse / BPA Driven Protocol    melatonin    ondansetron **OR** ondansetron    [COMPLETED] Insert Peripheral IV **AND** sodium chloride    Assessment & Plan   Assessment & Plan     Active Hospital Problems    Diagnosis  POA    **Alcohol withdrawal [F10.939]  Yes    COPD (chronic obstructive pulmonary disease) [J44.9]  Unknown    Essential hypertension, benign [I10]  Unknown    Alcohol withdrawal syndrome with complication [F10.939]  Yes      Resolved Hospital Problems   No resolved problems to display.        Brief Hospital Course to  date:  Nilesh Zapata is a 63 y.o. male with a history of renal cell carcinoma s/p left nephrectomy, alcohol dependence with withdrawal admitted with Alcohol withdrawal.     Discussion/plan for today:  Withdrawal remains resolved.  Blood pressure better on lower amlodipine dose.  Plan to transfer to [facility] when possible attempting to get further updates from case management.  Discontinue telemetry and transfer to [facility].    Alcohol use disorder with withdrawal  -Access team consulted to provide resources for sobriety  -Receiving multivitamin, thiamine, folate  -Patient does not seem to tolerate Ativan/causes him to become agitated.  DC phenobarb, received taper Librium  - Atarax as needed for anxiety.   -Withdrawal now resolved     Hypertension  - continue amlodipine, dose decreased     COPD  -Continue home inhalers/as needed nebs, respiratory rate stable     Mild thrombocytopenia: Likely related to alcohol use.  Recommend primary care follow-up with continued monitoring of platelet counts and could consider further outpatient work-up as needed.  Encourage nutrition    Debility: Improving.  Working with PT.  Now ambulating greater than 150 feet with use of rolling walker    Disposition: I expect the patient to be discharged either home or [facility] when able    CODE STATUS:   Code Status and Medical Interventions:   Ordered at: 09/03/23 1551     Code Status (Patient has no pulse and is not breathing):    CPR (Attempt to Resuscitate)     Medical Interventions (Patient has pulse or is breathing):    Full       Mariusz Powers MD  09/10/23

## 2023-09-10 NOTE — PLAN OF CARE
Goal Outcome Evaluation:        Patient is resting well at this time and denies any pain or discomfort His lungs are clear to diminished bilaterally. VSS, he is able to make his needs known, and his CIWA scoring has been zero this shift. Plans to D/C to a rehabilitation facility soon have been made.

## 2023-09-11 ENCOUNTER — READMISSION MANAGEMENT (OUTPATIENT)
Dept: CALL CENTER | Facility: HOSPITAL | Age: 63
End: 2023-09-11
Payer: MEDICARE

## 2023-09-11 VITALS
TEMPERATURE: 98.2 F | HEIGHT: 70 IN | SYSTOLIC BLOOD PRESSURE: 91 MMHG | RESPIRATION RATE: 16 BRPM | DIASTOLIC BLOOD PRESSURE: 63 MMHG | BODY MASS INDEX: 29.51 KG/M2 | HEART RATE: 82 BPM | WEIGHT: 206.13 LBS | OXYGEN SATURATION: 94 %

## 2023-09-11 PROCEDURE — 94799 UNLISTED PULMONARY SVC/PX: CPT

## 2023-09-11 PROCEDURE — 94761 N-INVAS EAR/PLS OXIMETRY MLT: CPT

## 2023-09-11 PROCEDURE — 94664 DEMO&/EVAL PT USE INHALER: CPT

## 2023-09-11 PROCEDURE — 94760 N-INVAS EAR/PLS OXIMETRY 1: CPT

## 2023-09-11 RX ORDER — CLONIDINE HYDROCHLORIDE 0.1 MG/1
0.1 TABLET ORAL EVERY 4 HOURS PRN
Qty: 30 TABLET | Refills: 0 | Status: SHIPPED | OUTPATIENT
Start: 2023-09-11

## 2023-09-11 RX ORDER — PROPRANOLOL HYDROCHLORIDE 10 MG/1
10 TABLET ORAL DAILY
Qty: 30 TABLET | Refills: 0 | Status: SHIPPED | OUTPATIENT
Start: 2023-09-12

## 2023-09-11 RX ORDER — AMLODIPINE BESYLATE 5 MG/1
2.5 TABLET ORAL DAILY
Qty: 30 TABLET | Refills: 0 | Status: SHIPPED | OUTPATIENT
Start: 2023-09-11

## 2023-09-11 RX ADMIN — POTASSIUM CHLORIDE 20 MEQ: 1.5 POWDER, FOR SOLUTION ORAL at 10:16

## 2023-09-11 RX ADMIN — BUDESONIDE AND FORMOTEROL FUMARATE DIHYDRATE 2 PUFF: 160; 4.5 AEROSOL RESPIRATORY (INHALATION) at 07:26

## 2023-09-11 RX ADMIN — Medication 1000 MCG: at 10:16

## 2023-09-11 RX ADMIN — PROPRANOLOL HYDROCHLORIDE 10 MG: 10 TABLET ORAL at 10:16

## 2023-09-11 RX ADMIN — MULTIPLE VITAMINS W/ MINERALS TAB 1 TABLET: TAB at 10:16

## 2023-09-11 RX ADMIN — Medication 100 MG: at 10:16

## 2023-09-11 RX ADMIN — RISPERIDONE 2 MG: 2 TABLET, FILM COATED ORAL at 10:22

## 2023-09-11 RX ADMIN — AMLODIPINE BESYLATE 2.5 MG: 2.5 TABLET ORAL at 10:16

## 2023-09-11 RX ADMIN — FAMOTIDINE 20 MG: 20 TABLET, FILM COATED ORAL at 07:04

## 2023-09-11 RX ADMIN — Medication 10 ML: at 10:17

## 2023-09-11 RX ADMIN — TIOTROPIUM BROMIDE INHALATION SPRAY 2 PUFF: 3.12 SPRAY, METERED RESPIRATORY (INHALATION) at 07:25

## 2023-09-11 RX ADMIN — ZINC OXIDE 1 APPLICATION: 200 OINTMENT TOPICAL at 08:00

## 2023-09-11 RX ADMIN — FOLIC ACID 1 MG: 1 TABLET ORAL at 10:16

## 2023-09-11 NOTE — CASE MANAGEMENT/SOCIAL WORK
Continued Stay Note  [facility]     Patient Name: Nilesh Zapata  MRN: [id number]  Today's Date: 9/11/2023    Admit Date: 9/3/2023    Plan: Home w/ list of intensive outpatient [facility] treatment list   Discharge Plan       Row Name 09/11/23 1338       Plan    Plan Home w/ list of intensive outpatient [facility] treatment list    Plan Comments CCP spoke with Shayla/[facility] who states they do not accept patient's insurance as it is out of state HMO plan. CCP manager Shital DAMIAN updated who states Medicare will not cover inpatient substance use treatment. CCP spoke with patient at bedside and updated him on dc plan. Patient is agreeable to participating in intensive outpatient substance use treatment. CCP provided list of intensive outpatient programs for substance use treatment. CCP spoke with patient's spouse over the phone and updated her. MD & RN updated. MIHIR, CSW                   Discharge Codes    No documentation.                 Expected Discharge Date and Time       Expected Discharge Date Expected Discharge Time    Sep 12, 2023               FLORIN Yepez

## 2023-09-11 NOTE — NURSING NOTE
Access follow-up regards ETOH:    The pt was found sleeping and would not awaken for interview. Primary RN reported there have been no issues. He is awaiting possible treatment at [facility] in [place] - they need to speak with the pt for intake.    Access following.

## 2023-09-11 NOTE — PLAN OF CARE
Goal Outcome Evaluation:  Plan of Care Reviewed With: patient        Progress: no change  Outcome Evaluation: Pt quiet and calm this shift, nad, ciwa 0, will be d/c home , spouse to  pt.

## 2023-09-11 NOTE — DISCHARGE SUMMARY
Patient Name: Nilesh Zapata  : 1960  MRN: [id number]    Date of Admission: 9/3/2023  Date of Discharge:  2023  Primary Care Physician: Provider, No Known      Chief Complaint:   Chest Pain and Withdrawal      Discharge Diagnoses     Active Hospital Problems    Diagnosis  POA    **Alcohol withdrawal [F10.939]  Yes    COPD (chronic obstructive pulmonary disease) [J44.9]  Unknown    Essential hypertension, benign [I10]  Unknown    Alcohol withdrawal syndrome with complication [F10.939]  Yes      Resolved Hospital Problems   No resolved problems to display.        Brief Admitting HPI     62 year old gentleman who presented to the emergency room with chest pain and tremors as well as palpitations; he stopped drinking alcohol yesterday;he has a history of alcohol dependence and complicate withdrawal; he was admitted for the same last month and has been drinking daily since discharge; no fever or chills; no visitors are present     Hospital Course     Pt admitted for alcohol withdrawal.  He was monitored on CIWA protocol and had gradual improvement in his symptoms.  Original plan was to go to intensive inpatient treatment, however for insurance reasons patient's stay was declined.  He was given a list of intensive outpatient STD treatment options and this is his plan upon discharge.Tolerating full p.o. diet and ambulatory independently.  He is stable for discharge at this time.    Day of Discharge     Physical Exam:  Temp:  [97.5 °F (36.4 °C)-97.7 °F (36.5 °C)] 97.7 °F (36.5 °C)  Heart Rate:  [78-95] 93  Resp:  [16] 16  BP: (101)/(64-70) 101/64  Body mass index is 29.58 kg/m².  Physical Exam  Constitutional:       Appearance: Normal appearance.   HENT:      Head: Normocephalic and atraumatic.      Mouth/Throat:      Mouth: Mucous membranes are moist.      Pharynx: No oropharyngeal exudate or posterior oropharyngeal erythema.   Eyes:      Extraocular Movements: Extraocular movements intact.      Pupils:  Pupils are equal, round, and reactive to light.   Cardiovascular:      Rate and Rhythm: Normal rate and regular rhythm.      Heart sounds: No murmur heard.    No gallop.   Pulmonary:      Effort: Pulmonary effort is normal. No respiratory distress.      Breath sounds: Normal breath sounds. No wheezing.   Abdominal:      General: Abdomen is flat. There is no distension.      Palpations: Abdomen is soft.      Tenderness: There is no abdominal tenderness. There is no guarding.   Musculoskeletal:         General: No swelling, tenderness or deformity. Normal range of motion.   Skin:     General: Skin is warm.      Coloration: Skin is not jaundiced.      Findings: No bruising.   Neurological:      General: No focal deficit present.      Mental Status: He is alert and oriented to person, place, and time.      Cranial Nerves: No cranial nerve deficit.   Psychiatric:         Mood and Affect: Mood normal.         Behavior: Behavior normal.       Consultants     Consult Orders (all) (From admission, onward)       Start     Ordered    09/03/23 1552  Inpatient consult to [facility]  Once        Provider:  (Not yet assigned)    09/03/23 1552    09/03/23 1407  [facility] (on-call MD unless specified) Details  Once        Specialty:  Hospitalist  Provider:  Shanel Talavera MD    09/03/23 1406                  Procedures     * Surgery not found *      Imaging Results (All)       Procedure Component Value Units Date/Time    XR Chest 1 View [[id number]] Collected: 09/03/23 1407     Updated: 09/03/23 1411    Narrative:      PORTABLE CHEST X-RAY     HISTORY: Chest pain, alcohol withdrawal..     Portable chest x-ray is provided. Correlation: August 17, 2023.     FINDINGS: The cardiomediastinal silhouette is normal. The lungs are  clear. The costophrenic sulci are dry and the bones appear normal. There  is no pneumothorax.       Impression:      Negative.     This report was finalized on 9/3/2023 2:08 PM by Dr. Chaz Deluca M.D.                  Pertinent Labs     Results from last 7 days   Lab Units 09/07/23  0652 09/06/23  0620 09/05/23  0536   WBC 10*3/mm3 7.11 7.99 6.25   HEMOGLOBIN g/dL 14.6 15.2 15.0   PLATELETS 10*3/mm3 116* 124* 131*     Results from last 7 days   Lab Units 09/07/23  0652 09/06/23  0620 09/05/23  0536   SODIUM mmol/L 136 138 139   POTASSIUM mmol/L 4.1 3.6 3.6   CHLORIDE mmol/L 99 100 101   CO2 mmol/L 27.9 27.0 23.9   BUN mg/dL 10 7* 6*   CREATININE mg/dL 0.91 0.74* 0.84   GLUCOSE mg/dL 77 75 93   EGFR mL/min/1.73 94.1 101.2 97.4     Results from last 7 days   Lab Units 09/07/23  0652 09/06/23  0620 09/05/23  0536   ALBUMIN g/dL 3.6 4.0 3.9   BILIRUBIN mg/dL 0.7 0.6 0.8   ALK PHOS U/L 97 111 112   AST (SGOT) U/L 31 40 33   ALT (SGPT) U/L 20 20 20     Results from last 7 days   Lab Units 09/07/23  0652 09/06/23  0620 09/05/23  0536   CALCIUM mg/dL 9.5 9.8 9.3   ALBUMIN g/dL 3.6 4.0 3.9   MAGNESIUM mg/dL 1.6 1.7 1.6   PHOSPHORUS mg/dL 3.1 3.2 2.5               Invalid input(s): LDLCALC          Test Results Pending at Discharge       Discharge Details        Discharge Medications        Changes to Medications        Instructions Start Date   amLODIPine 5 MG tablet  Commonly known as: NORVASC  What changed: how much to take   2.5 mg, Oral, Daily      cloNIDine 0.1 MG tablet  Commonly known as: CATAPRES  What changed:   when to take this  reasons to take this  additional instructions   0.1 mg, Oral, Every 4 Hours PRN      propranolol 10 MG tablet  Commonly known as: INDERAL  What changed: when to take this   10 mg, Oral, Daily   Start Date: September 12, 2023            Continue These Medications        Instructions Start Date   cyanocobalamin 1000 MCG tablet  Commonly known as: VITAMIN B-12   1,000 mcg, Oral, Daily      famotidine 20 MG tablet  Commonly known as: PEPCID   20 mg, Oral, 2 Times Daily Before Meals      fluvoxaMINE 25 MG tablet  Commonly known as: LUVOX   100 mg, Oral, Daily      folic acid 1 MG  tablet  Commonly known as: FOLVITE   1 mg, Oral, Daily      hydrOXYzine 25 MG tablet  Commonly known as: ATARAX   25 mg, Oral, Every 8 Hours PRN      melatonin 3 MG tablet   3 mg, Oral, Nightly PRN      mirtazapine 15 MG tablet  Commonly known as: REMERON   15 mg, Oral, Nightly      multivitamin tablet tablet   1 tablet, Oral, Daily      nystatin 039500 UNIT/GM cream  Commonly known as: MYCOSTATIN   1 application , Topical, 2 Times Daily      O2  Commonly known as: OXYGEN   2 L/min, Inhalation, As Needed, use PRN for SOB      potassium chloride 20 MEQ packet  Commonly known as: KLOR-CON   20 mEq, Oral, Daily      risperiDONE 2 MG tablet  Commonly known as: risperDAL   2 mg, Oral, 2 Times Daily      Trelegy Ellipta 100-62.5-25 MCG/ACT inhaler  Generic drug: Fluticasone-Umeclidin-Vilant   1 puff, Inhalation, Daily - RT      TRIPLE ANTIBIOTIC EX   1 application , Topical, As Needed, PRN for skin irritation                No Known Allergies    Discharge Disposition:  Home or Self Care      Discharge Diet:  Diet Order   Procedures    Diet: Cardiac Diets; Healthy Heart (2-3 Na+); Texture: Regular Texture (IDDSI 7); Fluid Consistency: Thin (IDDSI 0)       Discharge Activity:   Activity Instructions       Activity as Tolerated              CODE STATUS:    Code Status and Medical Interventions:   Ordered at: 09/03/23 1551     Code Status (Patient has no pulse and is not breathing):    CPR (Attempt to Resuscitate)     Medical Interventions (Patient has pulse or is breathing):    Full       No future appointments.   Follow-up Information       Provider, No Known .    Contact information:  [facility] [address] [phone]                             Time Spent on Discharge:  Greater than 30 minutes spent on discharge management including final examination, discussion of hospital stay and patient education, preparation of records, medication reconciliation, follow up planning      Negro Torres,  MD [facility]  09/11/23  15:17 EDT

## 2023-09-11 NOTE — PLAN OF CARE
Goal Outcome Evaluation:   Pt AO x4, RA, sr on monitor, no c/o pain or soa, administered meds as scheduled, prn atarax x 1 this shift as per pt request, no ss of acute distress noted.      Problem: Adult Inpatient Plan of Care  Goal: Plan of Care Review  Outcome: Ongoing, Progressing  Goal: Patient-Specific Goal (Individualized)  Outcome: Ongoing, Progressing  Goal: Absence of Hospital-Acquired Illness or Injury  Outcome: Ongoing, Progressing  Intervention: Identify and Manage Fall Risk  Recent Flowsheet Documentation  Taken 9/11/2023 0601 by Lucio Hall RN  Safety Promotion/Fall Prevention:   activity supervised   safety round/check completed   room organization consistent  Taken 9/11/2023 0412 by Lucio Hall RN  Safety Promotion/Fall Prevention:   activity supervised   safety round/check completed   room organization consistent  Taken 9/11/2023 0212 by Lucio Hall RN  Safety Promotion/Fall Prevention:   activity supervised   safety round/check completed   room organization consistent  Taken 9/11/2023 0001 by Lucio Hall RN  Safety Promotion/Fall Prevention:   activity supervised   safety round/check completed   room organization consistent  Taken 9/10/2023 2201 by Lucio Hall RN  Safety Promotion/Fall Prevention:   activity supervised   safety round/check completed   room organization consistent  Taken 9/10/2023 2011 by Lucio Hall RN  Safety Promotion/Fall Prevention:   activity supervised   safety round/check completed   room organization consistent  Intervention: Prevent Skin Injury  Recent Flowsheet Documentation  Taken 9/11/2023 0601 by Lucio Hall RN  Body Position: position changed independently  Taken 9/11/2023 0412 by Lucio Hall RN  Body Position: position changed independently  Taken 9/11/2023 0212 by Lucio Hall RN  Body Position: position changed independently  Taken 9/11/2023 0001 by Lucio Hall RN  Body Position: position changed  independently  Skin Protection: adhesive use limited  Taken 9/10/2023 2201 by Lucio Hall RN  Body Position: position changed independently  Taken 9/10/2023 2011 by Lucio Hall RN  Body Position: position changed independently  Skin Protection: adhesive use limited  Intervention: Prevent and Manage VTE (Venous Thromboembolism) Risk  Recent Flowsheet Documentation  Taken 9/11/2023 0001 by Lucio Hall RN  Activity Management: activity encouraged  Range of Motion: active ROM (range of motion) encouraged  Taken 9/10/2023 2011 by Lucio Hall RN  VTE Prevention/Management: patient refused intervention  Range of Motion: active ROM (range of motion) encouraged  Intervention: Prevent Infection  Recent Flowsheet Documentation  Taken 9/11/2023 0601 by Lucio Hall RN  Infection Prevention:   hand hygiene promoted   rest/sleep promoted  Taken 9/11/2023 0412 by Lucio Hall RN  Infection Prevention:   hand hygiene promoted   single patient room provided   rest/sleep promoted  Taken 9/11/2023 0212 by Lucio Hall RN  Infection Prevention:   hand hygiene promoted   rest/sleep promoted  Taken 9/11/2023 0001 by Lucio Hall RN  Infection Prevention:   hand hygiene promoted   rest/sleep promoted  Taken 9/10/2023 2201 by Lucio Hall RN  Infection Prevention:   hand hygiene promoted   rest/sleep promoted  Taken 9/10/2023 2011 by Lucio Hall RN  Infection Prevention:   hand hygiene promoted   rest/sleep promoted  Goal: Optimal Comfort and Wellbeing  Outcome: Ongoing, Progressing  Intervention: Provide Person-Centered Care  Recent Flowsheet Documentation  Taken 9/11/2023 0001 by Lucio Hall RN  Trust Relationship/Rapport:   care explained   choices provided  Taken 9/10/2023 2011 by Lucio Hall RN  Trust Relationship/Rapport:   care explained   choices provided  Goal: Readiness for Transition of Care  Outcome: Ongoing, Progressing     Problem: Fall Injury  Risk  Goal: Absence of Fall and Fall-Related Injury  Outcome: Ongoing, Progressing  Intervention: Identify and Manage Contributors  Recent Flowsheet Documentation  Taken 9/11/2023 0601 by Lucio Hall RN  Medication Review/Management: medications reviewed  Taken 9/11/2023 0412 by Lucio Hall RN  Medication Review/Management: medications reviewed  Taken 9/11/2023 0212 by Lucio Hall RN  Medication Review/Management: medications reviewed  Taken 9/11/2023 0001 by Lucio Hall RN  Medication Review/Management: medications reviewed  Self-Care Promotion: independence encouraged  Taken 9/10/2023 2201 by Lucio Hall RN  Medication Review/Management: medications reviewed  Taken 9/10/2023 2011 by Lucio Hall RN  Medication Review/Management: medications reviewed  Intervention: Promote Injury-Free Environment  Recent Flowsheet Documentation  Taken 9/11/2023 0601 by Lucio Hall RN  Safety Promotion/Fall Prevention:   activity supervised   safety round/check completed   room organization consistent  Taken 9/11/2023 0412 by Lucio Hall RN  Safety Promotion/Fall Prevention:   activity supervised   safety round/check completed   room organization consistent  Taken 9/11/2023 0212 by Lucio Hall RN  Safety Promotion/Fall Prevention:   activity supervised   safety round/check completed   room organization consistent  Taken 9/11/2023 0001 by Lucio Hall RN  Safety Promotion/Fall Prevention:   activity supervised   safety round/check completed   room organization consistent  Taken 9/10/2023 2201 by Lucio Hall RN  Safety Promotion/Fall Prevention:   activity supervised   safety round/check completed   room organization consistent  Taken 9/10/2023 2011 by Lucio Hall RN  Safety Promotion/Fall Prevention:   activity supervised   safety round/check completed   room organization consistent

## 2023-09-12 NOTE — OUTREACH NOTE
Prep Survey      Flowsheet Row Responses   [facility] patient discharged from? [place]   Is LACE score < 7 ? No   Eligibility Not Eligible   What are the reasons patient is not eligible? Other  [ETOH abuse]   Does the patient have one of the following disease processes/diagnoses(primary or secondary)? Other  [alcohol withdrawal]   Prep survey completed? Yes            Nicole BEARDEN - Registered Nurse

## 2023-09-13 NOTE — CASE MANAGEMENT/SOCIAL WORK
Case Management Discharge Note      Final Note: dc home         Selected Continued Care - Discharged on 9/11/2023 Admission date: 9/3/2023 - Discharge disposition: Home or Self Care      Destination    No services have been selected for the patient.                Durable Medical Equipment    No services have been selected for the patient.                Dialysis/Infusion    No services have been selected for the patient.                Home Medical Care    No services have been selected for the patient.                Therapy    No services have been selected for the patient.                Community Resources    No services have been selected for the patient.                Community & DME    No services have been selected for the patient.                    Selected Continued Care - Prior Encounters Includes continued care and service providers with selected services from prior encounters from 6/5/2023 to 9/11/2023      Discharged on 7/7/2023 Admission date: 7/2/2023 - Discharge disposition: Home-Health Care Svc      Home Medical Care       Service Provider Selected Services Address Phone Fax Patient Preferred    [facility] [address] [address] [phone] --                      Discharged on 6/16/2023 Admission date: 6/3/2023 - Discharge disposition: Skilled Nursing Facility (DC - External)      Destination       Service Provider Selected Services Address Phone Fax Patient Preferred    [facility] [address] [facility] , [facility] [address] [phone] --                               Final Discharge Disposition Code: 01 - home or self-care

## 2023-09-19 ENCOUNTER — APPOINTMENT (OUTPATIENT)
Dept: GENERAL RADIOLOGY | Facility: HOSPITAL | Age: 63
DRG: 897 | End: 2023-09-19
Payer: MEDICARE

## 2023-09-19 ENCOUNTER — HOSPITAL ENCOUNTER (INPATIENT)
Facility: HOSPITAL | Age: 63
LOS: 3 days | Discharge: HOME | DRG: 897 | End: 2023-09-25
Attending: EMERGENCY MEDICINE | Admitting: HOSPITALIST
Payer: MEDICARE

## 2023-09-19 DIAGNOSIS — E83.42 HYPOMAGNESEMIA: ICD-10-CM

## 2023-09-19 DIAGNOSIS — E83.39 HYPOPHOSPHATEMIA: ICD-10-CM

## 2023-09-19 DIAGNOSIS — F10.930 ALCOHOL WITHDRAWAL SYNDROME WITHOUT COMPLICATION: Primary | ICD-10-CM

## 2023-09-19 LAB
ALBUMIN SERPL-MCNC: 4.3 G/DL (ref 3.5–5.2)
ALBUMIN/GLOB SERPL: 1.3 G/DL
ALP SERPL-CCNC: 119 U/L (ref 39–117)
ALT SERPL W P-5'-P-CCNC: 30 U/L (ref 1–41)
AMPHET+METHAMPHET UR QL: NEGATIVE
ANION GAP SERPL CALCULATED.3IONS-SCNC: 18 MMOL/L (ref 5–15)
AST SERPL-CCNC: 38 U/L (ref 1–40)
BARBITURATES UR QL SCN: POSITIVE
BASOPHILS # BLD AUTO: 0.09 10*3/MM3 (ref 0–0.2)
BASOPHILS NFR BLD AUTO: 0.8 % (ref 0–1.5)
BENZODIAZ UR QL SCN: POSITIVE
BILIRUB SERPL-MCNC: 0.2 MG/DL (ref 0–1.2)
BILIRUB UR QL STRIP: NEGATIVE
BUN SERPL-MCNC: 13 MG/DL (ref 8–23)
BUN/CREAT SERPL: 13.3 (ref 7–25)
CALCIUM SPEC-SCNC: 9.9 MG/DL (ref 8.6–10.5)
CANNABINOIDS SERPL QL: NEGATIVE
CHLORIDE SERPL-SCNC: 98 MMOL/L (ref 98–107)
CLARITY UR: CLEAR
CO2 SERPL-SCNC: 25 MMOL/L (ref 22–29)
COCAINE UR QL: NEGATIVE
COLOR UR: YELLOW
CREAT SERPL-MCNC: 0.98 MG/DL (ref 0.76–1.27)
DEPRECATED RDW RBC AUTO: 42 FL (ref 37–54)
EGFRCR SERPLBLD CKD-EPI 2021: 85.6 ML/MIN/1.73
EOSINOPHIL # BLD AUTO: 0.1 10*3/MM3 (ref 0–0.4)
EOSINOPHIL NFR BLD AUTO: 0.8 % (ref 0.3–6.2)
ERYTHROCYTE [DISTWIDTH] IN BLOOD BY AUTOMATED COUNT: 12.3 % (ref 12.3–15.4)
ETHANOL BLD-MCNC: <10 MG/DL (ref 0–10)
ETHANOL UR QL: <0.01 %
FENTANYL UR-MCNC: NEGATIVE NG/ML
GLOBULIN UR ELPH-MCNC: 3.4 GM/DL
GLUCOSE SERPL-MCNC: 117 MG/DL (ref 65–99)
GLUCOSE UR STRIP-MCNC: NEGATIVE MG/DL
HCT VFR BLD AUTO: 43.8 % (ref 37.5–51)
HGB BLD-MCNC: 15 G/DL (ref 13–17.7)
HGB UR QL STRIP.AUTO: NEGATIVE
HOLD SPECIMEN: NORMAL
HOLD SPECIMEN: NORMAL
IMM GRANULOCYTES # BLD AUTO: 0.04 10*3/MM3 (ref 0–0.05)
IMM GRANULOCYTES NFR BLD AUTO: 0.3 % (ref 0–0.5)
KETONES UR QL STRIP: NEGATIVE
LEUKOCYTE ESTERASE UR QL STRIP.AUTO: NEGATIVE
LIPASE SERPL-CCNC: 43 U/L (ref 13–60)
LYMPHOCYTES # BLD AUTO: 1.56 10*3/MM3 (ref 0.7–3.1)
LYMPHOCYTES NFR BLD AUTO: 13.2 % (ref 19.6–45.3)
MAGNESIUM SERPL-MCNC: 1.3 MG/DL (ref 1.6–2.4)
MCH RBC QN AUTO: 31.6 PG (ref 26.6–33)
MCHC RBC AUTO-ENTMCNC: 34.2 G/DL (ref 31.5–35.7)
MCV RBC AUTO: 92.2 FL (ref 79–97)
METHADONE UR QL SCN: NEGATIVE
MONOCYTES # BLD AUTO: 0.79 10*3/MM3 (ref 0.1–0.9)
MONOCYTES NFR BLD AUTO: 6.7 % (ref 5–12)
NEUTROPHILS NFR BLD AUTO: 78.2 % (ref 42.7–76)
NEUTROPHILS NFR BLD AUTO: 9.24 10*3/MM3 (ref 1.7–7)
NITRITE UR QL STRIP: NEGATIVE
NRBC BLD AUTO-RTO: 0 /100 WBC (ref 0–0.2)
OPIATES UR QL: NEGATIVE
OXYCODONE UR QL SCN: NEGATIVE
PH UR STRIP.AUTO: 7 [PH] (ref 5–8)
PHOSPHATE SERPL-MCNC: 2.1 MG/DL (ref 2.5–4.5)
PLATELET # BLD AUTO: 250 10*3/MM3 (ref 140–450)
PMV BLD AUTO: 9.4 FL (ref 6–12)
POTASSIUM SERPL-SCNC: 4 MMOL/L (ref 3.5–5.2)
PROT SERPL-MCNC: 7.7 G/DL (ref 6–8.5)
PROT UR QL STRIP: ABNORMAL
QT INTERVAL: 362 MS
QTC INTERVAL: 465 MS
RBC # BLD AUTO: 4.75 10*6/MM3 (ref 4.14–5.8)
SODIUM SERPL-SCNC: 141 MMOL/L (ref 136–145)
SP GR UR STRIP: 1.02 (ref 1–1.03)
T4 FREE SERPL-MCNC: 1.16 NG/DL (ref 0.93–1.7)
TSH SERPL DL<=0.05 MIU/L-ACNC: 2.55 UIU/ML (ref 0.27–4.2)
UROBILINOGEN UR QL STRIP: ABNORMAL
WBC NRBC COR # BLD: 11.82 10*3/MM3 (ref 3.4–10.8)
WHOLE BLOOD HOLD COAG: NORMAL
WHOLE BLOOD HOLD SPECIMEN: NORMAL

## 2023-09-19 PROCEDURE — 80307 DRUG TEST PRSMV CHEM ANLYZR: CPT | Performed by: EMERGENCY MEDICINE

## 2023-09-19 PROCEDURE — 84439 ASSAY OF FREE THYROXINE: CPT | Performed by: EMERGENCY MEDICINE

## 2023-09-19 PROCEDURE — 25010000002 LORAZEPAM PER 2 MG: Performed by: EMERGENCY MEDICINE

## 2023-09-19 PROCEDURE — 83690 ASSAY OF LIPASE: CPT | Performed by: EMERGENCY MEDICINE

## 2023-09-19 PROCEDURE — 82077 ASSAY SPEC XCP UR&BREATH IA: CPT | Performed by: EMERGENCY MEDICINE

## 2023-09-19 PROCEDURE — 83735 ASSAY OF MAGNESIUM: CPT | Performed by: EMERGENCY MEDICINE

## 2023-09-19 PROCEDURE — G0378 HOSPITAL OBSERVATION PER HR: HCPCS

## 2023-09-19 PROCEDURE — 71045 X-RAY EXAM CHEST 1 VIEW: CPT

## 2023-09-19 PROCEDURE — 25010000002 MAGNESIUM SULFATE 2 GM/50ML SOLUTION: Performed by: HOSPITALIST

## 2023-09-19 PROCEDURE — 93005 ELECTROCARDIOGRAM TRACING: CPT | Performed by: EMERGENCY MEDICINE

## 2023-09-19 PROCEDURE — 84443 ASSAY THYROID STIM HORMONE: CPT | Performed by: EMERGENCY MEDICINE

## 2023-09-19 PROCEDURE — 81003 URINALYSIS AUTO W/O SCOPE: CPT | Performed by: EMERGENCY MEDICINE

## 2023-09-19 PROCEDURE — 25010000002 THIAMINE HCL 200 MG/2ML SOLUTION: Performed by: EMERGENCY MEDICINE

## 2023-09-19 PROCEDURE — 84100 ASSAY OF PHOSPHORUS: CPT | Performed by: EMERGENCY MEDICINE

## 2023-09-19 PROCEDURE — 80053 COMPREHEN METABOLIC PANEL: CPT | Performed by: EMERGENCY MEDICINE

## 2023-09-19 PROCEDURE — 93010 ELECTROCARDIOGRAM REPORT: CPT | Performed by: INTERNAL MEDICINE

## 2023-09-19 PROCEDURE — 85025 COMPLETE CBC W/AUTO DIFF WBC: CPT | Performed by: EMERGENCY MEDICINE

## 2023-09-19 PROCEDURE — 25010000002 MAGNESIUM SULFATE 2 GM/50ML SOLUTION: Performed by: EMERGENCY MEDICINE

## 2023-09-19 PROCEDURE — 99285 EMERGENCY DEPT VISIT HI MDM: CPT

## 2023-09-19 RX ORDER — LORAZEPAM 2 MG/ML
2 INJECTION INTRAMUSCULAR
Status: DISCONTINUED | OUTPATIENT
Start: 2023-09-19 | End: 2023-09-20

## 2023-09-19 RX ORDER — LORAZEPAM 1 MG/1
2 TABLET ORAL
Status: DISCONTINUED | OUTPATIENT
Start: 2023-09-19 | End: 2023-09-19

## 2023-09-19 RX ORDER — LORAZEPAM 2 MG/ML
1 INJECTION INTRAMUSCULAR
Status: DISCONTINUED | OUTPATIENT
Start: 2023-09-19 | End: 2023-09-20

## 2023-09-19 RX ORDER — LORAZEPAM 2 MG/ML
2 INJECTION INTRAMUSCULAR
Status: DISCONTINUED | OUTPATIENT
Start: 2023-09-19 | End: 2023-09-19

## 2023-09-19 RX ORDER — CHOLECALCIFEROL (VITAMIN D3) 125 MCG
1000 CAPSULE ORAL DAILY
Status: DISCONTINUED | OUTPATIENT
Start: 2023-09-20 | End: 2023-09-25 | Stop reason: HOSPADM

## 2023-09-19 RX ORDER — LORAZEPAM 2 MG/ML
1 INJECTION INTRAMUSCULAR
Status: DISCONTINUED | OUTPATIENT
Start: 2023-09-19 | End: 2023-09-19

## 2023-09-19 RX ORDER — MAGNESIUM SULFATE HEPTAHYDRATE 40 MG/ML
2 INJECTION, SOLUTION INTRAVENOUS ONCE
Status: COMPLETED | OUTPATIENT
Start: 2023-09-19 | End: 2023-09-19

## 2023-09-19 RX ORDER — LORAZEPAM 1 MG/1
1 TABLET ORAL
Status: DISCONTINUED | OUTPATIENT
Start: 2023-09-19 | End: 2023-09-20

## 2023-09-19 RX ORDER — SODIUM CHLORIDE 0.9 % (FLUSH) 0.9 %
10 SYRINGE (ML) INJECTION AS NEEDED
Status: DISCONTINUED | OUTPATIENT
Start: 2023-09-19 | End: 2023-09-25 | Stop reason: HOSPADM

## 2023-09-19 RX ORDER — LORAZEPAM 2 MG/ML
4 INJECTION INTRAMUSCULAR
Status: DISCONTINUED | OUTPATIENT
Start: 2023-09-19 | End: 2023-09-19

## 2023-09-19 RX ORDER — THIAMINE HYDROCHLORIDE 100 MG/ML
100 INJECTION, SOLUTION INTRAMUSCULAR; INTRAVENOUS ONCE
Status: COMPLETED | OUTPATIENT
Start: 2023-09-19 | End: 2023-09-19

## 2023-09-19 RX ORDER — LORAZEPAM 1 MG/1
1 TABLET ORAL
Status: DISCONTINUED | OUTPATIENT
Start: 2023-09-19 | End: 2023-09-19

## 2023-09-19 RX ORDER — FAMOTIDINE 20 MG/1
20 TABLET, FILM COATED ORAL
Status: DISCONTINUED | OUTPATIENT
Start: 2023-09-20 | End: 2023-09-25 | Stop reason: HOSPADM

## 2023-09-19 RX ORDER — LORAZEPAM 1 MG/1
1 TABLET ORAL EVERY 6 HOURS
Status: DISCONTINUED | OUTPATIENT
Start: 2023-09-20 | End: 2023-09-19

## 2023-09-19 RX ORDER — AMLODIPINE BESYLATE 5 MG/1
2.5 TABLET ORAL DAILY
Status: DISCONTINUED | OUTPATIENT
Start: 2023-09-20 | End: 2023-09-25 | Stop reason: HOSPADM

## 2023-09-19 RX ORDER — HYDROXYZINE HYDROCHLORIDE 25 MG/1
25 TABLET, FILM COATED ORAL EVERY 8 HOURS PRN
Status: DISCONTINUED | OUTPATIENT
Start: 2023-09-19 | End: 2023-09-25 | Stop reason: HOSPADM

## 2023-09-19 RX ORDER — CLONIDINE HYDROCHLORIDE 0.1 MG/1
0.1 TABLET ORAL EVERY 4 HOURS PRN
Status: DISCONTINUED | OUTPATIENT
Start: 2023-09-19 | End: 2023-09-20

## 2023-09-19 RX ORDER — SODIUM PHOSPHATE IN 0.9 % NACL 15MMOL/100
15 PLASTIC BAG, INJECTION (ML) INTRAVENOUS
Status: COMPLETED | OUTPATIENT
Start: 2023-09-19 | End: 2023-09-19

## 2023-09-19 RX ORDER — LORAZEPAM 1 MG/1
2 TABLET ORAL
Status: DISCONTINUED | OUTPATIENT
Start: 2023-09-19 | End: 2023-09-20

## 2023-09-19 RX ORDER — FLUVOXAMINE MALEATE 50 MG/1
100 TABLET, COATED ORAL DAILY
Status: DISCONTINUED | OUTPATIENT
Start: 2023-09-20 | End: 2023-09-25 | Stop reason: HOSPADM

## 2023-09-19 RX ORDER — MIRTAZAPINE 15 MG/1
15 TABLET, FILM COATED ORAL NIGHTLY
Status: DISCONTINUED | OUTPATIENT
Start: 2023-09-20 | End: 2023-09-25 | Stop reason: HOSPADM

## 2023-09-19 RX ORDER — BUDESONIDE AND FORMOTEROL FUMARATE DIHYDRATE 160; 4.5 UG/1; UG/1
2 AEROSOL RESPIRATORY (INHALATION)
Status: DISCONTINUED | OUTPATIENT
Start: 2023-09-20 | End: 2023-09-25 | Stop reason: HOSPADM

## 2023-09-19 RX ORDER — DIPHENOXYLATE HYDROCHLORIDE AND ATROPINE SULFATE 2.5; .025 MG/1; MG/1
1 TABLET ORAL DAILY
Status: DISCONTINUED | OUTPATIENT
Start: 2023-09-20 | End: 2023-09-25 | Stop reason: HOSPADM

## 2023-09-19 RX ORDER — LORAZEPAM 1 MG/1
2 TABLET ORAL EVERY 6 HOURS
Status: DISCONTINUED | OUTPATIENT
Start: 2023-09-19 | End: 2023-09-19

## 2023-09-19 RX ORDER — LORAZEPAM 2 MG/ML
2 INJECTION INTRAMUSCULAR EVERY 6 HOURS
Status: DISCONTINUED | OUTPATIENT
Start: 2023-09-19 | End: 2023-09-19

## 2023-09-19 RX ORDER — POTASSIUM CHLORIDE 1.5 G/1.58G
20 POWDER, FOR SOLUTION ORAL DAILY
Status: DISCONTINUED | OUTPATIENT
Start: 2023-09-20 | End: 2023-09-25 | Stop reason: HOSPADM

## 2023-09-19 RX ORDER — LORAZEPAM 1 MG/1
4 TABLET ORAL
Status: DISCONTINUED | OUTPATIENT
Start: 2023-09-19 | End: 2023-09-19

## 2023-09-19 RX ORDER — LORAZEPAM 2 MG/ML
1 INJECTION INTRAMUSCULAR EVERY 6 HOURS
Status: DISCONTINUED | OUTPATIENT
Start: 2023-09-20 | End: 2023-09-19

## 2023-09-19 RX ORDER — PROPRANOLOL HYDROCHLORIDE 10 MG/1
10 TABLET ORAL DAILY
Status: DISCONTINUED | OUTPATIENT
Start: 2023-09-20 | End: 2023-09-25 | Stop reason: HOSPADM

## 2023-09-19 RX ORDER — FOLIC ACID 1 MG/1
1 TABLET ORAL DAILY
Status: DISCONTINUED | OUTPATIENT
Start: 2023-09-20 | End: 2023-09-25 | Stop reason: HOSPADM

## 2023-09-19 RX ORDER — RISPERIDONE 2 MG/1
2 TABLET ORAL 2 TIMES DAILY
Status: DISCONTINUED | OUTPATIENT
Start: 2023-09-20 | End: 2023-09-25 | Stop reason: HOSPADM

## 2023-09-19 RX ADMIN — SODIUM PHOSPHATE, MONOBASIC, MONOHYDRATE AND SODIUM PHOSPHATE, DIBASIC, ANHYDROUS 15 MMOL: 142; 276 INJECTION, SOLUTION INTRAVENOUS at 17:59

## 2023-09-19 RX ADMIN — SODIUM PHOSPHATE, MONOBASIC, MONOHYDRATE AND SODIUM PHOSPHATE, DIBASIC, ANHYDROUS 15 MMOL: 142; 276 INJECTION, SOLUTION INTRAVENOUS at 14:40

## 2023-09-19 RX ADMIN — LORAZEPAM 1 MG: 1 TABLET ORAL at 22:31

## 2023-09-19 RX ADMIN — THIAMINE HYDROCHLORIDE 100 MG: 100 INJECTION, SOLUTION INTRAMUSCULAR; INTRAVENOUS at 10:54

## 2023-09-19 RX ADMIN — ZINC OXIDE 1 APPLICATION: 200 OINTMENT TOPICAL at 20:23

## 2023-09-19 RX ADMIN — Medication 100 MG: at 14:39

## 2023-09-19 RX ADMIN — MAGNESIUM SULFATE HEPTAHYDRATE 2 G: 40 INJECTION, SOLUTION INTRAVENOUS at 12:48

## 2023-09-19 RX ADMIN — ZINC OXIDE 1 APPLICATION: 200 OINTMENT TOPICAL at 17:35

## 2023-09-19 RX ADMIN — MAGNESIUM SULFATE HEPTAHYDRATE 2 G: 2 INJECTION, SOLUTION INTRAVENOUS at 10:54

## 2023-09-19 RX ADMIN — LORAZEPAM 1 MG: 1 TABLET ORAL at 20:22

## 2023-09-19 RX ADMIN — SODIUM CHLORIDE 1000 ML: 9 INJECTION, SOLUTION INTRAVENOUS at 10:43

## 2023-09-19 RX ADMIN — LORAZEPAM 2 MG: 2 INJECTION INTRAMUSCULAR; INTRAVENOUS at 10:18

## 2023-09-19 RX ADMIN — LORAZEPAM 1 MG: 2 INJECTION INTRAMUSCULAR; INTRAVENOUS at 09:55

## 2023-09-19 RX ADMIN — LORAZEPAM 1 MG: 2 INJECTION INTRAMUSCULAR; INTRAVENOUS at 11:56

## 2023-09-19 NOTE — H&P
HISTORY AND PHYSICAL   [facility]        Date of Admission: 2023  Patient Identification:  Name: Nilesh Zapata  Age: 63 y.o.  Sex: male  :  1960  MRN: [id number]                     Primary Care Physician: Provider, No Known    Chief Complaint: Requests sobriety    History of Present Illness:   Mr. Zapata is a 63-year-old male who unfortunately is in a recurrent process of abusing alcohol and come to the hospital repeatedly for issues of alcohol detox.  He has basically come in on a monthly basis at this point in time.  He was just discharged on 2023 per Dr. Hodgson.  He is continuing to drink anywhere from a pint to 1/5 of bourbon on a daily basis.  He states his last drink was last night.  He ultimately comes to the hospital I think before DTs or any issues truly set in.  He is here with complaints of feeling shaky nauseated headache and clamminess which sounds a bit like a hangover but ultimately were not having any suicidal thoughts hallucination or any confusion as he is resting comfortably in bed conversational pleasant with no aspects of tremor.  Despite his elevated CIWA numbers per discussion with ER MD, I am truly starting to concern myself with secondary gain for this gentleman.  I have very clearly counseled him bluntly in the ER that he is directly putting himself in an increased risk of death.  He states that he had previous detox issues set up before and he never went.  At this juncture he has been brought in under observation status and replacement of his magnesium deficiency and if I do not see any aspects of true DTs by tomorrow, possible disposition to home.  O2 per EMS reported to be in the 80s.  Currently 98% on 2 L.    Past Medical History:  Past Medical History:   Diagnosis Date    Alcohol abuse     Anxiety     Delirium tremens 2019    Fatty liver     FH: kidney cancer     Hypertension      Past Surgical History:  Past Surgical History:   Procedure  Laterality Date    APPENDECTOMY      NEPHRECTOMY      left     TONSILLECTOMY        Home Meds:  (Not in a hospital admission)      Allergies:  No Known Allergies  Immunizations:  Immunization History   Administered Date(s) Administered    COVID-19 (PFIZER) Purple Cap Monovalent 2021, 2021, 2021    Fluzone (or Fluarix & Flulaval for VFC) >6mos 2023    Influenza Quad Vaccine (Inpatient) 2017    Influenza Split Preservative Free ID 10/30/2014    Influenza, Unspecified 2018, 2022    Pneumococcal Conjugate 13-Valent (PCV13) 2018    Pneumococcal Polysaccharide (PPSV23) 2017     Social History:   Social History     Social History Narrative    Not on file     Social History     Socioeconomic History    Marital status: Legally    Tobacco Use    Smoking status: Some Days     Packs/day: 0.25     Years: 15.00     Pack years: 3.75     Types: Cigarettes     Start date: 1981    Tobacco comments:     refused    Vaping Use    Vaping Use: Unknown   Substance and Sexual Activity    Alcohol use: Yes     Comment: pint&half day    Drug use: Never    Sexual activity: Defer       Family History:  Family History   Problem Relation Age of Onset    Diabetes Mother     Throat cancer Father         Review of Systems  See history of present illness and past medical history.  Patient [place] fever chills night sweats.  Admits to headache dizziness clamminess nausea fatigue.  Denies problems swallowing chest pain or palpitation admits to cough and shortness of breath.  Denies any abdominal pain.  Admits to continued alcohol abuse.  Denies any additional bruising or bleeding.  Denies any focal loss of function.  Remainder of ROS is negative.    Objective:  T Max 24 hrs: Temp (24hrs), Av.6 °F (37 °C), Min:98.6 °F (37 °C), Max:98.6 °F (37 °C)    Vitals Ranges:   Temp:  [98.6 °F (37 °C)] 98.6 °F (37 °C)  Heart Rate:  [] 87  Resp:  [18] 18  BP: (127-156)/(81-97)  "139/81      Exam:  /81   Pulse 87   Temp 98.6 °F (37 °C) (Tympanic)   Resp 18   Ht 172.7 cm (68\")   Wt 93 kg (205 lb)   SpO2 98%   BMI 31.17 kg/m²     General Appearance:    Alert, cooperative, no tremor, alert and oriented x3, no family at bedside as patient seen in room #16/ER   Head:    Normocephalic, without obvious abnormality, atraumatic   Eyes:    PERRL, no signs of icterus, EOM's intact, both eyes   Ears:    Normal external ear canals, both ears   Nose:   Nares normal, septum midline, mucosa normal, no drainage    or sinus tenderness   Throat:   Lips, mucosa, and tongue normal   Neck:   Supple, no JVD       Lungs:     Clear to auscultation bilaterally, respirations unlabored   Chest Wall:    No tenderness or deformity    Heart:    Regular rate and rhythm, S1 and S2 normal   Abdomen:     Soft, nontender, bowel sounds active all four quadrants,   Truncally obese   Extremities: Moving all with baseline strength, no cyanosis or edema   Pulses:   2+ and symmetric all extremities   Skin: Nonjaundiced       Neurologic:   CNII-XII intact, no focal deficits      .    Data Review:  Labs in chart were reviewed.    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       Plan:    Alcohol abuse and continued " abuse of medical services and hospitalizations are continued theme for this patient.  He is basically coming here monthly at this point requesting detox even before DTs are present.  He continues to utilize alcohol in which he is ingesting anywhere from a pint to 1/5 of bourbon on a daily basis.  I do not feel he is an active DTs at this time despite his elevated CIWA numbers as that can be quite subjective and this patient I believe is becoming quite well versed.  I have very clearly counseled him in the ER that every time he goes through alcohol DTs he is carrying a 25% mortality risk and I feel there is just too much abuse of both the alcohol as well as hospitalization/CIWA.  He is already testing positive for benzos and barbiturates and neither of which are noted on his MAR which concerns me for possible abuse of additional hospital systems as well.    Past history of seizure due to alcohol abuse -no seizure meds noted on MAR    Replace mag -deficiency secondary to alcohol abuse -total 4 g to be given today    Potassium is therapeutic    Replace phosphorus IV    Continue meds for hypertension    Mild leukocytosis reactive    COPD without acute exacerbation -okay with home use of Trelegy otherwise we will transition due to formulary issues with this hospitalization   -Wean O2 to room air    SCDs for DVT prophylaxis        CCP for discharge needs and hopefully we can turn this hospitalization around quickly.  At this point I do not have any intent on getting access involved or trying to work on some major disposition plan for this gentleman    Long-term prognosis is very very very poor.  He is directly putting himself at increased risk for death due to recurrent request for elective DTs with no sincerity put towards detox    Lucian Garcia MD  9/19/2023  11:54 EDT

## 2023-09-19 NOTE — NURSING NOTE
CWON note: pt seen for evaluation of yeast dermatitis to madeleine groin, scrotum, and in his gluteal cleft POA. Pt is alert and oriented, continent of bowel and bladder. Magic Barrier cream has been ordered TID, he should keep the area clean and dry, open to air as much as possible. Explained all to pt and he verbalized understanding.  Please re-consult for any additional needs.

## 2023-09-19 NOTE — ED NOTES
Pt arrives via EMS from home for alcohol detox. Last drink was yesterday evening. Pt states he drinks 1/5 to a pint of bourbon daily. Pt reports delirium tremors.   Pt denies seizures with previous detox attempts.

## 2023-09-19 NOTE — PROGRESS NOTES
Clinical Pharmacy Services: Medication History    Nilesh Zapata is a 63 y.o. male presenting to [facility] for   Chief Complaint   Patient presents with    Alcohol Problem       He  has a past medical history of Alcohol abuse, Anxiety, Delirium tremens (03/20/2019), Fatty liver, FH: kidney cancer, and Hypertension.    Allergies as of 09/19/2023    (No Known Allergies)       Medication information was obtained from: Patient   Pharmacy and Phone Number:     Prior to Admission Medications       Prescriptions Last Dose Informant Patient Reported? Taking?    amLODIPine (NORVASC) 5 MG tablet Unknown Self No No    Take 0.5 tablets by mouth Daily.    cloNIDine (CATAPRES) 0.1 MG tablet Unknown Self No No    Take 1 tablet by mouth Every 4 (Four) Hours As Needed for High Blood Pressure (sbp greater than 160).    Patient taking differently:  Take 1 tablet by mouth Daily.    famotidine (PEPCID) 20 MG tablet Unknown Self No No    Take 1 tablet by mouth 2 (Two) Times a Day Before Meals.    Fluticasone-Umeclidin-Vilant (Trelegy Ellipta) 100-62.5-25 MCG/ACT inhaler Unknown Self Yes No    Inhale 1 puff Daily.    fluvoxaMINE (LUVOX) 25 MG tablet Unknown Self Yes No    Take 1 tablet by mouth Daily.    folic acid (FOLVITE) 1 MG tablet Unknown Self No No    Take 1 tablet by mouth Daily.    hydrOXYzine (ATARAX) 25 MG tablet Unknown Self Yes No    Take 1 tablet by mouth 2 (Two) Times a Day As Needed.    melatonin 3 MG tablet Unknown Self No No    Take 1 tablet by mouth At Night As Needed for Sleep.    mirtazapine (REMERON) 15 MG tablet Unknown Self No No    Take 1 tablet by mouth Every Night.    O2 (OXYGEN) Unknown  Yes No    Inhale 2 L/min As Needed. use PRN for SOB    POTASSIUM CHLORIDE PO Unknown Self Yes No    Take 20 mEq by mouth Daily.    propranolol (INDERAL) 10 MG tablet Unknown Self No No    Take 1 tablet by mouth Daily.    risperiDONE (risperDAL) 2 MG tablet Unknown Self Yes No    Take 1 tablet by mouth 2 (Two)  Times a Day.    vitamin B-12 (VITAMIN B-12) 1000 MCG tablet Unknown Self No No    Take 1 tablet by mouth Daily.              Medication notes: Went over medication with pt. Patient is not compliant with medications.     This medication list is complete to the best of my knowledge as of 9/19/2023    Please call if questions.    Kristel Poe  Medication History Technician   [phone]    9/19/2023 12:40 EDT

## 2023-09-19 NOTE — ED NOTES
Nursing report ED to floor  Nilesh Zapata  63 y.o.  male    HPI :   Chief Complaint   Patient presents with    Alcohol Problem       Admitting doctor:   Lucian Garcia MD    Admitting diagnosis:   The primary encounter diagnosis was Alcohol withdrawal syndrome without complication. Diagnoses of Hypomagnesemia and Hypophosphatemia were also pertinent to this visit.    Code status:   Current Code Status       Date Active Code Status Order ID Comments User Context       Prior            Allergies:   Patient has no known allergies.    Isolation:   No active isolations    Intake and Output    Intake/Output Summary (Last 24 hours) at 9/19/2023 1158  Last data filed at 9/19/2023 1147  Gross per 24 hour   Intake 1000 ml   Output --   Net 1000 ml       Weight:       09/19/23  0928   Weight: 93 kg (205 lb)       Most recent vitals:   Vitals:    09/19/23 1037 09/19/23 1104 09/19/23 1126 09/19/23 1143   BP:       Pulse: 102 107 94 87   Resp:       Temp:       TempSrc:       SpO2: 92% 93% 96% 98%   Weight:       Height:           Active LDAs/IV Access:   Lines, Drains & Airways       Active LDAs       Name Placement date Placement time Site Days    Peripheral IV 09/19/23 0941 Left Antecubital 09/19/23  0941  Antecubital  less than 1    Peripheral IV 09/19/23 1101 Anterior;Right Forearm 09/19/23  1101  Forearm  less than 1                    Labs (abnormal labs have a star):   Labs Reviewed   COMPREHENSIVE METABOLIC PANEL - Abnormal; Notable for the following components:       Result Value    Glucose 117 (*)     Alkaline Phosphatase 119 (*)     Anion Gap 18.0 (*)     All other components within normal limits    Narrative:     GFR Normal >60  Chronic Kidney Disease <60  Kidney Failure <15     URINALYSIS W/ MICROSCOPIC IF INDICATED (NO CULTURE) - Abnormal; Notable for the following components:    Protein, UA Trace (*)     All other components within normal limits    Narrative:     Urine microscopic not indicated.   URINE DRUG  SCREEN - Abnormal; Notable for the following components:    Barbiturates Screen, Urine Positive (*)     Benzodiazepine Screen, Urine Positive (*)     All other components within normal limits    Narrative:     Negative Thresholds Per Drugs Screened:    Amphetamines                 500 ng/ml  Barbiturates                 200 ng/ml  Benzodiazepines              100 ng/ml  Cocaine                      300 ng/ml  Methadone                    300 ng/ml  Opiates                      300 ng/ml  Oxycodone                    100 ng/ml  THC                           50 ng/ml  Fentanyl                       5 ng/ml      The Normal Value for all drugs tested is negative. This report includes final unconfirmed screening results to be used for medical treatment purposes only. Unconfirmed results must not be used for non-medical purposes such as employment or legal testing. Clinical consideration should be applied to any drug of abuse test, particularly when unconfirmed results are used.           MAGNESIUM - Abnormal; Notable for the following components:    Magnesium 1.3 (*)     All other components within normal limits   CBC WITH AUTO DIFFERENTIAL - Abnormal; Notable for the following components:    WBC 11.82 (*)     Neutrophil % 78.2 (*)     Lymphocyte % 13.2 (*)     Neutrophils, Absolute 9.24 (*)     All other components within normal limits   PHOSPHORUS - Abnormal; Notable for the following components:    Phosphorus 2.1 (*)     All other components within normal limits   LIPASE - Normal   TSH - Normal   T4, FREE - Normal    Narrative:     Results may be falsely increased if patient taking Biotin.     RAINBOW DRAW    Narrative:     The following orders were created for panel order [place] Draw.  Procedure                               Abnormality         Status                     ---------                               -----------         ------                     Green Top (Gel)[[id number]]                                   Final result               Lavender Top[[id number]]                                     Final result               Gold Top - SST[[id number]]                                   Final result               Light Blue Top[[id number]]                                   Final result                 Please view results for these tests on the individual orders.   ETHANOL   CBC AND DIFFERENTIAL    Narrative:     The following orders were created for panel order CBC & Differential.  Procedure                               Abnormality         Status                     ---------                               -----------         ------                     CBC Auto Differential[[id number]]        Abnormal            Final result                 Please view results for these tests on the individual orders.   GREEN TOP   LAVENDER TOP   GOLD TOP - SST   LIGHT BLUE TOP       EKG:   ECG 12 Lead Tachycardia   Preliminary Result   HEART RATE= 99  bpm   RR Interval= 606  ms   RI Interval= 52  ms   P Horizontal Axis=   deg   P Front Axis= 25  deg   QRSD Interval= 95  ms   QT Interval= 362  ms   QTcB= 465  ms   QRS Axis= -84  deg   T Wave Axis= 35  deg   - ABNORMAL ECG -   Sinus tachycardia   Multiple premature complexes, vent & supraven   Abnormal R-wave progression, late transition   Inferior infarct, old   Baseline wander in lead(s) III,aVL,V1   Electronically Signed By:    Date and Time of Study: 2023-09-19 09:38:33          Meds given in ED:   Medications   sodium chloride 0.9 % flush 10 mL (has no administration in time range)   magnesium sulfate 2g/50 mL (PREMIX) infusion (2 g Intravenous New Bag 9/19/23 1054)   LORazepam (ATIVAN) tablet 1 mg ( Oral Not Given:  See Alt 9/19/23 1156)     Or   LORazepam (ATIVAN) injection 1 mg (1 mg Intravenous Given 9/19/23 1156)     Or   LORazepam (ATIVAN) tablet 2 mg ( Oral Not Given:  See Alt 9/19/23 1156)     Or   LORazepam (ATIVAN) injection 2 mg ( Intravenous Not Given:  See Alt 9/19/23 1156)      Or   LORazepam (ATIVAN) injection 2 mg ( Intravenous Not Given:  See Alt 9/19/23 1156)     Or   LORazepam (ATIVAN) injection 2 mg ( Intramuscular Not Given:  See Alt 9/19/23 1156)   magnesium sulfate 2g/50 mL (PREMIX) infusion (has no administration in time range)   thiamine (VITAMIN B-1) tablet 100 mg (has no administration in time range)   sodium phosphates 15 mmol in 250 mL 0.9% sodium chloride IVPB (has no administration in time range)   thiamine (B-1) injection 100 mg (100 mg Intravenous Given 9/19/23 1054)   sodium chloride 0.9 % bolus 1,000 mL (0 mL Intravenous Stopped 9/19/23 1147)       Imaging results:  No radiology results for the last day    Ambulatory status:   - standy    Social issues:   Social History     Socioeconomic History    Marital status: Legally    Tobacco Use    Smoking status: Some Days     Packs/day: 0.25     Years: 15.00     Pack years: 3.75     Types: Cigarettes     Start date: 12/17/1981    Tobacco comments:     refused    Vaping Use    Vaping Use: Unknown   Substance and Sexual Activity    Alcohol use: Yes     Comment: pint&half day    Drug use: Never    Sexual activity: Defer       Last CIWA: 10 @ 1158    Oneyda Xie RN  09/19/23 11:58 EDT

## 2023-09-19 NOTE — ED PROVIDER NOTES
EMERGENCY DEPARTMENT ENCOUNTER    CHIEF COMPLAINT  Chief Complaint: Alcohol withdrawal  History given by: Patient  History limited by: Nothing  Room Number: 16/16  PMD: Jerilyn Guidry      HPI:  Pt is a 63 y.o. male daily drinker presents complaining of alcohol withdrawal.  Patient reports his last drink was at 6 PM last night, today he is feeling shaky, nauseated with a headache and clamminess.  Patient reports she has felt like this prior to going into DTs.  Patient reports he does want to quit drinking and would like assistance with this.  Patient denies suicidal/homicidal thoughts, hallucinations.  Patient reports she is a smoker, reports she drinks 1 pint to 1/5 of liquor a day, denies other drug use.  Patient denies chest pain, shortness of air, vomiting, fever, head injury, suicidal/homicidal thoughts.    Aggravating Factors: Not eating or drinking  Alleviating Factors: Nothing  Treatment before arrival: Nothing  Chronic or social conditions impacting care: Alcohol abuse    Additional sources:  Discussed/ obtained information from independent historians: Patient gave entire history    External (non-ED) record review:   Patient was admitted to the hospital 9/3 through 9/11/2023 with alcohol withdrawal, chest pain        PAST MEDICAL HISTORY  Active Ambulatory Problems     Diagnosis Date Noted    Alcohol withdrawal syndrome with complication 12/17/2017    Anxiety 12/17/2017    Fatty liver 12/17/2017    Tobacco abuse 12/17/2017    Kidney cancer, primary, with metastasis from kidney to other site 12/17/2017    Alcohol dependence with withdrawal 12/17/2017    Hyponatremia 12/21/2017    Alcohol abuse 10/30/2019    History of renal cell cancer 10/30/2019    History of left nephrectomy 2/2 RCC 10/30/2019    Liver lesion 12/04/2017    Lung nodule 08/21/2017    Vitamin D deficiency 08/16/2017    Under emotional stress 10/30/2019     from spouse 10/30/2019    Coping style affecting medical condition 10/30/2019     Essential hypertension 05/31/2022    Acute adjustment disorder with mixed anxiety and depressed mood 12/27/2022    Alcoholic liver disease 12/27/2022    Hypoxemia 12/27/2022    Closed fracture of left superior pubic ramus 06/09/2023    Closed fracture of left inferior pubic ramus 06/09/2023    Closed fracture of sacrum 06/09/2023    Anemia 06/14/2023    Hypotension 06/14/2023    Contusion of left hip 06/16/2023    Alcoholic intoxication without complication 07/03/2023    Left acetabular fracture 07/03/2023    Fall 07/03/2023    COPD (chronic obstructive pulmonary disease) 07/03/2023    Alcohol withdrawal 08/13/2023    Hypokalemia 08/14/2023    Hypomagnesemia 08/14/2023    Chronic respiratory failure with hypoxia 08/18/2023    COPD (chronic obstructive pulmonary disease) 09/04/2023    Essential hypertension, benign 09/04/2023     Resolved Ambulatory Problems     Diagnosis Date Noted    Hypokalemia 12/21/2017    Delirium tremens 03/20/2019    Thrombocytopenia 05/31/2022    Alcohol withdrawal syndrome without complication 12/26/2022    Hypomagnesemia 12/26/2022    Hypophosphatemia 12/27/2022    Constipation 06/11/2023     Past Medical History:   Diagnosis Date    FH: kidney cancer     Hypertension        PAST SURGICAL HISTORY  Past Surgical History:   Procedure Laterality Date    APPENDECTOMY      NEPHRECTOMY      left     TONSILLECTOMY         FAMILY HISTORY  Family History   Problem Relation Age of Onset    Diabetes Mother     Throat cancer Father        SOCIAL HISTORY  Social History     Socioeconomic History    Marital status: Legally    Tobacco Use    Smoking status: Some Days     Packs/day: 0.25     Years: 15.00     Pack years: 3.75     Types: Cigarettes     Start date: 12/17/1981    Tobacco comments:     refused    Vaping Use    Vaping Use: Unknown   Substance and Sexual Activity    Alcohol use: Yes     Comment: pint&half day    Drug use: Never    Sexual activity: Defer       ALLERGIES  Patient has no  known allergies.    REVIEW OF SYSTEMS  Review of Systems    PHYSICAL EXAM  ED Triage Vitals [09/19/23 0928]   Temp Heart Rate Resp BP SpO2   98.6 °F (37 °C) (!) 127 18 156/89 97 %      Temp src Heart Rate Source Patient Position BP Location FiO2 (%)   Tympanic Monitor -- -- --       Physical Exam  Vitals and nursing note reviewed.   Constitutional:       General: He is in acute distress.      Comments: Agitated/fidgeting, resting tremor   HENT:      Head: Normocephalic and atraumatic.   Eyes:      Extraocular Movements: Extraocular movements intact.      Pupils: Pupils are equal, round, and reactive to light.   Cardiovascular:      Rate and Rhythm: Normal rate and regular rhythm.      Pulses:           Posterior tibial pulses are 2+ on the right side and 2+ on the left side.      Heart sounds: Normal heart sounds. No murmur heard.  Pulmonary:      Effort: Pulmonary effort is normal. No respiratory distress.      Breath sounds: Normal breath sounds. No wheezing.   Abdominal:      General: Bowel sounds are normal.      Palpations: Abdomen is soft.      Tenderness: There is no abdominal tenderness. There is no guarding or rebound.   Musculoskeletal:         General: Normal range of motion.      Cervical back: Normal range of motion.   Skin:     General: Skin is warm.      Comments: Patient clammy   Neurological:      Mental Status: He is alert and oriented to person, place, and time.      Comments: CIWA score 18   Psychiatric:         Mood and Affect: Mood and affect normal.       LAB RESULTS  Recent Results (from the past 24 hour(s))   ECG 12 Lead Tachycardia    Collection Time: 09/19/23  9:38 AM   Result Value Ref Range    QT Interval 362 ms    QTC Interval 465 ms   Comprehensive Metabolic Panel    Collection Time: 09/19/23  9:41 AM    Specimen: Blood   Result Value Ref Range    Glucose 117 (H) 65 - 99 mg/dL    BUN 13 8 - 23 mg/dL    Creatinine 0.98 0.76 - 1.27 mg/dL    Sodium 141 136 - 145 mmol/L    Potassium 4.0  3.5 - 5.2 mmol/L    Chloride 98 98 - 107 mmol/L    CO2 25.0 22.0 - 29.0 mmol/L    Calcium 9.9 8.6 - 10.5 mg/dL    Total Protein 7.7 6.0 - 8.5 g/dL    Albumin 4.3 3.5 - 5.2 g/dL    ALT (SGPT) 30 1 - 41 U/L    AST (SGOT) 38 1 - 40 U/L    Alkaline Phosphatase 119 (H) 39 - 117 U/L    Total Bilirubin 0.2 0.0 - 1.2 mg/dL    Globulin 3.4 gm/dL    A/G Ratio 1.3 g/dL    BUN/Creatinine Ratio 13.3 7.0 - 25.0    Anion Gap 18.0 (H) 5.0 - 15.0 mmol/L    eGFR 85.6 >60.0 mL/min/1.73   Lipase    Collection Time: 09/19/23  9:41 AM    Specimen: Blood   Result Value Ref Range    Lipase 43 13 - 60 U/L   Ethanol    Collection Time: 09/19/23  9:41 AM    Specimen: Blood   Result Value Ref Range    Ethanol <10 0 - 10 mg/dL    Ethanol % <0.010 %   Magnesium    Collection Time: 09/19/23  9:41 AM    Specimen: Blood   Result Value Ref Range    Magnesium 1.3 (L) 1.6 - 2.4 mg/dL   TSH    Collection Time: 09/19/23  9:41 AM    Specimen: Blood   Result Value Ref Range    TSH 2.550 0.270 - 4.200 uIU/mL   T4, Free    Collection Time: 09/19/23  9:41 AM    Specimen: Blood   Result Value Ref Range    Free T4 1.16 0.93 - 1.70 ng/dL   Green Top (Gel)    Collection Time: 09/19/23  9:41 AM   Result Value Ref Range    Extra Tube Hold for add-ons.    Lavender Top    Collection Time: 09/19/23  9:41 AM   Result Value Ref Range    Extra Tube hold for add-on    Gold Top - SST    Collection Time: 09/19/23  9:41 AM   Result Value Ref Range    Extra Tube Hold for add-ons.    Light Blue Top    Collection Time: 09/19/23  9:41 AM   Result Value Ref Range    Extra Tube Hold for add-ons.    CBC Auto Differential    Collection Time: 09/19/23  9:41 AM    Specimen: Blood   Result Value Ref Range    WBC 11.82 (H) 3.40 - 10.80 10*3/mm3    RBC 4.75 4.14 - 5.80 10*6/mm3    Hemoglobin 15.0 13.0 - 17.7 g/dL    Hematocrit 43.8 37.5 - 51.0 %    MCV 92.2 79.0 - 97.0 fL    MCH 31.6 26.6 - 33.0 pg    MCHC 34.2 31.5 - 35.7 g/dL    RDW 12.3 12.3 - 15.4 %    RDW-SD 42.0 37.0 - 54.0 fl     MPV 9.4 6.0 - 12.0 fL    Platelets 250 140 - 450 10*3/mm3    Neutrophil % 78.2 (H) 42.7 - 76.0 %    Lymphocyte % 13.2 (L) 19.6 - 45.3 %    Monocyte % 6.7 5.0 - 12.0 %    Eosinophil % 0.8 0.3 - 6.2 %    Basophil % 0.8 0.0 - 1.5 %    Immature Grans % 0.3 0.0 - 0.5 %    Neutrophils, Absolute 9.24 (H) 1.70 - 7.00 10*3/mm3    Lymphocytes, Absolute 1.56 0.70 - 3.10 10*3/mm3    Monocytes, Absolute 0.79 0.10 - 0.90 10*3/mm3    Eosinophils, Absolute 0.10 0.00 - 0.40 10*3/mm3    Basophils, Absolute 0.09 0.00 - 0.20 10*3/mm3    Immature Grans, Absolute 0.04 0.00 - 0.05 10*3/mm3    nRBC 0.0 0.0 - 0.2 /100 WBC   Phosphorus    Collection Time: 09/19/23  9:41 AM    Specimen: Blood   Result Value Ref Range    Phosphorus 2.1 (L) 2.5 - 4.5 mg/dL   Urinalysis With Microscopic If Indicated (No Culture) - Urine, Clean Catch    Collection Time: 09/19/23 11:08 AM    Specimen: Urine, Clean Catch   Result Value Ref Range    Color, UA Yellow Yellow, Straw    Appearance, UA Clear Clear    pH, UA 7.0 5.0 - 8.0    Specific Gravity, UA 1.022 1.005 - 1.030    Glucose, UA Negative Negative    Ketones, UA Negative Negative    Bilirubin, UA Negative Negative    Blood, UA Negative Negative    Protein, UA Trace (A) Negative    Leuk Esterase, UA Negative Negative    Nitrite, UA Negative Negative    Urobilinogen, UA 1.0 E.U./dL 0.2 - 1.0 E.U./dL   Urine Drug Screen - Urine, Clean Catch    Collection Time: 09/19/23 11:08 AM    Specimen: Urine, Clean Catch   Result Value Ref Range    Amphet/Methamphet, Screen Negative Negative    Barbiturates Screen, Urine Positive (A) Negative    Benzodiazepine Screen, Urine Positive (A) Negative    Cocaine Screen, Urine Negative Negative    Opiate Screen Negative Negative    THC, Screen, Urine Negative Negative    Methadone Screen, Urine Negative Negative    Oxycodone Screen, Urine Negative Negative    Fentanyl, Urine Negative Negative       I ordered the above labs and reviewed the results    RADIOLOGY  No Radiology  Exams Resulted Within Past 24 Hours    I ordered the above noted radiological studies. Interpreted by radiologist. Viewed and interpreted by me in PACS -no large pneumothorax on chest x-ray      PROCEDURES  Procedures      MEDICATIONS GIVEN IN THE EMERGENCY DEPARTMENT  Medications   sodium chloride 0.9 % flush 10 mL (has no administration in time range)   magnesium sulfate 2g/50 mL (PREMIX) infusion (2 g Intravenous New Bag 9/19/23 1054)   LORazepam (ATIVAN) tablet 1 mg (has no administration in time range)     Or   LORazepam (ATIVAN) injection 1 mg (has no administration in time range)     Or   LORazepam (ATIVAN) tablet 2 mg (has no administration in time range)     Or   LORazepam (ATIVAN) injection 2 mg (has no administration in time range)     Or   LORazepam (ATIVAN) injection 2 mg (has no administration in time range)     Or   LORazepam (ATIVAN) injection 2 mg (has no administration in time range)   Magnesium Standard Dose Replacement - Follow Nurse / BPA Driven Protocol (has no administration in time range)   Phosphorus Replacement - Follow Nurse / BPA Driven Protocol (has no administration in time range)   magnesium sulfate 2g/50 mL (PREMIX) infusion (has no administration in time range)   thiamine (B-1) injection 100 mg (100 mg Intravenous Given 9/19/23 1054)   sodium chloride 0.9 % bolus 1,000 mL (0 mL Intravenous Stopped 9/19/23 1147)           MEDICAL DECISION MAKING  Results were reviewed/discussed with the patient and they were also made aware of online access. Pt also made aware that some labs, such as cultures, will not be resulted during ER visit and followup with PMD is necessary.   EKGs and labs independently viewed and interpreted by me.  Discussion below represents my analysis of pertinent findings related to patient's condition, differential diagnosis, treatment plan and final disposition.    Differential diagnosis includes but is not limited to:  Drug abuse, drug withdrawal,    Independent  interpretation of labs, radiology studies, and discussions with consultants:  ED Course as of 09/19/23 1152   Tue Sep 19, 2023   1126 After 3 mg of Ativan patient seen and reexamined, no hyperreflexia, no clonus patellar and Achilles.  Patient refused reports his nausea is minimal at this point, headache very mild, resting comfortably with some twitching of his feet, no tremor visible, skin warm and dry.  Heart rate 90s, blood pressure 130s over 80s, Ciwa 8, CIWA protocol decreased to low risk parameters [TO]   1142 Dr. Lucian Wilhelm MD, on-call for [facility] is aware of patient's presentation, electrolyte abnormalities, chest x-ray pending, mild hypoxia and agrees to admit for further testing, treatment as needed [TO]      ED Course User Index  [TO] Yamel Luevano MD     EKG          EKG time: 0938  Rhythm/Rate: Sinus tachycardia, rate in the 90s, occasional PVCs  P waves and VA: Normal P waves, normal JARETT's  QRS, axis: Poor R wave progression, Q's in the inferior leads  ST and T waves: Nonspecific ST/T wave findings, somewhat limited by baseline artifact minimally    Interpreted Contemporaneously by me, independently views   Minimally changed compared to prior 9/3/2023      Shared decision making: Patient voices understanding of labs, improvement post medications for his withdrawal symptoms, agrees to admission for further testing, treatment as needed.      MDM         DIAGNOSIS  Final diagnoses:   Alcohol withdrawal syndrome without complication   Hypomagnesemia   Hypophosphatemia       DISPOSITION  ADMISSION    Discussed treatment plan and reason for admission with pt/family and admitting physician.  Pt/family voiced understanding of the plan for admission for further testing/treatment as needed.       Latest Documented Vital Signs:  As of 11:52 EDT  BP- 139/81 HR- 87 Temp- 98.6 °F (37 °C) (Tympanic) O2 sat- 98%    --      Please note that portions of this note were completed with a voice recognition program.        Note Disclaimer: At [facility], we believe that sharing information builds trust and better relationships. You are receiving this note because you are receiving care at [facility] or recently visited. It is possible you will see health information before a provider has talked with you about it. This kind of information can be easy to misunderstand. To help you fully understand what it means for your health, we urge you to discuss this note with your provider.     Yamel Luevano MD  09/19/23 6910

## 2023-09-20 LAB
DEPRECATED RDW RBC AUTO: 42.8 FL (ref 37–54)
ERYTHROCYTE [DISTWIDTH] IN BLOOD BY AUTOMATED COUNT: 12.5 % (ref 12.3–15.4)
HCT VFR BLD AUTO: 38.9 % (ref 37.5–51)
HGB BLD-MCNC: 13.1 G/DL (ref 13–17.7)
MAGNESIUM SERPL-MCNC: 3.4 MG/DL (ref 1.6–2.4)
MCH RBC QN AUTO: 31.7 PG (ref 26.6–33)
MCHC RBC AUTO-ENTMCNC: 33.7 G/DL (ref 31.5–35.7)
MCV RBC AUTO: 94.2 FL (ref 79–97)
PHOSPHATE SERPL-MCNC: 2.8 MG/DL (ref 2.5–4.5)
PLATELET # BLD AUTO: 165 10*3/MM3 (ref 140–450)
PMV BLD AUTO: 9.7 FL (ref 6–12)
RBC # BLD AUTO: 4.13 10*6/MM3 (ref 4.14–5.8)
WBC NRBC COR # BLD: 8.65 10*3/MM3 (ref 3.4–10.8)

## 2023-09-20 PROCEDURE — 94761 N-INVAS EAR/PLS OXIMETRY MLT: CPT

## 2023-09-20 PROCEDURE — 94799 UNLISTED PULMONARY SVC/PX: CPT

## 2023-09-20 PROCEDURE — 84100 ASSAY OF PHOSPHORUS: CPT | Performed by: HOSPITALIST

## 2023-09-20 PROCEDURE — 83735 ASSAY OF MAGNESIUM: CPT | Performed by: HOSPITALIST

## 2023-09-20 PROCEDURE — 36415 COLL VENOUS BLD VENIPUNCTURE: CPT | Performed by: HOSPITALIST

## 2023-09-20 PROCEDURE — 94664 DEMO&/EVAL PT USE INHALER: CPT

## 2023-09-20 PROCEDURE — G0378 HOSPITAL OBSERVATION PER HR: HCPCS

## 2023-09-20 PROCEDURE — 85027 COMPLETE CBC AUTOMATED: CPT | Performed by: HOSPITALIST

## 2023-09-20 PROCEDURE — 94640 AIRWAY INHALATION TREATMENT: CPT

## 2023-09-20 RX ORDER — CHLORDIAZEPOXIDE HYDROCHLORIDE 5 MG/1
5 CAPSULE, GELATIN COATED ORAL EVERY 6 HOURS PRN
Status: DISCONTINUED | OUTPATIENT
Start: 2023-09-20 | End: 2023-09-20

## 2023-09-20 RX ORDER — CHLORDIAZEPOXIDE HYDROCHLORIDE 5 MG/1
5 CAPSULE, GELATIN COATED ORAL EVERY 8 HOURS PRN
Status: DISCONTINUED | OUTPATIENT
Start: 2023-09-20 | End: 2023-09-24

## 2023-09-20 RX ORDER — ACETAMINOPHEN 325 MG/1
650 TABLET ORAL ONCE
Status: COMPLETED | OUTPATIENT
Start: 2023-09-20 | End: 2023-09-20

## 2023-09-20 RX ORDER — ACETAMINOPHEN 325 MG/1
650 TABLET ORAL EVERY 6 HOURS PRN
Status: DISCONTINUED | OUTPATIENT
Start: 2023-09-20 | End: 2023-09-25 | Stop reason: HOSPADM

## 2023-09-20 RX ORDER — ONDANSETRON 4 MG/1
4 TABLET, FILM COATED ORAL EVERY 6 HOURS PRN
Status: DISCONTINUED | OUTPATIENT
Start: 2023-09-20 | End: 2023-09-25 | Stop reason: HOSPADM

## 2023-09-20 RX ORDER — ONDANSETRON 2 MG/ML
4 INJECTION INTRAMUSCULAR; INTRAVENOUS EVERY 6 HOURS PRN
Status: DISCONTINUED | OUTPATIENT
Start: 2023-09-20 | End: 2023-09-25 | Stop reason: HOSPADM

## 2023-09-20 RX ADMIN — Medication 100 MG: at 08:17

## 2023-09-20 RX ADMIN — FOLIC ACID 1 MG: 1 TABLET ORAL at 08:17

## 2023-09-20 RX ADMIN — Medication 1000 MCG: at 08:17

## 2023-09-20 RX ADMIN — LORAZEPAM 1 MG: 1 TABLET ORAL at 03:15

## 2023-09-20 RX ADMIN — LORAZEPAM 1 MG: 1 TABLET ORAL at 08:17

## 2023-09-20 RX ADMIN — ZINC OXIDE 1 APPLICATION: 200 OINTMENT TOPICAL at 21:52

## 2023-09-20 RX ADMIN — PROPRANOLOL HYDROCHLORIDE 10 MG: 10 TABLET ORAL at 08:16

## 2023-09-20 RX ADMIN — RISPERIDONE 2 MG: 2 TABLET, FILM COATED ORAL at 00:56

## 2023-09-20 RX ADMIN — FLUVOXAMINE MALEATE 100 MG: 50 TABLET, COATED ORAL at 08:16

## 2023-09-20 RX ADMIN — ZINC OXIDE 1 APPLICATION: 200 OINTMENT TOPICAL at 08:18

## 2023-09-20 RX ADMIN — MIRTAZAPINE 15 MG: 15 TABLET, FILM COATED ORAL at 21:51

## 2023-09-20 RX ADMIN — POTASSIUM CHLORIDE 20 MEQ: 1.5 POWDER, FOR SOLUTION ORAL at 08:17

## 2023-09-20 RX ADMIN — CHLORDIAZEPOXIDE HYDROCHLORIDE 5 MG: 5 CAPSULE ORAL at 15:22

## 2023-09-20 RX ADMIN — ACETAMINOPHEN 650 MG: 325 TABLET, FILM COATED ORAL at 15:22

## 2023-09-20 RX ADMIN — FAMOTIDINE 20 MG: 20 TABLET, FILM COATED ORAL at 16:32

## 2023-09-20 RX ADMIN — ZINC OXIDE 1 APPLICATION: 200 OINTMENT TOPICAL at 15:22

## 2023-09-20 RX ADMIN — Medication 1 TABLET: at 08:16

## 2023-09-20 RX ADMIN — AMLODIPINE BESYLATE 2.5 MG: 5 TABLET ORAL at 08:17

## 2023-09-20 RX ADMIN — TIOTROPIUM BROMIDE INHALATION SPRAY 2 PUFF: 3.12 SPRAY, METERED RESPIRATORY (INHALATION) at 07:14

## 2023-09-20 RX ADMIN — HYDROXYZINE HYDROCHLORIDE 25 MG: 25 TABLET ORAL at 13:02

## 2023-09-20 RX ADMIN — ACETAMINOPHEN 650 MG: 325 TABLET, FILM COATED ORAL at 02:24

## 2023-09-20 RX ADMIN — RISPERIDONE 2 MG: 2 TABLET, FILM COATED ORAL at 21:51

## 2023-09-20 RX ADMIN — MIRTAZAPINE 15 MG: 15 TABLET, FILM COATED ORAL at 00:56

## 2023-09-20 RX ADMIN — BUDESONIDE AND FORMOTEROL FUMARATE DIHYDRATE 2 PUFF: 160; 4.5 AEROSOL RESPIRATORY (INHALATION) at 20:37

## 2023-09-20 RX ADMIN — BUDESONIDE AND FORMOTEROL FUMARATE DIHYDRATE 2 PUFF: 160; 4.5 AEROSOL RESPIRATORY (INHALATION) at 07:13

## 2023-09-20 RX ADMIN — RISPERIDONE 2 MG: 2 TABLET, FILM COATED ORAL at 08:17

## 2023-09-20 RX ADMIN — FAMOTIDINE 20 MG: 20 TABLET, FILM COATED ORAL at 08:16

## 2023-09-20 RX ADMIN — HYDROXYZINE HYDROCHLORIDE 25 MG: 25 TABLET ORAL at 21:51

## 2023-09-20 NOTE — PLAN OF CARE
Goal Outcome Evaluation:         Pt alert and oriented this shift. PO ativan 1mg x 3 for CIWA scores between 8-10. Stand by assist to bathroom. Cooperative with care.

## 2023-09-20 NOTE — PROGRESS NOTES
"    DAILY PROGRESS NOTE  [facility]    Patient Identification:  Name: Nilesh Zapata  Age: 63 y.o.  Sex: male  :  1960  MRN: [id number]         Primary Care Physician: Provider, No Known    Subjective:  Interval History: Patient resting comfortably.  He was eating lunch and snacking on chips upon entering the room.  He was completely relaxed comfortable conversational with no confusion or altered mentation or any tremors or anything consistent with DTs present.  Denies any fever denies any chest pain troubles breathing nausea and or vomiting    Objective: I cannot appreciate anything whatsoever on exam consistent with DTs    Scheduled Meds:amLODIPine, 2.5 mg, Oral, Daily  budesonide-formoterol, 2 puff, Inhalation, BID - RT   And  tiotropium bromide monohydrate, 2 puff, Inhalation, Daily - RT  famotidine, 20 mg, Oral, BID AC  fluvoxaMINE, 100 mg, Oral, Daily  folic acid, 1 mg, Oral, Daily  hydrocortisone-bacitracin-zinc oxide-nystatin, 1 application , Topical, TID  mirtazapine, 15 mg, Oral, Nightly  multivitamin, 1 tablet, Oral, Daily  potassium chloride, 20 mEq, Oral, Daily  propranolol, 10 mg, Oral, Daily  risperiDONE, 2 mg, Oral, BID  thiamine, 100 mg, Oral, Daily  cyanocobalamin, 1,000 mcg, Oral, Daily      Continuous Infusions:     Vital signs in last 24 hours:  Temp:  [97.9 °F (36.6 °C)-98.2 °F (36.8 °C)] 98.1 °F (36.7 °C)  Heart Rate:  [73-91] 77  Resp:  [16-20] 18  BP: (121-154)/() 121/75    Intake/Output:    Intake/Output Summary (Last 24 hours) at 2023 1228  Last data filed at 2023 1140  Gross per 24 hour   Intake --   Output 750 ml   Net -750 ml       Exam:  /75 (BP Location: Left arm, Patient Position: Lying)   Pulse 77   Temp 98.1 °F (36.7 °C) (Oral)   Resp 18   Ht 172.7 cm (68\")   Wt 93 kg (205 lb)   SpO2 91%   BMI 31.17 kg/m²     General Appearance:    Alert, cooperative, no distress, AAOx3                          Head:    Normocephalic, without obvious " abnormality, atraumatic                           Eyes:    PERRL, conjunctivae/corneas clear, EOM's intact, both eyes                         Throat:   Oral mucosa pink and moist                         Lungs:    Clear to auscultation bilaterally, respirations unlabored                 Chest Wall:    No tenderness or deformity                          Heart:    Regular rate and rhythm, S1 and S2 normal                  Abdomen:     Soft, nontender, bowel sounds active, truncally obese                 Extremities: Moving all with baseline strength, no cyanosis or edema                            Skin:   Skin is warm and dry, nonjaundiced                  neurologic:   CNII-XII intact, no focal deficits noted     Data Review:  Labs in chart were reviewed.    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       Plan:    I do not feel this patient is in active DTs and I do feel he is abusing the hospitalization and possibly utilizing multiple hospitals given his drug screen positive for barbiturates and benzos prior to admission but no meds of such type on his MAR    Again he is in no current DTs.  He is completely alert and oriented has no tremor is sitting back relaxed eating lunch and I believe the CIWA protocol is just to subjective and it will be discontinued.  I have discussed further management with RN to ensure everything is clear    Low-dose Librium made available on every 8 as needed basis    Magnesium and phosphorus deficiency replaced    TSH therapeutic    HTN  stable    Mild reactive leukocytosis resolved    COPD without acute exacerbation        Disposition -I personally do not think this patient has had any alcohol DTs.  I think he is manipulating the CIWA protocol at this time.  He is completely alert and oriented x3 with no DTs symptoms but he still manages to get numbers high enough on the CIWA protocol to obtain medications.  I discussed further management with RN.  I will watch this patient in house overnight with plans to likely DC tomorrow pending his clinical progression.  He is ultimately alert and oriented x3 and do have concerns about him being a high risk for leaving AMA but ultimately that is his choice if he so chooses.  Strongly recommend alcohol detox the patient is just not mentally there or ready for it    Lucian Garcia MD  9/20/2023  12:28 EDT

## 2023-09-21 PROCEDURE — G0378 HOSPITAL OBSERVATION PER HR: HCPCS

## 2023-09-21 PROCEDURE — 94799 UNLISTED PULMONARY SVC/PX: CPT

## 2023-09-21 PROCEDURE — 25010000002 LORAZEPAM PER 2 MG: Performed by: NURSE PRACTITIONER

## 2023-09-21 PROCEDURE — 94761 N-INVAS EAR/PLS OXIMETRY MLT: CPT

## 2023-09-21 PROCEDURE — 25010000002 LORAZEPAM PER 2 MG: Performed by: HOSPITALIST

## 2023-09-21 PROCEDURE — 94664 DEMO&/EVAL PT USE INHALER: CPT

## 2023-09-21 PROCEDURE — 25010000002 PHENOBARBITAL PER 120 MG: Performed by: HOSPITALIST

## 2023-09-21 RX ORDER — PHENOBARBITAL 32.4 MG/1
32.4 TABLET ORAL ONCE
Status: DISCONTINUED | OUTPATIENT
Start: 2023-09-23 | End: 2023-09-25 | Stop reason: HOSPADM

## 2023-09-21 RX ORDER — PHENOBARBITAL SODIUM 65 MG/ML
65 INJECTION INTRAMUSCULAR ONCE
Status: DISCONTINUED | OUTPATIENT
Start: 2023-09-22 | End: 2023-09-25 | Stop reason: HOSPADM

## 2023-09-21 RX ORDER — LORAZEPAM 2 MG/ML
2 INJECTION INTRAMUSCULAR
Status: DISCONTINUED | OUTPATIENT
Start: 2023-09-21 | End: 2023-09-25 | Stop reason: HOSPADM

## 2023-09-21 RX ORDER — LORAZEPAM 2 MG/ML
1 INJECTION INTRAMUSCULAR ONCE
Status: COMPLETED | OUTPATIENT
Start: 2023-09-21 | End: 2023-09-21

## 2023-09-21 RX ORDER — LORAZEPAM 2 MG/ML
1 INJECTION INTRAMUSCULAR
Status: DISCONTINUED | OUTPATIENT
Start: 2023-09-21 | End: 2023-09-25 | Stop reason: HOSPADM

## 2023-09-21 RX ORDER — LORAZEPAM 1 MG/1
1 TABLET ORAL
Status: DISCONTINUED | OUTPATIENT
Start: 2023-09-21 | End: 2023-09-25 | Stop reason: HOSPADM

## 2023-09-21 RX ORDER — PHENOBARBITAL SODIUM 65 MG/ML
65 INJECTION INTRAMUSCULAR ONCE
Status: COMPLETED | OUTPATIENT
Start: 2023-09-22 | End: 2023-09-22

## 2023-09-21 RX ORDER — LORAZEPAM 1 MG/1
1 TABLET ORAL EVERY 6 HOURS
Status: DISCONTINUED | OUTPATIENT
Start: 2023-09-22 | End: 2023-09-21

## 2023-09-21 RX ORDER — LORAZEPAM 1 MG/1
2 TABLET ORAL EVERY 6 HOURS
Status: DISCONTINUED | OUTPATIENT
Start: 2023-09-21 | End: 2023-09-21

## 2023-09-21 RX ORDER — LORAZEPAM 1 MG/1
2 TABLET ORAL
Status: DISCONTINUED | OUTPATIENT
Start: 2023-09-21 | End: 2023-09-25 | Stop reason: HOSPADM

## 2023-09-21 RX ORDER — PHENOBARBITAL 32.4 MG/1
32.4 TABLET ORAL ONCE
Status: COMPLETED | OUTPATIENT
Start: 2023-09-24 | End: 2023-09-24

## 2023-09-21 RX ADMIN — FLUVOXAMINE MALEATE 100 MG: 50 TABLET, COATED ORAL at 08:44

## 2023-09-21 RX ADMIN — RISPERIDONE 2 MG: 2 TABLET, FILM COATED ORAL at 08:44

## 2023-09-21 RX ADMIN — ZINC OXIDE 1 APPLICATION: 200 OINTMENT TOPICAL at 17:27

## 2023-09-21 RX ADMIN — HYDROXYZINE HYDROCHLORIDE 25 MG: 25 TABLET ORAL at 05:57

## 2023-09-21 RX ADMIN — LORAZEPAM 2 MG: 2 INJECTION INTRAMUSCULAR; INTRAVENOUS at 15:37

## 2023-09-21 RX ADMIN — Medication 100 MG: at 08:44

## 2023-09-21 RX ADMIN — AMLODIPINE BESYLATE 2.5 MG: 5 TABLET ORAL at 08:44

## 2023-09-21 RX ADMIN — CHLORDIAZEPOXIDE HYDROCHLORIDE 5 MG: 5 CAPSULE ORAL at 02:10

## 2023-09-21 RX ADMIN — LORAZEPAM 2 MG: 2 INJECTION INTRAMUSCULAR; INTRAVENOUS at 11:36

## 2023-09-21 RX ADMIN — TIOTROPIUM BROMIDE INHALATION SPRAY 2 PUFF: 3.12 SPRAY, METERED RESPIRATORY (INHALATION) at 08:18

## 2023-09-21 RX ADMIN — LORAZEPAM 1 MG: 2 INJECTION INTRAMUSCULAR; INTRAVENOUS at 07:24

## 2023-09-21 RX ADMIN — ZINC OXIDE 1 APPLICATION: 200 OINTMENT TOPICAL at 22:32

## 2023-09-21 RX ADMIN — FOLIC ACID 1 MG: 1 TABLET ORAL at 08:44

## 2023-09-21 RX ADMIN — LORAZEPAM 1 MG: 2 INJECTION INTRAMUSCULAR; INTRAVENOUS at 03:16

## 2023-09-21 RX ADMIN — RISPERIDONE 2 MG: 2 TABLET, FILM COATED ORAL at 21:16

## 2023-09-21 RX ADMIN — Medication 1 TABLET: at 08:44

## 2023-09-21 RX ADMIN — PHENOBARBITAL SODIUM 136.5 MG: 65 INJECTION INTRAMUSCULAR at 14:08

## 2023-09-21 RX ADMIN — Medication 1000 MCG: at 08:44

## 2023-09-21 RX ADMIN — BUDESONIDE AND FORMOTEROL FUMARATE DIHYDRATE 2 PUFF: 160; 4.5 AEROSOL RESPIRATORY (INHALATION) at 08:18

## 2023-09-21 RX ADMIN — LORAZEPAM 2 MG: 2 INJECTION INTRAMUSCULAR; INTRAVENOUS at 18:55

## 2023-09-21 RX ADMIN — ZINC OXIDE 1 APPLICATION: 200 OINTMENT TOPICAL at 08:45

## 2023-09-21 RX ADMIN — BUDESONIDE AND FORMOTEROL FUMARATE DIHYDRATE 2 PUFF: 160; 4.5 AEROSOL RESPIRATORY (INHALATION) at 19:09

## 2023-09-21 RX ADMIN — PHENOBARBITAL SODIUM 136.5 MG: 65 INJECTION INTRAMUSCULAR at 08:46

## 2023-09-21 RX ADMIN — POTASSIUM CHLORIDE 20 MEQ: 1.5 POWDER, FOR SOLUTION ORAL at 08:44

## 2023-09-21 RX ADMIN — PROPRANOLOL HYDROCHLORIDE 10 MG: 10 TABLET ORAL at 08:45

## 2023-09-21 RX ADMIN — PHENOBARBITAL SODIUM 136.5 MG: 65 INJECTION INTRAMUSCULAR at 17:26

## 2023-09-21 RX ADMIN — LORAZEPAM 2 MG: 2 INJECTION INTRAMUSCULAR; INTRAVENOUS at 21:16

## 2023-09-21 RX ADMIN — MIRTAZAPINE 15 MG: 15 TABLET, FILM COATED ORAL at 21:16

## 2023-09-21 RX ADMIN — FAMOTIDINE 20 MG: 20 TABLET, FILM COATED ORAL at 08:45

## 2023-09-21 RX ADMIN — Medication 10 ML: at 08:45

## 2023-09-21 NOTE — PROGRESS NOTES
DAILY PROGRESS NOTE  [facility]    Patient Identification:  Name: Nilesh Zapata  Age: 63 y.o.  Sex: male  :  1960  MRN: [id number]         Primary Care Physician: Provider, No Known    Subjective:  Interval History: Behaviors noted overnight led to a couple doses of IV Ativan and called by RN this morning as he was starting to make comments that he will pull his IV air get out of bed or being noncompliant.  The CIWA protocol is very subjective and I have concerns that this patient is able to manipulate his answers to achieve medical therapies    Objective: This patient is alert and oriented x3 completely.  No tremor at all.  Was calm and conversational for me    Scheduled Meds:amLODIPine, 2.5 mg, Oral, Daily  budesonide-formoterol, 2 puff, Inhalation, BID - RT   And  tiotropium bromide monohydrate, 2 puff, Inhalation, Daily - RT  famotidine, 20 mg, Oral, BID AC  fluvoxaMINE, 100 mg, Oral, Daily  folic acid, 1 mg, Oral, Daily  hydrocortisone-bacitracin-zinc oxide-nystatin, 1 application , Topical, TID  mirtazapine, 15 mg, Oral, Nightly  multivitamin, 1 tablet, Oral, Daily  [START ON 2023] PHENobarbital, 65 mg, Intravenous, Once   Followed by  [START ON 2023] PHENobarbital, 65 mg, Intravenous, Once   Followed by  [START ON 2023] PHENobarbital, 32.4 mg, Oral, Once   Followed by  [START ON 2023] PHENobarbital, 32.4 mg, Oral, Once   Followed by  [START ON 2023] PHENobarbital, 32.4 mg, Oral, Once  PHENobarbital, 2 mg/kg (Ideal), Intravenous, Once   Followed by  PHENobarbital, 2 mg/kg (Ideal), Intravenous, Once  potassium chloride, 20 mEq, Oral, Daily  propranolol, 10 mg, Oral, Daily  risperiDONE, 2 mg, Oral, BID  thiamine, 100 mg, Oral, Daily  cyanocobalamin, 1,000 mcg, Oral, Daily      Continuous Infusions:     Vital signs in last 24 hours:  Temp:  [97.7 °F (36.5 °C)-99 °F (37.2 °C)] 99 °F (37.2 °C)  Heart Rate:  [] 126  Resp:  [16-20] 18  BP: ()/(61-92)  "131/92    Intake/Output:    Intake/Output Summary (Last 24 hours) at 9/21/2023 1027  Last data filed at 9/21/2023 0107  Gross per 24 hour   Intake --   Output 1600 ml   Net -1600 ml       Exam:  /92 (BP Location: Left arm, Patient Position: Lying)   Pulse (!) 126   Temp 99 °F (37.2 °C) (Oral)   Resp 18   Ht 172.7 cm (68\")   Wt 93 kg (205 lb)   SpO2 92%   BMI 31.17 kg/m²     General Appearance:    Alert, cooperative, AAOx3                          Head:    Normocephalic, without obvious abnormality, atraumatic                           Eyes:    PERRL, conjunctivae/corneas clear, EOM's intact, both eyes                         Throat:   Oral mucosa pink and moist                           Neck:   Supple, symmetrical, trachea midline, no JVD                         Lungs:    Clear to auscultation bilaterally, respirations unlabored                 Chest Wall:    No tenderness or deformity                          Heart:    Regular/reactive tachycardia rate and rhythm, S1 and S2 normal                  Abdomen:     Soft, nontender, bowel sounds active, truncally obese                 Extremities: Moving all, no cyanosis or edema                        Pulses:   Pulses palpable in all extremities                            Skin:   Skin is warm and dry, nonjaundiced                  Neurologic:   CNII-XII intact, no tremor or focal deficits      Data Review:  Labs in chart were reviewed.    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       Plan:    I am really having " a hard time believing these are truly alcohol DTs versus underlying psychiatric disorder with secondary gain    This patient is completely alert and oriented x3 and tremulous despite poor behaviors noted at bedside that have led to IV Ativan overnight and now reinstitution of CIWA protocol    Counseled patient at bedside as best I could to see what is going on in his home life    Case discussed with RN at bedside -she is able to appreciate my similar concerns as this does not make sense of true alcohol DTs versus manipulative style behavior    Alcohol level admission was negative.  UDS positive for benzos and barbiturates    Lucian Garcia MD  9/21/2023  10:27 EDT

## 2023-09-21 NOTE — NURSING NOTE
"Alert and oriented to self and location. Signs of paranoia and hallucination noted. Patient asked \"What's with all the decorations and why does that lady keep coming in and telling me this is her room?\" Patient noted to guard and frequently rearrange his belongings when was previously unconcerned. Restlessness observed and frequently tries to get up out of bed unassisted. Attempting to call 911. Call placed to [facility] on-call to notify.  "

## 2023-09-21 NOTE — PLAN OF CARE
Pt. Tachycardic at times, VS otherwise wnl. No c/o pain.  Pt. A&O x4 early in the day, hard to tell if patient was showing withdrawal signs early on, however, more confused as the day progressed.  Pt. On CIWA protocol, CIWAs increasingly worse throughout the day, started on Phenobarbital, IV Ativan given in between PRN as ordered. Pt. With brief, incontinent. Pt. Resting comfortably at present, will continue to monitor closely for the remainder of this RN's shift.        Problem: Adult Inpatient Plan of Care  Goal: Plan of Care Review  Outcome: Ongoing, Progressing  Flowsheets (Taken 9/21/2023 4707)  Progress: no change  Plan of Care Reviewed With: patient

## 2023-09-22 PROCEDURE — 25010000002 PHENOBARBITAL PER 120 MG: Performed by: HOSPITALIST

## 2023-09-22 PROCEDURE — 25010000002 LORAZEPAM PER 2 MG: Performed by: HOSPITALIST

## 2023-09-22 PROCEDURE — 94760 N-INVAS EAR/PLS OXIMETRY 1: CPT

## 2023-09-22 PROCEDURE — 94799 UNLISTED PULMONARY SVC/PX: CPT

## 2023-09-22 PROCEDURE — 97162 PT EVAL MOD COMPLEX 30 MIN: CPT

## 2023-09-22 PROCEDURE — 94761 N-INVAS EAR/PLS OXIMETRY MLT: CPT

## 2023-09-22 PROCEDURE — 97530 THERAPEUTIC ACTIVITIES: CPT

## 2023-09-22 PROCEDURE — 94664 DEMO&/EVAL PT USE INHALER: CPT

## 2023-09-22 RX ADMIN — Medication 100 MG: at 10:05

## 2023-09-22 RX ADMIN — AMLODIPINE BESYLATE 2.5 MG: 5 TABLET ORAL at 10:06

## 2023-09-22 RX ADMIN — PHENOBARBITAL SODIUM 65 MG: 65 INJECTION INTRAMUSCULAR at 01:13

## 2023-09-22 RX ADMIN — ZINC OXIDE 1 APPLICATION: 200 OINTMENT TOPICAL at 10:07

## 2023-09-22 RX ADMIN — ZINC OXIDE 1 APPLICATION: 200 OINTMENT TOPICAL at 18:23

## 2023-09-22 RX ADMIN — FAMOTIDINE 20 MG: 20 TABLET, FILM COATED ORAL at 18:19

## 2023-09-22 RX ADMIN — RISPERIDONE 2 MG: 2 TABLET, FILM COATED ORAL at 10:06

## 2023-09-22 RX ADMIN — RISPERIDONE 2 MG: 2 TABLET, FILM COATED ORAL at 20:39

## 2023-09-22 RX ADMIN — POTASSIUM CHLORIDE 20 MEQ: 1.5 POWDER, FOR SOLUTION ORAL at 10:06

## 2023-09-22 RX ADMIN — FLUVOXAMINE MALEATE 100 MG: 50 TABLET, COATED ORAL at 10:06

## 2023-09-22 RX ADMIN — FOLIC ACID 1 MG: 1 TABLET ORAL at 10:06

## 2023-09-22 RX ADMIN — TIOTROPIUM BROMIDE INHALATION SPRAY 2 PUFF: 3.12 SPRAY, METERED RESPIRATORY (INHALATION) at 08:07

## 2023-09-22 RX ADMIN — ZINC OXIDE 1 APPLICATION: 200 OINTMENT TOPICAL at 20:39

## 2023-09-22 RX ADMIN — FAMOTIDINE 20 MG: 20 TABLET, FILM COATED ORAL at 10:06

## 2023-09-22 RX ADMIN — LORAZEPAM 2 MG: 2 INJECTION INTRAMUSCULAR; INTRAVENOUS at 03:08

## 2023-09-22 RX ADMIN — BUDESONIDE AND FORMOTEROL FUMARATE DIHYDRATE 2 PUFF: 160; 4.5 AEROSOL RESPIRATORY (INHALATION) at 08:09

## 2023-09-22 RX ADMIN — Medication 1000 MCG: at 10:06

## 2023-09-22 RX ADMIN — BUDESONIDE AND FORMOTEROL FUMARATE DIHYDRATE 2 PUFF: 160; 4.5 AEROSOL RESPIRATORY (INHALATION) at 21:17

## 2023-09-22 RX ADMIN — Medication 1 TABLET: at 10:06

## 2023-09-22 RX ADMIN — MIRTAZAPINE 15 MG: 15 TABLET, FILM COATED ORAL at 20:39

## 2023-09-22 RX ADMIN — PROPRANOLOL HYDROCHLORIDE 10 MG: 10 TABLET ORAL at 10:06

## 2023-09-22 NOTE — PLAN OF CARE
Goal Outcome Evaluation:  Plan of Care Reviewed With: patient           Outcome Evaluation: Pt. is a 63 year old Male admitted to the hospital with ETOH Abuse.  Pt. very groggy and non-verbal this PM and was unable to provide any past functional history.  Pt. followed simple, one step commands ~ 25% of the time during evaluation.  Pt. presents with decreased strength, decreased balance, and decreased tolerance to functional activity. This PM, pt. requires Max. assist x 2 for bed mobility.  Once sitting EOB, pt. requires Min. assist x 1 for static sitting balance and Mod. assist x 1 for dynamic sitting balance.  Attempted sit <-> stand transfers but pt. required Dep. assist x 2 and was unable to clear his bottom off of the bed.  Pt. will benefit from skilled inpt. P.T. to address his functional deficits and to assist pt. in regaining his maximum level of independence with functional mobility.      Anticipated Discharge Disposition (PT): skilled nursing facility    Patient was not wearing a face mask during this therapy encounter. Therapist used appropriate personal protective equipment including eye protection, mask, and gloves.  Mask used was standard procedure mask. Appropriate PPE was worn during the entire therapy session. Hand hygiene was completed before and after therapy session. Patient is not in enhanced droplet precautions.

## 2023-09-22 NOTE — PROGRESS NOTES
"    DAILY PROGRESS NOTE  [facility]    Patient Identification:  Name: Nilesh Zapata  Age: 63 y.o.  Sex: male  :  1960  MRN: [id number]         Primary Care Physician: Provider, No Known    Subjective:  Interval History: Resting comfortably.  Arousable but a little more sedated today    Objective: In no apparent distress.  No family present    Scheduled Meds:amLODIPine, 2.5 mg, Oral, Daily  budesonide-formoterol, 2 puff, Inhalation, BID - RT   And  tiotropium bromide monohydrate, 2 puff, Inhalation, Daily - RT  famotidine, 20 mg, Oral, BID AC  fluvoxaMINE, 100 mg, Oral, Daily  folic acid, 1 mg, Oral, Daily  hydrocortisone-bacitracin-zinc oxide-nystatin, 1 application , Topical, TID  mirtazapine, 15 mg, Oral, Nightly  multivitamin, 1 tablet, Oral, Daily  PHENobarbital, 65 mg, Intravenous, Once   Followed by  [START ON 2023] PHENobarbital, 32.4 mg, Oral, Once   Followed by  [START ON 2023] PHENobarbital, 32.4 mg, Oral, Once   Followed by  [START ON 2023] PHENobarbital, 32.4 mg, Oral, Once  potassium chloride, 20 mEq, Oral, Daily  propranolol, 10 mg, Oral, Daily  risperiDONE, 2 mg, Oral, BID  thiamine, 100 mg, Oral, Daily  cyanocobalamin, 1,000 mcg, Oral, Daily      Continuous Infusions:     Vital signs in last 24 hours:  Temp:  [97.7 °F (36.5 °C)-99.3 °F (37.4 °C)] 97.7 °F (36.5 °C)  Heart Rate:  [77-98] 78  Resp:  [18-22] 20  BP: (136-161)/() 136/116    Intake/Output:  No intake or output data in the 24 hours ending 23 1007    Exam:  BP (!) 136/116 (BP Location: Right arm, Patient Position: Lying)   Pulse 78   Temp 97.7 °F (36.5 °C) (Oral)   Resp 20   Ht 172.7 cm (68\")   Wt 93 kg (205 lb)   SpO2 96%   BMI 31.17 kg/m²     General Appearance:  Resting quite soundly, little bit sedated and slept through my exam                          head:    Normocephalic, without obvious abnormality, atraumatic                         Throat:   Oral mucosa pink and moist          "                  Neck:   No JVD                         Lungs:    Clear to auscultation bilaterally, respirations unlabored                          Heart:    Regular rate and rhythm, S1 and S2 normal                  Abdomen:     Soft, nontender, bowel sounds active                  Extremities: Moves all, no cyanosis or edema                        Pulses:   Pulses palpable in lower extremities                            Skin:   Skin is warm and dry, nonjaundiced                  Neurologic: Unable to fully evaluate at this time      Data Review:  Labs in chart were reviewed.    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       Plan:    I am really having a hard time believing these are truly alcohol DTs versus underlying psychiatric disorder with secondary gain     This patient is completely alert and oriented x3 and tremulous despite poor behaviors noted at bedside that have led to IV Ativan overnight and now reinstitution of CIWA protocol     Previously counseled patient at bedside as best I could to see what is going on in his home life     RN yesterday who witnessed his behaviors/Case discussed at length.  Agreement with me in regards to less likelihood that this could be real DTs     Alcohol level admission was negative.  UDS positive for benzos and barbiturates        Disposition - will be assuming care of my placed tomorrow and I think it be nice to have a fresh set eyes and get his opinion.  I will defer need for psychiatric consultation to him after his  evaluation   -I have not yet met spouse at bedside    Lucian Garcia MD  9/22/2023  10:07 EDT

## 2023-09-22 NOTE — THERAPY EVALUATION
Patient Name: Nilesh Zapata  : 1960    MRN: [id number]                              Today's Date: 2023       Admit Date: 2023    Visit Dx:     ICD-10-CM ICD-9-CM   1. Alcohol withdrawal syndrome without complication  F10.930 291.81   2. Hypomagnesemia  E83.42 275.2   3. Hypophosphatemia  E83.39 275.3     Patient Active Problem List   Diagnosis    Alcohol withdrawal syndrome with complication    Anxiety    Fatty liver    Tobacco abuse    Kidney cancer, primary, with metastasis from kidney to other site    Alcohol dependence with withdrawal    Hyponatremia    Alcohol abuse    History of renal cell cancer    History of left nephrectomy / RCC    Liver lesion    Lung nodule    Vitamin D deficiency    Under emotional stress     from spouse    Coping style affecting medical condition    Essential hypertension    Acute adjustment disorder with mixed anxiety and depressed mood    Alcoholic liver disease    Hypoxemia    Closed fracture of left superior pubic ramus    Closed fracture of left inferior pubic ramus    Closed fracture of sacrum    Anemia    Hypotension    Contusion of left hip    Alcoholic intoxication without complication    Left acetabular fracture    Fall    COPD (chronic obstructive pulmonary disease)    Alcohol withdrawal    Hypokalemia    Hypomagnesemia    Chronic respiratory failure with hypoxia    COPD (chronic obstructive pulmonary disease)    Essential hypertension, benign     Past Medical History:   Diagnosis Date    Alcohol abuse     Anxiety     Delirium tremens 2019    Fatty liver     FH: kidney cancer     Hypertension      Past Surgical History:   Procedure Laterality Date    APPENDECTOMY      NEPHRECTOMY      left     TONSILLECTOMY        General Information       Row Name 23 1509          Physical Therapy Time and Intention    Document Type evaluation  Pt. admitted with ETOH Abuse  -MS     Mode of Treatment physical therapy;individual therapy  -MS       Row  Name 09/22/23 1509          General Information    Patient Profile Reviewed yes  -MS     Prior Level of Function --  Pt. non-verbal during therapy session and very groggy.  -MS     Existing Precautions/Restrictions fall  Exit alarm  -MS     Barriers to Rehab cognitive status  -MS       Row Name 09/22/23 1509          Cognition    Orientation Status (Cognition) unable/difficult to assess  pt. non-verbal and very groggy  -MS       Row Name 09/22/23 1509          Safety Issues, Functional Mobility    Comment, Safety Issues/Impairments (Mobility) Gait belt used for safety.  -MS               User Key  (r) = Recorded By, (t) = Taken By, (c) = Cosigned By      Initials Name Provider Type    Savage Peterson, PT Physical Therapist                   Mobility       Row Name 09/22/23 1510          Bed Mobility    Bed Mobility supine-sit;sit-supine  -MS     Supine-Sit [place] (Bed Mobility) maximum assist (25% patient effort);2 person assist  -MS     Sit-Supine [place] (Bed Mobility) maximum assist (25% patient effort);2 person assist  -MS     Comment, (Bed Mobility) Once sitting EOB, Pt. requires Min. assist x 1 for static sitting balance and Mod. assist x1 for dynamic sitting balance.  -MS       Row Name 09/22/23 1510          Transfers    Comment, (Transfers) Attempted sit <-> stand transfers at bedside but pt. unable to clear his bottom off of the bed.   -MS       Row Name 09/22/23 1510          Sit-Stand Transfer    Sit-Stand [place] (Transfers) dependent (less than 25% patient effort);2 person assist  -MS     Assistive Device (Sit-Stand Transfers) --  HHA x 2  -MS               User Key  (r) = Recorded By, (t) = Taken By, (c) = Cosigned By      Initials Name Provider Type    Savage Peterson, PT Physical Therapist                   Obj/Interventions       Row Name 09/22/23 1511          Range of Motion Comprehensive    Comment, General Range of Motion Had to assess AAROM as pt. would not follow  AROM commands;  AAROM x 4 extremities (WFL's)  -MS       Row Name 09/22/23 1511          Strength Comprehensive (MMT)    Comment, General Manual Muscle Testing (MMT) Assessment Unable to assess MMT as pt. does not follow AROM commands  -MS               User Key  (r) = Recorded By, (t) = Taken By, (c) = Cosigned By      Initials Name Provider Type    Savage Peterson L, PT Physical Therapist                   Goals/Plan       Row Name 09/22/23 1513          Bed Mobility Goal 1 (PT)    Activity/Assistive Device (Bed Mobility Goal 1, PT) bed mobility activities, all  -MS     [place] Level/Cues Needed (Bed Mobility Goal 1, PT) moderate assist (50-74% patient effort)  -MS     Time Frame (Bed Mobility Goal 1, PT) long term goal (LTG);1 week  -MS       Row Name 09/22/23 1513          Transfer Goal 1 (PT)    Activity/Assistive Device (Transfer Goal 1, PT) transfers, all;walker, rolling  -MS     [place] Level/Cues Needed (Transfer Goal 1, PT) moderate assist (50-74% patient effort)  -MS     Time Frame (Transfer Goal 1, PT) long term goal (LTG);1 week  -MS       Row Name 09/22/23 1513          Gait Training Goal 1 (PT)    Activity/Assistive Device (Gait Training Goal 1, PT) gait (walking locomotion);walker, rolling  -MS     [place] Level (Gait Training Goal 1, PT) moderate assist (50-74% patient effort)  -MS     Distance (Gait Training Goal 1, PT) 10 feet  -MS     Time Frame (Gait Training Goal 1, PT) long term goal (LTG);1 week  -MS     Strategies/Barriers (Gait Training Goal 1, PT) Pending pt. progress  -MS       Row Name 09/22/23 1513          Therapy Assessment/Plan (PT)    Planned Therapy Interventions (PT) balance training;bed mobility training;gait training;home exercise program;patient/family education;postural re-education;transfer training;strengthening;ROM (range of motion)  -MS               User Key  (r) = Recorded By, (t) = Taken By, (c) = Cosigned By      Initials Name Provider Type    MS  Savage Samayoa, PT Physical Therapist                   Clinical Impression       Row Name 09/22/23 1512          Pain    Pretreatment Pain Rating 0/10 - no pain  -MS     Posttreatment Pain Rating 0/10 - no pain  -MS     Pre/Posttreatment Pain Comment No verbal/visual signs of pain during therapy session.  -MS       Row Name 09/22/23 1512          Plan of Care Review    Plan of Care Reviewed With patient  -MS       Row Name 09/22/23 1512          Therapy Assessment/Plan (PT)    Rehab Potential (PT) fair, will monitor progress closely  -MS     Criteria for Skilled Interventions Met (PT) skilled treatment is necessary  -MS     Therapy Frequency (PT) 6 times/wk  -MS       Row Name 09/22/23 1512          Positioning and Restraints    Pre-Treatment Position in bed  -MS     Post Treatment Position bed  -MS     In Bed notified nsg;supine;call light within reach;encouraged to call for assist;exit alarm on  All lines intact.  -MS               User Key  (r) = Recorded By, (t) = Taken By, (c) = Cosigned By      Initials Name Provider Type    MS Savage Samayoa, PT Physical Therapist                   Outcome Measures       Row Name 09/22/23 1513          How much help from another person do you currently need...    Turning from your back to your side while in flat bed without using bedrails? 2  -MS     Moving from lying on back to sitting on the side of a flat bed without bedrails? 2  -MS     Moving to and from a bed to a chair (including a wheelchair)? 1  -MS     Standing up from a chair using your arms (e.g., wheelchair, bedside chair)? 1  -MS     Climbing 3-5 steps with a railing? 1  -MS     To walk in hospital room? 1  -MS     AM-PAC 6 Clicks Score (PT) 8  -MS     Highest level of mobility 3 --> Sat at edge of bed  -MS       Row Name 09/22/23 1513          Functional Assessment    Outcome Measure Options AM-PAC 6 Clicks Basic Mobility (PT)  -MS               User Key  (r) = Recorded By, (t) = Taken By, (c) = Cosigned  By      Initials Name Provider Type    MS Savage Samayoa PT Physical Therapist                                 Physical Therapy Education       Title: PT OT SLP Therapies (In Progress)       Topic: Physical Therapy (In Progress)       Point: Mobility training (In Progress)       Learning Progress Summary             Patient Nonacceptance, E,D, NR by MS at 9/22/2023 1514                         Point: Home exercise program (Not Started)       Learner Progress:  Not documented in this visit.              Point: Body mechanics (In Progress)       Learning Progress Summary             Patient Nonacceptance, E,D, NR by MS at 9/22/2023 1514                         Point: Precautions (In Progress)       Learning Progress Summary             Patient Nonacceptance, E,D, NR by MS at 9/22/2023 1514                                         User Key       Initials Effective Dates Name Provider Type Discipline    MS 06/16/21 -  Savage Samayoa PT Physical Therapist PT                  PT Recommendation and Plan  Planned Therapy Interventions (PT): balance training, bed mobility training, gait training, home exercise program, patient/family education, postural re-education, transfer training, strengthening, ROM (range of motion)  Plan of Care Reviewed With: patient  Outcome Evaluation: Pt. is a 63 year old Male admitted to the hospital with ETOH Abuse.  Pt. very groggy and non-verbal this PM and was unable to provide any past functional history.  Pt. followed simple, one step commands ~ 25% of the time during evaluation.  Pt. presents with decreased strength, decreased balance, and decreased tolerance to functional activity. This PM, pt. requires Max. assist x 2 for bed mobility.  Once sitting EOB, pt. requires Min. assist x 1 for static sitting balance and Mod. assist x 1 for dynamic sitting balance.  Attempted sit <-> stand transfers but pt. required Dep. assist x 2 and was unable to clear his bottom off of the bed.  Pt.  will benefit from skilled inpt. P.T. to address his functional deficits and to assist pt. in regaining his maximum level of independence with functional mobility.     Time Calculation:         PT Charges       Row Name 09/22/23 1518             Time Calculation    Start Time 1310  -MS      Stop Time 1330  -MS      Time Calculation (min) 20 min  -MS      PT Received On 09/22/23  -MS      PT - Next Appointment 09/23/23  -MS      PT Goal Re-Cert Due Date 09/29/23  -MS         Time Calculation- PT    Total Timed Code Minutes- PT 19 minute(s)  -MS                User Key  (r) = Recorded By, (t) = Taken By, (c) = Cosigned By      Initials Name Provider Type    Savage Peterson, PT Physical Therapist                  Therapy Charges for Today       Code Description Service Date Service Provider Modifiers Qty    63052561796 HC PT EVAL MOD COMPLEXITY 2 9/22/2023 Savage Samayoa, PT GP 1    23069091656 HC PT THERAPEUTIC ACT EA 15 MIN 9/22/2023 Savage Samayoa, PT GP 1    84663356874 HC PT THER SUPP EA 15 MIN 9/22/2023 Savage Samayoa, PT GP 1            PT G-Codes  Outcome Measure Options: AM-PAC 6 Clicks Basic Mobility (PT)  AM-PAC 6 Clicks Score (PT): 8  PT Discharge Summary  Anticipated Discharge Disposition (PT): skilled nursing facility    Savage Samayoa, PT  9/22/2023

## 2023-09-23 PROCEDURE — 94799 UNLISTED PULMONARY SVC/PX: CPT

## 2023-09-23 PROCEDURE — 94664 DEMO&/EVAL PT USE INHALER: CPT

## 2023-09-23 PROCEDURE — 94761 N-INVAS EAR/PLS OXIMETRY MLT: CPT

## 2023-09-23 RX ADMIN — Medication 100 MG: at 09:55

## 2023-09-23 RX ADMIN — MIRTAZAPINE 15 MG: 15 TABLET, FILM COATED ORAL at 21:02

## 2023-09-23 RX ADMIN — ZINC OXIDE 1 APPLICATION: 200 OINTMENT TOPICAL at 15:01

## 2023-09-23 RX ADMIN — ZINC OXIDE 1 APPLICATION: 200 OINTMENT TOPICAL at 17:04

## 2023-09-23 RX ADMIN — FAMOTIDINE 20 MG: 20 TABLET, FILM COATED ORAL at 17:04

## 2023-09-23 RX ADMIN — ZINC OXIDE 1 APPLICATION: 200 OINTMENT TOPICAL at 21:03

## 2023-09-23 RX ADMIN — BUDESONIDE AND FORMOTEROL FUMARATE DIHYDRATE 2 PUFF: 160; 4.5 AEROSOL RESPIRATORY (INHALATION) at 20:07

## 2023-09-23 RX ADMIN — TIOTROPIUM BROMIDE INHALATION SPRAY 2 PUFF: 3.12 SPRAY, METERED RESPIRATORY (INHALATION) at 07:29

## 2023-09-23 RX ADMIN — POTASSIUM CHLORIDE 20 MEQ: 1.5 POWDER, FOR SOLUTION ORAL at 09:55

## 2023-09-23 RX ADMIN — Medication 1 TABLET: at 09:55

## 2023-09-23 RX ADMIN — Medication 1000 MCG: at 09:55

## 2023-09-23 RX ADMIN — PROPRANOLOL HYDROCHLORIDE 10 MG: 10 TABLET ORAL at 09:55

## 2023-09-23 RX ADMIN — RISPERIDONE 2 MG: 2 TABLET, FILM COATED ORAL at 09:54

## 2023-09-23 RX ADMIN — FOLIC ACID 1 MG: 1 TABLET ORAL at 09:55

## 2023-09-23 RX ADMIN — FLUVOXAMINE MALEATE 100 MG: 50 TABLET, COATED ORAL at 09:55

## 2023-09-23 RX ADMIN — BUDESONIDE AND FORMOTEROL FUMARATE DIHYDRATE 2 PUFF: 160; 4.5 AEROSOL RESPIRATORY (INHALATION) at 07:28

## 2023-09-23 RX ADMIN — RISPERIDONE 2 MG: 2 TABLET, FILM COATED ORAL at 21:02

## 2023-09-23 RX ADMIN — FAMOTIDINE 20 MG: 20 TABLET, FILM COATED ORAL at 09:55

## 2023-09-23 RX ADMIN — AMLODIPINE BESYLATE 2.5 MG: 5 TABLET ORAL at 09:55

## 2023-09-23 NOTE — PROGRESS NOTES
Name: Nilesh Zapata ADMIT: 2023   : 1960  PCP: Provider, No Known    MRN: [id number] LOS: 1 days   AGE/SEX: 63 y.o. male  ROOM: [facility]     Subjective   Subjective   Patient is seen at bedside, no new complaints.       Objective   Objective   Vital Signs  Temp:  [97 °F (36.1 °C)-97.6 °F (36.4 °C)] 97 °F (36.1 °C)  Heart Rate:  [78-95] 78  Resp:  [18-20] 18  BP: (104-121)/(71-84) 104/71  SpO2:  [93 %-95 %] 94 %  on  Flow (L/min):  [2] 2;   Device (Oxygen Therapy): nasal cannula  Body mass index is 31.17 kg/m².  Physical Exam  General, awake and alert.  Head and ENT, normocephalic and atraumatic.  Lungs, symmetric expansion, equal air entry bilaterally.  Heart, regular rate and rhythm.  Abdomen, soft and nontender.  Extremities, no clubbing or cyanosis.  Neuro, no focal deficits.  Skin: Warm and no rash.  Psych, normal mood and affect.  Musculoskeletal, joint examination is grossly normal.    Copied text material from yesterday's note has been reviewed for appropriate changes and remains accurate as of 23.      Results Review     I reviewed the patient's new clinical results.  Results from last 7 days   Lab Units 23  03123  0941   WBC 10*3/mm3 8.65 11.82*   HEMOGLOBIN g/dL 13.1 15.0   PLATELETS 10*3/mm3 165 250     Results from last 7 days   Lab Units 23  0941   SODIUM mmol/L 141   POTASSIUM mmol/L 4.0   CHLORIDE mmol/L 98   CO2 mmol/L 25.0   BUN mg/dL 13   CREATININE mg/dL 0.98   GLUCOSE mg/dL 117*   EGFR mL/min/1.73 85.6     Results from last 7 days   Lab Units 23  0941   ALBUMIN g/dL 4.3   BILIRUBIN mg/dL 0.2   ALK PHOS U/L 119*   AST (SGOT) U/L 38   ALT (SGPT) U/L 30     Results from last 7 days   Lab Units 23  03123  0941   CALCIUM mg/dL  --  9.9   ALBUMIN g/dL  --  4.3   MAGNESIUM mg/dL 3.4* 1.3*   PHOSPHORUS mg/dL 2.8 2.1*       No results found for: HGBA1C, POCGLU    No radiology results for the last day    I have personally reviewed all  medications:  Scheduled Medications  amLODIPine, 2.5 mg, Oral, Daily  budesonide-formoterol, 2 puff, Inhalation, BID - RT   And  tiotropium bromide monohydrate, 2 puff, Inhalation, Daily - RT  famotidine, 20 mg, Oral, BID AC  fluvoxaMINE, 100 mg, Oral, Daily  folic acid, 1 mg, Oral, Daily  hydrocortisone-bacitracin-zinc oxide-nystatin, 1 application , Topical, TID  mirtazapine, 15 mg, Oral, Nightly  multivitamin, 1 tablet, Oral, Daily  PHENobarbital, 65 mg, Intravenous, Once   Followed by  PHENobarbital, 32.4 mg, Oral, Once   Followed by  PHENobarbital, 32.4 mg, Oral, Once   Followed by  [START ON 9/24/2023] PHENobarbital, 32.4 mg, Oral, Once  potassium chloride, 20 mEq, Oral, Daily  propranolol, 10 mg, Oral, Daily  risperiDONE, 2 mg, Oral, BID  thiamine, 100 mg, Oral, Daily  cyanocobalamin, 1,000 mcg, Oral, Daily    Infusions   Diet  Diet: Regular/House Diet; Texture: Regular Texture (IDDSI 7); Fluid Consistency: Thin (IDDSI 0)    I have personally reviewed:  [x]  Laboratory   [x]  Microbiology   [x]  Radiology   [x]  EKG/Telemetry  [x]  Cardiology/Vascular   []  Pathology    []  Records       Assessment/Plan     Active Hospital Problems    Diagnosis  POA    **Alcohol abuse [F10.10]  Yes    Hypomagnesemia [E83.42]  Yes    COPD (chronic obstructive pulmonary disease) [J44.9]  Yes    Essential hypertension [I10]  Yes    History of renal cell cancer [Z85.528]  Not Applicable    History of left nephrectomy 2/2 RCC [Z90.5]  Not Applicable    Hyponatremia [E87.1]  Yes    Tobacco abuse [Z72.0]  Yes    Fatty liver [K76.0]  Yes      Resolved Hospital Problems   No resolved problems to display.       63 y.o. male admitted with Alcohol abuse.    Assessment and plan  1.  Alcohol abuse, continue CIWA protocol.  Monitor for signs of withdrawal.  He is awake and alert at the time of my evaluation.     2.  Polysubstance abuse, UDS positive for benzos and barbiturates, will get psych evaluation.    3.  COPD, hypertension, renal  cell cancer, chronic conditions.    4.  CODE STATUS full.  Further plans based on hospital course.      Yaakov Lopez MD  [facility]  09/23/23  16:32 EDT

## 2023-09-23 NOTE — PLAN OF CARE
Goal Outcome Evaluation:  Plan of Care Reviewed With: patient        Progress: no change  Outcome Evaluation: VSS: 2L remain; Pt is alert and oriented, but very drowsy; wife called for update; will continue to monitor

## 2023-09-24 LAB
ALBUMIN SERPL-MCNC: 3.8 G/DL (ref 3.5–5.2)
ALBUMIN/GLOB SERPL: 1.2 G/DL
ALP SERPL-CCNC: 101 U/L (ref 39–117)
ALT SERPL W P-5'-P-CCNC: 17 U/L (ref 1–41)
ANION GAP SERPL CALCULATED.3IONS-SCNC: 11 MMOL/L (ref 5–15)
AST SERPL-CCNC: 26 U/L (ref 1–40)
BASOPHILS # BLD AUTO: 0.04 10*3/MM3 (ref 0–0.2)
BASOPHILS NFR BLD AUTO: 0.4 % (ref 0–1.5)
BILIRUB SERPL-MCNC: 0.2 MG/DL (ref 0–1.2)
BUN SERPL-MCNC: 21 MG/DL (ref 8–23)
BUN/CREAT SERPL: 22.1 (ref 7–25)
CALCIUM SPEC-SCNC: 9.4 MG/DL (ref 8.6–10.5)
CHLORIDE SERPL-SCNC: 100 MMOL/L (ref 98–107)
CO2 SERPL-SCNC: 26 MMOL/L (ref 22–29)
CREAT SERPL-MCNC: 0.95 MG/DL (ref 0.76–1.27)
DEPRECATED RDW RBC AUTO: 41.9 FL (ref 37–54)
EGFRCR SERPLBLD CKD-EPI 2021: 88.8 ML/MIN/1.73
EOSINOPHIL # BLD AUTO: 0.26 10*3/MM3 (ref 0–0.4)
EOSINOPHIL NFR BLD AUTO: 2.6 % (ref 0.3–6.2)
ERYTHROCYTE [DISTWIDTH] IN BLOOD BY AUTOMATED COUNT: 12.3 % (ref 12.3–15.4)
GLOBULIN UR ELPH-MCNC: 3.2 GM/DL
GLUCOSE BLDC GLUCOMTR-MCNC: 121 MG/DL (ref 70–130)
GLUCOSE SERPL-MCNC: 92 MG/DL (ref 65–99)
HCT VFR BLD AUTO: 40.7 % (ref 37.5–51)
HGB BLD-MCNC: 14.1 G/DL (ref 13–17.7)
IMM GRANULOCYTES # BLD AUTO: 0.02 10*3/MM3 (ref 0–0.05)
IMM GRANULOCYTES NFR BLD AUTO: 0.2 % (ref 0–0.5)
LYMPHOCYTES # BLD AUTO: 2.03 10*3/MM3 (ref 0.7–3.1)
LYMPHOCYTES NFR BLD AUTO: 20.1 % (ref 19.6–45.3)
MCH RBC QN AUTO: 32.1 PG (ref 26.6–33)
MCHC RBC AUTO-ENTMCNC: 34.6 G/DL (ref 31.5–35.7)
MCV RBC AUTO: 92.7 FL (ref 79–97)
MONOCYTES # BLD AUTO: 1.17 10*3/MM3 (ref 0.1–0.9)
MONOCYTES NFR BLD AUTO: 11.6 % (ref 5–12)
NEUTROPHILS NFR BLD AUTO: 6.6 10*3/MM3 (ref 1.7–7)
NEUTROPHILS NFR BLD AUTO: 65.1 % (ref 42.7–76)
NRBC BLD AUTO-RTO: 0 /100 WBC (ref 0–0.2)
PLATELET # BLD AUTO: 198 10*3/MM3 (ref 140–450)
PMV BLD AUTO: 10.5 FL (ref 6–12)
POTASSIUM SERPL-SCNC: 3.9 MMOL/L (ref 3.5–5.2)
PROT SERPL-MCNC: 7 G/DL (ref 6–8.5)
RBC # BLD AUTO: 4.39 10*6/MM3 (ref 4.14–5.8)
SODIUM SERPL-SCNC: 137 MMOL/L (ref 136–145)
WBC NRBC COR # BLD: 10.12 10*3/MM3 (ref 3.4–10.8)

## 2023-09-24 PROCEDURE — 94799 UNLISTED PULMONARY SVC/PX: CPT

## 2023-09-24 PROCEDURE — 97110 THERAPEUTIC EXERCISES: CPT

## 2023-09-24 PROCEDURE — 94664 DEMO&/EVAL PT USE INHALER: CPT

## 2023-09-24 PROCEDURE — 85025 COMPLETE CBC W/AUTO DIFF WBC: CPT | Performed by: INTERNAL MEDICINE

## 2023-09-24 PROCEDURE — 94760 N-INVAS EAR/PLS OXIMETRY 1: CPT

## 2023-09-24 PROCEDURE — 82948 REAGENT STRIP/BLOOD GLUCOSE: CPT

## 2023-09-24 PROCEDURE — 80053 COMPREHEN METABOLIC PANEL: CPT | Performed by: INTERNAL MEDICINE

## 2023-09-24 PROCEDURE — 94761 N-INVAS EAR/PLS OXIMETRY MLT: CPT

## 2023-09-24 RX ADMIN — MIRTAZAPINE 15 MG: 15 TABLET, FILM COATED ORAL at 20:35

## 2023-09-24 RX ADMIN — BUDESONIDE AND FORMOTEROL FUMARATE DIHYDRATE 2 PUFF: 160; 4.5 AEROSOL RESPIRATORY (INHALATION) at 20:12

## 2023-09-24 RX ADMIN — FOLIC ACID 1 MG: 1 TABLET ORAL at 08:16

## 2023-09-24 RX ADMIN — POTASSIUM CHLORIDE 20 MEQ: 1.5 POWDER, FOR SOLUTION ORAL at 08:17

## 2023-09-24 RX ADMIN — PHENOBARBITAL 32.4 MG: 32.4 TABLET ORAL at 02:42

## 2023-09-24 RX ADMIN — RISPERIDONE 2 MG: 2 TABLET, FILM COATED ORAL at 08:17

## 2023-09-24 RX ADMIN — HYDROXYZINE HYDROCHLORIDE 25 MG: 25 TABLET ORAL at 23:09

## 2023-09-24 RX ADMIN — TIOTROPIUM BROMIDE INHALATION SPRAY 2 PUFF: 3.12 SPRAY, METERED RESPIRATORY (INHALATION) at 08:13

## 2023-09-24 RX ADMIN — FLUVOXAMINE MALEATE 100 MG: 50 TABLET, COATED ORAL at 08:19

## 2023-09-24 RX ADMIN — Medication 100 MG: at 08:16

## 2023-09-24 RX ADMIN — Medication 1 TABLET: at 08:16

## 2023-09-24 RX ADMIN — ZINC OXIDE 1 APPLICATION: 200 OINTMENT TOPICAL at 20:36

## 2023-09-24 RX ADMIN — ACETAMINOPHEN 650 MG: 325 TABLET, FILM COATED ORAL at 00:47

## 2023-09-24 RX ADMIN — Medication 1000 MCG: at 08:16

## 2023-09-24 RX ADMIN — FAMOTIDINE 20 MG: 20 TABLET, FILM COATED ORAL at 08:17

## 2023-09-24 RX ADMIN — PROPRANOLOL HYDROCHLORIDE 10 MG: 10 TABLET ORAL at 08:17

## 2023-09-24 RX ADMIN — RISPERIDONE 2 MG: 2 TABLET, FILM COATED ORAL at 20:35

## 2023-09-24 RX ADMIN — AMLODIPINE BESYLATE 2.5 MG: 5 TABLET ORAL at 08:18

## 2023-09-24 RX ADMIN — ZINC OXIDE 1 APPLICATION: 200 OINTMENT TOPICAL at 10:00

## 2023-09-24 RX ADMIN — BUDESONIDE AND FORMOTEROL FUMARATE DIHYDRATE 2 PUFF: 160; 4.5 AEROSOL RESPIRATORY (INHALATION) at 08:12

## 2023-09-24 NOTE — PROGRESS NOTES
Name: Nilesh Zapata ADMIT: 2023   : 1960  PCP: Provider, No Known    MRN: [id number] LOS: 2 days   AGE/SEX: 63 y.o. male  ROOM: [facility]/     Subjective   Subjective   Patient is seen at bedside, no new complaints.       Objective   Objective   Vital Signs  Temp:  [97.5 °F (36.4 °C)-98.6 °F (37 °C)] 98.2 °F (36.8 °C)  Heart Rate:  [] 78  Resp:  [18-20] 18  BP: (108-146)/(65-97) 108/65  SpO2:  [91 %-93 %] 91 %  on  Flow (L/min):  [2] 2;   Device (Oxygen Therapy): room air  Body mass index is 31.17 kg/m².  Physical Exam  General, awake and alert.  Head and ENT, normocephalic and atraumatic.  Lungs, symmetric expansion, equal air entry bilaterally.  Heart, regular rate and rhythm.  Abdomen, soft and nontender.  Extremities, no clubbing or cyanosis.  Neuro, no focal deficits.  Skin: Warm and no rash.  Psych, normal mood and affect.  Musculoskeletal, joint examination is grossly normal.    Copied text material from yesterday's note has been reviewed for appropriate changes and remains accurate as of 23.        Results Review     I reviewed the patient's new clinical results.  Results from last 7 days   Lab Units 23  0514 23  0317 23  0941   WBC 10*3/mm3 10.12 8.65 11.82*   HEMOGLOBIN g/dL 14.1 13.1 15.0   PLATELETS 10*3/mm3 198 165 250     Results from last 7 days   Lab Units 23  0514 23  0941   SODIUM mmol/L 137 141   POTASSIUM mmol/L 3.9 4.0   CHLORIDE mmol/L 100 98   CO2 mmol/L 26.0 25.0   BUN mg/dL 21 13   CREATININE mg/dL 0.95 0.98   GLUCOSE mg/dL 92 117*   EGFR mL/min/1.73 88.8 85.6     Results from last 7 days   Lab Units 23  0514 23  0941   ALBUMIN g/dL 3.8 4.3   BILIRUBIN mg/dL 0.2 0.2   ALK PHOS U/L 101 119*   AST (SGOT) U/L 26 38   ALT (SGPT) U/L 17 30     Results from last 7 days   Lab Units 23  0514 23  0317 23  0941   CALCIUM mg/dL 9.4  --  9.9   ALBUMIN g/dL 3.8  --  4.3   MAGNESIUM mg/dL  --  3.4* 1.3*   PHOSPHORUS mg/dL   --  2.8 2.1*       Glucose   Date/Time Value Ref Range Status   09/24/2023 1118 121 70 - 130 mg/dL Final       No radiology results for the last day    I have personally reviewed all medications:  Scheduled Medications  amLODIPine, 2.5 mg, Oral, Daily  budesonide-formoterol, 2 puff, Inhalation, BID - RT   And  tiotropium bromide monohydrate, 2 puff, Inhalation, Daily - RT  famotidine, 20 mg, Oral, BID AC  fluvoxaMINE, 100 mg, Oral, Daily  folic acid, 1 mg, Oral, Daily  hydrocortisone-bacitracin-zinc oxide-nystatin, 1 application , Topical, TID  mirtazapine, 15 mg, Oral, Nightly  multivitamin, 1 tablet, Oral, Daily  PHENobarbital, 65 mg, Intravenous, Once   Followed by  PHENobarbital, 32.4 mg, Oral, Once   Followed by  PHENobarbital, 32.4 mg, Oral, Once  potassium chloride, 20 mEq, Oral, Daily  propranolol, 10 mg, Oral, Daily  risperiDONE, 2 mg, Oral, BID  thiamine, 100 mg, Oral, Daily  cyanocobalamin, 1,000 mcg, Oral, Daily    Infusions   Diet  Diet: Regular/House Diet; Texture: Regular Texture (IDDSI 7); Fluid Consistency: Thin (IDDSI 0)    I have personally reviewed:  [x]  Laboratory   [x]  Microbiology   [x]  Radiology   [x]  EKG/Telemetry  [x]  Cardiology/Vascular   []  Pathology    []  Records       Assessment/Plan     Active Hospital Problems    Diagnosis  POA    **Alcohol abuse [F10.10]  Yes    Hypomagnesemia [E83.42]  Yes    COPD (chronic obstructive pulmonary disease) [J44.9]  Yes    Essential hypertension [I10]  Yes    History of renal cell cancer [Z85.528]  Not Applicable    History of left nephrectomy 2/2 RCC [Z90.5]  Not Applicable    Hyponatremia [E87.1]  Yes    Tobacco abuse [Z72.0]  Yes    Fatty liver [K76.0]  Yes      Resolved Hospital Problems   No resolved problems to display.       63 y.o. male admitted with Alcohol abuse.    Assessment and plan  1.  Alcohol abuse, continue CIWA protocol.  Monitor for signs of withdrawal.  He is awake and alert at the time of my evaluation.      2.  Polysubstance  abuse, UDS positive for benzos and barbiturates, will get psych evaluation.     3.  COPD, hypertension, renal cell cancer, chronic conditions.     4.  CODE STATUS full.  Further plans based on hospital course.         Yaakov Lopez MD  [facility]  09/24/23  15:42 EDT

## 2023-09-24 NOTE — THERAPY TREATMENT NOTE
Patient Name: Nilesh Zapata  : 1960    MRN: [id number]                              Today's Date: 2023       Admit Date: 2023    Visit Dx:     ICD-10-CM ICD-9-CM   1. Alcohol withdrawal syndrome without complication  F10.930 291.81   2. Hypomagnesemia  E83.42 275.2   3. Hypophosphatemia  E83.39 275.3     Patient Active Problem List   Diagnosis    Alcohol withdrawal syndrome with complication    Anxiety    Fatty liver    Tobacco abuse    Kidney cancer, primary, with metastasis from kidney to other site    Alcohol dependence with withdrawal    Hyponatremia    Alcohol abuse    History of renal cell cancer    History of left nephrectomy / RCC    Liver lesion    Lung nodule    Vitamin D deficiency    Under emotional stress     from spouse    Coping style affecting medical condition    Essential hypertension    Acute adjustment disorder with mixed anxiety and depressed mood    Alcoholic liver disease    Hypoxemia    Closed fracture of left superior pubic ramus    Closed fracture of left inferior pubic ramus    Closed fracture of sacrum    Anemia    Hypotension    Contusion of left hip    Alcoholic intoxication without complication    Left acetabular fracture    Fall    COPD (chronic obstructive pulmonary disease)    Alcohol withdrawal    Hypokalemia    Hypomagnesemia    Chronic respiratory failure with hypoxia    COPD (chronic obstructive pulmonary disease)    Essential hypertension, benign     Past Medical History:   Diagnosis Date    Alcohol abuse     Anxiety     Delirium tremens 2019    Fatty liver     FH: kidney cancer     Hypertension      Past Surgical History:   Procedure Laterality Date    APPENDECTOMY      NEPHRECTOMY      left     TONSILLECTOMY        General Information       Row Name 23 1246          Physical Therapy Time and Intention    Document Type therapy note (daily note)  -AR     Mode of Treatment physical therapy  -AR       Row Name 23 1246          General  Information    Patient Profile Reviewed yes  -AR     Existing Precautions/Restrictions fall  -AR               User Key  (r) = Recorded By, (t) = Taken By, (c) = Cosigned By      Initials Name Provider Type    Dede Trimble, PT Physical Therapist                   Mobility       Row Name 09/24/23 1247          Bed Mobility    Supine-Sit [place] (Bed Mobility) standby assist  -AR     Sit-Supine [place] (Bed Mobility) not tested  -AR     Assistive Device (Bed Mobility) bed rails;head of bed elevated  -AR       Row Name 09/24/23 1247          Sit-Stand Transfer    Sit-Stand [place] (Transfers) contact guard  -AR     Assistive Device (Sit-Stand Transfers) walker, front-wheeled  -AR       Row Name 09/24/23 1247          Gait/Stairs (Locomotion)    [place] Level (Gait) contact guard  -AR     Assistive Device (Gait) walker, front-wheeled  -AR     Distance in Feet (Gait) 100' slow shuffling steps, heavy UE support on walker  -AR     Deviations/Abnormal Patterns (Gait) festinating/shuffling;ousmane decreased  -AR     Bilateral Gait Deviations forward flexed posture;heel strike decreased  -AR               User Key  (r) = Recorded By, (t) = Taken By, (c) = Cosigned By      Initials Name Provider Type    Dede Trimble, PT Physical Therapist                   Obj/Interventions       Row Name 09/24/23 1248          Balance    Balance Assessment standing dynamic balance  -AR     Dynamic Standing Balance minimal assist  -AR     Position/Device Used, Standing Balance walker, rolling  -AR               User Key  (r) = Recorded By, (t) = Taken By, (c) = Cosigned By      Initials Name Provider Type    Dede Trimble PT Physical Therapist                   Goals/Plan    No documentation.                  Clinical Impression       Row Name 09/24/23 1248          Pain    Pretreatment Pain Rating 0/10 - no pain  -AR     Posttreatment Pain Rating 0/10 - no pain  -AR     Pain Intervention(s)  Repositioned  -AR       Row Name 09/24/23 1248          Plan of Care Review    Outcome Evaluation Improved activity tolerance during PT today.  Able to ambulate 100' w/ contact assist using RW, heavy UE support on walker.  Recommend sitting in chair few hours/day, ambulating with staff.  Anticipate DC to home with assist as needed, possible  need for walker.  -AR       Row Name 09/24/23 1248          Therapy Assessment/Plan (PT)    Therapy Frequency (PT) 5 times/wk  -AR       Row Name 09/24/23 1248          Vital Signs    O2 Delivery Pre Treatment room air  -AR       Row Name 09/24/23 1248          Positioning and Restraints    Pre-Treatment Position in bed  -AR     Post Treatment Position chair  -AR     In Chair notified nsg;sitting;reclined;call light within reach;encouraged to call for assist;exit alarm on  -AR               User Key  (r) = Recorded By, (t) = Taken By, (c) = Cosigned By      Initials Name Provider Type    Dede Trimble, PT Physical Therapist                   Outcome Measures       Row Name 09/24/23 1249 09/24/23 0730       How much help from another person do you currently need...    Turning from your back to your side while in flat bed without using bedrails? 4  -AR 4  -SF    Moving from lying on back to sitting on the side of a flat bed without bedrails? 3  -AR 4  -SF    Moving to and from a bed to a chair (including a wheelchair)? 3  -AR 3  -SF    Standing up from a chair using your arms (e.g., wheelchair, bedside chair)? 3  -AR 3  -SF    Climbing 3-5 steps with a railing? 3  -AR 2  -SF    To walk in hospital room? 3  -AR 3  -SF    AM-PAC 6 Clicks Score (PT) 19  -AR 19  -SF    Highest level of mobility 6 --> Walked 10 steps or more  -AR 6 --> Walked 10 steps or more  -SF      Row Name 09/24/23 1249          Functional Assessment    Outcome Measure Options AM-PAC 6 Clicks Basic Mobility (PT)  -AR               User Key  (r) = Recorded By, (t) = Taken By, (c) = Cosigned By      Initials  Name Provider Type    AR Dede Syed, PT Physical Therapist    Emma Montague RN Registered Nurse                                 Physical Therapy Education       Title: PT OT SLP Therapies (In Progress)       Topic: Physical Therapy (In Progress)       Point: Mobility training (In Progress)       Learning Progress Summary             Patient Acceptance, E, NR by AR at 9/24/2023 1249    Nonacceptance, E,D, NR by MS at 9/22/2023 1514                         Point: Home exercise program (In Progress)       Learning Progress Summary             Patient Acceptance, E, NR by AR at 9/24/2023 1249                         Point: Body mechanics (In Progress)       Learning Progress Summary             Patient Acceptance, E, NR by AR at 9/24/2023 1249    Nonacceptance, E,D, NR by MS at 9/22/2023 1514                         Point: Precautions (In Progress)       Learning Progress Summary             Patient Acceptance, E, NR by AR at 9/24/2023 1249    Nonacceptance, E,D, NR by MS at 9/22/2023 1514                                         User Key       Initials Effective Dates Name Provider Type Discipline    MS 06/16/21 -  [place] PT Physical Therapist PT    AR 06/16/21 -  Dede Syed PT Physical Therapist PT                  PT Recommendation and Plan     Outcome Evaluation: Improved activity tolerance during PT today.  Able to ambulate 100' w/ contact assist using RW, heavy UE support on walker.  Recommend sitting in chair few hours/day, ambulating with staff.  Anticipate DC to home with assist as needed, possible  need for walker.     Time Calculation:         PT Charges       Row Name 09/24/23 1247             Time Calculation    Start Time 0915  -AR      Stop Time 0939  -AR      Time Calculation (min) 24 min  -AR      PT Received On 09/24/23  -AR      PT - Next Appointment 09/25/23  -AR                User Key  (r) = Recorded By, (t) = Taken By, (c) = Cosigned By      Initials Name Provider Type     AR Dede Syed, PT Physical Therapist                  Therapy Charges for Today       Code Description Service Date Service Provider Modifiers Qty    84729456475 HC PT THER PROC EA 15 MIN 9/24/2023 Dede Syed, PT GP 2            PT G-Codes  Outcome Measure Options: AM-PAC 6 Clicks Basic Mobility (PT)  AM-PAC 6 Clicks Score (PT): 19  PT Discharge Summary  Anticipated Discharge Disposition (PT): home with assist, home with home health    Dede Syed PT  9/24/2023

## 2023-09-24 NOTE — CONSULTS
IDENTIFYING INFORMATION: The patient is a 63-year-old white male with a longstanding history of alcohol dependence readmitted for detox    CHIEF COMPLAINT: None given    INFORMANT: Staff and chart, patient does not awaken for interview    RELIABILITY: Good    HISTORY OF PRESENT ILLNESS: The patient is a 63-year-old white male with a history of multiple previous admissions to this facility related to his ongoing abuse of alcohol.  He continues to drink a pint to 1/5 of hard liquor on a daily basis.  He has been treated for DTs and alcohol withdrawal seizure in the past and has been seen on 2 previous occasions by this physician in consultation.  The patient's nurse reports that today the patient is doing well.  His last CIWA score was 1 and his detox appears to be near completion.  On 921, however, the patient had exhibited some confusion and attempted to get out of bed.  He has repeatedly declined offers of post discharge chemical dependence treatment per the chart.    PAST PSYCHIATRIC HISTORY: The patient been seen by this psychiatrist on 2 previous occasions.  He is currently prescribed Luvox but it is unclear who is the prescriber of this medication.    PAST MEDICAL HISTORY: Significant for hypertension, GERD, history of withdrawal seizure, fatty liver, history of kidney cancer, hypertension    MEDICATIONS:   Current Facility-Administered Medications   Medication Dose Route Frequency Provider Last Rate Last Admin    acetaminophen (TYLENOL) tablet 650 mg  650 mg Oral Q6H PRN Lucian Garcia MD   650 mg at 09/24/23 0047    amLODIPine (NORVASC) tablet 2.5 mg  2.5 mg Oral Daily Lucian Garcia MD   2.5 mg at 09/24/23 0818    budesonide-formoterol (SYMBICORT) 160-4.5 MCG/ACT inhaler 2 puff  2 puff Inhalation BID - RT Lucian Garcia MD   2 puff at 09/24/23 0812    And    tiotropium (SPIRIVA RESPIMAT) 2.5 mcg/act aerosol solution inhaler  2 puff Inhalation Daily - RT Lucian Garcia MD   2 puff at  09/24/23 0813    famotidine (PEPCID) tablet 20 mg  20 mg Oral BID AC Lucian Garcia MD   20 mg at 09/24/23 0817    fluvoxaMINE (LUVOX) tablet 100 mg  100 mg Oral Daily Lucian Garcia MD   100 mg at 09/24/23 0819    folic acid (FOLVITE) tablet 1 mg  1 mg Oral Daily Lucian Garcia MD   1 mg at 09/24/23 0816    hydrocortisone-bacitracin-zinc oxide-nystatin (MAGIC BARRIER) ointment 1 application   1 application  Topical TID Lucian Garcia MD   1 application  at 09/23/23 2103    hydrOXYzine (ATARAX) tablet 25 mg  25 mg Oral Q8H PRN Lucian Garcia MD   25 mg at 09/21/23 0557    LORazepam (ATIVAN) tablet 1 mg  1 mg Oral Q1H PRN Lucian Garcia MD        Or    LORazepam (ATIVAN) injection 1 mg  1 mg Intravenous Q1H PRN Lucian Garcia MD        Or    LORazepam (ATIVAN) tablet 2 mg  2 mg Oral Q1H PRN Lucian Garcia MD        Or    LORazepam (ATIVAN) injection 2 mg  2 mg Intravenous Q1H PRN Lucian Garcia MD   2 mg at 09/22/23 0308    Or    LORazepam (ATIVAN) injection 2 mg  2 mg Intravenous Q15 Min PRN Lucian Garcia MD   2 mg at 09/21/23 1537    Or    LORazepam (ATIVAN) injection 2 mg  2 mg Intramuscular Q15 Min PRN Lucian Garcia MD        mirtazapine (REMERON) tablet 15 mg  15 mg Oral Nightly Lucian Garcia MD   15 mg at 09/23/23 2102    multivitamin (THERAGRAN) tablet 1 tablet  1 tablet Oral Daily Lucian Garcia MD   1 tablet at 09/24/23 0816    ondansetron (ZOFRAN) tablet 4 mg  4 mg Oral Q6H PRN Lucian Garcia MD        Or    ondansetron (ZOFRAN) injection 4 mg  4 mg Intravenous Q6H PRN Lucian Garcia MD        PHENobarbital injection 65 mg  65 mg Intravenous Once Lucian Garcia MD        Followed by    PHENobarbital (LUMINAL) tablet 32.4 mg  32.4 mg Oral Once Lucian Garcia MD        Followed by    PHENobarbital (LUMINAL) tablet 32.4 mg  32.4 mg Oral Once Lucian Garcia MD        potassium chloride (KLOR-CON)  packet 20 mEq  20 mEq Oral Daily Lucian Garcia MD   20 mEq at 09/24/23 0817    propranolol (INDERAL) tablet 10 mg  10 mg Oral Daily Lucian Garcia MD   10 mg at 09/24/23 0817    risperiDONE (risperDAL) tablet 2 mg  2 mg Oral BID Lucian Garcia MD   2 mg at 09/24/23 0817    sodium chloride 0.9 % flush 10 mL  10 mL Intravenous PRN Yamel Luevano MD   10 mL at 09/21/23 0845    thiamine (VITAMIN B-1) tablet 100 mg  100 mg Oral Daily Lucian Garcia MD   100 mg at 09/24/23 0816    vitamin B-12 (CYANOCOBALAMIN) tablet 1,000 mcg  1,000 mcg Oral Daily Lucian Garcia MD   1,000 mcg at 09/24/23 0816         ALLERGIES: None    FAMILY HISTORY: Noncontributory    SOCIAL HISTORY: Alcohol use as described previously    MENTAL STATUS EXAM: The patient is a soundly sleeping white male who is snoring loudly and cannot be awakened for interview.    ASSETS/LIABILITIES: To be assessed/ongoing alcohol use next field    DIAGNOSTIC IMPRESSION: Alcohol use disorder, delirium tremens resolved, medical problems as previously described    PLAN: It appears as though the patient's detox is essentially complete as his nurse reports that he has been fully oriented, cooperative and that his last CIWA score was 1.  I will again ask the access center to provide the patient with information regarding post discharge chemical dependence treatment options.

## 2023-09-24 NOTE — PLAN OF CARE
Goal Outcome Evaluation:              Outcome Evaluation: Improved activity tolerance during PT today.  Able to ambulate 100' w/ contact assist using RW, heavy UE support on walker.  Recommend sitting in chair few hours/day, ambulating with staff.  Anticipate DC to home with assist as needed, possible  need for walker.      Anticipated Discharge Disposition (PT): home with assist, home with home health

## 2023-09-24 NOTE — PLAN OF CARE
Goal Outcome Evaluation:      Pt. Alert, oriented x3, Pt.staying in bed quietly until this time, no behavior noted, no anxiety noted, perineal care given, treatment done to buttocks area,  v/s stable, 02 sats down to 87 , 88, oxygen in placed @2l/m per n/c, kept o2sats over 90%, no s/s acute distress noted

## 2023-09-25 ENCOUNTER — READMISSION MANAGEMENT (OUTPATIENT)
Dept: CALL CENTER | Facility: HOSPITAL | Age: 63
End: 2023-09-25

## 2023-09-25 VITALS
RESPIRATION RATE: 18 BRPM | WEIGHT: 205 LBS | SYSTOLIC BLOOD PRESSURE: 94 MMHG | OXYGEN SATURATION: 91 % | DIASTOLIC BLOOD PRESSURE: 60 MMHG | BODY MASS INDEX: 31.07 KG/M2 | HEIGHT: 68 IN | HEART RATE: 77 BPM | TEMPERATURE: 98.4 F

## 2023-09-25 LAB
ALBUMIN SERPL-MCNC: 3.8 G/DL (ref 3.5–5.2)
ALBUMIN/GLOB SERPL: 1.2 G/DL
ALP SERPL-CCNC: 102 U/L (ref 39–117)
ALT SERPL W P-5'-P-CCNC: 18 U/L (ref 1–41)
ANION GAP SERPL CALCULATED.3IONS-SCNC: 12 MMOL/L (ref 5–15)
AST SERPL-CCNC: 24 U/L (ref 1–40)
BASOPHILS # BLD AUTO: 0.04 10*3/MM3 (ref 0–0.2)
BASOPHILS NFR BLD AUTO: 0.4 % (ref 0–1.5)
BILIRUB SERPL-MCNC: 0.3 MG/DL (ref 0–1.2)
BUN SERPL-MCNC: 19 MG/DL (ref 8–23)
BUN/CREAT SERPL: 18.8 (ref 7–25)
CALCIUM SPEC-SCNC: 9.4 MG/DL (ref 8.6–10.5)
CHLORIDE SERPL-SCNC: 98 MMOL/L (ref 98–107)
CO2 SERPL-SCNC: 26 MMOL/L (ref 22–29)
CREAT SERPL-MCNC: 1.01 MG/DL (ref 0.76–1.27)
DEPRECATED RDW RBC AUTO: 44.6 FL (ref 37–54)
EGFRCR SERPLBLD CKD-EPI 2021: 82.5 ML/MIN/1.73
EOSINOPHIL # BLD AUTO: 0.28 10*3/MM3 (ref 0–0.4)
EOSINOPHIL NFR BLD AUTO: 2.7 % (ref 0.3–6.2)
ERYTHROCYTE [DISTWIDTH] IN BLOOD BY AUTOMATED COUNT: 12.6 % (ref 12.3–15.4)
GLOBULIN UR ELPH-MCNC: 3.2 GM/DL
GLUCOSE SERPL-MCNC: 86 MG/DL (ref 65–99)
HCT VFR BLD AUTO: 41 % (ref 37.5–51)
HGB BLD-MCNC: 13.9 G/DL (ref 13–17.7)
IMM GRANULOCYTES # BLD AUTO: 0.03 10*3/MM3 (ref 0–0.05)
IMM GRANULOCYTES NFR BLD AUTO: 0.3 % (ref 0–0.5)
LYMPHOCYTES # BLD AUTO: 1.78 10*3/MM3 (ref 0.7–3.1)
LYMPHOCYTES NFR BLD AUTO: 17.2 % (ref 19.6–45.3)
MCH RBC QN AUTO: 32.3 PG (ref 26.6–33)
MCHC RBC AUTO-ENTMCNC: 33.9 G/DL (ref 31.5–35.7)
MCV RBC AUTO: 95.3 FL (ref 79–97)
MONOCYTES # BLD AUTO: 1.23 10*3/MM3 (ref 0.1–0.9)
MONOCYTES NFR BLD AUTO: 11.9 % (ref 5–12)
NEUTROPHILS NFR BLD AUTO: 6.97 10*3/MM3 (ref 1.7–7)
NEUTROPHILS NFR BLD AUTO: 67.5 % (ref 42.7–76)
NRBC BLD AUTO-RTO: 0 /100 WBC (ref 0–0.2)
PLATELET # BLD AUTO: 182 10*3/MM3 (ref 140–450)
PMV BLD AUTO: 10.4 FL (ref 6–12)
POTASSIUM SERPL-SCNC: 4.2 MMOL/L (ref 3.5–5.2)
PROT SERPL-MCNC: 7 G/DL (ref 6–8.5)
RBC # BLD AUTO: 4.3 10*6/MM3 (ref 4.14–5.8)
SODIUM SERPL-SCNC: 136 MMOL/L (ref 136–145)
WBC NRBC COR # BLD: 10.33 10*3/MM3 (ref 3.4–10.8)

## 2023-09-25 PROCEDURE — 94799 UNLISTED PULMONARY SVC/PX: CPT

## 2023-09-25 PROCEDURE — 94761 N-INVAS EAR/PLS OXIMETRY MLT: CPT

## 2023-09-25 PROCEDURE — 94664 DEMO&/EVAL PT USE INHALER: CPT

## 2023-09-25 PROCEDURE — 97530 THERAPEUTIC ACTIVITIES: CPT

## 2023-09-25 PROCEDURE — 85025 COMPLETE CBC W/AUTO DIFF WBC: CPT | Performed by: INTERNAL MEDICINE

## 2023-09-25 PROCEDURE — 80053 COMPREHEN METABOLIC PANEL: CPT | Performed by: INTERNAL MEDICINE

## 2023-09-25 RX ADMIN — PROPRANOLOL HYDROCHLORIDE 10 MG: 10 TABLET ORAL at 08:59

## 2023-09-25 RX ADMIN — FOLIC ACID 1 MG: 1 TABLET ORAL at 08:59

## 2023-09-25 RX ADMIN — ZINC OXIDE 1 APPLICATION: 200 OINTMENT TOPICAL at 16:24

## 2023-09-25 RX ADMIN — HYDROXYZINE HYDROCHLORIDE 25 MG: 25 TABLET ORAL at 08:59

## 2023-09-25 RX ADMIN — FAMOTIDINE 20 MG: 20 TABLET, FILM COATED ORAL at 16:30

## 2023-09-25 RX ADMIN — AMLODIPINE BESYLATE 2.5 MG: 5 TABLET ORAL at 08:57

## 2023-09-25 RX ADMIN — Medication 1 TABLET: at 08:59

## 2023-09-25 RX ADMIN — BUDESONIDE AND FORMOTEROL FUMARATE DIHYDRATE 2 PUFF: 160; 4.5 AEROSOL RESPIRATORY (INHALATION) at 09:16

## 2023-09-25 RX ADMIN — FLUVOXAMINE MALEATE 100 MG: 50 TABLET, COATED ORAL at 08:59

## 2023-09-25 RX ADMIN — Medication 100 MG: at 08:59

## 2023-09-25 RX ADMIN — ZINC OXIDE 1 APPLICATION: 200 OINTMENT TOPICAL at 08:59

## 2023-09-25 RX ADMIN — TIOTROPIUM BROMIDE INHALATION SPRAY 2 PUFF: 3.12 SPRAY, METERED RESPIRATORY (INHALATION) at 09:16

## 2023-09-25 RX ADMIN — FAMOTIDINE 20 MG: 20 TABLET, FILM COATED ORAL at 08:58

## 2023-09-25 RX ADMIN — POTASSIUM CHLORIDE 20 MEQ: 1.5 POWDER, FOR SOLUTION ORAL at 08:59

## 2023-09-25 RX ADMIN — RISPERIDONE 2 MG: 2 TABLET, FILM COATED ORAL at 08:58

## 2023-09-25 RX ADMIN — Medication 1000 MCG: at 08:59

## 2023-09-25 NOTE — DISCHARGE SUMMARY
[facility]    Date of Discharge:  9/25/2023    PCP: Provider, No Known    Discharge Diagnosis:   Active Hospital Problems    Diagnosis  POA    **Alcohol abuse [F10.10]  Yes    Hypomagnesemia [E83.42]  Yes    COPD (chronic obstructive pulmonary disease) [J44.9]  Yes    Essential hypertension [I10]  Yes    History of renal cell cancer [Z85.528]  Not Applicable    History of left nephrectomy 2/2 RCC [Z90.5]  Not Applicable    Hyponatremia [E87.1]  Yes    Tobacco abuse [Z72.0]  Yes    Fatty liver [K76.0]  Yes      Resolved Hospital Problems   No resolved problems to display.          Consults       Date and Time Order Name Status Description    9/23/2023  4:36 PM Inpatient Psychiatrist Consult Completed     9/19/2023 11:20 AM [facility] (on-call MD unless specified) Details            Hospital Course  63 y.o. male   1.  Alcohol abuse, continue CIWA protocol.  Monitored for signs of withdrawal.  He is awake and alert at the time of my evaluation.  He is stable to discharge.     2.  Polysubstance abuse, UDS positive for benzos and barbiturates, psychiatry also cleared him for DC     3.  COPD, hypertension, renal cell cancer, chronic conditions.    4.  He will follow-up with PCP in 7 days. The patient is awake and alert and exhibits no signs of alcohol withdrawal when seen today. He states that he plans to seek a 30-day residential treatment program at \"[facility]\" on [facility] following discharge.         Temp:  [98.2 °F (36.8 °C)-98.8 °F (37.1 °C)] 98.4 °F (36.9 °C)  Heart Rate:  [76-98] 77  Resp:  [18] 18  BP: ()/(60-92) 94/60  Body mass index is 31.17 kg/m².    Physical Exam  General, awake and alert.  Head and ENT, normocephalic and atraumatic.  Lungs, symmetric expansion, equal air entry bilaterally.  Heart, regular rate and rhythm.  Abdomen, soft and nontender.  Extremities, no clubbing or cyanosis.  Neuro, no focal deficits.  Skin: Warm and no " rash.  Psych, normal mood and affect.  Musculoskeletal, joint examination is grossly normal.    Disposition: Home or Self Care       Discharge Medications        Changes to Medications        Instructions Start Date   cloNIDine 0.1 MG tablet  Commonly known as: CATAPRES  What changed: when to take this   0.1 mg, Oral, Every 4 Hours PRN             Continue These Medications        Instructions Start Date   amLODIPine 5 MG tablet  Commonly known as: NORVASC   2.5 mg, Oral, Daily      cyanocobalamin 1000 MCG tablet  Commonly known as: VITAMIN B-12   1,000 mcg, Oral, Daily      famotidine 20 MG tablet  Commonly known as: PEPCID   20 mg, Oral, 2 Times Daily Before Meals      fluvoxaMINE 25 MG tablet  Commonly known as: LUVOX   25 mg, Oral, Daily      folic acid 1 MG tablet  Commonly known as: FOLVITE   1 mg, Oral, Daily      hydrOXYzine 25 MG tablet  Commonly known as: ATARAX   25 mg, Oral, 2 Times Daily PRN      melatonin 3 MG tablet   3 mg, Oral, Nightly PRN      mirtazapine 15 MG tablet  Commonly known as: REMERON   15 mg, Oral, Nightly      O2  Commonly known as: OXYGEN   2 L/min, Inhalation, As Needed, use PRN for SOB      POTASSIUM CHLORIDE PO   20 mEq, Oral, Daily      propranolol 10 MG tablet  Commonly known as: INDERAL   10 mg, Oral, Daily      risperiDONE 2 MG tablet  Commonly known as: risperDAL   2 mg, Oral, 2 Times Daily      Trelegy Ellipta 100-62.5-25 MCG/ACT inhaler  Generic drug: Fluticasone-Umeclidin-Vilant   1 puff, Inhalation, Daily - RT                Additional Instructions for the Follow-ups that You Need to Schedule       Discharge Follow-up with PCP   As directed       Currently Documented PCP:    Provider, No Known    PCP Phone Number:    [phone]     Follow Up Details: Follow-up with PCP in 7 days.               Follow-up Information       Provider, No Known .    Why: Follow-up with PCP in 7 days.  Contact information:  [facility] [address] [phone]                           No future appointments.     Yaakov Lopez MD  [facility]  09/25/23    Discharge time spent greater than 30 minutes.

## 2023-09-25 NOTE — CASE MANAGEMENT/SOCIAL WORK
Case Management Discharge Note      Final Note: [facility] via family, no additional CCP needs.         Selected Continued Care - Admitted Since 9/19/2023       Destination    No services have been selected for the patient.                Durable Medical Equipment    No services have been selected for the patient.                Dialysis/Infusion    No services have been selected for the patient.                Home Medical Care    No services have been selected for the patient.                Therapy    No services have been selected for the patient.                Community Resources    No services have been selected for the patient.                Community & DME    No services have been selected for the patient.                    Selected Continued Care - Prior Encounters Includes continued care and service providers with selected services from prior encounters from 6/21/2023 to 9/25/2023      Discharged on 7/7/2023 Admission date: 7/2/2023 - Discharge disposition: Home-Health Care Svc      Home Medical Care       Service Provider Selected Services Address Phone Fax Patient Preferred    [facility] [address] [facility] [address] [address] [id number] [phone] --                               Final Discharge Disposition Code: 01 - home or self-care

## 2023-09-25 NOTE — PLAN OF CARE
Goal Outcome Evaluation:  Plan of Care Reviewed With: patient        Progress: improving  Outcome Evaluation: Pt seen by PT this PM for tx. Pt affect flat although agreeable to PT and following directions and cues when given. Pt performed bed mobility req SBA. Pt stood w/ CGa/min A and use of fww. Pt made progress w/ amb around nsg station req CGA and use of fww. Pt slow and mildly unsteady w/  no overt LOB. Fatigue limited distance. Pt performed ther ex for strengthening before returning to bed w/ SBA. PT will prog as pt krissy.

## 2023-09-25 NOTE — PLAN OF CARE
Goal Outcome Evaluation:  Plan of Care Reviewed With: patient        Progress: improving  Outcome Evaluation: CIWA 0. Denies pain. PRN atarax x1 for c/o anxiety. See MAR.VSS          Detail Level: Detailed

## 2023-09-25 NOTE — PROGRESS NOTES
"The patient is awake and alert and exhibits no signs of alcohol withdrawal when seen today.  He states that he plans to seek a 30-day residential treatment program at \"[facility]\" on [facility] following discharge.  I have strongly encouraged him to follow through with this plan.  "

## 2023-09-25 NOTE — CASE MANAGEMENT/SOCIAL WORK
Discharge Planning Assessment  [facility]     Patient Name: Nilesh Zapata  MRN: [id number]  Today's Date: 9/25/2023    Admit Date: 9/19/2023    Plan: [facility] when medically ready   Discharge Needs Assessment       Row Name 09/25/23 1435       Living Environment    People in Home alone    Current Living Arrangements apartment    Family Caregiver if Needed spouse    Quality of Family Relationships helpful;involved;supportive    Able to Return to Prior Arrangements no       Transition Planning    Patient/Family Anticipates Transition to inpatient rehabilitation facility    Patient/Family Anticipated Services at Transition rehabilitation services    Transportation Anticipated family or friend will provide       Discharge Needs Assessment    Readmission Within the Last 30 Days previous discharge plan unsuccessful    Current Outpatient/Agency/Support Group other (see comments)    Equipment Currently Used at Home none    Concerns to be Addressed discharge planning    Equipment Needed After Discharge none    Outpatient/Agency/Support Group Needs inpatient rehabilitation facility                   Discharge Plan       Row Name 09/25/23 1436       Plan    Plan [facility] when medically ready    Patient/Family in Agreement with Plan yes    Plan Comments CCP met with pt at bedside, introduced self and role of CCP. Face sheet information and pharmacy verified. Pt lives in a single-story apartment alone. Pt still drives, IADL's and denies DME. Pt denies having a living will. Pt is enrolled in meds to beds and denies trouble affording or managing his medications. Pt has used [facility] in the past and has been to [facility] for SNF. Pt stated DC plan is to DC to [facility] program. Pt asked that CCP fax DC summary to [facility] at [phone]. Pt states his wife will transport. Yudelka BROWN/ARASELI                  Continued Care and Services - Admitted Since 9/19/2023     Coordination has not been started for this encounter.       Selected Continued Care - Prior Encounters Includes continued care and service providers with selected services from prior encounters from 6/21/2023 to 9/25/2023      Discharged on 7/7/2023 Admission date: 7/2/2023 - Discharge disposition: Home-Health Care c      Home Medical Care       Service Provider Selected Services Address Phone Fax Patient Preferred    [facility] [address] [address] [phone] --                          Expected Discharge Date and Time       Expected Discharge Date Expected Discharge Time    Sep 26, 2023            Demographic Summary    No documentation.                  Functional Status       Row Name 09/25/23 1434       Functional Status    Usual Activity Tolerance good    Current Activity Tolerance moderate       Assessment of Health Literacy    How often do you have someone help you read hospital materials? Never    How often do you have problems learning about your medical condition because of difficulty understanding written information? Never    How often do you have a problem understanding what is told to you about your medical condition? Never    How confident are you filling out medical forms by yourself? Quite a bit    Health Literacy Good       Functional Status, IADL    Medications independent    Meal Preparation independent    Housekeeping independent    Laundry independent    Shopping independent                               Brittney Tse, KEVIN

## 2023-09-25 NOTE — THERAPY TREATMENT NOTE
Patient Name: Nilesh Zapata  : 1960    MRN: [id number]                              Today's Date: 2023       Admit Date: 2023    Visit Dx:     ICD-10-CM ICD-9-CM   1. Alcohol withdrawal syndrome without complication  F10.930 291.81   2. Hypomagnesemia  E83.42 275.2   3. Hypophosphatemia  E83.39 275.3     Patient Active Problem List   Diagnosis    Alcohol withdrawal syndrome with complication    Anxiety    Fatty liver    Tobacco abuse    Kidney cancer, primary, with metastasis from kidney to other site    Alcohol dependence with withdrawal    Hyponatremia    Alcohol abuse    History of renal cell cancer    History of left nephrectomy / RCC    Liver lesion    Lung nodule    Vitamin D deficiency    Under emotional stress     from spouse    Coping style affecting medical condition    Essential hypertension    Acute adjustment disorder with mixed anxiety and depressed mood    Alcoholic liver disease    Hypoxemia    Closed fracture of left superior pubic ramus    Closed fracture of left inferior pubic ramus    Closed fracture of sacrum    Anemia    Hypotension    Contusion of left hip    Alcoholic intoxication without complication    Left acetabular fracture    Fall    COPD (chronic obstructive pulmonary disease)    Alcohol withdrawal    Hypokalemia    Hypomagnesemia    Chronic respiratory failure with hypoxia    COPD (chronic obstructive pulmonary disease)    Essential hypertension, benign     Past Medical History:   Diagnosis Date    Alcohol abuse     Anxiety     Delirium tremens 2019    Fatty liver     FH: kidney cancer     Hypertension      Past Surgical History:   Procedure Laterality Date    APPENDECTOMY      NEPHRECTOMY      left     TONSILLECTOMY        General Information       Row Name 23 1538          Physical Therapy Time and Intention    Document Type therapy note (daily note)  -PH     Mode of Treatment physical therapy  -PH       Row Name 23 1538          General  Information    Existing Precautions/Restrictions fall  -PH       Row Name 09/25/23 1538          Cognition    Orientation Status (Cognition) oriented x 3  -PH       Row Name 09/25/23 1538          Safety Issues, Functional Mobility    Impairments Affecting Function (Mobility) balance;endurance/activity tolerance;strength  -PH     Comment, Safety Issues/Impairments (Mobility) gt belt and non skid socks donned  -PH               User Key  (r) = Recorded By, (t) = Taken By, (c) = Cosigned By      Initials Name Provider Type     Licha Wagner PTA Physical Therapist Assistant                   Mobility       Row Name 09/25/23 1538          Bed Mobility    Bed Mobility bed mobility (all) activities  -PH     All Activities, [place] (Bed Mobility) standby assist  -PH       Row Name 09/25/23 1538          Transfers    Comment, (Transfers) pt SBA for sit bal at EOB  -       Row Name 09/25/23 1538          Sit-Stand Transfer    Sit-Stand [place] (Transfers) contact guard;verbal cues  -PH     Assistive Device (Sit-Stand Transfers) walker, front-wheeled  -PH       Row Name 09/25/23 1538          Gait/Stairs (Locomotion)    [place] Level (Gait) contact guard  -PH     Assistive Device (Gait) walker, front-wheeled  -PH     Distance in Feet (Gait) 150'  -PH     Deviations/Abnormal Patterns (Gait) ousmane decreased;gait speed decreased;stride length decreased;base of support, wide  -PH     Bilateral Gait Deviations forward flexed posture  -PH     [place] Level (Stairs) not tested  -PH     Comment, (Gait/Stairs) slow w/ no overt LOB; mild unsteadiness noted  -PH               User Key  (r) = Recorded By, (t) = Taken By, (c) = Cosigned By      Initials Name Provider Type     Licha Wagner PTA Physical Therapist Assistant                   Obj/Interventions       Row Name 09/25/23 1540          Motor Skills    Therapeutic Exercise other (see comments)  BAP, LAQ,kevin stephenson shldr  flexion; x 10 reps  -PH       Row Name 09/25/23 1540          Balance    Balance Assessment sitting static balance;sitting dynamic balance;standing static balance  -PH     Static Sitting Balance standby assist  -PH     Dynamic Sitting Balance standby assist  -PH     Static Standing Balance contact guard  -PH     Position/Device Used, Standing Balance walker, front-wheeled  -PH               User Key  (r) = Recorded By, (t) = Taken By, (c) = Cosigned By      Initials Name Provider Type     Licha Wagner PTA Physical Therapist Assistant                   Goals/Plan    No documentation.                  Clinical Impression       Row Name 09/25/23 1541          Pain    Pretreatment Pain Rating 0/10 - no pain  -PH     Posttreatment Pain Rating 0/10 - no pain  -PH       Row Name 09/25/23 1541          Plan of Care Review    Plan of Care Reviewed With patient  -PH     Progress improving  -PH     Outcome Evaluation Pt seen by PT this PM for tx. Pt affect flat although agreeable to PT and following directions and cues when given. Pt performed bed mobility req SBA. Pt stood w/ CGa/min A and use of fww. Pt made progress w/ amb around nsg station req CGA and use of fww. Pt slow and mildly unsteady w/  no overt LOB. Fatigue limited distance. Pt performed ther ex for strengthening before returning to bed w/ SBA. PT will prog as pt krissy.  -PH       Row Name 09/25/23 1541          Vital Signs    O2 Delivery Pre Treatment room air  -PH     O2 Delivery Intra Treatment room air  -PH     O2 Delivery Post Treatment room air  -PH       Row Name 09/25/23 1541          Positioning and Restraints    Pre-Treatment Position in bed  -PH     Post Treatment Position bed  -PH     In Bed fowlers;call light within reach;encouraged to call for assist;exit alarm on;notified nsg  -PH               User Key  (r) = Recorded By, (t) = Taken By, (c) = Cosigned By      Initials Name Provider Type    PH Licha Wagner PTA Physical  Therapist Assistant                   Outcome Measures       Row Name 09/25/23 1544          How much help from another person do you currently need...    Turning from your back to your side while in flat bed without using bedrails? 4  -PH     Moving from lying on back to sitting on the side of a flat bed without bedrails? 3  -PH     Moving to and from a bed to a chair (including a wheelchair)? 3  -PH     Standing up from a chair using your arms (e.g., wheelchair, bedside chair)? 3  -PH     Climbing 3-5 steps with a railing? 2  -PH     To walk in hospital room? 3  -PH     AM-PAC 6 Clicks Score (PT) 18  -PH     Highest level of mobility 6 --> Walked 10 steps or more  -PH       Row Name 09/25/23 1544          Functional Assessment    Outcome Measure Options AM-PAC 6 Clicks Basic Mobility (PT)  -PH               User Key  (r) = Recorded By, (t) = Taken By, (c) = Cosigned By      Initials Name Provider Type    Licha Denise PTA Physical Therapist Assistant                                 Physical Therapy Education       Title: PT OT SLP Therapies (Done)       Topic: Physical Therapy (Done)       Point: Mobility training (Done)       Learning Progress Summary             Patient Acceptance, E,TB,D, VU,NR by  at 9/25/2023 1544    Acceptance, E, NR by AR at 9/24/2023 1249    Nonacceptance, E,D, NR by MS at 9/22/2023 1514                         Point: Home exercise program (Done)       Learning Progress Summary             Patient Acceptance, E,TB,D, VU,NR by  at 9/25/2023 1544    Acceptance, E, NR by AR at 9/24/2023 1249                         Point: Body mechanics (Done)       Learning Progress Summary             Patient Acceptance, E,TB,D, VU,NR by  at 9/25/2023 1544    Acceptance, E, NR by AR at 9/24/2023 1249    Nonacceptance, E,D, NR by MS at 9/22/2023 1514                         Point: Precautions (Done)       Learning Progress Summary             Patient Acceptance, E,TB,D, VU,NR by  at  9/25/2023 1544    Acceptance, E, NR by AR at 9/24/2023 1249    Nonacceptance, E,D, NR by MS at 9/22/2023 1514                                         User Key       Initials Effective Dates Name Provider Type Discipline    MS 06/16/21 -  Savage Samayoa, PT Physical Therapist PT    AR 06/16/21 -  Dede Syed, PT Physical Therapist PT    PH 06/16/21 -  Licha Wagner PTA Physical Therapist Assistant PT                  PT Recommendation and Plan     Plan of Care Reviewed With: patient  Progress: improving  Outcome Evaluation: Pt seen by PT this PM for tx. Pt affect flat although agreeable to PT and following directions and cues when given. Pt performed bed mobility req SBA. Pt stood w/ CGa/min A and use of fww. Pt made progress w/ amb around nsg station req CGA and use of fww. Pt slow and mildly unsteady w/  no overt LOB. Fatigue limited distance. Pt performed ther ex for strengthening before returning to bed w/ SBA. PT will prog as pt krissy.     Time Calculation:         PT Charges       Row Name 09/25/23 1544             Time Calculation    Start Time 1525  -PH      Stop Time 1537  -PH      Time Calculation (min) 12 min  -PH      PT Received On 09/25/23  -PH      PT - Next Appointment 09/26/23  -PH         Timed Charges    95090 - PT Therapeutic Exercise Minutes 4  -PH      73012 - PT Therapeutic Activity Minutes 8  -PH         Total Minutes    Timed Charges Total Minutes 12  -PH       Total Minutes 12  -PH                User Key  (r) = Recorded By, (t) = Taken By, (c) = Cosigned By      Initials Name Provider Type    PH Licha Wagner PTA Physical Therapist Assistant                  Therapy Charges for Today       Code Description Service Date Service Provider Modifiers Qty    15314910582 HC PT THERAPEUTIC ACT EA 15 MIN 9/25/2023 Licha Wagner PTA GP 1            PT G-Codes  Outcome Measure Options: AM-PAC 6 Clicks Basic Mobility (PT)  AM-PAC 6 Clicks Score (PT): 18        Licha Wagner, PTA  9/25/2023

## 2023-09-25 NOTE — NURSING NOTE
Pt discharged today accompanied for spouse, peripheral IV removed, pt took his personal items to home, paperwork faxed to [facility], a copy of AVS and med list was provided.

## 2023-09-26 NOTE — OUTREACH NOTE
Prep Survey      Flowsheet Row Responses   Nondenominational facility patient discharged from? [facility]   Is LACE score < 7 ? Yes   Eligibility Readm Mgmt   Discharge diagnosis alcohol abuse   Does the patient have one of the following disease processes/diagnoses(primary or secondary)? Other   Does the patient have Home health ordered? No   Is there a DME ordered? No   Prep survey completed? Yes            PABLO DUFF - Registered Nurse